# Patient Record
Sex: FEMALE | Race: WHITE | NOT HISPANIC OR LATINO | Employment: OTHER | URBAN - METROPOLITAN AREA
[De-identification: names, ages, dates, MRNs, and addresses within clinical notes are randomized per-mention and may not be internally consistent; named-entity substitution may affect disease eponyms.]

---

## 2017-01-23 RX ORDER — VALSARTAN 320 MG
TABLET ORAL
Qty: 90 TABLET | Refills: 1 | Status: SHIPPED | OUTPATIENT
Start: 2017-01-23 | End: 2017-09-30 | Stop reason: SDUPTHER

## 2017-01-23 RX ORDER — FUROSEMIDE 20 MG/1
TABLET ORAL
Qty: 180 TABLET | Refills: 1 | Status: SHIPPED | OUTPATIENT
Start: 2017-01-23 | End: 2017-07-25

## 2017-01-23 RX ORDER — FLUTICASONE PROPIONATE 50 MCG
SPRAY, SUSPENSION (ML) NASAL
Qty: 16 G | Refills: 1 | Status: SHIPPED | OUTPATIENT
Start: 2017-01-23 | End: 2017-07-31 | Stop reason: SDUPTHER

## 2017-01-23 RX ORDER — NIFEDIPINE 30 MG/1
TABLET, FILM COATED, EXTENDED RELEASE ORAL
Qty: 90 TABLET | Refills: 1 | Status: SHIPPED | OUTPATIENT
Start: 2017-01-23 | End: 2017-08-18 | Stop reason: SDUPTHER

## 2017-01-23 RX ORDER — POTASSIUM CHLORIDE 750 MG/1
TABLET, FILM COATED, EXTENDED RELEASE ORAL
Qty: 90 TABLET | Refills: 1 | Status: SHIPPED | OUTPATIENT
Start: 2017-01-23 | End: 2017-02-16

## 2017-01-23 RX ORDER — CYCLOBENZAPRINE HCL 10 MG
TABLET ORAL
Qty: 30 TABLET | Refills: 0 | Status: SHIPPED | OUTPATIENT
Start: 2017-01-23 | End: 2017-02-22 | Stop reason: SDUPTHER

## 2017-01-23 RX ORDER — ALLOPURINOL 300 MG/1
TABLET ORAL
Qty: 90 TABLET | Refills: 1 | Status: SHIPPED | OUTPATIENT
Start: 2017-01-23 | End: 2017-09-30 | Stop reason: SDUPTHER

## 2017-02-16 ENCOUNTER — OFFICE VISIT (OUTPATIENT)
Dept: FAMILY MEDICINE CLINIC | Facility: CLINIC | Age: 77
End: 2017-02-16

## 2017-02-16 VITALS
WEIGHT: 186.6 LBS | HEIGHT: 62 IN | HEART RATE: 82 BPM | BODY MASS INDEX: 34.34 KG/M2 | TEMPERATURE: 98 F | RESPIRATION RATE: 18 BRPM | SYSTOLIC BLOOD PRESSURE: 140 MMHG | OXYGEN SATURATION: 92 % | DIASTOLIC BLOOD PRESSURE: 70 MMHG

## 2017-02-16 DIAGNOSIS — R19.7 DIARRHEA, UNSPECIFIED TYPE: Primary | ICD-10-CM

## 2017-02-16 DIAGNOSIS — I10 ESSENTIAL HYPERTENSION: ICD-10-CM

## 2017-02-16 PROCEDURE — 99213 OFFICE O/P EST LOW 20 MIN: CPT | Performed by: INTERNAL MEDICINE

## 2017-02-16 NOTE — PROGRESS NOTES
"Subjective   Patient ID: Miri Yung is a 76 y.o. female presents with   Chief Complaint   Patient presents with   • Diarrhea       HPI - this patient presents today with complaints of loose stool off and on for the last few weeks.  She's had no fever or abdominal pain or cramps or blood.  Normal colonoscopy back in 2014.  She is taking magnesium and she drinks a lot of milk.    Assessment plan    Diarrhea-recommend going on a lactose-free diet for a week and see if that makes a diagnosis if not hold the magnesium.    Hypertension controlled with current regimen.    Allergies   Allergen Reactions   • Decongestant  [Pseudoephedrine]        The following portions of the patient's history were reviewed and updated as appropriate: allergies, current medications, past family history, past medical history, past social history, past surgical history and problem list.      Review of Systems   Constitutional: Negative.  Negative for fever.   Gastrointestinal: Positive for diarrhea. Negative for abdominal pain, blood in stool, constipation, nausea and vomiting.       Objective     Vitals:    02/16/17 1002   BP: 140/70   Pulse: 82   Resp: 18   Temp: 98 °F (36.7 °C)   TempSrc: Oral   SpO2: 92%   Weight: 186 lb 9.6 oz (84.6 kg)   Height: 61.5\" (156.2 cm)         Physical Exam   Constitutional: She appears well-developed and well-nourished.   Cardiovascular: Normal rate, regular rhythm and normal heart sounds.    Pulmonary/Chest: Effort normal and breath sounds normal.   Abdominal: Soft. She exhibits no distension. There is no tenderness.   Psychiatric: She has a normal mood and affect. Her behavior is normal.   Nursing note and vitals reviewed.        Miri was seen today for diarrhea.    Diagnoses and all orders for this visit:    Diarrhea, unspecified type    Essential hypertension        Call or return to clinic prn if these symptoms worsen or fail to improve as anticipated.  "

## 2017-02-22 RX ORDER — CYCLOBENZAPRINE HCL 10 MG
10 TABLET ORAL 3 TIMES DAILY PRN
Qty: 90 TABLET | Refills: 0 | Status: SHIPPED | OUTPATIENT
Start: 2017-02-22 | End: 2017-04-28 | Stop reason: SDUPTHER

## 2017-04-10 RX ORDER — METOPROLOL SUCCINATE 100 MG/1
TABLET, EXTENDED RELEASE ORAL
Qty: 90 TABLET | Refills: 1 | Status: SHIPPED | OUTPATIENT
Start: 2017-04-10 | End: 2017-08-24 | Stop reason: ALTCHOICE

## 2017-05-01 RX ORDER — CYCLOBENZAPRINE HCL 10 MG
TABLET ORAL
Qty: 90 TABLET | Refills: 0 | Status: SHIPPED | OUTPATIENT
Start: 2017-05-01 | End: 2017-07-31 | Stop reason: SDUPTHER

## 2017-05-22 ENCOUNTER — OFFICE VISIT (OUTPATIENT)
Dept: FAMILY MEDICINE CLINIC | Facility: CLINIC | Age: 77
End: 2017-05-22

## 2017-05-22 VITALS
BODY MASS INDEX: 35.9 KG/M2 | OXYGEN SATURATION: 90 % | RESPIRATION RATE: 18 BRPM | WEIGHT: 195.1 LBS | TEMPERATURE: 98.4 F | HEART RATE: 78 BPM | HEIGHT: 62 IN | SYSTOLIC BLOOD PRESSURE: 124 MMHG | DIASTOLIC BLOOD PRESSURE: 68 MMHG

## 2017-05-22 DIAGNOSIS — R60.9 EDEMA, UNSPECIFIED TYPE: ICD-10-CM

## 2017-05-22 DIAGNOSIS — D50.0 IRON DEFICIENCY ANEMIA DUE TO CHRONIC BLOOD LOSS: ICD-10-CM

## 2017-05-22 DIAGNOSIS — I10 ESSENTIAL HYPERTENSION: Primary | ICD-10-CM

## 2017-05-22 PROCEDURE — 99213 OFFICE O/P EST LOW 20 MIN: CPT | Performed by: INTERNAL MEDICINE

## 2017-05-23 LAB
ALBUMIN SERPL-MCNC: 4.2 G/DL (ref 3.5–5.2)
ALBUMIN/GLOB SERPL: 2.1 G/DL
ALP SERPL-CCNC: 134 U/L (ref 39–117)
ALT SERPL-CCNC: 60 U/L (ref 1–33)
AST SERPL-CCNC: 64 U/L (ref 1–32)
BASOPHILS # BLD AUTO: 0.07 10*3/MM3 (ref 0–0.2)
BASOPHILS NFR BLD AUTO: 0.7 % (ref 0–1.5)
BILIRUB SERPL-MCNC: 0.4 MG/DL (ref 0.1–1.2)
BUN SERPL-MCNC: 10 MG/DL (ref 8–23)
BUN/CREAT SERPL: 14.1 (ref 7–25)
CALCIUM SERPL-MCNC: 9.4 MG/DL (ref 8.6–10.5)
CHLORIDE SERPL-SCNC: 100 MMOL/L (ref 98–107)
CO2 SERPL-SCNC: 29.9 MMOL/L (ref 22–29)
CREAT SERPL-MCNC: 0.71 MG/DL (ref 0.57–1)
EOSINOPHIL # BLD AUTO: 0.22 10*3/MM3 (ref 0–0.7)
EOSINOPHIL NFR BLD AUTO: 2.1 % (ref 0.3–6.2)
ERYTHROCYTE [DISTWIDTH] IN BLOOD BY AUTOMATED COUNT: 14.2 % (ref 11.7–13)
GLOBULIN SER CALC-MCNC: 2 GM/DL
GLUCOSE SERPL-MCNC: 120 MG/DL (ref 65–99)
HCT VFR BLD AUTO: 43 % (ref 35.6–45.5)
HGB BLD-MCNC: 13.5 G/DL (ref 11.9–15.5)
IMM GRANULOCYTES # BLD: 0.03 10*3/MM3 (ref 0–0.03)
IMM GRANULOCYTES NFR BLD: 0.3 % (ref 0–0.5)
LYMPHOCYTES # BLD AUTO: 3.07 10*3/MM3 (ref 0.9–4.8)
LYMPHOCYTES NFR BLD AUTO: 29.7 % (ref 19.6–45.3)
MCH RBC QN AUTO: 29.2 PG (ref 26.9–32)
MCHC RBC AUTO-ENTMCNC: 31.4 G/DL (ref 32.4–36.3)
MCV RBC AUTO: 92.9 FL (ref 80.5–98.2)
MONOCYTES # BLD AUTO: 0.75 10*3/MM3 (ref 0.2–1.2)
MONOCYTES NFR BLD AUTO: 7.3 % (ref 5–12)
NEUTROPHILS # BLD AUTO: 6.18 10*3/MM3 (ref 1.9–8.1)
NEUTROPHILS NFR BLD AUTO: 59.9 % (ref 42.7–76)
PLATELET # BLD AUTO: 199 10*3/MM3 (ref 140–500)
POTASSIUM SERPL-SCNC: 4.4 MMOL/L (ref 3.5–5.2)
PROT SERPL-MCNC: 6.2 G/DL (ref 6–8.5)
RBC # BLD AUTO: 4.63 10*6/MM3 (ref 3.9–5.2)
SODIUM SERPL-SCNC: 143 MMOL/L (ref 136–145)
T4 FREE SERPL-MCNC: 1.08 NG/DL (ref 0.93–1.7)
TSH SERPL DL<=0.005 MIU/L-ACNC: 3.44 MIU/ML (ref 0.27–4.2)
WBC # BLD AUTO: 10.32 10*3/MM3 (ref 4.5–10.7)

## 2017-05-25 ENCOUNTER — TELEPHONE (OUTPATIENT)
Dept: FAMILY MEDICINE CLINIC | Facility: CLINIC | Age: 77
End: 2017-05-25

## 2017-06-16 RX ORDER — PANTOPRAZOLE SODIUM 40 MG/1
TABLET, DELAYED RELEASE ORAL
Qty: 90 TABLET | Refills: 1 | Status: SHIPPED | OUTPATIENT
Start: 2017-06-16 | End: 2017-09-30 | Stop reason: SDUPTHER

## 2017-06-16 RX ORDER — ISOSORBIDE MONONITRATE 30 MG/1
TABLET, EXTENDED RELEASE ORAL
Qty: 90 TABLET | Refills: 1 | Status: SHIPPED | OUTPATIENT
Start: 2017-06-16 | End: 2017-09-30 | Stop reason: SDUPTHER

## 2017-07-24 ENCOUNTER — TELEPHONE (OUTPATIENT)
Dept: FAMILY MEDICINE CLINIC | Facility: CLINIC | Age: 77
End: 2017-07-24

## 2017-07-25 ENCOUNTER — OFFICE VISIT (OUTPATIENT)
Dept: FAMILY MEDICINE CLINIC | Facility: CLINIC | Age: 77
End: 2017-07-25

## 2017-07-25 VITALS
DIASTOLIC BLOOD PRESSURE: 80 MMHG | BODY MASS INDEX: 35.5 KG/M2 | SYSTOLIC BLOOD PRESSURE: 142 MMHG | TEMPERATURE: 98.2 F | OXYGEN SATURATION: 91 % | RESPIRATION RATE: 18 BRPM | HEIGHT: 62 IN | HEART RATE: 70 BPM | WEIGHT: 192.9 LBS

## 2017-07-25 DIAGNOSIS — R39.9 UTI SYMPTOMS: Primary | ICD-10-CM

## 2017-07-25 DIAGNOSIS — I10 ESSENTIAL HYPERTENSION: ICD-10-CM

## 2017-07-25 DIAGNOSIS — N30.00 ACUTE CYSTITIS WITHOUT HEMATURIA: Primary | ICD-10-CM

## 2017-07-25 DIAGNOSIS — R60.9 EDEMA, UNSPECIFIED TYPE: ICD-10-CM

## 2017-07-25 PROBLEM — R19.7 DIARRHEA: Status: RESOLVED | Noted: 2017-02-16 | Resolved: 2017-07-25

## 2017-07-25 LAB
BILIRUB BLD-MCNC: NEGATIVE MG/DL
CLARITY, POC: ABNORMAL
COLOR UR: ABNORMAL
GLUCOSE UR STRIP-MCNC: NEGATIVE MG/DL
KETONES UR QL: NEGATIVE
LEUKOCYTE EST, POC: ABNORMAL
NITRITE UR-MCNC: NEGATIVE MG/ML
PH UR: 5.5 [PH] (ref 5–8)
PROT UR STRIP-MCNC: NEGATIVE MG/DL
RBC # UR STRIP: NEGATIVE /UL
SP GR UR: 1.01 (ref 1–1.03)
UROBILINOGEN UR QL: NORMAL

## 2017-07-25 PROCEDURE — 99213 OFFICE O/P EST LOW 20 MIN: CPT | Performed by: INTERNAL MEDICINE

## 2017-07-25 PROCEDURE — 81003 URINALYSIS AUTO W/O SCOPE: CPT | Performed by: INTERNAL MEDICINE

## 2017-07-25 RX ORDER — SULFAMETHOXAZOLE AND TRIMETHOPRIM 400; 80 MG/1; MG/1
1 TABLET ORAL 2 TIMES DAILY
Qty: 10 TABLET | Refills: 0 | Status: SHIPPED | OUTPATIENT
Start: 2017-07-25 | End: 2017-08-24

## 2017-07-25 NOTE — PROGRESS NOTES
"Subjective   Patient ID: Miri Yung is a 76 y.o. female presents with   Chief Complaint   Patient presents with   • Sinusitis     ears hurt, head stuffy, eye pain due to stuffy head   • Urinary Tract Infection     burning       HPI - This patient has a history of edema she presents today with dysuria we did a urinalysis grossly suggestive of a UTI she's having frequent dysuria going on for at least 2 weeks.  Also she's been eating a lot of salt and she's has more edema than usual.  No shortness of breath    Assessment plan    Acute cystitis without hematuria Bactrim single strength 1 by mouth twice a day ×5 days    Hypertension controlled with current agents    Edema Lasix 40 mg daily and reiterated salt restriction.    Allergies   Allergen Reactions   • Decongestant  [Pseudoephedrine]        The following portions of the patient's history were reviewed and updated as appropriate: allergies, current medications, past family history, past medical history, past social history, past surgical history and problem list.      Review of Systems   Constitutional: Positive for fatigue.   Respiratory: Negative.  Negative for shortness of breath.    Cardiovascular: Positive for leg swelling. Negative for chest pain.   Genitourinary: Positive for dysuria.       Objective     Vitals:    07/25/17 1308   BP: 142/80   Pulse: 70   Resp: 18   Temp: 98.2 °F (36.8 °C)   TempSrc: Oral   SpO2: 91%   Weight: 192 lb 14.4 oz (87.5 kg)   Height: 61.5\" (156.2 cm)         Physical Exam   Constitutional: She is oriented to person, place, and time. She appears well-developed and well-nourished.   HENT:   Head: Normocephalic and atraumatic.   Cardiovascular: Normal rate, regular rhythm and normal heart sounds.    Pulmonary/Chest: Effort normal and breath sounds normal.   Musculoskeletal: She exhibits edema.   Neurological: She is alert and oriented to person, place, and time.   Nursing note and vitals reviewed.        Miri was seen today for " sinusitis and urinary tract infection.    Diagnoses and all orders for this visit:    Acute cystitis without hematuria    Essential hypertension    Edema, unspecified type    Other orders  -     sulfamethoxazole-trimethoprim (BACTRIM,SEPTRA) 400-80 MG tablet; Take 1 tablet by mouth 2 (Two) Times a Day.        Call or return to clinic prn if these symptoms worsen or fail to improve as anticipated.

## 2017-07-29 LAB
BACTERIA UR CULT: ABNORMAL
BACTERIA UR CULT: ABNORMAL
OTHER ANTIBIOTIC SUSC ISLT: ABNORMAL

## 2017-07-31 RX ORDER — CYCLOBENZAPRINE HCL 10 MG
10 TABLET ORAL 3 TIMES DAILY PRN
Qty: 90 TABLET | Refills: 0 | Status: SHIPPED | OUTPATIENT
Start: 2017-07-31 | End: 2017-10-03 | Stop reason: SDUPTHER

## 2017-07-31 RX ORDER — FLUTICASONE PROPIONATE 50 MCG
2 SPRAY, SUSPENSION (ML) NASAL DAILY
Qty: 16 G | Refills: 1 | Status: SHIPPED | OUTPATIENT
Start: 2017-07-31 | End: 2017-09-30 | Stop reason: SDUPTHER

## 2017-08-18 RX ORDER — NIFEDIPINE 30 MG/1
TABLET, FILM COATED, EXTENDED RELEASE ORAL
Qty: 90 TABLET | Refills: 0 | Status: SHIPPED | OUTPATIENT
Start: 2017-08-18 | End: 2017-11-18 | Stop reason: SDUPTHER

## 2017-08-24 ENCOUNTER — OFFICE VISIT (OUTPATIENT)
Dept: CARDIOLOGY | Facility: CLINIC | Age: 77
End: 2017-08-24

## 2017-08-24 VITALS
DIASTOLIC BLOOD PRESSURE: 78 MMHG | WEIGHT: 191 LBS | SYSTOLIC BLOOD PRESSURE: 132 MMHG | HEIGHT: 61 IN | BODY MASS INDEX: 36.06 KG/M2 | HEART RATE: 71 BPM

## 2017-08-24 DIAGNOSIS — I34.0 NON-RHEUMATIC MITRAL REGURGITATION: Primary | ICD-10-CM

## 2017-08-24 DIAGNOSIS — I38 VALVULAR ENDOCARDITIS: ICD-10-CM

## 2017-08-24 DIAGNOSIS — I36.1 NON-RHEUMATIC TRICUSPID VALVE INSUFFICIENCY: ICD-10-CM

## 2017-08-24 DIAGNOSIS — I27.20 PULMONARY HYPERTENSION (HCC): ICD-10-CM

## 2017-08-24 PROCEDURE — 99214 OFFICE O/P EST MOD 30 MIN: CPT | Performed by: INTERNAL MEDICINE

## 2017-08-24 PROCEDURE — 93000 ELECTROCARDIOGRAM COMPLETE: CPT | Performed by: INTERNAL MEDICINE

## 2017-08-24 NOTE — PROGRESS NOTES
Date of Office Visit: 2017  Encounter Provider: Jignesh York MD  Place of Service: [facility] CARDIOLOGY  Patient Name: Miri Yung  :1940      Chief Complaint   Patient presents with   • Atrial Fibrillation     History of Present Illness    The patient is a 76-year-old white female with a history of paroxysmal atrial fibrillation mitral and tricuspid valve regurgitation and hypertension.  She remains in sinus rhythm.  She has noted some lower extremity edema that is intermittent but may be related to her salt intake.  She has a remote history of endocarditis but did not require valvular replacement.  At this point she has no signs of infection.  Her last echocardiogram was in  revealing moderate mitral regurgitation as well as mild tricuspid regurgitation.  Her right ventricular systolic pressure was mildly elevated at 40.    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   • Endocarditis     with mitral and tricuspid regurgitation   • Esophageal reflux    • Gout    • History of blood transfusion     due to anemia   • HTN (hypertension)    • Hyperlipidemia    • Insomnia    • LOM (loss of memory)    • Lung mass    • 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; generally asymptomatic   • Mitral regurgitation    • Osteoarthritis    • Sacroiliitis    • Tricuspid regurgitation          Past Surgical History:   Procedure Laterality Date   • CATARACT EXTRACTION     • COLONOSCOPY N/A 3/10/2016    Procedure: COLONOSCOPY TO CECUM;  Surgeon: Shaan Becerra Jr., MD;  Location: [facility] ENDOSCOPY;  Service:    • ENDOSCOPY N/A 3/10/2016    Procedure: ESOPHAGOGASTRODUODENOSCOPY ;  Surgeon: Shaan Becerra Jr., MD;  Location: [facility] ENDOSCOPY;  Service:    • KNEE SURGERY     • TUBAL ABDOMINAL LIGATION             Current Outpatient  Prescriptions:   •  acetaminophen (TYLENOL) 500 MG tablet, Take 500 mg by mouth every 4 (four) hours as needed for mild pain (1-3) (1-2 TABLETS PRN Q 4-6 HOURS)., Disp: , Rfl:   •  allopurinol (ZYLOPRIM) 300 MG tablet, Take 1 tablet by mouth  daily, Disp: 90 tablet, Rfl: 1  •  brimonidine (ALPHAGAN) 0.2 % ophthalmic solution, , Disp: , Rfl:   •  cyclobenzaprine (FLEXERIL) 10 MG tablet, Take 1 tablet by mouth 3 (Three) Times a Day As Needed for Muscle Spasms., Disp: 90 tablet, Rfl: 0  •  CYMBALTA 60 MG capsule, Take 1 capsule by mouth  daily, Disp: 90 capsule, Rfl: 1  •  DIOVAN 320 MG tablet, Take 1 tablet by mouth  daily, Disp: 90 tablet, Rfl: 1  •  diphenhydrAMINE (BENADRYL) 25 MG tablet, Take 25 mg by mouth every 6 (six) hours as needed for itching., Disp: , Rfl:   •  fluticasone (FLONASE) 50 MCG/ACT nasal spray, 2 sprays into each nostril Daily., Disp: 16 g, Rfl: 1  •  furosemide (LASIX) 20 MG tablet, Take 2 tablets by mouth daily. Take in the morning, Disp: 60 tablet, Rfl: 5  •  isosorbide mononitrate (IMDUR) 30 MG 24 hr tablet, Take 1 tablet by mouth  daily at night for blood  pressure, Disp: 90 tablet, Rfl: 1  •  Multiple Vitamins-Minerals (MULTI FOR HER 50+) capsule, Take 1 capsule by mouth daily., Disp: , Rfl:   •  NIFEdipine CC (ADALAT CC) 30 MG 24 hr tablet, Take 1 tablet by mouth  daily, Disp: 90 tablet, Rfl: 0  •  pantoprazole (PROTONIX) 40 MG EC tablet, Take 1 tablet by mouth  daily, Disp: 90 tablet, Rfl: 1  •  potassium chloride (K-DUR,KLOR-CON) 10 MEQ CR tablet, Take 1 tablet by mouth every morning., Disp: 90 tablet, Rfl: 3      Social History     Social History   • Marital status:      Spouse name: N/A   • Number of children: N/A   • Years of education: N/A     Occupational History   • Not on file.     Social History Main Topics   • Smoking status: Never Smoker   • Smokeless tobacco: Never Used   • Alcohol use No   • Drug use: No   • Sexual activity: Defer     Other Topics Concern   • Not on  "file     Social History Narrative         Review of Systems   Constitution: Positive for malaise/fatigue.   HENT: Negative.    Eyes: Negative.    Cardiovascular: Positive for dyspnea on exertion and leg swelling.   Respiratory: Negative.    Endocrine: Negative.    Skin: Negative.    Musculoskeletal: Negative.    Gastrointestinal: Negative.    Neurological: Negative.    Psychiatric/Behavioral: Negative.        Procedures      ECG 12 Lead  Date/Time: 8/24/2017 3:36 PM  Performed by: KRISTA MARRERO  Authorized by: KRISTA MARRERO   Comparison: compared with previous ECG from 12/14/2016  Similar to previous ECG  Rhythm: sinus rhythm  Rate: normal  Conduction: conduction normal  QRS axis: normal  Comments: Nonspecific ST-T wave changes               Objective:    /78  Pulse 71  Ht 61\" (154.9 cm)  Wt 191 lb (86.6 kg)  BMI 36.09 kg/m2        Physical Exam   Constitutional: She is oriented to person, place, and time. She appears well-developed and well-nourished.   HENT:   Head: Normocephalic.   Eyes: Pupils are equal, round, and reactive to light.   Neck: Normal range of motion. No JVD present. Carotid bruit is not present. No thyromegaly present.   Cardiovascular: Normal rate, regular rhythm, S1 normal, S2 normal and intact distal pulses.  Exam reveals no gallop and no friction rub.    Murmur heard.  High-pitched blowing holosystolic murmur is present with a grade of 1/6  at the apex  Pulmonary/Chest: Effort normal and breath sounds normal.   Abdominal: Soft. Bowel sounds are normal.   Musculoskeletal: She exhibits edema ( 1+ ankle edema).   Neurological: She is alert and oriented to person, place, and time.   Skin: Skin is warm, dry and intact. No erythema.   Psychiatric: She has a normal mood and affect.   Vitals reviewed.          Assessment:       Diagnosis Plan   1. Non-rheumatic mitral regurgitation     2. Non-rheumatic tricuspid valve insufficiency     3. Pulmonary hypertension     4. Valvular " endocarditis     5.  Paroxysmal atrial fibrillation: Resolved         Plan:       I'm not making any changes in her medication today.  I have encouraged her to refrain from salt.  She leads an extremely sedentary life and indicates that she may have no interest in trying to improve that.  I will plan on seeing her back in 6 months unless her situation changes.

## 2017-09-15 RX ORDER — DULOXETIN HYDROCHLORIDE 60 MG/1
CAPSULE, DELAYED RELEASE ORAL
Qty: 90 CAPSULE | Refills: 1 | Status: SHIPPED | OUTPATIENT
Start: 2017-09-15 | End: 2018-02-20 | Stop reason: SDUPTHER

## 2017-10-02 RX ORDER — FUROSEMIDE 20 MG/1
TABLET ORAL
Qty: 180 TABLET | Refills: 2 | Status: SHIPPED | OUTPATIENT
Start: 2017-10-02 | End: 2018-10-18 | Stop reason: SDUPTHER

## 2017-10-02 RX ORDER — ISOSORBIDE MONONITRATE 30 MG/1
TABLET, EXTENDED RELEASE ORAL
Qty: 90 TABLET | Refills: 2 | Status: SHIPPED | OUTPATIENT
Start: 2017-10-02 | End: 2018-02-28 | Stop reason: SDUPTHER

## 2017-10-02 RX ORDER — PANTOPRAZOLE SODIUM 40 MG/1
TABLET, DELAYED RELEASE ORAL
Qty: 90 TABLET | Refills: 2 | Status: SHIPPED | OUTPATIENT
Start: 2017-10-02 | End: 2018-10-18 | Stop reason: SDUPTHER

## 2017-10-02 RX ORDER — POTASSIUM CHLORIDE 750 MG/1
TABLET, FILM COATED, EXTENDED RELEASE ORAL
Qty: 90 TABLET | Refills: 2 | Status: SHIPPED | OUTPATIENT
Start: 2017-10-02 | End: 2018-10-18 | Stop reason: SDUPTHER

## 2017-10-02 RX ORDER — FLUTICASONE PROPIONATE 50 MCG
SPRAY, SUSPENSION (ML) NASAL
Qty: 32 G | Refills: 2 | Status: SHIPPED | OUTPATIENT
Start: 2017-10-02 | End: 2019-02-12 | Stop reason: SDUPTHER

## 2017-10-02 RX ORDER — ALLOPURINOL 300 MG/1
TABLET ORAL
Qty: 90 TABLET | Refills: 2 | Status: SHIPPED | OUTPATIENT
Start: 2017-10-02 | End: 2018-10-18 | Stop reason: SDUPTHER

## 2017-10-02 RX ORDER — VALSARTAN 320 MG/1
TABLET ORAL
Qty: 90 TABLET | Refills: 2 | Status: SHIPPED | OUTPATIENT
Start: 2017-10-02 | End: 2018-08-15

## 2017-10-02 RX ORDER — METOPROLOL SUCCINATE 100 MG/1
TABLET, EXTENDED RELEASE ORAL
Qty: 90 TABLET | Refills: 2 | Status: SHIPPED | OUTPATIENT
Start: 2017-10-02 | End: 2018-10-18 | Stop reason: SDUPTHER

## 2017-10-03 RX ORDER — CYCLOBENZAPRINE HCL 10 MG
10 TABLET ORAL 3 TIMES DAILY PRN
Qty: 90 TABLET | Refills: 0 | Status: SHIPPED | OUTPATIENT
Start: 2017-10-03 | End: 2018-04-05 | Stop reason: SDUPTHER

## 2017-11-20 RX ORDER — NIFEDIPINE 30 MG/1
TABLET, FILM COATED, EXTENDED RELEASE ORAL
Qty: 90 TABLET | Refills: 1 | Status: SHIPPED | OUTPATIENT
Start: 2017-11-20 | End: 2018-05-15 | Stop reason: SDUPTHER

## 2018-01-03 RX ORDER — CYCLOBENZAPRINE HCL 10 MG
TABLET ORAL
Qty: 90 TABLET | Refills: 0 | Status: SHIPPED | OUTPATIENT
Start: 2018-01-03 | End: 2018-02-28 | Stop reason: SDUPTHER

## 2018-02-20 RX ORDER — DULOXETIN HYDROCHLORIDE 60 MG/1
CAPSULE, DELAYED RELEASE ORAL
Qty: 90 CAPSULE | Refills: 0 | Status: SHIPPED | OUTPATIENT
Start: 2018-02-20 | End: 2018-05-15 | Stop reason: SDUPTHER

## 2018-02-28 ENCOUNTER — OFFICE VISIT (OUTPATIENT)
Dept: FAMILY MEDICINE CLINIC | Facility: CLINIC | Age: 78
End: 2018-02-28

## 2018-02-28 VITALS
BODY MASS INDEX: 35.5 KG/M2 | HEIGHT: 61 IN | OXYGEN SATURATION: 91 % | WEIGHT: 188 LBS | SYSTOLIC BLOOD PRESSURE: 150 MMHG | DIASTOLIC BLOOD PRESSURE: 52 MMHG | HEART RATE: 100 BPM

## 2018-02-28 DIAGNOSIS — I10 ESSENTIAL HYPERTENSION: ICD-10-CM

## 2018-02-28 DIAGNOSIS — E78.00 PURE HYPERCHOLESTEROLEMIA: Primary | ICD-10-CM

## 2018-02-28 DIAGNOSIS — G89.29 CHRONIC MIDLINE LOW BACK PAIN WITHOUT SCIATICA: ICD-10-CM

## 2018-02-28 DIAGNOSIS — D50.0 IRON DEFICIENCY ANEMIA DUE TO CHRONIC BLOOD LOSS: ICD-10-CM

## 2018-02-28 DIAGNOSIS — M54.50 CHRONIC MIDLINE LOW BACK PAIN WITHOUT SCIATICA: ICD-10-CM

## 2018-02-28 DIAGNOSIS — R53.82 CHRONIC FATIGUE: ICD-10-CM

## 2018-02-28 LAB
ALBUMIN SERPL-MCNC: 4.5 G/DL (ref 3.5–5.2)
ALBUMIN/GLOB SERPL: 2.1 G/DL
ALP SERPL-CCNC: 147 U/L (ref 39–117)
ALT SERPL-CCNC: 59 U/L (ref 1–33)
AST SERPL-CCNC: 62 U/L (ref 1–32)
BASOPHILS # BLD AUTO: 0.08 10*3/MM3 (ref 0–0.2)
BASOPHILS NFR BLD AUTO: 0.6 % (ref 0–1.5)
BILIRUB SERPL-MCNC: 1 MG/DL (ref 0.1–1.2)
BUN SERPL-MCNC: 12 MG/DL (ref 8–23)
BUN/CREAT SERPL: 16.7 (ref 7–25)
CALCIUM SERPL-MCNC: 9.4 MG/DL (ref 8.6–10.5)
CHLORIDE SERPL-SCNC: 97 MMOL/L (ref 98–107)
CHOLEST SERPL-MCNC: 152 MG/DL (ref 0–200)
CO2 SERPL-SCNC: 29.3 MMOL/L (ref 22–29)
CREAT SERPL-MCNC: 0.72 MG/DL (ref 0.57–1)
EOSINOPHIL # BLD AUTO: 0.21 10*3/MM3 (ref 0–0.7)
EOSINOPHIL NFR BLD AUTO: 1.6 % (ref 0.3–6.2)
ERYTHROCYTE [DISTWIDTH] IN BLOOD BY AUTOMATED COUNT: 13.7 % (ref 11.7–13)
GFR SERPLBLD CREATININE-BSD FMLA CKD-EPI: 78 ML/MIN/1.73
GFR SERPLBLD CREATININE-BSD FMLA CKD-EPI: 95 ML/MIN/1.73
GLOBULIN SER CALC-MCNC: 2.1 GM/DL
GLUCOSE SERPL-MCNC: 142 MG/DL (ref 65–99)
HCT VFR BLD AUTO: 50.7 % (ref 35.6–45.5)
HDLC SERPL-MCNC: 33 MG/DL (ref 40–60)
HGB BLD-MCNC: 16 G/DL (ref 11.9–15.5)
IMM GRANULOCYTES # BLD: 0.05 10*3/MM3 (ref 0–0.03)
IMM GRANULOCYTES NFR BLD: 0.4 % (ref 0–0.5)
LDLC SERPL CALC-MCNC: 83 MG/DL (ref 0–100)
LYMPHOCYTES # BLD AUTO: 3.53 10*3/MM3 (ref 0.9–4.8)
LYMPHOCYTES NFR BLD AUTO: 27.3 % (ref 19.6–45.3)
MCH RBC QN AUTO: 30.4 PG (ref 26.9–32)
MCHC RBC AUTO-ENTMCNC: 31.6 G/DL (ref 32.4–36.3)
MCV RBC AUTO: 96.4 FL (ref 80.5–98.2)
MONOCYTES # BLD AUTO: 0.98 10*3/MM3 (ref 0.2–1.2)
MONOCYTES NFR BLD AUTO: 7.6 % (ref 5–12)
NEUTROPHILS # BLD AUTO: 8.07 10*3/MM3 (ref 1.9–8.1)
NEUTROPHILS NFR BLD AUTO: 62.5 % (ref 42.7–76)
PLATELET # BLD AUTO: 198 10*3/MM3 (ref 140–500)
POTASSIUM SERPL-SCNC: 4.1 MMOL/L (ref 3.5–5.2)
PROT SERPL-MCNC: 6.6 G/DL (ref 6–8.5)
RBC # BLD AUTO: 5.26 10*6/MM3 (ref 3.9–5.2)
SODIUM SERPL-SCNC: 144 MMOL/L (ref 136–145)
T4 FREE SERPL-MCNC: 1.22 NG/DL (ref 0.93–1.7)
TRIGL SERPL-MCNC: 179 MG/DL (ref 0–150)
TSH SERPL DL<=0.005 MIU/L-ACNC: 5.21 MIU/ML (ref 0.27–4.2)
VLDLC SERPL CALC-MCNC: 35.8 MG/DL (ref 5–40)
WBC # BLD AUTO: 12.92 10*3/MM3 (ref 4.5–10.7)

## 2018-02-28 PROCEDURE — 99214 OFFICE O/P EST MOD 30 MIN: CPT | Performed by: INTERNAL MEDICINE

## 2018-02-28 RX ORDER — ISOSORBIDE MONONITRATE 60 MG/1
60 TABLET, EXTENDED RELEASE ORAL DAILY
Qty: 90 TABLET | Refills: 1 | Status: SHIPPED | OUTPATIENT
Start: 2018-02-28 | End: 2018-03-28 | Stop reason: SDUPTHER

## 2018-02-28 NOTE — PROGRESS NOTES
"Subjective   Patient ID: Miri Yung is a 77 y.o. female presents with   Chief Complaint   Patient presents with   • Edema     bilateral ankles - redness    • Sinusitis   • Back Pain       HPI - This patient presents today for treatment of hypercholesterolemia hypertension low back pain history of anemia and fatigue she also has some low back pain has been going on for a few months.  She has slipped off her low-salt diet and her blood pressures she has lower extremity edema.    Assessment plan    Hypertension and lower extremity edema she sees cardiology.  Gone increase her isosorbide from 30-60 and I instructed her to stop using salt again.  Continue Lasix 40 mg we'll get a metabolic panel    Chronic back pain x-ray of the lumbar spine    History of anemia blood count     fatigue fatigue labs    Hypercholesterolemia fasting lipid profile        Allergies   Allergen Reactions   • Decongestant  [Pseudoephedrine]        The following portions of the patient's history were reviewed and updated as appropriate: allergies, current medications, past family history, past medical history, past social history, past surgical history and problem list.      Review of Systems   Constitutional: Negative.    HENT: Negative.    Eyes: Negative.    Respiratory: Negative.    Cardiovascular: Positive for leg swelling.   Musculoskeletal: Positive for back pain.   Allergic/Immunologic: Negative.        Objective     Vitals:    02/28/18 0915   BP: 150/52   BP Location: Left arm   Patient Position: Sitting   Cuff Size: Adult   Pulse: 100   SpO2: 91%   Weight: 85.3 kg (188 lb)   Height: 154.9 cm (61\")         Physical Exam   Constitutional: She is oriented to person, place, and time. She appears well-developed and well-nourished. No distress.   HENT:   Head: Normocephalic and atraumatic.   Eyes: Conjunctivae and EOM are normal. Pupils are equal, round, and reactive to light. Right eye exhibits no discharge. Left eye exhibits no discharge. " No scleral icterus.   Neck: Normal range of motion. Neck supple. No tracheal deviation present. No thyromegaly present.   Cardiovascular: Normal rate, regular rhythm, normal heart sounds and normal pulses.  Exam reveals no gallop.    No murmur heard.  Pulmonary/Chest: Effort normal and breath sounds normal. No respiratory distress. She has no wheezes. She has no rales.   Musculoskeletal: Normal range of motion. She exhibits no tenderness.   Neurological: She is alert and oriented to person, place, and time.   Skin: Skin is warm and dry. No rash noted. No erythema. No pallor.   Psychiatric: She has a normal mood and affect. Her behavior is normal. Judgment and thought content normal.   Nursing note and vitals reviewed.        Miri was seen today for edema, sinusitis and back pain.    Diagnoses and all orders for this visit:    Pure hypercholesterolemia  -     Lipid Panel  -     CBC & Differential  -     Comprehensive Metabolic Panel  -     TSH+Free T4    Essential hypertension  -     Lipid Panel  -     CBC & Differential  -     Comprehensive Metabolic Panel  -     TSH+Free T4    Chronic midline low back pain without sciatica  -     Lipid Panel  -     CBC & Differential  -     Comprehensive Metabolic Panel  -     TSH+Free T4  -     XR Spine Lumbar 2 or 3 View    Iron deficiency anemia due to chronic blood loss  -     Lipid Panel  -     CBC & Differential  -     Comprehensive Metabolic Panel  -     TSH+Free T4    Chronic fatigue  -     Lipid Panel  -     CBC & Differential  -     Comprehensive Metabolic Panel  -     TSH+Free T4    Other orders  -     isosorbide mononitrate (IMDUR) 60 MG 24 hr tablet; Take 1 tablet by mouth Daily.        Call or return to clinic prn if these symptoms worsen or fail to improve as anticipated.

## 2018-03-12 ENCOUNTER — TELEPHONE (OUTPATIENT)
Dept: FAMILY MEDICINE CLINIC | Facility: CLINIC | Age: 78
End: 2018-03-12

## 2018-03-12 NOTE — TELEPHONE ENCOUNTER
Family called for pt. She has another sinus infection. I called the pt back and explained dr mahajan was out of office and we could get her in tomorrow or she could go to [facility]. She said she would just wait.

## 2018-03-28 RX ORDER — ISOSORBIDE MONONITRATE 60 MG/1
60 TABLET, EXTENDED RELEASE ORAL DAILY
Qty: 90 TABLET | Refills: 0 | Status: SHIPPED | OUTPATIENT
Start: 2018-03-28 | End: 2018-10-23 | Stop reason: SDUPTHER

## 2018-04-09 RX ORDER — CYCLOBENZAPRINE HCL 10 MG
TABLET ORAL
Qty: 90 TABLET | Refills: 0 | Status: SHIPPED | OUTPATIENT
Start: 2018-04-09 | End: 2018-08-01

## 2018-04-18 ENCOUNTER — OFFICE VISIT (OUTPATIENT)
Dept: CARDIOLOGY | Facility: CLINIC | Age: 78
End: 2018-04-18

## 2018-04-18 VITALS
WEIGHT: 190 LBS | HEIGHT: 62 IN | BODY MASS INDEX: 34.96 KG/M2 | DIASTOLIC BLOOD PRESSURE: 74 MMHG | HEART RATE: 78 BPM | SYSTOLIC BLOOD PRESSURE: 152 MMHG

## 2018-04-18 DIAGNOSIS — I10 ESSENTIAL HYPERTENSION: ICD-10-CM

## 2018-04-18 DIAGNOSIS — I36.1 NON-RHEUMATIC TRICUSPID VALVE INSUFFICIENCY: ICD-10-CM

## 2018-04-18 DIAGNOSIS — I38 VALVULAR ENDOCARDITIS: Primary | ICD-10-CM

## 2018-04-18 DIAGNOSIS — I34.0 NON-RHEUMATIC MITRAL REGURGITATION: ICD-10-CM

## 2018-04-18 PROCEDURE — 99214 OFFICE O/P EST MOD 30 MIN: CPT | Performed by: INTERNAL MEDICINE

## 2018-04-18 PROCEDURE — 93000 ELECTROCARDIOGRAM COMPLETE: CPT | Performed by: INTERNAL MEDICINE

## 2018-04-18 NOTE — PROGRESS NOTES
Date of Office Visit: 2018  Encounter Provider: Jignesh York MD  Place of Service: [facility] CARDIOLOGY  Patient Name: Miri Yung  :1940      Chief Complaint   Patient presents with   • Cardiac Valve Problem     History of Present Illness    The patient is a 77-year-old white female with a history of bacterial endocarditis.  As a result she has developed mitral and tricuspid regurgitation area did she did not require valvular replacement.  Her echocardiogram done in  was significant for moderate mitral regurgitation and mild tricuspid regurgitation.  She has borderline pulmonary hypertension.    Symptomatically the patient complains of intermittent peripheral edema but she is not always compliant with salt restriction.  She also complains of fatigue issues as well as some memory loss.    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   • Endocarditis     with mitral and tricuspid regurgitation   • Esophageal reflux    • Gout    • History of blood transfusion     due to anemia   • HTN (hypertension)    • Hyperlipidemia    • Insomnia    • LOM (loss of memory)    • Lung mass    • 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; generally asymptomatic   • Mitral regurgitation    • Osteoarthritis    • Sacroiliitis    • Tricuspid regurgitation          Past Surgical History:   Procedure Laterality Date   • CATARACT EXTRACTION     • COLONOSCOPY N/A 3/10/2016    Procedure: COLONOSCOPY TO CECUM;  Surgeon: Shaan Becerra Jr., MD;  Location: [facility] ENDOSCOPY;  Service:    • ENDOSCOPY N/A 3/10/2016    Procedure: ESOPHAGOGASTRODUODENOSCOPY ;  Surgeon: Shaan Becerra Jr., MD;  Location: [facility] ENDOSCOPY;  Service:    • KNEE SURGERY     • TUBAL ABDOMINAL LIGATION             Current Outpatient Prescriptions:   •  acetaminophen (TYLENOL) 500  MG tablet, Take 500 mg by mouth every 4 (four) hours as needed for mild pain (1-3) (1-2 TABLETS PRN Q 4-6 HOURS)., Disp: , Rfl:   •  allopurinol (ZYLOPRIM) 300 MG tablet, TAKE 1 TABLET BY MOUTH  DAILY, Disp: 90 tablet, Rfl: 2  •  brimonidine (ALPHAGAN) 0.2 % ophthalmic solution, , Disp: , Rfl:   •  cyclobenzaprine (FLEXERIL) 10 MG tablet, TAKE 1 TABLET BY MOUTH 3  TIMES A DAY AS NEEDED FOR  MUSCLE SPASM(S), Disp: 90 tablet, Rfl: 0  •  diphenhydrAMINE (BENADRYL) 25 MG tablet, Take 25 mg by mouth every 6 (six) hours as needed for itching., Disp: , Rfl:   •  DULoxetine (CYMBALTA) 60 MG capsule, TAKE 1 CAPSULE BY MOUTH  DAILY, Disp: 90 capsule, Rfl: 0  •  fluticasone (FLONASE) 50 MCG/ACT nasal spray, USE 2 SPRAYS IN EACH  NOSTRIL DAILY, Disp: 32 g, Rfl: 2  •  furosemide (LASIX) 20 MG tablet, TAKE 1 TABLET BY MOUTH 2  TIMES A DAY, Disp: 180 tablet, Rfl: 2  •  isosorbide mononitrate (IMDUR) 60 MG 24 hr tablet, Take 1 tablet by mouth Daily., Disp: 90 tablet, Rfl: 0  •  metoprolol succinate XL (TOPROL-XL) 100 MG 24 hr tablet, TAKE 1 TABLET BY MOUTH  DAILY, Disp: 90 tablet, Rfl: 2  •  Multiple Vitamins-Minerals (MULTI FOR HER 50+) capsule, Take 1 capsule by mouth daily., Disp: , Rfl:   •  NIFEdipine CC (ADALAT CC) 30 MG 24 hr tablet, TAKE 1 TABLET BY MOUTH  DAILY, Disp: 90 tablet, Rfl: 1  •  pantoprazole (PROTONIX) 40 MG EC tablet, TAKE 1 TABLET BY MOUTH  DAILY, Disp: 90 tablet, Rfl: 2  •  potassium chloride (K-DUR) 10 MEQ CR tablet, TAKE 1 TABLET BY MOUTH  EVERY MORNING, Disp: 90 tablet, Rfl: 2  •  valsartan (DIOVAN) 320 MG tablet, TAKE 1 TABLET BY MOUTH  DAILY, Disp: 90 tablet, Rfl: 2      Social History     Social History   • Marital status:      Spouse name: N/A   • Number of children: N/A   • Years of education: N/A     Occupational History   • Not on file.     Social History Main Topics   • Smoking status: Never Smoker   • Smokeless tobacco: Never Used   • Alcohol use No   • Drug use: No   • Sexual activity:  "Defer     Other Topics Concern   • Not on file     Social History Narrative   • No narrative on file         Review of Systems   Constitution: Positive for malaise/fatigue.   Cardiovascular: Positive for leg swelling.       Procedures      ECG 12 Lead  Date/Time: 4/18/2018 3:20 PM  Performed by: KRISTA MARRERO  Authorized by: KRISTA MARRERO   Comparison: compared with previous ECG from 8/24/2017  Rhythm: sinus rhythm  Rate: normal  Conduction: conduction normal  QRS axis: normal                  Objective:    /74   Pulse 78   Ht 157.5 cm (62\")   Wt 86.2 kg (190 lb)   BMI 34.75 kg/m²         Physical Exam   Constitutional: She is oriented to person, place, and time. She appears well-developed and well-nourished.   HENT:   Head: Normocephalic.   Eyes: Pupils are equal, round, and reactive to light.   Neck: Normal range of motion. No JVD present. Carotid bruit is not present. No thyromegaly present.   Cardiovascular: Normal rate, regular rhythm, S1 normal, S2 normal and intact distal pulses.  Exam reveals no gallop and no friction rub.    Murmur heard.  High-pitched blowing holosystolic murmur is present with a grade of 1/6  at the apex  Pulmonary/Chest: Effort normal and breath sounds normal.   Abdominal: Soft. Bowel sounds are normal.   Musculoskeletal: Edema: trace.   Neurological: She is alert and oriented to person, place, and time.   Skin: Skin is warm, dry and intact. No erythema.   Psychiatric: She has a normal mood and affect.   Vitals reviewed.          Assessment:       Diagnosis Plan   1. Valvular endocarditis     2. Non-rheumatic mitral regurgitation     3. Non-rheumatic tricuspid valve insufficiency     4. Essential hypertension         The patient has been advised concerning her diet and physical activity.  At this point no changes in medication are going to be made.  I will see her back in 6 months.     Plan:         "

## 2018-05-15 RX ORDER — DULOXETIN HYDROCHLORIDE 60 MG/1
CAPSULE, DELAYED RELEASE ORAL
Qty: 90 CAPSULE | Refills: 0 | Status: SHIPPED | OUTPATIENT
Start: 2018-05-15 | End: 2018-10-23 | Stop reason: SDUPTHER

## 2018-05-15 RX ORDER — NIFEDIPINE 30 MG/1
TABLET, FILM COATED, EXTENDED RELEASE ORAL
Qty: 90 TABLET | Refills: 0 | Status: SHIPPED | OUTPATIENT
Start: 2018-05-15 | End: 2018-10-18 | Stop reason: SDUPTHER

## 2018-07-03 RX ORDER — CYCLOBENZAPRINE HCL 10 MG
10 TABLET ORAL 3 TIMES DAILY PRN
Qty: 90 TABLET | Refills: 0 | OUTPATIENT
Start: 2018-07-03 | End: 2018-07-24

## 2018-08-01 ENCOUNTER — OFFICE VISIT (OUTPATIENT)
Dept: FAMILY MEDICINE CLINIC | Facility: CLINIC | Age: 78
End: 2018-08-01

## 2018-08-01 VITALS
SYSTOLIC BLOOD PRESSURE: 122 MMHG | DIASTOLIC BLOOD PRESSURE: 62 MMHG | TEMPERATURE: 98.7 F | RESPIRATION RATE: 18 BRPM | WEIGHT: 193.2 LBS | BODY MASS INDEX: 35.55 KG/M2 | OXYGEN SATURATION: 95 % | HEART RATE: 75 BPM | HEIGHT: 62 IN

## 2018-08-01 DIAGNOSIS — J30.89 CHRONIC ALLERGIC RHINITIS DUE TO OTHER ALLERGIC TRIGGER, UNSPECIFIED SEASONALITY: ICD-10-CM

## 2018-08-01 DIAGNOSIS — K42.9 UMBILICAL HERNIA WITHOUT OBSTRUCTION AND WITHOUT GANGRENE: ICD-10-CM

## 2018-08-01 DIAGNOSIS — J32.1 CHRONIC FRONTAL SINUSITIS: Primary | ICD-10-CM

## 2018-08-01 PROCEDURE — 99214 OFFICE O/P EST MOD 30 MIN: CPT | Performed by: FAMILY MEDICINE

## 2018-08-01 RX ORDER — CETIRIZINE HYDROCHLORIDE 10 MG/1
10 TABLET ORAL DAILY
Qty: 30 TABLET | Refills: 11 | Status: SHIPPED | OUTPATIENT
Start: 2018-08-01 | End: 2019-01-11 | Stop reason: SDUPTHER

## 2018-08-01 RX ORDER — MONTELUKAST SODIUM 10 MG/1
10 TABLET ORAL NIGHTLY
Qty: 30 TABLET | Refills: 11 | Status: SHIPPED | OUTPATIENT
Start: 2018-08-01 | End: 2019-01-15 | Stop reason: SDUPTHER

## 2018-08-01 NOTE — PROGRESS NOTES
Subjective   Miri Yung is a 77 y.o. female. Presents today to establish care for possible sinus infection, transferring from [facility].     History of Present Illness     Subjective   Miri Yung is a 77 y.o. female who presents for evaluation of sinus pain. Symptoms include: clear rhinorrhea, congestion, frequent clearing of the throat, headaches, nasal congestion, post nasal drip, purulent rhinorrhea, sinus pressure, sneezing and sore throat. Onset of symptoms was years ago. n/a ago. Symptoms have been unchanged since that time. Past history is significant for no history of pneumonia or bronchitis. Patient is a non-smoker.  Currently taking benadryl only for allergic control.      The following portions of the patient's history were reviewed and updated as appropriate: allergies, current medications, past family history, past medical history, past social history, past surgical history and problem list.     Review of Systems   Constitutional: Negative for activity change, appetite change, fatigue and fever.   HENT: Positive for congestion, ear pain, sinus pain and sinus pressure. Negative for facial swelling and sore throat.    Eyes: Negative for discharge and itching.   Respiratory: Negative for cough, chest tightness and shortness of breath.    Cardiovascular: Negative for chest pain and palpitations.   Gastrointestinal: Negative for abdominal distention and constipation.   Endocrine: Negative for polydipsia, polyphagia and polyuria.   Genitourinary: Negative for difficulty urinating and flank pain.   Musculoskeletal: Negative for arthralgias and back pain.   Skin: Negative for color change, rash and wound.   Allergic/Immunologic: Negative for environmental allergies and food allergies.   Neurological: Negative for weakness and numbness.   Hematological: Negative for adenopathy. Does not bruise/bleed easily.   Psychiatric/Behavioral: Negative for decreased concentration and dysphoric mood. The  patient is not nervous/anxious.        Objective   Physical Exam   Constitutional: She appears well-developed and well-nourished. No distress.   HENT:   Right Ear: Hearing, tympanic membrane, external ear and ear canal normal.   Left Ear: Hearing, tympanic membrane, external ear and ear canal normal.   Nose: Nose normal.   Mouth/Throat: Uvula is midline, oropharynx is clear and moist and mucous membranes are normal.   Eyes: Conjunctivae are normal. Right eye exhibits no discharge. Left eye exhibits no discharge.   Neck: Neck supple.   Cardiovascular: Normal rate, regular rhythm and normal heart sounds.    Pulmonary/Chest: Effort normal and breath sounds normal.   Abdominal: Soft. Bowel sounds are normal. A hernia is present.       Lymphadenopathy:     She has no cervical adenopathy.   Skin: Skin is warm. Capillary refill takes less than 2 seconds. She is not diaphoretic.   Psychiatric: She has a normal mood and affect. Her behavior is normal. Judgment and thought content normal.   Nursing note and vitals reviewed.       Assessment/Plan   Miri was seen today for establish care and sinusitis.    Diagnoses and all orders for this visit:    Chronic frontal sinusitis  -     cetirizine (zyrTEC) 10 MG tablet; Take 1 tablet by mouth Daily.  -     montelukast (SINGULAIR) 10 MG tablet; Take 1 tablet by mouth Every Night.    Chronic allergic rhinitis due to other allergic trigger, unspecified seasonality  -     cetirizine (zyrTEC) 10 MG tablet; Take 1 tablet by mouth Daily.  -     montelukast (SINGULAIR) 10 MG tablet; Take 1 tablet by mouth Every Night.    Umbilical hernia without obstruction and without gangrene       Stop Benadryl, +Zyrtec, +Singulair  Umibilical hernia is stable per patient and per exam.  Counseled regarding the natural history of a hernia.  No need for referral or surgery right now.  Patient to let me know if things get worse.

## 2018-08-01 NOTE — PATIENT INSTRUCTIONS
Allergic Rhinitis, Adult  Allergic rhinitis is an allergic reaction that affects the mucous membrane inside the nose. It causes sneezing, a runny or stuffy nose, and the feeling of mucus going down the back of the throat (postnasal drip). Allergic rhinitis can be mild to severe.  There are two types of allergic rhinitis:  · Seasonal. This type is also called hay fever. It happens only during certain seasons.  · Perennial. This type can happen at any time of the year.    What are the causes?  This condition happens when the body's defense system (immune system) responds to certain harmless substances called allergens as though they were germs.   Seasonal allergic rhinitis is triggered by pollen, which can come from grasses, trees, and weeds. Perennial allergic rhinitis may be caused by:  · House dust mites.  · Pet dander.  · Mold spores.    What are the signs or symptoms?  Symptoms of this condition include:  · Sneezing.  · Runny or stuffy nose (nasal congestion).  · Postnasal drip.  · Itchy nose.  · Tearing of the eyes.  · Trouble sleeping.  · Daytime sleepiness.    How is this diagnosed?  This condition may be diagnosed based on:  · Your medical history.  · A physical exam.  · Tests to check for related conditions, such as:  ? Asthma.  ? Pink eye.  ? Ear infection.  ? Upper respiratory infection.  · Tests to find out which allergens trigger your symptoms. These may include skin or blood tests.    How is this treated?  There is no cure for this condition, but treatment can help control symptoms. Treatment may include:  · Taking medicines that block allergy symptoms, such as antihistamines. Medicine may be given as a shot, nasal spray, or pill.  · Avoiding the allergen.  · Desensitization. This treatment involves getting ongoing shots until your body becomes less sensitive to the allergen. This treatment may be done if other treatments do not help.  · If taking medicine and avoiding the allergen does not work, new,  stronger medicines may be prescribed.    Follow these instructions at home:  · Find out what you are allergic to. Common allergens include smoke, dust, and pollen.  · Avoid the things you are allergic to. These are some things you can do to help avoid allergens:  ? Replace carpet with wood, tile, or vinyl ajith. Carpet can trap dander and dust.  ? Do not smoke. Do not allow smoking in your home.  ? Change your heating and air conditioning filter at least once a month.  ? During allergy season:  § Keep windows closed as much as possible.  § Plan outdoor activities when pollen counts are lowest. This is usually during the evening hours.  § When coming indoors, change clothing and shower before sitting on furniture or bedding.  · Take over-the-counter and prescription medicines only as told by your health care provider.  · Keep all follow-up visits as told by your health care provider. This is important.  Contact a health care provider if:  · You have a fever.  · You develop a persistent cough.  · You make whistling sounds when you breathe (you wheeze).  · Your symptoms interfere with your normal daily activities.  Get help right away if:  · You have shortness of breath.  Summary  · This condition can be managed by taking medicines as directed and avoiding allergens.  · Contact your health care provider if you develop a persistent cough or fever.  · During allergy season, keep windows closed as much as possible.  This information is not intended to replace advice given to you by your health care provider. Make sure you discuss any questions you have with your health care provider.  Document Released: 09/12/2002 Document Revised: 01/25/2018 Document Reviewed: 01/25/2018  Elsevier Interactive Patient Education © 2018 [employer]

## 2018-08-15 ENCOUNTER — TELEPHONE (OUTPATIENT)
Dept: FAMILY MEDICINE CLINIC | Facility: CLINIC | Age: 78
End: 2018-08-15

## 2018-08-15 DIAGNOSIS — I10 BENIGN ESSENTIAL HTN: Primary | ICD-10-CM

## 2018-08-15 RX ORDER — LOSARTAN POTASSIUM 50 MG/1
50 TABLET ORAL DAILY
Qty: 30 TABLET | Refills: 3 | Status: SHIPPED | OUTPATIENT
Start: 2018-08-15 | End: 2018-11-28 | Stop reason: SDUPTHER

## 2018-08-15 NOTE — TELEPHONE ENCOUNTER
Patient called stating she is needing a refill on her blood pressure medication which is valsartan. The pharmacy informed her that this medication has been recalled and she was hoping you could send an alternate medication to her pharmacy.    Please advise, thanks.    Call Hardeep Yung (son) with any questions or concerns.

## 2018-08-15 NOTE — TELEPHONE ENCOUNTER
Will try switching to losartan 50mg instead.  After a few days, recheck the blood pressure.  If higher than 140/90, than increase to two tablets.

## 2018-10-16 ENCOUNTER — TELEPHONE (OUTPATIENT)
Dept: FAMILY MEDICINE CLINIC | Facility: CLINIC | Age: 78
End: 2018-10-16

## 2018-10-18 RX ORDER — PANTOPRAZOLE SODIUM 40 MG/1
40 TABLET, DELAYED RELEASE ORAL DAILY
Qty: 90 TABLET | Refills: 0 | Status: SHIPPED | OUTPATIENT
Start: 2018-10-18 | End: 2019-08-13 | Stop reason: SDUPTHER

## 2018-10-18 RX ORDER — ALLOPURINOL 300 MG/1
300 TABLET ORAL DAILY
Qty: 90 TABLET | Refills: 0 | Status: SHIPPED | OUTPATIENT
Start: 2018-10-18 | End: 2018-12-06 | Stop reason: SDUPTHER

## 2018-10-18 RX ORDER — METOPROLOL SUCCINATE 100 MG/1
100 TABLET, EXTENDED RELEASE ORAL DAILY
Qty: 90 TABLET | Refills: 0 | Status: SHIPPED | OUTPATIENT
Start: 2018-10-18 | End: 2018-12-06 | Stop reason: SDUPTHER

## 2018-10-18 RX ORDER — NIFEDIPINE 30 MG/1
30 TABLET, FILM COATED, EXTENDED RELEASE ORAL DAILY
Qty: 90 TABLET | Refills: 0 | Status: SHIPPED | OUTPATIENT
Start: 2018-10-18 | End: 2019-01-11 | Stop reason: SDUPTHER

## 2018-10-18 RX ORDER — POTASSIUM CHLORIDE 750 MG/1
10 TABLET, FILM COATED, EXTENDED RELEASE ORAL EVERY MORNING
Qty: 90 TABLET | Refills: 0 | Status: SHIPPED | OUTPATIENT
Start: 2018-10-18 | End: 2018-12-06 | Stop reason: SDUPTHER

## 2018-10-18 RX ORDER — FUROSEMIDE 20 MG/1
20 TABLET ORAL 2 TIMES DAILY
Qty: 180 TABLET | Refills: 0 | Status: SHIPPED | OUTPATIENT
Start: 2018-10-18 | End: 2018-12-06 | Stop reason: SDUPTHER

## 2018-10-18 RX ORDER — POTASSIUM CHLORIDE 750 MG/1
10 TABLET, FILM COATED, EXTENDED RELEASE ORAL EVERY MORNING
Qty: 90 TABLET | Refills: 0 | Status: SHIPPED | OUTPATIENT
Start: 2018-10-18 | End: 2018-10-18 | Stop reason: SDUPTHER

## 2018-10-18 RX ORDER — PANTOPRAZOLE SODIUM 40 MG/1
40 TABLET, DELAYED RELEASE ORAL DAILY
Qty: 90 TABLET | Refills: 0 | Status: SHIPPED | OUTPATIENT
Start: 2018-10-18 | End: 2018-10-18 | Stop reason: SDUPTHER

## 2018-10-23 ENCOUNTER — TELEPHONE (OUTPATIENT)
Dept: FAMILY MEDICINE CLINIC | Facility: CLINIC | Age: 78
End: 2018-10-23

## 2018-10-23 RX ORDER — ISOSORBIDE MONONITRATE 60 MG/1
60 TABLET, EXTENDED RELEASE ORAL DAILY
Qty: 90 TABLET | Refills: 0 | Status: SHIPPED | OUTPATIENT
Start: 2018-10-23 | End: 2019-02-28 | Stop reason: SDUPTHER

## 2018-10-23 RX ORDER — DULOXETIN HYDROCHLORIDE 60 MG/1
60 CAPSULE, DELAYED RELEASE ORAL DAILY
Qty: 90 CAPSULE | Refills: 0 | Status: SHIPPED | OUTPATIENT
Start: 2018-10-23 | End: 2018-12-31 | Stop reason: SDUPTHER

## 2018-10-23 RX ORDER — ISOSORBIDE MONONITRATE 60 MG/1
60 TABLET, EXTENDED RELEASE ORAL DAILY
Qty: 90 TABLET | Refills: 0 | Status: SHIPPED | OUTPATIENT
Start: 2018-10-23 | End: 2018-10-23 | Stop reason: SDUPTHER

## 2018-10-23 RX ORDER — DULOXETIN HYDROCHLORIDE 60 MG/1
60 CAPSULE, DELAYED RELEASE ORAL DAILY
Qty: 90 CAPSULE | Refills: 0 | Status: SHIPPED | OUTPATIENT
Start: 2018-10-23 | End: 2018-10-23 | Stop reason: SDUPTHER

## 2018-10-23 NOTE — TELEPHONE ENCOUNTER
[facility] pharmacy called requesting refills on isosorbide monoitrate 60 mg and duloxetine 60 mg. Looks like neither of those medications have been prescribed by you in the past. Okay to refill?

## 2018-10-31 ENCOUNTER — OFFICE VISIT (OUTPATIENT)
Dept: CARDIOLOGY | Facility: CLINIC | Age: 78
End: 2018-10-31

## 2018-10-31 VITALS
BODY MASS INDEX: 35.68 KG/M2 | HEART RATE: 82 BPM | HEIGHT: 61 IN | WEIGHT: 189 LBS | SYSTOLIC BLOOD PRESSURE: 158 MMHG | DIASTOLIC BLOOD PRESSURE: 82 MMHG

## 2018-10-31 DIAGNOSIS — I36.1 NON-RHEUMATIC TRICUSPID VALVE INSUFFICIENCY: ICD-10-CM

## 2018-10-31 DIAGNOSIS — I34.0 NON-RHEUMATIC MITRAL REGURGITATION: Primary | ICD-10-CM

## 2018-10-31 PROBLEM — I33.0 BACTERIAL ENDOCARDITIS: Status: ACTIVE | Noted: 2018-10-31

## 2018-10-31 PROCEDURE — 99214 OFFICE O/P EST MOD 30 MIN: CPT | Performed by: INTERNAL MEDICINE

## 2018-10-31 PROCEDURE — 93000 ELECTROCARDIOGRAM COMPLETE: CPT | Performed by: INTERNAL MEDICINE

## 2018-11-27 ENCOUNTER — TELEPHONE (OUTPATIENT)
Dept: FAMILY MEDICINE CLINIC | Facility: CLINIC | Age: 78
End: 2018-11-27

## 2018-11-27 NOTE — TELEPHONE ENCOUNTER
Patient saw cardiology 2 weeks ago and they stated that the losartan should be doubled.  So she has been taking more and is running out so she needs a ne script sent in for increased dose.  Son also stated that we had told them that if her BP was running high when we started it 2 months ago when she started it that if it remained high that she should increase it.

## 2018-11-28 DIAGNOSIS — I10 BENIGN ESSENTIAL HTN: ICD-10-CM

## 2018-11-28 RX ORDER — LOSARTAN POTASSIUM 50 MG/1
50 TABLET ORAL 2 TIMES DAILY
Qty: 60 TABLET | Refills: 3 | Status: SHIPPED | OUTPATIENT
Start: 2018-11-28 | End: 2018-11-29 | Stop reason: SDUPTHER

## 2018-11-28 NOTE — TELEPHONE ENCOUNTER
Do we have an office note from cardiology?  I want to see what they actually recommended her to do with the losartan.

## 2018-11-29 ENCOUNTER — OFFICE VISIT (OUTPATIENT)
Dept: FAMILY MEDICINE CLINIC | Facility: CLINIC | Age: 78
End: 2018-11-29

## 2018-11-29 VITALS
HEART RATE: 73 BPM | SYSTOLIC BLOOD PRESSURE: 144 MMHG | RESPIRATION RATE: 16 BRPM | WEIGHT: 183.8 LBS | BODY MASS INDEX: 34.7 KG/M2 | HEIGHT: 61 IN | TEMPERATURE: 98 F | DIASTOLIC BLOOD PRESSURE: 68 MMHG | OXYGEN SATURATION: 94 %

## 2018-11-29 DIAGNOSIS — I10 ESSENTIAL HYPERTENSION: Primary | ICD-10-CM

## 2018-11-29 DIAGNOSIS — E78.00 PURE HYPERCHOLESTEROLEMIA: ICD-10-CM

## 2018-11-29 DIAGNOSIS — I10 BENIGN ESSENTIAL HTN: ICD-10-CM

## 2018-11-29 PROCEDURE — 99213 OFFICE O/P EST LOW 20 MIN: CPT | Performed by: FAMILY MEDICINE

## 2018-11-29 RX ORDER — LOSARTAN POTASSIUM 25 MG/1
TABLET ORAL
Qty: 90 TABLET | Refills: 2 | Status: SHIPPED | OUTPATIENT
Start: 2018-11-29 | End: 2019-01-15 | Stop reason: SDUPTHER

## 2018-11-29 NOTE — PROGRESS NOTES
Subjective   Miri Yung is a 78 y.o. female. Presents today for medication management for hypertension and hyperlipidemia. Patient requesting labs, not fasting     History of Present Illness     Hypertension  Patient is here for follow-up of elevated blood pressure. She is not exercising and is adherent to a low-salt diet. Blood pressure is well controlled at home. Cardiac symptoms: none. Patient denies chest pain, irregular heart beat, lower extremity edema, near-syncope and weight gain. Cardiovascular risk factors: advanced age (older than 55 for men, 65 for women), dyslipidemia and sedentary lifestyle. Use of agents associated with hypertension: none. History of target organ damage: none.    She does use medications that may worsen dyslipidemias (corticosteroids, progestins, anabolic steroids, diuretics, beta-blockers, amiodarone, cyclosporine, olanzapine). The patient exercises never.        The following portions of the patient's history were reviewed and updated as appropriate: allergies, current medications, past family history, past medical history, past social history, past surgical history and problem list.    Review of Systems   Constitutional: Negative for fatigue and fever.   HENT: Negative for sinus pressure, sinus pain and sore throat.    Respiratory: Negative for chest tightness and wheezing.    Cardiovascular: Negative for chest pain and palpitations.       Objective   Physical Exam   Constitutional: She appears well-developed and well-nourished. No distress.   Cardiovascular: Normal rate, regular rhythm and normal heart sounds. Exam reveals no gallop and no friction rub.   No murmur heard.  Pulmonary/Chest: Breath sounds normal. No respiratory distress. She has no wheezes.   Skin: Skin is warm. Capillary refill takes less than 2 seconds. She is not diaphoretic.   Nursing note and vitals reviewed.      Assessment/Plan   Miri was seen today for med management.    Diagnoses and all orders for this  visit:    Essential hypertension    Pure hypercholesterolemia    Benign essential HTN  -     losartan (COZAAR) 25 MG tablet; TAKE 1 TABLET IN THE MORNING AND 2 TABLETS AT NIGHT    Plan will be to decrease her blood pressure medicine to 25 mg in the morning and 50 mg at night.  See her back in 2 weeks for follow-up.

## 2018-11-29 NOTE — PATIENT INSTRUCTIONS
Hypertension  Hypertension is another name for high blood pressure. High blood pressure forces your heart to work harder to pump blood. This can cause problems over time.  There are two numbers in a blood pressure reading. There is a top number (systolic) over a bottom number (diastolic). It is best to have a blood pressure below 120/80. Healthy choices can help lower your blood pressure. You may need medicine to help lower your blood pressure if:  · Your blood pressure cannot be lowered with healthy choices.  · Your blood pressure is higher than 130/80.    Follow these instructions at home:  Eating and drinking  · If directed, follow the DASH eating plan. This diet includes:  ? Filling half of your plate at each meal with fruits and vegetables.  ? Filling one quarter of your plate at each meal with whole grains. Whole grains include whole wheat pasta, brown rice, and whole grain bread.  ? Eating or drinking low-fat dairy products, such as skim milk or low-fat yogurt.  ? Filling one quarter of your plate at each meal with low-fat (lean) proteins. Low-fat proteins include fish, skinless chicken, eggs, beans, and tofu.  ? Avoiding fatty meat, cured and processed meat, or chicken with skin.  ? Avoiding premade or processed food.  · Eat less than 1,500 mg of salt (sodium) a day.  · Limit alcohol use to no more than 1 drink a day for nonpregnant women and 2 drinks a day for men. One drink equals 12 oz of beer, 5 oz of wine, or 1½ oz of hard liquor.  Lifestyle  · Work with your doctor to stay at a healthy weight or to lose weight. Ask your doctor what the best weight is for you.  · Get at least 30 minutes of exercise that causes your heart to beat faster (aerobic exercise) most days of the week. This may include walking, swimming, or biking.  · Get at least 30 minutes of exercise that strengthens your muscles (resistance exercise) at least 3 days a week. This may include lifting weights or pilates.  · Do not use any  products that contain nicotine or tobacco. This includes cigarettes and e-cigarettes. If you need help quitting, ask your doctor.  · Check your blood pressure at home as told by your doctor.  · Keep all follow-up visits as told by your doctor. This is important.  Medicines  · Take over-the-counter and prescription medicines only as told by your doctor. Follow directions carefully.  · Do not skip doses of blood pressure medicine. The medicine does not work as well if you skip doses. Skipping doses also puts you at risk for problems.  · Ask your doctor about side effects or reactions to medicines that you should watch for.  Contact a doctor if:  · You think you are having a reaction to the medicine you are taking.  · You have headaches that keep coming back (recurring).  · You feel dizzy.  · You have swelling in your ankles.  · You have trouble with your vision.  Get help right away if:  · You get a very bad headache.  · You start to feel confused.  · You feel weak or numb.  · You feel faint.  · You get very bad pain in your:  ? Chest.  ? Belly (abdomen).  · You throw up (vomit) more than once.  · You have trouble breathing.  Summary  · Hypertension is another name for high blood pressure.  · Making healthy choices can help lower blood pressure. If your blood pressure cannot be controlled with healthy choices, you may need to take medicine.  This information is not intended to replace advice given to you by your health care provider. Make sure you discuss any questions you have with your health care provider.  Document Released: 06/05/2009 Document Revised: 11/15/2017 Document Reviewed: 11/15/2017  [employer] Interactive Patient Education © 2018 [employer].

## 2018-12-06 RX ORDER — METOPROLOL SUCCINATE 100 MG/1
100 TABLET, EXTENDED RELEASE ORAL DAILY
Qty: 90 TABLET | Refills: 1 | Status: SHIPPED | OUTPATIENT
Start: 2018-12-06 | End: 2019-07-15 | Stop reason: SDUPTHER

## 2018-12-06 RX ORDER — PANTOPRAZOLE SODIUM 40 MG/1
40 TABLET, DELAYED RELEASE ORAL DAILY
Qty: 90 TABLET | Refills: 1 | Status: SHIPPED | OUTPATIENT
Start: 2018-12-06 | End: 2019-05-01

## 2018-12-06 RX ORDER — FUROSEMIDE 20 MG/1
TABLET ORAL
Qty: 180 TABLET | Refills: 1 | Status: SHIPPED | OUTPATIENT
Start: 2018-12-06 | End: 2019-08-05

## 2018-12-06 RX ORDER — ALLOPURINOL 300 MG/1
300 TABLET ORAL DAILY
Qty: 90 TABLET | Refills: 1 | Status: SHIPPED | OUTPATIENT
Start: 2018-12-06 | End: 2019-07-15 | Stop reason: SDUPTHER

## 2018-12-06 RX ORDER — POTASSIUM CHLORIDE 750 MG/1
10 TABLET, FILM COATED, EXTENDED RELEASE ORAL EVERY MORNING
Qty: 90 TABLET | Refills: 1 | Status: SHIPPED | OUTPATIENT
Start: 2018-12-06 | End: 2019-07-15 | Stop reason: SDUPTHER

## 2018-12-31 RX ORDER — DULOXETIN HYDROCHLORIDE 60 MG/1
60 CAPSULE, DELAYED RELEASE ORAL DAILY
Qty: 90 CAPSULE | Refills: 0 | Status: SHIPPED | OUTPATIENT
Start: 2018-12-31 | End: 2019-04-01 | Stop reason: SDUPTHER

## 2019-01-11 DIAGNOSIS — J32.1 CHRONIC FRONTAL SINUSITIS: ICD-10-CM

## 2019-01-11 DIAGNOSIS — J30.9 ALLERGIC RHINITIS, UNSPECIFIED SEASONALITY, UNSPECIFIED TRIGGER: ICD-10-CM

## 2019-01-11 RX ORDER — NIFEDIPINE 30 MG/1
30 TABLET, FILM COATED, EXTENDED RELEASE ORAL DAILY
Qty: 90 TABLET | Refills: 0 | Status: SHIPPED | OUTPATIENT
Start: 2019-01-11 | End: 2019-05-18 | Stop reason: SDUPTHER

## 2019-01-11 RX ORDER — CETIRIZINE HYDROCHLORIDE 10 MG/1
10 TABLET ORAL DAILY
Qty: 90 TABLET | Refills: 0 | Status: SHIPPED | OUTPATIENT
Start: 2019-01-11 | End: 2019-08-06 | Stop reason: HOSPADM

## 2019-01-14 ENCOUNTER — OFFICE VISIT (OUTPATIENT)
Dept: FAMILY MEDICINE CLINIC | Facility: CLINIC | Age: 79
End: 2019-01-14

## 2019-01-14 VITALS
WEIGHT: 185 LBS | TEMPERATURE: 98.2 F | HEIGHT: 61 IN | OXYGEN SATURATION: 93 % | DIASTOLIC BLOOD PRESSURE: 60 MMHG | SYSTOLIC BLOOD PRESSURE: 140 MMHG | HEART RATE: 77 BPM | RESPIRATION RATE: 16 BRPM | BODY MASS INDEX: 34.93 KG/M2

## 2019-01-14 DIAGNOSIS — E78.5 HYPERLIPIDEMIA, UNSPECIFIED HYPERLIPIDEMIA TYPE: ICD-10-CM

## 2019-01-14 DIAGNOSIS — Z79.899 HIGH RISK MEDICATION USE: ICD-10-CM

## 2019-01-14 DIAGNOSIS — I10 BENIGN ESSENTIAL HTN: ICD-10-CM

## 2019-01-14 DIAGNOSIS — R53.83 FATIGUE, UNSPECIFIED TYPE: Primary | ICD-10-CM

## 2019-01-14 DIAGNOSIS — E03.8 SUBCLINICAL HYPOTHYROIDISM: ICD-10-CM

## 2019-01-14 DIAGNOSIS — E55.9 VITAMIN D DEFICIENCY: ICD-10-CM

## 2019-01-14 PROCEDURE — 99214 OFFICE O/P EST MOD 30 MIN: CPT | Performed by: FAMILY MEDICINE

## 2019-01-14 NOTE — PROGRESS NOTES
Subjective   Miri Yung is a 78 y.o. female. Presents today for medication management for hypertension. Patient is requesting fasting labs.   History of Present Illness     Patient comes in today to be checked for hypertension.  She says overall she is doing fairly well she is Yoni care of things okay.  We did not stay on the cystic long as she was moving onto saying that she was very fatigued and that this has not gotten any better in some time and wanted to get some levels checked on her vitamins as well as her thyroid and CMP.  She says that she is able to continue to do what she normally does at home.  Nothing makes it better or worse.  She is not taking any supplementation right now.  She has not any pain.  I'll isn't any weight.    The following portions of the patient's history were reviewed and updated as appropriate: allergies, current medications, past family history, past medical history, past social history, past surgical history and problem list.    Review of Systems   Constitutional: Positive for fatigue. Negative for activity change, appetite change and fever.   HENT: Negative for ear pain, facial swelling and sore throat.    Eyes: Negative for discharge and itching.   Respiratory: Negative for cough, chest tightness and shortness of breath.    Cardiovascular: Negative for chest pain and palpitations.   Gastrointestinal: Negative for abdominal distention and constipation.   Endocrine: Negative for polydipsia, polyphagia and polyuria.   Genitourinary: Negative for difficulty urinating and flank pain.   Musculoskeletal: Negative for arthralgias and back pain.   Skin: Negative for color change, rash and wound.   Allergic/Immunologic: Negative for environmental allergies and food allergies.   Neurological: Negative for weakness and numbness.   Hematological: Negative for adenopathy. Does not bruise/bleed easily.   Psychiatric/Behavioral: Negative for decreased concentration and dysphoric mood. The  patient is not nervous/anxious.        Objective   Physical Exam   Constitutional: She is oriented to person, place, and time. She appears well-developed and well-nourished. No distress.   HENT:   Mouth/Throat: Oropharynx is clear and moist. No oropharyngeal exudate.   Eyes: Conjunctivae are normal. Right eye exhibits no discharge. Left eye exhibits no discharge.   Neck: Normal range of motion. Neck supple.   Cardiovascular: Normal rate, regular rhythm and normal heart sounds. Exam reveals no gallop and no friction rub.   No murmur heard.  Pulmonary/Chest: Effort normal and breath sounds normal. She has no wheezes. She has no rales.   Abdominal: Soft. Bowel sounds are normal. She exhibits no distension. There is no tenderness.   Musculoskeletal: She exhibits no edema or deformity.   Lymphadenopathy:     She has no cervical adenopathy.   Neurological: She is alert and oriented to person, place, and time.   Skin: Skin is warm and dry. No rash noted.   Psychiatric: She has a normal mood and affect. Her behavior is normal.   Nursing note and vitals reviewed.      Assessment/Plan   Miri was seen today for med management.    Diagnoses and all orders for this visit:    Fatigue, unspecified type  -     Comprehensive Metabolic Panel  -     Vitamin D 25 hydroxy  -     Vitamin B12  -     CBC No Differential    Subclinical hypothyroidism  -     TSH Rfx On Abnormal To Free T4    Hyperlipidemia, unspecified hyperlipidemia type  -     Lipid panel    High risk medication use  -     Comprehensive Metabolic Panel    Vitamin D deficiency   -     Vitamin D 25 hydroxy    Benign essential HTN      Will get some labs to check for fatigue including CBC, CMP, vitamin D, vitamin B12, TSH.  Many of these will also be helpful for the hypertension monitoring.  We'll follow and follow up with patient via phone.

## 2019-01-14 NOTE — PATIENT INSTRUCTIONS
Fatigue  Fatigue is feeling tired all of the time, a lack of energy, or a lack of motivation. Occasional or mild fatigue is often a normal response to activity or life in general. However, long-lasting (chronic) or extreme fatigue may indicate an underlying medical condition.  Follow these instructions at home:  Watch your fatigue for any changes. The following actions may help to lessen any discomfort you are feeling:  · Talk to your health care provider about how much sleep you need each night. Try to get the required amount every night.  · Take medicines only as directed by your health care provider.  · Eat a healthy and nutritious diet. Ask your health care provider if you need help changing your diet.  · Drink enough fluid to keep your urine clear or pale yellow.  · Practice ways of relaxing, such as yoga, meditation, massage therapy, or acupuncture.  · Exercise regularly.  · Change situations that cause you stress. Try to keep your work and personal routine reasonable.  · Do not abuse illegal drugs.  · Limit alcohol intake to no more than 1 drink per day for nonpregnant women and 2 drinks per day for men. One drink equals 12 ounces of beer, 5 ounces of wine, or 1½ ounces of hard liquor.  · Take a multivitamin, if directed by your health care provider.    Contact a health care provider if:  · Your fatigue does not get better.  · You have a fever.  · You have unintentional weight loss or gain.  · You have headaches.  · You have difficulty:  ? Falling asleep.  ? Sleeping throughout the night.  · You feel angry, guilty, anxious, or sad.  · You are unable to have a bowel movement (constipation).  · You skin is dry.  · Your legs or another part of your body is swollen.  Get help right away if:  · You feel confused.  · Your vision is blurry.  · You feel faint or pass out.  · You have a severe headache.  · You have severe abdominal, pelvic, or back pain.  · You have chest pain, shortness of breath, or an irregular or  fast heartbeat.  · You are unable to urinate or you urinate less than normal.  · You develop abnormal bleeding, such as bleeding from the rectum, vagina, nose, lungs, or nipples.  · You vomit blood.  · You have thoughts about harming yourself or committing suicide.  · You are worried that you might harm someone else.  This information is not intended to replace advice given to you by your health care provider. Make sure you discuss any questions you have with your health care provider.  Document Released: 10/14/2008 Document Revised: 05/25/2017 Document Reviewed: 04/21/2015  [employer] Interactive Patient Education © 2018 [employer]

## 2019-01-15 DIAGNOSIS — I10 BENIGN ESSENTIAL HTN: ICD-10-CM

## 2019-01-15 DIAGNOSIS — J32.9 CHRONIC SINUSITIS, UNSPECIFIED LOCATION: ICD-10-CM

## 2019-01-15 DIAGNOSIS — J32.1 CHRONIC FRONTAL SINUSITIS: ICD-10-CM

## 2019-01-15 LAB
25(OH)D3+25(OH)D2 SERPL-MCNC: 27.2 NG/ML (ref 30–100)
ALBUMIN SERPL-MCNC: 4.3 G/DL (ref 3.5–4.8)
ALBUMIN/GLOB SERPL: 2.3 {RATIO} (ref 1.2–2.2)
ALP SERPL-CCNC: 159 IU/L (ref 39–117)
ALT SERPL-CCNC: 56 IU/L (ref 0–32)
AST SERPL-CCNC: 65 IU/L (ref 0–40)
BILIRUB SERPL-MCNC: 0.7 MG/DL (ref 0–1.2)
BUN SERPL-MCNC: 9 MG/DL (ref 8–27)
BUN/CREAT SERPL: 16 (ref 12–28)
CALCIUM SERPL-MCNC: 9.1 MG/DL (ref 8.7–10.3)
CHLORIDE SERPL-SCNC: 99 MMOL/L (ref 96–106)
CHOLEST SERPL-MCNC: 134 MG/DL (ref 100–199)
CO2 SERPL-SCNC: 27 MMOL/L (ref 20–29)
CREAT SERPL-MCNC: 0.58 MG/DL (ref 0.57–1)
ERYTHROCYTE [DISTWIDTH] IN BLOOD BY AUTOMATED COUNT: 14.1 % (ref 12.3–15.4)
GLOBULIN SER CALC-MCNC: 1.9 G/DL (ref 1.5–4.5)
GLUCOSE SERPL-MCNC: 199 MG/DL (ref 65–99)
HCT VFR BLD AUTO: 45.4 % (ref 34–46.6)
HDLC SERPL-MCNC: 36 MG/DL
HGB BLD-MCNC: 15 G/DL (ref 11.1–15.9)
LDLC SERPL CALC-MCNC: 70 MG/DL (ref 0–99)
MCH RBC QN AUTO: 29.9 PG (ref 26.6–33)
MCHC RBC AUTO-ENTMCNC: 33 G/DL (ref 31.5–35.7)
MCV RBC AUTO: 91 FL (ref 79–97)
PLATELET # BLD AUTO: 159 X10E3/UL (ref 150–379)
POTASSIUM SERPL-SCNC: 3.9 MMOL/L (ref 3.5–5.2)
PROT SERPL-MCNC: 6.2 G/DL (ref 6–8.5)
RBC # BLD AUTO: 5.01 X10E6/UL (ref 3.77–5.28)
SODIUM SERPL-SCNC: 142 MMOL/L (ref 134–144)
TRIGL SERPL-MCNC: 138 MG/DL (ref 0–149)
TSH SERPL DL<=0.005 MIU/L-ACNC: 3.83 UIU/ML (ref 0.45–4.5)
VIT B12 SERPL-MCNC: 729 PG/ML (ref 232–1245)
VLDLC SERPL CALC-MCNC: 28 MG/DL (ref 5–40)
WBC # BLD AUTO: 10.9 X10E3/UL (ref 3.4–10.8)

## 2019-01-15 RX ORDER — LOSARTAN POTASSIUM 25 MG/1
TABLET ORAL
Qty: 90 TABLET | Refills: 2 | Status: SHIPPED | OUTPATIENT
Start: 2019-01-15 | End: 2019-07-15 | Stop reason: SDUPTHER

## 2019-01-15 RX ORDER — MONTELUKAST SODIUM 10 MG/1
10 TABLET ORAL NIGHTLY
Qty: 90 TABLET | Refills: 2 | Status: SHIPPED | OUTPATIENT
Start: 2019-01-15 | End: 2019-09-03 | Stop reason: SDUPTHER

## 2019-01-16 ENCOUNTER — OFFICE VISIT (OUTPATIENT)
Dept: FAMILY MEDICINE CLINIC | Facility: CLINIC | Age: 79
End: 2019-01-16

## 2019-01-16 VITALS
HEIGHT: 61 IN | SYSTOLIC BLOOD PRESSURE: 138 MMHG | DIASTOLIC BLOOD PRESSURE: 68 MMHG | RESPIRATION RATE: 16 BRPM | TEMPERATURE: 98.1 F | HEART RATE: 82 BPM | BODY MASS INDEX: 34.93 KG/M2 | OXYGEN SATURATION: 94 % | WEIGHT: 185 LBS

## 2019-01-16 DIAGNOSIS — R73.09 ABNORMAL GLUCOSE: ICD-10-CM

## 2019-01-16 DIAGNOSIS — E11.9 TYPE 2 DIABETES MELLITUS WITHOUT COMPLICATION, WITHOUT LONG-TERM CURRENT USE OF INSULIN (HCC): Primary | ICD-10-CM

## 2019-01-16 LAB
EXPIRATION DATE: ABNORMAL
HBA1C MFR BLD: 8.2 %
Lab: 953

## 2019-01-16 PROCEDURE — 99213 OFFICE O/P EST LOW 20 MIN: CPT | Performed by: FAMILY MEDICINE

## 2019-01-16 PROCEDURE — 83036 HEMOGLOBIN GLYCOSYLATED A1C: CPT | Performed by: FAMILY MEDICINE

## 2019-01-16 NOTE — PATIENT INSTRUCTIONS
Diabetes Mellitus and Nutrition  When you have diabetes (diabetes mellitus), it is very important to have healthy eating habits because your blood sugar (glucose) levels are greatly affected by what you eat and drink. Eating healthy foods in the appropriate amounts, at about the same times every day, can help you:  · Control your blood glucose.  · Lower your risk of heart disease.  · Improve your blood pressure.  · Reach or maintain a healthy weight.    Every person with diabetes is different, and each person has different needs for a meal plan. Your health care provider may recommend that you work with a diet and nutrition specialist (dietitian) to make a meal plan that is best for you. Your meal plan may vary depending on factors such as:  · The calories you need.  · The medicines you take.  · Your weight.  · Your blood glucose, blood pressure, and cholesterol levels.  · Your activity level.  · Other health conditions you have, such as heart or kidney disease.    How do carbohydrates affect me?  Carbohydrates affect your blood glucose level more than any other type of food. Eating carbohydrates naturally increases the amount of glucose in your blood. Carbohydrate counting is a method for keeping track of how many carbohydrates you eat. Counting carbohydrates is important to keep your blood glucose at a healthy level, especially if you use insulin or take certain oral diabetes medicines.  It is important to know how many carbohydrates you can safely have in each meal. This is different for every person. Your dietitian can help you calculate how many carbohydrates you should have at each meal and for snack.  Foods that contain carbohydrates include:  · Bread, cereal, rice, pasta, and crackers.  · Potatoes and corn.  · Peas, beans, and lentils.  · Milk and yogurt.  · Fruit and juice.  · Desserts, such as cakes, cookies, ice cream, and candy.    How does alcohol affect me?  Alcohol can cause a sudden decrease in blood  "glucose (hypoglycemia), especially if you use insulin or take certain oral diabetes medicines. Hypoglycemia can be a life-threatening condition. Symptoms of hypoglycemia (sleepiness, dizziness, and confusion) are similar to symptoms of having too much alcohol.  If your health care provider says that alcohol is safe for you, follow these guidelines:  · Limit alcohol intake to no more than 1 drink per day for nonpregnant women and 2 drinks per day for men. One drink equals 12 oz of beer, 5 oz of wine, or 1½ oz of hard liquor.  · Do not drink on an empty stomach.  · Keep yourself hydrated with water, diet soda, or unsweetened iced tea.  · Keep in mind that regular soda, juice, and other mixers may contain a lot of sugar and must be counted as carbohydrates.    What are tips for following this plan?  Reading food labels  · Start by checking the serving size on the label. The amount of calories, carbohydrates, fats, and other nutrients listed on the label are based on one serving of the food. Many foods contain more than one serving per package.  · Check the total grams (g) of carbohydrates in one serving. You can calculate the number of servings of carbohydrates in one serving by dividing the total carbohydrates by 15. For example, if a food has 30 g of total carbohydrates, it would be equal to 2 servings of carbohydrates.  · Check the number of grams (g) of saturated and trans fats in one serving. Choose foods that have low or no amount of these fats.  · Check the number of milligrams (mg) of sodium in one serving. Most people should limit total sodium intake to less than 2,300 mg per day.  · Always check the nutrition information of foods labeled as \"low-fat\" or \"nonfat\". These foods may be higher in added sugar or refined carbohydrates and should be avoided.  · Talk to your dietitian to identify your daily goals for nutrients listed on the label.  Shopping  · Avoid buying canned, premade, or processed foods. These " foods tend to be high in fat, sodium, and added sugar.  · Shop around the outside edge of the grocery store. This includes fresh fruits and vegetables, bulk grains, fresh meats, and fresh dairy.  Cooking  · Use low-heat cooking methods, such as baking, instead of high-heat cooking methods like deep frying.  · Cook using healthy oils, such as olive, canola, or sunflower oil.  · Avoid cooking with butter, cream, or high-fat meats.  Meal planning  · Eat meals and snacks regularly, preferably at the same times every day. Avoid going long periods of time without eating.  · Eat foods high in fiber, such as fresh fruits, vegetables, beans, and whole grains. Talk to your dietitian about how many servings of carbohydrates you can eat at each meal.  · Eat 4-6 ounces of lean protein each day, such as lean meat, chicken, fish, eggs, or tofu. 1 ounce is equal to 1 ounce of meat, chicken, or fish, 1 egg, or 1/4 cup of tofu.  · Eat some foods each day that contain healthy fats, such as avocado, nuts, seeds, and fish.  Lifestyle    · Check your blood glucose regularly.  · Exercise at least 30 minutes 5 or more days each week, or as told by your health care provider.  · Take medicines as told by your health care provider.  · Do not use any products that contain nicotine or tobacco, such as cigarettes and e-cigarettes. If you need help quitting, ask your health care provider.  · Work with a counselor or diabetes educator to identify strategies to manage stress and any emotional and social challenges.  What are some questions to ask my health care provider?  · Do I need to meet with a diabetes educator?  · Do I need to meet with a dietitian?  · What number can I call if I have questions?  · When are the best times to check my blood glucose?  Where to find more information:  · American Diabetes Association: diabetes.org/food-and-fitness/food  · Academy of Nutrition and Dietetics:  www.eatright.org/resources/health/diseases-and-conditions/diabetes  · National [place] of Diabetes and Digestive and Kidney Diseases (NIH): www.niddk.nih.gov/health-information/diabetes/overview/diet-eating-physical-activity  Summary  · A healthy meal plan will help you control your blood glucose and maintain a healthy lifestyle.  · Working with a diet and nutrition specialist (dietitian) can help you make a meal plan that is best for you.  · Keep in mind that carbohydrates and alcohol have immediate effects on your blood glucose levels. It is important to count carbohydrates and to use alcohol carefully.  This information is not intended to replace advice given to you by your health care provider. Make sure you discuss any questions you have with your health care provider.  Document Released: 09/14/2006 Document Revised: 01/22/2018 Document Reviewed: 01/22/2018  [employer] Interactive Patient Education © 2018 [employer].

## 2019-01-16 NOTE — PROGRESS NOTES
Subjective   Miri Yung is a 78 y.o. female. Presents today for follow up on recent lab work.     History of Present Illness     Diabetes mellitus-worsening.  Seen on most recent CMP that glucoses now in the 190s when before it was closer to the 140s.  Currently the patient is not taking any diabetic medications.  As far as she knows she has never been on any diabetic medications it is always been diet controlled.  She cannot remember exactly when she was diagnosed but it wasn't all that long ago.  Denies any neuropathic pain or tingling.  She does endorse some eye blurring which is shown up approximately the same time as when the sugar started to get worse.  She endorses enjoying many oranges a day and also enjoys strawberries, blueberries and those little fruit cups at the supermarket that had for 2 symptom and she drinks the juice.    The following portions of the patient's history were reviewed and updated as appropriate: allergies, current medications, past family history, past medical history, past social history, past surgical history and problem list.    Review of Systems   Constitutional: Negative for fatigue and fever.   Endocrine: Negative for cold intolerance, heat intolerance, polydipsia, polyphagia and polyuria.       Objective   Physical Exam   Constitutional: She appears well-developed and well-nourished. No distress.   Cardiovascular: Normal rate, regular rhythm and normal heart sounds. Exam reveals no gallop and no friction rub.   No murmur heard.  Pulmonary/Chest: Effort normal and breath sounds normal. No respiratory distress. She has no wheezes. She has no rales.   Skin: She is not diaphoretic.   Nursing note and vitals reviewed.      Assessment/Plan   Miri was seen today for follow-up.    Diagnoses and all orders for this visit:    Type 2 diabetes mellitus without complication, without long-term current use of insulin (CMS/[facility])  -     metFORMIN (GLUCOPHAGE) 500 MG tablet; Take 1 tablet by  mouth Daily With Breakfast.    Abnormal glucose  -     POCT glycated hemoglobin, total       will start patient on 500 mg of metformin daily and instructed her and her daughter on working with her diet and making smarter choices when it comes to sweets and fruits.  See back in 1-2 months depending on how she is doing.

## 2019-02-12 RX ORDER — FLUTICASONE PROPIONATE 50 MCG
2 SPRAY, SUSPENSION (ML) NASAL DAILY
Qty: 32 G | Refills: 2 | Status: SHIPPED | OUTPATIENT
Start: 2019-02-12 | End: 2019-10-02

## 2019-02-28 RX ORDER — ISOSORBIDE MONONITRATE 60 MG/1
60 TABLET, EXTENDED RELEASE ORAL DAILY
Qty: 90 TABLET | Refills: 0 | Status: SHIPPED | OUTPATIENT
Start: 2019-02-28 | End: 2019-07-15 | Stop reason: SDUPTHER

## 2019-03-01 RX ORDER — NIFEDIPINE 30 MG/1
TABLET, EXTENDED RELEASE ORAL
Qty: 90 TABLET | OUTPATIENT
Start: 2019-03-01

## 2019-03-21 ENCOUNTER — OFFICE VISIT (OUTPATIENT)
Dept: FAMILY MEDICINE CLINIC | Facility: CLINIC | Age: 79
End: 2019-03-21

## 2019-03-21 VITALS
TEMPERATURE: 98.2 F | BODY MASS INDEX: 35.19 KG/M2 | HEART RATE: 75 BPM | HEIGHT: 61 IN | RESPIRATION RATE: 16 BRPM | OXYGEN SATURATION: 98 % | DIASTOLIC BLOOD PRESSURE: 60 MMHG | SYSTOLIC BLOOD PRESSURE: 142 MMHG | WEIGHT: 186.4 LBS

## 2019-03-21 DIAGNOSIS — G47.00 INSOMNIA, UNSPECIFIED TYPE: ICD-10-CM

## 2019-03-21 DIAGNOSIS — F32.A DEPRESSION, UNSPECIFIED DEPRESSION TYPE: Primary | ICD-10-CM

## 2019-03-21 DIAGNOSIS — K59.00 CONSTIPATION, UNSPECIFIED CONSTIPATION TYPE: ICD-10-CM

## 2019-03-21 DIAGNOSIS — J30.2 SEASONAL ALLERGIES: ICD-10-CM

## 2019-03-21 PROCEDURE — 99214 OFFICE O/P EST MOD 30 MIN: CPT | Performed by: FAMILY MEDICINE

## 2019-03-21 RX ORDER — BUPROPION HYDROCHLORIDE 150 MG/1
150 TABLET, EXTENDED RELEASE ORAL
Qty: 30 TABLET | Refills: 5 | Status: SHIPPED | OUTPATIENT
Start: 2019-03-21 | End: 2019-08-14

## 2019-03-21 NOTE — PATIENT INSTRUCTIONS
Major Depressive Disorder, Adult  Major depressive disorder (MDD) is a mental health condition. It may also be called clinical depression or unipolar depression. MDD usually causes feelings of sadness, hopelessness, or helplessness. MDD can also cause physical symptoms. It can interfere with work, school, relationships, and other everyday activities. MDD may be mild, moderate, or severe. It may occur once (single episode major depressive disorder) or it may occur multiple times (recurrent major depressive disorder).  What are the causes?  The exact cause of this condition is not known. MDD is most likely caused by a combination of things, which may include:  · Genetic factors. These are traits that are passed along from parent to child.  · Individual factors. Your personality, your behavior, and the way you handle your thoughts and feelings may contribute to MDD. This includes personality traits and behaviors learned from others.  · Physical factors, such as:  ? Differences in the part of your brain that controls emotion. This part of your brain may be different than it is in people who do not have MDD.  ? Long-term (chronic) medical or psychiatric illnesses.  · Social factors. Traumatic experiences or major life changes may play a role in the development of MDD.    What increases the risk?  This condition is more likely to develop in women. The following factors may also make you more likely to develop MDD:  · A family history of depression.  · Troubled family relationships.  · Abnormally low levels of certain brain chemicals.  · Traumatic events in childhood, especially abuse or the loss of a parent.  · Being under a lot of stress, or long-term stress, especially from upsetting life experiences or losses.  · A history of:  ? Chronic physical illness.  ? Other mental health disorders.  ? Substance abuse.  · Poor living conditions.  · Experiencing social exclusion or discrimination on a regular basis.    What are  the signs or symptoms?  The main symptoms of MDD typically include:  · Constant depressed or irritable mood.  · Loss of interest in things and activities.    MDD symptoms may also include:  · Sleeping or eating too much or too little.  · Unexplained weight change.  · Fatigue or low energy.  · Feelings of worthlessness or guilt.  · Difficulty thinking clearly or making decisions.  · Thoughts of suicide or of harming others.  · Physical agitation or weakness.  · Isolation.    Severe cases of MDD may also occur with other symptoms, such as:  · Delusions or hallucinations, in which you imagine things that are not real (psychotic depression).  · Low-level depression that lasts at least a year (chronic depression or persistent depressive disorder).  · Extreme sadness and hopelessness (melancholic depression).  · Trouble speaking and moving (catatonic depression).    How is this diagnosed?  This condition may be diagnosed based on:  · Your symptoms.  · Your medical history, including your mental health history. This may involve tests to evaluate your mental health. You may be asked questions about your lifestyle, including any drug and alcohol use, and how long you have had symptoms of MDD.  · A physical exam.  · Blood tests to rule out other conditions.    You must have a depressed mood and at least four other MDD symptoms most of the day, nearly every day in the same 2-week timeframe before your health care provider can confirm a diagnosis of MDD.  How is this treated?  This condition is usually treated by mental health professionals, such as psychologists, psychiatrists, and clinical social workers. You may need more than one type of treatment. Treatment may include:  · Psychotherapy. This is also called talk therapy or counseling. Types of psychotherapy include:  ? Cognitive behavioral therapy (CBT). This type of therapy teaches you to recognize unhealthy feelings, thoughts, and behaviors, and replace them with  positive thoughts and actions.  ? Interpersonal therapy (IPT). This helps you to improve the way you relate to and communicate with others.  ? Family therapy. This treatment includes members of your family.  · Medicine to treat anxiety and depression, or to help you control certain emotions and behaviors.  · Lifestyle changes, such as:  ? Limiting alcohol and drug use.  ? Exercising regularly.  ? Getting plenty of sleep.  ? Making healthy eating choices.  ? Spending more time outdoors.    Treatments involving stimulation of the brain can be used in situations with extremely severe symptoms, or when medicine or other therapies do not work over time. These treatments include electroconvulsive therapy, transcranial magnetic stimulation, and vagal nerve stimulation.  Follow these instructions at home:  Activity  · Return to your normal activities as told by your health care provider.  · Exercise regularly and spend time outdoors as told by your health care provider.  General instructions  · Take over-the-counter and prescription medicines only as told by your health care provider.  · Do not drink alcohol. If you drink alcohol, limit your alcohol intake to no more than 1 drink a day for nonpregnant women and 2 drinks a day for men. One drink equals 12 oz of beer, 5 oz of wine, or 1½ oz of hard liquor. Alcohol can affect any antidepressant medicines you are taking. Talk to your health care provider about your alcohol use.  · Eat a healthy diet and get plenty of sleep.  · Find activities that you enjoy doing, and make time to do them.  · Consider joining a support group. Your health care provider may be able to recommend a support group.  · Keep all follow-up visits as told by your health care provider. This is important.  Where to find more information  National [place] on Mental Illness  · www.daniel.org    U.S. National Thor of Mental Health  · www.nimh.nih.gov    National Suicide Prevention  [facility]  · 4-399-760-TALK (2949). This is free, 24-hour help.    Contact a health care provider if:  · Your symptoms get worse.  · You develop new symptoms.  Get help right away if:  · You self-harm.  · You have serious thoughts about hurting yourself or others.  · You see, hear, taste, smell, or feel things that are not present (hallucinate).  This information is not intended to replace advice given to you by your health care provider. Make sure you discuss any questions you have with your health care provider.  Document Released: 04/14/2014 Document Revised: 08/24/2017 Document Reviewed: 06/28/2017  [employer] Interactive Patient Education © 2019 [employer] Inc.

## 2019-03-21 NOTE — PROGRESS NOTES
Subjective   Miri Yung is a 78 y.o. female. Presents today for follow up on depression, and insomnia. Also would like to discuss constipation and sinusitis.    History of Present Illness     Depression/Insomnia - PHQ-9 was an 11 today.  She says that [place] and she does not have much contact with the world.  She is no longer driving since about 2 years ago but she is interested in trying to get back into driving.  She takes Cymbalta 60mg daily.  The medication was helping her in the past but now she feels like the medication not helping as much.  She also had some issues with insomnia.  Endorses poor concentration, low mood, no suicidal thoughts.    Allergic sinusitis -uncontrolled.  She says that her sinuses are bothering her and been going on for a few months.  She says that she is taking Flonase but not taking any antihistamine.  She was taken Zyrtec in the past.  Zyrtec seem to be helping.  It is unknown what exactly triggers her could be outside or indoor allergies but she is triggered multiple times for the year.    Constipation-uncontrolled.  It just started.  She said this happens from time to time.  But she does not have anything at home except a little bit of Ex-Lax.  Her last bowel movement was about 3 days ago.  Nothing change in her diet that she knows of.  No new medications.    The following portions of the patient's history were reviewed and updated as appropriate: allergies, current medications, past family history, past medical history, past social history, past surgical history and problem list.    Review of Systems   Constitutional: Negative for fatigue and fever.   Respiratory: Negative for shortness of breath and wheezing.    Cardiovascular: Negative for chest pain and palpitations.   Gastrointestinal: Positive for constipation. Negative for nausea.   Psychiatric/Behavioral: Positive for dysphoric mood.       Objective   Physical Exam   Constitutional: She  appears well-developed and well-nourished. No distress.   HENT:   Right Ear: Hearing, tympanic membrane, external ear and ear canal normal.   Left Ear: Hearing, tympanic membrane, external ear and ear canal normal.   Nose: Right sinus exhibits maxillary sinus tenderness. Right sinus exhibits no frontal sinus tenderness. Left sinus exhibits maxillary sinus tenderness. Left sinus exhibits no frontal sinus tenderness.   Mouth/Throat: Uvula is midline, oropharynx is clear and moist and mucous membranes are normal.   Eyes: Conjunctivae are normal. Right eye exhibits no discharge. Left eye exhibits no discharge.   Neck: Neck supple.   Cardiovascular: Normal rate, regular rhythm and normal heart sounds. Exam reveals no gallop and no friction rub.   No murmur heard.  Pulmonary/Chest: Effort normal and breath sounds normal. She has no wheezes. She has no rales.   Lymphadenopathy:     She has no cervical adenopathy.   Skin: Skin is warm. Capillary refill takes less than 2 seconds. She is not diaphoretic.   Nursing note and vitals reviewed.      Assessment/Plan   Miri was seen today for follow-up and constipation.    Diagnoses and all orders for this visit:    Depression, unspecified depression type  -     buPROPion SR (WELLBUTRIN SR) 150 MG 12 hr tablet; Take 1 tablet by mouth Daily With Breakfast.    Insomnia, unspecified type    Seasonal allergies    Constipation, unspecified constipation type      Start new medication bupropion to help with her depression.  I understand that this has a norepinephrine component and so is Cymbalta.  My hypothesis is that with the low dose of Wellbutrin this should not cause much of an interaction with Cymbalta.  I counseled the patient to watch for increased anxiety or increased energy that is out of proportion to expectation.  Regarding her seasonal allergies I gave her some samples of Zyrtec and Mucinex and recommend that she get started back on Zyrtec.  For constipation I gave her some  samples of MiraLAX and recommended that she get started on that to help with that..

## 2019-04-01 RX ORDER — DULOXETIN HYDROCHLORIDE 60 MG/1
60 CAPSULE, DELAYED RELEASE ORAL DAILY
Qty: 90 CAPSULE | Refills: 0 | Status: SHIPPED | OUTPATIENT
Start: 2019-04-01 | End: 2019-08-05

## 2019-05-01 ENCOUNTER — OFFICE VISIT (OUTPATIENT)
Dept: CARDIOLOGY | Facility: CLINIC | Age: 79
End: 2019-05-01

## 2019-05-01 VITALS
WEIGHT: 184.4 LBS | HEART RATE: 77 BPM | DIASTOLIC BLOOD PRESSURE: 64 MMHG | BODY MASS INDEX: 33.93 KG/M2 | SYSTOLIC BLOOD PRESSURE: 122 MMHG | HEIGHT: 62 IN | OXYGEN SATURATION: 97 %

## 2019-05-01 DIAGNOSIS — I36.1 NON-RHEUMATIC TRICUSPID VALVE INSUFFICIENCY: ICD-10-CM

## 2019-05-01 DIAGNOSIS — F03.90 DEMENTIA WITHOUT BEHAVIORAL DISTURBANCE, UNSPECIFIED DEMENTIA TYPE: ICD-10-CM

## 2019-05-01 DIAGNOSIS — E78.00 PURE HYPERCHOLESTEROLEMIA: ICD-10-CM

## 2019-05-01 DIAGNOSIS — I33.0 BACTERIAL ENDOCARDITIS, UNSPECIFIED CHRONICITY: Primary | ICD-10-CM

## 2019-05-01 DIAGNOSIS — I10 ESSENTIAL HYPERTENSION: ICD-10-CM

## 2019-05-01 DIAGNOSIS — I34.0 NON-RHEUMATIC MITRAL REGURGITATION: ICD-10-CM

## 2019-05-01 PROCEDURE — 99214 OFFICE O/P EST MOD 30 MIN: CPT | Performed by: NURSE PRACTITIONER

## 2019-05-01 PROCEDURE — 93000 ELECTROCARDIOGRAM COMPLETE: CPT | Performed by: NURSE PRACTITIONER

## 2019-05-20 RX ORDER — NIFEDIPINE 30 MG/1
TABLET, EXTENDED RELEASE ORAL
Qty: 90 TABLET | Refills: 0 | Status: SHIPPED | OUTPATIENT
Start: 2019-05-20 | End: 2019-08-05

## 2019-05-31 DIAGNOSIS — E11.9 TYPE 2 DIABETES MELLITUS WITHOUT COMPLICATION, WITHOUT LONG-TERM CURRENT USE OF INSULIN (HCC): ICD-10-CM

## 2019-06-03 DIAGNOSIS — E11.9 TYPE 2 DIABETES MELLITUS WITHOUT COMPLICATION, WITHOUT LONG-TERM CURRENT USE OF INSULIN (HCC): ICD-10-CM

## 2019-06-06 ENCOUNTER — OFFICE VISIT (OUTPATIENT)
Dept: FAMILY MEDICINE CLINIC | Facility: CLINIC | Age: 79
End: 2019-06-06

## 2019-06-06 VITALS
HEIGHT: 62 IN | WEIGHT: 185.6 LBS | BODY MASS INDEX: 34.16 KG/M2 | TEMPERATURE: 97.9 F | HEART RATE: 79 BPM | OXYGEN SATURATION: 95 % | DIASTOLIC BLOOD PRESSURE: 64 MMHG | SYSTOLIC BLOOD PRESSURE: 128 MMHG | RESPIRATION RATE: 16 BRPM

## 2019-06-06 DIAGNOSIS — K59.01 SLOW TRANSIT CONSTIPATION: ICD-10-CM

## 2019-06-06 DIAGNOSIS — F41.8 MIXED ANXIETY DEPRESSIVE DISORDER: Primary | ICD-10-CM

## 2019-06-06 DIAGNOSIS — G47.00 INSOMNIA, UNSPECIFIED TYPE: ICD-10-CM

## 2019-06-06 PROCEDURE — 99213 OFFICE O/P EST LOW 20 MIN: CPT | Performed by: FAMILY MEDICINE

## 2019-06-06 NOTE — PATIENT INSTRUCTIONS
Constipation, Adult  Constipation is when a person:  · Poops (has a bowel movement) fewer times in a week than normal.  · Has a hard time pooping.  · Has poop that is dry, hard, or bigger than normal.    Follow these instructions at home:  Eating and drinking    · Eat foods that have a lot of fiber, such as:  ? Fresh fruits and vegetables.  ? Whole grains.  ? Beans.  · Eat less of foods that are high in fat, low in fiber, or overly processed, such as:  ? French fries.  ? Hamburgers.  ? Cookies.  ? Candy.  ? Soda.  · Drink enough fluid to keep your pee (urine) clear or pale yellow.  General instructions  · Exercise regularly or as told by your doctor.  · Go to the restroom when you feel like you need to poop. Do not hold it in.  · Take over-the-counter and prescription medicines only as told by your doctor. These include any fiber supplements.  · Do pelvic floor retraining exercises, such as:  ? Doing deep breathing while relaxing your lower belly (abdomen).  ? Relaxing your pelvic floor while pooping.  · Watch your condition for any changes.  · Keep all follow-up visits as told by your doctor. This is important.  Contact a doctor if:  · You have pain that gets worse.  · You have a fever.  · You have not pooped for 4 days.  · You throw up (vomit).  · You are not hungry.  · You lose weight.  · You are bleeding from the anus.  · You have thin, pencil-like poop (stool).  Get help right away if:  · You have a fever, and your symptoms suddenly get worse.  · You leak poop or have blood in your poop.  · Your belly feels hard or bigger than normal (is bloated).  · You have very bad belly pain.  · You feel dizzy or you faint.  This information is not intended to replace advice given to you by your health care provider. Make sure you discuss any questions you have with your health care provider.  Document Released: 06/05/2009 Document Revised: 07/07/2017 Document Reviewed: 06/07/2017  [employer] Interactive Patient Education ©  2019 [employer] Inc.

## 2019-06-06 NOTE — PROGRESS NOTES
Subjective   Miri Yung is a 78 y.o. female. Presents today for medication management for depression and insomnia.     History of Present Illness     Depression - also having some insomnia.  Denies depressed mood or anxiety but stil having trouble getting to sleep.  Patient is taking Cymbalta as prescribed.  She has not tried any melatonin in a long time but was thinking of trying it again.    Lastly wanted to ask about constipation and whether or not she should be taking something on a regular basis.  She was taking the Colace and MiraLAX that I gave him last time for a few weeks and then stopped.  Denies getting plenty of hydration as she says that she has been hydrating with coke.    The following portions of the patient's history were reviewed and updated as appropriate: allergies, current medications, past family history, past medical history, past social history, past surgical history and problem list.    Review of Systems   Constitutional: Negative for fatigue and fever.   Respiratory: Negative for shortness of breath and wheezing.    Cardiovascular: Negative for chest pain and palpitations.   Gastrointestinal: Negative for constipation and nausea.       Objective   Physical Exam   Constitutional: She appears well-developed and well-nourished. No distress.   Cardiovascular: Normal rate, regular rhythm and normal heart sounds. Exam reveals no gallop and no friction rub.   No murmur heard.  Pulmonary/Chest: Effort normal and breath sounds normal. She has no wheezes. She has no rales.   Skin: Skin is warm. Capillary refill takes less than 2 seconds. She is not diaphoretic.   Nursing note and vitals reviewed.      Assessment/Plan   Miri was seen today for med management.    Diagnoses and all orders for this visit:    Mixed anxiety depressive disorder    Insomnia, unspecified type    Slow transit constipation        Continue current medications.  Okay to try some melatonin 3 mg at night for her insomnia.  For  the slow transit constipation recommend trying some Colace daily along with MiraLAX which I gave him samples of.  After a few weeks they should try MiraLAX a few times a week if needed same thing with the Colace.

## 2019-07-11 ENCOUNTER — OFFICE VISIT (OUTPATIENT)
Dept: FAMILY MEDICINE CLINIC | Facility: CLINIC | Age: 79
End: 2019-07-11

## 2019-07-11 VITALS
WEIGHT: 184 LBS | HEART RATE: 72 BPM | OXYGEN SATURATION: 95 % | HEIGHT: 62 IN | SYSTOLIC BLOOD PRESSURE: 142 MMHG | BODY MASS INDEX: 33.86 KG/M2 | DIASTOLIC BLOOD PRESSURE: 72 MMHG | TEMPERATURE: 98.4 F | RESPIRATION RATE: 16 BRPM

## 2019-07-11 DIAGNOSIS — R27.0 ATAXIA: Primary | ICD-10-CM

## 2019-07-11 DIAGNOSIS — R29.6 FALLS FREQUENTLY: ICD-10-CM

## 2019-07-11 PROCEDURE — 99214 OFFICE O/P EST MOD 30 MIN: CPT | Performed by: FAMILY MEDICINE

## 2019-07-11 NOTE — PATIENT INSTRUCTIONS
Fall Prevention in the Home, Adult  Falls can cause injuries. They can happen to people of all ages. There are many things you can do to make your home safe and to help prevent falls. Ask for help when making these changes, if needed.  What actions can I take to prevent falls?  General Instructions  · Use good lighting in all rooms. Replace any light bulbs that burn out.  · Turn on the lights when you go into a dark area. Use night-lights.  · Keep items that you use often in easy-to-reach places. Lower the shelves around your home if necessary.  · Set up your furniture so you have a clear path. Avoid moving your furniture around.  · Do not have throw rugs and other things on the floor that can make you trip.  · Avoid walking on wet floors.  · If any of your floors are uneven, fix them.  · Add color or contrast paint or tape to clearly jewel and help you see:  ? Any grab bars or handrails.  ? First and last steps of stairways.  ? Where the edge of each step is.  · If you use a stepladder:  ? Make sure that it is fully opened. Do not climb a closed stepladder.  ? Make sure that both sides of the stepladder are locked into place.  ? Ask someone to hold the stepladder for you while you use it.  · If there are any pets around you, be aware of where they are.  What can I do in the bathroom?  · Keep the floor dry. Clean up any water that spills onto the floor as soon as it happens.  · Remove soap buildup in the tub or shower regularly.  · Use non-skid mats or decals on the floor of the tub or shower.  · Attach bath mats securely with double-sided, non-slip rug tape.  · If you need to sit down in the shower, use a plastic, non-slip stool.  · Install grab bars by the toilet and in the tub and shower. Do not use towel bars as grab bars.  What can I do in the bedroom?  · Make sure that you have a light by your bed that is easy to reach.  · Do not use any sheets or blankets that are too big for your bed. They should not hang  down onto the floor.  · Have a firm chair that has side arms. You can use this for support while you get dressed.  What can I do in the kitchen?  · Clean up any spills right away.  · If you need to reach something above you, use a strong step stool that has a grab bar.  · Keep electrical cords out of the way.  · Do not use floor polish or wax that makes floors slippery. If you must use wax, use non-skid floor wax.  What can I do with my stairs?  · Do not leave any items on the stairs.  · Make sure that you have a light switch at the top of the stairs and the bottom of the stairs. If you do not have them, ask someone to add them for you.  · Make sure that there are handrails on both sides of the stairs, and use them. Fix handrails that are broken or loose. Make sure that handrails are as long as the stairways.  · Install non-slip stair treads on all stairs in your home.  · Avoid having throw rugs at the top or bottom of the stairs. If you do have throw rugs, attach them to the floor with carpet tape.  · Choose a carpet that does not hide the edge of the steps on the stairway.  · Check any carpeting to make sure that it is firmly attached to the stairs. Fix any carpet that is loose or worn.  What can I do on the outside of my home?  · Use bright outdoor lighting.  · Regularly fix the edges of walkways and driveways and fix any cracks.  · Remove anything that might make you trip as you walk through a door, such as a raised step or threshold.  · Trim any bushes or trees on the path to your home.  · Regularly check to see if handrails are loose or broken. Make sure that both sides of any steps have handrails.  · Install guardrails along the edges of any raised decks and porches.  · Clear walking paths of anything that might make someone trip, such as tools or rocks.  · Have any leaves, snow, or ice cleared regularly.  · Use sand or salt on walking paths during winter.  · Clean up any spills in your garage right away.  This includes grease or oil spills.  What other actions can I take?  · Wear shoes that:  ? Have a low heel. Do not wear high heels.  ? Have rubber bottoms.  ? Are comfortable and fit you well.  ? Are closed at the toe. Do not wear open-toe sandals.  · Use tools that help you move around (mobility aids) if they are needed. These include:  ? Canes.  ? Walkers.  ? Scooters.  ? Crutches.  · Review your medicines with your doctor. Some medicines can make you feel dizzy. This can increase your chance of falling.  Ask your doctor what other things you can do to help prevent falls.  Where to find more information  · Centers for Disease Control and Prevention, [place]AD[place]: https://cdc.gov  · National [place] on Aging: https://qt1tjac.kathryn.nih.gov  Contact a doctor if:  · You are afraid of falling at home.  · You feel weak, drowsy, or dizzy at home.  · You fall at home.  Summary  · There are many simple things that you can do to make your home safe and to help prevent falls.  · Ways to make your home safe include removing tripping hazards and installing grab bars in the bathroom.  · Ask for help when making these changes in your home.  This information is not intended to replace advice given to you by your health care provider. Make sure you discuss any questions you have with your health care provider.  Document Released: 10/14/2010 Document Revised: 08/02/2018 Document Reviewed: 08/02/2018  [employer] Interactive Patient Education © 2019 [employer]

## 2019-07-11 NOTE — PROGRESS NOTES
Subjective   Miri Yung is a 78 y.o. female. Presents today to discuss recent falls, 3 falls in past month.    History of Present Illness     Falls - Becoming more frequent with at least 3 in the last month.  Last one was about last week.  She has fallen bending over, falling backwards into the tub. No warning of the fall but does lose balance easily.  All falls occurring while standing still.  Patient says that she has not changed any medications since seeing me last.  She had her daughter check her home to make sure there was no rugs or anything that could trip her in the home.  She denies being tripped in the past.  She has not been sick recently.  She denies any changes in her strength, sensation, speech.    The following portions of the patient's history were reviewed and updated as appropriate: allergies, current medications, past family history, past medical history, past social history, past surgical history and problem list.    Review of Systems   Constitutional: Negative for fatigue and fever.   Respiratory: Negative for shortness of breath and wheezing.    Cardiovascular: Negative for chest pain and palpitations.   Gastrointestinal: Negative for constipation and nausea.   Neurological: Negative for dizziness, weakness and light-headedness.       Objective   Physical Exam   Constitutional: She is oriented to person, place, and time. She appears well-developed and well-nourished. No distress.   HENT:   Mouth/Throat: Oropharynx is clear and moist. No oropharyngeal exudate.   Eyes: Conjunctivae are normal. Right eye exhibits no discharge. Left eye exhibits no discharge.   Neck: Neck supple.   Cardiovascular: Normal rate, regular rhythm and normal heart sounds. Exam reveals no gallop and no friction rub.   No murmur heard.  Pulmonary/Chest: Effort normal and breath sounds normal. She has no wheezes. She has no rales.   Abdominal: Soft. Bowel sounds are normal. She exhibits no distension. There is no  tenderness.   Musculoskeletal: She exhibits no edema or deformity.   Lymphadenopathy:     She has no cervical adenopathy.   Neurological: She is alert and oriented to person, place, and time. She displays normal reflexes. No cranial nerve deficit or sensory deficit. She exhibits normal muscle tone. Coordination abnormal.   Negative Romberg  Positive ataxia on heel-to-toe and normal walking   Skin: Skin is warm and dry. No rash noted.        Psychiatric: She has a normal mood and affect. Her behavior is normal.   Nursing note and vitals reviewed.      Assessment/Plan   Miri was seen today for fall.    Diagnoses and all orders for this visit:    Ataxia  -     Ambulatory Referral to Physical Therapy Evaluate and treat  -     MRI Brain With & Without Contrast; Future    Falls frequently  -     Ambulatory Referral to Physical Therapy Evaluate and treat  -     MRI Brain With & Without Contrast; Future      I am going to get her set up with physical therapy to evaluate her ataxia.  We will also get an MRI of the brain with and without contrast to look for subacute stroke or remote history of stroke.  He does not appear to be anything on exam today that would have me send her to the ER for acute stroke evaluation.  I did mention to the family that they should call EMS or send her to the ER the next time she falls so she can be evaluated.  Less likely this could be a blood sugar drop issue but it seems that this only occurs when she is either try to bend down to pick something up there or if she is just standing around.

## 2019-07-15 DIAGNOSIS — E11.9 TYPE 2 DIABETES MELLITUS WITHOUT COMPLICATION, WITHOUT LONG-TERM CURRENT USE OF INSULIN (HCC): ICD-10-CM

## 2019-07-15 DIAGNOSIS — I10 BENIGN ESSENTIAL HTN: ICD-10-CM

## 2019-07-16 RX ORDER — ISOSORBIDE MONONITRATE 60 MG/1
60 TABLET, EXTENDED RELEASE ORAL DAILY
Qty: 90 TABLET | Refills: 1 | Status: SHIPPED | OUTPATIENT
Start: 2019-07-16 | End: 2019-08-05

## 2019-07-16 RX ORDER — METOPROLOL SUCCINATE 100 MG/1
100 TABLET, EXTENDED RELEASE ORAL DAILY
Qty: 90 TABLET | Refills: 1 | Status: SHIPPED | OUTPATIENT
Start: 2019-07-16 | End: 2019-08-05

## 2019-07-16 RX ORDER — ALLOPURINOL 300 MG/1
300 TABLET ORAL DAILY
Qty: 90 TABLET | Refills: 1 | Status: SHIPPED | OUTPATIENT
Start: 2019-07-16 | End: 2019-08-05

## 2019-07-16 RX ORDER — POTASSIUM CHLORIDE 750 MG/1
10 TABLET, FILM COATED, EXTENDED RELEASE ORAL EVERY MORNING
Qty: 90 TABLET | Refills: 1 | Status: SHIPPED | OUTPATIENT
Start: 2019-07-16 | End: 2019-08-05

## 2019-07-16 RX ORDER — LOSARTAN POTASSIUM 25 MG/1
TABLET ORAL
Qty: 270 TABLET | Refills: 1 | Status: SHIPPED | OUTPATIENT
Start: 2019-07-16 | End: 2019-08-05

## 2019-07-18 ENCOUNTER — TELEPHONE (OUTPATIENT)
Dept: FAMILY MEDICINE CLINIC | Facility: CLINIC | Age: 79
End: 2019-07-18

## 2019-07-18 NOTE — TELEPHONE ENCOUNTER
She really should be evaluated somewhere with a x-ray machine.  The last time she came in to be seen for falls she came in on a day when x-ray was not available and she assured me that she felt like she was not in any danger of having a broken bone and felt better.  If she is any increasing pain, she needs to be evaluated to make sure that something is not going on.

## 2019-07-18 NOTE — TELEPHONE ENCOUNTER
"Patient called stating she is in \"pain\" from the recent falls that were discussed at her last office visit on 7/11 and was hoping you could call in something to help with the pain and inflammation. Does she need to be reevaluated?   "

## 2019-08-03 ENCOUNTER — HOSPITAL ENCOUNTER (OUTPATIENT)
Dept: MRI IMAGING | Facility: HOSPITAL | Age: 79
Discharge: HOME | End: 2019-08-03
Admitting: FAMILY MEDICINE

## 2019-08-03 DIAGNOSIS — R29.6 FALLS FREQUENTLY: ICD-10-CM

## 2019-08-03 DIAGNOSIS — R27.0 ATAXIA: ICD-10-CM

## 2019-08-03 PROCEDURE — 0 GADOBENATE DIMEGLUMINE 529 MG/ML SOLUTION: Performed by: FAMILY MEDICINE

## 2019-08-03 PROCEDURE — 70553 MRI BRAIN STEM W/O & W/DYE: CPT

## 2019-08-03 PROCEDURE — 82565 ASSAY OF CREATININE: CPT

## 2019-08-03 PROCEDURE — A9577 INJ MULTIHANCE: HCPCS | Performed by: FAMILY MEDICINE

## 2019-08-03 RX ADMIN — GADOBENATE DIMEGLUMINE 17 ML: 529 INJECTION, SOLUTION INTRAVENOUS at 10:59

## 2019-08-05 ENCOUNTER — HOSPITAL ENCOUNTER (OUTPATIENT)
Facility: HOSPITAL | Age: 79
Setting detail: OBSERVATION
Discharge: HOME | End: 2019-08-06
Attending: EMERGENCY MEDICINE | Admitting: HOSPITALIST

## 2019-08-05 ENCOUNTER — TELEPHONE (OUTPATIENT)
Dept: FAMILY MEDICINE CLINIC | Facility: CLINIC | Age: 79
End: 2019-08-05

## 2019-08-05 ENCOUNTER — APPOINTMENT (OUTPATIENT)
Dept: MRI IMAGING | Facility: HOSPITAL | Age: 79
End: 2019-08-05

## 2019-08-05 DIAGNOSIS — I63.9 ACUTE CEREBROVASCULAR ACCIDENT (CVA) (HCC): Primary | ICD-10-CM

## 2019-08-05 DIAGNOSIS — I63.9 ACUTE ISCHEMIC STROKE (HCC): Primary | ICD-10-CM

## 2019-08-05 PROBLEM — I48.91 ATRIAL FIBRILLATION (HCC): Status: ACTIVE | Noted: 2019-08-05

## 2019-08-05 PROBLEM — E11.9 TYPE 2 DIABETES MELLITUS (HCC): Status: ACTIVE | Noted: 2019-08-05

## 2019-08-05 LAB
ANION GAP SERPL CALCULATED.3IONS-SCNC: 12.3 MMOL/L (ref 5–15)
APTT PPP: 31.4 SECONDS (ref 22.7–35.4)
BASOPHILS # BLD AUTO: 0.1 10*3/MM3 (ref 0–0.2)
BASOPHILS NFR BLD AUTO: 1 % (ref 0–1.5)
BUN BLD-MCNC: 13 MG/DL (ref 8–23)
BUN/CREAT SERPL: 22 (ref 7–25)
CALCIUM SPEC-SCNC: 9.3 MG/DL (ref 8.6–10.5)
CHLORIDE SERPL-SCNC: 104 MMOL/L (ref 98–107)
CO2 SERPL-SCNC: 29.7 MMOL/L (ref 22–29)
CREAT BLD-MCNC: 0.59 MG/DL (ref 0.57–1)
CREAT BLDA-MCNC: 0.6 MG/DL (ref 0.6–1.3)
DEPRECATED RDW RBC AUTO: 47.6 FL (ref 37–54)
EOSINOPHIL # BLD AUTO: 0.17 10*3/MM3 (ref 0–0.4)
EOSINOPHIL NFR BLD AUTO: 1.7 % (ref 0.3–6.2)
ERYTHROCYTE [DISTWIDTH] IN BLOOD BY AUTOMATED COUNT: 13.6 % (ref 12.3–15.4)
GFR SERPL CREATININE-BSD FRML MDRD: 98 ML/MIN/1.73
GLUCOSE BLD-MCNC: 113 MG/DL (ref 65–99)
GLUCOSE BLDC GLUCOMTR-MCNC: 116 MG/DL (ref 70–130)
HCT VFR BLD AUTO: 47.1 % (ref 34–46.6)
HGB BLD-MCNC: 15 G/DL (ref 12–15.9)
IMM GRANULOCYTES # BLD AUTO: 0.06 10*3/MM3 (ref 0–0.05)
IMM GRANULOCYTES NFR BLD AUTO: 0.6 % (ref 0–0.5)
INR PPP: 1.14 (ref 0.9–1.1)
LYMPHOCYTES # BLD AUTO: 2.45 10*3/MM3 (ref 0.7–3.1)
LYMPHOCYTES NFR BLD AUTO: 23.9 % (ref 19.6–45.3)
MCH RBC QN AUTO: 30.2 PG (ref 26.6–33)
MCHC RBC AUTO-ENTMCNC: 31.8 G/DL (ref 31.5–35.7)
MCV RBC AUTO: 94.8 FL (ref 79–97)
MONOCYTES # BLD AUTO: 0.78 10*3/MM3 (ref 0.1–0.9)
MONOCYTES NFR BLD AUTO: 7.6 % (ref 5–12)
NEUTROPHILS # BLD AUTO: 6.69 10*3/MM3 (ref 1.7–7)
NEUTROPHILS NFR BLD AUTO: 65.2 % (ref 42.7–76)
NRBC BLD AUTO-RTO: 0 /100 WBC (ref 0–0.2)
PLATELET # BLD AUTO: 168 10*3/MM3 (ref 140–450)
PMV BLD AUTO: 12.1 FL (ref 6–12)
POTASSIUM BLD-SCNC: 3.8 MMOL/L (ref 3.5–5.2)
PROTHROMBIN TIME: 14.3 SECONDS (ref 11.7–14.2)
RBC # BLD AUTO: 4.97 10*6/MM3 (ref 3.77–5.28)
SODIUM BLD-SCNC: 146 MMOL/L (ref 136–145)
WBC NRBC COR # BLD: 10.25 10*3/MM3 (ref 3.4–10.8)

## 2019-08-05 PROCEDURE — 82962 GLUCOSE BLOOD TEST: CPT

## 2019-08-05 PROCEDURE — G0378 HOSPITAL OBSERVATION PER HR: HCPCS

## 2019-08-05 PROCEDURE — A9577 INJ MULTIHANCE: HCPCS | Performed by: HOSPITALIST

## 2019-08-05 PROCEDURE — 93010 ELECTROCARDIOGRAM REPORT: CPT | Performed by: INTERNAL MEDICINE

## 2019-08-05 PROCEDURE — 85610 PROTHROMBIN TIME: CPT | Performed by: EMERGENCY MEDICINE

## 2019-08-05 PROCEDURE — 99205 OFFICE O/P NEW HI 60 MIN: CPT | Performed by: PSYCHIATRY & NEUROLOGY

## 2019-08-05 PROCEDURE — 85730 THROMBOPLASTIN TIME PARTIAL: CPT | Performed by: EMERGENCY MEDICINE

## 2019-08-05 PROCEDURE — 99284 EMERGENCY DEPT VISIT MOD MDM: CPT

## 2019-08-05 PROCEDURE — 85025 COMPLETE CBC W/AUTO DIFF WBC: CPT | Performed by: EMERGENCY MEDICINE

## 2019-08-05 PROCEDURE — 70549 MR ANGIOGRAPH NECK W/O&W/DYE: CPT

## 2019-08-05 PROCEDURE — 93005 ELECTROCARDIOGRAM TRACING: CPT | Performed by: EMERGENCY MEDICINE

## 2019-08-05 PROCEDURE — 80048 BASIC METABOLIC PNL TOTAL CA: CPT | Performed by: EMERGENCY MEDICINE

## 2019-08-05 PROCEDURE — 70544 MR ANGIOGRAPHY HEAD W/O DYE: CPT

## 2019-08-05 PROCEDURE — 0 GADOBENATE DIMEGLUMINE 529 MG/ML SOLUTION: Performed by: HOSPITALIST

## 2019-08-05 RX ORDER — SODIUM CHLORIDE 0.9 % (FLUSH) 0.9 %
10 SYRINGE (ML) INJECTION AS NEEDED
Status: DISCONTINUED | OUTPATIENT
Start: 2019-08-05 | End: 2019-08-06 | Stop reason: HOSPADM

## 2019-08-05 RX ORDER — METOPROLOL SUCCINATE 100 MG/1
100 TABLET, EXTENDED RELEASE ORAL DAILY
COMMUNITY
End: 2019-10-18 | Stop reason: SDUPTHER

## 2019-08-05 RX ORDER — DULOXETIN HYDROCHLORIDE 60 MG/1
60 CAPSULE, DELAYED RELEASE ORAL DAILY
COMMUNITY
End: 2019-08-06 | Stop reason: HOSPADM

## 2019-08-05 RX ORDER — SODIUM CHLORIDE 0.9 % (FLUSH) 0.9 %
3-10 SYRINGE (ML) INJECTION AS NEEDED
Status: DISCONTINUED | OUTPATIENT
Start: 2019-08-05 | End: 2019-08-06 | Stop reason: HOSPADM

## 2019-08-05 RX ORDER — LOSARTAN POTASSIUM 25 MG/1
25 TABLET ORAL EVERY MORNING
COMMUNITY
End: 2019-08-14

## 2019-08-05 RX ORDER — NIFEDIPINE 30 MG/1
30 TABLET, EXTENDED RELEASE ORAL DAILY
COMMUNITY
End: 2019-08-06 | Stop reason: HOSPADM

## 2019-08-05 RX ORDER — ASPIRIN 600 MG
300 SUPPOSITORY, RECTAL RECTAL DAILY
Status: DISCONTINUED | OUTPATIENT
Start: 2019-08-06 | End: 2019-08-06

## 2019-08-05 RX ORDER — NICOTINE POLACRILEX 4 MG
15 LOZENGE BUCCAL
Status: DISCONTINUED | OUTPATIENT
Start: 2019-08-05 | End: 2019-08-06 | Stop reason: HOSPADM

## 2019-08-05 RX ORDER — ASPIRIN 325 MG
325 TABLET ORAL DAILY
Status: DISCONTINUED | OUTPATIENT
Start: 2019-08-06 | End: 2019-08-06

## 2019-08-05 RX ORDER — ASPIRIN 81 MG/1
324 TABLET, CHEWABLE ORAL ONCE
Status: COMPLETED | OUTPATIENT
Start: 2019-08-05 | End: 2019-08-05

## 2019-08-05 RX ORDER — ATORVASTATIN CALCIUM 80 MG/1
80 TABLET, FILM COATED ORAL NIGHTLY
Status: DISCONTINUED | OUTPATIENT
Start: 2019-08-05 | End: 2019-08-06

## 2019-08-05 RX ORDER — POTASSIUM CHLORIDE 750 MG/1
10 TABLET, FILM COATED, EXTENDED RELEASE ORAL DAILY
COMMUNITY
End: 2019-10-18 | Stop reason: SDUPTHER

## 2019-08-05 RX ORDER — ISOSORBIDE MONONITRATE 60 MG/1
60 TABLET, EXTENDED RELEASE ORAL DAILY
COMMUNITY
End: 2019-10-18 | Stop reason: SDUPTHER

## 2019-08-05 RX ORDER — FUROSEMIDE 20 MG/1
20 TABLET ORAL 2 TIMES DAILY
COMMUNITY
End: 2019-09-02 | Stop reason: SDUPTHER

## 2019-08-05 RX ORDER — SODIUM CHLORIDE 0.9 % (FLUSH) 0.9 %
3 SYRINGE (ML) INJECTION EVERY 12 HOURS SCHEDULED
Status: DISCONTINUED | OUTPATIENT
Start: 2019-08-05 | End: 2019-08-06 | Stop reason: HOSPADM

## 2019-08-05 RX ORDER — NITROGLYCERIN 0.4 MG/1
0.4 TABLET SUBLINGUAL
Status: DISCONTINUED | OUTPATIENT
Start: 2019-08-05 | End: 2019-08-06 | Stop reason: HOSPADM

## 2019-08-05 RX ORDER — LOSARTAN POTASSIUM 25 MG/1
50 TABLET ORAL NIGHTLY
COMMUNITY
End: 2019-08-14

## 2019-08-05 RX ORDER — DEXTROSE MONOHYDRATE 25 G/50ML
25 INJECTION, SOLUTION INTRAVENOUS
Status: DISCONTINUED | OUTPATIENT
Start: 2019-08-05 | End: 2019-08-06 | Stop reason: HOSPADM

## 2019-08-05 RX ORDER — ALLOPURINOL 300 MG/1
300 TABLET ORAL DAILY
COMMUNITY
End: 2019-10-18 | Stop reason: SDUPTHER

## 2019-08-05 RX ADMIN — GADOBENATE DIMEGLUMINE 17 ML: 529 INJECTION, SOLUTION INTRAVENOUS at 17:41

## 2019-08-05 RX ADMIN — SODIUM CHLORIDE, PRESERVATIVE FREE 3 ML: 5 INJECTION INTRAVENOUS at 21:14

## 2019-08-05 RX ADMIN — ATORVASTATIN CALCIUM 80 MG: 80 TABLET, FILM COATED ORAL at 21:14

## 2019-08-05 RX ADMIN — ASPIRIN 324 MG: 81 TABLET, CHEWABLE ORAL at 15:07

## 2019-08-05 NOTE — TELEPHONE ENCOUNTER
I spoke with [facility] neuroscience again.  They spoke with physicians and they advised patient go to the emergency department at [facility] and on call neurology/neurovascular will see her there and evaluate her with work-up.  I spoke with patient again and with her permission spoke with her daughter who will be taking patient to the emergency department.  They both confirmed patient did have a fall 3 to 4 days ago but has otherwise had no other neurological symptoms that are new or changing.  They will go to [facility] now.

## 2019-08-05 NOTE — TELEPHONE ENCOUNTER
----- Message from Gabriela Cortez sent at 8/5/2019 10:48 AM EDT -----  Regarding: Referral to Neuro  Patient was contacted by phone and she did not understand so sister in law called and she gave verbal permission for Gabriela Johnson to talk to sister in law.  Gabriela spoke to patient and discussed importance to patient to go to ER at [facility], [place], operated by [facility].

## 2019-08-05 NOTE — PLAN OF CARE
Problem: Patient Care Overview  Goal: Plan of Care Review  Outcome: Ongoing (interventions implemented as appropriate)   08/05/19 9585   Coping/Psychosocial   Plan of Care Reviewed With patient   Plan of Care Review   Progress no change   OTHER   Outcome Summary vss, nih-1, failed swallow, new admit

## 2019-08-05 NOTE — ED TRIAGE NOTES
Patient states that she had been falling a lot. She also states she had been dizzy. Family also states that she thought she was having some confusion. Patient had an MRI on Saturday and they were called by the Neurologist and told to come here for possible stroke.

## 2019-08-05 NOTE — ED PROVIDER NOTES
" EMERGENCY DEPARTMENT ENCOUNTER    Room Number:  11/11  Date seen:  8/5/2019  Time seen: 12:37 PM  PCP: Albert Brooks MD  Historian: patient      HPI:  Chief Complaint: dizziness  A complete HPI/ROS/PMH/PSH/SH/FH are unobtainable due to: none  Context: Miri Yung is a 78 y.o. female who presents to the ED c/o dizziness and increased falls over the past several weeks. She had outpatient MRI ordered and had it done 08/3/19. She was called today and told to come to the ED as it showed possible CVA. She admits to feeling \"off balanced\" and \"sways to the right\" during physical therapy. She admits that occasionally feels choked when swallowing, but denies HA, extremity weakness/numbness, speech problems, CP, SOA, and other complaints. Pt admits to another fall after seeing her PCP, but did not come to the ED as she was instructed to do for evaluation. Pt denies injuries from falls. Pt is not a smoker and does not use ETOH. She is not on ASA or anticoagulants.    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   • Urinary tract infection 02/08/2016   • Valvular endocarditis 02/08/2016   • Long QT interval 02/19/2016   • Microcytic anemia 03/03/2016   • Anemia 03/04/2016   • Seasonal allergies 08/19/2016   • Health care maintenance 09/28/2016   • Acute cystitis without hematuria 07/25/2017   • Chronic fatigue 02/28/2018   • Bacterial endocarditis 10/31/2018   • Slow " transit constipation 06/06/2019     Resolved Ambulatory Problems     Diagnosis Date Noted   • Left lower quadrant pain 02/08/2016   • Arthritis 02/08/2016   • Hematochezia 02/08/2016   • Pain in joint 02/08/2016   • Lung mass 02/08/2016   • Muscle pain 02/08/2016   • Inflammation of sacroiliac joint (CMS/HCC) 02/08/2016   • Stress incontinence in female 02/08/2016   • Urinary urgency 02/08/2016   • Precordial pain 02/19/2016   • High risk medication use 02/19/2016   • Chest pain 02/19/2016   • Iron deficiency anemia due to chronic blood loss 03/03/2016   • Pulmonary hypertension (CMS/HCC) 03/30/2016   • Fatigue due to exposure 05/05/2016   • Need for vaccination 10/17/2016   • Atypical chest pain 12/14/2016   • Diarrhea 02/16/2017     Past Medical History:   Diagnosis Date   • Anxiety    • Atrial fibrillation (CMS/HCC)    • Dementia    • Depression    • Edema    • Encephalopathy    • Endocarditis    • Esophageal reflux    • Gout    • History of blood transfusion    • HTN (hypertension)    • Hyperlipidemia    • Insomnia    • LOM (loss of memory)    • Lung mass    • Mitral and aortic valve disease    • Mitral regurgitation    • Nonrheumatic tricuspid (valve) insufficiency    • Osteoarthritis    • Sacroiliitis (CMS/HCC)    • Tricuspid regurgitation          PAST SURGICAL HISTORY  Past Surgical History:   Procedure Laterality Date   • CATARACT EXTRACTION     • COLONOSCOPY N/A 3/10/2016    Procedure: COLONOSCOPY TO CECUM;  Surgeon: Shaan Becerra Jr., MD;  Location: [facility] ENDOSCOPY;  Service:    • ENDOSCOPY N/A 3/10/2016    Procedure: ESOPHAGOGASTRODUODENOSCOPY ;  Surgeon: Shaan Becerra Jr., MD;  Location: [facility] ENDOSCOPY;  Service:    • KNEE SURGERY     • TUBAL ABDOMINAL LIGATION           FAMILY HISTORY  Family History   Problem Relation Age of Onset   • Kidney disease Mother    • Heart disease Mother    • Emphysema Father    • Heart disease Brother          SOCIAL HISTORY  Social History     Socioeconomic History    • Marital status:      Spouse name: Not on file   • Number of children: Not on file   • Years of education: Not on file   • Highest education level: Not on file   Tobacco Use   • Smoking status: Never Smoker   • Smokeless tobacco: Never Used   Substance and Sexual Activity   • Alcohol use: No   • Drug use: No   • Sexual activity: Defer         ALLERGIES  Decongestant  [pseudoephedrine]        REVIEW OF SYSTEMS  Review of Systems   Constitutional: Negative for diaphoresis and fever.   HENT: Negative for congestion.    Eyes: Negative for visual disturbance.   Respiratory: Negative for shortness of breath.    Cardiovascular: Negative for palpitations.   Gastrointestinal: Negative for blood in stool and vomiting.   Endocrine: Negative for polyuria.   Genitourinary: Negative for flank pain.   Musculoskeletal: Negative for joint swelling.   Skin: Negative for wound.   Neurological: Positive for dizziness. Negative for seizures.   Hematological: Negative for adenopathy.   Psychiatric/Behavioral: Negative for sleep disturbance.            PHYSICAL EXAM  ED Triage Vitals   Temp Heart Rate Resp BP SpO2   08/05/19 1209 08/05/19 1153 08/05/19 1153 08/05/19 1209 08/05/19 1153   98.4 °F (36.9 °C) 76 18 170/75 92 %      Temp src Heart Rate Source Patient Position BP Location FiO2 (%)   08/05/19 1209 -- -- -- --   Oral             GENERAL: no acute distress  HENT: nares patent  EYES: no scleral icterus  CV: regular rhythm, normal rate, nml heart sounds  RESPIRATORY: normal effort, CTAB  ABDOMEN: soft, nontender  MUSCULOSKELETAL: no deformity  NEURO: alert, moves all extremities, follows commands  Recent and remote memory functions are normal. The patient is attentive with normal concentration. Language is fluent. Speech is clear, non-dysarthric. Fund of knowledge is normal.   Symmetric smile with no facial droop.  Eyes close and shut strongly bilaterally.  Symmetric eyebrow raise bilaterally.  EOMI, PERRL  CN II-XII  grossly normal otherwise.  5/5 strength to bilateral upper and lower extremities.  No pronator drift.  Intact FNF.  SKIN: warm, dry    Vital signs and nursing notes reviewed.          LAB RESULTS  Recent Results (from the past 24 hour(s))   Protime-INR    Collection Time: 08/05/19 12:18 PM   Result Value Ref Range    Protime 14.3 (H) 11.7 - 14.2 Seconds    INR 1.14 (H) 0.90 - 1.10   aPTT    Collection Time: 08/05/19 12:18 PM   Result Value Ref Range    PTT 31.4 22.7 - 35.4 seconds   Basic Metabolic Panel    Collection Time: 08/05/19 12:18 PM   Result Value Ref Range    Glucose 113 (H) 65 - 99 mg/dL    BUN 13 8 - 23 mg/dL    Creatinine 0.59 0.57 - 1.00 mg/dL    Sodium 146 (H) 136 - 145 mmol/L    Potassium 3.8 3.5 - 5.2 mmol/L    Chloride 104 98 - 107 mmol/L    CO2 29.7 (H) 22.0 - 29.0 mmol/L    Calcium 9.3 8.6 - 10.5 mg/dL    eGFR Non African Amer 98 >60 mL/min/1.73    BUN/Creatinine Ratio 22.0 7.0 - 25.0    Anion Gap 12.3 5.0 - 15.0 mmol/L   CBC Auto Differential    Collection Time: 08/05/19 12:18 PM   Result Value Ref Range    WBC 10.25 3.40 - 10.80 10*3/mm3    RBC 4.97 3.77 - 5.28 10*6/mm3    Hemoglobin 15.0 12.0 - 15.9 g/dL    Hematocrit 47.1 (H) 34.0 - 46.6 %    MCV 94.8 79.0 - 97.0 fL    MCH 30.2 26.6 - 33.0 pg    MCHC 31.8 31.5 - 35.7 g/dL    RDW 13.6 12.3 - 15.4 %    RDW-SD 47.6 37.0 - 54.0 fl    MPV 12.1 (H) 6.0 - 12.0 fL    Platelets 168 140 - 450 10*3/mm3    Neutrophil % 65.2 42.7 - 76.0 %    Lymphocyte % 23.9 19.6 - 45.3 %    Monocyte % 7.6 5.0 - 12.0 %    Eosinophil % 1.7 0.3 - 6.2 %    Basophil % 1.0 0.0 - 1.5 %    Immature Grans % 0.6 (H) 0.0 - 0.5 %    Neutrophils, Absolute 6.69 1.70 - 7.00 10*3/mm3    Lymphocytes, Absolute 2.45 0.70 - 3.10 10*3/mm3    Monocytes, Absolute 0.78 0.10 - 0.90 10*3/mm3    Eosinophils, Absolute 0.17 0.00 - 0.40 10*3/mm3    Basophils, Absolute 0.10 0.00 - 0.20 10*3/mm3    Immature Grans, Absolute 0.06 (H) 0.00 - 0.05 10*3/mm3    nRBC 0.0 0.0 - 0.2 /100 WBC       Ordered the  above labs and reviewed the results.        RADIOLOGY  No Radiology Exams Resulted Within Past 24 Hours    Ordered the above noted radiological studies. Reviewed by me in PACS.     PROCEDURES  Procedures      EKG:           EKG time: 1231  Rhythm/Rate: NSR 69  P waves and ND: nml  QRS, axis: imcompleted LBBB   ST and T waves: no acute ischemic changes     Interpreted Contemporaneously by me, independently viewed  Similar changed compared to prior 03/5/16    Pt is not a candidate for TPA due to NIH of 0 and delay of presentation.     MEDICATIONS GIVEN IN ER  Medications   sodium chloride 0.9 % flush 10 mL (not administered)   aspirin chewable tablet 324 mg (not administered)                   PROGRESS AND CONSULTS     1242  Discussed MRI showing an area of concern for acute CVA. Pt told the plan to admit for additional evaluation. Pt understands and agrees with the plan, all questions answered.    1250  Discussed the pt w/ Dr. Hays, neurology. He agreed to consult on the pt.    1323  Discussed the pt w/ Dr. Breen CARLY. He agreed to admit.      MEDICAL DECISION MAKING    78-year-old female presents after incidental notation of an acute stroke on MRI obtained 2 days prior.  She has no symptoms currently.  She was not a TPA candidate due to delayed presentation and lack of symptoms.  Given her risk for further stroke, she will be admitted for further evaluation of stroke risk factors.  I have ordered 324 of aspirin here.  Neurology has been consulted.    MDM  Number of Diagnoses or Management Options  Acute ischemic stroke (CMS/[facility]):      Amount and/or Complexity of Data Reviewed  Clinical lab tests: reviewed ()  Decide to obtain previous medical records or to obtain history from someone other than the patient: yes  Discuss the patient with other providers: yes (Dr. Hays, neurology  Dr. Breen, [facility])    Patient Progress  Patient progress: stable             DIAGNOSIS  Final diagnoses:   Acute ischemic stroke  (CMS/[facility])         DISPOSITION  ADMISSION    Discussed treatment plan and reason for admission with pt/family and admitting physician.  Pt/family voiced understanding of the plan for admission for further testing/treatment as needed.     Latest Documented Vital Signs:  As of 1:24 PM  BP- 170/75 HR- 69 Temp- 98.4 °F (36.9 °C) (Oral) O2 sat- 92%        --  Documentation assistance provided by carmina Salmeron for Dr. CHAS Wetzel MD.  Information recorded by the scribe was done at my direction and has been verified and validated by me.      Please note that portions of this were completed with a voice recognition program.      Brittny Salmeron  08/05/19 7241       Christian Wetzel MD  08/05/19 2271

## 2019-08-05 NOTE — PROGRESS NOTES
Clinical Pharmacy Services: Medication History    Miri Yung is a 78 y.o. female presenting to [facility] for   Chief Complaint   Patient presents with   • Abnormal Lab     patient came in states that she had an MRI on saturday-she states that she thinks she might of had a stroke.        She  has a past medical history of Anxiety, Atrial fibrillation (CMS/HCC), Dementia, Depression, Edema, Encephalopathy, Endocarditis, Esophageal reflux, Gout, History of blood transfusion, HTN (hypertension), Hyperlipidemia, Insomnia, LOM (loss of memory), Lung mass, Mitral and aortic valve disease, Mitral regurgitation, Nonrheumatic tricuspid (valve) insufficiency, Osteoarthritis, Sacroiliitis (CMS/HCC), and Tricuspid regurgitation.    Allergies as of 08/05/2019 - Reviewed 08/05/2019   Allergen Reaction Noted   • Decongestant  [pseudoephedrine]  02/07/2016       Medication information was obtained from: Family, patient, medication list  Pharmacy and Phone Number: [facility] [phone], [facility]    Prior to Admission Medications     Prescriptions Last Dose Informant Patient Reported? Taking?    acetaminophen (TYLENOL) 500 MG tablet  Family Member Yes Yes    Take 500 mg by mouth every 4 (four) hours as needed for mild pain (1-3) (1-2 TABLETS PRN Q 4-6 HOURS).    allopurinol (ZYLOPRIM) 300 MG tablet  Family Member Yes Yes    Take 300 mg by mouth Daily.    cetirizine (zyrTEC) 10 MG tablet  Family Member No Yes    Take 1 tablet by mouth Daily.    Patient taking differently:  Take 10 mg by mouth Daily As Needed.    DULoxetine (CYMBALTA) 60 MG capsule  Family Member Yes Yes    Take 60 mg by mouth Daily.    fluticasone (FLONASE) 50 MCG/ACT nasal spray  Family Member No Yes    2 sprays by Each Nare route Daily.    Patient taking differently:  2 sprays by Each Nare route Daily As Needed.    furosemide (LASIX) 20 MG tablet  Family Member Yes Yes    Take 20 mg by mouth 2 (Two) Times a Day.    isosorbide mononitrate (IMDUR)  60 MG 24 hr tablet  Family Member Yes Yes    Take 60 mg by mouth Daily.    losartan (COZAAR) 25 MG tablet  Family Member Yes Yes    Take 25 mg by mouth Every Morning.    losartan (COZAAR) 25 MG tablet  Family Member Yes Yes    Take 50 mg by mouth Every Night.    metFORMIN (GLUCOPHAGE) 500 MG tablet  Family Member Yes Yes    Take 500 mg by mouth Daily With Breakfast.    metoprolol succinate XL (TOPROL-XL) 100 MG 24 hr tablet  Family Member Yes Yes    Take 100 mg by mouth Daily.    montelukast (SINGULAIR) 10 MG tablet  Family Member No Yes    Take 1 tablet by mouth Every Night.    Multiple Vitamins-Minerals (MULTI FOR HER 50+) capsule  Family Member Yes Yes    Take 1 capsule by mouth daily.    NIFEdipine XL (PROCARDIA XL) 30 MG 24 hr tablet  Family Member Yes Yes    Take 30 mg by mouth Daily.    pantoprazole (PROTONIX) 40 MG EC tablet  Family Member No Yes    Take 1 tablet by mouth Daily.    potassium chloride (K-DUR) 10 MEQ CR tablet  Family Member Yes Yes    Take 10 mEq by mouth Daily.    buPROPion SR (WELLBUTRIN SR) 150 MG 12 hr tablet   No No    Take 1 tablet by mouth Daily With Breakfast.            Medication notes: Family and patient were not able to confirm that the patient is taking Wellbutrin. Medication list provided was dated January 2018. Family stated that she is taking Cymbalta but are unsure about the Wellbutrin.    This medication list is complete to the best of my knowledge as of 8/5/2019    Please call if questions.    Selene Bonner, Medication History Technician  8/5/2019 3:00 PM

## 2019-08-05 NOTE — H&P
Patient Name:  Miri Yung  YOB: 1940  MRN:  [id number]  Admit Date:  8/5/2019  Patient Care Team:  Albert Brooks MD as PCP - General (Family Medicine)  Albert Brooks MD as PCP - Claims Attributed      Subjective   History Present Illness     Chief Complaint   Patient presents with   • Abnormal Lab     patient came in states that she had an MRI on saturday-she states that she thinks she might of had a stroke.      History of Present Illness   Ms. Yung is a 78 y.o. non-smoker with a history of dementia, gout, HLD, paroxsymal atrial fibrillation, mitral and tricuspid regurgitation, endocarditis and HTN that presents to [facility] complaining of an abnormal MRI. The patient presented to her PCP office on 7/11/19 for recurrent falls (3 in one month) and ataxia. At that time, Dr. Brooks ordered for PT and an MRI of the brain. She got this MRI done 8/3/19 and was called today to come to the ED regarding CVA found on imaging. She presents now for evaluation.   The patient states she lives alone and has not fallen since her last doctor's appointment with Dr. Brooks. According to the patient (who has baseline memory loss), she was falling at home, but didn't know why. She denies tripping or becoming lightheaded or dizzy. She reports she would just fall over when she was standing or if she bent down to get something. She denies any visual disturbances at this time. However, she did report seeing red dots and other abnormal images to Dr. Hays with neurology (see his note). She denies headaches as well as any unilateral weakness. Her sons at bedside deny changes in her speech. She is noted to have memory loss of recent events, but is able to recall memories from the distant past. She does take her own medications at home, but her son will make sure they are correctly placed in her weekly tabs. She denies any chest pain, shortness of breath or recent illness.    The patient was last  admitted here in March of 2016. EGD and colonoscopy were negative for bleeding despite requiring 4 units of blood during that stay. Prior to this, she was admitted here in February of 2016 for atrial fibrillation with RVR. She last saw Dr. York in October of 2018 when he adjusted her blood pressure medications at that time. 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 She was on a baby aspirin at one time, but is not on any anticoagulation for her history of atrial fibrillation.     Review of Systems   Constitutional: Negative for activity change, appetite change, chills, fatigue and fever.   HENT: Negative for congestion, ear pain, postnasal drip and sore throat.    Eyes: Negative for pain and discharge.   Respiratory: Negative for cough, choking, chest tightness and shortness of breath.    Cardiovascular: Negative for chest pain, palpitations and leg swelling.   Gastrointestinal: Negative for abdominal distention, abdominal pain, diarrhea, nausea and vomiting.   Endocrine: Negative for cold intolerance and heat intolerance.   Genitourinary: Negative for difficulty urinating and dyspareunia.   Musculoskeletal: Positive for gait problem. Negative for arthralgias, back pain and joint swelling.   Skin: Negative for color change and pallor.   Neurological: Positive for weakness. Negative for dizziness, syncope, light-headedness, numbness and headaches.   Psychiatric/Behavioral: Positive for confusion (baseline forgetful). The patient is not nervous/anxious.       Personal History     Past Medical History:   Diagnosis Date   • Anxiety    • Atrial fibrillation (CMS/HCC)    • Dementia    • Depression    • Edema    • Encephalopathy     secondary to endocarditis   • Endocarditis     with mitral and tricuspid regurgitation   • Esophageal reflux    • Gout    • History of blood transfusion     due to anemia   • HTN (hypertension)    • Hyperlipidemia    • Insomnia    • LOM (loss of  memory)    • Lung mass    • 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; generally asymptomatic   • Mitral regurgitation    • Nonrheumatic tricuspid (valve) insufficiency    • Osteoarthritis    • Sacroiliitis (CMS/HCC)    • Tricuspid regurgitation      Past Surgical History:   Procedure Laterality Date   • CATARACT EXTRACTION     • COLONOSCOPY N/A 3/10/2016    Procedure: COLONOSCOPY TO CECUM;  Surgeon: Shaan Becerra Jr., MD;  Location: [facility] ENDOSCOPY;  Service:    • ENDOSCOPY N/A 3/10/2016    Procedure: ESOPHAGOGASTRODUODENOSCOPY ;  Surgeon: Shaan Becerra Jr., MD;  Location: [facility] ENDOSCOPY;  Service:    • KNEE SURGERY     • TUBAL ABDOMINAL LIGATION       Family History   Problem Relation Age of Onset   • Kidney disease Mother    • Heart disease Mother    • Emphysema Father    • Heart disease Brother      Social History     Tobacco Use   • Smoking status: Never Smoker   • Smokeless tobacco: Never Used   Substance Use Topics   • Alcohol use: No   • Drug use: No       (Not in a hospital admission)  Allergies:    Allergies   Allergen Reactions   • Decongestant  [Pseudoephedrine]        Objective    Objective     Vital Signs  Temp:  [98.4 °F (36.9 °C)] 98.4 °F (36.9 °C)  Heart Rate:  [69-78] 78  Resp:  [18] 18  BP: (150-170)/(69-77) 150/77  SpO2:  [90 %-93 %] 90 %  on   ;      Body mass index is 34.57 kg/m².    Physical Exam   Constitutional: She is oriented to person, place, and time. She appears well-developed and well-nourished. No distress.   HENT:   Head: Normocephalic and atraumatic.   Nose: Nose normal.   Missing teeth.   Eyes: Conjunctivae and EOM are normal. Right eye exhibits no discharge. Left eye exhibits no discharge.   Neck: Normal range of motion. Neck supple.   Cardiovascular: Normal rate, normal heart sounds and intact distal pulses.   No murmur heard.  Occasional irregular beat. PACs on monitor.   Pulmonary/Chest: Effort normal and breath sounds normal. No respiratory distress.    Abdominal: Soft. Bowel sounds are normal. She exhibits no distension. There is no tenderness.   Musculoskeletal: Normal range of motion. She exhibits edema (mild BLE). She exhibits no tenderness.   Neurological: She is alert and oriented to person, place, and time.   Forgetful. This is baseline per family.   Skin: Skin is warm and dry. No erythema.   Psychiatric: She has a normal mood and affect. Her behavior is normal.   Nursing note and vitals reviewed.     Results Review:  I reviewed the patient's new clinical results.  I reviewed the patient's new imaging results and agree with the interpretation.  I reviewed the patient's other test results and agree with the interpretation  I personally viewed and interpreted the patient's EKG/Telemetry data    Lab Results (last 24 hours)     Procedure Component Value Units Date/Time    CBC & Differential [[id number]] Collected:  08/05/19 1218    Specimen:  Blood Updated:  08/05/19 1234    Narrative:       The following orders were created for panel order CBC & Differential.  Procedure                               Abnormality         Status                     ---------                               -----------         ------                     CBC Auto Differential[[id number]]        Abnormal            Final result                 Please view results for these tests on the individual orders.    Protime-INR [[id number]]  (Abnormal) Collected:  08/05/19 1218    Specimen:  Blood Updated:  08/05/19 1245     Protime 14.3 Seconds      INR 1.14    aPTT [[id number]]  (Normal) Collected:  08/05/19 1218    Specimen:  Blood Updated:  08/05/19 1245     PTT 31.4 seconds     Basic Metabolic Panel [[id number]]  (Abnormal) Collected:  08/05/19 1218    Specimen:  Blood Updated:  08/05/19 1250     Glucose 113 mg/dL      BUN 13 mg/dL      Creatinine 0.59 mg/dL      Sodium 146 mmol/L      Potassium 3.8 mmol/L      Chloride 104 mmol/L      CO2 29.7 mmol/L      Calcium 9.3 mg/dL      eGFR Non   Amer 98 mL/min/1.73      BUN/Creatinine Ratio 22.0     Anion Gap 12.3 mmol/L     Narrative:       GFR Normal >60  Chronic Kidney Disease <60  Kidney Failure <15    CBC Auto Differential [[id number]]  (Abnormal) Collected:  08/05/19 1218    Specimen:  Blood Updated:  08/05/19 1234     WBC 10.25 10*3/mm3      RBC 4.97 10*6/mm3      Hemoglobin 15.0 g/dL      Hematocrit 47.1 %      MCV 94.8 fL      MCH 30.2 pg      MCHC 31.8 g/dL      RDW 13.6 %      RDW-SD 47.6 fl      MPV 12.1 fL      Platelets 168 10*3/mm3      Neutrophil % 65.2 %      Lymphocyte % 23.9 %      Monocyte % 7.6 %      Eosinophil % 1.7 %      Basophil % 1.0 %      Immature Grans % 0.6 %      Neutrophils, Absolute 6.69 10*3/mm3      Lymphocytes, Absolute 2.45 10*3/mm3      Monocytes, Absolute 0.78 10*3/mm3      Eosinophils, Absolute 0.17 10*3/mm3      Basophils, Absolute 0.10 10*3/mm3      Immature Grans, Absolute 0.06 10*3/mm3      nRBC 0.0 /100 WBC           Imaging Results (last 24 hours)     ** No results found for the last 24 hours. 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   · PT/OT/ST consulted.   · NPO until cleared by speech.  · ASA and Lipitor.  · Check lipid panel and HgbA1c in AM.  · MRA of head and neck ordered per neuro.  · Check echocardiogram.    Atrial fibrillation  · History of this from 2016.   · In NSR now. Not on anticoagulation from past GI bleeding.  · Await echo and consider cardiology consult pending  results.    Dementia/Anxiety/Depression  · Hold Cymbalta for now per neuro recs, but continue other home medications.    HTN  · Blood pressures stable.   · Goals of BP to <130/80 per neurology recs. Restart home medications once reviewed.  · Monitor.     Diabetes  · Hold oral diabetic agents.   · SSI. AC and HS.   · Last hgbA1c 1/2019 8.2.    I discussed the patients findings and my recommendations with patient and family.    VTE Prophylaxis - SCDs .  Code Status - Full code. Confirmed with patient and sons at bedside.       KWASI Ann  [facility]  08/05/19  3:38 PM     As above.  Pt examined and test results noted.  Pt presently w no c/o.  A&O X 3 w no appreciable lateralizing signs.  Lungs CTA, Heart RRR...    Georges Breen MD  8/5/2019  18:22

## 2019-08-05 NOTE — PROGRESS NOTES
Discharge Planning Assessment  [facility]     Patient Name: Miri Yung  MRN: [id number]  Today's Date: 8/5/2019    Admit Date: 8/5/2019    Discharge Needs Assessment     Row Name 08/05/19 1606       Discharge Needs Assessment    Offered/Gave Vendor List  yes [facility]        Discharge Plan     Row Name 08/05/19 1538       Plan    Plan  Patient plans to go home upon d/c; family can transport via private vehicle    Plan Comments  Entered room, introduced self and explained role. Verified information on facesheet; Patient lives alone in a home w/6 steps into home and a basement for 40+years; Patient presents to the ED today due to abnormal MRI and frequent falls; Patient has been active in outpatient PT through [facility] for her falls; Family (2 sons- Hardeep and Brian) at patient bedside- permission granted to talk w/them present; Family assists patient w/errands however patient is mostly independent w/ADL's. Patient currently receives medication through mail order [facility] and [facility]    Final Discharge Disposition Code  01 - home or self-care        Destination      No service coordination in this encounter.      Durable Medical Equipment      No service coordination in this encounter.      Dialysis/Infusion      No service coordination in this encounter.      Home Medical Care      No service coordination in this encounter.      Therapy      No service coordination in this encounter.      Community Resources      No service coordination in this encounter.          Demographic Summary    No documentation.       Functional Status    No documentation.       Psychosocial    No documentation.       Abuse/Neglect    No documentation.       Legal    No documentation.       Substance Abuse    No documentation.       Patient Forms    No documentation.           Miri Sullivan RN

## 2019-08-05 NOTE — CONSULTS
NEUROLOGY CONSULT - STROKE TEAM    DOS: 2019  NAME: Miri Yung   : 1940  PCP: Albert Brooks MD  CC: Stroke  Referring MD: No ref. provider found  Patient being seen at request of Dr. Wetzel for advice and opinion on stroke.    Neurological Problem and Interval History:  78 y.o. female presents with chief complaint of: falls.  Patient has had 3 falls in the past 4 months prompting an MRI ordered in early July which identified a left frontal tiny DWI change with a subtle ADC correlate consistent with a subacute stroke. Patient reports that since her  passed in , she has been slowly declining and largely spends most of the day in bed or a chair. She also had a right knee replacement and since then, has been prone to falling and these past few months has been falling more.     She also reports having visual disturbances. During the examination, she reported that my hand looked red; that earlier the wall seemed to have red dots on it that are now gone.  She also reports having seen other odd things such as black and gold 'painting' like a landscape on objects that then disappear.     She reports having worsening memory problems over the years as well. 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; generally asymptomatic   • Mitral regurgitation    • Nonrheumatic tricuspid (valve) insufficiency    • Osteoarthritis    • Sacroiliitis (CMS/HCC)    • Tricuspid regurgitation      Past Surgical History:    Procedure Laterality Date   • CATARACT EXTRACTION     • COLONOSCOPY N/A 3/10/2016    Procedure: COLONOSCOPY TO CECUM;  Surgeon: Shaan Becerra Jr., MD;  Location: [facility] ENDOSCOPY;  Service:    • ENDOSCOPY N/A 3/10/2016    Procedure: ESOPHAGOGASTRODUODENOSCOPY ;  Surgeon: Shaan Becerra Jr., MD;  Location: [facility] ENDOSCOPY;  Service:    • KNEE SURGERY     • TUBAL ABDOMINAL LIGATION         Review of Systems:        No fever, sweats or chills,  No neck pain, back pain or joint pain  No loss of hearing or tinnitus  No blurry or double vision but with odd visual hallucinations  No rashes or edema  No chest pain or palpitations  No shortness of breath or wheezing  No diarrhea or constipation  No urinary incontinence or dysuria  No seizures or syncope  No depression or anxiety  Positive for left knee pain and falls.     Medications On Admission    (Not in a hospital admission)  No current facility-administered medications on file prior to encounter.      Current Outpatient Medications on File Prior to Encounter   Medication Sig Dispense Refill   • acetaminophen (TYLENOL) 500 MG tablet Take 500 mg by mouth every 4 (four) hours as needed for mild pain (1-3) (1-2 TABLETS PRN Q 4-6 HOURS).     • allopurinol (ZYLOPRIM) 300 MG tablet TAKE 1 TABLET BY MOUTH  DAILY 90 tablet 1   • buPROPion SR (WELLBUTRIN SR) 150 MG 12 hr tablet Take 1 tablet by mouth Daily With Breakfast. 30 tablet 5   • cetirizine (zyrTEC) 10 MG tablet Take 1 tablet by mouth Daily. 90 tablet 0   • DULoxetine (CYMBALTA) 60 MG capsule TAKE 1 CAPSULE BY MOUTH  DAILY 90 capsule 0   • fluticasone (FLONASE) 50 MCG/ACT nasal spray 2 sprays by Each Nare route Daily. 32 g 2   • furosemide (LASIX) 20 MG tablet TAKE 1 TABLET BY MOUTH TWO  TIMES DAILY 180 tablet 1   • isosorbide mononitrate (IMDUR) 60 MG 24 hr tablet TAKE 1 TABLET BY MOUTH  DAILY 90 tablet 1   • losartan (COZAAR) 25 MG tablet TAKE 1 TABLET BY MOUTH IN  THE MORNING AND 2 TABLETS  AT NIGHT 270 tablet 1    • metFORMIN (GLUCOPHAGE) 500 MG tablet TAKE 1 TABLET BY MOUTH DAILY WITH BREAKFAST 90 tablet 0   • metoprolol succinate XL (TOPROL-XL) 100 MG 24 hr tablet TAKE 1 TABLET BY MOUTH  DAILY 90 tablet 1   • montelukast (SINGULAIR) 10 MG tablet Take 1 tablet by mouth Every Night. 90 tablet 2   • Multiple Vitamins-Minerals (MULTI FOR HER 50+) capsule Take 1 capsule by mouth daily.     • NIFEdipine XL (PROCARDIA XL) 30 MG 24 hr tablet TAKE 1 TABLET BY MOUTH  DAILY 90 tablet 0   • pantoprazole (PROTONIX) 40 MG EC tablet Take 1 tablet by mouth Daily. 90 tablet 0   • potassium chloride (K-DUR) 10 MEQ CR tablet TAKE 1 TABLET BY MOUTH  EVERY MORNING 90 tablet 1       Allergies:  Allergies   Allergen Reactions   • Decongestant  [Pseudoephedrine]        Social Hx:  Social History     Socioeconomic History   • Marital status:      Spouse name: Not on file   • Number of children: Not on file   • Years of education: Not on file   • Highest education level: Not on file   Tobacco Use   • Smoking status: Never Smoker   • Smokeless tobacco: Never Used   Substance and Sexual Activity   • Alcohol use: No   • Drug use: No   • Sexual activity: Defer       Family Hx:  Family History   Problem Relation Age of Onset   • Kidney disease Mother    • Heart disease Mother    • Emphysema Father    • Heart disease Brother        Review of Imaging (Interpretation of images not reports): Left frontal DWI change with very subtle ADC change consistent with a subacute infarct. Mild-moderate leukoaraiosis.      Laboratory Results:   Lab Results   Component Value Date    GLUCOSE 113 (H) 08/05/2019    CALCIUM 9.3 08/05/2019     (H) 08/05/2019    K 3.8 08/05/2019    CO2 29.7 (H) 08/05/2019     08/05/2019    BUN 13 08/05/2019    CREATININE 0.59 08/05/2019    EGFRIFAFRI 101 01/14/2019    EGFRIFNONA 98 08/05/2019    BCR 22.0 08/05/2019    ANIONGAP 12.3 08/05/2019     Lab Results   Component Value Date    WBC 10.25 08/05/2019    HGB 15.0  "08/05/2019    HCT 47.1 (H) 08/05/2019    MCV 94.8 08/05/2019     08/05/2019     No results found for: CHOL  Lab Results   Component Value Date    HDL 36 (L) 01/14/2019    HDL 33 (L) 02/28/2018    HDL 43 08/03/2015     Lab Results   Component Value Date    LDL 70 01/14/2019    LDL 83 02/28/2018    LDL 98 08/03/2015     Lab Results   Component Value Date    TRIG 138 01/14/2019    TRIG 179 (H) 02/28/2018    TRIG 220 (H) 08/03/2015     Lab Results   Component Value Date    HGBA1C 8.2 01/16/2019     Lab Results   Component Value Date    INR 1.14 (H) 08/05/2019    INR 1.0 02/23/2016    PROTIME 14.3 (H) 08/05/2019    PROTIME 13.1 02/23/2016         Physical Examination:   /75   Pulse 69   Temp 98.4 °F (36.9 °C) (Oral)   Resp 18   Ht 157.5 cm (62\")   Wt 85.7 kg (189 lb)   SpO2 92%   BMI 34.57 kg/m²   General Appearance:   Well developed, well nourished, well groomed, alert, and cooperative.  HEENT: Normocephalic. Atraumatic. No JVD, no tracheal deviation. Many missing teeth  Neck and Spine: Normal range of motion.  Normal alignment. No mass or tenderness. No bruits.  Cardiac: Regular rate and rhythm. No murmurs.  Pulmonary: No shortness of breath, clear anteriorly to auscultation  Abdomen:  Soft, non-tender, non-distended   Peripheral Vasculature: Radial pulses are equal and symmetric. No signs of distal embolization.  Extremities:    No edema or deformities.   Skin:    No rashes or discoloration    Neurological examination:  Higher Integrative  Function: Oriented to time, place and person. Normal registration, attention span and concentration. Normal language including comprehension, spontaneous speech, repetition, naming and vocabulary. No neglect. Normal fund of knowledge and higher integrative function. Not able to come up with the president's name. Could orient to 9/11. No obvious psychosis but reports visual hallucinations.   CN II: Pupils are equal, round, and reactive to light. Blinks to visual " threat and counts fingers in all fields  CN III IV VI: Extraocular movements are full without nystagmus.   CN V: Normal facial sensation   CN VII: Facial movements are symmetric. No labial dysarthria  CN VIII:   Auditory acuity is normal.  CN IX & X:   No palatal or pharnygeal dysarthria.  CN XI: Turns head without abnormality.   CN XII:   The tongue is midline.  No lingual dysarthria.   Motor: Normal muscle strength, bulk and tone in upper and lower extremities except for hip flexion which is 4/5 on right and 4+/5 on left. No fasciculations, rigidity, spasticity, or abnormal movements.  Reflexes: 2+ in the upper extremities 1+ knee, absent ankle. Plantar responses are flexor.  Sensation: Normal to light touch, temperature, and proprioception in arms and legs.   Station and Gait: Deferred for bed rest    Coordination: Finger to nose test shows no dysmetria.  Rapid alternating movements are normal.  Heel to shin normal.    NIHSS:     Diagnoses / Discussion:  78 y.o. who presents with Sx of recurrent falls, visual hallucinations, memory problems and a DWI change on MRI. Imaging shows moderate degree of leukoaraiosis. Will check CTA or MRA and echocardiogram and consider prolonged cardiac monitoring.  2) Dementia with visual hallucinations. However, patient is on duloxetine which has a known complication of visual illusion/hallucinations. Would like to hold that and see if her symptoms resolve off of it. If not, consider atypical dementia work-up.  3) Deconditioning - Patient with proximal leg weakness and a long history of progressively decreasing activity. I suspect this is the principal reason for her falls and should improve with physical therapy.     Plan:  Aspirin 325mg  Neurochecks  Check Lipid panel, Hgb A1C  Lipitor 80mg  TTE  MRA  EKG Tele  PT/OT/ST  Stroke Education  Blood pressure control to <130/80  Goal LDL <70-recommend high dose statins-   Serum glucose < 140     Call 911 for stroke any stroke  symptoms    I have discussed the above with the patient and family.  Time spent with patient: 45min    MDM  Reviewed: previous chart, nursing note and vitals  Reviewed previous: labs and MRI  Interpretation: labs and MRI  Total time providing critical care: 30-74 minutes. This excludes time spent performing separately reportable procedures and services.         Fede Hays MD  Neurology

## 2019-08-06 ENCOUNTER — APPOINTMENT (OUTPATIENT)
Dept: CARDIOLOGY | Facility: HOSPITAL | Age: 79
End: 2019-08-06

## 2019-08-06 VITALS
WEIGHT: 182 LBS | TEMPERATURE: 98.6 F | HEART RATE: 74 BPM | RESPIRATION RATE: 18 BRPM | SYSTOLIC BLOOD PRESSURE: 156 MMHG | DIASTOLIC BLOOD PRESSURE: 67 MMHG | HEIGHT: 62 IN | BODY MASS INDEX: 33.49 KG/M2 | OXYGEN SATURATION: 91 %

## 2019-08-06 PROBLEM — I47.9 PAROXYSMAL TACHYCARDIA: Status: ACTIVE | Noted: 2019-08-05

## 2019-08-06 LAB
ANION GAP SERPL CALCULATED.3IONS-SCNC: 12.4 MMOL/L (ref 5–15)
ASCENDING AORTA: 2.6 CM
BH CV ECHO MEAS - % IVS THICK: 167 %
BH CV ECHO MEAS - ACS: 1.8 CM
BH CV ECHO MEAS - AO MAX PG: 16 MMHG
BH CV ECHO MEAS - AO MEAN PG (FULL): 6 MMHG
BH CV ECHO MEAS - AO MEAN PG: 8 MMHG
BH CV ECHO MEAS - AO ROOT AREA (BSA CORRECTED): 1.4
BH CV ECHO MEAS - AO ROOT AREA: 4.9 CM^2
BH CV ECHO MEAS - AO ROOT DIAM: 2.5 CM
BH CV ECHO MEAS - AO V2 MAX: 198 CM/SEC
BH CV ECHO MEAS - AO V2 MEAN: 125 CM/SEC
BH CV ECHO MEAS - AO V2 VTI: 39.9 CM
BH CV ECHO MEAS - AVA(I,A): 1.6 CM^2
BH CV ECHO MEAS - AVA(I,D): 1.6 CM^2
BH CV ECHO MEAS - BSA(HAYCOCK): 1.9 M^2
BH CV ECHO MEAS - BSA: 1.8 M^2
BH CV ECHO MEAS - BZI_BMI: 33.3 KILOGRAMS/M^2
BH CV ECHO MEAS - BZI_METRIC_HEIGHT: 157.5 CM
BH CV ECHO MEAS - BZI_METRIC_WEIGHT: 82.6 KG
BH CV ECHO MEAS - EDV(MOD-SP2): 59 ML
BH CV ECHO MEAS - EDV(MOD-SP4): 73 ML
BH CV ECHO MEAS - EDV(TEICH): 140.1 ML
BH CV ECHO MEAS - EF(MOD-BP): 57 %
BH CV ECHO MEAS - EF(MOD-SP2): 61 %
BH CV ECHO MEAS - EF(MOD-SP4): 53.4 %
BH CV ECHO MEAS - ESV(MOD-SP2): 23 ML
BH CV ECHO MEAS - ESV(MOD-SP4): 34 ML
BH CV ECHO MEAS - IVS/LVPW: 1
BH CV ECHO MEAS - IVSD: 1.1 CM
BH CV ECHO MEAS - IVSS: 2.9 CM
BH CV ECHO MEAS - LAT PEAK E' VEL: 10 CM/SEC
BH CV ECHO MEAS - LV DIASTOLIC VOL/BSA (35-75): 39.8 ML/M^2
BH CV ECHO MEAS - LV MASS(C)D: 223.4 GRAMS
BH CV ECHO MEAS - LV MASS(C)DI: 121.7 GRAMS/M^2
BH CV ECHO MEAS - LV MEAN PG: 2 MMHG
BH CV ECHO MEAS - LV SYSTOLIC VOL/BSA (12-30): 18.5 ML/M^2
BH CV ECHO MEAS - LV V1 MAX: 106 CM/SEC
BH CV ECHO MEAS - LV V1 MEAN: 68.4 CM/SEC
BH CV ECHO MEAS - LV V1 VTI: 22 CM
BH CV ECHO MEAS - LVIDD: 5.4 CM
BH CV ECHO MEAS - LVLD AP2: 5.6 CM
BH CV ECHO MEAS - LVLD AP4: 5.6 CM
BH CV ECHO MEAS - LVLS AP2: 5.1 CM
BH CV ECHO MEAS - LVLS AP4: 5.2 CM
BH CV ECHO MEAS - LVOT AREA (M): 2.8 CM^2
BH CV ECHO MEAS - LVOT AREA: 2.8 CM^2
BH CV ECHO MEAS - LVOT DIAM: 1.9 CM
BH CV ECHO MEAS - LVPWD: 1 CM
BH CV ECHO MEAS - MED PEAK E' VEL: 5 CM/SEC
BH CV ECHO MEAS - MR MAX PG: 96.4 MMHG
BH CV ECHO MEAS - MR MAX VEL: 491 CM/SEC
BH CV ECHO MEAS - MV A DUR: 0.16 SEC
BH CV ECHO MEAS - MV A MAX VEL: 111 CM/SEC
BH CV ECHO MEAS - MV DEC SLOPE: 538 CM/SEC^2
BH CV ECHO MEAS - MV DEC TIME: 0.19 SEC
BH CV ECHO MEAS - MV E MAX VEL: 131 CM/SEC
BH CV ECHO MEAS - MV E/A: 1.2
BH CV ECHO MEAS - MV MEAN PG: 4 MMHG
BH CV ECHO MEAS - MV P1/2T MAX VEL: 144 CM/SEC
BH CV ECHO MEAS - MV P1/2T: 78.4 MSEC
BH CV ECHO MEAS - MV V2 MEAN: 101 CM/SEC
BH CV ECHO MEAS - MV V2 VTI: 42.5 CM
BH CV ECHO MEAS - MVA P1/2T LCG: 1.5 CM^2
BH CV ECHO MEAS - MVA(P1/2T): 2.8 CM^2
BH CV ECHO MEAS - MVA(VTI): 1.5 CM^2
BH CV ECHO MEAS - PA MAX PG: 5.6 MMHG
BH CV ECHO MEAS - PA V2 MAX: 118 CM/SEC
BH CV ECHO MEAS - PULM A REVS DUR: 0.11 SEC
BH CV ECHO MEAS - PULM A REVS VEL: 33.8 CM/SEC
BH CV ECHO MEAS - PULM DIAS VEL: 27.3 CM/SEC
BH CV ECHO MEAS - PULM S/D: 1.4
BH CV ECHO MEAS - PULM SYS VEL: 37.7 CM/SEC
BH CV ECHO MEAS - QP/QS: 0.84
BH CV ECHO MEAS - RAP SYSTOLE: 3 MMHG
BH CV ECHO MEAS - RV MEAN PG: 1 MMHG
BH CV ECHO MEAS - RV V1 MEAN: 46.7 CM/SEC
BH CV ECHO MEAS - RV V1 VTI: 15.2 CM
BH CV ECHO MEAS - RVOT AREA: 3.5 CM^2
BH CV ECHO MEAS - RVOT DIAM: 2.1 CM
BH CV ECHO MEAS - RVSP: 41 MMHG
BH CV ECHO MEAS - SI(AO): 106.7 ML/M^2
BH CV ECHO MEAS - SI(LVOT): 34 ML/M^2
BH CV ECHO MEAS - SI(MOD-SP2): 19.6 ML/M^2
BH CV ECHO MEAS - SI(MOD-SP4): 21.2 ML/M^2
BH CV ECHO MEAS - SV(AO): 195.9 ML
BH CV ECHO MEAS - SV(LVOT): 62.4 ML
BH CV ECHO MEAS - SV(MOD-SP2): 36 ML
BH CV ECHO MEAS - SV(MOD-SP4): 39 ML
BH CV ECHO MEAS - SV(RVOT): 52.6 ML
BH CV ECHO MEAS - TAPSE (>1.6): 2.3 CM2
BH CV ECHO MEAS - TR MAX VEL: 311 CM/SEC
BH CV ECHO MEASUREMENTS AVERAGE E/E' RATIO: 17.47
BH CV XLRA - RV BASE: 3.2 CM
BH CV XLRA - TDI S': 16 CM/SEC
BUN BLD-MCNC: 9 MG/DL (ref 8–23)
BUN/CREAT SERPL: 18.8 (ref 7–25)
CALCIUM SPEC-SCNC: 9 MG/DL (ref 8.6–10.5)
CHLORIDE SERPL-SCNC: 103 MMOL/L (ref 98–107)
CHOLEST SERPL-MCNC: 111 MG/DL (ref 0–200)
CO2 SERPL-SCNC: 27.6 MMOL/L (ref 22–29)
CREAT BLD-MCNC: 0.48 MG/DL (ref 0.57–1)
DEPRECATED RDW RBC AUTO: 47.4 FL (ref 37–54)
ERYTHROCYTE [DISTWIDTH] IN BLOOD BY AUTOMATED COUNT: 13.7 % (ref 12.3–15.4)
GFR SERPL CREATININE-BSD FRML MDRD: 125 ML/MIN/1.73
GLUCOSE BLD-MCNC: 123 MG/DL (ref 65–99)
GLUCOSE BLDC GLUCOMTR-MCNC: 107 MG/DL (ref 70–130)
GLUCOSE BLDC GLUCOMTR-MCNC: 124 MG/DL (ref 70–130)
GLUCOSE BLDC GLUCOMTR-MCNC: 129 MG/DL (ref 70–130)
HBA1C MFR BLD: 6 % (ref 4.8–5.6)
HCT VFR BLD AUTO: 44.4 % (ref 34–46.6)
HDLC SERPL-MCNC: 42 MG/DL (ref 40–60)
HGB BLD-MCNC: 14 G/DL (ref 12–15.9)
LDLC SERPL CALC-MCNC: 47 MG/DL (ref 0–100)
LDLC/HDLC SERPL: 1.13 {RATIO}
LEFT ATRIUM VOLUME INDEX: 37 ML/M2
MAXIMAL PREDICTED HEART RATE: 141 BPM
MCH RBC QN AUTO: 29.9 PG (ref 26.6–33)
MCHC RBC AUTO-ENTMCNC: 31.5 G/DL (ref 31.5–35.7)
MCV RBC AUTO: 94.7 FL (ref 79–97)
PLATELET # BLD AUTO: 153 10*3/MM3 (ref 140–450)
PMV BLD AUTO: 12 FL (ref 6–12)
POTASSIUM BLD-SCNC: 3.5 MMOL/L (ref 3.5–5.2)
RBC # BLD AUTO: 4.69 10*6/MM3 (ref 3.77–5.28)
SINUS: 2.5 CM
SODIUM BLD-SCNC: 143 MMOL/L (ref 136–145)
STJ: 2 CM
STRESS TARGET HR: 120 BPM
TRIGL SERPL-MCNC: 108 MG/DL (ref 0–150)
VLDLC SERPL-MCNC: 21.6 MG/DL (ref 5–40)
WBC NRBC COR # BLD: 8.91 10*3/MM3 (ref 3.4–10.8)

## 2019-08-06 PROCEDURE — 83036 HEMOGLOBIN GLYCOSYLATED A1C: CPT | Performed by: NURSE PRACTITIONER

## 2019-08-06 PROCEDURE — 97535 SELF CARE MNGMENT TRAINING: CPT

## 2019-08-06 PROCEDURE — G0378 HOSPITAL OBSERVATION PER HR: HCPCS

## 2019-08-06 PROCEDURE — 82962 GLUCOSE BLOOD TEST: CPT

## 2019-08-06 PROCEDURE — 80048 BASIC METABOLIC PNL TOTAL CA: CPT | Performed by: NURSE PRACTITIONER

## 2019-08-06 PROCEDURE — 93306 TTE W/DOPPLER COMPLETE: CPT

## 2019-08-06 PROCEDURE — 80061 LIPID PANEL: CPT | Performed by: NURSE PRACTITIONER

## 2019-08-06 PROCEDURE — 97165 OT EVAL LOW COMPLEX 30 MIN: CPT

## 2019-08-06 PROCEDURE — 97161 PT EVAL LOW COMPLEX 20 MIN: CPT

## 2019-08-06 PROCEDURE — 92610 EVALUATE SWALLOWING FUNCTION: CPT

## 2019-08-06 PROCEDURE — 93306 TTE W/DOPPLER COMPLETE: CPT | Performed by: INTERNAL MEDICINE

## 2019-08-06 PROCEDURE — 85027 COMPLETE CBC AUTOMATED: CPT | Performed by: NURSE PRACTITIONER

## 2019-08-06 RX ORDER — ATORVASTATIN CALCIUM 40 MG/1
40 TABLET, FILM COATED ORAL NIGHTLY
Qty: 30 TABLET | Refills: 0 | Status: SHIPPED | OUTPATIENT
Start: 2019-08-06 | End: 2019-10-02

## 2019-08-06 RX ORDER — ATORVASTATIN CALCIUM 20 MG/1
40 TABLET, FILM COATED ORAL NIGHTLY
Status: DISCONTINUED | OUTPATIENT
Start: 2019-08-06 | End: 2019-08-06 | Stop reason: HOSPADM

## 2019-08-06 RX ORDER — ASPIRIN 81 MG/1
81 TABLET ORAL DAILY
Status: DISCONTINUED | OUTPATIENT
Start: 2019-08-06 | End: 2019-08-06 | Stop reason: HOSPADM

## 2019-08-06 RX ORDER — ASPIRIN 81 MG/1
81 TABLET ORAL DAILY
Qty: 30 TABLET | Refills: 0 | Status: SHIPPED | OUTPATIENT
Start: 2019-08-07 | End: 2019-12-04

## 2019-08-06 RX ADMIN — ASPIRIN 325 MG: 325 TABLET ORAL at 08:59

## 2019-08-06 RX ADMIN — SODIUM CHLORIDE, PRESERVATIVE FREE 3 ML: 5 INJECTION INTRAVENOUS at 08:59

## 2019-08-06 NOTE — PLAN OF CARE
Problem: Patient Care Overview  Goal: Plan of Care Review  Outcome: Ongoing (interventions implemented as appropriate)   08/06/19 [id number]   Coping/Psychosocial   Plan of Care Reviewed With patient   Plan of Care Review   Progress no change   OTHER   Outcome Summary VSS. NIH 0. Echo to be completed. Labs for the morning. Will continue to monitor.        Problem: Fall Risk (Adult)  Goal: Absence of Fall  Outcome: Ongoing (interventions implemented as appropriate)      Problem: Stroke (Ischemic) (Adult)  Goal: Signs and Symptoms of Listed Potential Problems Will be Absent, Minimized or Managed (Stroke)  Outcome: Ongoing (interventions implemented as appropriate)

## 2019-08-06 NOTE — THERAPY EVALUATION
Acute Care - Occupational Therapy Initial Evaluation  [facility]     Patient Name: Miri Yung  : 1940  MRN: [id number]  Today's Date: 2019  Onset of Illness/Injury or Date of Surgery: 19  Date of Referral to OT: 19  Referring Physician: Adri VILLALPANDO    Admit Date: 2019       ICD-10-CM ICD-9-CM   1. Acute ischemic stroke (CMS/HCC) I63.9 434.91     Patient Active Problem List   Diagnosis   • Acute sinusitis   • Atopic rhinitis   • Dementia   • Mixed anxiety depressive disorder   • Edema   • Abnormal liver function tests   • Gastroesophageal reflux disease   • Gout   • Hyperlipidemia   • HTN (hypertension)   • Insomnia   • Arthropathy of knee   • Memory loss   • Low back pain   • Mitral valve insufficiency   • Tricuspid valve insufficiency   • Urinary tract infection   • Valvular endocarditis   • Long QT interval   • Microcytic anemia   • Anemia   • Seasonal allergies   • Health care maintenance   • Acute cystitis without hematuria   • Chronic fatigue   • Bacterial endocarditis   • Slow transit constipation   • Acute ischemic stroke (CMS/HCC)   • Atrial fibrillation (CMS/HCC)   • Type 2 diabetes mellitus (CMS/HCC)     Past Medical History:   Diagnosis Date   • Anxiety    • Atrial fibrillation (CMS/HCC)    • Dementia    • Depression    • Edema    • Encephalopathy     secondary to endocarditis   • Endocarditis     with mitral and tricuspid regurgitation   • Esophageal reflux    • Gout    • History of blood transfusion     due to anemia   • HTN (hypertension)    • Hyperlipidemia    • Insomnia    • LOM (loss of memory)    • Lung mass    • 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; generally asymptomatic   • Mitral regurgitation    • Nonrheumatic tricuspid (valve) insufficiency    • Osteoarthritis    • Sacroiliitis (CMS/HCC)    • Tricuspid regurgitation      Past Surgical History:   Procedure Laterality Date   • CATARACT EXTRACTION     • COLONOSCOPY N/A 3/10/2016     "Procedure: COLONOSCOPY TO CECUM;  Surgeon: Shaan Becerra Jr., MD;  Location: [facility] ENDOSCOPY;  Service:    • ENDOSCOPY N/A 3/10/2016    Procedure: ESOPHAGOGASTRODUODENOSCOPY ;  Surgeon: Shaan Becerra Jr., MD;  Location: [facility] ENDOSCOPY;  Service:    • KNEE SURGERY     • TUBAL ABDOMINAL LIGATION            OT ASSESSMENT FLOWSHEET (last 12 hours)      Occupational Therapy Evaluation     Row Name 08/06/19 1348                   OT Evaluation Time/Intention    Subjective Information  complains of;weakness  -SK        Document Type  evaluation  -SK        Mode of Treatment  individual therapy;occupational therapy  -SK        Patient Effort  good  -SK        Comment  Pt states that she \"falls a lot\" at home but per son, she hasn't fallen in months   -SK           General Information    Patient Profile Reviewed?  yes  -SK        Onset of Illness/Injury or Date of Surgery  08/05/19  -SK        Referring Physician  Adri APRN  -SK        Patient Observations  alert;cooperative;agree to therapy  -SK        Patient/Family Observations  \"I live by myself but my son lives next door.\"   -SK        General Observations of Patient  pt supine in bed, family present at bedside, no signs of distress noted   -SK        Prior Level of Function  independent:  -SK        Equipment Currently Used at Home  cane, straight;shower chair  -SK        Pertinent History of Current Functional Problem  acute ishemic CVA  -SK        Risks Reviewed  patient:;family:  -SK        Benefits Reviewed  patient:;family:  -SK        Barriers to Rehab  none identified  -SK           Cognitive Assessment/Intervention- PT/OT    Orientation Status (Cognition)  oriented to;person;place;situation;verbal cues/prompts needed for orientation vcs for year as pt became confused if was 2-19 or 2019  -SK        Follows Commands (Cognition)  follows one step commands;over 90% accuracy  -SK        Safety Deficit (Cognitive)  mild deficit  -SK        Personal Safety " Interventions  fall prevention program maintained;gait belt;supervised activity  -SK           Safety Issues, Functional Mobility    Impairments Affecting Function (Mobility)  balance;strength;cognition  -SK           Bed Mobility Assessment/Treatment    Bed Mobility Assessment/Treatment  supine-sit;sit-supine  -SK        Supine-Sit [place] (Bed Mobility)  independent  -SK        Sit-Supine [place] (Bed Mobility)  independent  -SK           Functional Mobility    Functional Mobility- Ind. Level  contact guard assist  -SK        Functional Mobility- Device  -- none  -SK        Functional Mobility-Distance (Feet)  20  -SK        Functional Mobility- Safety Issues  balance decreased during turns;step length decreased  -SK        Functional Mobility- Comment  walk to and from bathroom  -SK           Transfer Assessment/Treatment    Transfer Assessment/Treatment  sit-stand transfer;stand-sit transfer;toilet transfer  -SK           Sit-Stand Transfer    Sit-Stand [place] (Transfers)  stand by assist  -SK           Stand-Sit Transfer    Stand-Sit [place] (Transfers)  stand by assist  -SK           Toilet Transfer    Type (Toilet Transfer)  sit-stand;stand-sit  -SK        [place] Level (Toilet Transfer)  contact guard  -SK        Assistive Device (Toilet Transfer)  commode;grab bars/safety frame  -SK           ADL Assessment/Intervention    BADL Assessment/Intervention  upper body dressing;lower body dressing;grooming;toileting  -SK           Lower Body Dressing Assessment/Training    Lower Body Dressing [place] Level  doff;don;socks;set up;supervision  -SK        Lower Body Dressing Position  edge of bed sitting  -SK           Grooming Assessment/Training    [place] Level (Grooming)  wash face, hands;oral care regimen;set up;supervision  -SK        Grooming Position  sink side  -SK           Toileting Assessment/Training    [place] Level (Toileting)  toileting skills;adjust/manage  clothing;perform perineal hygiene;contact guard assist  -SK        Assistive Devices (Toileting)  commode;grab bar/safety frame  -SK        Toileting Position  supported sitting;supported standing  -SK           General ROM    GENERAL ROM COMMENTS  BUE WFL  -SK           MMT (Manual Muscle Testing)    General MMT Comments  grossly 3/5   -SK           Motor Assessment/Interventions    Additional Documentation  Balance (Group);Balance Interventions (Group);Therapeutic Exercise (Group);Therapeutic Exercise Interventions (Group)  -SK           Sensory Assessment/Intervention    Sensory General Assessment  no sensation deficits identified  -SK        Additional Documentation  Vision Assessment/Intervention (Group)  -SK           Vision Assessment/Intervention    Visual Impairment/Limitations  other (see comments) decreased tracking temporally bilaterally from mid-line  -SK           Positioning and Restraints    Pre-Treatment Position  in bed  -SK        Post Treatment Position  bed  -SK        In Bed  fowlers;encouraged to call for assist;with family/caregiver;call light within reach  -SK           Pain Scale: Numbers Pre/Post-Treatment    Pain Scale: Numbers, Pretreatment  0/10 - no pain  -SK        Pain Scale: Numbers, Post-Treatment  0/10 - no pain  -SK           Plan of Care Review    Plan of Care Reviewed With  patient;family  -SK           Clinical Impression (OT)    Date of Referral to OT  08/05/19  -SK        OT Diagnosis  78 y.o. female with a history of dementia, gout, HLD, paroxsymal atrial fibrillation, mitral and tricuspid regurgitation, endocarditis and HTN that presents to [facility] with generalized weakness, c/o visual deficits with colors/visual ground, decreased functional mobility, decreased ind with ADLs.    -SK        Functional Level at Time of Evaluation (OT Eval)  Pt perform bed mobility independently, supine to sit with CGA and toilet transfer with CGA, grooming at sink with set-up, don socks sitting  EOB with set-up/CGA.  Pt states she is more off balance than normal.  Pt will benefit from skilled OT to address deficits and increase ind with ADLs and BUE strength.  Pt to return home with assist from family upon d/c.   -SK        Patient/Family Goals Statement (OT Eval)  to return home and to PLOF, increase strength  -SK        Criteria for Skilled Therapeutic Interventions Met (OT Eval)  yes  -SK        Rehab Potential (OT Eval)  good, to achieve stated therapy goals  -SK        Therapy Frequency (OT Eval)  5 times/wk  -SK        Care Plan Review (OT)  evaluation/treatment results reviewed;care plan/treatment goals reviewed;patient/other agree to care plan  -SK        Anticipated Discharge Disposition (OT)  home with assist  -SK           Planned OT Interventions    Planned Therapy Interventions (OT Eval)  activity tolerance training;transfer/mobility retraining;strengthening exercise;ROM/therapeutic exercise;BADL retraining  -SK           OT Goals    Transfer Goal Selection (OT)  transfer, OT goal 1  -SK        Bathing Goal Selection (OT)  bathing, OT goal 1  -SK        Dressing Goal Selection (OT)  dressing, OT goal 1  -SK        Strength Goal Selection (OT)  strength, OT goal 1  -SK        Additional Documentation  Strength Goal Selection (OT) (Row)  -SK           Transfer Goal 1 (OT)    Activity/Assistive Device (Transfer Goal 1, OT)  sit-to-stand/stand-to-sit;bed-to-chair/chair-to-bed;toilet;shower chair  -SK        [place] Level/Cues Needed (Transfer Goal 1, OT)  conditional independence  -SK        Time Frame (Transfer Goal 1, OT)  1 week  -SK        Progress/Outcome (Transfer Goal 1, OT)  goal ongoing  -SK           Bathing Goal 1 (OT)    Activity/Assistive Device (Bathing Goal 1, OT)  bathing skills, all;upper body bathing;lower body bathing;shower chair  -SK        [place] Level/Cues Needed (Bathing Goal 1, OT)  supervision required  -SK        Time Frame (Bathing Goal 1, OT)  1 week  -SK         Progress/Outcomes (Bathing Goal 1, OT)  goal ongoing  -SK           Dressing Goal 1 (OT)    Activity/Assistive Device (Dressing Goal 1, OT)  dressing skills, all;upper body dressing;lower body dressing  -SK        [place]/Cues Needed (Dressing Goal 1, OT)  supervision required  -SK        Time Frame (Dressing Goal 1, OT)  1 week  -SK        Progress/Outcome (Dressing Goal 1, OT)  goal ongoing  -SK           Strength Goal 1 (OT)    Strength Goal 1 (OT)  perform BUE ex to increase strength to 4-/5  -SK        Time Frame (Strength Goal 1, OT)  1 week  -SK        Progress/Outcome (Strength Goal 1, OT)  goal ongoing  -SK          User Key  (r) = Recorded By, (t) = Taken By, (c) = Cosigned By    Initials Name Effective Dates    Anita Cook, OT 02/25/19 -          Occupational Therapy Education     Title: PT OT SLP Therapies (Done)     Topic: Occupational Therapy (Done)     Point: ADL training (Done)     Description: Instruct learner(s) on proper safety adaptation and remediation techniques during self care or transfers.   Instruct in proper use of assistive devices.    Learning Progress Summary           Patient Acceptance, E,TB, VU by SK at 8/6/2019  2:05 PM    Comment:  educate pt on safety with ADLs, DME and safety with shower chair, BUE strength ex   Family Acceptance, E,TB, VU by SK at 8/6/2019  2:05 PM    Comment:  educate pt on safety with ADLs, DME and safety with shower chair, BUE strength ex                   Point: Home exercise program (Done)     Description: Instruct learner(s) on appropriate technique for monitoring, assisting and/or progressing therapeutic exercises/activities.    Learning Progress Summary           Patient Acceptance, E,TB, VU by SK at 8/6/2019  2:05 PM    Comment:  educate pt on safety with ADLs, DME and safety with shower chair, BUE strength ex   Family Acceptance, E,TB, VU by SK at 8/6/2019  2:05 PM    Comment:  educate pt on safety with ADLs, DME and safety with shower  chair, BUE strength ex                   Point: Precautions (Done)     Description: Instruct learner(s) on prescribed precautions during self-care and functional transfers.    Learning Progress Summary           Patient Acceptance, E,TB, VU by SK at 8/6/2019  2:05 PM    Comment:  educate pt on safety with ADLs, DME and safety with shower chair, BUE strength ex   Family Acceptance, E,TB, VU by SK at 8/6/2019  2:05 PM    Comment:  educate pt on safety with ADLs, DME and safety with shower chair, BUE strength ex                   Point: Body mechanics (Done)     Description: Instruct learner(s) on proper positioning and spine alignment during self-care, functional mobility activities and/or exercises.    Learning Progress Summary           Patient Acceptance, E,TB, VU by SK at 8/6/2019  2:05 PM    Comment:  educate pt on safety with ADLs, DME and safety with shower chair, BUE strength ex   Family Acceptance, E,TB, VU by SK at 8/6/2019  2:05 PM    Comment:  educate pt on safety with ADLs, DME and safety with shower chair, BUE strength ex                               User Key     Initials Effective Dates Name Provider Type Discipline    SK 02/25/19 -  Anita Valentine OT Occupational Therapist OT                  OT Recommendation and Plan  Outcome Summary/Treatment Plan (OT)  Anticipated Discharge Disposition (OT): home with assist  Planned Therapy Interventions (OT Eval): activity tolerance training, transfer/mobility retraining, strengthening exercise, ROM/therapeutic exercise, BADL retraining  Therapy Frequency (OT Eval): 5 times/wk  Plan of Care Review  Plan of Care Reviewed With: patient, family  Plan of Care Reviewed With: patient, family  Outcome Summary: 78 y.o. female with a history of dementia, gout, HLD, paroxsymal atrial fibrillation, mitral and tricuspid regurgitation, endocarditis and HTN that presents to [facility] with generalized weakness, c/o visual deficits with colors/visual ground, decreased functional  mobility, decreased ind with ADLs.  Pt perform bed mobility independently, supine to sit with CGA and toilet transfer with CGA, grooming at sink with set-up, don socks sitting EOB with set-up/CGA.  Pt states she is more off balance than normal.  Pt will benefit from skilled OT to address deficits and increase ind with ADLs and BUE strength.  Pt to return home with assist from family upon d/c.     Outcome Measures     Row Name 08/06/19 1400 08/06/19 1000          How much help from another person do you currently need...    Turning from your back to your side while in flat bed without using bedrails?  --  4  -MS     Moving from lying on back to sitting on the side of a flat bed without bedrails?  --  4  -MS     Moving to and from a bed to a chair (including a wheelchair)?  --  4  -MS     Standing up from a chair using your arms (e.g., wheelchair, bedside chair)?  --  4  -MS     Climbing 3-5 steps with a railing?  --  4  -MS     To walk in hospital room?  --  4  -MS     AM-PAC 6 Clicks Score (PT)  --  24  -MS        How much help from another is currently needed...    Putting on and taking off regular lower body clothing?  3  -SK  --     Bathing (including washing, rinsing, and drying)  3  -SK  --     Toileting (which includes using toilet bed pan or urinal)  4  -SK  --     Putting on and taking off regular upper body clothing  4  -SK  --     Taking care of personal grooming (such as brushing teeth)  4  -SK  --     Eating meals  4  -SK  --     AM-PAC 6 Clicks Score (OT)  22  -SK  --        Functional Assessment    Outcome Measure Options  AM-PAC 6 Clicks Daily Activity (OT)  -SK  AM-PAC 6 Clicks Basic Mobility (PT)  -MS       User Key  (r) = Recorded By, (t) = Taken By, (c) = Cosigned By    Initials Name Provider Type    Ajith Enriquez, PT Physical Therapist    Anita Cook, OT Occupational Therapist          Time Calculation:   Time Calculation- OT     Row Name 08/06/19 2545             Time Calculation-  OT    OT Start Time  1327  -SK      OT Stop Time  1348  -SK      OT Time Calculation (min)  21 min  -SK      Total Timed Code Minutes- OT  15 minute(s)  -SK      OT Goal Re-Cert Due Date  08/13/19  -SK        User Key  (r) = Recorded By, (t) = Taken By, (c) = Cosigned By    Initials Name Provider Type    SK Anita Valentine OT Occupational Therapist        Therapy Charges for Today     Code Description Service Date Service Provider Modifiers Qty    [id number]  OT EVAL LOW COMPLEXITY 2 8/6/2019 Anita Valentine OT GO 1    [id number]  OT SELF CARE/MGMT/TRAIN EA 15 MIN 8/6/2019 Anita Valentine OT GO 1               Anita Valentine OT  8/6/2019

## 2019-08-06 NOTE — PROGRESS NOTES
"DOS: 2019  NAME: Miri Yung   : 1940  PCP: Albert Brooks MD  Chief Complaint   Patient presents with   • Abnormal Lab     patient came in states that she had an MRI on saturday-she states that she thinks she might of had a stroke.      Stroke  Patient seen in follow-up today; new to me        Subjective: No events overnight. At neurologic baseline according to patient.     Family at bedside.     Objective:  Vital signs: /67 (BP Location: Right arm, Patient Position: Lying)   Pulse 74   Temp 98.6 °F (37 °C) (Oral)   Resp 18   Ht 157.5 cm (62\")   Wt 82.6 kg (182 lb)   SpO2 91%   BMI 33.29 kg/m²       HEENT: Normocephalic, atraumatic   COR: RRR  Resp: Even and unlabored  Extremities: Equal pulses, non distal embolization    Neurological:   MS: AO. Language normal. No neglect. Higher integrative function normal  CN: II-XII normal mild right-sided facial droop   Motor: 5/5, normal tone  Reflexes: Toes down going   Sensory: Intact  Coordination: Normal    Laboratory results:  Lab Results   Component Value Date    GLUCOSE 123 (H) 2019    CALCIUM 9.0 2019     2019    K 3.5 2019    CO2 27.6 2019     2019    BUN 9 2019    CREATININE 0.48 (L) 2019    EGFRIFAFRI 101 2019    EGFRIFNONA 125 2019    BCR 18.8 2019    ANIONGAP 12.4 2019     Lab Results   Component Value Date    WBC 8.91 2019    HGB 14.0 2019    HCT 44.4 2019    MCV 94.7 2019     2019     Lab Results   Component Value Date    CHOL 111 2019     Lab Results   Component Value Date    HDL 42 2019    HDL 36 (L) 2019    HDL 33 (L) 2018     Lab Results   Component Value Date    LDL 47 2019    LDL 70 2019    LDL 83 2018     Lab Results   Component Value Date    TRIG 108 2019    TRIG 138 2019    TRIG 179 (H) 2018     .  Lab Results   Component Value Date    TSH " 3.830 01/14/2019     Lab Results   Component Value Date    ADKQOITP07 729 01/14/2019     Lab Results   Component Value Date    HGBA1C 6.00 (H) 08/06/2019     Lab Results   Component Value Date    CHOL 111 08/06/2019    CHLPL 134 01/14/2019    CHLPL 152 02/28/2018    CHLPL 185 08/03/2015     Lab Results   Component Value Date    TRIG 108 08/06/2019    TRIG 138 01/14/2019    TRIG 179 (H) 02/28/2018     Lab Results   Component Value Date    HDL 42 08/06/2019    HDL 36 (L) 01/14/2019    HDL 33 (L) 02/28/2018     Lab Results   Component Value Date    LDL 47 08/06/2019    LDL 70 01/14/2019    LDL 83 02/28/2018       Review and interpretation of imaging:  MRI BRAIN WITH AND WITHOUT CONTRAST     HISTORY: Progressive ataxia, frequent falls.     COMPARISON: CT head 04/19/2011.     TECHNIQUE: A MRI examination of the brain was performed before and after  the intravenous administration of contrast utilizing sagittal T1, axial  diffusion, T1, T2, T2 FLAIR, gradient echo T2 as well as axial and  coronal T1 postcontrast weighted sequences.     FINDINGS: The diffusion sequence demonstrates an area of increased  signal intensity involving the operculum on the left consistent with an  acute infarct measuring 6 mm in size. There is mild-to-moderate small  vessel ischemic disease. There is expected flow-void in the basilar  artery and in the distal aspect of the internal carotid arteries  bilaterally on the axial T2 sequence.     There is age-appropriate atrophy.     After contrast administration, there is no evidence of abnormal  enhancement.     IMPRESSION:  Age-appropriate atrophy, mild-to-moderate small vessel  ischemic disease and a small acute infarct involving the operculum on  the left. The above information was called to and discussed with Dr. Gabriela Johnson who is covering for Dr. Albert Brooks on 08/05/2019 at  0955 hours.     This report was finalized on 8/6/2019 7:26 AM by Dr. Isacc Noble M.D.     MRI ANGIOGRAM NECK W  WO CONTRAST-, MRI ANGIOGRAM HEAD WO CONTRAST-     CLINICAL HISTORY: Frequent falls. Ataxia. Head injury. Memory loss. Left  frontal lobe CVA.     TECHNIQUE: MR angiography images of the neck and head were obtained  using time-of-flight-of-flight vascular enhancement techniques. Coronal  MR angiography images of the neck were also acquired during intravenous  injection of MultiHance contrast.      COMPARISON: MRI of the brain dated 08/03/2019.     FINDINGS: There is normal branching of the great vessels from the aortic  arch without NASCET significant stenosis. No stenoses are identified in  either common or external or internal carotid artery in the neck. The  carotid bifurcations appear normal. The right vertebral artery is  dominant. No stenoses are identified in either vertebral artery  throughout their course in the neck and head. There are no intracranial  stenoses or occlusions. No aneurysms are identified. A punctate acute  ischemic infarct is again noted in the operculum of the left frontal  lobe. This is unchanged. There is no significant associated mass effect.     IMPRESSION:  No vascular abnormalities are identified in the neck or  head. There is a tiny punctate acute ischemic infarct within the  operculum of the left frontal lobe that is unchanged since the preceding  MRI dated 08/03/2019.     This report was finalized on 8/5/2019 6:24 PM by Dr. Vincent Henriquez M.D.     TTE  Interpretation Summary     · The study is technically difficult for diagnosis.  · The left ventricular cavity is borderline dilated.  · Left ventricular diastolic dysfunction (grade II) consistent with pseudonormalization.  · Left atrial cavity size is mildly dilated.  · Mild mitral valve regurgitation is present  · Mild tricuspid valve regurgitation is present.  · Calculated right ventricular systolic pressure from tricuspid regurgitation is 41 mmHg.  · The aortic valve exhibits sclerosis.  · Peak velocity of the flow distal to the  aortic valve is 198.0 cm/s.          Impression:This is a 77 yo female w/ hx of AF (on not anticoagulant d/t GI Bleed), HTN, hyperlipidemia, endocarditis and valve disease who presented to [facility] on 08/05 d/t dizziness/recurrent falls for which our service was consulted. Recent MRI July 2019 revealed small area of DWI changes within left frontal lobe suggestive of subacute CVA. MRA Head and Neck revealed no vascular abnormalities. Evidence of smal acute ischemic infarct in left frontal lobe. Otherwise unremarkable. TTE EF 57%. LV normal. LA mildly dilated. No mention of PFO. LA volume index 37; know AF. Given GI bleed hx in the setting of new stroke and known AF watchman device should be consider to prevent CVA reoccurrence. Recommendations below. No further neurology workup indicated. We will sign off and see again upon request.      Plan:  Consider Watchman device in the future d/t AF/GI bleed history    Decrease ASA 81mg daily (not on PTA)   Decrease Lipitor 40mg daily (47)   Neurochecks  BP control  Stroke Education  SAEED/SCDs  PT/OT/ST  Follow-up in stroke clinic in 3 months; sooner if needed     Case reviewed w/ Dr Hays and he agrees with plan above.     KWASI Jha

## 2019-08-06 NOTE — DISCHARGE SUMMARY
"                                                                   PHYSICIAN DISCHARGE SUMMARY                                                                        [facility]    Patient Identification:  Name: Miri Yung  Age: 78 y.o.  Sex: female  :  1940  MRN: [id number]  Primary Care Physician: Albert Brooks MD    Admit date: 2019  Discharge date and time: 2019     Discharged Condition: good    Discharge Diagnoses:     Acute ischemic stroke (CMS/HCC)    Dementia    Mixed anxiety depressive disorder    Gastroesophageal reflux disease    HTN (hypertension)    Paroxysmal atrial fib    Type 2 diabetes mellitus (CMS/HCC)         Hospital Course:  Pleasant 78-year-old female who is been evaluated as an outpatient for increased incidence of falls.  She was sent for an outpatient MRI which reported the following:  \"mild-to-moderate small vessel ischemic disease and a small acute infarct involving the operculum on the left.\"  She was sent to the emergency room and evaluated by neurology.  Work-up was initiated which included an MRA as well as echocardiogram.  Both were unremarkable.  She does have a given history of paroxysmal atrial fibrillation although she stayed in sinus rhythm throughout hospitalization.  She will be started on a daily aspirin.  Neurology is recommended that she be evaluated to see if she would benefit from watchman's device.  She apparently has a very high risk for anticoagulation.  We will have this scheduled to be set up as an outpatient.        Consults:     Consults     Date and Time Order Name Status Description    2019 1339 Inpatient Neurology Consult Stroke      2019 1256 [facility] (on-call MD unless specified) Details Completed     2019 1245 Inpatient Neurology Consult Stroke Completed             Discharge Exam:  Physical Exam  Afebrile vital signs stable.  Well-developed well-nourished female no apparent distress.  Lungs clear to auscultation " good air movement.  Heart regular rate with frequent ectopy and PACs noted on the monitor.  Extremities no clubbing cyanosis or edema.  Alert conversant cooperative and pleasant.  No appreciable lateralizing signs.    Disposition:  Home    Patient Instructions:      Discharge Medications      New Medications      Instructions Start Date   aspirin 81 MG EC tablet   81 mg, Oral, Daily   Start Date:  8/7/2019     atorvastatin 40 MG tablet  Commonly known as:  LIPITOR   40 mg, Oral, Nightly         Changes to Medications      Instructions Start Date   fluticasone 50 MCG/ACT nasal spray  Commonly known as:  FLONASE  What changed:    · when to take this  · reasons to take this   2 sprays, Each Nare, Daily         Continue These Medications      Instructions Start Date   acetaminophen 500 MG tablet  Commonly known as:  TYLENOL   500 mg, Oral, Every 4 Hours PRN      allopurinol 300 MG tablet  Commonly known as:  ZYLOPRIM   300 mg, Oral, Daily      buPROPion  MG 12 hr tablet  Commonly known as:  WELLBUTRIN SR   150 mg, Oral, Daily With Breakfast      furosemide 20 MG tablet  Commonly known as:  LASIX   20 mg, Oral, 2 Times Daily      isosorbide mononitrate 60 MG 24 hr tablet  Commonly known as:  IMDUR   60 mg, Oral, Daily      losartan 25 MG tablet  Commonly known as:  COZAAR   25 mg, Oral, Every Morning      losartan 25 MG tablet  Commonly known as:  COZAAR   50 mg, Oral, Nightly      metFORMIN 500 MG tablet  Commonly known as:  GLUCOPHAGE   500 mg, Oral, Daily With Breakfast      metoprolol succinate  MG 24 hr tablet  Commonly known as:  TOPROL-XL   100 mg, Oral, Daily      montelukast 10 MG tablet  Commonly known as:  SINGULAIR   10 mg, Oral, Nightly      MULTI FOR HER 50+ capsule   1 capsule, Oral, Daily      pantoprazole 40 MG EC tablet  Commonly known as:  PROTONIX   40 mg, Oral, Daily      potassium chloride 10 MEQ CR tablet  Commonly known as:  K-DUR   10 mEq, Oral, Daily         Stop These Medications     cetirizine 10 MG tablet  Commonly known as:  zyrTEC     DULoxetine 60 MG capsule  Commonly known as:  CYMBALTA     NIFEdipine XL 30 MG 24 hr tablet  Commonly known as:  PROCARDIA XL          Diet Instructions     Diet: Consistent Carbohydrate, Cardiac      Discharge Diet:   Consistent Carbohydrate  Cardiac           Future Appointments   Date Time Provider Department Center   9/9/2019  2:45 PM Jen Pizano MD [facility] None   11/6/2019 11:45 AM Jignesh York MD [facility] None     Additional Instructions for the Follow-ups that You Need to Schedule     Ambulatory Referral to Home Health   As directed      Face to Face Visit Date:  8/6/2019    Follow-up Provider for Plan of Care?:  I treated the patient in an acute care facility and will not continue treatment after discharge.    Follow-up Provider:  JEN PIZANO [[id number]]    Reason/Clinical Findings:  CVA    Describe mobility limitations that make leaving home difficult:  CVA    Nursing/Therapeutic Services Requested:  Physical Therapy Occupational Therapy    PT orders:  Therapeutic exercise Gait Training Home safety assessment Transfer training    Weight Bearing Status:  As Tolerated    Occupational orders:  Activities of daily living Strengthening Cognition Home safety assessment    Frequency:  1 Week 1         Discharge Follow-up with PCP   As directed       Currently Documented PCP:    Jen Pizano MD    PCP Phone Number:    [phone]     Follow Up Details:  1 week         Discharge Follow-up with Specialty: Cardiology; 1 Week   As directed      Specialty:  Cardiology    Follow Up:  1 Week            Contact information for follow-up providers     Beckie Cheatham APRN Follow up in 3 month(s).    Specialties:  Emergency Medicine, Neurology, Nurse Practitioner  Contact information:  [address] [place] [address]  [phone]             Jen Pizano MD .    Specialties:  Family Medicine, Emergency Medicine  Why:   1 week  Contact information:  [address] [facility] [address]  [phone]                   Contact information for after-discharge care     Home Medical Care     [facility] .    Service:  Home Health Services  Contact information:  [address], [address]  [facility] [address] [phone]                           Discharge Order (From admission, onward)    Start     Ordered    08/06/19 1540  Discharge patient  Once     Expected Discharge Date:  08/06/19    Discharge Disposition:  Home or Self Care    Physician of Record for Attribution - Please select from Treatment Team:  GEORGES BREEN [[id number]]    Review needed by CMO to determine Physician of Record:  No       Question Answer Comment   Physician of Record for Attribution - Please select from Treatment Team GEORGES BREEN    Review needed by CMO to determine Physician of Record No        08/06/19 1545            Total time spent discharging patient including evaluation,post hospitalization follow up,  medication and post hospitalization instructions and education total time exceeds 30 minutes.    Signed:  Georges Breen MD  8/6/2019  3:49 PM    EMR Dragon/Transcription disclaimer:   Much of this encounter note is an electronic transcription/translation of spoken language to printed text. The electronic translation of spoken language may permit erroneous, or at times, nonsensical words or phrases to be inadvertently transcribed; Although I have reviewed the note for such errors, some may still exist.

## 2019-08-06 NOTE — DISCHARGE PLACEMENT REQUEST
"Bree Byrne (78 y.o. Female)     Date of Birth Social Security Number Address Home Phone MRN    1940  [address]   [place] [address] [phone] [id number]    Orthodox Marital Status          Sabianist        Admission Date Admission Type Admitting Provider Attending Provider Department, Room/Bed    8/5/19 Emergency Georges Breen MD Broaddus, Emmett J., MD [address], [address]    Discharge Date Discharge Disposition Discharge Destination                       Attending Provider:  Georges Breen MD    Allergies:  Decongestant  [Pseudoephedrine]    Isolation:  None   Infection:  None   Code Status:  CPR    Ht:  157.5 cm (62\")   Wt:  82.6 kg (182 lb)    Admission Cmt:  None   Principal Problem:  Acute ischemic stroke (CMS/[facility]) [I63.9]                 Active Insurance as of 8/5/2019     Primary Coverage     Payor Plan Insurance Group Employer/Plan Group    MEDICARE MEDICARE A & B      Payor Plan Address Payor Plan Phone Number Payor Plan Fax Number Effective Dates    [address] [phone]  11/1/2004 - None Entered    formerly [facility] [address]       Subscriber Name Subscriber Birth Date Member ID       BREE BYRNE 1940 [id number]           Secondary Coverage     Payor Plan Insurance Group Employer/Plan Group    AARP MED SUPP AARP HEALTH CARE OPTIONS      Payor Plan Address Payor Plan Phone Number Payor Plan Fax Number Effective Dates    [facility] [phone]  1/1/2016 - None Entered    [address]       [facility] [address]       Subscriber Name Subscriber Birth Date Member ID       BREE BYRNE 1940 [id number]                 Emergency Contacts      (Rel.) Home Phone Work Phone Mobile Phone    Hardeep Byrne (Son) [phone] -- [phone]    Brian Byrne (Son) [phone] -- [phone]    Ashley Israel (Other) -- -- [phone]              "

## 2019-08-06 NOTE — PROGRESS NOTES
Continued Stay Note  [facility]     Patient Name: Miri Yung  MRN: [id number]  Today's Date: 8/6/2019    Admit Date: 8/5/2019    Discharge Plan     Row Name 08/06/19 1537       Plan    Plan Comments  CCP attempted to call [facility] to ask for an earlier appointment for Pt (per Dr. Breen request), received a voicemail system, that states they will call back the next day after 3pm.  CCP left numbers to  with info that we were asking for an earlier appointment than Nov.  Also left pt's numbers to call if they called on 8/7, after dc.  Jessica Lewis RN    Row Name 08/06/19 153       Plan    Plan  plans are home with family and [employer] .          Discharge Codes    No documentation.             Jessica Lewis RN

## 2019-08-06 NOTE — PLAN OF CARE
Problem: Patient Care Overview  Goal: Plan of Care Review   08/06/19 1014   Coping/Psychosocial   Plan of Care Reviewed With patient;family   OTHER   Outcome Summary Pt. is currently independent and back to baseline with functional mobility. Pt. with no questions/concerns regarding functional mobility or home safety. Will sign off.           dizziness

## 2019-08-06 NOTE — THERAPY DISCHARGE NOTE
Acute Care - Physical Therapy Initial Eval/Discharge  [facility]     Patient Name: Miri Yung  : 1940  MRN: [id number]  Today's Date: 2019   Onset of Illness/Injury or Date of Surgery: 19  Date of Referral to PT: 19  Referring Physician: Kiersten Patel      Admit Date: 2019    Visit Dx:    ICD-10-CM ICD-9-CM   1. Acute ischemic stroke (CMS/HCC) I63.9 434.91     Patient Active Problem List   Diagnosis   • Acute sinusitis   • Atopic rhinitis   • Dementia   • Mixed anxiety depressive disorder   • Edema   • Abnormal liver function tests   • Gastroesophageal reflux disease   • Gout   • Hyperlipidemia   • HTN (hypertension)   • Insomnia   • Arthropathy of knee   • Memory loss   • Low back pain   • Mitral valve insufficiency   • Tricuspid valve insufficiency   • Urinary tract infection   • Valvular endocarditis   • Long QT interval   • Microcytic anemia   • Anemia   • Seasonal allergies   • Health care maintenance   • Acute cystitis without hematuria   • Chronic fatigue   • Bacterial endocarditis   • Slow transit constipation   • Acute ischemic stroke (CMS/HCC)   • Atrial fibrillation (CMS/HCC)   • Type 2 diabetes mellitus (CMS/HCC)     Past Medical History:   Diagnosis Date   • Anxiety    • Atrial fibrillation (CMS/HCC)    • Dementia    • Depression    • Edema    • Encephalopathy     secondary to endocarditis   • Endocarditis     with mitral and tricuspid regurgitation   • Esophageal reflux    • Gout    • History of blood transfusion     due to anemia   • HTN (hypertension)    • Hyperlipidemia    • Insomnia    • LOM (loss of memory)    • Lung mass    • 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; generally asymptomatic   • Mitral regurgitation    • Nonrheumatic tricuspid (valve) insufficiency    • Osteoarthritis    • Sacroiliitis (CMS/HCC)    • Tricuspid regurgitation      Past Surgical History:   Procedure Laterality Date   • CATARACT EXTRACTION     • COLONOSCOPY  "N/A 3/10/2016    Procedure: COLONOSCOPY TO CECUM;  Surgeon: Shaan Becerra Jr., MD;  Location: [facility] ENDOSCOPY;  Service:    • ENDOSCOPY N/A 3/10/2016    Procedure: ESOPHAGOGASTRODUODENOSCOPY ;  Surgeon: Shaan Becerra Jr., MD;  Location: [facility] ENDOSCOPY;  Service:    • KNEE SURGERY     • TUBAL ABDOMINAL LIGATION            PT ASSESSMENT (last 12 hours)      Physical Therapy Evaluation     Row Name 08/06/19 1011          PT Evaluation Time/Intention    Subjective Information  no complaints  -MS     Document Type  discharge evaluation/summary  -MS     Mode of Treatment  physical therapy;individual therapy  -MS     Patient Effort  good  -MS     Comment  Pt. reports feeling \"bored\" this day. Otherwise, pt. with no complaints  -MS     Row Name 08/06/19 1011          General Information    Onset of Illness/Injury or Date of Surgery  08/05/19  -MS     Referring Physician  Kiersten Patel  -MS     Patient Observations  agree to therapy;cooperative  -MS     Prior Level of Function  independent:  -MS     Equipment Currently Used at Home  none  -MS     Pertinent History of Current Functional Problem  Pt. admitted with an Acute Ischemic CVA  -MS     Risks Reviewed  patient and family:  -MS     Benefits Reviewed  patient and family:  -MS     Barriers to Rehab  none identified  -MS     Row Name 08/06/19 1011          Cognitive Assessment/Intervention- PT/OT    Orientation Status (Cognition)  oriented x 3  -MS     Follows Commands (Cognition)  follows one step commands;WNL  -MS     Personal Safety Interventions  fall prevention program maintained;gait belt;nonskid shoes/slippers when out of bed;supervised activity  -MS     Row Name 08/06/19 1011          Bed Mobility Assessment/Treatment    Bed Mobility Assessment/Treatment  supine-sit;sit-supine  -MS     Supine-Sit [place] (Bed Mobility)  independent  -MS     Sit-Supine [place] (Bed Mobility)  independent  -MS     Row Name 08/06/19 1011          Transfer " Assessment/Treatment    Transfer Assessment/Treatment  sit-stand transfer;stand-sit transfer  -MS     Stand-Sit [place] (Transfers)  independent  -MS     Row Name 08/06/19 1011          Gait/Stairs Assessment/Training    [place] Level (Gait)  independent  -MS     Distance in Feet (Gait)  100 feet  -MS     Pattern (Gait)  step-through  -MS     Comment (Gait/Stairs)  No balance deficits noted  -MS     Row Name 08/06/19 1011          General ROM    GENERAL ROM COMMENTS  BUE/LE (WFL's)  -MS     Row Name 08/06/19 1011          MMT (Manual Muscle Testing)    General MMT Comments  BUE/LE (4-/5)  -MS     Row Name 08/06/19 1011          Pain Assessment    Additional Documentation  Pain Scale: Numbers Pre/Post-Treatment (Group)  -MS     Row Name 08/06/19 1011          Pain Scale: Numbers Pre/Post-Treatment    Pain Scale: Numbers, Pretreatment  0/10 - no pain  -MS     Pain Scale: Numbers, Post-Treatment  0/10 - no pain  -MS     Row Name 08/06/19 1011          Physical Therapy Clinical Impression    Date of Referral to PT  08/05/19  -MS     Row Name 08/06/19 1011          Positioning and Restraints    Pre-Treatment Position  in bed  -MS     Post Treatment Position  bed  -MS     In Bed  notified nsg;supine;call light within reach;encouraged to call for assist;with family/caregiver All lines intact.  -MS       User Key  (r) = Recorded By, (t) = Taken By, (c) = Cosigned By    Initials Name Provider Type    MS Chepe Ajith ALEXIA, PT Physical Therapist          Physical Therapy Education     Title: PT OT SLP Therapies (Resolved)     Topic: Physical Therapy (Resolved)     Point: Mobility training (Resolved)     Learning Progress Summary           Patient Acceptance, E,D, MARIANA,DU by MS at 8/6/2019 10:14 AM                   Point: Home exercise program (Resolved)     Learning Progress Summary           Patient Acceptance, E,D, VU,DU by MS at 8/6/2019 10:14 AM                   Point: Body mechanics (Resolved)     Learning  Progress Summary           Patient Acceptance, JAMES,MARCELLUS, MARIANA,JACOB by MS at 8/6/2019 10:14 AM                   Point: Precautions (Resolved)     Learning Progress Summary           Patient Acceptance, E,MARCELLUS, MARIANA,JACOB by MS at 8/6/2019 10:14 AM                               User Key     Initials Effective Dates Name Provider Type Discipline    MS 04/03/18 -  Ajith Mo, PT Physical Therapist PT                PT Recommendation and Plan  Anticipated Discharge Disposition (PT): home  Outcome Summary/Treatment Plan (PT)  Anticipated Discharge Disposition (PT): home  Plan of Care Reviewed With: patient, family  Outcome Summary: Pt. is currently independent and back to baseline with functional mobility. Pt. with no questions/concerns regarding functional mobility or home safety.  Will sign off.     Outcome Measures     Row Name 08/06/19 1000             How much help from another person do you currently need...    Turning from your back to your side while in flat bed without using bedrails?  4  -MS      Moving from lying on back to sitting on the side of a flat bed without bedrails?  4  -MS      Moving to and from a bed to a chair (including a wheelchair)?  4  -MS      Standing up from a chair using your arms (e.g., wheelchair, bedside chair)?  4  -MS      Climbing 3-5 steps with a railing?  4  -MS      To walk in hospital room?  4  -MS      AM-PAC 6 Clicks Score (PT)  24  -MS         Functional Assessment    Outcome Measure Options  AM-PAC 6 Clicks Basic Mobility (PT)  -MS        User Key  (r) = Recorded By, (t) = Taken By, (c) = Cosigned By    Initials Name Provider Type    MS Ajith Mo, PT Physical Therapist           Time Calculation:   PT Charges     Row Name 08/06/19 1018             Time Calculation    Start Time  0910  -MS      Stop Time  0924  -MS      Time Calculation (min)  14 min  -MS      PT Received On  08/06/19  -MS         Time Calculation- PT    Total Timed Code Minutes- PT  12 minute(s)  -MS         User Key  (r) = Recorded By, (t) = Taken By, (c) = Cosigned By    Initials Name Provider Type    MS Ajith Mo, PT Physical Therapist        Therapy Charges for Today     Code Description Service Date Service Provider Modifiers Qty    29731210829 HC PT EVAL LOW COMPLEXITY 1 8/6/2019 Ajith Mo, PT GP 1          PT G-Codes  Outcome Measure Options: AM-PAC 6 Clicks Basic Mobility (PT)  AM-PAC 6 Clicks Score (PT): 24    PT Discharge Summary  Anticipated Discharge Disposition (PT): home    Ajith Mo, PT  8/6/2019

## 2019-08-06 NOTE — THERAPY EVALUATION
Acute Care - Speech Language Pathology   Swallow Initial Evaluation [facility]     Patient Name: Miri Yung  : 1940  MRN: [id number]  Today's Date: 2019  Onset of Illness/Injury or Date of Surgery: 19     Referring Physician: Adri VILLALPANDO      Admit Date: 2019    Visit Dx:     ICD-10-CM ICD-9-CM   1. Acute ischemic stroke (CMS/HCC) I63.9 434.91     Patient Active Problem List   Diagnosis   • Acute sinusitis   • Atopic rhinitis   • Dementia   • Mixed anxiety depressive disorder   • Edema   • Abnormal liver function tests   • Gastroesophageal reflux disease   • Gout   • Hyperlipidemia   • HTN (hypertension)   • Insomnia   • Arthropathy of knee   • Memory loss   • Low back pain   • Mitral valve insufficiency   • Tricuspid valve insufficiency   • Urinary tract infection   • Valvular endocarditis   • Long QT interval   • Microcytic anemia   • Anemia   • Seasonal allergies   • Health care maintenance   • Acute cystitis without hematuria   • Chronic fatigue   • Bacterial endocarditis   • Slow transit constipation   • Acute ischemic stroke (CMS/HCC)   • Paroxysmal tachycardia (CMS/HCC)   • Type 2 diabetes mellitus (CMS/HCC)     Past Medical History:   Diagnosis Date   • Anxiety    • Atrial fibrillation (CMS/HCC)    • Dementia    • Depression    • Edema    • Encephalopathy     secondary to endocarditis   • Endocarditis     with mitral and tricuspid regurgitation   • Esophageal reflux    • Gout    • History of blood transfusion     due to anemia   • HTN (hypertension)    • Hyperlipidemia    • Insomnia    • LOM (loss of memory)    • Lung mass    • 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; generally asymptomatic   • Mitral regurgitation    • Nonrheumatic tricuspid (valve) insufficiency    • Osteoarthritis    • Sacroiliitis (CMS/HCC)    • Tricuspid regurgitation      Past Surgical History:   Procedure Laterality Date   • CATARACT EXTRACTION     • COLONOSCOPY N/A 3/10/2016     Procedure: COLONOSCOPY TO CECUM;  Surgeon: Shaan Becerra Jr., MD;  Location: [facility] ENDOSCOPY;  Service:    • ENDOSCOPY N/A 3/10/2016    Procedure: ESOPHAGOGASTRODUODENOSCOPY ;  Surgeon: Shaan Becerra Jr., MD;  Location: [facility] ENDOSCOPY;  Service:    • KNEE SURGERY     • TUBAL ABDOMINAL LIGATION          SWALLOW EVALUATION (last 72 hours)      SLP Adult Swallow Evaluation     Row Name 08/06/19 1400          Document Type  evaluation  -ML    Subjective Information  no complaints  -ML    Patient Observations  alert;cooperative  -ML    Patient/Family Observations  Pt's 2 sons present in room  -ML    Patient Effort  good  -ML    Symptoms Noted During/After Treatment  none  -ML          Patient Profile Reviewed  yes  -ML    Pertinent History Of Current Problem  pt admitted after outpatient MRI revealed CVA involving operculum on the left  -ML    Current Method of Nutrition  regular textures;thin liquids  -ML    Prior Level of Function-Swallowing  no diet consistency restrictions  -ML    Plans/Goals Discussed with  patient;agreed upon  -ML    Barriers to Rehab  none identified  -ML    Patient's Goals for Discharge  patient did not state  -ML          Additional Documentation  Pain Scale: Numbers Pre/Post-Treatment (Group)  -ML          Pain Scale: Numbers, Pretreatment  0/10 - no pain  -ML    Pain Scale: Numbers, Post-Treatment  0/10 - no pain  -ML          Dentition Assessment  natural, present and adequate;missing teeth  -ML    Secretion Management  WNL/WFL  -ML    Mucosal Quality  moist, healthy  -ML          Oral Motor General Assessment  WFL  -ML          Eating/Swallowing Skills  self-fed  -ML    Positioning During Eating  upright in bed  -ML    Utensils Used  spoon;cup;straw  -ML    Consistencies Trialed  regular textures;soft textures;pureed;thin liquids mixed  -ML          Oral Prep Phase  WFL  -ML    Oral Transit  impaired  -ML    Oral Residue  --  -ML    Pharyngeal Phase  suspected pharyngeal impairment  -ML     Clinical Swallow Evaluation Summary  Pt presents with suspected mild oropharyngeal dysphagia characterized by impaired bolus control and delayed swallow initiation with trials of mixed consistencies peaches indicated by coughing. Pt noted to attempt to talk during trials of mixed consistency which could be impacting deficits. Pt exhibited no overt signs/symptoms of penetration/aspiration during trials of thin liquids, puree, or regular solids. Pt's sons present in room reported pt exhibited no coughing/choking during lunch meal today. Recommend regular solid diet/no mixed consistencies and thin liquids. Meds whole with water or per pt preference.  Education provided to pt and pt's son. Pt expressed understanding and agreement.   -ML          SLP Swallowing Diagnosis  mild;oral dysfunction;suspected pharyngeal dysfunction  -ML    Functional Impact  risk of aspiration/pneumonia  -ML    Rehab Potential/Prognosis, Swallowing  good, to achieve stated therapy goals  -ML    Swallow Criteria for Skilled Therapeutic Interventions Met  demonstrates skilled criteria  -ML          Therapy Frequency (Swallow)  PRN  -ML    Predicted Duration Therapy Intervention (Days)  until discharge  -ML    SLP Diet Recommendation  regular textures;thin liquids no mixed consistencies  -ML    Recommended Diagnostics  reassess via clinical swallow evaluation  -ML    Recommended Precautions and Strategies  upright posture during/after eating  -ML    SLP Rec. for Method of Medication Administration  meds whole;with thin liquids  -ML    Monitor for Signs of Aspiration  yes;notify SLP if any concerns  -ML    Anticipated Dischage Disposition  home  -ML          Oral Nutrition/Hydration Goal Selection (SLP)  oral nutrition/hydration, SLP goal 1  -ML          Oral Nutrition/Hydration Goal 1, SLP  Pt will safely swallow regular diet and thin liquids without overt s/s of pen/asp.   -ML    Time Frame (Oral Nutrition/Hydration Goal 1, SLP)  by discharge   -ML      User Key  (r) = Recorded By, (t) = Taken By, (c) = Cosigned By    Initials Name Effective Dates    Gloria Carmona MS CCC-SLP 10/04/18 -           EDUCATION  The patient has been educated in the following areas:   Dysphagia (Swallowing Impairment).    SLP Recommendation and Plan  SLP Swallowing Diagnosis: mild, oral dysfunction, suspected pharyngeal dysfunction  SLP Diet Recommendation: regular textures, thin liquids(no mixed consistencies)  Recommended Precautions and Strategies: upright posture during/after eating  SLP Rec. for Method of Medication Administration: meds whole, with thin liquids     Monitor for Signs of Aspiration: yes, notify SLP if any concerns  Recommended Diagnostics: reassess via clinical swallow evaluation  Swallow Criteria for Skilled Therapeutic Interventions Met: demonstrates skilled criteria  Anticipated Dischage Disposition: home  Rehab Potential/Prognosis, Swallowing: good, to achieve stated therapy goals  Therapy Frequency (Swallow): PRN  Predicted Duration Therapy Intervention (Days): until discharge               SLP GOALS     Row Name 08/06/19 1401             Oral Nutrition/Hydration Goal 1 (SLP)    Oral Nutrition/Hydration Goal 1, SLP  Pt will safely swallow regular diet and thin liquids without overt s/s of pen/asp.   -ML      Time Frame (Oral Nutrition/Hydration Goal 1, SLP)  by discharge  -ML        User Key  (r) = Recorded By, (t) = Taken By, (c) = Cosigned By    Initials Name Provider Type    Gloria Carmona MS CCC-SLP Speech and Language Pathologist           SLP Outcome Measures (last 72 hours)      SLP Outcome Measures     Row Name 08/06/19 1600             SLP Outcome Measures    Outcome Measure Used?  Adult NOMS  -ML         Adult FCM Scores    FCM Chosen  Swallowing  -ML      Swallowing FCM Score  6  -ML        User Key  (r) = Recorded By, (t) = Taken By, (c) = Cosigned By    Initials Name Effective Dates    Gloria Carmona MS CCC-SLP  10/04/18 -            Time Calculation:   Time Calculation- SLP     Row Name 08/06/19 1607             Time Calculation- SLP    SLP Start Time  1401  -ML      SLP Received On  08/06/19  -ML        User Key  (r) = Recorded By, (t) = Taken By, (c) = Cosigned By    Initials Name Provider Type    Gloria Carmona MS CCC-SLP Speech and Language Pathologist          Therapy Charges for Today     Code Description Service Date Service Provider Modifiers Qty    [id number] HC ST EVAL ORAL PHARYNG SWALLOW 3 8/6/2019 Gloria Pascual MS CCC-SLP GN 1               Gloria Pascual MS CCC-MATTHEW  8/6/2019

## 2019-08-06 NOTE — NURSING NOTE
Referral received through stroke screening order set.Noted that PT has signed off as their services are not indicated.  Acute inpatient rehab is not indicated.

## 2019-08-06 NOTE — PLAN OF CARE
Problem: Patient Care Overview  Goal: Plan of Care Review   08/06/19 9764   Coping/Psychosocial   Plan of Care Reviewed With patient;family   OTHER   Outcome Summary 78 y.o. female with a history of dementia, gout, HLD, paroxsymal atrial fibrillation, mitral and tricuspid regurgitation, endocarditis and HTN that presents to [facility] with generalized weakness, c/o visual deficits with colors/visual ground, decreased functional mobility, decreased ind with ADLs. Pt perform bed mobility independently, supine to sit with CGA and toilet transfer with CGA, grooming at sink with set-up, don socks sitting EOB with set-up/CGA. Pt states she is more off balance than normal. Pt will benefit from skilled OT to address deficits and increase ind with ADLs and BUE strength. Pt to return home with assist from family upon d/c.

## 2019-08-06 NOTE — PROGRESS NOTES
Continued Stay Note  [facility]     Patient Name: Miri Yung  MRN: [id number]  Today's Date: 8/6/2019    Admit Date: 8/5/2019    Discharge Plan     Row Name 08/06/19 1537       Plan    Plan Comments  CCP attempted to call [facility] to ask for an earlier appointment for Pt (per Dr. Breen request), received a voicemail system, that states they will call back the next day after 3pm.  CCP left numbers to  with info that we were asking for an earlier appointment than Nov.  Also left pt's numbers to call if they called on 8/7, after dc.  Jessica Lewis RN    Row Name 08/06/19 1534       Plan    Plan  plans are home with family and [facility] .          Discharge Codes    No documentation.             Jessica Lewis RN

## 2019-08-07 ENCOUNTER — READMISSION MANAGEMENT (OUTPATIENT)
Dept: CALL CENTER | Facility: HOSPITAL | Age: 79
End: 2019-08-07

## 2019-08-07 NOTE — OUTREACH NOTE
Prep Survey      Responses   Facility patient discharged from?  [facility]   Is patient eligible?  Yes   Discharge diagnosis  Acute ischemic stroke, dementia, mixed anxiety depressive disorder, GERD, HTN, PAF, DM II   Does the patient have one of the following disease processes/diagnoses(primary or secondary)?  Stroke (TIA) [LACE <7]   Does the patient have Home health ordered?  Yes   What is the Home health agency?   [facility]   Is there a DME ordered?  No   Comments regarding appointments  See AVS   Prep survey completed?  Yes          Miri Toth RN

## 2019-08-07 NOTE — PROGRESS NOTES
Case Management Discharge Note    Final Note: Confirmed with Nisha/ [facility] that they are following.    Destination      No service has been selected for the patient.      Durable Medical Equipment      No service has been selected for the patient.      Dialysis/Infusion      No service has been selected for the patient.      Home Medical Care - Selection Complete      Service Provider Request Status Selected Services Address Phone Number Fax Number    [facility] [place] [facility] [address], [address], [facility] [address] [phone]      Therapy      No service has been selected for the patient.      Community Resources      No service has been selected for the patient.             Final Discharge Disposition Code: 06 - home with home health care([facility])

## 2019-08-07 NOTE — PROGRESS NOTES
Case Management Discharge Note    Final Note: Confirmed with Nisha/ [facility] that they are following.    Destination      No service has been selected for the patient.      Durable Medical Equipment      No service has been selected for the patient.      Dialysis/Infusion      No service has been selected for the patient.      Home Medical Care - Selection Complete      Service Provider Request Status Selected Services Address Phone Number Fax Number    JUAN DANIEL\[facility]\""[place] ,[place] Selected Home Health Services [address] [place], [address], [facility] [address] [phone]      Therapy      No service has been selected for the patient.      Community Resources      No service has been selected for the patient.             Final Discharge Disposition Code: 06 - home with home health care([facility])

## 2019-08-08 ENCOUNTER — READMISSION MANAGEMENT (OUTPATIENT)
Dept: CALL CENTER | Facility: HOSPITAL | Age: 79
End: 2019-08-08

## 2019-08-08 NOTE — OUTREACH NOTE
Stroke Week 1 Survey      Responses   Facility patient discharged from?  [facility]   Does the patient have one of the following disease processes/diagnoses(primary or secondary)?  Stroke (TIA)   Is there a successful TCM telephone encounter documented?  No   Week 1 attempt successful?  Yes   Call start time  1149   Call end time  1153   Discharge diagnosis  Acute ischemic stroke, dementia, mixed anxiety depressive disorder, GERD, HTN, PAF, DM II   Meds reviewed with patient/caregiver?  Yes   Is the patient having any side effects they believe may be caused by any medication additions or changes?  No   Does the patient have all medications ordered at discharge?  Yes   Is the patient taking all medications as directed (includes completed medication regime)?  Yes   Comments regarding appointments  Sees cardiology on August 14 2019   Does the patient have a primary care provider?   Yes   Does the patient have an appointment with their PCP within 7 days of discharge?  Greater than 7 days   Comments regarding PCP  Has PCP appt on Sept 9 2019    What is preventing the patient from scheduling follow up appointments within 7 days of discharge?  Earlier appointment not available   Nursing Interventions  Verified appointment date/time/provider   Has the patient kept scheduled appointments due by today?  N/A   What is the Home health agency?   [facility]    Has home health visited the patient within 72 hours of discharge?  Yes   Psychosocial issues?  No   Does the patient require any assistance with activities of daily living such as eating, bathing, dressing, walking, etc.?  No   Does the patient have any residual symptoms from stroke/TIA?  No   Comments  Patient is doing well.    Did the patient receive a copy of their discharge instructions?  Yes   Nursing interventions  Reviewed instructions with patient   What is the patient's perception of their health status since discharge?  Improving   Nursing interventions  Nurse  provided patient education   Is the patient able to teach back FAST for Stroke?  Yes   Is the patient/caregiver able to teach back the risk factors for a stroke?  High blood pressure-goal below 120/80, History of Afib, Diabetes, High Cholesterol, Carotid or other artery disease   Is the patient/caregiver able to teach back signs and symptoms related to disease process for when to call PCP?  Yes   Is the patient/caregiver able to teach back signs and symptoms related to disease process for when to call 911?  Yes   Is the patient/caregiver able to teach back the hierarchy of who to call/visit for symptoms/problems? PCP, Specialist, Home health nurse, Urgent Care, ED, 911  Yes   Additional teach back comments  If s/s of loss of coordination, visual disturbances, facial drooping, weakness or numbness in arms, speech disturbances, or severe headache seek medical care immediately. Patient verbalized understanding.   Week 1 call completed?  Yes          Hernandez Dye RN

## 2019-08-14 ENCOUNTER — OFFICE VISIT (OUTPATIENT)
Dept: CARDIOLOGY | Facility: CLINIC | Age: 79
End: 2019-08-14

## 2019-08-14 VITALS
HEIGHT: 60 IN | OXYGEN SATURATION: 97 % | WEIGHT: 187 LBS | BODY MASS INDEX: 36.71 KG/M2 | DIASTOLIC BLOOD PRESSURE: 80 MMHG | SYSTOLIC BLOOD PRESSURE: 160 MMHG | HEART RATE: 76 BPM

## 2019-08-14 DIAGNOSIS — E78.00 PURE HYPERCHOLESTEROLEMIA: ICD-10-CM

## 2019-08-14 DIAGNOSIS — I36.1 NON-RHEUMATIC TRICUSPID VALVE INSUFFICIENCY: ICD-10-CM

## 2019-08-14 DIAGNOSIS — I10 ESSENTIAL HYPERTENSION: ICD-10-CM

## 2019-08-14 DIAGNOSIS — I63.9 ACUTE ISCHEMIC STROKE (HCC): Primary | ICD-10-CM

## 2019-08-14 DIAGNOSIS — R00.2 PALPITATIONS: ICD-10-CM

## 2019-08-14 DIAGNOSIS — I34.0 NON-RHEUMATIC MITRAL REGURGITATION: ICD-10-CM

## 2019-08-14 DIAGNOSIS — I33.0 BACTERIAL ENDOCARDITIS, UNSPECIFIED CHRONICITY: ICD-10-CM

## 2019-08-14 PROCEDURE — 99214 OFFICE O/P EST MOD 30 MIN: CPT | Performed by: NURSE PRACTITIONER

## 2019-08-14 PROCEDURE — 93000 ELECTROCARDIOGRAM COMPLETE: CPT | Performed by: NURSE PRACTITIONER

## 2019-08-14 RX ORDER — VALSARTAN 40 MG/1
60 TABLET ORAL DAILY
COMMUNITY
End: 2019-08-14

## 2019-08-14 RX ORDER — PANTOPRAZOLE SODIUM 40 MG/1
40 TABLET, DELAYED RELEASE ORAL DAILY
Qty: 90 TABLET | Refills: 0 | Status: SHIPPED | OUTPATIENT
Start: 2019-08-14 | End: 2019-10-18 | Stop reason: SDUPTHER

## 2019-08-14 RX ORDER — NIFEDIPINE 30 MG/1
30 TABLET, FILM COATED, EXTENDED RELEASE ORAL DAILY
COMMUNITY
End: 2019-10-02

## 2019-08-14 RX ORDER — DULOXETIN HYDROCHLORIDE 60 MG/1
60 CAPSULE, DELAYED RELEASE ORAL DAILY
COMMUNITY
End: 2019-08-14

## 2019-08-14 NOTE — PROGRESS NOTES
Date of Office Visit: 2019  Encounter Provider: KWASI Mello  Place of Service: [facility] CARDIOLOGY  Patient Name: Miri Yung  :1940      Subjective:     Chief Complaint:  Follow-up following an ischemic stroke    History of Present Illness:  Miri Yung is a pleasant 78 y.o. female who I have seen once before outside records have been obtained and reviewed by me.     This is a patient of Dr. York who was last in the office on 10/31/2018.  She has a history of bacterial endocarditis and developed mitral and tricuspid regurgitation which did not require valvular replacement.  Her echocardiogram showed moderate mitral regurgitation mild tricuspid regurgitation as well as borderline pulmonary hypertension.  At her last visit she was feeling about the same.  She did not have any chest pain or lightheadedness.  Very rarely she would notice some palpitations.  Her blood pressure was recently noted to be elevated and adjustments were made.  She does get short of breath with exertion which is unchanged from before.  Her blood pressure was uncontrolled Dr. York increased her losartan to 50 mg twice a day.  He wanted her son to recheck her blood pressure and report to her PCP Dr. Brooks.  He wanted her to return in 6 months for follow-up.    2019 I saw her she for the first time for 6-month follow-up visit.  Her blood pressure today was good at 122/64.  She was not checking her blood pressure at home.  She was unsure names and dosages of her medications however she believes it is exactly how it is written in the computer per Dr. Brooks instruction. She denied any angina or significant shortness of breath, PND  Or orthopnea.  She hadsome chronic lower extremity edema which is unchanged.  She denied weight gain, palpitations, dizziness, lightheadedness, syncope or near syncope.  She reports form about 2 to 3 weeks so she felt a little unbalanced but that has  resolved.  She was able to do most things around her house without limitation.I wanted her to monitor her blood pressure at home and notify us if you staying above 140/90.  I did not make any other changes and wanted her to follow-up in 6 months with Dr. York.    8/5/2019 she had been evaluated for increased incident of falls.  She was sent for an outpatient MRI which showed mild to moderate small vessel ischemic disease and a small acute infarct involving the operculum on the left.  She was sent to the ED and evaluated by neurology.  Work-up included an MRA and echocardiogram.  The study was technically difficult but her EF calculated was 57% with normal wall thickness wall motion, grade 2 diastolic dysfunction, normal RV cavity size and function, mildly dilated left atrial cavity size with negative saline test results, normal right atrial size, aortic sclerosis but no regurgitation or stenosis, max pressure gradient was 16 mmHg, mild mitral regurgitation with mild MAC, mild tricuspid regurgitation with an RVSP of 41 mmHg.The patient stayed in sinus rhythm throughout hospitalization and was started on daily aspirin and her LDL was low so atorvastatin dose decreased.  She was having some visual hallucinations which was felt possibly related to Cymbalta.  It was felt that her falls are related to deconditioning status post knee replacement.  She was felt to be very high risk for anticoagulation due to falls.  Neurology recommended that she follow-up with cardiology for assessment of candidacy for watchman's atrial closure device.    She is presenting today for follow-up visit following acute ischemic stroke to discuss anticoagulation options.  She is present today with her sister-in-law.  She is been feeling okay since she got out of the hospital.  She denies chest pain.  She does have occasional shortness of breath on exertion that is not necessarily worse over the last several years.  She denies any  palpitations, racing heart rate sensation or skipped beats.  She denies any dizziness, syncope, near syncope.  She does feel off balance at times.  Her last fall was 2 weeks ago.  She was started on some Lasix recently that has overall improved her swelling.  Her blood pressure is high today 160/80.  Home health has been coming twice a week and she is also doing home PT following her stroke.  She believes her blood pressures have been good with home health.  He states since she stopped 1 of her antidepressants that she has had nightmares.  She has not had any changes in the occasional red sensation/visual hallucinations that she has been experiencing for years.  There is unfortunately confusion on her medications as she provided an old list and is uncertain what she is actually taking.  They were advised to call back later today with her occasions.  They are also given a pressure log log to keep with home health and advised to call if her blood pressure was greater than 140/90.       Past Medical History:   Diagnosis Date   • Acute ischemic stroke (CMS/HCC)    • Anxiety    • Atrial fibrillation (CMS/HCC)    • Dementia    • Depression    • Edema    • Encephalopathy     secondary to endocarditis   • Endocarditis     with mitral and tricuspid regurgitation   • Esophageal reflux    • GERD (gastroesophageal reflux disease)    • Gout    • History of blood transfusion     due to anemia   • HTN (hypertension)    • Hyperlipidemia    • Insomnia    • LOM (loss of memory)    • Lung mass    • 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; generally asymptomatic   • Mitral regurgitation    • Mixed anxiety depressive disorder    • Nonrheumatic tricuspid (valve) insufficiency    • Osteoarthritis    • PAF (paroxysmal atrial fibrillation) (CMS/HCC)    • Sacroiliitis (CMS/HCC)    • Tricuspid regurgitation    • Type 2 diabetes mellitus (CMS/HCC)      Past Surgical History:   Procedure Laterality Date   • CATARACT  EXTRACTION     • COLONOSCOPY N/A 3/10/2016    Procedure: COLONOSCOPY TO CECUM;  Surgeon: Shaan Becerra Jr., MD;  Location: [facility] ENDOSCOPY;  Service:    • ENDOSCOPY N/A 3/10/2016    Procedure: ESOPHAGOGASTRODUODENOSCOPY ;  Surgeon: Shaan Becerra Jr., MD;  Location: [facility] ENDOSCOPY;  Service:    • KNEE SURGERY     • TUBAL ABDOMINAL LIGATION       This med list may not be accurate as the patient brought an updated medication list and was unable to verify many of her medications.  They were advised to call back today as soon as they get home to review these meds so it can be updated.    Outpatient Medications Prior to Visit   Medication Sig Dispense Refill   • acetaminophen (TYLENOL) 500 MG tablet Take 500 mg by mouth every 4 (four) hours as needed for mild pain (1-3) (1-2 TABLETS PRN Q 4-6 HOURS).     • allopurinol (ZYLOPRIM) 300 MG tablet Take 300 mg by mouth Daily.     • aspirin 81 MG EC tablet Take 1 tablet by mouth Daily. 30 tablet 0   • atorvastatin (LIPITOR) 40 MG tablet Take 1 tablet by mouth Every Night. 30 tablet 0   • DULoxetine (CYMBALTA) 60 MG capsule Take 60 mg by mouth Daily.     • fluticasone (FLONASE) 50 MCG/ACT nasal spray 2 sprays by Each Nare route Daily. (Patient taking differently: 2 sprays by Each Nare route Daily As Needed.) 32 g 2   • furosemide (LASIX) 20 MG tablet Take 20 mg by mouth 2 (Two) Times a Day.     • isosorbide mononitrate (IMDUR) 60 MG 24 hr tablet Take 60 mg by mouth Daily.     • metoprolol succinate XL (TOPROL-XL) 100 MG 24 hr tablet Take 100 mg by mouth Daily.     • montelukast (SINGULAIR) 10 MG tablet Take 1 tablet by mouth Every Night. 90 tablet 2   • Multiple Vitamins-Minerals (MULTI FOR HER 50+) capsule Take 1 capsule by mouth daily.     • NIFEdipine CC (ADALAT CC) 30 MG 24 hr tablet Take 30 mg by mouth Daily.     • pantoprazole (PROTONIX) 40 MG EC tablet TAKE 1 TABLET BY MOUTH  DAILY 90 tablet 0   • potassium chloride (K-DUR) 10 MEQ CR tablet Take 10 mEq by mouth Daily.      • valsartan (DIOVAN) 40 MG tablet Take 60 mg by mouth Daily.     • buPROPion SR (WELLBUTRIN SR) 150 MG 12 hr tablet Take 1 tablet by mouth Daily With Breakfast. 30 tablet 5   • losartan (COZAAR) 25 MG tablet Take 25 mg by mouth Every Morning.     • losartan (COZAAR) 25 MG tablet Take 50 mg by mouth Every Night.     • metFORMIN (GLUCOPHAGE) 500 MG tablet Take 500 mg by mouth Daily With Breakfast.       No facility-administered medications prior to visit.        Allergies as of 08/14/2019 - Reviewed 08/14/2019   Allergen Reaction Noted   • Decongestant  [pseudoephedrine]  02/07/2016     Social History     Socioeconomic History   • Marital status:      Spouse name: Not on file   • Number of children: Not on file   • Years of education: Not on file   • Highest education level: Not on file   Tobacco Use   • Smoking status: Never Smoker   • Smokeless tobacco: Never Used   • Tobacco comment: caffeine use    Substance and Sexual Activity   • Alcohol use: No   • Drug use: No   • Sexual activity: Defer     Family History   Problem Relation Age of Onset   • Kidney disease Mother    • Heart disease Mother    • Emphysema Father    • Heart disease Brother      Review of Systems   Constitution: Positive for malaise/fatigue and weight gain. Negative for chills and fever.   HENT: Positive for ear pain (r/t allergies/sinus). Negative for hearing loss, nosebleeds and sore throat (r/t allergies/sinus).    Eyes: Positive for pain (r/t allergies/sinus). Negative for double vision, vision loss in left eye and vision loss in right eye.   Cardiovascular: Negative for chest pain, claudication, cyanosis, dyspnea on exertion, irregular heartbeat, leg swelling, near-syncope, orthopnea, palpitations, paroxysmal nocturnal dyspnea and syncope.   Respiratory: Positive for shortness of breath and snoring. Negative for cough (intermittent dry r/t allergies/sinus) and wheezing.    Endocrine: Negative for cold intolerance and heat  "intolerance.   Hematologic/Lymphatic: Negative for bleeding problem.   Skin: Positive for color change. Negative for itching, rash and unusual hair distribution.   Musculoskeletal: Positive for joint pain and myalgias. Negative for joint swelling.   Gastrointestinal: Negative for abdominal pain, diarrhea, hematochezia, melena, nausea and vomiting.   Genitourinary: Negative for decreased libido, frequency, hematuria, hesitancy and incomplete emptying.   Neurological: Positive for excessive daytime sleepiness and headaches. Negative for dizziness, light-headedness, loss of balance, numbness, paresthesias and seizures.   Psychiatric/Behavioral: Positive for depression. The patient is nervous/anxious.           Objective:     Vitals:    08/14/19 1230   BP: 160/80   BP Location: Left arm   Patient Position: Sitting   Cuff Size: Adult   Pulse: 75   Weight: 84.8 kg (187 lb)   Height: 152.4 cm (60\")     Body mass index is 36.52 kg/m².    PHYSICAL EXAM:  Physical Exam   Constitutional: She is oriented to person, place, and time. She appears well-developed and well-nourished. No distress.   Obese   HENT:   Head: Normocephalic and atraumatic.   Eyes: Conjunctivae are normal. Pupils are equal, round, and reactive to light.   Neck: No JVD present. Carotid bruit is not present.   Cardiovascular: Normal rate, regular rhythm and intact distal pulses.   Murmur (aortic sclerosis) heard.   Harsh midsystolic murmur is present with a grade of 2/6 at the upper right sternal border and upper left sternal border.  Pulses:       Radial pulses are 2+ on the right side, and 2+ on the left side.        Posterior tibial pulses are 2+ on the right side, and 2+ on the left side.   1+ pretibial and ankle edema bilaterally   Pulmonary/Chest: Effort normal and breath sounds normal. No accessory muscle usage. No tachypnea. No respiratory distress. She has no decreased breath sounds. She has no wheezes. She has no rhonchi. She has no rales. She " exhibits no tenderness.   Abdominal: Soft. Bowel sounds are normal. She exhibits no distension. There is no tenderness. There is no rebound and no guarding.   Obese   Musculoskeletal: Edema: mild, nonpitting bilateral ankle edema.   Neurological: She is alert and oriented to person, place, and time. GCS eye subscore is 4. GCS verbal subscore is 5. GCS motor subscore is 6.   Skin: Skin is warm, dry and intact. She is not diaphoretic. No erythema.   Psychiatric: She has a normal mood and affect. Her speech is normal and behavior is normal. Judgment and thought content normal. Cognition and memory are impaired.   Forgetful   Nursing note and vitals reviewed.        ECG 12 Lead  Date/Time: 8/14/2019 12:35 PM  Performed by: Kia Ho APRN  Authorized by: Kia Ho APRN   Comparison: compared with previous ECG from 8/5/2019  Rhythm: sinus rhythm  Rate: normal  BPM: 75  QRS axis: normal  Other findings: non-specific ST-T wave changes    Clinical impression: abnormal EKG  Comments: Indication: Recent ischemic CVA, valvular heart disease                 Assessment:       Diagnosis Plan   1. Acute ischemic stroke (CMS/HCC)     2. Non-rheumatic mitral regurgitation     3. Non-rheumatic tricuspid valve insufficiency     4. Bacterial endocarditis, unspecified chronicity     5. Pure hypercholesterolemia     6. Essential hypertension         Plan:        1.  Bacterial endocarditis: Hx of. Patient had recovered     2.  Mitral and tricuspid regurgitation secondary to #1.  Moderate mitral regurgitation on Feb 2015 echo, MR and TR both mild on 8/2018 echo.   Stable.      3.  Pulmonary hypertension: Borderline with RVSP of 40 mmHg on Feb 2015 echo.  Repeat echo 8/2019 following ischemic stroke showed RVSP of 41 mmHg.  No signficant shortness of breath or worsening times.     4. Hypertension: Blood pressure is elevated today 160/80.  She is unsure what meds she is taking.  She was advised to call back with her medications  so we can ensure we have updated list in epic.    5. Dementia: Forgetful but A & O x 4    6.  Acute ischemic stroke: August 2019.  Started on aspirin  by neurology her LDL was in the 40s so her statin dose was decreased.  Will evaluate for atrial fibrillation as potential cause of her stroke.  Echo with negative saline test.  Defer further to neurology.    7.  History of paroxysmal atrial fibrillation: I reviewed all her EKGs from 2016 in epic and I do not see any atrial fibrillation.  I do not see a recent Holter monitor either.  Will evaluate with Ziopatch to start.  She may benefit from a loop recorder if no evidence of atrial fibrillation is found on Ziopatch.  We will make further recommendations following her Ziopatch.    8.  Frequent falls: Neuro felt this was attributed to deconditioning and leg weakness from her knee replacement. Defer     9. Aortic Sclerosis: On 8/2019 echo     10.  Nightmares/sleep disturbance: Advised to follow-up with Dr. Brooks or neurology to discuss since med changes were recently made to her Cymbalta.        I advised the patient that if they have not heard from our office within 7 days after they have completed their testing (Ziopatch) to please call our office to obtain their results.  Patient was given written plan of care and both her and her sister-in-law demonstrated understanding to call back with her medications today, they were also advised to contact our office if her blood pressures are remaining over 140/90 as well as to obtain her Ziopatch results if they have not heard regarding results within 1 week after completing.  Further recommendations to be made following her Ziopatch results.     I will have her keep her follow up with Dr. York on 11/6/19 as scheduled, unless otherwise needed sooner.  I advised the patient to contact our office with any questions or concerns.  She demonstrated understanding.        Your medication list           Accurate as of 8/14/19 12:45  PM. If you have any questions, ask your nurse or doctor.               CHANGE how you take these medications      Instructions Last Dose Given Next Dose Due   fluticasone 50 MCG/ACT nasal spray  Commonly known as:  FLONASE  What changed:    · when to take this  · reasons to take this      2 sprays by Each Nare route Daily.          CONTINUE taking these medications      Instructions Last Dose Given Next Dose Due   acetaminophen 500 MG tablet  Commonly known as:  TYLENOL      Take 500 mg by mouth every 4 (four) hours as needed for mild pain (1-3) (1-2 TABLETS PRN Q 4-6 HOURS).       allopurinol 300 MG tablet  Commonly known as:  ZYLOPRIM      Take 300 mg by mouth Daily.       aspirin 81 MG EC tablet      Take 1 tablet by mouth Daily.       atorvastatin 40 MG tablet  Commonly known as:  LIPITOR      Take 1 tablet by mouth Every Night.       DULoxetine 60 MG capsule  Commonly known as:  CYMBALTA      Take 60 mg by mouth Daily.       furosemide 20 MG tablet  Commonly known as:  LASIX      Take 20 mg by mouth 2 (Two) Times a Day.       isosorbide mononitrate 60 MG 24 hr tablet  Commonly known as:  IMDUR      Take 60 mg by mouth Daily.       metoprolol succinate  MG 24 hr tablet  Commonly known as:  TOPROL-XL      Take 100 mg by mouth Daily.       montelukast 10 MG tablet  Commonly known as:  SINGULAIR      Take 1 tablet by mouth Every Night.       MULTI FOR HER 50+ capsule      Take 1 capsule by mouth daily.       NIFEdipine CC 30 MG 24 hr tablet  Commonly known as:  ADALAT CC      Take 30 mg by mouth Daily.       pantoprazole 40 MG EC tablet  Commonly known as:  PROTONIX      TAKE 1 TABLET BY MOUTH  DAILY       potassium chloride 10 MEQ CR tablet  Commonly known as:  K-DUR      Take 10 mEq by mouth Daily.       valsartan 40 MG tablet  Commonly known as:  DIOVAN      Take 60 mg by mouth Daily.          STOP taking these medications    buPROPion  MG 12 hr tablet  Commonly known as:  WELLBUTRIN SR  Stopped by:   KWASI Mello        losartan 25 MG tablet  Commonly known as:  COZAAR  Stopped by:  KWASI Mello        metFORMIN 500 MG tablet  Commonly known as:  GLUCOPHAGE  Stopped by:  KWASI Mello               The above medication changes may not have been made by this provider.  Medication list was updated to reflect medications patient is currently taking including medication changes and discontinuations made by other healthcare providers.      It has been a pleasure to participate in this patient's care. Please feel free to contact me with any questions or concerns.     KWASI Mello  08/14/2019       Dictated utilizing Dragon Dictation System.

## 2019-08-15 ENCOUNTER — READMISSION MANAGEMENT (OUTPATIENT)
Dept: CALL CENTER | Facility: HOSPITAL | Age: 79
End: 2019-08-15

## 2019-08-15 NOTE — OUTREACH NOTE
Stroke Week 2 Survey      Responses   Facility patient discharged from?  [facility]   Does the patient have one of the following disease processes/diagnoses(primary or secondary)?  Stroke (TIA)   Week 2 attempt successful?  No   Unsuccessful attempts  Attempt 1          Jake Veronica RN

## 2019-08-16 ENCOUNTER — READMISSION MANAGEMENT (OUTPATIENT)
Dept: CALL CENTER | Facility: HOSPITAL | Age: 79
End: 2019-08-16

## 2019-08-16 NOTE — OUTREACH NOTE
Stroke Week 2 Survey      Responses   Facility patient discharged from?  [facility]   Does the patient have one of the following disease processes/diagnoses(primary or secondary)?  Stroke (TIA)   Week 2 attempt successful?  Yes   Call start time  1558   Call end time  1602   Discharge diagnosis  Acute ischemic stroke, dementia, mixed anxiety depressive disorder, GERD, HTN, PAF, DM II   Meds reviewed with patient/caregiver?  Yes   Is the patient having any side effects they believe may be caused by any medication additions or changes?  No   Does the patient have all medications ordered at discharge?  Yes   Is the patient taking all medications as directed (includes completed medication regime)?  Yes   Medication comments  Son and HH manage meds. She doesnt know details.   Does the patient have a primary care provider?   Yes   Does the patient have an appointment with their PCP within 7 days of discharge?  Yes   Has the patient kept scheduled appointments due by today?  Yes   Comments  Needs Neuro appt.   What is the Home health agency?   [facility]   Has home health visited the patient within 72 hours of discharge?  Yes   Psychosocial issues?  No   Does the patient require any assistance with activities of daily living such as eating, bathing, dressing, walking, etc.?  No   Does the patient have any residual symptoms from stroke/TIA?  No   Does the patient understand the diet ordered at discharge?  Yes   Comments  Patient is doing well.    Did the patient receive a copy of their discharge instructions?  Yes   Nursing interventions  Reviewed instructions with patient   What is the patient's perception of their health status since discharge?  Improving   Nursing interventions  Nurse provided patient education   Is the patient able to teach back FAST for Stroke?  Yes   Is the patient/caregiver able to teach back the risk factors for a stroke?  High blood pressure-goal below 120/80, History of Afib, Diabetes, High  Cholesterol, Carotid or other artery disease   Is the patient/caregiver able to teach back signs and symptoms related to disease process for when to call PCP?  Yes   Is the patient/caregiver able to teach back signs and symptoms related to disease process for when to call 911?  Yes   Is the patient/caregiver able to teach back the hierarchy of who to call/visit for symptoms/problems? PCP, Specialist, Home health nurse, Urgent Care, ED, 911  Yes   Additional teach back comments  Needs more reinforcement on s/s of CVA.    Week 2 call completed?  Yes          Vincent Mesa RN

## 2019-08-23 ENCOUNTER — READMISSION MANAGEMENT (OUTPATIENT)
Dept: CALL CENTER | Facility: HOSPITAL | Age: 79
End: 2019-08-23

## 2019-08-23 NOTE — OUTREACH NOTE
Stroke Week 3 Survey      Responses   Facility patient discharged from?  [facility]   Does the patient have one of the following disease processes/diagnoses(primary or secondary)?  Stroke (TIA)   Week 3 attempt successful?  Yes   Call start time  1659   Call end time  1703   Discharge diagnosis  Acute ischemic stroke, dementia, mixed anxiety depressive disorder, GERD, HTN, PAF, DM II   Is patient permission given to speak with other caregiver?  Yes   Person spoke with today (if not patient) and relationship  Pt and son.   Meds reviewed with patient/caregiver?  Yes   Is the patient having any side effects they believe may be caused by any medication additions or changes?  No   Does the patient have all medications ordered at discharge?  Yes   Is the patient taking all medications as directed (includes completed medication regime)?  Yes   Does the patient have a primary care provider?   Yes   Does the patient have an appointment with their PCP within 7 days of discharge?  Yes   Has the patient kept scheduled appointments due by today?  Yes   Comments  Still needs Neuro appt. Spoke to son who will call about appt.   [facility] agency?   [facility]    Has home health visited the patient within 72 hours of discharge?  Yes   Psychosocial issues?  No   Does the patient require any assistance with activities of daily living such as eating, bathing, dressing, walking, etc.?  No   Does the patient have any residual symptoms from stroke/TIA?  No   Does the patient understand the diet ordered at discharge?  Yes   Did the patient receive a copy of their discharge instructions?  Yes   Nursing interventions  Reviewed instructions with patient   What is the patient's perception of their health status since discharge?  Improving   Nursing interventions  Nurse provided patient education   Is the patient able to teach back FAST for Stroke?  Yes   Is the patient/caregiver able to teach back the risk factors for a  stroke?  High blood pressure-goal below 120/80, History of Afib, Diabetes, High Cholesterol, Carotid or other artery disease   Is the patient/caregiver able to teach back signs and symptoms related to disease process for when to call PCP?  Yes   Is the patient/caregiver able to teach back signs and symptoms related to disease process for when to call 911?  Yes   Is the patient/caregiver able to teach back the hierarchy of who to call/visit for symptoms/problems? PCP, Specialist, Home health nurse, Urgent Care, ED, 911  Yes   Week 3 call completed?  Yes          Vincent Mesa RN

## 2019-08-30 ENCOUNTER — EPISODE CHANGES (OUTPATIENT)
Dept: CASE MANAGEMENT | Facility: OTHER | Age: 79
End: 2019-08-30

## 2019-08-30 ENCOUNTER — READMISSION MANAGEMENT (OUTPATIENT)
Dept: CALL CENTER | Facility: HOSPITAL | Age: 79
End: 2019-08-30

## 2019-08-30 NOTE — OUTREACH NOTE
Stroke Week 4 Survey      Responses   Facility patient discharged from?  [facility]   Does the patient have one of the following disease processes/diagnoses(primary or secondary)?  Stroke (TIA)   Week 4 attempt successful?  Yes   Call start time  1447   Call end time  1450   Discharge diagnosis  Acute ischemic stroke, dementia, mixed anxiety depressive disorder, GERD, HTN, PAF, DM II   Meds reviewed with patient/caregiver?  Yes   Is the patient taking all medications as directed (includes completed medication regime)?  Yes   Medication comments  Son and HH manage meds. She doesnt know details.   Has the patient kept scheduled appointments due by today?  Yes   Is the patient still receiving Home Health Services?  Yes   Home health comments  HH and therapist coming in to check    Does the patient require any assistance with activities of daily living such as eating, bathing, dressing, walking, etc.?  No   Does the patient have any residual symptoms from stroke/TIA?  No   Does the patient understand the diet ordered at discharge?  Yes   Comments  Patient is doing well.    What is the patient's perception of their health status since discharge?  Improving   Is the patient able to teach back FAST for Stroke?  Yes   Is the patient/caregiver able to teach back the risk factors for a stroke?  High blood pressure-goal below 120/80, High Cholesterol, Physical inactivity and obesity   Is the patient/caregiver able to teach back signs and symptoms related to disease process for when to call PCP?  Yes   Is the patient/caregiver able to teach back signs and symptoms related to disease process for when to call 911?  Yes   Is the patient/caregiver able to teach back the hierarchy of who to call/visit for symptoms/problems? PCP, Specialist, Home health nurse, Urgent Care, ED, 911  Yes   Additional teach back comments  Pt is doing well and states she is not having any issues   Week 4 Call Completed?  Yes   Would the patient like one  additional call?  No   Graduated  Yes   Did the patient feel the follow up calls were helpful during their recovery period?  Yes   Was the number of calls appropriate?  Yes          Leanna Sanchez RN

## 2019-08-31 DIAGNOSIS — J32.1 CHRONIC FRONTAL SINUSITIS: ICD-10-CM

## 2019-09-03 DIAGNOSIS — J32.1 CHRONIC FRONTAL SINUSITIS: ICD-10-CM

## 2019-09-03 RX ORDER — FUROSEMIDE 20 MG/1
TABLET ORAL
Qty: 180 TABLET | Refills: 0 | Status: SHIPPED | OUTPATIENT
Start: 2019-09-03 | End: 2019-12-06 | Stop reason: SDUPTHER

## 2019-09-03 RX ORDER — MONTELUKAST SODIUM 10 MG/1
10 TABLET ORAL NIGHTLY
Qty: 90 TABLET | Refills: 0 | Status: SHIPPED | OUTPATIENT
Start: 2019-09-03 | End: 2020-08-03

## 2019-09-03 RX ORDER — MONTELUKAST SODIUM 10 MG/1
10 TABLET ORAL NIGHTLY
Qty: 30 TABLET | Refills: 0 | Status: SHIPPED | OUTPATIENT
Start: 2019-09-03 | End: 2019-09-03 | Stop reason: SDUPTHER

## 2019-09-05 ENCOUNTER — TELEPHONE (OUTPATIENT)
Dept: FAMILY MEDICINE CLINIC | Facility: CLINIC | Age: 79
End: 2019-09-05

## 2019-09-05 NOTE — TELEPHONE ENCOUNTER
I would have her check with the dentist to see what would be entailed as far as if she will need to be put out or if she could be awake for the procedure.  If the dentist needs a clearance then we can talk about it.

## 2019-09-05 NOTE — TELEPHONE ENCOUNTER
Pt called stating she needs to go to the dentist due to breaking teeth. She states that she has a history of strokes and wants to make sure she doesn't need to see you first?

## 2019-09-06 NOTE — TELEPHONE ENCOUNTER
Pt states that her sister in law spoke to her dentist. She is going to call her to get the information and call back.

## 2019-09-06 NOTE — TELEPHONE ENCOUNTER
Patch applied at cardiologist appointment today. They are going to go in details with him. Her dentist appointment is on Monday and it just a consultation at this time. When and if they begin to do procedures they will get this information for you beforehand.

## 2019-09-09 ENCOUNTER — OFFICE VISIT (OUTPATIENT)
Dept: FAMILY MEDICINE CLINIC | Facility: CLINIC | Age: 79
End: 2019-09-09

## 2019-09-09 VITALS
SYSTOLIC BLOOD PRESSURE: 128 MMHG | HEIGHT: 60 IN | BODY MASS INDEX: 36.48 KG/M2 | OXYGEN SATURATION: 95 % | TEMPERATURE: 98.5 F | DIASTOLIC BLOOD PRESSURE: 64 MMHG | WEIGHT: 185.8 LBS | HEART RATE: 74 BPM | RESPIRATION RATE: 16 BRPM

## 2019-09-09 DIAGNOSIS — Z86.73 STATUS POST STROKE DUE TO CEREBROVASCULAR DISEASE: Primary | ICD-10-CM

## 2019-09-09 PROCEDURE — 99214 OFFICE O/P EST MOD 30 MIN: CPT | Performed by: FAMILY MEDICINE

## 2019-09-09 NOTE — PATIENT INSTRUCTIONS
Stroke Prevention  Some medical conditions and lifestyle choices can lead to a higher risk for a stroke. You can help to prevent a stroke by making nutrition, lifestyle, and other changes.  What nutrition changes can be made?    · Eat healthy foods.  ? Choose foods that are high in fiber. These include:  § Fresh fruits.  § Fresh vegetables.  § Whole grains.  ? Eat at least 5 or more servings of fruits and vegetables each day. Try to fill half of your plate at each meal with fruits and vegetables.  ? Choose lean protein foods. These include:  § Lowfat (lean) cuts of meat.  § Chicken without skin.  § Fish.  § Tofu.  § Beans.  § Nuts.  ? Eat low-fat dairy products.  ? Avoid foods that:  § Are high in salt (sodium).  § Have saturated fat.  § Have trans fat.  § Have cholesterol.  § Are processed.  § Are premade.  · Follow eating guidelines as told by your doctor. These may include:  ? Reducing how many calories you eat and drink each day.  ? Limiting how much salt you eat or drink each day to 1,500 milligrams (mg).  ? Using only healthy fats for cooking. These include:  § Olive oil.  § Canola oil.  § Sunflower oil.  ? Counting how many carbohydrates you eat and drink each day.  What lifestyle changes can be made?  · Try to stay at a healthy weight. Talk to your doctor about what a good weight is for you.  · Get at least 30 minutes of moderate physical activity at least 5 days a week. This can include:  ? Fast walking.  ? Biking.  ? Swimming.  · Do not use any products that have nicotine or tobacco. This includes cigarettes and e-cigarettes. If you need help quitting, ask your doctor. Avoid being around tobacco smoke in general.  · Limit how much alcohol you drink to no more than 1 drink a day for nonpregnant women and 2 drinks a day for men. One drink equals 12 oz of beer, 5 oz of wine, or 1½ oz of hard liquor.  · Do not use drugs.  · Avoid taking birth control pills. Talk to your doctor about the risks of taking birth  "control pills if:  ? You are over 35 years old.  ? You smoke.  ? You get migraines.  ? You have had a blood clot.  What other changes can be made?  · Manage your cholesterol.  ? It is important to eat a healthy diet.  ? If your cholesterol cannot be managed through your diet, you may also need to take medicines. Take medicines as told by your doctor.  · Manage your diabetes.  ? It is important to eat a healthy diet and to exercise regularly.  ? If your blood sugar cannot be managed through diet and exercise, you may need to take medicines. Take medicines as told by your doctor.  · Control your high blood pressure (hypertension).  ? Try to keep your blood pressure below 130/80. This can help lower your risk of stroke.  ? It is important to eat a healthy diet and to exercise regularly.  ? If your blood pressure cannot be managed through diet and exercise, you may need to take medicines. Take medicines as told by your doctor.  ? Ask your doctor if you should check your blood pressure at home.  ? Have your blood pressure checked every year. Do this even if your blood pressure is normal.  · Talk to your doctor about getting checked for a sleep disorder. Signs of this can include:  ? Snoring a lot.  ? Feeling very tired.  · Take over-the-counter and prescription medicines only as told by your doctor. These may include aspirin or blood thinners (antiplatelets or anticoagulants).  · Make sure that any other medical conditions you have are managed.  Where to find more information  · American Stroke Association: www.strokeassociation.org  · National Stroke Association: www.stroke.org  Get help right away if:  · You have any symptoms of stroke. \"BE FAST\" is an easy way to remember the main warning signs:  ? B - Balance. Signs are dizziness, sudden trouble walking, or loss of balance.  ? E - Eyes. Signs are trouble seeing or a sudden change in how you see.  ? F - Face. Signs are sudden weakness or loss of feeling of the face, " or the face or eyelid drooping on one side.  ? A - Arms. Signs are weakness or loss of feeling in an arm. This happens suddenly and usually on one side of the body.  ? S - Speech. Signs are sudden trouble speaking, slurred speech, or trouble understanding what people say.  ? T - Time. Time to call emergency services. Write down what time symptoms started.  · You have other signs of stroke, such as:  ? A sudden, very bad headache with no known cause.  ? Feeling sick to your stomach (nausea).  ? Throwing up (vomiting).  ? Jerky movements you cannot control (seizure).  These symptoms may represent a serious problem that is an emergency. Do not wait to see if the symptoms will go away. Get medical help right away. Call your local emergency services (911 in the [place] Do not drive yourself to the hospital.  Summary  · You can prevent a stroke by eating healthy, exercising, not smoking, drinking less alcohol, and treating other health problems, such as diabetes, high blood pressure, or high cholesterol.  · Do not use any products that contain nicotine or tobacco, such as cigarettes and e-cigarettes.  · Get help right away if you have any signs or symptoms of a stroke.  This information is not intended to replace advice given to you by your health care provider. Make sure you discuss any questions you have with your health care provider.  Document Released: 06/18/2013 Document Revised: 03/21/2018 Document Reviewed: 03/21/2018  [employer] Interactive Patient Education © 2019 [employer]

## 2019-09-23 ENCOUNTER — TELEPHONE (OUTPATIENT)
Dept: CARDIOLOGY | Facility: CLINIC | Age: 79
End: 2019-09-23

## 2019-09-23 NOTE — TELEPHONE ENCOUNTER
Called and spoke with the patient regarding her ziopatch results after discussing with Dr. York.  When I talked her she has not had any problems with dizziness leg weakness or falls since before I saw her last.  She is overall doing well.  She has not had any bleeding issues.  She takes aspirin 81 mg daily.  I informed her of the need to treat her atrial fibrillation with anticoagulation especially with recent ischemic stroke.  I reviewed safety precautions and bleeding precautions with the patient.  She was encouraged to contact our office if the apixaban cost is too much as we can try to supplement.  I tried to reach out to her son Bhargav and will try again later to review the plan of care with him.  She will keep her 10/2/2019 scheduled appointment with Dr. York for reevaluation after initiation of apixaban.

## 2019-09-23 NOTE — TELEPHONE ENCOUNTER
Called and spoke with the patient's son Hardeep regarding plan of care and apixaban. He was given same side effect and bleeding/safety risk discussion as his mother. They will call with any issues or cost trouble with the medicine. Otherwise they will follow up with Dr. York on 10/2/19 as scheduled. Patient's son  demonstrated understanding.

## 2019-09-27 ENCOUNTER — PATIENT OUTREACH (OUTPATIENT)
Dept: CASE MANAGEMENT | Facility: OTHER | Age: 79
End: 2019-09-27

## 2019-09-27 NOTE — OUTREACH NOTE
Care Coordination Assessment    Documented/Reviewed By:  Luis Enrique Ramos RN Date/time:  9/27/2019 11:29 AM   Assessment completed with:  children (Comment: son Hardeep)  Enrolled in care management program:  No  Living arrangement:  alone  Support system:  children  Type of residence:  private residence  Home care services:  Yes (Comment: [facility]  following hospital stay 8/5/19)  Equipment used at home:  cane  Inadequate nutrition:  No  Medication adherence problem:  No  Experiencing side effects from current medications:  No  History of fall(s) in last 6 months:  No  Difficulty keeping appointments:  No  Family aware of the patient's advance care planning wishes:  Yes

## 2019-09-27 NOTE — OUTREACH NOTE
Care Plan Note      Responses   Annual Wellness Visit:   Patient Will Schedule   Care Gaps Addressed  Flu Shot, Mammogram, Pneumonia Vaccine   Flu Shot Status  Patient will Complete   Flu Shot Completion at [facility] or Other  Other   Other Location  pharmacy   Mammogram Status  Patient will Complete   Mammogram Completion at [facility] or Other  [facility]   Mammogram Comments  son will schedule   Care Gap Comments  Son will discuss with PCP and pharmacy   Does patient have depression diagnosis?  No   Advanced Directives:  Patient Has   Medication Adherence  Medications understood        Spoke with pt's son Hardeep Yung for f/u call.  Pt is at baseline with ADL's, cooking, lives alone, uses a cane when out of the home. Family checks on her several times a day, son lives near by, shops, transports as needed. [facility] continue visits, but may be ending soon.  Discussed pt's intermittent short term memory loss, suggested son to be observant of any changes in her ADL routines especially if concerns safety.  Addressed AWV and gaps in care (see above).  Declines MyChart.  Mr Yung voiced no further needs or questions, appreciated the call.     Education/instruction provided by Care Coordinator:  Informed of care advisor role, contact number.     Follow Up Outreach Due:  As needed    Luis Enrique Ramos RN  Community Care Coordinator    9/27/2019, 11:33 AM

## 2019-10-02 ENCOUNTER — OFFICE VISIT (OUTPATIENT)
Dept: CARDIOLOGY | Facility: CLINIC | Age: 79
End: 2019-10-02

## 2019-10-02 VITALS
HEIGHT: 62 IN | DIASTOLIC BLOOD PRESSURE: 72 MMHG | SYSTOLIC BLOOD PRESSURE: 154 MMHG | HEART RATE: 75 BPM | WEIGHT: 181.8 LBS | OXYGEN SATURATION: 95 % | BODY MASS INDEX: 33.45 KG/M2

## 2019-10-02 DIAGNOSIS — I36.1 NONRHEUMATIC TRICUSPID VALVE REGURGITATION: ICD-10-CM

## 2019-10-02 DIAGNOSIS — I34.0 NONRHEUMATIC MITRAL VALVE REGURGITATION: ICD-10-CM

## 2019-10-02 DIAGNOSIS — I63.9 ACUTE ISCHEMIC STROKE (HCC): ICD-10-CM

## 2019-10-02 DIAGNOSIS — I38 VALVULAR ENDOCARDITIS: Primary | ICD-10-CM

## 2019-10-02 DIAGNOSIS — I47.9 PAROXYSMAL TACHYCARDIA (HCC): ICD-10-CM

## 2019-10-02 PROCEDURE — 93000 ELECTROCARDIOGRAM COMPLETE: CPT | Performed by: INTERNAL MEDICINE

## 2019-10-02 PROCEDURE — 99214 OFFICE O/P EST MOD 30 MIN: CPT | Performed by: INTERNAL MEDICINE

## 2019-10-02 RX ORDER — BUPROPION HYDROCHLORIDE 150 MG/1
150 TABLET, EXTENDED RELEASE ORAL DAILY
COMMUNITY
End: 2019-11-04

## 2019-10-02 RX ORDER — LOSARTAN POTASSIUM 25 MG/1
25 TABLET ORAL DAILY
COMMUNITY
End: 2019-10-18 | Stop reason: SDUPTHER

## 2019-10-02 NOTE — PROGRESS NOTES
Date of Office Visit: 10/02/2019    Patient Name: Miri Yung  : 1940    Encounter Provider: Jignesh York MD  Referring Provider: No ref. provider found  Place of Service: [facility] CARDIOLOGY  Patient Care Team:  Albert Brooks MD as PCP - General (Family Medicine)  Albert Brooks MD as PCP - Claims Attributed  Luis Enrique Ramos, DELORES as Community Care Coordinator (Population Health)      Chief Complaint   Patient presents with   • Cardiac Valve Problem   • Irregular Heart Beat     History of Present Illness    The patient is a 78-year-old white female with a history of bacterial endocarditis.  She developed mitral and tricuspid regurgitation but after reviewing treatment it was felt that she did not need valve replacement.  Her echocardiogram shows moderate mitral regurgitation and only mild tricuspid regurgitation with borderline pulmonary hypertension.  She has paroxysms of atrial fibrillation which we believe are the basis of her cerebrovascular accident.  She remains anticoagulated with apixaban.    The patient's symptoms are mostly orthopedic or related to memory loss and dementia issues.    Past Medical History:   Diagnosis Date   • Acute ischemic stroke (CMS/HCC)    • Anxiety    • Atrial fibrillation (CMS/HCC)    • Dementia (CMS/HCC)    • Depression    • Edema    • Encephalopathy     secondary to endocarditis   • Endocarditis     with mitral and tricuspid regurgitation   • Esophageal reflux    • GERD (gastroesophageal reflux disease)    • Gout    • History of blood transfusion     due to anemia   • HTN (hypertension)    • Hyperlipidemia    • Insomnia    • LOM (loss of memory)    • Lung mass    • 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; generally asymptomatic   • Mitral regurgitation    • Mixed anxiety depressive disorder    • Nonrheumatic tricuspid (valve) insufficiency    • Osteoarthritis    • PAF (paroxysmal atrial  fibrillation) (CMS/HCC)    • Sacroiliitis (CMS/HCC)    • Tricuspid regurgitation    • Type 2 diabetes mellitus (CMS/HCC)          Past Surgical History:   Procedure Laterality Date   • CATARACT EXTRACTION     • COLONOSCOPY N/A 3/10/2016    Procedure: COLONOSCOPY TO CECUM;  Surgeon: Shaan Becerra Jr., MD;  Location: [facility] ENDOSCOPY;  Service:    • ENDOSCOPY N/A 3/10/2016    Procedure: ESOPHAGOGASTRODUODENOSCOPY ;  Surgeon: Shaan Becerra Jr., MD;  Location: [facility] ENDOSCOPY;  Service:    • KNEE SURGERY     • TUBAL ABDOMINAL LIGATION             Current Outpatient Medications:   •  acetaminophen (TYLENOL) 500 MG tablet, Take 500 mg by mouth every 4 (four) hours as needed for mild pain (1-3) (1-2 TABLETS PRN Q 4-6 HOURS)., Disp: , Rfl:   •  allopurinol (ZYLOPRIM) 300 MG tablet, Take 300 mg by mouth Daily., Disp: , Rfl:   •  apixaban (ELIQUIS) 5 MG tablet tablet, Take 1 tablet by mouth Every 12 (Twelve) Hours., Disp: 60 tablet, Rfl: 2  •  aspirin 81 MG EC tablet, Take 1 tablet by mouth Daily., Disp: 30 tablet, Rfl: 0  •  buPROPion SR (WELLBUTRIN SR) 150 MG 12 hr tablet, Take 150 mg by mouth Daily., Disp: , Rfl:   •  furosemide (LASIX) 20 MG tablet, TAKE 1 TABLET BY MOUTH TWO  TIMES DAILY (Patient taking differently: TAKE 1 TABLET BY MOUTH DAILY), Disp: 180 tablet, Rfl: 0  •  isosorbide mononitrate (IMDUR) 60 MG 24 hr tablet, Take 60 mg by mouth Daily., Disp: , Rfl:   •  losartan (COZAAR) 25 MG tablet, Take 25 mg by mouth Daily., Disp: , Rfl:   •  metFORMIN (GLUCOPHAGE) 500 MG tablet, Take 500 mg by mouth Daily With Breakfast., Disp: , Rfl:   •  metoprolol succinate XL (TOPROL-XL) 100 MG 24 hr tablet, Take 100 mg by mouth Daily., Disp: , Rfl:   •  montelukast (SINGULAIR) 10 MG tablet, TAKE 1 TABLET BY MOUTH EVERY NIGHT, Disp: 90 tablet, Rfl: 0  •  Multiple Vitamins-Minerals (MULTI FOR HER 50+) capsule, Take 1 capsule by mouth daily., Disp: , Rfl:   •  pantoprazole (PROTONIX) 40 MG EC tablet, TAKE 1 TABLET BY MOUTH   "DAILY, Disp: 90 tablet, Rfl: 0  •  potassium chloride (K-DUR) 10 MEQ CR tablet, Take 10 mEq by mouth Daily., Disp: , Rfl:       Social History     Socioeconomic History   • Marital status:      Spouse name: Not on file   • Number of children: Not on file   • Years of education: Not on file   • Highest education level: Not on file   Tobacco Use   • Smoking status: Never Smoker   • Smokeless tobacco: Never Used   • Tobacco comment: caffeine use    Substance and Sexual Activity   • Alcohol use: No   • Drug use: No   • Sexual activity: Defer         ROS    Procedures      ECG 12 Lead  Date/Time: 10/2/2019 12:42 PM  Performed by: Jignesh York MD  Authorized by: Jignesh York MD   Comparison: compared with previous ECG from 8/14/2019  Similar to previous ECG  Rhythm: sinus rhythm  Rate: normal  Conduction: conduction normal  QRS axis: normal                  Objective:    /72 (BP Location: Right arm, Patient Position: Sitting, Cuff Size: Large Adult)   Pulse 75   Ht 157.5 cm (62\")   Wt 82.5 kg (181 lb 12.8 oz)   SpO2 95%   BMI 33.25 kg/m²         Physical Exam   Constitutional: She is oriented to person, place, and time. She appears well-developed and well-nourished.   HENT:   Head: Normocephalic.   Eyes: Pupils are equal, round, and reactive to light.   Neck: Normal range of motion. No JVD present. Carotid bruit is not present. No thyromegaly present.   Cardiovascular: Normal rate, regular rhythm, S1 normal, S2 normal and intact distal pulses. Exam reveals no gallop and no friction rub.   Murmur heard.  High-pitched blowing holosystolic murmur is present at the apex.  Pulmonary/Chest: Effort normal and breath sounds normal.   Abdominal: Soft. Bowel sounds are normal.   Musculoskeletal: She exhibits no edema.   Neurological: She is alert and oriented to person, place, and time.   Skin: Skin is warm, dry and intact. No erythema.   Psychiatric: She has a normal mood and affect.   Vitals " reviewed.          Assessment:       Diagnosis Plan   1. Valvular endocarditis     2. Paroxysmal tachycardia (CMS/HCC)     3. Nonrheumatic tricuspid valve regurgitation     4. Nonrheumatic mitral valve regurgitation     5. Acute ischemic stroke (CMS/HCC)       1.  Endocarditis: Mitral and tricuspid regurgitation, relatively asymptomatic  2.  Paroxysmal atrial fibrillation: Remains anticoagulated with apixaban  3.  Mitral regurgitation: Moderate  4.  Tricuspid regurgitation: Mild  5.  Cerebrovascular accident: Remains anticoagulated due to atrial fibrillation  6.  Dementia       Plan:

## 2019-10-03 ENCOUNTER — EPISODE CHANGES (OUTPATIENT)
Dept: CASE MANAGEMENT | Facility: OTHER | Age: 79
End: 2019-10-03

## 2019-10-06 DIAGNOSIS — F32.A DEPRESSION, UNSPECIFIED DEPRESSION TYPE: ICD-10-CM

## 2019-10-06 DIAGNOSIS — E11.9 TYPE 2 DIABETES MELLITUS WITHOUT COMPLICATION, WITHOUT LONG-TERM CURRENT USE OF INSULIN (HCC): ICD-10-CM

## 2019-10-07 RX ORDER — BUPROPION HYDROCHLORIDE 150 MG/1
150 TABLET, EXTENDED RELEASE ORAL
Qty: 30 TABLET | Refills: 2 | Status: SHIPPED | OUTPATIENT
Start: 2019-10-07 | End: 2020-01-06

## 2019-10-09 ENCOUNTER — HOSPITAL ENCOUNTER (EMERGENCY)
Facility: HOSPITAL | Age: 79
Discharge: HOME | End: 2019-10-09
Attending: EMERGENCY MEDICINE | Admitting: EMERGENCY MEDICINE

## 2019-10-09 ENCOUNTER — PATIENT OUTREACH (OUTPATIENT)
Dept: CASE MANAGEMENT | Facility: OTHER | Age: 79
End: 2019-10-09

## 2019-10-09 VITALS
WEIGHT: 181.88 LBS | SYSTOLIC BLOOD PRESSURE: 181 MMHG | HEIGHT: 62 IN | TEMPERATURE: 97.1 F | RESPIRATION RATE: 18 BRPM | OXYGEN SATURATION: 95 % | HEART RATE: 70 BPM | BODY MASS INDEX: 33.47 KG/M2 | DIASTOLIC BLOOD PRESSURE: 84 MMHG

## 2019-10-09 DIAGNOSIS — K06.8 BLEEDING GUMS: Primary | ICD-10-CM

## 2019-10-09 PROCEDURE — 99284 EMERGENCY DEPT VISIT MOD MDM: CPT

## 2019-10-09 RX ORDER — LIDOCAINE HYDROCHLORIDE AND EPINEPHRINE 10; 10 MG/ML; UG/ML
10 INJECTION, SOLUTION INFILTRATION; PERINEURAL ONCE
Status: COMPLETED | OUTPATIENT
Start: 2019-10-09 | End: 2019-10-09

## 2019-10-09 RX ADMIN — LIDOCAINE HYDROCHLORIDE,EPINEPHRINE BITARTRATE 2 ML: 10; .01 INJECTION, SOLUTION INFILTRATION; PERINEURAL at 15:26

## 2019-10-09 RX ADMIN — TRANEXAMIC ACID 500 MG: 100 INJECTION, SOLUTION INTRAVENOUS at 14:53

## 2019-10-09 NOTE — PROGRESS NOTES
Spoke at length with son, who states that her home health services have just completed. Offered resources to son, who declined at this time. Son stated that he lives next door and he does not need anything. Pt. Management business card given to son for any further needs. Jayde Plasencia RN

## 2019-10-09 NOTE — ED TRIAGE NOTES
Pt reports having teeth pulled at dentist office yesterday, takes Eliquis and today cannot get bleeding to stop.

## 2019-10-09 NOTE — ED PROVIDER NOTES
EMERGENCY DEPARTMENT ENCOUNTER    Room Number:  32/32  Date seen:  10/9/2019  Time seen: 11:22 AM  PCP: Albert Brooks MD  Historian: self, pt's family      HPI:  Chief complaint: Gum bleeding  Context: Miri Yung is a 78 y.o. female who presents to the ED c/o gum bleeding that started around 0300 this morning. Pt denies nausea, vomiting, fever, chills, HA, SOA, and dizziness. The pt was seen at the Dentist yesterday, where she had 2 teeth extracted. Since the extraction, she rinsed her mouth with salt water and took medications as instructed, including 5 mg Eliquis BID. Around 0300 this morning, the pt noticed significant bleeding. Due to bleeding being uncontrollable, she decided to come to the ED for further evaluation. Hx of AFIB, CVA, HTN, Tyle 2 DM, GERD and dementia.     ALLERGIES  Decongestant  [pseudoephedrine]    PAST MEDICAL HISTORY  Active Ambulatory Problems     Diagnosis Date Noted   • Acute sinusitis 02/08/2016   • Atopic rhinitis 02/08/2016   • Dementia (CMS/[facility]) 02/08/2016   • Mixed anxiety depressive disorder 02/08/2016   • Edema 02/08/2016   • Abnormal liver function tests 02/08/2016   • Gastroesophageal reflux disease 02/08/2016   • Gout 02/08/2016   • Hyperlipidemia 02/08/2016   • HTN (hypertension) 02/08/2016   • Insomnia 02/08/2016   • Arthropathy of knee 02/08/2016   • Memory loss 02/08/2016   • Low back pain 02/08/2016   • Mitral valve insufficiency 02/08/2016   • Tricuspid valve insufficiency 02/08/2016   • Urinary tract infection 02/08/2016   • Valvular endocarditis 02/08/2016   • Long QT interval 02/19/2016   • Microcytic anemia 03/03/2016   • Anemia 03/04/2016   • Seasonal allergies 08/19/2016   • Health care maintenance 09/28/2016   • Acute cystitis without hematuria 07/25/2017   • Chronic fatigue 02/28/2018   • Bacterial endocarditis 10/31/2018   • Slow transit constipation 06/06/2019   • Acute ischemic stroke (CMS/HCC) 08/05/2019   • Paroxysmal tachycardia (CMS/[facility]) 08/05/2019    • Type 2 diabetes mellitus (CMS/HCC) 08/05/2019     Resolved Ambulatory Problems     Diagnosis Date Noted   • Left lower quadrant pain 02/08/2016   • Arthritis 02/08/2016   • Hematochezia 02/08/2016   • Pain in joint 02/08/2016   • Lung mass 02/08/2016   • Muscle pain 02/08/2016   • Inflammation of sacroiliac joint (CMS/HCC) 02/08/2016   • Stress incontinence in female 02/08/2016   • Urinary urgency 02/08/2016   • Precordial pain 02/19/2016   • High risk medication use 02/19/2016   • Chest pain 02/19/2016   • Iron deficiency anemia due to chronic blood loss 03/03/2016   • Pulmonary hypertension (CMS/HCC) 03/30/2016   • Fatigue due to exposure 05/05/2016   • Need for vaccination 10/17/2016   • Atypical chest pain 12/14/2016   • Diarrhea 02/16/2017     Past Medical History:   Diagnosis Date   • Acute ischemic stroke (CMS/HCC)    • Anxiety    • Atrial fibrillation (CMS/HCC)    • Dementia (CMS/HCC)    • Depression    • Edema    • Encephalopathy    • Endocarditis    • Esophageal reflux    • GERD (gastroesophageal reflux disease)    • Gout    • History of blood transfusion    • HTN (hypertension)    • Hyperlipidemia    • Insomnia    • LOM (loss of memory)    • Lung mass    • Mitral and aortic valve disease    • Mitral regurgitation    • Mixed anxiety depressive disorder    • Nonrheumatic tricuspid (valve) insufficiency    • Osteoarthritis    • PAF (paroxysmal atrial fibrillation) (CMS/HCC)    • Sacroiliitis (CMS/HCC)    • Tricuspid regurgitation    • Type 2 diabetes mellitus (CMS/HCC)        PAST SURGICAL HISTORY  Past Surgical History:   Procedure Laterality Date   • CATARACT EXTRACTION     • COLONOSCOPY N/A 3/10/2016    Procedure: COLONOSCOPY TO CECUM;  Surgeon: Shaan Becerra Jr., MD;  Location: [facility] ENDOSCOPY;  Service:    • ENDOSCOPY N/A 3/10/2016    Procedure: ESOPHAGOGASTRODUODENOSCOPY ;  Surgeon: Shaan Becerra Jr., MD;  Location: [facility] ENDOSCOPY;  Service:    • KNEE SURGERY     • TUBAL ABDOMINAL LIGATION          FAMILY HISTORY  Family History   Problem Relation Age of Onset   • Kidney disease Mother    • Heart disease Mother    • Emphysema Father    • Heart disease Brother        SOCIAL HISTORY  Social History     Socioeconomic History   • Marital status:      Spouse name: Not on file   • Number of children: Not on file   • Years of education: Not on file   • Highest education level: Not on file   Tobacco Use   • Smoking status: Never Smoker   • Smokeless tobacco: Never Used   • Tobacco comment: caffeine use    Substance and Sexual Activity   • Alcohol use: No   • Drug use: No   • Sexual activity: Defer           REVIEW OF SYSTEMS  Review of Systems   Constitutional: Negative for chills and fever.   HENT: Positive for dental problem (gum bleeding).    Respiratory: Negative for shortness of breath.    Cardiovascular: Negative for chest pain.   Gastrointestinal: Negative for blood in stool, nausea and vomiting.   Genitourinary: Negative for vaginal bleeding.   Neurological: Negative for dizziness and headaches.   Hematological: Bruises/bleeds easily.           PHYSICAL EXAM  ED Triage Vitals [10/09/19 1114]   Temp Heart Rate Resp BP SpO2   97.1 °F (36.2 °C) 76 18 -- 95 %      Temp src Heart Rate Source Patient Position BP Location FiO2 (%)   Tympanic Monitor -- -- --     Physical Exam   Constitutional: No distress.   HENT:   Head: Normocephalic and atraumatic.   Mouth/Throat: Abnormal dentition (poor). No dental abscesses.   Area of bleeding to the top right gum and bottom left gum, with minimal active bleeding and large clot formation.  Patient missing the majority of her teeth.  Few remaining teeth are in poor condition   Eyes: EOM are normal.   Neck: Normal range of motion. Neck supple.   Cardiovascular: Normal rate. An irregularly irregular rhythm present.   Murmur heard.   Systolic murmur is present with a grade of 2/6.  Pulmonary/Chest: Effort normal and breath sounds normal. No respiratory distress.    Abdominal: Soft. There is no tenderness. There is no rebound and no guarding.   Musculoskeletal: Normal range of motion. She exhibits no edema.   Neurological: She is alert. She has normal sensation.   Skin: Skin is warm and dry. No rash noted.   Psychiatric: Mood and affect normal.   Nursing note and vitals reviewed.      MEDICATIONS GIVEN IN ER  Medications   Tranexamic Acid Sterile Solution 500 mg (500 mg Topical Given by Other 10/9/19 1453)   lidocaine-EPINEPHrine (XYLOCAINE W/EPI) 1 %-1:215165 injection 10 mL (2 mL Injection Given by Other 10/9/19 1526)       PROGRESS AND CONSULTS    Progress Notes:       1133 Suctioned moderate amount of blood out of pt's mouth. There is an area of bleeding to the top right mouth and bottom left, with minimal active bleeding and large clot formation.. Thrombi pad applied.     1226 Rechecked pt. Pt is resting comfortably with improved bleeding. Plan is to recheck pt.     1243 Tranexamic Acid Sterile Solution 500 mg ordered.     1319 Rechecked pt. Pt is resting comfortably with slight bleeding. Pt states Dr. Hall (Dentist) extracted the teeth and was aware pt is anticoagulated. Additional thrombi pad and TXA applied.     1322 Reviewed pt's history and workup with Dr. Tyler (ER physician).  After a bedside evaluation, Dr. Tyler agrees with the plan of care.    1505 Rechecked pt. Still oozing blood from the bottom L. Injected Xylocaine with EPI 1% and placed Xylocaine soaked gauze.    1522 Xylocaine w/EPI 1% ordered.  Surgicel gauze placed    1609 Rechecked pt. Pt is resting comfortably in NAD with HR of 72 and BP of 186/89. There is no active bleeding upon re-examination of pt at this time.     1741 Rechecked pt. Pt is resting comfortably with HR of 73. Upon re-evaluation, pt is not actively bleeding. Plan is to d/c pt with f/u to Oral Surgery. Pt was instructed to not take Eliquis for the next 2 days. Pt understands and agrees with the plan. All questions were answered.  "    Disposition vitals:  /89   Pulse 71   Temp 97.1 °F (36.2 °C) (Tympanic)   Resp 18   Ht 157.5 cm (62\")   Wt 82.5 kg (181 lb 14.1 oz)   SpO2 96%   BMI 33.27 kg/m²         DIAGNOSIS  Final diagnoses:   Bleeding gums         DISPOSITION  DISCHARGE    Patient discharged in stable condition.    Reviewed implications of results, diagnosis, meds, responsibility to follow up, warning signs and symptoms of possible worsening, potential complications and reasons to return to ER.    Patient/Family voiced understanding of above instructions.    Discussed plan for discharge, as there is no emergent indication for admission. Patient referred to primary care provider for BP management due to today's BP. Pt/family is agreeable and understands need for follow up and repeat testing.  Pt is aware that discharge does not mean that nothing is wrong but it indicates no emergency is present that requires admission and they must continue care with follow-up as given below or physician of their choice.     FOLLOW-UP  Albert Brooks MD  [address] [id number] [phone]      As needed    Daniel Higginbotham MD  [address] [address]  [phone]      or see Oral Surgeon in [facility]         Medication List      Changed    furosemide 20 MG tablet  Commonly known as:  LASIX  TAKE 1 TABLET BY MOUTH TWO  TIMES DAILY  What changed:    how much to take  how to take this  when to take this        Stop    apixaban 5 MG tablet tablet  Commonly known as:  ELIQUIS          Documentation assistance provided by carmina Rodríguez for Jan Gerard PA-C.  Information recorded by the carmina was done at my direction and has been verified and validated by me.       Marcos Melo  10/09/19 1805       Jan Gerard PA  10/09/19 2000    "

## 2019-10-09 NOTE — DISCHARGE INSTRUCTIONS
Follow-up with Oral Surgeon in [facility] tomorrow    Only Ice Chips tonight    Keep gauze on until seen by Dentist or Oral Surgeon    Return for any worsening symptoms

## 2019-10-09 NOTE — ED PROVIDER NOTES
Pt presents to the ED c/o oral bleeding that began yesterday after having two teeth extracted. Pt reports being anticoagulated on Eliquis for Afib. Pt states she did not stop Eliquis prior to having teeth extracted.       On exam, heart irregularly irregular, lungs CTAB, poor dentition, area of bleeding and clot in R posterior upper gum and L lower posterior gum.     Workup reviewed. Thrombin gauze was placed in mouth. I agree with the plan to re-assess pt and make sure bleeding stops.            Attestation:    The CABRERA and I have discussed this patient's history, physical exam, and treatment plan.  I have reviewed the documentation and personally had a face to face interaction with the patient. I affirm the documentation and agree with the treatment and plan.  The attached note describes my personal findings.    Documentation assistance provided by carmina Nicholson for . Information recorded by the carmina was done at my direction and has been verified and validated by me.          Gladys Nicholson  10/09/19 5567       Tanner Tyler MD  10/09/19 2738

## 2019-10-09 NOTE — OUTREACH NOTE
Care Coordination Note    Communication with ED care manager update of the following:   Spoke with Jayde GUSMAN. Patient here with bleeding from the mouth after tooth extraction. Patient has Home Health ending soon. ACO patient. Patient has memory issues. Please round with son to see if resources needed. Son knows to watch for ADL slips as patient support decreases.     Jess Romo RN  Community Care Coordinator    10/9/2019, 2:53 PM

## 2019-10-21 RX ORDER — ALLOPURINOL 300 MG/1
TABLET ORAL
Qty: 90 TABLET | Refills: 0 | Status: SHIPPED | OUTPATIENT
Start: 2019-10-21 | End: 2020-03-12

## 2019-10-21 RX ORDER — METOPROLOL SUCCINATE 100 MG/1
TABLET, EXTENDED RELEASE ORAL
Qty: 90 TABLET | Refills: 0 | Status: SHIPPED | OUTPATIENT
Start: 2019-10-21 | End: 2020-03-12

## 2019-10-21 RX ORDER — POTASSIUM CHLORIDE 750 MG/1
TABLET, FILM COATED, EXTENDED RELEASE ORAL
Qty: 90 TABLET | Refills: 0 | Status: SHIPPED | OUTPATIENT
Start: 2019-10-21 | End: 2020-03-12

## 2019-10-21 RX ORDER — ISOSORBIDE MONONITRATE 60 MG/1
TABLET, EXTENDED RELEASE ORAL
Qty: 90 TABLET | Refills: 0 | Status: SHIPPED | OUTPATIENT
Start: 2019-10-21 | End: 2020-03-12

## 2019-10-21 RX ORDER — LOSARTAN POTASSIUM 25 MG/1
TABLET ORAL
Qty: 270 TABLET | Refills: 0 | Status: SHIPPED | OUTPATIENT
Start: 2019-10-21 | End: 2020-03-12

## 2019-10-21 RX ORDER — PANTOPRAZOLE SODIUM 40 MG/1
40 TABLET, DELAYED RELEASE ORAL DAILY
Qty: 90 TABLET | Refills: 0 | Status: SHIPPED | OUTPATIENT
Start: 2019-10-21 | End: 2020-01-13

## 2019-11-04 ENCOUNTER — OFFICE VISIT (OUTPATIENT)
Dept: FAMILY MEDICINE CLINIC | Facility: CLINIC | Age: 79
End: 2019-11-04

## 2019-11-04 ENCOUNTER — TELEPHONE (OUTPATIENT)
Dept: CARDIOLOGY | Facility: CLINIC | Age: 79
End: 2019-11-04

## 2019-11-04 VITALS
SYSTOLIC BLOOD PRESSURE: 134 MMHG | BODY MASS INDEX: 33.2 KG/M2 | OXYGEN SATURATION: 94 % | HEIGHT: 62 IN | TEMPERATURE: 98.5 F | RESPIRATION RATE: 17 BRPM | HEART RATE: 81 BPM | WEIGHT: 180.4 LBS | DIASTOLIC BLOOD PRESSURE: 68 MMHG

## 2019-11-04 DIAGNOSIS — F51.01 PRIMARY INSOMNIA: ICD-10-CM

## 2019-11-04 DIAGNOSIS — R06.02 SHORTNESS OF BREATH: ICD-10-CM

## 2019-11-04 DIAGNOSIS — Z23 NEED FOR INFLUENZA VACCINATION: ICD-10-CM

## 2019-11-04 DIAGNOSIS — Z79.01 CHRONIC ANTICOAGULATION: ICD-10-CM

## 2019-11-04 DIAGNOSIS — R15.9 FULL INCONTINENCE OF FECES: Primary | ICD-10-CM

## 2019-11-04 DIAGNOSIS — M25.50 GENERALIZED JOINT PAIN: ICD-10-CM

## 2019-11-04 PROCEDURE — 99215 OFFICE O/P EST HI 40 MIN: CPT | Performed by: FAMILY MEDICINE

## 2019-11-04 PROCEDURE — 90653 IIV ADJUVANT VACCINE IM: CPT | Performed by: FAMILY MEDICINE

## 2019-11-04 PROCEDURE — G0008 ADMIN INFLUENZA VIRUS VAC: HCPCS | Performed by: FAMILY MEDICINE

## 2019-11-04 RX ORDER — MIRTAZAPINE 7.5 MG/1
7.5 TABLET, FILM COATED ORAL NIGHTLY
Qty: 30 TABLET | Refills: 3 | Status: SHIPPED | OUTPATIENT
Start: 2019-11-04 | End: 2020-03-09

## 2019-11-04 RX ORDER — ATORVASTATIN CALCIUM 40 MG/1
40 TABLET, FILM COATED ORAL DAILY
COMMUNITY
End: 2021-03-26 | Stop reason: SDUPTHER

## 2019-11-04 NOTE — TELEPHONE ENCOUNTER
Called and spoke with patient's son Hardeep after discussing with Dr. Beyer. Patient is ok to temporarily hold her blood thinners for couple days before her dental work and resume as soon as possible afterwards. I did review with him that her stroke risk off of her blood thinners is not 0 especially with her history of her previous stroke but is felt to be low enough to temporarily hold since she has had bleeding issues necessitating ED visit with dental work in the past. I did reiterate the need for antibiotics with dental work with her history of endocarditis.  He states her dentist has been prescribing antibiotics for every procedure with her endocarditis history.  He demonstrated understanding.

## 2019-11-04 NOTE — TELEPHONE ENCOUNTER
[phone]  9:18 am    Pts son called, stating the pt is needing dental work and they need orders for her anticoag.  After reviewing chart I called son back and lmom to call with what she is having done, when and with whom.  And also to let us know if she is on any other anticoags.     [facility]

## 2019-11-04 NOTE — TELEPHONE ENCOUNTER
[phone]  10:07 am    Hardeep called back stating the pt has a broken tooth that needs repair.  He states pt is on eliquis and asa.  He is concerned since last time she needed tooth repair she bled a lot.  Please advise.    [facility]

## 2019-11-04 NOTE — PATIENT INSTRUCTIONS
Arthritis  Arthritis means joint pain. It can also mean joint disease. A joint is a place where bones come together. People who have arthritis may have:  · Red joints.  · Swollen joints.  · Stiff joints.  · Warm joints.  · A fever.  · A feeling of being sick.  Follow these instructions at home:  Pay attention to any changes in your symptoms. Take these actions to help with your pain and swelling.  Medicines  · Take over-the-counter and prescription medicines only as told by your doctor.  · Do not take aspirin for pain if your doctor says that you may have gout.  Activity  · Rest your joint if your doctor tells you to.  · Avoid activities that make the pain worse.  · Exercise your joint regularly as told by your doctor. Try doing exercises like:  ? Swimming.  ? Water aerobics.  ? Biking.  ? Walking.  Joint Care    · If your joint is swollen, keep it raised (elevated) if told by your doctor.  · If your joint feels stiff in the morning, try taking a warm shower.  · If you have diabetes, do not apply heat without asking your doctor.  · If told, apply heat to the joint:  ? Put a towel between the joint and the hot pack or heating pad.  ? Leave the heat on the area for 20-30 minutes.  · If told, apply ice to the joint:  ? Put ice in a plastic bag.  ? Place a towel between your skin and the bag.  ? Leave the ice on for 20 minutes, 2-3 times per day.  · Keep all follow-up visits as told by your doctor.  Contact a doctor if:  · The pain gets worse.  · You have a fever.  Get help right away if:  · You have very bad pain in your joint.  · You have swelling in your joint.  · Your joint is red.  · Many joints become painful and swollen.  · You have very bad back pain.  · Your leg is very weak.  · You cannot control your pee (urine) or poop (stool).  This information is not intended to replace advice given to you by your health care provider. Make sure you discuss any questions you have with your health care provider.  Document  Released: 03/14/2011 Document Revised: 05/25/2017 Document Reviewed: 03/14/2016  [employer] Interactive Patient Education © 2019 [employer]

## 2019-11-04 NOTE — PROGRESS NOTES
Subjective   Miri Yung is a 79 y.o. female. Presents today to be evaluated for ongoing bowel incontinence and shortness of breath. Also would like to discuss issues with insomnia. Patient will be having a tooth removed soon and is needing to know how to stop Eliquis.     History of Present Illness     I have reviewed the information above gathered by the medical assistant with the patient it is accurate.    Bowel incontinence-patient has ever been seen before for it.  She has been going on for a few months.  She says it will happen at random.  She has not changed anything in her diet.    Shortness of breath-she says she is not too concerned about it right now.  It was a problem initially but she thinks it was related to her atrial fibrillation.  Currently she is not having any palpitations or fluttering of her heart.    She is having some issues with insomnia.  She has pain which contributes but she also does have a primary insomnia.  She is tried melatonin which she has been partially effective but she needs up a little bit stronger.  Never tried anything like mirtazapine.      She is also concerned about generalized arthritis which is been a hronic issue.  She says that she is never been worked up for it.  She cannot take NSAIDs because she is on Eliquis for her atrial fibrillation.  She has a history of arthritis from gout and last uric acid level documented in our system was 2015 at that point it was slightly elevated in the sixes.    She is having some bleeding in her mouth from a recent tooth extraction which they did know she was on Eliquis and she took Eliquis throughout the procedure.  She is supposed to have a different tooth extracted soon and wanted to know when she needed to stop her Eliquis.    The following portions of the patient's history were reviewed and updated as appropriate: allergies, current medications, past family history, past medical history, past social history, past surgical  history and problem list.    Review of Systems   Respiratory: Positive for shortness of breath.    Gastrointestinal: Positive for diarrhea.        Bowel incontinence    Psychiatric/Behavioral: Positive for sleep disturbance.   All other systems reviewed and are negative.      Objective   Physical Exam   Constitutional: She is oriented to person, place, and time. She appears well-developed and well-nourished. No distress.   HENT:   Mouth/Throat: Oropharynx is clear and moist. No oropharyngeal exudate.   Eyes: Conjunctivae are normal. Right eye exhibits no discharge. Left eye exhibits no discharge.   Neck: Normal range of motion. Neck supple.   Cardiovascular: Normal rate, regular rhythm and normal heart sounds. Exam reveals no gallop and no friction rub.   No murmur heard.  Pulmonary/Chest: Effort normal and breath sounds normal. She has no wheezes. She has no rales.   Abdominal: Soft. Bowel sounds are normal. She exhibits no distension. There is no tenderness.   Musculoskeletal: She exhibits no edema or deformity.   Lymphadenopathy:     She has no cervical adenopathy.   Neurological: She is alert and oriented to person, place, and time.   Skin: Skin is warm and dry. No rash noted.   Psychiatric: She has a normal mood and affect. Her behavior is normal.   Nursing note and vitals reviewed.      Assessment/Plan   Miri was seen today for fecal incontinence, shortness of breath and insomnia.    Diagnoses and all orders for this visit:    Full incontinence of feces  -     Ambulatory Referral to Gastroenterology    Shortness of breath    Primary insomnia  -     mirtazapine (REMERON) 7.5 MG tablet; Take 1 tablet by mouth Every Night.    Generalized joint pain  -     Comprehensive Metabolic Panel  -     Uric acid; Future  -     DAVID  -     Anti-DNA Antibody, Double-stranded  -     C-reactive Protein  -     Cyclic Citrul Peptide Antibody, IgG / IgA  -     Rheumatoid Factor  -     Sedimentation Rate  -     Uric acid    Chronic  anticoagulation    Need for influenza vaccination  -     Fluad Quad >65 years ([id number])      Plan as above.  We will send the patient out for gastroenterology referral regarding the new diagnosis of full incontinence of feces.  Nothing to do a shortness of breath, may have just been a short run of RVR.  But currently she is not having the symptoms.  We will start the medication Remeron for her insomnia.  We will also do the work-up for the generalized joint pain as above.  Regarding the chronic anticoagulation, I recommended that we stop the Eliquis 48 hours prior to the procedure and then they should discuss with the specialist regarding when to restart Eliquis based on the type of procedure and risk involved.  Plan on seeing the patient back in about 3 to 4 weeks.    I spent 40 minutes in the room with greater than 50% of the time counseling regarding the incontinence of feces in the different possibilities that could be and that we would need a referral to gastroenterology she could need possibility of scope.  We also talked about the different possibilities for insomnia and why we should try mirtazapine as a safer medication in her age group.  We also discussed the generalized joint pain and that we would likely need a rheumatologic work-up to discern whether or not there could be something like rheumatoid arthritis or other inflammatory arthritic disorders.  We also discussed at length the reasoning behind stopping Eliquis approximately 48 hours prior to the procedure.

## 2019-11-06 ENCOUNTER — OFFICE VISIT (OUTPATIENT)
Dept: GASTROENTEROLOGY | Facility: CLINIC | Age: 79
End: 2019-11-06

## 2019-11-06 VITALS
WEIGHT: 179.8 LBS | HEIGHT: 62 IN | SYSTOLIC BLOOD PRESSURE: 138 MMHG | TEMPERATURE: 97.8 F | DIASTOLIC BLOOD PRESSURE: 82 MMHG | BODY MASS INDEX: 33.09 KG/M2

## 2019-11-06 DIAGNOSIS — R19.4 ALTERED BOWEL HABITS: Primary | ICD-10-CM

## 2019-11-06 DIAGNOSIS — R15.9 FULL INCONTINENCE OF FECES: ICD-10-CM

## 2019-11-06 LAB
ALBUMIN SERPL-MCNC: 4.1 G/DL (ref 3.5–5.2)
ALBUMIN/GLOB SERPL: 2.7 G/DL
ALP SERPL-CCNC: 120 U/L (ref 39–117)
ALT SERPL-CCNC: 21 U/L (ref 1–33)
ANA SER QL: NEGATIVE
AST SERPL-CCNC: 24 U/L (ref 1–32)
BILIRUB SERPL-MCNC: 0.6 MG/DL (ref 0.2–1.2)
BUN SERPL-MCNC: 13 MG/DL (ref 8–23)
BUN/CREAT SERPL: 21 (ref 7–25)
CALCIUM SERPL-MCNC: 8.9 MG/DL (ref 8.6–10.5)
CCP IGA+IGG SERPL IA-ACNC: 5 UNITS (ref 0–19)
CHLORIDE SERPL-SCNC: 104 MMOL/L (ref 98–107)
CO2 SERPL-SCNC: 26.2 MMOL/L (ref 22–29)
CREAT SERPL-MCNC: 0.62 MG/DL (ref 0.57–1)
CRP SERPL-MCNC: 0.32 MG/DL (ref 0–0.5)
DSDNA AB SER-ACNC: <1 IU/ML (ref 0–9)
ERYTHROCYTE [SEDIMENTATION RATE] IN BLOOD BY WESTERGREN METHOD: 4 MM/HR (ref 0–30)
GLOBULIN SER CALC-MCNC: 1.5 GM/DL
GLUCOSE SERPL-MCNC: 143 MG/DL (ref 65–99)
POTASSIUM SERPL-SCNC: 3.6 MMOL/L (ref 3.5–5.2)
PROT SERPL-MCNC: 5.6 G/DL (ref 6–8.5)
SODIUM SERPL-SCNC: 145 MMOL/L (ref 136–145)
URATE SERPL-MCNC: 3.9 MG/DL (ref 2.4–5.7)

## 2019-11-06 PROCEDURE — 99203 OFFICE O/P NEW LOW 30 MIN: CPT | Performed by: INTERNAL MEDICINE

## 2019-11-08 ENCOUNTER — PATIENT OUTREACH (OUTPATIENT)
Dept: CASE MANAGEMENT | Facility: OTHER | Age: 79
End: 2019-11-08

## 2019-11-08 DIAGNOSIS — J32.1 CHRONIC FRONTAL SINUSITIS: ICD-10-CM

## 2019-11-08 RX ORDER — MONTELUKAST SODIUM 10 MG/1
10 TABLET ORAL NIGHTLY
Qty: 30 TABLET | Refills: 5 | Status: SHIPPED | OUTPATIENT
Start: 2019-11-08 | End: 2019-12-18 | Stop reason: SDUPTHER

## 2019-11-11 ENCOUNTER — TELEPHONE (OUTPATIENT)
Dept: FAMILY MEDICINE CLINIC | Facility: CLINIC | Age: 79
End: 2019-11-11

## 2019-11-11 NOTE — TELEPHONE ENCOUNTER
The GI doctor thought the metformin could be contributing to the GI problems as asked to discontinue it.  It is a low dose anyway.  However, she would need to be very cautious with her sugar consumption to keep her diabetes from worsening.  It is does, we will need to try something else.  I would have her check in with me in about a month to make sure her diabetes is not taking a poor turn.    I have d/c'd the metformin on the med list.

## 2019-11-11 NOTE — TELEPHONE ENCOUNTER
----- Message from Melody Luna MA sent at 11/11/2019 10:00 AM EST -----  Patients son aware.  He would like to hold off on both the pain management and palliative care referral for now. He is going to try to get her up and exercising a little more to see if that helps with some of her issues.  She seen gastro on 11/6, records on in the system to review.  Also, he is wanting to know if want her to continue taking metformin daily. He stated his aunt told him at one point when she was released from the hospital that she was no longer supposed to take it. I still have it listed as one of her meds and didn't see where you had discontinued it in any recent office notes.

## 2019-12-04 ENCOUNTER — OFFICE VISIT (OUTPATIENT)
Dept: NEUROLOGY | Facility: CLINIC | Age: 79
End: 2019-12-04

## 2019-12-04 ENCOUNTER — TELEPHONE (OUTPATIENT)
Dept: NEUROLOGY | Facility: CLINIC | Age: 79
End: 2019-12-04

## 2019-12-04 VITALS
BODY MASS INDEX: 33.86 KG/M2 | DIASTOLIC BLOOD PRESSURE: 78 MMHG | HEIGHT: 62 IN | OXYGEN SATURATION: 96 % | SYSTOLIC BLOOD PRESSURE: 158 MMHG | HEART RATE: 73 BPM | WEIGHT: 184 LBS

## 2019-12-04 DIAGNOSIS — I63.9 CEREBROVASCULAR ACCIDENT (CVA), UNSPECIFIED MECHANISM (HCC): ICD-10-CM

## 2019-12-04 DIAGNOSIS — I10 ESSENTIAL HYPERTENSION: ICD-10-CM

## 2019-12-04 DIAGNOSIS — Z79.899 ON STATIN THERAPY: ICD-10-CM

## 2019-12-04 DIAGNOSIS — Z91.89 AT RISK FOR OBSTRUCTIVE SLEEP APNEA: Primary | ICD-10-CM

## 2019-12-04 PROCEDURE — 99214 OFFICE O/P EST MOD 30 MIN: CPT | Performed by: NURSE PRACTITIONER

## 2019-12-04 NOTE — TELEPHONE ENCOUNTER
Attempted to reach pt, no answer.  Discussed w/ pt's son Hardeep.     ----- Message from KWASI Jha sent at 12/4/2019  3:58 PM EST -----  Let patient know that cardiology recommendations from before still stand with this dental procedure.  Please let her know that she is at increased risk for recurrent stroke while being off anticoagulation.  Today I reviewed signs and symptoms of stroke with her you may want to enter rate those and when she should present to the emergency room.  Also, according to cardiology they do not see an indication for aspirin therefore given her history of GI bleed I would recommend discontinuation.  I will plan to discontinue in the MAR.  Please update patient.  Thank you.    ----- Message -----  From: Kia Ho APRN  Sent: 12/4/2019   2:11 PM  To: KWASI Jha    Yes same clearance as before. She will require antibiotic prophylaxis for her history of endocarditis from her dentist. I also do not see a clear cardiac indication of why she is on Aspirin so ok to stop from my standpoint while on DOAC. Thanks!        ----- Message -----  From: Beckie Cheatham APRN  Sent: 12/4/2019   2:02 PM  To: KWASI Mello    Patient has another planned upcoming dental extraction (4-6 teeth); previous note regarding dental surgery indicated okay to hold and resume as soon as possible.  I just wanted to confirm that this is still the case for repeat dental surgery planned for next week.  I reiterated the need to have prophylactic antibiotic prior to procedure due to history of endocarditis.  Also, given the patient's history of GI bleed is there an indication for NOAC and antiplatelet from a cardiology perspective?  If not, I was thinking we could discontinue aspirin… Thoughts?

## 2019-12-04 NOTE — ADDENDUM NOTE
Addended by: JAZLYN CARSON on: 12/4/2019 04:03 PM     Modules accepted: Orders, Level of Service

## 2019-12-09 ENCOUNTER — OFFICE VISIT (OUTPATIENT)
Dept: FAMILY MEDICINE CLINIC | Facility: CLINIC | Age: 79
End: 2019-12-09

## 2019-12-09 VITALS
WEIGHT: 187 LBS | HEIGHT: 62 IN | DIASTOLIC BLOOD PRESSURE: 64 MMHG | HEART RATE: 72 BPM | TEMPERATURE: 98.2 F | OXYGEN SATURATION: 92 % | BODY MASS INDEX: 34.41 KG/M2 | SYSTOLIC BLOOD PRESSURE: 142 MMHG | RESPIRATION RATE: 16 BRPM

## 2019-12-09 DIAGNOSIS — E11.9 TYPE 2 DIABETES MELLITUS WITHOUT COMPLICATION, WITHOUT LONG-TERM CURRENT USE OF INSULIN (HCC): Primary | ICD-10-CM

## 2019-12-09 DIAGNOSIS — F32.A DEPRESSIVE DISORDER: ICD-10-CM

## 2019-12-09 DIAGNOSIS — G47.00 INSOMNIA, UNSPECIFIED TYPE: ICD-10-CM

## 2019-12-09 LAB
EXPIRATION DATE: ABNORMAL
HBA1C MFR BLD: 6.9 %
Lab: ABNORMAL

## 2019-12-09 PROCEDURE — 99214 OFFICE O/P EST MOD 30 MIN: CPT | Performed by: FAMILY MEDICINE

## 2019-12-09 PROCEDURE — 83036 HEMOGLOBIN GLYCOSYLATED A1C: CPT | Performed by: FAMILY MEDICINE

## 2019-12-09 RX ORDER — NIFEDIPINE 30 MG/1
TABLET, EXTENDED RELEASE ORAL
Qty: 90 TABLET | Refills: 0 | Status: SHIPPED | OUTPATIENT
Start: 2019-12-09 | End: 2019-12-09

## 2019-12-09 RX ORDER — DULOXETIN HYDROCHLORIDE 60 MG/1
CAPSULE, DELAYED RELEASE ORAL
Qty: 90 CAPSULE | Refills: 0 | Status: SHIPPED | OUTPATIENT
Start: 2019-12-09 | End: 2019-12-09

## 2019-12-09 RX ORDER — FUROSEMIDE 20 MG/1
TABLET ORAL
Qty: 180 TABLET | Refills: 0 | Status: SHIPPED | OUTPATIENT
Start: 2019-12-09 | End: 2020-02-10

## 2019-12-09 RX ORDER — PANTOPRAZOLE SODIUM 40 MG/1
40 TABLET, DELAYED RELEASE ORAL DAILY
Qty: 90 TABLET | Refills: 0 | OUTPATIENT
Start: 2019-12-09

## 2019-12-09 NOTE — PATIENT INSTRUCTIONS

## 2019-12-09 NOTE — PROGRESS NOTES
Subjective   Miri Yung is a 79 y.o. female. Presents today for medication management for depression, diabetes mellitus, and insomnia.     History of Present Illness     Depressive disorder - not good.  She is alone a lot and not interacting with society much.  Taking Remeron 7.5mg and wellbutrin 150mg.  Her sister is planning on getting her out more often.    Diabetes Mellitus - Stable.  She is currently diet controlled.  No new numbness, tingling.  Eye exam is up-to-date.  Last A1C was 6.0 in August 2019.  A1c today is 6.9.  Attributes that to maybe some sweets.    Insomnia - Not going well.  Stroke doctor is setting up a sleep study after the first of the year.  Takes remeron 7.5mg qHS currently with some benefit.      The following portions of the patient's history were reviewed and updated as appropriate: allergies, current medications, past family history, past medical history, past social history, past surgical history and problem list.    Review of Systems   Constitutional: Negative for activity change, appetite change, fatigue and fever.   HENT: Negative for ear pain, facial swelling and sore throat.    Eyes: Negative for discharge and itching.   Respiratory: Negative for cough, chest tightness, shortness of breath and wheezing.    Cardiovascular: Negative for chest pain and palpitations.   Gastrointestinal: Negative for abdominal distention, constipation and nausea.   Endocrine: Negative for polydipsia, polyphagia and polyuria.   Genitourinary: Negative for difficulty urinating and flank pain.   Musculoskeletal: Negative for arthralgias and back pain.   Skin: Negative for color change, rash and wound.   Allergic/Immunologic: Negative for environmental allergies and food allergies.   Neurological: Negative for weakness and numbness.   Hematological: Negative for adenopathy. Does not bruise/bleed easily.   Psychiatric/Behavioral: Positive for decreased concentration and dysphoric mood. The patient is  nervous/anxious.    All other systems reviewed and are negative.      Objective   Physical Exam   Constitutional: She appears well-developed and well-nourished. No distress.   Cardiovascular: Normal rate, regular rhythm and normal heart sounds. Exam reveals no gallop and no friction rub.   No murmur heard.  Pulmonary/Chest: Effort normal and breath sounds normal. She has no wheezes. She has no rales.   Abdominal: Soft. Bowel sounds are normal. She exhibits no distension.   Skin: Skin is warm. Capillary refill takes less than 2 seconds. She is not diaphoretic.   Psychiatric: She has a normal mood and affect. Her behavior is normal. Judgment and thought content normal.   Nursing note and vitals reviewed.      Assessment/Plan   Miri was seen today for med management.    Diagnoses and all orders for this visit:    Type 2 diabetes mellitus without complication, without long-term current use of insulin (CMS/[facility])  -     POCT glycated hemoglobin, total    Depressive disorder    Insomnia, unspecified type        All conditions appear stable.  I did mention to her that her A1c has increased to 6.9% and if it gets into the sevens then I am going to have to consider adding back a small dose of metformin.  I will recheck it in 3 months.  Regarding the depressive disorder and insomnia, patient is going to continue with the medications that she is currently prescribed.  She is about to get a sleep study so I am not to change the Remeron right this moment.  Plan on seeing her back in about 3 months for another A1c check.

## 2019-12-11 ENCOUNTER — EPISODE CHANGES (OUTPATIENT)
Dept: CASE MANAGEMENT | Facility: OTHER | Age: 79
End: 2019-12-11

## 2019-12-18 ENCOUNTER — OFFICE VISIT (OUTPATIENT)
Dept: FAMILY MEDICINE CLINIC | Facility: CLINIC | Age: 79
End: 2019-12-18

## 2019-12-18 VITALS
OXYGEN SATURATION: 93 % | DIASTOLIC BLOOD PRESSURE: 76 MMHG | HEIGHT: 62 IN | TEMPERATURE: 98.2 F | WEIGHT: 189.2 LBS | BODY MASS INDEX: 34.82 KG/M2 | SYSTOLIC BLOOD PRESSURE: 140 MMHG | HEART RATE: 80 BPM

## 2019-12-18 DIAGNOSIS — I47.9 PAROXYSMAL TACHYCARDIA (HCC): ICD-10-CM

## 2019-12-18 DIAGNOSIS — I63.9 ACUTE ISCHEMIC STROKE (HCC): ICD-10-CM

## 2019-12-18 DIAGNOSIS — I48.0 PAROXYSMAL ATRIAL FIBRILLATION (HCC): ICD-10-CM

## 2019-12-18 DIAGNOSIS — I38 VALVULAR ENDOCARDITIS: ICD-10-CM

## 2019-12-18 DIAGNOSIS — R94.31 LONG QT INTERVAL: ICD-10-CM

## 2019-12-18 DIAGNOSIS — I33.0 BACTERIAL ENDOCARDITIS, UNSPECIFIED CHRONICITY: ICD-10-CM

## 2019-12-18 DIAGNOSIS — R06.01 ORTHOPNEA: ICD-10-CM

## 2019-12-18 DIAGNOSIS — R60.0 LOCALIZED EDEMA: Primary | ICD-10-CM

## 2019-12-18 DIAGNOSIS — R06.02 SHORTNESS OF BREATH: ICD-10-CM

## 2019-12-18 PROBLEM — I48.91 ATRIAL FIBRILLATION (HCC): Status: ACTIVE | Noted: 2019-12-18

## 2019-12-18 PROCEDURE — 99214 OFFICE O/P EST MOD 30 MIN: CPT | Performed by: PHYSICIAN ASSISTANT

## 2019-12-18 NOTE — PROGRESS NOTES
"Subjective   Miri Yung is a 79 y.o. female presented today with shortness of breath and bilateral lower extremity edema started on Friday     History of Present Illness     Started 12/13/19 but worsened last night. She has had increased SOA, \"gasping last night\". Feels unbalanced with walking- repots that is new with her symptoms. No CP, palpitations, weakness.  She is having to sleep on multiple pillows due to shortness of breath with laying down. Legs have been swelling \"for a long time\". They report she has had some worsening recently but she states that happens then improves.     Does not weight daily. Heart monitor was after CVA- they found out she had Atrial fibrillation and started Eliquis.  She does take Lasix daily.    The following portions of the patient's history were reviewed and updated as appropriate: allergies, current medications, past family history, past medical history, past social history, past surgical history and problem list.    Review of Systems   Constitutional: Positive for fatigue.   HENT: Negative.    Eyes: Negative.    Respiratory: Positive for shortness of breath.    Cardiovascular: Positive for leg swelling.   Gastrointestinal: Negative.    Genitourinary: Negative.    Musculoskeletal: Positive for gait problem.   Skin:        Bilateral lower extremity edema    Psychiatric/Behavioral: Negative.        Objective    Vitals:    12/18/19 1028   BP: 140/76   Pulse: 80   Temp: 98.2 °F (36.8 °C)   SpO2: 93%     Body mass index is 34.6 kg/m².    Physical Exam   Constitutional: She is oriented to person, place, and time. She appears well-developed and well-nourished.   HENT:   Head: Normocephalic and atraumatic.   Right Ear: External ear normal.   Left Ear: External ear normal.   Nose: Nose normal.   Eyes: Conjunctivae and lids are normal.   Neck: Neck supple. Carotid bruit is not present.   Cardiovascular: Normal rate, regular rhythm, normal heart sounds and intact distal pulses. Exam " reveals no gallop and no friction rub.   No murmur heard.  Pulmonary/Chest: Effort normal. No respiratory distress. She has no wheezes. She has no rhonchi. She has rales ( Possible faint Rales bilateral bases).   Musculoskeletal: She exhibits edema ( 2+ pitting bilateral lower extremities). She exhibits no deformity.   Neurological: She is alert and oriented to person, place, and time. Gait normal.   Skin: Skin is warm and dry.   Psychiatric: She has a normal mood and affect. Her speech is normal and behavior is normal. Judgment and thought content normal. Cognition and memory are normal.   Nursing note and vitals reviewed.      Assessment/Plan   Miri was seen today for shortness of breath and bilateral lower extremity edema.    Diagnoses and all orders for this visit:    Localized edema    Shortness of breath    Orthopnea    Bacterial endocarditis, unspecified chronicity    Long QT interval    Paroxysmal tachycardia (CMS/HCC)    Valvular endocarditis    Paroxysmal atrial fibrillation (CMS/HCC)    Acute ischemic stroke (CMS/HCC)      Patient Instructions   Assessment and plan  79-year-old female who presents today with edema and shortness of breath.  Patient's family member that is with her reports symptoms started 12/13/2019.  However, patient reports while it may have been mild since 12/13/2019, she had significant worsening last night.  They report bilateral lower extremity edema that is increased from baseline, orthopnea-propping on multiple pillows to lay down at night, and shortness of breath-reports gasping for air last night.  She reports this has improved some from last night.  Patient usually sees Dr. Albert Brooks and is unknown to me.  With review of her past medical history, Dr. Brooks notes, cardiology and neurology consult notes, hospitalizations, labs, and cardiac testing, she has significant history for CVA 8/2019  then found to have atrial fibrillation on cardiac monitor-low A. fib burden.  She  was started on Eliquis that they reports she is taking as directed.  She was seen is up-to-date with cardiology and neurology.  Patient has gained about 10 pounds since 11/6/2019.  I discussed with them possible dietary changes over the holidays and they report she does not eat much due to not having teeth.  They do not feel her diet has significantly changed over the holidays.  Patient does have 2+ pitting edema bilateral lower extremities and possible faint rales bilateral bases.  She is in no acute distress today.  Blood pressure is uncontrolled but is stable from previous vital signs back to 11/6/2019.  I consulted cardiology to determine if they wanted me to do further work-up or if she needed to go to the chest pain clinic, ER, or follow-up with them.  They were able to work her in tomorrow with KWASI in the cardiologist's office.  I counseled the patient and family member at length that she should go to ER ASAP if recurrence of gasping shortness of breath, worsening symptoms, or if she develops chest pain, palpitations, weakness, dizziness, syncope, or any other concerns.  Otherwise, she will be seen by cardiology tomorrow at 230.

## 2019-12-18 NOTE — PATIENT INSTRUCTIONS
Assessment and plan  79-year-old female who presents today with edema and shortness of breath.  Patient's family member that is with her reports symptoms started 12/13/2019.  However, patient reports while it may have been mild since 12/13/2019, she had significant worsening last night.  They report bilateral lower extremity edema that is increased from baseline, orthopnea-propping on multiple pillows to lay down at night, and shortness of breath-reports gasping for air last night.  She reports this has improved some from last night.  Patient usually sees Dr. Albert Brooks and is unknown to me.  With review of her past medical history, Dr. Brooks notes, cardiology and neurology consult notes, hospitalizations, labs, and cardiac testing, she has significant history for CVA 8/2019  then found to have atrial fibrillation on cardiac monitor-low A. fib burden.  She was started on Eliquis that they reports she is taking as directed.  She was seen is up-to-date with cardiology and neurology.  Patient has gained about 10 pounds since 11/6/2019.  I discussed with them possible dietary changes over the holidays and they report she does not eat much due to not having teeth.  They do not feel her diet has significantly changed over the holidays.  Patient does have 2+ pitting edema bilateral lower extremities and possible faint rales bilateral bases.  She is in no acute distress today.  Blood pressure is uncontrolled but is stable from previous vital signs back to 11/6/2019.  I consulted cardiology to determine if they wanted me to do further work-up or if she needed to go to the chest pain clinic, ER, or follow-up with them.  They were able to work her in tomorrow with KWASI in the cardiologist's office.  I counseled the patient and family member at length that she should go to ER ASAP if recurrence of gasping shortness of breath, worsening symptoms, or if she develops chest pain, palpitations, weakness, dizziness, syncope, or  any other concerns.  Otherwise, she will be seen by cardiology tomorrow at 230.

## 2019-12-19 ENCOUNTER — OFFICE VISIT (OUTPATIENT)
Dept: CARDIOLOGY | Facility: CLINIC | Age: 79
End: 2019-12-19

## 2019-12-19 VITALS
SYSTOLIC BLOOD PRESSURE: 142 MMHG | WEIGHT: 187 LBS | BODY MASS INDEX: 34.41 KG/M2 | DIASTOLIC BLOOD PRESSURE: 80 MMHG | RESPIRATION RATE: 22 BRPM | HEIGHT: 62 IN | HEART RATE: 82 BPM

## 2019-12-19 DIAGNOSIS — I50.33 ACUTE ON CHRONIC DIASTOLIC CONGESTIVE HEART FAILURE (HCC): ICD-10-CM

## 2019-12-19 DIAGNOSIS — I10 ESSENTIAL HYPERTENSION: ICD-10-CM

## 2019-12-19 DIAGNOSIS — I63.9 ACUTE ISCHEMIC STROKE (HCC): ICD-10-CM

## 2019-12-19 DIAGNOSIS — I34.0 NONRHEUMATIC MITRAL VALVE REGURGITATION: ICD-10-CM

## 2019-12-19 DIAGNOSIS — I38 VALVULAR ENDOCARDITIS: Primary | ICD-10-CM

## 2019-12-19 DIAGNOSIS — I48.0 PAROXYSMAL ATRIAL FIBRILLATION (HCC): ICD-10-CM

## 2019-12-19 PROCEDURE — 99214 OFFICE O/P EST MOD 30 MIN: CPT | Performed by: NURSE PRACTITIONER

## 2019-12-19 PROCEDURE — 93000 ELECTROCARDIOGRAM COMPLETE: CPT | Performed by: NURSE PRACTITIONER

## 2019-12-19 NOTE — PROGRESS NOTES
Date of Office Visit: 19  Encounter Provider: KWASI Corbin  Place of Service: [facility] CARDIOLOGY  Patient Name: Miri Yung  :1940    Chief Complaint   Patient presents with   • Transient Ischemic Attack   • Endocarditis   • Congestive Heart Failure   :     HPI: Miri Yung is a 79 y.o. female  with history of hyperlipidemia, diabetes mellitus, paroxysmal atrial fibrillation, cerebrovascular accident, dementia, congestive heart failure, endocarditis, and GERD.      She is followed by Dr. York. I will see her for the first time today and have reviewed her record.       She is followed by Dr. York. I will see her for the first time today andhave reviewed her record.  She has history of bacterial endocarditis and as a result developed mitral and tricuspid regurgitation.  She did not require valvular replacement.  In 2019 repeat echocardiogram showed normal left ventricular systolic function, grade 2 diastolic dysfunction, mild mitral regurgitation, mild tricuspid regurgitation and elevated RVSP at 41 mmHg.  Unfortunately, she suffered a cerebral ischemic event.  Follow-up mobile telemetry device revealed atrial fibrillation and atrial flutter.  She is anticoagulated with Eliquis.    She presents today with complaint of increased shortness of breath over the last 48 hours.  She describes orthopnea.  She has had increased lower extremity edema.  She has some dental work and has been eating a lot of [employer] soup.  She has had no palpitation, chest pain, near-syncope, or syncope.  She does have intermittent lightheadedness especially if she moves too quickly.      Allergies   Allergen Reactions   • Decongestant  [Pseudoephedrine]        Past Medical History:   Diagnosis Date   • Acute ischemic stroke (CMS/HCC)    • Anxiety    • Atrial fibrillation (CMS/HCC)    • Dementia (CMS/HCC)    • Depression    • Edema    • Encephalopathy     secondary to  "endocarditis   • Endocarditis     with mitral and tricuspid regurgitation   • Esophageal reflux    • GERD (gastroesophageal reflux disease)    • Gout    • History of blood transfusion     due to anemia   • HTN (hypertension)    • Hyperlipidemia    • Insomnia    • LOM (loss of memory)    • Lung mass    • 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; generally asymptomatic   • Mitral regurgitation    • Mixed anxiety depressive disorder    • Nonrheumatic tricuspid (valve) insufficiency    • Osteoarthritis    • PAF (paroxysmal atrial fibrillation) (CMS/HCC)    • Sacroiliitis (CMS/HCC)    • Tricuspid regurgitation    • Type 2 diabetes mellitus (CMS/HCC)        Past Surgical History:   Procedure Laterality Date   • CATARACT EXTRACTION     • COLONOSCOPY N/A 3/10/2016    Procedure: COLONOSCOPY TO CECUM;  Surgeon: Shaan Becerra Jr., MD;  Location: [facility] ENDOSCOPY;  Service:    • ENDOSCOPY N/A 3/10/2016    Procedure: ESOPHAGOGASTRODUODENOSCOPY ;  Surgeon: Shaan Becerra Jr., MD;  Location: [facility] ENDOSCOPY;  Service:    • KNEE SURGERY     • TUBAL ABDOMINAL LIGATION           Family and social history reviewed.     Review of Systems   Constitution: Positive for malaise/fatigue.   Cardiovascular: Positive for dyspnea on exertion, leg swelling and orthopnea.   Neurological: Positive for light-headedness.   Psychiatric/Behavioral: Positive for memory loss.     All other systems were reviewed and are negative          Objective:     Vitals:    12/19/19 1419   BP: 142/80   BP Location: Left arm   Patient Position: Sitting   Pulse: 82   Resp: 22   Weight: 84.8 kg (187 lb)   Height: 157.5 cm (62\")     Body mass index is 34.2 kg/m².    PHYSICAL EXAM:  Physical Exam   Constitutional: She is oriented to person, place, and time. She appears well-developed and well-nourished. No distress.   HENT:   Head: Normocephalic.   Eyes: Conjunctivae are normal.   Neck: Normal range of motion. No JVD present.   Cardiovascular: " Normal rate, regular rhythm, normal heart sounds and intact distal pulses.   No murmur heard.  Pulses:       Carotid pulses are 2+ on the right side, and 2+ on the left side.       Radial pulses are 2+ on the right side, and 2+ on the left side.        Posterior tibial pulses are 2+ on the right side, and 2+ on the left side.   Pulmonary/Chest: Effort normal. No respiratory distress. She has decreased breath sounds in the right lower field and the left lower field. She has wheezes in the right middle field. She has no rhonchi. She has no rales. She exhibits no tenderness.   Abdominal: Soft. Bowel sounds are normal. She exhibits no distension.   Musculoskeletal: Normal range of motion. She exhibits edema.   Neurological: She is alert and oriented to person, place, and time.   Skin: Skin is warm, dry and intact. No rash noted. She is not diaphoretic. No cyanosis.   Psychiatric: She has a normal mood and affect. Her behavior is normal. Judgment and thought content normal.         ECG 12 Lead  Date/Time: 12/19/2019 4:42 PM  Performed by: Jeanette Hall APRN  Authorized by: Jeanette Hall APRN   Comparison: compared with previous ECG   Similar to previous ECG  Rhythm: sinus rhythm  Ectopy: unifocal PVCs and trigeminy  Rate: normal    Clinical impression: abnormal EKG            Current Outpatient Medications   Medication Sig Dispense Refill   • acetaminophen (TYLENOL) 500 MG tablet Take 500 mg by mouth every 4 (four) hours as needed for mild pain (1-3) (1-2 TABLETS PRN Q 4-6 HOURS).     • allopurinol (ZYLOPRIM) 300 MG tablet TAKE 1 TABLET BY MOUTH  DAILY 90 tablet 0   • Apixaban (ELIQUIS PO) Take  by mouth.     • atorvastatin (LIPITOR) 40 MG tablet Take 40 mg by mouth Daily.     • buPROPion SR (WELLBUTRIN SR) 150 MG 12 hr tablet TAKE 1 TABLET BY MOUTH DAILY WITH BREAKFAST 30 tablet 2   • furosemide (LASIX) 20 MG tablet TAKE 1 TABLET BY MOUTH TWO  TIMES DAILY 180 tablet 0   • isosorbide mononitrate (IMDUR) 60 MG 24 hr  tablet TAKE 1 TABLET BY MOUTH  DAILY 90 tablet 0   • losartan (COZAAR) 25 MG tablet TAKE 1 TABLET BY MOUTH IN  THE MORNING AND 2 TABLETS  AT NIGHT 270 tablet 0   • metoprolol succinate XL (TOPROL-XL) 100 MG 24 hr tablet TAKE 1 TABLET BY MOUTH  DAILY 90 tablet 0   • mirtazapine (REMERON) 7.5 MG tablet Take 1 tablet by mouth Every Night. 30 tablet 3   • montelukast (SINGULAIR) 10 MG tablet TAKE 1 TABLET BY MOUTH EVERY NIGHT 90 tablet 0   • Multiple Vitamins-Minerals (MULTI FOR HER 50+) capsule Take 1 capsule by mouth daily.     • pantoprazole (PROTONIX) 40 MG EC tablet TAKE 1 TABLET BY MOUTH  DAILY 90 tablet 0   • potassium chloride (K-DUR) 10 MEQ CR tablet TAKE 1 TABLET BY MOUTH  EVERY MORNING 90 tablet 0     No current facility-administered medications for this visit.      Assessment:       Diagnosis Plan   1. Valvular endocarditis     2. Nonrheumatic mitral valve regurgitation     3. Acute ischemic stroke (CMS/HCC)     4. Essential hypertension     5. Paroxysmal atrial fibrillation (CMS/[facility])          Orders Placed This Encounter   Procedures   • ECG 12 Lead     This order was created via procedure documentation         Plan:   1.  79-year-old female with diastolic congestive heart failure and now with increased shortness of breath, lower extremity edema, orthopnea and wheezing.  We will double Lasix to 40 mg twice daily she will also double potassium.  She is to start following daily weights and cut back on salt intake.  We will call her on Monday to reevaluate her weights and give further recommendation on continued diuretic needs.  We will continue increased diuretic any longer will need follow-up BMP  2.  Paroxysmal atrial fibrillation in normal sinus rhythm today anticoagulated with Eliquis  3.  History of bacterial endocarditis with mild mitral and tricuspid regurgitation on echo August 2019.  She requires SBE with dental procedures and her dentist prescribes that  4.  Hypertension blood pressure adequately  controlled  5. GERD  6. History of falls   7. History of CVA 08/2019  8. Dementia  9. Suspected sleep apnea- her neurologist previously referred her to sleep medicine they are to keep that appt in feb/ march  It has been a pleasure to participate in this patient's care.      Thank you,  KWASI Corbin      **I used Dragon to dictate this note:**

## 2019-12-23 ENCOUNTER — TELEPHONE (OUTPATIENT)
Dept: CARDIOLOGY | Facility: CLINIC | Age: 79
End: 2019-12-23

## 2019-12-23 NOTE — TELEPHONE ENCOUNTER
Called M to sonHardeep to inquire on weights, breathing and swelling since increased diuretic. Await his returned call.

## 2019-12-23 NOTE — TELEPHONE ENCOUNTER
----- Message from KWASI Corbin sent at 12/19/2019  3:14 PM EST -----  [phone] carlos- son. Call to follow up on weights

## 2019-12-24 NOTE — TELEPHONE ENCOUNTER
Please have him give doubled dose today then Go back to 20 mg twice daily with one dose potassium daily starting tomorrow. Continue to weight daily, limit sodium/salt and call with 2-3 pound gain overnight or 5 pound gain in a week

## 2019-12-24 NOTE — TELEPHONE ENCOUNTER
I spoke with patient's son and relayed the instructions from Jeanette below.  He verbalized understanding of all instructions given. / LILLIAN

## 2019-12-24 NOTE — TELEPHONE ENCOUNTER
Patient's son (Hardeep) called to relay patient is down to 179 lbs.  The edema in her legs has improved.  Breathing easier.  Overall, she is doing much better.  He asks what he should do regarding her medications now.  / LILLIAN     Hardeep can be reached at [phone]

## 2020-01-03 DIAGNOSIS — F32.A DEPRESSION, UNSPECIFIED DEPRESSION TYPE: ICD-10-CM

## 2020-01-06 RX ORDER — BUPROPION HYDROCHLORIDE 150 MG/1
150 TABLET, EXTENDED RELEASE ORAL
Qty: 30 TABLET | Refills: 2 | Status: SHIPPED | OUTPATIENT
Start: 2020-01-06 | End: 2020-04-02

## 2020-01-13 RX ORDER — PANTOPRAZOLE SODIUM 40 MG/1
40 TABLET, DELAYED RELEASE ORAL DAILY
Qty: 90 TABLET | Refills: 0 | Status: SHIPPED | OUTPATIENT
Start: 2020-01-13 | End: 2020-04-06

## 2020-01-14 ENCOUNTER — OFFICE VISIT (OUTPATIENT)
Dept: FAMILY MEDICINE CLINIC | Facility: CLINIC | Age: 80
End: 2020-01-14

## 2020-01-14 VITALS
SYSTOLIC BLOOD PRESSURE: 132 MMHG | BODY MASS INDEX: 34.6 KG/M2 | WEIGHT: 188 LBS | HEIGHT: 62 IN | RESPIRATION RATE: 17 BRPM | OXYGEN SATURATION: 91 % | TEMPERATURE: 97.3 F | HEART RATE: 93 BPM | DIASTOLIC BLOOD PRESSURE: 68 MMHG

## 2020-01-14 DIAGNOSIS — M54.50 ACUTE LEFT-SIDED LOW BACK PAIN WITHOUT SCIATICA: ICD-10-CM

## 2020-01-14 DIAGNOSIS — R30.0 DYSURIA: Primary | ICD-10-CM

## 2020-01-14 DIAGNOSIS — R82.998 OTHER ABNORMAL FINDINGS IN URINE: ICD-10-CM

## 2020-01-14 LAB
BILIRUB BLD-MCNC: NEGATIVE MG/DL
CLARITY, POC: CLEAR
COLOR UR: YELLOW
GLUCOSE UR STRIP-MCNC: NEGATIVE MG/DL
KETONES UR QL: NEGATIVE
LEUKOCYTE EST, POC: NEGATIVE
NITRITE UR-MCNC: NEGATIVE MG/ML
PH UR: 6 [PH] (ref 5–8)
PROT UR STRIP-MCNC: NEGATIVE MG/DL
RBC # UR STRIP: NEGATIVE /UL
SP GR UR: 1.01 (ref 1–1.03)
UROBILINOGEN UR QL: NORMAL

## 2020-01-14 PROCEDURE — 81003 URINALYSIS AUTO W/O SCOPE: CPT | Performed by: PHYSICIAN ASSISTANT

## 2020-01-14 PROCEDURE — 99213 OFFICE O/P EST LOW 20 MIN: CPT | Performed by: PHYSICIAN ASSISTANT

## 2020-01-14 RX ORDER — BACLOFEN 10 MG/1
10 TABLET ORAL 2 TIMES DAILY PRN
Qty: 30 TABLET | Refills: 0 | Status: SHIPPED | OUTPATIENT
Start: 2020-01-14 | End: 2020-03-09

## 2020-01-14 NOTE — PROGRESS NOTES
Subjective   Miri Yung is a 79 y.o. female. Presents today to be evaluated for possible UTI- mild dysuria and low back pain.     History of Present Illness     Every now and then slight burning with urination. No other urinary symptoms. Having left sided back pain- left low back. No injury. Started before [place]. - no change in activity prior. Normal BM. No constipation, diarrhea, fever. No CP.  No other concerns or symptoms    The following portions of the patient's history were reviewed and updated as appropriate: allergies, current medications, past family history, past medical history, past social history, past surgical history and problem list.    Review of Systems   Constitutional: Negative.    HENT: Negative.    Respiratory: Negative.    Cardiovascular: Negative.    Gastrointestinal: Negative.    Genitourinary: Positive for dysuria.   Musculoskeletal: Positive for back pain.   Skin: Negative.    Psychiatric/Behavioral: Negative.        Objective    Vitals:    01/14/20 1246   BP: 132/68   Pulse: 93   Resp: 17   Temp: 97.3 °F (36.3 °C)   SpO2: 91%     Body mass index is 34.39 kg/m².    Physical Exam   Constitutional: She is oriented to person, place, and time. She appears well-developed and well-nourished.   HENT:   Head: Normocephalic and atraumatic.   Right Ear: External ear normal.   Left Ear: External ear normal.   Nose: Nose normal.   Eyes: Conjunctivae and lids are normal.   Neck: Neck supple. Carotid bruit is not present.   Cardiovascular: Normal rate, regular rhythm, normal heart sounds and intact distal pulses. Exam reveals no gallop and no friction rub.   No murmur heard.  Pulmonary/Chest: Effort normal and breath sounds normal. No respiratory distress. She has no wheezes. She has no rhonchi. She has no rales.   Abdominal: Soft. Normal appearance and bowel sounds are normal. She exhibits no shifting dullness, no distension, no abdominal bruit and no mass. There is no hepatosplenomegaly. There  is no tenderness. There is no rigidity, no rebound, no guarding and no CVA tenderness. No hernia.   Musculoskeletal: Normal range of motion. She exhibits no edema or deformity.        Lumbar back: She exhibits no tenderness, no bony tenderness, no swelling, no edema, no spasm and normal pulse.   Neurological: She is alert and oriented to person, place, and time. She has normal strength. Gait normal.   Reflex Scores:       Patellar reflexes are 2+ on the right side and 2+ on the left side.       Achilles reflexes are 2+ on the right side and 2+ on the left side.  Skin: Skin is warm and dry. She is not diaphoretic.   Psychiatric: She has a normal mood and affect. Her speech is normal and behavior is normal. Judgment and thought content normal. Cognition and memory are normal.   Nursing note and vitals reviewed.      Assessment/Plan   Miri was seen today for difficulty urinating and back pain.    Diagnoses and all orders for this visit:    Dysuria  -     POCT urinalysis dipstick, automated    Acute left-sided low back pain without sciatica  -     Urine Culture - Urine, Urine, Clean Catch  -     Basic Metabolic Panel  -     baclofen (LIORESAL) 10 MG tablet; Take 1 tablet by mouth 2 (Two) Times a Day As Needed for Muscle Spasms.  -     Ambulatory Referral to Physical Therapy    Other abnormal findings in urine   -     Urine Culture - Urine, Urine, Clean Catch      Patient Instructions   Assessment and plan  79-year-old female who presents today with only occasional, intermittent slight burning with urination.  She is also having left sided low back pain.  Pain is worse positionally and with certain movements.  She denies any other urinary symptoms, injury, change in activity, constipation, diarrhea, fever, chest pain, diaphoresis, or any other concerns or symptoms.  She reports this started before [place].  She has normal urinalysis today.  I will send out for culture just to ensure there is no occult UTI.  I also  discussed this seems to be more musculoskeletal.  I will give baclofen to use as needed and will refer to physical therapy.  Patient to return if no improvement, worsening, new or changing symptoms.

## 2020-01-15 LAB
BUN SERPL-MCNC: 17 MG/DL (ref 8–27)
BUN/CREAT SERPL: 28 (ref 12–28)
CALCIUM SERPL-MCNC: 8.9 MG/DL (ref 8.7–10.3)
CHLORIDE SERPL-SCNC: 104 MMOL/L (ref 96–106)
CO2 SERPL-SCNC: 26 MMOL/L (ref 20–29)
CREAT SERPL-MCNC: 0.61 MG/DL (ref 0.57–1)
GLUCOSE SERPL-MCNC: 182 MG/DL (ref 65–99)
POTASSIUM SERPL-SCNC: 4 MMOL/L (ref 3.5–5.2)
SODIUM SERPL-SCNC: 145 MMOL/L (ref 134–144)

## 2020-01-16 ENCOUNTER — TELEPHONE (OUTPATIENT)
Dept: FAMILY MEDICINE CLINIC | Facility: CLINIC | Age: 80
End: 2020-01-16

## 2020-01-16 LAB
BACTERIA UR CULT: NORMAL
BACTERIA UR CULT: NORMAL

## 2020-01-16 NOTE — TELEPHONE ENCOUNTER
Patients son called stating the patient informed him after her last office visit she was told to no longer take tylenol. He is wanting to know an alternate pain reliever that she could take. Just wanting to be cautious due to her age and contraindications with other medications. Please advise, thanks.

## 2020-01-16 NOTE — TELEPHONE ENCOUNTER
"Patient called stating she is unable to tolerated the baclofen that was recently prescribed. She stated it is causing weakness, fatigue, and she is \"hurting all over\". I advised her to stop the medication. Please advise, thanks.   "

## 2020-01-17 NOTE — TELEPHONE ENCOUNTER
Please see if she has seen started physical therapy.  See if she has had any change in her symptoms.  She should stop baclofen.

## 2020-01-17 NOTE — PATIENT INSTRUCTIONS
Assessment and plan  79-year-old female who presents today with only occasional, intermittent slight burning with urination.  She is also having left sided low back pain.  Pain is worse positionally and with certain movements.  She denies any other urinary symptoms, injury, change in activity, constipation, diarrhea, fever, chest pain, diaphoresis, or any other concerns or symptoms.  She reports this started before [place].  She has normal urinalysis today.  I will send out for culture just to ensure there is no occult UTI.  I also discussed this seems to be more musculoskeletal.  I will give baclofen to use as needed and will refer to physical therapy.  Patient to return if no improvement, worsening, new or changing symptoms.

## 2020-01-17 NOTE — TELEPHONE ENCOUNTER
Patients son stated she has not started PT as of yet and her symptoms are about the same, no changes.

## 2020-01-26 DIAGNOSIS — M54.50 ACUTE LEFT-SIDED LOW BACK PAIN WITHOUT SCIATICA: ICD-10-CM

## 2020-01-27 RX ORDER — BACLOFEN 10 MG/1
TABLET ORAL
Qty: 30 TABLET | Refills: 0 | OUTPATIENT
Start: 2020-01-27

## 2020-02-04 ENCOUNTER — OFFICE VISIT (OUTPATIENT)
Dept: SLEEP MEDICINE | Facility: HOSPITAL | Age: 80
End: 2020-02-04

## 2020-02-04 VITALS
HEART RATE: 79 BPM | OXYGEN SATURATION: 92 % | DIASTOLIC BLOOD PRESSURE: 78 MMHG | BODY MASS INDEX: 35.15 KG/M2 | SYSTOLIC BLOOD PRESSURE: 147 MMHG | HEIGHT: 62 IN | WEIGHT: 191 LBS

## 2020-02-04 DIAGNOSIS — G47.33 OBSTRUCTIVE SLEEP APNEA, ADULT: Primary | ICD-10-CM

## 2020-02-04 PROCEDURE — 99215 OFFICE O/P EST HI 40 MIN: CPT | Performed by: PSYCHIATRY & NEUROLOGY

## 2020-02-04 PROCEDURE — G0463 HOSPITAL OUTPT CLINIC VISIT: HCPCS

## 2020-02-04 RX ORDER — APIXABAN 5 MG/1
TABLET, FILM COATED ORAL
Qty: 60 TABLET | Refills: 2 | Status: SHIPPED | OUTPATIENT
Start: 2020-02-04 | End: 2020-03-05 | Stop reason: SDUPTHER

## 2020-02-04 NOTE — PROGRESS NOTES
Subjective   Miri Yung is a 79 y.o. female.     CC:  Can't sleep    HPI:  Ms. Yung is an 79-year-old female with a history of atrial fibrillation, CHF, hypertension, hyperlipidemia, endocarditis with valve disease, dementia, depression, and diabetes as well as stroke who presents to the sleep clinic today as a new patient to me for the evaluation of sleep apnea.  The patient was seen in the neurology clinic on December 4, 2019.  At that time the patient reported a poor quality of sleep.  She catnaps.  She only gets 2 hours asleep at a time.  She does use Remeron for insomnia.  Due to concerns for sleep apnea referral was placed.  I reviewed the patient's labs.  On January 14 her BMP was normal.  I reviewed the patient's imaging.  She had an MRI of her brain done in August of last year that showed age-appropriate atrophy, mild to moderate small vessel ischemic disease and a small acute infarct involving the operculum on the left.    Patient reports that she cannot sleep.  She does not go to bed at a certain time but wakes up at 6 AM.  She does not get more than 2 hours at a time.  She feels tired when she wakes up and nap several times throughout the day.  She does report excessive daytime sleepiness and fatigue during the day.  She has gained 10 pounds in the last 5 years.  There is loud snoring and all sleeping positions.  She does wake up gasping for breath.  She does report some pain that affects her sleep.  She has difficulty falling and staying asleep.  She feels like her sleep is restless.  ESS is a 7.    Goes to bed around 9 pm, gets up by 2 am.  Is sleep at 9 pm.  Falls asleep pretty easily.  Is waking up too early.  Can't fall back to sleep.  Will watch TV or do things around the house if she can't fall back to sleep.  Is tired during the day.  Thinks that she naps daily.  Not sure how many times per day.  Not sure for how long.  Doesn't feel better after she naps.  Not sure what is waking her up at  2.  Drinks 1 cup of coffee a day.  Drinks soda as well.  Not sure how much.  Not much exercise.  Just doesn't want to.  Doing PT 2 days per week.  No ETOH.  Watches TV before bed.  Not in the bedroom.  No RLS.  No RBD, sleep walking.      Apnea:  Gasping- no  Witnessed pauses- no  Night sweats- no  AM HA- no  Snoring- yes  FHx- no  Dry mouth- yes  Nocturia- 1/night    Describes mood as: grumpy  Describes memory as: bad    Prior PSG? no         The following portions of the patient's history were reviewed and updated as appropriate: allergies, current medications, past family history, past medical history, past social history, past surgical history and problem list.    Review of Systems   Constitutional: Negative.    HENT: Negative.    Respiratory: Positive for shortness of breath.    Cardiovascular: Positive for leg swelling.   Gastrointestinal: Negative.    Endocrine: Negative.    Genitourinary: Negative.    Musculoskeletal: Positive for arthralgias and joint swelling.   Skin: Negative.    Neurological: Negative.    Hematological: Negative.    Psychiatric/Behavioral: Positive for depressed mood. The patient is nervous/anxious.        Objective   Physical Exam  Constitutional:  Vital signs reviewed.  No apparent distress.  Well groomed.  Eyes:  No injection, no icterus.    Ears, Nose, Mouth, Throat:  Mallampati grade III, retrognathia, overjet, neck circumference is 40 cm  Respiratory:  Normal effort.  Clear to auscultation bilaterally.  Cardiovascular:  Regular rate and rhythm.  No murmurs.  No carotid bruits.   Musculoskeletal: Gait mildly unsteady.  Muscle tone and bulk normal.  Strength is 5/5 in the bilateral upper and lower extremities proximally and distally.  Skin:  No rashes.  Warm, dry, and intact.  Psychiatric:  Good mood.  Normal affect.  Neurologic:  The patient is alert and oriented. Attention/concentration is within normal limits.  Speech is fluent without dysarthria.  The patient is able to follow  complex commands without difficulty. Fund of knowledge normal.  Pupils equally round and reactive to light. Extraocular movements intact.  Facial sensation intact.  Smile symmetric. Palate elevates symmetrically.  SCM and trapezius are 5/5 bilaterally.  Tongue is midline.      Assessment/Plan   Problems Addressed this Visit     None      Visit Diagnoses     Obstructive sleep apnea, adult    -  Primary    Relevant Orders    Home Sleep Study        Ms. Yung is an 79-year-old female with history of atrial fibrillation, congestive heart failure, hypertension, hyperlipidemia, endocarditis, dementia, depression, diabetes, and stroke who presents today for sleep evaluation.    1.  Insomnia-I recommend for the patient to wean off caffeine, increase activity during the day, and avoid naps.  She may also benefit from a referral to Dr. Carrion for cognitive behavioral therapy for insomnia.    2.  Sleep disordered breathing-The patient is also high risk for sleep disordered breathing such as obstructive sleep apnea.  I recommend further evaluation with a home sleep study.  We discussed the diagnosis of sleep apnea as well as the consequences of untreated sleep apnea.

## 2020-02-04 NOTE — PATIENT INSTRUCTIONS
• At night, only use the bed and bedroom for sleep and sex only. Do not read, watch TV, eat, or worry in bed.   • Lie down only when you are sleepy.   • If you are unable to fall asleep, get up and go to another room. Avoid stimulating activities if you do get up.   • No clocks (if you tend to look at the clock throughout the night) or TV (or other electronic screens) in the bedroom.   • Avoid screens (TV, computer, tablet, cell phone) the hour prior to bedtime and during your sleep period.   • Establish a regular bedtime routine and a regular sleep/wake schedule. Keep this schedule 7 days a week.   • Do not eat or drink close to bedtime.   • Make sure your bedroom is dark, cool, and comfortable.   • If you need background noise, use a fan or a white noise machine.   • Avoid caffeine (regular coffee, decaf coffee, tea, sodas, chocolate, energy drinks).   • Avoid alcohol and nicotine close to bedtime.   • Exercise during the day, but not within 3 hours of bedtime.   • Avoid naps.   • Check if any of your night time medications may cause sleep problems.   • If you have heart burn or indigestion at night: avoid eating close to bedtime; elevated your head of bed; avoid spicy foods, tomatoes, citrus, alcohol, caffeine, and mint at night; take your antacid at night; sleep on your left side.   • If you have nasal stuffiness or congestion: consider taking an over the counter allergy medicine such as zyrtec, claritin, or allegra at night as well as a nasal spray such as Flonase or Nasacort.   • If you gasp for air at night, stop breathing in your sleep, have night sweats, snore, unrefreshing sleep, feel tired during the day, have morning headaches or dry mouth in the morning, you may be at risk for having problems with your breathing at night, likely sleep apnea. You may want to talk to your doctor about these symptoms.   • Consider trying melatonin at night. This can be purchased over the counter without a prescription.  I  recommend doses between 0.5-3 mg.  Try [employer], [employer], [employer] (on [employer]) or REM Fresh brands.    • Consider the Night [employer]  fermin or [employer]-I  for your smart device.

## 2020-02-10 RX ORDER — NIFEDIPINE 30 MG/1
TABLET, EXTENDED RELEASE ORAL
Qty: 90 TABLET | Refills: 0 | Status: SHIPPED | OUTPATIENT
Start: 2020-02-10 | End: 2020-03-05

## 2020-02-10 RX ORDER — FUROSEMIDE 20 MG/1
TABLET ORAL
Qty: 180 TABLET | Refills: 0 | Status: SHIPPED | OUTPATIENT
Start: 2020-02-10 | End: 2020-05-04

## 2020-02-10 RX ORDER — DULOXETIN HYDROCHLORIDE 60 MG/1
CAPSULE, DELAYED RELEASE ORAL
Qty: 90 CAPSULE | Refills: 0 | Status: SHIPPED | OUTPATIENT
Start: 2020-02-10 | End: 2020-03-05 | Stop reason: SINTOL

## 2020-02-21 ENCOUNTER — TELEPHONE (OUTPATIENT)
Dept: NEUROLOGY | Facility: CLINIC | Age: 80
End: 2020-02-21

## 2020-02-26 ENCOUNTER — APPOINTMENT (OUTPATIENT)
Dept: SLEEP MEDICINE | Facility: HOSPITAL | Age: 80
End: 2020-02-26

## 2020-02-28 ENCOUNTER — OFFICE VISIT (OUTPATIENT)
Dept: NEUROLOGY | Facility: CLINIC | Age: 80
End: 2020-02-28

## 2020-02-28 VITALS
OXYGEN SATURATION: 94 % | BODY MASS INDEX: 34.78 KG/M2 | HEART RATE: 73 BPM | SYSTOLIC BLOOD PRESSURE: 140 MMHG | WEIGHT: 189 LBS | HEIGHT: 62 IN | DIASTOLIC BLOOD PRESSURE: 72 MMHG

## 2020-02-28 DIAGNOSIS — G47.33 OBSTRUCTIVE SLEEP APNEA: Primary | ICD-10-CM

## 2020-02-28 DIAGNOSIS — G47.00 INSOMNIA, UNSPECIFIED TYPE: ICD-10-CM

## 2020-02-28 NOTE — PROGRESS NOTES
Subjective:     Patient ID: Miri Yung is a 79 y.o. female.    Today's appointment was made by mistake.  The patient was actually already evaluated for her sleep on February 4, 2020 in the sleep clinic.  She has not had her sleep study done yet.  She is going to reschedule in April.  Her sleep study is scheduled for March 18.  No charge for this visit.    The following portions of the patient's history were reviewed and updated as appropriate: allergies, current medications, past family history, past medical history, past social history, past surgical history and problem list.    Review of Systems   Constitutional: Negative for activity change, appetite change and fatigue.   HENT: Positive for trouble swallowing. Negative for ear pain and sore throat.    Eyes: Negative for photophobia, pain and redness.   Respiratory: Negative for cough, chest tightness and shortness of breath.    Cardiovascular: Positive for leg swelling. Negative for chest pain and palpitations.   Gastrointestinal: Negative for abdominal pain, nausea and vomiting.   Endocrine: Negative for cold intolerance, heat intolerance and polyphagia.   Musculoskeletal: Positive for back pain and gait problem. Negative for neck pain.   Skin: Negative for color change, pallor and rash.   Allergic/Immunologic: Negative for environmental allergies, food allergies and immunocompromised state.   Neurological: Negative for dizziness, tremors, seizures, syncope, facial asymmetry, speech difficulty, weakness, light-headedness, numbness and headaches.   Hematological: Negative for adenopathy. Bruises/bleeds easily.   Psychiatric/Behavioral: Positive for agitation and sleep disturbance. Negative for behavioral problems, confusion, decreased concentration, dysphoric mood, hallucinations, self-injury and suicidal ideas. The patient is nervous/anxious. The patient is not hyperactive.         Objective:    Neurologic Exam    Physical Exam    Assessment/Plan:        Problems Addressed this Visit        Other    Insomnia      Other Visit Diagnoses     Obstructive sleep apnea    -  Primary

## 2020-03-02 ENCOUNTER — TELEPHONE (OUTPATIENT)
Dept: FAMILY MEDICINE CLINIC | Facility: CLINIC | Age: 80
End: 2020-03-02

## 2020-03-02 NOTE — TELEPHONE ENCOUNTER
----- Message from Gabriela Diego sent at 3/2/2020 11:54 AM EST -----  Regarding: SALT WATER TO RINSE MOUTH OUT  Contact: [phone]  Patient son told her not to use salt water to wash her mouth out.  She wants to know if he is correct.

## 2020-03-02 NOTE — TELEPHONE ENCOUNTER
Dr. Brooks states it is okay for the patient to gargle salt water and spit it out. Patient son Hardeep is concerned that she will swallow it and states she is trying every way she can to consume salt.

## 2020-03-05 ENCOUNTER — OFFICE VISIT (OUTPATIENT)
Dept: NEUROLOGY | Facility: CLINIC | Age: 80
End: 2020-03-05

## 2020-03-05 VITALS
HEART RATE: 70 BPM | DIASTOLIC BLOOD PRESSURE: 76 MMHG | OXYGEN SATURATION: 94 % | BODY MASS INDEX: 34.23 KG/M2 | HEIGHT: 62 IN | WEIGHT: 186 LBS | SYSTOLIC BLOOD PRESSURE: 160 MMHG

## 2020-03-05 DIAGNOSIS — I63.9 ACUTE ISCHEMIC STROKE (HCC): Primary | ICD-10-CM

## 2020-03-05 DIAGNOSIS — E11.9 TYPE 2 DIABETES MELLITUS WITHOUT COMPLICATION, WITHOUT LONG-TERM CURRENT USE OF INSULIN (HCC): ICD-10-CM

## 2020-03-05 DIAGNOSIS — E78.00 PURE HYPERCHOLESTEROLEMIA: ICD-10-CM

## 2020-03-05 DIAGNOSIS — I48.0 PAROXYSMAL ATRIAL FIBRILLATION (HCC): ICD-10-CM

## 2020-03-05 DIAGNOSIS — Z91.81 RISK FOR FALLS: ICD-10-CM

## 2020-03-05 DIAGNOSIS — I10 ESSENTIAL HYPERTENSION: ICD-10-CM

## 2020-03-05 PROCEDURE — 99213 OFFICE O/P EST LOW 20 MIN: CPT | Performed by: NURSE PRACTITIONER

## 2020-03-05 NOTE — PROGRESS NOTES
"DOS: 3/5/2020  NAME: Miri Yung   : 1940  PCP: Albert Brooks MD    Chief Complaint   Patient presents with   • Stroke      Is accompanied by her son who assists w/ provided some portion of history.     Neurological Problem and Interval History:  79 y.o. right-handed female with a Hx of atrial fibrillation, hyperlipidemia, hypertension and Endocarditis with valve disease, dementia, depressive disorder and diabetes mellitus who I am seeing today in follow-up for left frontal infarct; etiology since been determined to be cardioembolic.    Ms. Yung was last seen in follow-up by me on 2019 for the same.  The time she denied any new signs and/or symptoms of stroke.  Since that time, she denies any new signs and symptoms of stroke.  Both she and her son state that they feel like neurologically she continues to improve daily.  She has increased stamina/energy over the last couple of months.  She denies any falls in the past 1 year; specifically no falls since last visit.  She continues to take Eliquis and atorvastatin; aspirin since discontinued.  Son also reports that Cymbalta was discontinued due to hallucinations.  She is on Wellbutrin and has been long-term along with Remeron.  She denies any suicidal or homicidal ideations.  She denies any worsening interchanging of her depression over the past 5 years.  She has planned sleep evaluation on .  Recommended that she complete repeat lipid panel/AST/ALT prior to this appointment which has not yet been done.  She has planned follow-up with her PCP next week and I have suggested she complete during that visit.  She had previously had dental surgery which required her to hold her Eliquis; this was completed 6 to 8 weeks ago without any issues.  He denies any other complaints and/or concerns on my exam minus still feeling a bit unsteady \"off balance\".  She is able to complete all ADLs without assistance.  Her son manages the finances.  " "Patient reports that she gave up driving since CVA because she did not feel \"safe\".  I will plan to see patient back in August 2020 and then annually thereafter.         Review of Systems:        Review of Systems   Constitutional: Negative for activity change, appetite change and unexpected weight change.   HENT: Negative for facial swelling, trouble swallowing and voice change.    Eyes: Negative for photophobia, pain and visual disturbance.   Respiratory: Negative for chest tightness, shortness of breath and wheezing.    Cardiovascular: Positive for leg swelling. Negative for chest pain and palpitations.   Gastrointestinal: Negative for abdominal pain, nausea and vomiting.   Endocrine: Negative for cold intolerance and heat intolerance.   Musculoskeletal: Positive for gait problem. Negative for arthralgias, back pain, joint swelling, myalgias, neck pain and neck stiffness.   Neurological: Negative for dizziness, tremors, seizures, syncope, facial asymmetry, speech difficulty, weakness, light-headedness, numbness and headaches.   Hematological: Bruises/bleeds easily.   Psychiatric/Behavioral: Positive for sleep disturbance. Negative for agitation, behavioral problems, confusion, decreased concentration, dysphoric mood, hallucinations, self-injury and suicidal ideas. The patient is not nervous/anxious and is not hyperactive.          Current Outpatient Medications:   •  acetaminophen (TYLENOL) 500 MG tablet, Take 500 mg by mouth every 4 (four) hours as needed for mild pain (1-3) (1-2 TABLETS PRN Q 4-6 HOURS)., Disp: , Rfl:   •  allopurinol (ZYLOPRIM) 300 MG tablet, TAKE 1 TABLET BY MOUTH  DAILY, Disp: 90 tablet, Rfl: 0  •  apixaban (ELIQUIS) 5 MG tablet tablet, Take 1 tablet by mouth Every 12 (Twelve) Hours., Disp: 60 tablet, Rfl: 2  •  atorvastatin (LIPITOR) 40 MG tablet, Take 40 mg by mouth Daily., Disp: , Rfl:   •  baclofen (LIORESAL) 10 MG tablet, Take 1 tablet by mouth 2 (Two) Times a Day As Needed for Muscle " "Spasms., Disp: 30 tablet, Rfl: 0  •  buPROPion SR (WELLBUTRIN SR) 150 MG 12 hr tablet, TAKE 1 TABLET BY MOUTH DAILY WITH BREAKFAST, Disp: 30 tablet, Rfl: 2  •  furosemide (LASIX) 20 MG tablet, TAKE 1 TABLET BY MOUTH TWO  TIMES DAILY, Disp: 180 tablet, Rfl: 0  •  isosorbide mononitrate (IMDUR) 60 MG 24 hr tablet, TAKE 1 TABLET BY MOUTH  DAILY, Disp: 90 tablet, Rfl: 0  •  losartan (COZAAR) 25 MG tablet, TAKE 1 TABLET BY MOUTH IN  THE MORNING AND 2 TABLETS  AT NIGHT, Disp: 270 tablet, Rfl: 0  •  metoprolol succinate XL (TOPROL-XL) 100 MG 24 hr tablet, TAKE 1 TABLET BY MOUTH  DAILY, Disp: 90 tablet, Rfl: 0  •  mirtazapine (REMERON) 7.5 MG tablet, Take 1 tablet by mouth Every Night., Disp: 30 tablet, Rfl: 3  •  montelukast (SINGULAIR) 10 MG tablet, TAKE 1 TABLET BY MOUTH EVERY NIGHT, Disp: 90 tablet, Rfl: 0  •  Multiple Vitamins-Minerals (MULTI FOR HER 50+) capsule, Take 1 capsule by mouth daily., Disp: , Rfl:   •  pantoprazole (PROTONIX) 40 MG EC tablet, TAKE 1 TABLET BY MOUTH  DAILY, Disp: 90 tablet, Rfl: 0  •  potassium chloride (K-DUR) 10 MEQ CR tablet, TAKE 1 TABLET BY MOUTH  EVERY MORNING, Disp: 90 tablet, Rfl: 0  •  VITAMIN D PO, Take  by mouth., Disp: , Rfl:     \"The following portions of the patient's history were reviewed and updated as appropriate: allergies, current medications, past family history, past medical history, past social history, past surgical history and problem list.\"  Review and Interpretation of Imaging:  No new imaging reviewed today  Laboratory Results:             Lab Results   Component Value Date    HGBA1C 6.9 12/09/2019         Lab Results   Component Value Date    CHOL 111 08/06/2019         Lab Results   Component Value Date    HDL 42 08/06/2019    HDL 36 (L) 01/14/2019    HDL 33 (L) 02/28/2018         Lab Results   Component Value Date    LDL 47 08/06/2019    LDL 70 01/14/2019    LDL 83 02/28/2018         Lab Results   Component Value Date    TRIG 108 08/06/2019    TRIG 138 01/14/2019 "    TRIG 179 (H) 02/28/2018     No results found for: RPR  Lab Results   Component Value Date    TSH 3.830 01/14/2019     Lab Results   Component Value Date    IMKWRCSA56 729 01/14/2019     Physical Examination: NIHSS: 0     mRS: 0  General Appearance:           Well developed, well nourished, well groomed, alert, and cooperative.  HEENT:            Normocephalic.   Neck and Spine:         Normal range of motion.  Normal alignment. No mass or tenderness. No bruits.  Cardiac:                                 Regular rate and rhythm. No murmurs.  Peripheral Vasculature:       Radial and pedal pulses are equal and symmetric.   Extremities:                            2+ pitting lower extremity edema noted no deformities. Normal joint ROM.  Skin:                                       No rashes or birth marks.     Neurological examination:  Higher Integrative  Function:         Oriented to time, place and person. Normal registration, recall, attention span and concentration. Normal language including comprehension, spontaneous speech, repetition, reading, writing, naming and vocabulary. No neglect with normal visual-spatial function and construction. Normal fund of knowledge and higher integrative function.  CN II:    Pupils are equal, round, and reactive to light. Normal visual acuity and visual fields.    CN III IV VI:      Extraocular movements are full without nystagmus.   CN V:   Normal facial sensation and strength of muscles of mastication.  CN VII:             Facial movements are symmetric. No weakness.  CN VIII:                                   Auditory acuity is normal.  CN IX & X:                              Symmetric palatal movement.  CN XI:  Sternocleidomastoid and trapezius are normal.  No weakness.  CN XII:                                    The tongue is midline.  No atrophy or fasciculations.  Motor:  Normal muscle strength, bulk and tone in upper and lower extremities.  No fasciculations, rigidity,  spasticity, or abnormal movements.  Reflexes:         Plantar responses are flexor.  Sensation:       Normal to light touch in arms and legs.  Station and Gait:        Normal gait and station.    Coordination:                        Finger to nose test shows no dysmetria.      Diagnoses / Discussion: This is a 79-year-old female with history of A. fib, hypertension, hyperlipidemia, endocarditis/valve disease, depression and diabetes mellitus who I saw today in follow-up for left frontal lobe infarction from August 2019 where she presented with complaint of dizziness and recurrent falls.  Etiology of her infarction has since been determined to be cardiac source, atrial fibrillation as her Holter showed A. fib therefore Eliquis 5 mg twice daily was initiated.  Since that time aspirin has been stopped and she continues to take high-dose statin.  May be able to taper statin in the near future (labs pending).  Other recommendations below.  Keep planned follow-up.  Call with any questions.    Plan:   Continue Eliquis and statin as written; samples of Eliquis provided   Keep planned sleep medicine follow-up   Will plan to complete MMSE/clock draw/animal naming testing at next exam   Recommend low weight compression stop to assist with lower extremity edema   Blood pressure control to <130/80   Goal LDL <70-recommend high dose statins-    Serum glucose < 140   Call 911 for stroke any stroke symptoms   Follow-up w/ me August 2020   Miri was seen today for stroke.    Diagnoses and all orders for this visit:    Acute ischemic stroke (CMS/HCC)    Paroxysmal atrial fibrillation (CMS/HCC)  -     apixaban (ELIQUIS) 5 MG tablet tablet; Take 1 tablet by mouth Every 12 (Twelve) Hours.    Essential hypertension    Pure hypercholesterolemia    Type 2 diabetes mellitus without complication, without long-term current use of insulin (CMS/HCC)    Risk for falls        MDM  Reviewed: previous chart, nursing note and vitals  Reviewed  previous: MRI and CT scan

## 2020-03-08 DIAGNOSIS — F51.01 PRIMARY INSOMNIA: ICD-10-CM

## 2020-03-09 ENCOUNTER — OFFICE VISIT (OUTPATIENT)
Dept: FAMILY MEDICINE CLINIC | Facility: CLINIC | Age: 80
End: 2020-03-09

## 2020-03-09 VITALS
HEART RATE: 73 BPM | RESPIRATION RATE: 17 BRPM | BODY MASS INDEX: 34.6 KG/M2 | SYSTOLIC BLOOD PRESSURE: 136 MMHG | OXYGEN SATURATION: 95 % | DIASTOLIC BLOOD PRESSURE: 62 MMHG | HEIGHT: 62 IN | WEIGHT: 188 LBS | TEMPERATURE: 98.8 F

## 2020-03-09 DIAGNOSIS — F51.01 PRIMARY INSOMNIA: ICD-10-CM

## 2020-03-09 DIAGNOSIS — E11.9 TYPE 2 DIABETES MELLITUS WITHOUT COMPLICATION, WITHOUT LONG-TERM CURRENT USE OF INSULIN (HCC): ICD-10-CM

## 2020-03-09 DIAGNOSIS — E78.00 PURE HYPERCHOLESTEROLEMIA: ICD-10-CM

## 2020-03-09 DIAGNOSIS — I10 ESSENTIAL HYPERTENSION: Primary | ICD-10-CM

## 2020-03-09 DIAGNOSIS — F32.A DEPRESSION, UNSPECIFIED DEPRESSION TYPE: ICD-10-CM

## 2020-03-09 PROCEDURE — 99214 OFFICE O/P EST MOD 30 MIN: CPT | Performed by: FAMILY MEDICINE

## 2020-03-09 RX ORDER — MIRTAZAPINE 7.5 MG/1
7.5 TABLET, FILM COATED ORAL NIGHTLY
Qty: 30 TABLET | Refills: 0 | Status: SHIPPED | OUTPATIENT
Start: 2020-03-09 | End: 2020-04-10

## 2020-03-09 NOTE — PROGRESS NOTES
Subjective   Miri Yung is a 79 y.o. female.  Presents for hypertension, hyperlipidemia, depression, type 2 diabetes mellitus, insomnia follow-up.    History of Present Illness     Diabetes mellitus-stable.  No new numbness, tingling.  Patient is taking no meds currently.  No side effects.  Last A1c was 6.9 on 12/9/2019.      Hypertension - stable.  Patient taking medication as prescribed.  Denies chest pain, shortness of breath, headache, lower extremity edema.  Patient is taking lasix, imdur, losartan, and metoprolol.    HLD-stable.  Patient taking atorvastatin 40mg as prescribed.  Trying to adhere to a low-fat diet.    Depression - stable.  Taking bupropion  mg.  Doing well with the medication.    Insomnia -stable.  Patient taking Remeron 7.5 mg at bedtime.  Getting plenty of sleep at night.  Still waiting to get sleep study completed.    The following portions of the patient's history were reviewed and updated as appropriate: allergies, current medications, past family history, past medical history, past social history, past surgical history and problem list.    Review of Systems   Constitutional: Negative for fatigue and fever.   Respiratory: Negative for shortness of breath and wheezing.    Cardiovascular: Negative for chest pain and palpitations.   Gastrointestinal: Negative for constipation and nausea.       Objective   Physical Exam   Constitutional: She is oriented to person, place, and time. She appears well-developed and well-nourished. No distress.   HENT:   Mouth/Throat: Oropharynx is clear and moist. No oropharyngeal exudate.   Eyes: Conjunctivae are normal. Right eye exhibits no discharge. Left eye exhibits no discharge.   Neck: Neck supple.   Cardiovascular: Normal rate, regular rhythm and normal heart sounds. Exam reveals no gallop and no friction rub.   No murmur heard.  Pulmonary/Chest: Effort normal and breath sounds normal. She has no wheezes. She has no rales.   Abdominal: Soft.  Bowel sounds are normal. She exhibits no distension. There is no tenderness.   Musculoskeletal: She exhibits no edema or deformity.   Lymphadenopathy:     She has no cervical adenopathy.   Neurological: She is alert and oriented to person, place, and time.   Skin: Skin is warm and dry. Capillary refill takes less than 2 seconds. No rash noted. She is not diaphoretic.   Psychiatric: She has a normal mood and affect. Her behavior is normal.   Nursing note and vitals reviewed.      Assessment/Plan   Miri was seen today for med management.    Diagnoses and all orders for this visit:    Essential hypertension  -     CBC (No Diff); Future  -     Comprehensive Metabolic Panel; Future    Type 2 diabetes mellitus without complication, without long-term current use of insulin (CMS/[facility])  -     Comprehensive Metabolic Panel; Future  -     Hemoglobin A1c; Future    Depression, unspecified depression type    Pure hypercholesterolemia  -     Lipid Panel; Future    Primary insomnia      All conditions appear stable.  Will get labs as above.  Plan on seeing back in about 6 months.

## 2020-03-09 NOTE — PATIENT INSTRUCTIONS

## 2020-03-10 ENCOUNTER — RESULTS ENCOUNTER (OUTPATIENT)
Dept: FAMILY MEDICINE CLINIC | Facility: CLINIC | Age: 80
End: 2020-03-10

## 2020-03-10 DIAGNOSIS — E78.00 PURE HYPERCHOLESTEROLEMIA: ICD-10-CM

## 2020-03-10 DIAGNOSIS — I10 ESSENTIAL HYPERTENSION: ICD-10-CM

## 2020-03-10 DIAGNOSIS — E11.9 TYPE 2 DIABETES MELLITUS WITHOUT COMPLICATION, WITHOUT LONG-TERM CURRENT USE OF INSULIN (HCC): ICD-10-CM

## 2020-03-11 LAB
ALBUMIN SERPL-MCNC: 4 G/DL (ref 3.5–5.2)
ALBUMIN/GLOB SERPL: 2.4 G/DL
ALP SERPL-CCNC: 121 U/L (ref 39–117)
ALT SERPL-CCNC: 30 U/L (ref 1–33)
AST SERPL-CCNC: 28 U/L (ref 1–32)
BILIRUB SERPL-MCNC: 0.6 MG/DL (ref 0.2–1.2)
BUN SERPL-MCNC: 16 MG/DL (ref 8–23)
BUN/CREAT SERPL: 26.2 (ref 7–25)
CALCIUM SERPL-MCNC: 8.9 MG/DL (ref 8.6–10.5)
CHLORIDE SERPL-SCNC: 100 MMOL/L (ref 98–107)
CHOLEST SERPL-MCNC: 131 MG/DL (ref 0–200)
CO2 SERPL-SCNC: 28.6 MMOL/L (ref 22–29)
CREAT SERPL-MCNC: 0.61 MG/DL (ref 0.57–1)
ERYTHROCYTE [DISTWIDTH] IN BLOOD BY AUTOMATED COUNT: 15.7 % (ref 12.3–15.4)
GLOBULIN SER CALC-MCNC: 1.7 GM/DL
GLUCOSE SERPL-MCNC: 169 MG/DL (ref 65–99)
HBA1C MFR BLD: 7.8 % (ref 4.8–5.6)
HCT VFR BLD AUTO: 36.6 % (ref 34–46.6)
HDLC SERPL-MCNC: 50 MG/DL (ref 40–60)
HGB BLD-MCNC: 11.3 G/DL (ref 12–15.9)
LDLC SERPL CALC-MCNC: 57 MG/DL (ref 0–100)
MCH RBC QN AUTO: 24.5 PG (ref 26.6–33)
MCHC RBC AUTO-ENTMCNC: 30.9 G/DL (ref 31.5–35.7)
MCV RBC AUTO: 79.2 FL (ref 79–97)
PLATELET # BLD AUTO: 158 10*3/MM3 (ref 140–450)
POTASSIUM SERPL-SCNC: 3.9 MMOL/L (ref 3.5–5.2)
PROT SERPL-MCNC: 5.7 G/DL (ref 6–8.5)
RBC # BLD AUTO: 4.62 10*6/MM3 (ref 3.77–5.28)
SODIUM SERPL-SCNC: 141 MMOL/L (ref 136–145)
TRIGL SERPL-MCNC: 118 MG/DL (ref 0–150)
VLDLC SERPL CALC-MCNC: 23.6 MG/DL
WBC # BLD AUTO: 7.94 10*3/MM3 (ref 3.4–10.8)

## 2020-03-12 RX ORDER — LOSARTAN POTASSIUM 25 MG/1
TABLET ORAL
Qty: 270 TABLET | Refills: 1 | Status: SHIPPED | OUTPATIENT
Start: 2020-03-12 | End: 2020-08-27

## 2020-03-12 RX ORDER — ISOSORBIDE MONONITRATE 60 MG/1
TABLET, EXTENDED RELEASE ORAL
Qty: 90 TABLET | Refills: 1 | Status: SHIPPED | OUTPATIENT
Start: 2020-03-12 | End: 2020-08-27

## 2020-03-12 RX ORDER — ALLOPURINOL 300 MG/1
TABLET ORAL
Qty: 90 TABLET | Refills: 1 | Status: SHIPPED | OUTPATIENT
Start: 2020-03-12 | End: 2020-08-27

## 2020-03-12 RX ORDER — METOPROLOL SUCCINATE 100 MG/1
TABLET, EXTENDED RELEASE ORAL
Qty: 90 TABLET | Refills: 1 | Status: SHIPPED | OUTPATIENT
Start: 2020-03-12 | End: 2020-08-27

## 2020-03-12 RX ORDER — POTASSIUM CHLORIDE 750 MG/1
TABLET, FILM COATED, EXTENDED RELEASE ORAL
Qty: 90 TABLET | Refills: 1 | Status: SHIPPED | OUTPATIENT
Start: 2020-03-12 | End: 2020-08-27

## 2020-03-18 ENCOUNTER — APPOINTMENT (OUTPATIENT)
Dept: SLEEP MEDICINE | Facility: HOSPITAL | Age: 80
End: 2020-03-18

## 2020-03-24 ENCOUNTER — TELEPHONE (OUTPATIENT)
Dept: FAMILY MEDICINE CLINIC | Facility: CLINIC | Age: 80
End: 2020-03-24

## 2020-03-24 NOTE — TELEPHONE ENCOUNTER
Agreed, and I would add she could wrap them with ace bandages if they continue to worsen.  Watch the salt intake.  If they continue to worsen, she should go to an urgent care to be seen.

## 2020-03-24 NOTE — TELEPHONE ENCOUNTER
Patient called stating she has a new onset of swelling in her feet. She said she has been walking around in her home more often as of lately so isn't sure if that is contributing. Denies chest pain/discomfort and SOA. She stated she feels fine. She isn't able to monitor blood pressure at home as of now. I advised patient to elevated legs and feet. Please advise, thanks.

## 2020-04-01 ENCOUNTER — TELEPHONE (OUTPATIENT)
Dept: FAMILY MEDICINE CLINIC | Facility: CLINIC | Age: 80
End: 2020-04-01

## 2020-04-01 NOTE — TELEPHONE ENCOUNTER
I would recommend she take Tylenol 500mg-1000mg no more than 3 times a day about 8 hours apart.  She is on too many medications with interactions for much else.  I don’t think her insurance would cover OTC Tylenol as a RX.

## 2020-04-01 NOTE — TELEPHONE ENCOUNTER
----- Message from Leana Gonzalez sent at 4/1/2020 10:26 AM EDT -----  Contact: [phone]  Patient called she is doing some home exercises and now her muscles are sore and she is in pain and would like for Dr Brooks to call in something for Pain to [facility] in [facility]

## 2020-04-02 DIAGNOSIS — F32.A DEPRESSION, UNSPECIFIED DEPRESSION TYPE: ICD-10-CM

## 2020-04-02 RX ORDER — BUPROPION HYDROCHLORIDE 150 MG/1
TABLET, EXTENDED RELEASE ORAL
Qty: 30 TABLET | Refills: 2 | Status: SHIPPED | OUTPATIENT
Start: 2020-04-02 | End: 2020-06-29

## 2020-04-06 RX ORDER — PANTOPRAZOLE SODIUM 40 MG/1
40 TABLET, DELAYED RELEASE ORAL DAILY
Qty: 90 TABLET | Refills: 0 | Status: SHIPPED | OUTPATIENT
Start: 2020-04-06 | End: 2020-06-15

## 2020-04-10 ENCOUNTER — OFFICE VISIT (OUTPATIENT)
Dept: FAMILY MEDICINE CLINIC | Facility: CLINIC | Age: 80
End: 2020-04-10

## 2020-04-10 DIAGNOSIS — F51.01 PRIMARY INSOMNIA: ICD-10-CM

## 2020-04-10 DIAGNOSIS — R52 CHRONIC GENERALIZED PAIN: Primary | ICD-10-CM

## 2020-04-10 DIAGNOSIS — G89.29 CHRONIC GENERALIZED PAIN: Primary | ICD-10-CM

## 2020-04-10 PROCEDURE — G2025 DIS SITE TELE SVCS RHC/FQHC: HCPCS | Performed by: FAMILY MEDICINE

## 2020-04-10 RX ORDER — MIRTAZAPINE 7.5 MG/1
7.5 TABLET, FILM COATED ORAL NIGHTLY
Qty: 30 TABLET | Refills: 2 | Status: SHIPPED | OUTPATIENT
Start: 2020-04-10 | End: 2020-06-29

## 2020-04-10 RX ORDER — HYDROCODONE BITARTRATE AND ACETAMINOPHEN 5; 325 MG/1; MG/1
1 TABLET ORAL EVERY 8 HOURS PRN
Qty: 12 TABLET | Refills: 0 | Status: SHIPPED | OUTPATIENT
Start: 2020-04-10 | End: 2020-05-05 | Stop reason: SDUPTHER

## 2020-04-10 NOTE — PATIENT INSTRUCTIONS
Chronic Pain, Adult  Chronic pain is a type of pain that lasts or keeps coming back (recurs) for at least six months. You may have chronic headaches, abdominal pain, or body pain. Chronic pain may be related to an illness, such as fibromyalgia or complex regional pain syndrome. Sometimes the cause of chronic pain is not known.  Chronic pain can make it hard for you to do daily activities. If not treated, chronic pain can lead to other health problems, including anxiety and depression. Treatment depends on the cause and severity of your pain. You may need to work with a pain specialist to come up with a treatment plan. The plan may include medicine, counseling, and physical therapy. Many people benefit from a combination of two or more types of treatment to control their pain.  Follow these instructions at home:  Lifestyle  · Consider keeping a pain diary to share with your health care providers.  · Consider talking with a mental health care provider (psychologist) about how to cope with chronic pain.  · Consider joining a chronic pain support group.  · Try to control or lower your stress levels. Talk to your health care provider about strategies to do this.  General instructions    · Take over-the-counter and prescription medicines only as told by your health care provider.  · Follow your treatment plan as told by your health care provider. This may include:  ? Gentle, regular exercise.  ? Eating a healthy diet that includes foods such as vegetables, fruits, fish, and lean meats.  ? Cognitive or behavioral therapy.  ? Working with a physical therapist.  ? Meditation or yoga.  ? Acupuncture or massage therapy.  ? Aroma, color, light, or sound therapy.  ? Local electrical stimulation.  ? Shots (injections) of numbing or pain-relieving medicines into the spine or the area of pain.  · Check your pain level as told by your health care provider. Ask your health care provider if you should use a pain scale.  · Learn as  much as you can about how to manage your chronic pain. Ask your health care provider if an intensive pain rehabilitation program or a chronic pain specialist would be helpful.  · Keep all follow-up visits as told by your health care provider. This is important.  Contact a health care provider if:  · Your pain gets worse.  · You have new pain.  · You have trouble sleeping.  · You have trouble doing your normal activities.  · Your pain is not controlled with treatment.  · Your have side effects from pain medicine.  · You feel weak.  Get help right away if:  · You lose feeling or have numbness in your body.  · You lose control of bowel or bladder function.  · Your pain suddenly gets much worse.  · You develop shaking or chills.  · You develop confusion.  · You develop chest pain.  · You have trouble breathing or shortness of breath.  · You pass out.  · You have thoughts about hurting yourself or others.  This information is not intended to replace advice given to you by your health care provider. Make sure you discuss any questions you have with your health care provider.  Document Released: 09/09/2003 Document Revised: 08/17/2017 Document Reviewed: 06/06/2017  [employer] Interactive Patient Education © 2020 [employer]

## 2020-04-10 NOTE — TELEPHONE ENCOUNTER
Yes, to consider anything stronger I would need video visit if at all possible.  I am considering a controlled substance I am tying to do a face to face.  If video is not possible, I may only be able to write a limited supply for now.

## 2020-04-10 NOTE — TELEPHONE ENCOUNTER
Patients son stated patient does not have Internet access and he works through the week until around 5PM so he isn't able to help her get set up with a video visit. Would you be willing to do a telephone visit?

## 2020-04-10 NOTE — TELEPHONE ENCOUNTER
I CAN do that.  I would just not be able to offer long term stronger medication until we do a video visit (kind of like a face to face).  I can do something on the short term with a phone call.

## 2020-04-10 NOTE — PROGRESS NOTES
Subjective   Miri Yung is a 79 y.o. female. Presents via telephone visit due to ongoing pain from chronic arthritis.    History of Present Illness     Chronic arthritis/pain -spoke with the patient and Hardeep, her son.  She has been dealing with this pain for quite some time and it is generalized in the joints but also sometimes in the muscle areas.  The signs had a long time ago  mention she may have fibromyalgia but that has never since materialized.  She took some hydrocodone for her dentures not long ago that seemed to help a lot with her pain as well.  She takes a 5 mg hydrocodone and breaks it in half and only takes a half of 1 when she is in pain.  The son tends to hang onto that pill and only give it to her if she is needing it.  He is only down to a few pills left.  He is very interested in getting her in after the pandemic to see what is going on as to why she is in pain.    The following portions of the patient's history were reviewed and updated as appropriate: allergies, current medications, past family history, past medical history, past social history, past surgical history and problem list.    Review of Systems   Musculoskeletal: Positive for arthralgias.       Objective   Physical Exam   N/A    Assessment/Plan   Diagnoses and all orders for this visit:    Chronic generalized pain  -     HYDROcodone-acetaminophen (NORCO) 5-325 MG per tablet; Take 1 tablet by mouth Every 8 (Eight) Hours As Needed for Moderate Pain .      Gave a temporary supply of hydrocodone for the chronic pain.  I mentioned that once the pandemic is over that we should have a face-to-face and try to figure out why she is having this pain but until then hopefully the hydrocodone will make her more comfortable.  I talked with the son extensively and I think he will be very good about hanging onto her medication and giving it to her as appropriate.  Follow-up in approximately 2 months.    I spent about 20 minutes in total on  the phone with the patient and the son.  And another 10 minutes completing the encounter along with research.

## 2020-04-10 NOTE — TELEPHONE ENCOUNTER
Patient called again today stating she has been taking the tylenol as directed and it isn't helping with muscle/joint pain. Are you able to prescribe anything? Does she need a telephone visit?

## 2020-04-13 ENCOUNTER — OFFICE VISIT (OUTPATIENT)
Dept: CARDIOLOGY | Facility: CLINIC | Age: 80
End: 2020-04-13

## 2020-04-13 VITALS
HEART RATE: 73 BPM | HEIGHT: 62 IN | BODY MASS INDEX: 34.04 KG/M2 | WEIGHT: 185 LBS | SYSTOLIC BLOOD PRESSURE: 156 MMHG | DIASTOLIC BLOOD PRESSURE: 61 MMHG

## 2020-04-13 DIAGNOSIS — I48.21 PERMANENT ATRIAL FIBRILLATION (HCC): Primary | ICD-10-CM

## 2020-04-13 DIAGNOSIS — I34.0 NONRHEUMATIC MITRAL VALVE REGURGITATION: ICD-10-CM

## 2020-04-13 DIAGNOSIS — I36.1 NONRHEUMATIC TRICUSPID VALVE REGURGITATION: ICD-10-CM

## 2020-04-13 DIAGNOSIS — I10 ESSENTIAL HYPERTENSION: ICD-10-CM

## 2020-04-13 DIAGNOSIS — I33.0 BACTERIAL ENDOCARDITIS, UNSPECIFIED CHRONICITY: ICD-10-CM

## 2020-04-13 PROCEDURE — 99442 PR PHYS/QHP TELEPHONE EVALUATION 11-20 MIN: CPT | Performed by: INTERNAL MEDICINE

## 2020-04-13 NOTE — PROGRESS NOTES
Date of Office Visit: 2020    Patient Name: Miri Yung  : 1940    Encounter Provider: Jignesh York MD  Referring Provider: No ref. provider found  Place of Service: [facility] CARDIOLOGY  Patient Care Team:  Albert Brooks MD as PCP - General (Family Medicine)  Albert Brooks MD as PCP - Claims Attributed    The patient agreed to telephone visit today.  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  It was felt at the time of her evaluation that she did not need valve replacement.  She has been managed on medical therapy.  She does have atrial fibrillation for which she is anticoagulated.    The big concern that the patient has is with her arthritis.  She does walk quite a bit and her knees generally hurt.    With respect to her cardiac status her blood pressure she states has remained fine.  She is not complaining of any chest pain or shortness of breath.  She does have some mild lower extremity edema.  She is trying to stay away from salt as much as she can.  She denies any fever or chills.  There is no orthopnea nor paroxysmal nocturnal dyspnea.  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; generally  asymptomatic   • Mitral regurgitation    • Mixed anxiety depressive disorder    • Nonrheumatic tricuspid (valve) insufficiency    • Osteoarthritis    • PAF (paroxysmal atrial fibrillation) (CMS/HCC)    • Sacroiliitis (CMS/HCC)    • Tricuspid regurgitation    • Type 2 diabetes mellitus (CMS/HCC)          Past Surgical History:   Procedure Laterality Date   • CATARACT EXTRACTION     • COLONOSCOPY N/A 3/10/2016    Procedure: COLONOSCOPY TO CECUM;  Surgeon: Shaan Becerra Jr., MD;  Location: [facility] ENDOSCOPY;  Service:    • ENDOSCOPY N/A 3/10/2016    Procedure: ESOPHAGOGASTRODUODENOSCOPY ;  Surgeon: Shaan Becerra Jr., MD;  Location: [facility] ENDOSCOPY;  Service:    • KNEE SURGERY     • TUBAL ABDOMINAL LIGATION             Current Outpatient Medications:   •  acetaminophen (TYLENOL) 500 MG tablet, Take 500 mg by mouth every 4 (four) hours as needed for mild pain (1-3) (1-2 TABLETS PRN Q 4-6 HOURS)., Disp: , Rfl:   •  allopurinol (ZYLOPRIM) 300 MG tablet, TAKE 1 TABLET BY MOUTH  DAILY, Disp: 90 tablet, Rfl: 1  •  apixaban (ELIQUIS) 5 MG tablet tablet, Take 1 tablet by mouth Every 12 (Twelve) Hours., Disp: 60 tablet, Rfl: 2  •  atorvastatin (LIPITOR) 40 MG tablet, Take 40 mg by mouth Daily., Disp: , Rfl:   •  buPROPion SR (WELLBUTRIN SR) 150 MG 12 hr tablet, TAKE 1 TABLET BY MOUTH EVERY DAY WITH BREAKFAST, Disp: 30 tablet, Rfl: 2  •  furosemide (LASIX) 20 MG tablet, TAKE 1 TABLET BY MOUTH TWO  TIMES DAILY, Disp: 180 tablet, Rfl: 0  •  HYDROcodone-acetaminophen (NORCO) 5-325 MG per tablet, Take 1 tablet by mouth Every 8 (Eight) Hours As Needed for Moderate Pain ., Disp: 12 tablet, Rfl: 0  •  isosorbide mononitrate (IMDUR) 60 MG 24 hr tablet, TAKE 1 TABLET BY MOUTH  DAILY, Disp: 90 tablet, Rfl: 1  •  losartan (COZAAR) 25 MG tablet, TAKE 1 TABLET BY MOUTH IN  THE MORNING AND 2 TABLETS  AT NIGHT, Disp: 270 tablet, Rfl: 1  •  metoprolol succinate XL (TOPROL-XL) 100 MG 24 hr tablet, TAKE 1 TABLET BY MOUTH  DAILY, Disp: 90 tablet,  "Rfl: 1  •  mirtazapine (REMERON) 7.5 MG tablet, TAKE 1 TABLET BY MOUTH EVERY NIGHT, Disp: 30 tablet, Rfl: 2  •  montelukast (SINGULAIR) 10 MG tablet, TAKE 1 TABLET BY MOUTH EVERY NIGHT, Disp: 90 tablet, Rfl: 0  •  Multiple Vitamins-Minerals (MULTI FOR HER 50+) capsule, Take 1 capsule by mouth daily., Disp: , Rfl:   •  pantoprazole (PROTONIX) 40 MG EC tablet, TAKE 1 TABLET BY MOUTH  DAILY, Disp: 90 tablet, Rfl: 0  •  potassium chloride (K-DUR) 10 MEQ CR tablet, TAKE 1 TABLET BY MOUTH  EVERY MORNING, Disp: 90 tablet, Rfl: 1  •  VITAMIN D PO, Take  by mouth., Disp: , Rfl:       Social History     Socioeconomic History   • Marital status:      Spouse name: Not on file   • Number of children: Not on file   • Years of education: Not on file   • Highest education level: Not on file   Tobacco Use   • Smoking status: Never Smoker   • Smokeless tobacco: Never Used   • Tobacco comment: caffeine use    Substance and Sexual Activity   • Alcohol use: No   • Drug use: No   • Sexual activity: Defer         Review of Systems   Constitution: Negative.   HENT: Negative.    Eyes: Negative.    Cardiovascular: Positive for leg swelling (Mild).   Respiratory: Negative.    Endocrine: Negative.    Skin: Negative.    Musculoskeletal: Positive for arthritis.   Gastrointestinal: Negative.    Neurological: Negative.    Psychiatric/Behavioral: Negative.        Procedures    Procedures        Objective:    /61   Pulse 73   Ht 157.5 cm (62\")   Wt 83.9 kg (185 lb)   BMI 33.84 kg/m²         Physical Exam    No physical exam done today because a telehealth visit  Assessment:       Diagnosis Plan   1. Permanent atrial fibrillation     2. Essential hypertension     3. Bacterial endocarditis, unspecified chronicity     4. Nonrheumatic mitral valve regurgitation     5. Nonrheumatic tricuspid valve regurgitation         1.  Atrial fibrillation: Anticoagulated with apixaban.  No bleeding issues  2.  Hypertension: Borderline control at this " time.  3.  Endocarditis: Asymptomatic  4.  Valvular heart disease: Tricuspid and mitral regurgitation.  Both mild  5.  Dyslipidemia: On statin therapy     Plan:   1.  Continue present medications  2.  Follow-up in the office in 6 months.  3.  Reviewed pandemic recommendations.    Total visit time including patient interaction: 15 minutes

## 2020-05-01 DIAGNOSIS — I48.0 PAROXYSMAL ATRIAL FIBRILLATION (HCC): ICD-10-CM

## 2020-05-01 RX ORDER — APIXABAN 5 MG/1
TABLET, FILM COATED ORAL
Qty: 60 TABLET | Refills: 2 | Status: SHIPPED | OUTPATIENT
Start: 2020-05-01 | End: 2020-08-25

## 2020-05-04 RX ORDER — FUROSEMIDE 20 MG/1
TABLET ORAL
Qty: 180 TABLET | Refills: 0 | Status: SHIPPED | OUTPATIENT
Start: 2020-05-04 | End: 2020-07-09 | Stop reason: SDUPTHER

## 2020-05-05 DIAGNOSIS — G89.29 CHRONIC GENERALIZED PAIN: ICD-10-CM

## 2020-05-05 DIAGNOSIS — R52 CHRONIC GENERALIZED PAIN: ICD-10-CM

## 2020-05-05 NOTE — TELEPHONE ENCOUNTER
Patient is requesting refill on HYDROcodone-acetaminophen (NORCO) 5-325 MG per tablet to be called in to [facility] #[id number] - [facility] [address] [address] [address] AT [place] [address] [address] [place] [address] - [phone] [facility] [phone]   [phone]

## 2020-05-05 NOTE — TELEPHONE ENCOUNTER
If she wants more of it, she will need an appointment to get that started.  She could do a telehealth as soon as today or she can do a telehealth tomorrow or see me on Thursday or Friday.     If she is not completely out, then I will wait until her appointment.  If she is completely out I will give her enough to get her to the appointment.

## 2020-05-06 NOTE — TELEPHONE ENCOUNTER
Patients son stated she does not have Internet access and would prefer to do a telephone visit. Medicare will cover the telephone visit. Are you okay with doing the telephone visit for the controlled substance on this patient or does she have to come in office?

## 2020-05-06 NOTE — TELEPHONE ENCOUNTER
Patients son is aware okay for telephone visit. He is not with patient. I attempted to contact patient at her number [phone], unable to reach.

## 2020-05-06 NOTE — TELEPHONE ENCOUNTER
That would be fine.  Video is optimal as it has a much better payout with Medicare but we can get by with a telephone this time.  Could she do 10am today?  Or I have lots of spots tomorrow.  You can tell her that I really do not need a lot of her time, just need to ask a few things.

## 2020-05-07 ENCOUNTER — OFFICE VISIT (OUTPATIENT)
Dept: FAMILY MEDICINE CLINIC | Facility: CLINIC | Age: 80
End: 2020-05-07

## 2020-05-07 DIAGNOSIS — G89.29 CHRONIC GENERALIZED PAIN: Primary | ICD-10-CM

## 2020-05-07 DIAGNOSIS — R52 CHRONIC GENERALIZED PAIN: Primary | ICD-10-CM

## 2020-05-07 DIAGNOSIS — M25.50 GENERALIZED JOINT PAIN: ICD-10-CM

## 2020-05-07 PROCEDURE — G2025 DIS SITE TELE SVCS RHC/FQHC: HCPCS | Performed by: FAMILY MEDICINE

## 2020-05-07 RX ORDER — HYDROCODONE BITARTRATE AND ACETAMINOPHEN 5; 325 MG/1; MG/1
TABLET ORAL
Qty: 15 TABLET | Refills: 0 | Status: SHIPPED | OUTPATIENT
Start: 2020-05-07 | End: 2020-06-01 | Stop reason: SDUPTHER

## 2020-05-07 NOTE — PROGRESS NOTES
Subjective   Miri Yung is a 79 y.o. female. Presents via telephone visit due to ongoing pain from chronic arthritis.    You have chosen to receive care through a telephone visit. Do you consent to use a telephone visit for your medical care today? Yes      History of Present Illness     Chronic arthritis/pain -patient says that the medication has been helping with her arthritic pain quite a bit.  She is taking half a pill at a time and usually about once a day.  I had originally given her 12 pills so she ran out a couple of days early which would make sense.  We settled on giving her another prescription for 15 tablets to take half a tablet once a day as needed.  She should follow-up with me in about 2 months.  Everything has been appropriate.        I spent about 8 minutes in total on the phone with the patient and 5 minutes completing the encounter along with research.

## 2020-05-07 NOTE — PATIENT INSTRUCTIONS
Arthritis  Arthritis means joint pain. It can also mean joint disease. A joint is a place where bones come together. There are more than 100 types of arthritis.  What are the causes?  This condition may be caused by:  · Wear and tear of a joint. This is the most common cause.  · A lot of acid in the blood, which leads to pain in the joint (gout).  · Pain and swelling (inflammation) in a joint.  · Infection of a joint.  · Injuries in the joint.  · A reaction to medicines (allergy).  In some cases, the cause may not be known.  What are the signs or symptoms?  Symptoms of this condition include:  · Redness at a joint.  · Swelling at a joint.  · Stiffness at a joint.  · Warmth coming from the joint.  · A fever.  · A feeling of being sick.  How is this treated?  This condition may be treated with:  · Treating the cause, if it is known.  · Rest.  · Raising (elevating) the joint.  · Putting cold or hot packs on the joint.  · Medicines to treat symptoms and reduce pain and swelling.  · Shots of medicines (cortisone) into the joint.  You may also be told to make changes in your life, such as doing exercises and losing weight.  Follow these instructions at home:  Medicines  · Take over-the-counter and prescription medicines only as told by your doctor.  · Do not take aspirin for pain if your doctor says that you may have gout.  Activity  · Rest your joint if your doctor tells you to.  · Avoid activities that make the pain worse.  · Exercise your joint regularly as told by your doctor. Try doing exercises like:  ? Swimming.  ? Water aerobics.  ? Biking.  ? Walking.  Managing pain, stiffness, and swelling         · If told, put ice on the affected area.  ? Put ice in a plastic bag.  ? Place a towel between your skin and the bag.  ? Leave the ice on for 20 minutes, 2-3 times per day.  · If your joint is swollen, raise (elevate) it above the level of your heart if told by your doctor.  · If your joint feels stiff in the morning,  try taking a warm shower.  · If told, put heat on the affected area. Do this as often as told by your doctor. Use the heat source that your doctor recommends, such as a moist heat pack or a heating pad. If you have diabetes, do not apply heat without asking your doctor. To apply heat:  ? Place a towel between your skin and the heat source.  ? Leave the heat on for 20-30 minutes.  ? Remove the heat if your skin turns bright red. This is very important if you are unable to feel pain, heat, or cold. You may have a greater risk of getting burned.  General instructions  · Do not use any products that contain nicotine or tobacco, such as cigarettes, e-cigarettes, and chewing tobacco. If you need help quitting, ask your doctor.  · Keep all follow-up visits as told by your doctor. This is important.  Contact a doctor if:  · The pain gets worse.  · You have a fever.  Get help right away if:  · You have very bad pain in your joint.  · You have swelling in your joint.  · Your joint is red.  · Many joints become painful and swollen.  · You have very bad back pain.  · Your leg is very weak.  · You cannot control your pee (urine) or poop (stool).  Summary  · Arthritis means joint pain. It can also mean joint disease. A joint is a place where bones come together.  · The most common cause of this condition is wear and tear of a joint.  · Symptoms of this condition include redness, swelling, or stiffness of the joint.  · This condition is treated with rest, raising the joint, medicines, and putting cold or hot packs on the joint.  · Follow your doctor's instructions about medicines, activity, exercises, and other home care treatments.  This information is not intended to replace advice given to you by your health care provider. Make sure you discuss any questions you have with your health care provider.  Document Released: 03/14/2011 Document Revised: 11/25/2019 Document Reviewed: 11/25/2019  [employer] Patient Education ©  2020 [employer]

## 2020-05-26 ENCOUNTER — TELEPHONE (OUTPATIENT)
Dept: FAMILY MEDICINE CLINIC | Facility: CLINIC | Age: 80
End: 2020-05-26

## 2020-05-26 DIAGNOSIS — R52 CHRONIC GENERALIZED PAIN: ICD-10-CM

## 2020-05-26 DIAGNOSIS — G89.29 CHRONIC GENERALIZED PAIN: ICD-10-CM

## 2020-06-01 ENCOUNTER — TELEPHONE (OUTPATIENT)
Dept: FAMILY MEDICINE CLINIC | Facility: CLINIC | Age: 80
End: 2020-06-01

## 2020-06-01 DIAGNOSIS — G89.29 CHRONIC GENERALIZED PAIN: ICD-10-CM

## 2020-06-01 DIAGNOSIS — R52 CHRONIC GENERALIZED PAIN: ICD-10-CM

## 2020-06-01 RX ORDER — HYDROCODONE BITARTRATE AND ACETAMINOPHEN 5; 325 MG/1; MG/1
TABLET ORAL
Qty: 15 TABLET | Refills: 0 | Status: SHIPPED | OUTPATIENT
Start: 2020-06-06 | End: 2020-06-29 | Stop reason: SDUPTHER

## 2020-06-01 NOTE — TELEPHONE ENCOUNTER
PATIENT CALLED IN TO REQUEST A REFILL OF HYDROcodone-acetaminophen (NORCO) 5-325 MG per tablet SENT TO [place] [address] . PATIENT PHONE NUMBER [phone].

## 2020-06-01 NOTE — TELEPHONE ENCOUNTER
I have placed the order but she is not due for a fill until 6/6/2020 so it will not be filled until then.

## 2020-06-15 RX ORDER — PANTOPRAZOLE SODIUM 40 MG/1
40 TABLET, DELAYED RELEASE ORAL DAILY
Qty: 90 TABLET | Refills: 0 | Status: SHIPPED | OUTPATIENT
Start: 2020-06-15 | End: 2020-08-27

## 2020-06-29 ENCOUNTER — TELEPHONE (OUTPATIENT)
Dept: FAMILY MEDICINE CLINIC | Facility: CLINIC | Age: 80
End: 2020-06-29

## 2020-06-29 DIAGNOSIS — G89.29 CHRONIC GENERALIZED PAIN: ICD-10-CM

## 2020-06-29 DIAGNOSIS — F32.A DEPRESSION, UNSPECIFIED DEPRESSION TYPE: ICD-10-CM

## 2020-06-29 DIAGNOSIS — F51.01 PRIMARY INSOMNIA: ICD-10-CM

## 2020-06-29 DIAGNOSIS — R52 CHRONIC GENERALIZED PAIN: ICD-10-CM

## 2020-06-29 RX ORDER — HYDROCODONE BITARTRATE AND ACETAMINOPHEN 5; 325 MG/1; MG/1
TABLET ORAL
Qty: 15 TABLET | Refills: 0 | Status: SHIPPED | OUTPATIENT
Start: 2020-06-29 | End: 2020-08-10 | Stop reason: SDUPTHER

## 2020-06-29 RX ORDER — BUPROPION HYDROCHLORIDE 150 MG/1
TABLET, EXTENDED RELEASE ORAL
Qty: 30 TABLET | Refills: 2 | Status: SHIPPED | OUTPATIENT
Start: 2020-06-29 | End: 2020-09-24

## 2020-06-29 RX ORDER — MIRTAZAPINE 7.5 MG/1
7.5 TABLET, FILM COATED ORAL NIGHTLY
Qty: 30 TABLET | Refills: 2 | Status: SHIPPED | OUTPATIENT
Start: 2020-06-29 | End: 2020-09-24

## 2020-06-29 NOTE — TELEPHONE ENCOUNTER
Patient called requesting refill on hydrocodone-acetaminophen 5-325 mg. qty 15.    tess done 5/7/2020.  Per office visit on 5/7/2020 patient to follow up in 7/2020.    Okay to refill?

## 2020-07-07 ENCOUNTER — TELEPHONE (OUTPATIENT)
Dept: INTERNAL MEDICINE | Facility: CLINIC | Age: 80
End: 2020-07-07

## 2020-07-07 NOTE — TELEPHONE ENCOUNTER
PATIENT WAS CALLING TO SEE IF THERE IS SOMETHING THAT CAN BE DONE FOR THE PAIN IN HER LEGS.     PATIENT IS WANTING SOME ADVICE OF ANY KIND OF EXERCISES SHE CAN DO TO HELP RELIEVE THE PAIN.     PATIENT STATED THE PAIN IS GETTING WORSE AND SHE HAS TO HOLD ONTO STUFF TO WALK SOMETIMES.    PATIENT CALL BACK NUMBER [phone]     PATIENT CONFIRMED [place] [id number]

## 2020-07-08 ENCOUNTER — TELEPHONE (OUTPATIENT)
Dept: INTERNAL MEDICINE | Facility: CLINIC | Age: 80
End: 2020-07-08

## 2020-07-08 NOTE — TELEPHONE ENCOUNTER
Pt does not wish to come in she has not been out of the house since covid has hit. I did inform her she is due to follow up. She said her pain level is an 8 she just wants you to call her and tell her how to proceed.

## 2020-07-08 NOTE — TELEPHONE ENCOUNTER
Patient returning call to Melody. Advised she wasn't available and wants someone to call her back.    Best call back [phone]

## 2020-07-08 NOTE — TELEPHONE ENCOUNTER
She really does not have many options.  I have some slots tomorrow that I could see her or she goes to the ER.  I cannot do anything for her over the phone/video phone.  Tell her this is like asking her  to fix your pipe leak without allowing him or her in the house.  She should be able to understand that analogy.

## 2020-07-09 ENCOUNTER — OFFICE VISIT (OUTPATIENT)
Dept: FAMILY MEDICINE CLINIC | Facility: CLINIC | Age: 80
End: 2020-07-09

## 2020-07-09 VITALS
SYSTOLIC BLOOD PRESSURE: 140 MMHG | RESPIRATION RATE: 17 BRPM | WEIGHT: 196.6 LBS | HEIGHT: 62 IN | TEMPERATURE: 98.3 F | OXYGEN SATURATION: 95 % | HEART RATE: 71 BPM | DIASTOLIC BLOOD PRESSURE: 50 MMHG | BODY MASS INDEX: 36.18 KG/M2

## 2020-07-09 DIAGNOSIS — I50.9 ACUTE ON CHRONIC CONGESTIVE HEART FAILURE, UNSPECIFIED HEART FAILURE TYPE (HCC): Primary | ICD-10-CM

## 2020-07-09 DIAGNOSIS — I50.9 ACUTE ON CHRONIC CONGESTIVE HEART FAILURE, UNSPECIFIED HEART FAILURE TYPE (HCC): ICD-10-CM

## 2020-07-09 PROCEDURE — 99214 OFFICE O/P EST MOD 30 MIN: CPT | Performed by: FAMILY MEDICINE

## 2020-07-09 RX ORDER — FUROSEMIDE 40 MG/1
40 TABLET ORAL 2 TIMES DAILY
Qty: 60 TABLET | Refills: 0 | Status: SHIPPED | OUTPATIENT
Start: 2020-07-09 | End: 2020-07-10

## 2020-07-09 NOTE — PATIENT INSTRUCTIONS
Heart Failure, Self Care  Heart failure is a serious condition. This sheet explains things you need to do to take care of yourself at home. To help you stay as healthy as possible, you may be asked to change your diet, take certain medicines, and make other changes in your life. Your doctor may also give you more specific instructions. If you have problems or questions, call your doctor.  What are the risks?  Having heart failure makes it more likely for you to have some problems. These problems can get worse if you do not take good care of yourself. Problems may include:  · Blood clotting problems. This may cause a stroke.  · Damage to the kidneys, liver, or lungs.  · Abnormal heart rhythms.  Supplies needed:  · Scale for weighing yourself.  · Blood pressure monitor.  · Notebook.  · Medicines.  How to care for yourself when you have heart failure  Medicines  Take over-the-counter and prescription medicines only as told by your doctor. Take your medicines every day.  · Do not stop taking your medicine unless your doctor tells you to do so.  · Do not skip any medicines.  · Get your prescriptions refilled before you run out of medicine. This is important.  Eating and drinking    · Eat heart-healthy foods. Talk with a diet specialist (dietitian) to create an eating plan.  · Choose foods that:  ? Have no trans fat.  ? Are low in saturated fat and cholesterol.  · Choose healthy foods, such as:  ? Fresh or frozen fruits and vegetables.  ? Fish.  ? Low-fat (lean) meats.  ? Legumes, such as beans, peas, and lentils.  ? Fat-free or low-fat dairy products.  ? Whole-grain foods.  ? High-fiber foods.  · Limit salt (sodium) if told by your doctor. Ask your diet specialist to tell you which seasonings are healthy for your heart.  · Cook in healthy ways instead of frying. Healthy ways of cooking include roasting, grilling, broiling, baking, poaching, steaming, and stir-frying.  · Limit how much fluid you drink, if told by your  doctor.  Alcohol use  · Do not drink alcohol if:  ? Your doctor tells you not to drink.  ? Your heart was damaged by alcohol, or you have very bad heart failure.  ? You are pregnant, may be pregnant, or are planning to become pregnant.  · If you drink alcohol:  ? Limit how much you use to:  § 0-1 drink a day for women.  § 0-2 drinks a day for men.  ? Be aware of how much alcohol is in your drink. In the [place] one drink equals one 12 oz bottle of beer (355 mL), one 5 oz glass of wine (148 mL), or one 1½ oz glass of hard liquor (44 mL).  Lifestyle    · Do not use any products that contain nicotine or tobacco, such as cigarettes, e-cigarettes, and chewing tobacco. If you need help quitting, ask your doctor.  ? Do not use nicotine gum or patches before talking to your doctor.  · Do not use illegal drugs.  · Lose weight if told by your doctor.  · Do physical activity if told by your doctor. Talk to your doctor before you begin an exercise if:  ? You are an older adult.  ? You have very bad heart failure.  · Learn to manage stress. If you need help, ask your doctor.  · Get rehab (rehabilitation) to help you stay independent and to help with your quality of life.  · Plan time to rest when you get tired.  Check weight and blood pressure    · Weigh yourself every day. This will help you to know if fluid is building up in your body.  ? Weigh yourself every morning after you pee (urinate) and before you eat breakfast.  ? Wear the same amount of clothing each time.  ? Write down your daily weight. Give your record to your doctor.  · Check and write down your blood pressure as told by your doctor.  · Check your pulse as told by your doctor.  Dealing with very hot and very cold weather  · If it is very hot:  ? Avoid activities that take a lot of energy.  ? Use air conditioning or fans, or find a cooler place.  ? Avoid caffeine and alcohol.  ? Wear clothing that is loose-fitting, lightweight, and light-colored.  · If it is very  cold:  ? Avoid activities that take a lot of energy.  ? Layer your clothes.  ? Wear mittens or gloves, a hat, and a scarf when you go outside.  ? Avoid alcohol.  Follow these instructions at home:  · Stay up to date with shots (vaccines). Get pneumococcal and flu (influenza) shots.  · Keep all follow-up visits as told by your doctor. This is important.  Contact a doctor if:  · You gain weight quickly.  · You have increasing shortness of breath.  · You cannot do your normal activities.  · You get tired easily.  · You cough a lot.  · You don't feel like eating or feel like you may vomit (nauseous).  · You become puffy (swell) in your hands, feet, ankles, or belly (abdomen).  · You cannot sleep well because it is hard to breathe.  · You feel like your heart is beating fast (palpitations).  · You get dizzy when you stand up.  Get help right away if:  · You have trouble breathing.  · You or someone else notices a change in your behavior, such as having trouble staying awake.  · You have chest pain or discomfort.  · You pass out (faint).  These symptoms may be an emergency. Do not wait to see if the symptoms will go away. Get medical help right away. Call your local emergency services (911 in the [place] Do not drive yourself to the hospital.  Summary  · Heart failure is a serious condition. To care for yourself, you may have to change your diet, take medicines, and make other lifestyle changes.  · Take your medicines every day. Do not stop taking them unless your doctor tells you to do so.  · Eat heart-healthy foods, such as fresh or frozen fruits and vegetables, fish, lean meats, legumes, fat-free or low-fat dairy products, and whole-grain or high-fiber foods.  · Ask your doctor if you can drink alcohol. You may have to stop alcohol use if you have very bad heart failure.  · Contact your doctor if you gain weight quickly or feel that your heart is beating too fast. Get help right away if you pass out, or have chest pain  or trouble breathing.  This information is not intended to replace advice given to you by your health care provider. Make sure you discuss any questions you have with your health care provider.  Document Released: 04/01/2020 Document Revised: 03/31/2020 Document Reviewed: 04/01/2020  Elsevier Patient Education © 2020 [employer] Inc.

## 2020-07-09 NOTE — PROGRESS NOTES
Subjective   Miri Yung is a 79 y.o. female.  Presents for bilateral lower extremity swelling and pain.    History of Present Illness     Congestive heart failure-worsening.  The son says that she was eating chili last night and she has been eating some things that are not good for her congestive heart failure.  She has been gaining quite a bit of weight lately.  She is gained about 10 pounds over the last month.  She is taking furosemide that is 20 mg twice a day.  Denies any chest pain or shortness of breath.  She says she has some pain in her lower extremities from the swelling.    The following portions of the patient's history were reviewed and updated as appropriate: allergies, current medications, past family history, past medical history, past social history, past surgical history and problem list.    Review of Systems   Constitutional: Positive for fatigue. Negative for fever.   Respiratory: Negative for shortness of breath and wheezing.    Cardiovascular: Positive for leg swelling. Negative for chest pain and palpitations.   Gastrointestinal: Negative for constipation and nausea.       Objective   Physical Exam   Constitutional: She appears well-developed and well-nourished. No distress.   Cardiovascular: Normal rate, regular rhythm and normal heart sounds. Exam reveals no gallop and no friction rub.   No murmur heard.  Pulmonary/Chest: Effort normal and breath sounds normal. She has no wheezes. She has no rales.   Musculoskeletal: She exhibits edema.   Skin: Skin is warm. Capillary refill takes less than 2 seconds. She is not diaphoretic.   Nursing note and vitals reviewed.      Assessment/Plan   Diagnoses and all orders for this visit:    Acute on chronic congestive heart failure, unspecified heart failure type (CMS/HCC)  -     furosemide (LASIX) 40 MG tablet; Take 1 tablet by mouth 2 (Two) Times a Day.      She is in acute on chronic congestive heart failure but I would say is that of mild stage.   Therefore I am going to increase her Lasix and have her son watch her weights over the next couple weeks until she follows back up with me.  If she increases more than 2 pounds per week and he needs to call me and let me know.

## 2020-07-10 RX ORDER — FUROSEMIDE 40 MG/1
TABLET ORAL
Qty: 180 TABLET | Refills: 1 | Status: SHIPPED | OUTPATIENT
Start: 2020-07-10 | End: 2020-07-14 | Stop reason: SDUPTHER

## 2020-07-14 DIAGNOSIS — I50.9 ACUTE ON CHRONIC CONGESTIVE HEART FAILURE, UNSPECIFIED HEART FAILURE TYPE (HCC): ICD-10-CM

## 2020-07-14 RX ORDER — FUROSEMIDE 40 MG/1
40 TABLET ORAL 2 TIMES DAILY
Qty: 180 TABLET | Refills: 0 | Status: SHIPPED | OUTPATIENT
Start: 2020-07-14 | End: 2020-12-25 | Stop reason: SDUPTHER

## 2020-07-24 ENCOUNTER — RESULTS ENCOUNTER (OUTPATIENT)
Dept: FAMILY MEDICINE CLINIC | Facility: CLINIC | Age: 80
End: 2020-07-24

## 2020-07-24 DIAGNOSIS — M1A.9XX0 CHRONIC GOUT WITHOUT TOPHUS, UNSPECIFIED CAUSE, UNSPECIFIED SITE: ICD-10-CM

## 2020-07-24 DIAGNOSIS — I50.9 ACUTE ON CHRONIC CONGESTIVE HEART FAILURE, UNSPECIFIED HEART FAILURE TYPE (HCC): ICD-10-CM

## 2020-07-24 DIAGNOSIS — E78.00 PURE HYPERCHOLESTEROLEMIA: ICD-10-CM

## 2020-07-24 DIAGNOSIS — Z79.899 HIGH RISK MEDICATION USE: ICD-10-CM

## 2020-07-27 ENCOUNTER — CLINICAL SUPPORT (OUTPATIENT)
Dept: FAMILY MEDICINE CLINIC | Facility: CLINIC | Age: 80
End: 2020-07-27

## 2020-07-27 VITALS
BODY MASS INDEX: 36.14 KG/M2 | HEIGHT: 62 IN | WEIGHT: 196.4 LBS | TEMPERATURE: 98.9 F | RESPIRATION RATE: 17 BRPM | SYSTOLIC BLOOD PRESSURE: 116 MMHG | DIASTOLIC BLOOD PRESSURE: 62 MMHG | OXYGEN SATURATION: 97 % | HEART RATE: 79 BPM

## 2020-07-27 LAB
ALBUMIN SERPL-MCNC: 4.5 G/DL (ref 3.5–5.2)
ALBUMIN/GLOB SERPL: 3 G/DL
ALP SERPL-CCNC: 95 U/L (ref 39–117)
ALT SERPL-CCNC: 35 U/L (ref 1–33)
AST SERPL-CCNC: 34 U/L (ref 1–32)
BILIRUB SERPL-MCNC: 0.5 MG/DL (ref 0–1.2)
BUN SERPL-MCNC: 15 MG/DL (ref 8–23)
BUN/CREAT SERPL: 22.7 (ref 7–25)
CALCIUM SERPL-MCNC: 9.2 MG/DL (ref 8.6–10.5)
CHLORIDE SERPL-SCNC: 102 MMOL/L (ref 98–107)
CHOLEST SERPL-MCNC: 142 MG/DL (ref 0–200)
CO2 SERPL-SCNC: 26.3 MMOL/L (ref 22–29)
CREAT SERPL-MCNC: 0.66 MG/DL (ref 0.57–1)
ERYTHROCYTE [DISTWIDTH] IN BLOOD BY AUTOMATED COUNT: 16.5 % (ref 12.3–15.4)
GLOBULIN SER CALC-MCNC: 1.5 GM/DL
GLUCOSE SERPL-MCNC: 182 MG/DL (ref 65–99)
HCT VFR BLD AUTO: 39.3 % (ref 34–46.6)
HDLC SERPL-MCNC: 47 MG/DL (ref 40–60)
HGB BLD-MCNC: 12 G/DL (ref 12–15.9)
LDLC SERPL CALC-MCNC: 66 MG/DL (ref 0–100)
MCH RBC QN AUTO: 25.5 PG (ref 26.6–33)
MCHC RBC AUTO-ENTMCNC: 30.5 G/DL (ref 31.5–35.7)
MCV RBC AUTO: 83.6 FL (ref 79–97)
PLATELET # BLD AUTO: 162 10*3/MM3 (ref 140–450)
POTASSIUM SERPL-SCNC: 3.5 MMOL/L (ref 3.5–5.2)
PROT SERPL-MCNC: 6 G/DL (ref 6–8.5)
RBC # BLD AUTO: 4.7 10*6/MM3 (ref 3.77–5.28)
SODIUM SERPL-SCNC: 142 MMOL/L (ref 136–145)
TRIGL SERPL-MCNC: 146 MG/DL (ref 0–150)
URATE SERPL-MCNC: 4.9 MG/DL (ref 2.4–5.7)
VLDLC SERPL CALC-MCNC: 29.2 MG/DL
WBC # BLD AUTO: 8.17 10*3/MM3 (ref 3.4–10.8)

## 2020-07-28 ENCOUNTER — RESULTS ENCOUNTER (OUTPATIENT)
Dept: INTERNAL MEDICINE | Facility: CLINIC | Age: 80
End: 2020-07-28

## 2020-07-28 DIAGNOSIS — E11.9 TYPE 2 DIABETES MELLITUS WITHOUT COMPLICATION, WITHOUT LONG-TERM CURRENT USE OF INSULIN (HCC): Primary | ICD-10-CM

## 2020-07-28 DIAGNOSIS — E11.9 TYPE 2 DIABETES MELLITUS WITHOUT COMPLICATION, WITHOUT LONG-TERM CURRENT USE OF INSULIN (HCC): ICD-10-CM

## 2020-07-28 LAB
HBA1C MFR BLD: 7.89 % (ref 4.8–5.6)
Lab: NORMAL
Lab: NORMAL
WRITTEN AUTHORIZATION: NORMAL

## 2020-08-01 DIAGNOSIS — J32.1 CHRONIC FRONTAL SINUSITIS: ICD-10-CM

## 2020-08-03 RX ORDER — MONTELUKAST SODIUM 10 MG/1
10 TABLET ORAL NIGHTLY
Qty: 30 TABLET | Refills: 5 | Status: SHIPPED | OUTPATIENT
Start: 2020-08-03 | End: 2021-02-09 | Stop reason: SDUPTHER

## 2020-08-10 ENCOUNTER — TELEPHONE (OUTPATIENT)
Dept: INTERNAL MEDICINE | Facility: CLINIC | Age: 80
End: 2020-08-10

## 2020-08-10 DIAGNOSIS — R52 CHRONIC GENERALIZED PAIN: ICD-10-CM

## 2020-08-10 DIAGNOSIS — G89.29 CHRONIC GENERALIZED PAIN: ICD-10-CM

## 2020-08-10 RX ORDER — HYDROCODONE BITARTRATE AND ACETAMINOPHEN 5; 325 MG/1; MG/1
TABLET ORAL
Qty: 15 TABLET | Refills: 0 | Status: SHIPPED | OUTPATIENT
Start: 2020-08-10 | End: 2020-09-09 | Stop reason: SDUPTHER

## 2020-08-10 RX ORDER — HYDROCODONE BITARTRATE AND ACETAMINOPHEN 5; 325 MG/1; MG/1
TABLET ORAL
Qty: 15 TABLET | Refills: 0 | Status: CANCELLED | OUTPATIENT
Start: 2020-08-10

## 2020-08-10 NOTE — TELEPHONE ENCOUNTER
PATIENT CALLED IN AND STATED SHE NEEDS A REFILL OF HYDROcodone-acetaminophen (NORCO) 5-325 MG per tablet       SENT TO [facility] #[id number] - [facility] [address] [facility] [address] [facility] [address] [place] [address] [address] [facility] [address] - [phone] [facility] [phone]   [phone]          PATIENT CALL BACK  [phone]

## 2020-08-20 ENCOUNTER — OFFICE VISIT (OUTPATIENT)
Dept: NEUROLOGY | Facility: CLINIC | Age: 80
End: 2020-08-20

## 2020-08-20 DIAGNOSIS — I63.9 ACUTE ISCHEMIC STROKE (HCC): Primary | ICD-10-CM

## 2020-08-20 DIAGNOSIS — E11.9 TYPE 2 DIABETES MELLITUS WITHOUT COMPLICATION, WITHOUT LONG-TERM CURRENT USE OF INSULIN (HCC): ICD-10-CM

## 2020-08-20 DIAGNOSIS — E66.01 MORBIDLY OBESE (HCC): ICD-10-CM

## 2020-08-20 DIAGNOSIS — I48.21 PERMANENT ATRIAL FIBRILLATION (HCC): ICD-10-CM

## 2020-08-20 DIAGNOSIS — I10 ESSENTIAL HYPERTENSION: ICD-10-CM

## 2020-08-20 DIAGNOSIS — E78.00 PURE HYPERCHOLESTEROLEMIA: ICD-10-CM

## 2020-08-20 DIAGNOSIS — Z99.89 USES WALKER: ICD-10-CM

## 2020-08-20 PROCEDURE — 99442 PR PHYS/QHP TELEPHONE EVALUATION 11-20 MIN: CPT | Performed by: NURSE PRACTITIONER

## 2020-08-20 NOTE — PROGRESS NOTES
"DOS: 2020  NAME: Miri Yung   : 1940  PCP: Albert Brooks MD    Chief Complaint   Patient presents with   • Stroke        Neurological Problem and Interval History:  79 y.o. right-handed female with a Hx of Atrial verbalization, hyperlipidemia, hypertension disease, dementia disorder, diabetes mellitus telephone visit today in follow-up for left frontal infarct (2019) with etiology determined to be cardioembolic sources patient to be in atrial fibrillation.      Follow-up by me on 2020 for the same.  At that time she denied any new signs and/or symptoms of stroke.  Today, she continues to deny any new signs and/or symptoms.  She reports that she is not sleeping well has poor balance CVA.  She denies any falls.  She reports frequent daytime sleepiness and has had prior BRIDGETTE appointment with pending BRIDGETTE evaluation encouraged her to complete this in the near future.  Today she reports she is on Eliquis but does not report that she is on atorvastatin she is unsure as to why this is been discontinued.  She states that her son takes care of all her medicines and if it is not in her medicine cabinet and she does not take it.  I have asked her to have her son give me a call to further evaluate.  Recent labs indicate mildly elevated AST and ALT reports generalized \"sore muscles\" which have been that way for forever according to her which may be the reason the medication was stopped/held.  She reports her blood pressure is \"good\" although she is unable to verbalize any numbers.  She lives alone and states [place] by myself and I have seen for people since quarantine starting.  She does state that she feels depressed but does not have any thoughts of suicidal/homicidal ideations.  She sees her son every day as he brings the dog over for his mother to \"dog sit\".  Offered home health services as patient could use some assistance with bathing and household ADLs which she is " declined at this time but reports that she will discuss further with her son and get back to me.  Denies any falls.  She uses a walker to ambulate.  She has had no hospitalizations since last evaluation per chart check.  She denies any new complaints or concerns at this time.  I will plan to see her back in 6 months time; at that time I would like to complete MMSE/animal naming/clock draw to further evaluate cognitive impairment.  Patient agrees with plan.      Review of Systems:        Review of Systems   Constitutional: Negative for activity change, appetite change and unexpected weight change.   HENT: Negative for facial swelling, trouble swallowing and voice change.    Eyes: Negative for photophobia, pain and visual disturbance.   Respiratory: Negative for chest tightness, shortness of breath and wheezing.    Cardiovascular: Negative for chest pain, palpitations and leg swelling.   Gastrointestinal: Negative for abdominal pain, nausea and vomiting.   Endocrine: Negative for cold intolerance and heat intolerance.   Musculoskeletal: Positive for gait problem. Negative for arthralgias, back pain, joint swelling, myalgias, neck pain and neck stiffness.   Neurological: Negative for dizziness, tremors, seizures, syncope, facial asymmetry, speech difficulty, weakness, light-headedness, numbness and headaches.   Hematological: Does not bruise/bleed easily.   Psychiatric/Behavioral: Negative for agitation, behavioral problems, confusion, decreased concentration, dysphoric mood, hallucinations, self-injury, sleep disturbance and suicidal ideas. The patient is not nervous/anxious and is not hyperactive.          Current Outpatient Medications:   •  acetaminophen (TYLENOL) 500 MG tablet, Take 500 mg by mouth every 4 (four) hours as needed for mild pain (1-3) (1-2 TABLETS PRN Q 4-6 HOURS)., Disp: , Rfl:   •  allopurinol (ZYLOPRIM) 300 MG tablet, TAKE 1 TABLET BY MOUTH  DAILY, Disp: 90 tablet, Rfl: 1  •  buPROPion SR  "(WELLBUTRIN SR) 150 MG 12 hr tablet, TAKE 1 TABLET BY MOUTH EVERY DAY WITH BREAKFAST, Disp: 30 tablet, Rfl: 2  •  ELIQUIS 5 MG tablet tablet, TAKE 1 TABLET BY MOUTH EVERY 12 HOURS, Disp: 60 tablet, Rfl: 2  •  furosemide (LASIX) 40 MG tablet, Take 1 tablet by mouth 2 (Two) Times a Day., Disp: 180 tablet, Rfl: 0  •  HYDROcodone-acetaminophen (NORCO) 5-325 MG per tablet, Take 1/2 tablet daily as needed for pain., Disp: 15 tablet, Rfl: 0  •  isosorbide mononitrate (IMDUR) 60 MG 24 hr tablet, TAKE 1 TABLET BY MOUTH  DAILY, Disp: 90 tablet, Rfl: 1  •  losartan (COZAAR) 25 MG tablet, TAKE 1 TABLET BY MOUTH IN  THE MORNING AND 2 TABLETS  AT NIGHT, Disp: 270 tablet, Rfl: 1  •  metoprolol succinate XL (TOPROL-XL) 100 MG 24 hr tablet, TAKE 1 TABLET BY MOUTH  DAILY, Disp: 90 tablet, Rfl: 1  •  mirtazapine (REMERON) 7.5 MG tablet, TAKE 1 TABLET BY MOUTH EVERY NIGHT, Disp: 30 tablet, Rfl: 2  •  montelukast (SINGULAIR) 10 MG tablet, TAKE 1 TABLET BY MOUTH EVERY NIGHT, Disp: 30 tablet, Rfl: 5  •  pantoprazole (PROTONIX) 40 MG EC tablet, TAKE 1 TABLET BY MOUTH  DAILY, Disp: 90 tablet, Rfl: 0  •  potassium chloride (K-DUR) 10 MEQ CR tablet, TAKE 1 TABLET BY MOUTH  EVERY MORNING, Disp: 90 tablet, Rfl: 1  •  atorvastatin (LIPITOR) 40 MG tablet, Take 40 mg by mouth Daily., Disp: , Rfl:     \"The following portions of the patient's history were reviewed and updated as appropriate: allergies, current medications, past family history, past medical history, past social history, past surgical history and problem list.\"  Review and Interpretation of Imaging:  No new imaging reviewed   laboratory Results:             Lab Results   Component Value Date    HGBA1C 7.89 (H) 07/27/2020         Lab Results   Component Value Date    CHOL 111 08/06/2019         Lab Results   Component Value Date    HDL 47 07/27/2020    HDL 50 03/10/2020    HDL 42 08/06/2019         Lab Results   Component Value Date    LDL 66 07/27/2020    LDL 57 03/10/2020    LDL 47 " 08/06/2019         Lab Results   Component Value Date    TRIG 146 07/27/2020    TRIG 118 03/10/2020    TRIG 108 08/06/2019     No results found for: RPR  Lab Results   Component Value Date    TSH 3.830 01/14/2019     Lab Results   Component Value Date    [id number] 729 01/14/2019     Exam limited by telephone visit    Last BMI 35.9  Neurological: Patient  is alert and oriented to person, place, and time.   Psychiatric: Patient has a normal mood and affect. Patients behavior is normal. Judgment and thought content normal.        Impression:  1.  Left frontal lobe infarct (August 2019); knowledge of stroke likely cardioembolic source as patient found to be in new onset A. fib since started on Eliquis  2.  A. Fib  3.  Diabetes mellitus  4.  Essential hypertension  5.  Hypercholesterolemia  6.  High risk for falls; uses walker        Plan:   Consider home health services; to assist with bathing and other ADLs-lives alone and does not drive   Recommend continue Eliquis as written   Atorvastatin has been discontinued; assuming that was due to most recent elevated AST/ALT-recommend consideration of Repatha and/or repeating AST/ALT to further evaluate resuming at l ower dose   Complete sleep study as previously recommended   Plan to complete MMSE/cholesterol/animal naming test at next in-person visit   Blood pressure control to <130/80   Goal LDL <70-recommend high dose statins-    Serum glucose < 140   Call 911 for stroke any stroke symptoms   Follow-up 6 months     I performed this clinical encounter by utilizing a real-time telehealth telephone/video connection between my location and the patient's location at home.     This patient has consented to a telehealth visit via get2play. The visit was scheduled as a telephone visit to comply with patient safety concerns in accordance with CDC recommendations. I spent 17 minutes in total including but not limited to the 15 minutes spent in direct conversation with this patient.      Miri was seen today for stroke.    Diagnoses and all orders for this visit:    Acute ischemic stroke (CMS/HCC)    Morbidly obese (CMS/HCC)    Pure hypercholesterolemia    Essential hypertension    Permanent atrial fibrillation (CMS/HCC)    Uses walker    Type 2 diabetes mellitus without complication, without long-term current use of insulin (CMS/HCC)

## 2020-08-24 ENCOUNTER — TELEPHONE (OUTPATIENT)
Dept: NEUROLOGY | Facility: CLINIC | Age: 80
End: 2020-08-24

## 2020-08-24 DIAGNOSIS — I48.0 PAROXYSMAL ATRIAL FIBRILLATION (HCC): ICD-10-CM

## 2020-08-24 NOTE — TELEPHONE ENCOUNTER
Received a call from Hardeep Yung son of patient who stated that his mother had a telephone appt the other day and Beckie Cheatham had requested for Hardeep to call and and answer some medication question according to his mother.     Apparently there is some confusion and he wants to make sure it is sorted out?     Hardeep Yung  [phone]      Thank you

## 2020-08-25 RX ORDER — APIXABAN 5 MG/1
TABLET, FILM COATED ORAL
Qty: 60 TABLET | Refills: 2 | Status: SHIPPED | OUTPATIENT
Start: 2020-08-25 | End: 2020-11-24

## 2020-08-27 RX ORDER — POTASSIUM CHLORIDE 750 MG/1
TABLET, FILM COATED, EXTENDED RELEASE ORAL
Qty: 90 TABLET | Refills: 3 | Status: SHIPPED | OUTPATIENT
Start: 2020-08-27 | End: 2021-01-01

## 2020-08-27 RX ORDER — ISOSORBIDE MONONITRATE 60 MG/1
TABLET, EXTENDED RELEASE ORAL
Qty: 90 TABLET | Refills: 3 | Status: SHIPPED | OUTPATIENT
Start: 2020-08-27 | End: 2021-01-01

## 2020-08-27 RX ORDER — ALLOPURINOL 300 MG/1
TABLET ORAL
Qty: 90 TABLET | Refills: 3 | Status: SHIPPED | OUTPATIENT
Start: 2020-08-27 | End: 2021-01-01

## 2020-08-27 RX ORDER — PANTOPRAZOLE SODIUM 40 MG/1
40 TABLET, DELAYED RELEASE ORAL DAILY
Qty: 90 TABLET | Refills: 3 | Status: SHIPPED | OUTPATIENT
Start: 2020-08-27 | End: 2021-01-01

## 2020-08-27 RX ORDER — METOPROLOL SUCCINATE 100 MG/1
TABLET, EXTENDED RELEASE ORAL
Qty: 90 TABLET | Refills: 3 | Status: SHIPPED | OUTPATIENT
Start: 2020-08-27 | End: 2021-06-19 | Stop reason: HOSPADM

## 2020-08-27 RX ORDER — LOSARTAN POTASSIUM 25 MG/1
TABLET ORAL
Qty: 270 TABLET | Refills: 3 | Status: SHIPPED | OUTPATIENT
Start: 2020-08-27 | End: 2021-01-01

## 2020-09-09 DIAGNOSIS — R52 CHRONIC GENERALIZED PAIN: ICD-10-CM

## 2020-09-09 DIAGNOSIS — G89.29 CHRONIC GENERALIZED PAIN: ICD-10-CM

## 2020-09-09 RX ORDER — HYDROCODONE BITARTRATE AND ACETAMINOPHEN 5; 325 MG/1; MG/1
TABLET ORAL
Qty: 15 TABLET | Refills: 0 | Status: SHIPPED | OUTPATIENT
Start: 2020-09-09 | End: 2020-10-26 | Stop reason: SDUPTHER

## 2020-09-09 NOTE — TELEPHONE ENCOUNTER
Please have her make a follow-up for 1 month from now.  We need to talk about her weaning some on this medicine anyway.   .

## 2020-09-09 NOTE — TELEPHONE ENCOUNTER
LAST RX 8-10-20   LAST O/V 7-23-20  NO FOLLOW UP SCHEDULED  UDS WAS ORDERED AT LAST VISIT BUT NOT DONE  CONTRACT UP TO DATE   DONNIE SCANNED.

## 2020-09-09 NOTE — TELEPHONE ENCOUNTER
Caller: Miri Yung    Relationship: Self    Best call back number: [phone]    Medication needed:   Requested Prescriptions     Pending Prescriptions Disp Refills   • HYDROcodone-acetaminophen (NORCO) 5-325 MG per tablet 15 tablet 0     Sig: Take 1/2 tablet daily as needed for pain.       When do you need the refill by:ASAP    What details did the patient provide when requesting the medication: OUT    Does the patient have less than a 3 day supply:  [x] Yes  [] No    What is the patient's preferred pharmacy: [id number] - [facility] [address] [facility] [address] OF [place] [address] & [place] [address] - [phone]  - [phone] FX

## 2020-09-15 ENCOUNTER — CLINICAL SUPPORT (OUTPATIENT)
Dept: FAMILY MEDICINE CLINIC | Facility: CLINIC | Age: 80
End: 2020-09-15

## 2020-09-15 DIAGNOSIS — Z23 NEED FOR INFLUENZA VACCINATION: Primary | ICD-10-CM

## 2020-09-15 PROCEDURE — 90694 VACC AIIV4 NO PRSRV 0.5ML IM: CPT | Performed by: PHYSICIAN ASSISTANT

## 2020-09-15 PROCEDURE — G0008 ADMIN INFLUENZA VIRUS VAC: HCPCS | Performed by: PHYSICIAN ASSISTANT

## 2020-09-23 DIAGNOSIS — F51.01 PRIMARY INSOMNIA: ICD-10-CM

## 2020-09-23 DIAGNOSIS — F32.A DEPRESSION, UNSPECIFIED DEPRESSION TYPE: ICD-10-CM

## 2020-09-24 RX ORDER — MIRTAZAPINE 7.5 MG/1
7.5 TABLET, FILM COATED ORAL NIGHTLY
Qty: 30 TABLET | Refills: 2 | Status: SHIPPED | OUTPATIENT
Start: 2020-09-24 | End: 2020-12-25 | Stop reason: SDUPTHER

## 2020-09-24 RX ORDER — BUPROPION HYDROCHLORIDE 150 MG/1
TABLET, EXTENDED RELEASE ORAL
Qty: 30 TABLET | Refills: 2 | Status: SHIPPED | OUTPATIENT
Start: 2020-09-24 | End: 2020-12-25 | Stop reason: SDUPTHER

## 2020-10-15 ENCOUNTER — OFFICE VISIT (OUTPATIENT)
Dept: FAMILY MEDICINE CLINIC | Facility: CLINIC | Age: 80
End: 2020-10-15

## 2020-10-15 VITALS
OXYGEN SATURATION: 95 % | TEMPERATURE: 97.2 F | HEIGHT: 62 IN | RESPIRATION RATE: 17 BRPM | DIASTOLIC BLOOD PRESSURE: 60 MMHG | WEIGHT: 197 LBS | SYSTOLIC BLOOD PRESSURE: 140 MMHG | BODY MASS INDEX: 36.25 KG/M2 | HEART RATE: 78 BPM

## 2020-10-15 DIAGNOSIS — E11.9 TYPE 2 DIABETES MELLITUS WITHOUT COMPLICATION, WITHOUT LONG-TERM CURRENT USE OF INSULIN (HCC): Primary | ICD-10-CM

## 2020-10-15 DIAGNOSIS — F51.01 PRIMARY INSOMNIA: ICD-10-CM

## 2020-10-15 DIAGNOSIS — I50.32 CHRONIC DIASTOLIC (CONGESTIVE) HEART FAILURE (HCC): ICD-10-CM

## 2020-10-15 PROCEDURE — 99214 OFFICE O/P EST MOD 30 MIN: CPT | Performed by: FAMILY MEDICINE

## 2020-10-15 RX ORDER — MIRTAZAPINE 15 MG/1
15 TABLET, FILM COATED ORAL NIGHTLY
Qty: 30 TABLET | Refills: 3 | Status: SHIPPED | OUTPATIENT
Start: 2020-10-15 | End: 2021-02-18 | Stop reason: SDUPTHER

## 2020-10-15 NOTE — PROGRESS NOTES
Subjective   Miri Yung is a 79 y.o. female. Presents for CHF, diabetes, insomnia follow-up     History of Present Illness     CHF - stable.  She believes it is doing a little better.  Only mild swelling in the ankles.  Denies any chest pain, or shortness of breath.  Taking  Lasix as prescribed.      Diabetes mellitus-stable.  No new numbness, tingling.  Patient is taking nothing.   A1c due.      Insomnia - sleeping is okay but anxiety is up a little.  Taking the remeron.   ASking if it could be increased.    The following portions of the patient's history were reviewed and updated as appropriate: allergies, current medications, past family history, past medical history, past social history, past surgical history and problem list.    Review of Systems   Constitutional: Negative for fatigue and fever.   Respiratory: Negative for shortness of breath and wheezing.    Cardiovascular: Negative for chest pain and palpitations.   Gastrointestinal: Negative for constipation and nausea.   Musculoskeletal: Positive for arthralgias.       Objective   Physical Exam  Vitals signs and nursing note reviewed.   Constitutional:       General: She is not in acute distress.     Appearance: She is well-developed.   HENT:      Mouth/Throat:      Pharynx: No oropharyngeal exudate.   Eyes:      General:         Right eye: No discharge.         Left eye: No discharge.      Conjunctiva/sclera: Conjunctivae normal.   Neck:      Musculoskeletal: Normal range of motion and neck supple.   Cardiovascular:      Rate and Rhythm: Normal rate and regular rhythm.      Heart sounds: Normal heart sounds. No murmur. No friction rub. No gallop.    Pulmonary:      Effort: Pulmonary effort is normal.      Breath sounds: Normal breath sounds. No wheezing or rales.   Abdominal:      General: Bowel sounds are normal. There is no distension.      Palpations: Abdomen is soft.      Tenderness: There is no abdominal tenderness.   Musculoskeletal:          General: No deformity.      Right lower leg: Edema present.      Left lower leg: Edema present.   Lymphadenopathy:      Cervical: No cervical adenopathy.   Skin:     General: Skin is warm and dry.      Findings: No rash.   Neurological:      Mental Status: She is alert and oriented to person, place, and time.   Psychiatric:         Behavior: Behavior normal.           Assessment/Plan   Diagnoses and all orders for this visit:    1. Type 2 diabetes mellitus without complication, without long-term current use of insulin (CMS/[facility]) (Primary)  -     Hemoglobin A1c    2. Primary insomnia  -     mirtazapine (REMERON) 15 MG tablet; Take 1 tablet by mouth Every Night.  Dispense: 30 tablet; Refill: 3    3. Chronic diastolic (congestive) heart failure (CMS/[facility])      Update A1c today.  Increased Remeron for sleep and anxiety.  Continue current heart failure meds.

## 2020-10-16 LAB — HBA1C MFR BLD: 8.2 % (ref 4.8–5.6)

## 2020-10-16 NOTE — PATIENT INSTRUCTIONS

## 2020-10-20 DIAGNOSIS — E11.9 TYPE 2 DIABETES MELLITUS WITHOUT COMPLICATION, WITHOUT LONG-TERM CURRENT USE OF INSULIN (HCC): Primary | ICD-10-CM

## 2020-10-26 DIAGNOSIS — R52 CHRONIC GENERALIZED PAIN: ICD-10-CM

## 2020-10-26 DIAGNOSIS — G89.29 CHRONIC GENERALIZED PAIN: ICD-10-CM

## 2020-10-26 RX ORDER — HYDROCODONE BITARTRATE AND ACETAMINOPHEN 5; 325 MG/1; MG/1
TABLET ORAL
Qty: 15 TABLET | Refills: 0 | Status: SHIPPED | OUTPATIENT
Start: 2020-10-26 | End: 2020-11-19 | Stop reason: SDUPTHER

## 2020-10-26 NOTE — TELEPHONE ENCOUNTER
Caller: Miri Yung    Relationship: Self    Best call back number: [phone]    Medication needed:   Requested Prescriptions     Pending Prescriptions Disp Refills   • HYDROcodone-acetaminophen (NORCO) 5-325 MG per tablet 15 tablet 0     Sig: Take 1/2 tablet daily as needed for pain.     What is the patient's preferred pharmacy: [facility] #[id number] - [facility] [address] [address] AT [facility] [address] [place] [address] & [place] [address] - [phone] [facility] [phone] FX

## 2020-11-11 ENCOUNTER — TELEPHONE (OUTPATIENT)
Dept: INTERNAL MEDICINE | Facility: CLINIC | Age: 80
End: 2020-11-11

## 2020-11-16 ENCOUNTER — OFFICE VISIT (OUTPATIENT)
Dept: FAMILY MEDICINE CLINIC | Facility: CLINIC | Age: 80
End: 2020-11-16

## 2020-11-16 VITALS
RESPIRATION RATE: 17 BRPM | TEMPERATURE: 97.7 F | OXYGEN SATURATION: 94 % | HEIGHT: 62 IN | DIASTOLIC BLOOD PRESSURE: 70 MMHG | BODY MASS INDEX: 37.8 KG/M2 | WEIGHT: 205.4 LBS | SYSTOLIC BLOOD PRESSURE: 160 MMHG | HEART RATE: 91 BPM

## 2020-11-16 DIAGNOSIS — R60.9 EDEMA, UNSPECIFIED TYPE: ICD-10-CM

## 2020-11-16 DIAGNOSIS — I50.32 CHRONIC DIASTOLIC (CONGESTIVE) HEART FAILURE (HCC): ICD-10-CM

## 2020-11-16 DIAGNOSIS — I10 ESSENTIAL HYPERTENSION: ICD-10-CM

## 2020-11-16 DIAGNOSIS — M17.10 ARTHROPATHY OF KNEE: Primary | ICD-10-CM

## 2020-11-16 DIAGNOSIS — I48.91 ATRIAL FIBRILLATION, UNSPECIFIED TYPE (HCC): ICD-10-CM

## 2020-11-16 PROCEDURE — 99214 OFFICE O/P EST MOD 30 MIN: CPT | Performed by: NURSE PRACTITIONER

## 2020-11-16 NOTE — PROGRESS NOTES
Subjective   Miri Yung is a 80 y.o. female.     Chief Complaint   Patient presents with   • Establish Care   • Med Management     Chronic generalized pain    • Edema     feet and legs        Vitals:    11/16/20 1257   BP: 160/70   Pulse: 91   Resp: 17   Temp: 97.7 °F (36.5 °C)   SpO2: 94%        This is a 80-year-old female patient being seen today due to chronic pain and hypertension.  Patient is currently taking Norco 5/320 1/2 tab daily for pain.  She reports medication is not easing her pain.  She tried physical therapy 1 time and was a unable to walk the next day.  She reports her pain is chronic.  He describes it as an ache all the time.  She states it is at a 7 on the pain scale.  She also has hypertension along with A. fib.  Blood pressure today is elevated.  She does have a family member with her today he states her edema has increased.  She is currently on Lasix 40 mg twice a day.  She is being followed by Dr. Zhu in cardiology.        The following portions of the patient's history were reviewed and updated as appropriate: allergies, current medications, past family history, past medical history, past social history, past surgical history, and problem list.    Review of Systems   Constitutional: Positive for unexpected weight gain. Negative for activity change, appetite change, fatigue and fever.   HENT: Negative for congestion.    Respiratory: Negative for apnea, cough, chest tightness and shortness of breath.    Cardiovascular: Positive for leg swelling. Negative for chest pain.   Gastrointestinal: Negative for abdominal pain.   Musculoskeletal: Positive for arthralgias and back pain. Negative for joint swelling.   Neurological: Negative for dizziness.       Objective   Physical Exam  Constitutional:       General: She is not in acute distress.     Appearance: Normal appearance.   HENT:      Head: Normocephalic.   Neck:      Musculoskeletal: Normal range of motion and neck supple.    Cardiovascular:      Rate and Rhythm: Normal rate and regular rhythm.      Pulses: Normal pulses.      Heart sounds: Normal heart sounds.   Pulmonary:      Effort: Pulmonary effort is normal.      Breath sounds: Normal breath sounds. No rhonchi.   Abdominal:      General: Bowel sounds are normal. There is no distension.      Palpations: Abdomen is soft.   Musculoskeletal:         General: No swelling or tenderness.      Right lower leg: Edema present.      Left lower leg: Edema present.   Skin:     General: Skin is warm and dry.   Neurological:      Mental Status: She is alert.   Psychiatric:         Mood and Affect: Mood normal.         Behavior: Behavior normal.           Assessment/Plan   Problems Addressed this Visit        Cardiovascular and Mediastinum    HTN (hypertension)    Relevant Orders    Basic metabolic panel    CBC w AUTO Differential    Atrial fibrillation (CMS/HCC)    Chronic diastolic (congestive) heart failure (CMS/HCC)    Relevant Orders    proBNP       Musculoskeletal and Integument    Arthropathy of knee - Primary    Relevant Orders    Ambulatory Referral to Physical Therapy       Other    Edema    Relevant Orders    proBNP      Diagnoses       Codes Comments    Arthropathy of knee    -  Primary ICD-10-CM: M17.10  ICD-9-CM: 716.96     Essential hypertension     ICD-10-CM: I10  ICD-9-CM: 401.9     Edema, unspecified type     ICD-10-CM: R60.9  ICD-9-CM: 782.3     Chronic diastolic (congestive) heart failure (CMS/HCC)     ICD-10-CM: I50.32  ICD-9-CM: 428.32, 428.0     Atrial fibrillation, unspecified type (CMS/HCC)     ICD-10-CM: I48.91  ICD-9-CM: 427.31           I have discussed her pain and we have agreed to try physical therapy again.  She does have significant lower extremity edema.  I will check a BMP and proBNP today.  I have instructed her to take a double Lasix in the a.m. for the next 2 days with an extra potassium pill.  I have also instructed her to follow-up with her cardiologist  soon

## 2020-11-17 ENCOUNTER — TELEPHONE (OUTPATIENT)
Dept: FAMILY MEDICINE CLINIC | Facility: CLINIC | Age: 80
End: 2020-11-17

## 2020-11-17 LAB
BASOPHILS # BLD AUTO: 0.1 X10E3/UL (ref 0–0.2)
BASOPHILS NFR BLD AUTO: 1 %
BUN SERPL-MCNC: 19 MG/DL (ref 8–27)
BUN/CREAT SERPL: 28 (ref 12–28)
CALCIUM SERPL-MCNC: 8.9 MG/DL (ref 8.7–10.3)
CHLORIDE SERPL-SCNC: 101 MMOL/L (ref 96–106)
CO2 SERPL-SCNC: 24 MMOL/L (ref 20–29)
CREAT SERPL-MCNC: 0.68 MG/DL (ref 0.57–1)
EOSINOPHIL # BLD AUTO: 0.1 X10E3/UL (ref 0–0.4)
EOSINOPHIL NFR BLD AUTO: 1 %
ERYTHROCYTE [DISTWIDTH] IN BLOOD BY AUTOMATED COUNT: 15.3 % (ref 11.7–15.4)
GLUCOSE SERPL-MCNC: 403 MG/DL (ref 65–99)
HCT VFR BLD AUTO: 32.9 % (ref 34–46.6)
HGB BLD-MCNC: 9.9 G/DL (ref 11.1–15.9)
IMM GRANULOCYTES # BLD AUTO: 0 X10E3/UL (ref 0–0.1)
IMM GRANULOCYTES NFR BLD AUTO: 1 %
LYMPHOCYTES # BLD AUTO: 1.4 X10E3/UL (ref 0.7–3.1)
LYMPHOCYTES NFR BLD AUTO: 20 %
MCH RBC QN AUTO: 25.3 PG (ref 26.6–33)
MCHC RBC AUTO-ENTMCNC: 30.1 G/DL (ref 31.5–35.7)
MCV RBC AUTO: 84 FL (ref 79–97)
MONOCYTES # BLD AUTO: 0.4 X10E3/UL (ref 0.1–0.9)
MONOCYTES NFR BLD AUTO: 6 %
NEUTROPHILS # BLD AUTO: 4.9 X10E3/UL (ref 1.4–7)
NEUTROPHILS NFR BLD AUTO: 71 %
NT-PROBNP SERPL-MCNC: 542 PG/ML (ref 0–738)
PLATELET # BLD AUTO: 140 X10E3/UL (ref 150–450)
POTASSIUM SERPL-SCNC: 4.2 MMOL/L (ref 3.5–5.2)
RBC # BLD AUTO: 3.91 X10E6/UL (ref 3.77–5.28)
SODIUM SERPL-SCNC: 139 MMOL/L (ref 134–144)
WBC # BLD AUTO: 6.9 X10E3/UL (ref 3.4–10.8)

## 2020-11-17 NOTE — TELEPHONE ENCOUNTER
PATIENT IS CALLING TO CHECK ON HER APPOINTMENT WITH DR. MARRERO THE HEART DOCTOR    PATIENT IS WANTING TO KNOW IF SHE NEEDS TO MAKE THE APPOINTMENT OR IF SOMEONE IN THE OFFICE WILL MAKE THE APPOINTMENT     PLEASE CONTACT PATIENT @[phone]

## 2020-11-19 DIAGNOSIS — R52 CHRONIC GENERALIZED PAIN: ICD-10-CM

## 2020-11-19 DIAGNOSIS — G89.29 CHRONIC GENERALIZED PAIN: ICD-10-CM

## 2020-11-19 RX ORDER — HYDROCODONE BITARTRATE AND ACETAMINOPHEN 5; 325 MG/1; MG/1
TABLET ORAL
Qty: 15 TABLET | Refills: 0 | Status: SHIPPED | OUTPATIENT
Start: 2020-11-19 | End: 2020-12-04 | Stop reason: ALTCHOICE

## 2020-11-20 ENCOUNTER — TELEPHONE (OUTPATIENT)
Dept: FAMILY MEDICINE CLINIC | Facility: CLINIC | Age: 80
End: 2020-11-20

## 2020-11-20 NOTE — TELEPHONE ENCOUNTER
Called patient and told her I would fax order to [facility] and have them call her for the appointments

## 2020-11-20 NOTE — TELEPHONE ENCOUNTER
PATIENT WOULD LIKE HER PHYSICAL THERAPY TO BE DONE AT [facility]. [facility] .    PLEASE ADVISE  [phone]

## 2020-11-21 DIAGNOSIS — I48.0 PAROXYSMAL ATRIAL FIBRILLATION (HCC): ICD-10-CM

## 2020-11-24 DIAGNOSIS — R60.9 EDEMA, UNSPECIFIED TYPE: ICD-10-CM

## 2020-11-24 DIAGNOSIS — I10 ESSENTIAL HYPERTENSION: ICD-10-CM

## 2020-11-24 DIAGNOSIS — I48.91 ATRIAL FIBRILLATION, UNSPECIFIED TYPE (HCC): ICD-10-CM

## 2020-11-24 RX ORDER — APIXABAN 5 MG/1
TABLET, FILM COATED ORAL
Qty: 60 TABLET | Refills: 2 | Status: SHIPPED | OUTPATIENT
Start: 2020-11-24 | End: 2021-02-18

## 2020-11-25 ENCOUNTER — OFFICE VISIT (OUTPATIENT)
Dept: CARDIOLOGY | Facility: CLINIC | Age: 80
End: 2020-11-25

## 2020-11-25 VITALS
SYSTOLIC BLOOD PRESSURE: 133 MMHG | HEART RATE: 89 BPM | BODY MASS INDEX: 37.17 KG/M2 | DIASTOLIC BLOOD PRESSURE: 52 MMHG | HEIGHT: 62 IN | WEIGHT: 202 LBS

## 2020-11-25 DIAGNOSIS — I33.0 BACTERIAL ENDOCARDITIS, UNSPECIFIED CHRONICITY: ICD-10-CM

## 2020-11-25 DIAGNOSIS — I36.1 NONRHEUMATIC TRICUSPID VALVE REGURGITATION: ICD-10-CM

## 2020-11-25 DIAGNOSIS — I50.32 CHRONIC DIASTOLIC (CONGESTIVE) HEART FAILURE (HCC): ICD-10-CM

## 2020-11-25 DIAGNOSIS — I48.0 PAROXYSMAL ATRIAL FIBRILLATION (HCC): Primary | ICD-10-CM

## 2020-11-25 DIAGNOSIS — I34.0 NONRHEUMATIC MITRAL VALVE REGURGITATION: ICD-10-CM

## 2020-11-25 PROCEDURE — 99442 PR PHYS/QHP TELEPHONE EVALUATION 11-20 MIN: CPT | Performed by: INTERNAL MEDICINE

## 2020-11-25 NOTE — PROGRESS NOTES
Date of Office Visit: 2020    Patient Name: Miri Yung  : 1940    Encounter Provider: Jignesh York MD  Referring Provider: No ref. provider found  Place of Service: [facility] CARDIOLOGY  Patient Care Team:  Loren Cruz APRN as PCP - General (Family Medicine)    The patient consented to a telephone telehealth visit today.  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  She has been managed on medical therapy over the years and actually done fairly well.  She has paroxysmal atrial fibrillation.  I do not have a recent echocardiogram.  The last is from .  At that time she was in sinus rhythm.  Patient overall states that she does have exertional dyspnea and also has some peripheral edema.  She recently had some laboratory work that I have reviewed.  Her proBNP was only 500.    Her echocardiogram was last performed in 2019.  At that time her left ventricular cavity was borderline dilated.  There is mild mitral regurgitation as well as mild tricuspid regurgitation and borderline right ventricular systolic pressure of 41.  Left ventricular function is normal.  Past Medical History:   Diagnosis Date   • Acute ischemic stroke (CMS/HCC)    • Anxiety    • Atrial fibrillation (CMS/HCC)    • Congestive heart failure (CHF) (CMS/HCC)    • Dementia (CMS/HCC)    • Depression    • Edema    • Encephalopathy     secondary to endocarditis   • Endocarditis     with mitral and tricuspid regurgitation   • Esophageal reflux    • GERD (gastroesophageal reflux disease)    • Gout    • History of blood transfusion     due to anemia   • HTN (hypertension)    • Hyperlipidemia    • Insomnia    • LOM (loss of memory)    • Lung mass    • Mitral and aortic  valve disease     secondary to endocarditis; generally asymptomatic   • Mitral regurgitation    • Mixed anxiety depressive disorder    • Nonrheumatic tricuspid (valve) insufficiency    • Osteoarthritis    • PAF (paroxysmal atrial fibrillation) (CMS/HCC)    • Sacroiliitis (CMS/HCC)    • Tricuspid regurgitation    • Type 2 diabetes mellitus (CMS/HCC)          Past Surgical History:   Procedure Laterality Date   • CATARACT EXTRACTION     • COLONOSCOPY N/A 3/10/2016    Procedure: COLONOSCOPY TO CECUM;  Surgeon: Shaan Becerra Jr., MD;  Location: [facility] ENDOSCOPY;  Service:    • ENDOSCOPY N/A 3/10/2016    Procedure: ESOPHAGOGASTRODUODENOSCOPY ;  Surgeon: Shaan Becerra Jr., MD;  Location: [facility] ENDOSCOPY;  Service:    • KNEE SURGERY     • TUBAL ABDOMINAL LIGATION             Current Outpatient Medications:   •  acetaminophen (TYLENOL) 500 MG tablet, Take 500 mg by mouth every 4 (four) hours as needed for mild pain (1-3) (1-2 TABLETS PRN Q 4-6 HOURS)., Disp: , Rfl:   •  allopurinol (ZYLOPRIM) 300 MG tablet, TAKE 1 TABLET BY MOUTH  DAILY, Disp: 90 tablet, Rfl: 3  •  atorvastatin (LIPITOR) 40 MG tablet, Take 40 mg by mouth Daily., Disp: , Rfl:   •  buPROPion SR (WELLBUTRIN SR) 150 MG 12 hr tablet, TAKE 1 TABLET BY MOUTH EVERY DAY WITH BREAKFAST, Disp: 30 tablet, Rfl: 2  •  Eliquis 5 MG tablet tablet, TAKE 1 TABLET BY MOUTH EVERY 12 HOURS, Disp: 60 tablet, Rfl: 2  •  furosemide (LASIX) 40 MG tablet, Take 1 tablet by mouth 2 (Two) Times a Day., Disp: 180 tablet, Rfl: 0  •  HYDROcodone-acetaminophen (NORCO) 5-325 MG per tablet, Take 1/2 tablet daily as needed for pain., Disp: 15 tablet, Rfl: 0  •  isosorbide mononitrate (IMDUR) 60 MG 24 hr tablet, TAKE 1 TABLET BY MOUTH  DAILY, Disp: 90 tablet, Rfl: 3  •  losartan (COZAAR) 25 MG tablet, TAKE 1 TABLET BY MOUTH IN  THE MORNING AND 2 TABLETS  BY MOUTH IN THE EVENING, Disp: 270 tablet, Rfl: 3  •  metFORMIN (Glucophage) 500 MG tablet, Take 1 tablet by mouth Daily With Breakfast.,  "Disp: 90 tablet, Rfl: 1  •  metoprolol succinate XL (TOPROL-XL) 100 MG 24 hr tablet, TAKE 1 TABLET BY MOUTH  DAILY, Disp: 90 tablet, Rfl: 3  •  mirtazapine (REMERON) 15 MG tablet, Take 1 tablet by mouth Every Night., Disp: 30 tablet, Rfl: 3  •  montelukast (SINGULAIR) 10 MG tablet, TAKE 1 TABLET BY MOUTH EVERY NIGHT, Disp: 30 tablet, Rfl: 5  •  pantoprazole (PROTONIX) 40 MG EC tablet, TAKE 1 TABLET BY MOUTH  DAILY, Disp: 90 tablet, Rfl: 3  •  potassium chloride (K-DUR) 10 MEQ CR tablet, TAKE 1 TABLET BY MOUTH IN  THE MORNING, Disp: 90 tablet, Rfl: 3      Social History     Socioeconomic History   • Marital status:      Spouse name: Not on file   • Number of children: Not on file   • Years of education: Not on file   • Highest education level: Not on file   Tobacco Use   • Smoking status: Never Smoker   • Smokeless tobacco: Never Used   • Tobacco comment: caffeine use    Substance and Sexual Activity   • Alcohol use: No   • Drug use: No   • Sexual activity: Defer         Review of Systems   Constitution: Negative.   HENT: Negative.    Eyes: Negative.    Cardiovascular: Positive for dyspnea on exertion and leg swelling.   Respiratory: Negative.    Endocrine: Negative.    Skin: Negative.    Musculoskeletal: Negative.    Gastrointestinal: Negative.    Neurological: Negative.    Psychiatric/Behavioral: Negative.        Procedures    Procedures        Objective:    /52 (BP Location: Left arm, Patient Position: Sitting, Cuff Size: Adult)   Pulse 89   Ht 157.5 cm (62\")   Wt 91.6 kg (202 lb)   BMI 36.95 kg/m²         Physical Exam  Not performed      Assessment:       Diagnosis Plan   1. Paroxysmal atrial fibrillation (CMS/HCC)     2. Bacterial endocarditis, unspecified chronicity     3. Chronic diastolic (congestive) heart failure (CMS/HCC)     4. Nonrheumatic mitral valve regurgitation     5. Nonrheumatic tricuspid valve regurgitation       1.  Paroxysmal atrial fibrillation.  Patient is anticoagulated " with Eliquis.  Recent renal function checked normal  2.  History of bacterial endocarditis: Mild mitral and tricuspid regurgitation  3.  Chronic diastolic heart failure: Remains on diuretic therapy and ARB.  4.  Diabetes mellitus: Type II.  Uncontrolled.  Markedly elevated hemoglobin A1c       Plan:       1.  Continue current medications  2.  Adhere to dietary restrictions  3.  Follow-up in 6 months    Total time of patient visit including direct patient contact: 15 minutes

## 2020-11-30 ENCOUNTER — OFFICE VISIT (OUTPATIENT)
Dept: FAMILY MEDICINE CLINIC | Facility: CLINIC | Age: 80
End: 2020-11-30

## 2020-11-30 ENCOUNTER — APPOINTMENT (OUTPATIENT)
Dept: GENERAL RADIOLOGY | Facility: HOSPITAL | Age: 80
End: 2020-11-30

## 2020-11-30 ENCOUNTER — HOSPITAL ENCOUNTER (EMERGENCY)
Facility: HOSPITAL | Age: 80
Discharge: HOME | End: 2020-11-30
Attending: EMERGENCY MEDICINE | Admitting: EMERGENCY MEDICINE

## 2020-11-30 VITALS
BODY MASS INDEX: 36.65 KG/M2 | HEIGHT: 62 IN | RESPIRATION RATE: 20 BRPM | OXYGEN SATURATION: 94 % | TEMPERATURE: 97.1 F | SYSTOLIC BLOOD PRESSURE: 142 MMHG | DIASTOLIC BLOOD PRESSURE: 68 MMHG | HEART RATE: 75 BPM

## 2020-11-30 VITALS
RESPIRATION RATE: 18 BRPM | HEART RATE: 148 BPM | WEIGHT: 200.4 LBS | OXYGEN SATURATION: 95 % | TEMPERATURE: 97.1 F | SYSTOLIC BLOOD PRESSURE: 132 MMHG | BODY MASS INDEX: 36.88 KG/M2 | HEIGHT: 62 IN | DIASTOLIC BLOOD PRESSURE: 68 MMHG

## 2020-11-30 DIAGNOSIS — R60.0 PEDAL EDEMA: ICD-10-CM

## 2020-11-30 DIAGNOSIS — I48.0 PAROXYSMAL ATRIAL FIBRILLATION (HCC): Primary | ICD-10-CM

## 2020-11-30 LAB
ALBUMIN SERPL-MCNC: 3.7 G/DL (ref 3.5–5.2)
ALBUMIN/GLOB SERPL: 2.2 G/DL
ALP SERPL-CCNC: 146 U/L (ref 39–117)
ALT SERPL W P-5'-P-CCNC: 39 U/L (ref 1–33)
ANION GAP SERPL CALCULATED.3IONS-SCNC: 10.6 MMOL/L (ref 5–15)
AST SERPL-CCNC: 34 U/L (ref 1–32)
BASOPHILS # BLD AUTO: 0.08 10*3/MM3 (ref 0–0.2)
BASOPHILS NFR BLD AUTO: 1.1 % (ref 0–1.5)
BILIRUB SERPL-MCNC: 0.4 MG/DL (ref 0–1.2)
BILIRUB UR QL STRIP: NEGATIVE
BUN SERPL-MCNC: 19 MG/DL (ref 8–23)
BUN/CREAT SERPL: 25 (ref 7–25)
CALCIUM SPEC-SCNC: 9 MG/DL (ref 8.6–10.5)
CHLORIDE SERPL-SCNC: 101 MMOL/L (ref 98–107)
CLARITY UR: CLEAR
CO2 SERPL-SCNC: 28.4 MMOL/L (ref 22–29)
COLOR UR: YELLOW
CREAT SERPL-MCNC: 0.76 MG/DL (ref 0.57–1)
DEPRECATED RDW RBC AUTO: 43.4 FL (ref 37–54)
EOSINOPHIL # BLD AUTO: 0.13 10*3/MM3 (ref 0–0.4)
EOSINOPHIL NFR BLD AUTO: 1.8 % (ref 0.3–6.2)
ERYTHROCYTE [DISTWIDTH] IN BLOOD BY AUTOMATED COUNT: 14.9 % (ref 12.3–15.4)
GFR SERPL CREATININE-BSD FRML MDRD: 73 ML/MIN/1.73
GLOBULIN UR ELPH-MCNC: 1.7 GM/DL
GLUCOSE SERPL-MCNC: 397 MG/DL (ref 65–99)
GLUCOSE UR STRIP-MCNC: ABNORMAL MG/DL
HCT VFR BLD AUTO: 33.8 % (ref 34–46.6)
HGB BLD-MCNC: 10.2 G/DL (ref 12–15.9)
HGB UR QL STRIP.AUTO: NEGATIVE
HOLD SPECIMEN: NORMAL
HOLD SPECIMEN: NORMAL
INR PPP: 1.66 (ref 0.9–1.1)
KETONES UR QL STRIP: NEGATIVE
LEUKOCYTE ESTERASE UR QL STRIP.AUTO: NEGATIVE
LYMPHOCYTES # BLD AUTO: 1.93 10*3/MM3 (ref 0.7–3.1)
LYMPHOCYTES NFR BLD AUTO: 27.3 % (ref 19.6–45.3)
MAGNESIUM SERPL-MCNC: 1.6 MG/DL (ref 1.6–2.4)
MCH RBC QN AUTO: 24.6 PG (ref 26.6–33)
MCHC RBC AUTO-ENTMCNC: 30.2 G/DL (ref 31.5–35.7)
MCV RBC AUTO: 81.4 FL (ref 79–97)
MONOCYTES # BLD AUTO: 0.49 10*3/MM3 (ref 0.1–0.9)
MONOCYTES NFR BLD AUTO: 6.9 % (ref 5–12)
NEUTROPHILS NFR BLD AUTO: 4.39 10*3/MM3 (ref 1.7–7)
NEUTROPHILS NFR BLD AUTO: 62.3 % (ref 42.7–76)
NITRITE UR QL STRIP: NEGATIVE
PH UR STRIP.AUTO: <=5 [PH] (ref 5–8)
PLATELET # BLD AUTO: 147 10*3/MM3 (ref 140–450)
PMV BLD AUTO: 11.6 FL (ref 6–12)
POTASSIUM SERPL-SCNC: 3.9 MMOL/L (ref 3.5–5.2)
PROT SERPL-MCNC: 5.4 G/DL (ref 6–8.5)
PROT UR QL STRIP: NEGATIVE
PROTHROMBIN TIME: 19.4 SECONDS (ref 11.7–14.2)
QT INTERVAL: 337 MS
QT INTERVAL: 400 MS
RBC # BLD AUTO: 4.15 10*6/MM3 (ref 3.77–5.28)
SODIUM SERPL-SCNC: 140 MMOL/L (ref 136–145)
SP GR UR STRIP: 1.01 (ref 1–1.03)
TROPONIN T SERPL-MCNC: <0.01 NG/ML (ref 0–0.03)
TROPONIN T SERPL-MCNC: <0.01 NG/ML (ref 0–0.03)
TSH SERPL DL<=0.05 MIU/L-ACNC: 2.61 UIU/ML (ref 0.27–4.2)
UROBILINOGEN UR QL STRIP: ABNORMAL
WBC # BLD AUTO: 7.06 10*3/MM3 (ref 3.4–10.8)
WHOLE BLOOD HOLD SPECIMEN: NORMAL
WHOLE BLOOD HOLD SPECIMEN: NORMAL

## 2020-11-30 PROCEDURE — 99284 EMERGENCY DEPT VISIT MOD MDM: CPT

## 2020-11-30 PROCEDURE — 85610 PROTHROMBIN TIME: CPT | Performed by: EMERGENCY MEDICINE

## 2020-11-30 PROCEDURE — 83735 ASSAY OF MAGNESIUM: CPT | Performed by: EMERGENCY MEDICINE

## 2020-11-30 PROCEDURE — 81003 URINALYSIS AUTO W/O SCOPE: CPT | Performed by: EMERGENCY MEDICINE

## 2020-11-30 PROCEDURE — P9612 CATHETERIZE FOR URINE SPEC: HCPCS

## 2020-11-30 PROCEDURE — 85025 COMPLETE CBC W/AUTO DIFF WBC: CPT | Performed by: EMERGENCY MEDICINE

## 2020-11-30 PROCEDURE — 99214 OFFICE O/P EST MOD 30 MIN: CPT | Performed by: NURSE PRACTITIONER

## 2020-11-30 PROCEDURE — 93005 ELECTROCARDIOGRAM TRACING: CPT | Performed by: EMERGENCY MEDICINE

## 2020-11-30 PROCEDURE — 84484 ASSAY OF TROPONIN QUANT: CPT | Performed by: EMERGENCY MEDICINE

## 2020-11-30 PROCEDURE — 96375 TX/PRO/DX INJ NEW DRUG ADDON: CPT

## 2020-11-30 PROCEDURE — 84443 ASSAY THYROID STIM HORMONE: CPT | Performed by: EMERGENCY MEDICINE

## 2020-11-30 PROCEDURE — 25010000002 FUROSEMIDE PER 20 MG: Performed by: EMERGENCY MEDICINE

## 2020-11-30 PROCEDURE — 80053 COMPREHEN METABOLIC PANEL: CPT | Performed by: EMERGENCY MEDICINE

## 2020-11-30 PROCEDURE — 93010 ELECTROCARDIOGRAM REPORT: CPT | Performed by: INTERNAL MEDICINE

## 2020-11-30 PROCEDURE — 71045 X-RAY EXAM CHEST 1 VIEW: CPT

## 2020-11-30 PROCEDURE — 96374 THER/PROPH/DIAG INJ IV PUSH: CPT

## 2020-11-30 RX ORDER — FUROSEMIDE 10 MG/ML
40 INJECTION INTRAMUSCULAR; INTRAVENOUS ONCE
Status: COMPLETED | OUTPATIENT
Start: 2020-11-30 | End: 2020-11-30

## 2020-11-30 RX ORDER — SODIUM CHLORIDE 0.9 % (FLUSH) 0.9 %
10 SYRINGE (ML) INJECTION AS NEEDED
Status: DISCONTINUED | OUTPATIENT
Start: 2020-11-30 | End: 2020-11-30 | Stop reason: HOSPADM

## 2020-11-30 RX ADMIN — FUROSEMIDE 40 MG: 10 INJECTION, SOLUTION INTRAMUSCULAR; INTRAVENOUS at 13:05

## 2020-11-30 RX ADMIN — METOROPROLOL TARTRATE 2.5 MG: 5 INJECTION, SOLUTION INTRAVENOUS at 12:58

## 2020-11-30 NOTE — PROGRESS NOTES
Subjective   Miri Yung is a 80 y.o. female.     Chief Complaint   Patient presents with   • Difficulty Urinating        Vitals:    11/30/20 1102   BP: 132/68   Pulse: (!) 148   Resp: 18   Temp: 97.1 °F (36.2 °C)   SpO2: 95%        This is a 80-year-old female patient here today for evaluation of complaints of dysuria and shortness of breath.  Patient has a history of atrial fibrillation and CHF.  At our last visit her edema was increased with increased weight gain.  She was asked to increase her water pills for couple days and be evaluated by cardiology.  Today her pulse is 140s to 150s and irregular.  She does report shortness of breath.  She states she has been up since 1 AM.  Her weight today is slightly improved from last visit.  She also reports chest tightness.       The following portions of the patient's history were reviewed and updated as appropriate: allergies, current medications, past family history, past medical history, past social history, past surgical history, and problem list.    Review of Systems   Constitutional: Positive for unexpected weight gain. Negative for activity change, appetite change and fatigue.   HENT: Negative for congestion.    Respiratory: Positive for chest tightness and shortness of breath. Negative for cough.    Cardiovascular: Positive for palpitations and leg swelling.   Gastrointestinal: Negative for abdominal distention, abdominal pain and nausea.   Genitourinary: Positive for dysuria. Negative for flank pain and urgency.   Skin: Dry skin: shortness of breath.       Objective   Physical Exam  Constitutional:       Appearance: Normal appearance.   Cardiovascular:      Rate and Rhythm: Tachycardia present. Rhythm irregular.      Pulses: Normal pulses.      Heart sounds: Murmur present.   Pulmonary:      Effort: Pulmonary effort is normal.      Breath sounds: Normal breath sounds. No wheezing or rhonchi.   Abdominal:      General: Abdomen is flat. Bowel sounds are  normal.      Palpations: Abdomen is soft.      Tenderness: There is no abdominal tenderness.   Skin:     General: Skin is warm and dry.   Neurological:      Mental Status: She is alert.           Assessment/Plan   Problems Addressed this Visit        Cardiovascular and Mediastinum    Atrial fibrillation (CMS/HCC) - Primary      Diagnoses       Codes Comments    Paroxysmal atrial fibrillation (CMS/HCC)    -  Primary ICD-10-CM: I48.0  ICD-9-CM: 427.31         Due to patient's rapid heart rate with increased shortness of breath I will send patient to ER for further evaluation. Family is at bedside and aware of plans

## 2020-12-03 ENCOUNTER — OFFICE VISIT (OUTPATIENT)
Dept: CARDIOLOGY | Facility: CLINIC | Age: 80
End: 2020-12-03

## 2020-12-03 ENCOUNTER — PATIENT OUTREACH (OUTPATIENT)
Dept: CASE MANAGEMENT | Facility: OTHER | Age: 80
End: 2020-12-03

## 2020-12-03 VITALS
HEART RATE: 77 BPM | WEIGHT: 198.8 LBS | SYSTOLIC BLOOD PRESSURE: 132 MMHG | HEIGHT: 62 IN | BODY MASS INDEX: 36.58 KG/M2 | DIASTOLIC BLOOD PRESSURE: 66 MMHG

## 2020-12-03 DIAGNOSIS — I38 VALVULAR ENDOCARDITIS: ICD-10-CM

## 2020-12-03 DIAGNOSIS — I50.32 CHRONIC DIASTOLIC (CONGESTIVE) HEART FAILURE (HCC): ICD-10-CM

## 2020-12-03 DIAGNOSIS — I48.0 PAROXYSMAL ATRIAL FIBRILLATION (HCC): Primary | ICD-10-CM

## 2020-12-03 PROCEDURE — 93000 ELECTROCARDIOGRAM COMPLETE: CPT | Performed by: INTERNAL MEDICINE

## 2020-12-03 PROCEDURE — 99214 OFFICE O/P EST MOD 30 MIN: CPT | Performed by: INTERNAL MEDICINE

## 2020-12-03 NOTE — PROGRESS NOTES
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  She has been managed medically over the years having done quite well.  Her cardiogram performed in 2019 showed left ventricular diastolic dysfunction grade 2, normal left ventricular systolic function, mild mitral and tricuspid regurgitation, aortic valve sclerosis, borderline pulmonary hypertension.  The patient has a history of atrial fibrillation as well.  Recently she was seen in the emergency room with  atrial fibrillation but ultimately converted to sinus rhythm.  Patient was sent to the emergency room she states by her family physician.  No specific therapy was administered.  The patient is chronically anticoagulated on apixaban.  Today here in the office she is in sinus rhythm and not really expressing any complaints.    Past Medical History:   Diagnosis Date   • Acute ischemic stroke (CMS/HCC)    • Anxiety    • Atrial fibrillation (CMS/HCC)    • Congestive heart failure (CHF) (CMS/HCC)    • Dementia (CMS/HCC)    • Depression    • Edema    • Encephalopathy     secondary to endocarditis   • Endocarditis     with mitral and tricuspid regurgitation   • Esophageal reflux    • GERD (gastroesophageal reflux disease)    • Gout    • History of blood transfusion     due to anemia   • HTN (hypertension)    • Hyperlipidemia    • Insomnia    • LOM (loss of memory)    • Lung mass    •  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; generally asymptomatic   • Mitral regurgitation    • Mixed anxiety depressive disorder    • Nonrheumatic tricuspid (valve) insufficiency    • Osteoarthritis    • PAF (paroxysmal atrial fibrillation) (CMS/HCC)    • Sacroiliitis (CMS/HCC)    • Tricuspid regurgitation    • Type 2 diabetes mellitus (CMS/HCC)          Past Surgical History:   Procedure Laterality Date   • CATARACT EXTRACTION     • COLONOSCOPY N/A 3/10/2016    Procedure: COLONOSCOPY TO CECUM;  Surgeon: Shaan Becerra Jr., MD;  Location: [facility] ENDOSCOPY;  Service:    • ENDOSCOPY N/A 3/10/2016    Procedure: ESOPHAGOGASTRODUODENOSCOPY ;  Surgeon: Shaan Becerra Jr., MD;  Location: [facility] ENDOSCOPY;  Service:    • KNEE SURGERY     • TUBAL ABDOMINAL LIGATION             Current Outpatient Medications:   •  acetaminophen (TYLENOL) 500 MG tablet, Take 500 mg by mouth every 4 (four) hours as needed for mild pain (1-3) (1-2 TABLETS PRN Q 4-6 HOURS)., Disp: , Rfl:   •  allopurinol (ZYLOPRIM) 300 MG tablet, TAKE 1 TABLET BY MOUTH  DAILY, Disp: 90 tablet, Rfl: 3  •  atorvastatin (LIPITOR) 40 MG tablet, Take 40 mg by mouth Daily., Disp: , Rfl:   •  buPROPion SR (WELLBUTRIN SR) 150 MG 12 hr tablet, TAKE 1 TABLET BY MOUTH EVERY DAY WITH BREAKFAST, Disp: 30 tablet, Rfl: 2  •  Eliquis 5 MG tablet tablet, TAKE 1 TABLET BY MOUTH EVERY 12 HOURS, Disp: 60 tablet, Rfl: 2  •  furosemide (LASIX) 40 MG tablet, Take 1 tablet by mouth 2 (Two) Times a Day., Disp: 180 tablet, Rfl: 0  •  HYDROcodone-acetaminophen (NORCO) 5-325 MG per tablet, Take 1/2 tablet daily as needed for pain., Disp: 15 tablet, Rfl: 0  •  isosorbide mononitrate (IMDUR) 60 MG 24 hr tablet, TAKE 1 TABLET BY MOUTH  DAILY, Disp: 90 tablet, Rfl: 3  •  losartan (COZAAR) 25 MG tablet, TAKE 1 TABLET BY MOUTH IN  THE MORNING AND 2 TABLETS  BY MOUTH IN THE EVENING, Disp: 270 tablet, Rfl: 3  •  metFORMIN (Glucophage) 500 MG tablet, Take 1 tablet by mouth Daily  "With Breakfast., Disp: 90 tablet, Rfl: 1  •  metoprolol succinate XL (TOPROL-XL) 100 MG 24 hr tablet, TAKE 1 TABLET BY MOUTH  DAILY, Disp: 90 tablet, Rfl: 3  •  mirtazapine (REMERON) 15 MG tablet, Take 1 tablet by mouth Every Night., Disp: 30 tablet, Rfl: 3  •  montelukast (SINGULAIR) 10 MG tablet, TAKE 1 TABLET BY MOUTH EVERY NIGHT, Disp: 30 tablet, Rfl: 5  •  pantoprazole (PROTONIX) 40 MG EC tablet, TAKE 1 TABLET BY MOUTH  DAILY, Disp: 90 tablet, Rfl: 3  •  potassium chloride (K-DUR) 10 MEQ CR tablet, TAKE 1 TABLET BY MOUTH IN  THE MORNING, Disp: 90 tablet, Rfl: 3      Social History     Socioeconomic History   • Marital status:      Spouse name: Not on file   • Number of children: Not on file   • Years of education: Not on file   • Highest education level: Not on file   Tobacco Use   • Smoking status: Never Smoker   • Smokeless tobacco: Never Used   • Tobacco comment: caffeine use    Substance and Sexual Activity   • Alcohol use: No   • Drug use: No   • Sexual activity: Defer         Review of Systems   Constitution: Positive for malaise/fatigue.   HENT: Negative.    Eyes: Negative.    Cardiovascular: Negative.    Respiratory: Negative.    Endocrine: Negative.    Skin: Negative.    Musculoskeletal: Negative.    Gastrointestinal: Negative.    Neurological: Negative.    Psychiatric/Behavioral: Negative.        Procedures      ECG 12 Lead    Date/Time: 12/3/2020 1:37 PM  Performed by: Jignesh York MD  Authorized by: Jignesh York MD   Comparison: compared with previous ECG from 11/30/2020  Similar to previous ECG  Rhythm: sinus rhythm  Rate: normal  Conduction: conduction normal  QRS axis: normal  Other findings: non-specific ST-T wave changes                Objective:    /66 (BP Location: Left arm)   Pulse 77   Ht 157.5 cm (62\")   Wt 90.2 kg (198 lb 12.8 oz)   BMI 36.36 kg/m²         Vitals signs reviewed.   Constitutional:       Appearance: Well-developed.   Eyes:      Pupils: " Pupils are equal, round, and reactive to light.   HENT:      Head: Normocephalic.   Neck:      Musculoskeletal: Normal range of motion.      Thyroid: No thyromegaly.      Vascular: No carotid bruit or JVD.   Pulmonary:      Effort: Pulmonary effort is normal.      Breath sounds: Normal breath sounds.   Cardiovascular:      Normal rate. Regular rhythm.      No gallop.   Pulses:     Intact distal pulses.   Edema:     Peripheral edema absent.   Abdominal:      General: Bowel sounds are normal.      Palpations: Abdomen is soft.   Skin:     General: Skin is warm and dry.      Findings: No erythema.   Neurological:      Mental Status: Alert and oriented to person, place, and time.             Assessment:       Diagnosis Plan   1. Paroxysmal atrial fibrillation (CMS/HCC)     2. Chronic diastolic (congestive) heart failure (CMS/HCC)     3. Valvular endocarditis         1.  Paroxysmal atrial fibrillation: [id number] score 5.  Patient anticoagulated with apixaban.  Renal function normal.  No further therapy indicated for her paroxysmal atrial fibrillation at this time.  2.  Chronic diastolic heart failure: Compensated  3.  Valvular endocarditis: Mild tricuspid and mitral regurgitation  4.  Borderline pulmonary hypertension  5.  Diabetes mellitus, type II on oral therapy     Plan:

## 2020-12-03 NOTE — OUTREACH NOTE
Patient Outreach Note    Spoke with patient regarding health and wellness after her recent ED visit with AF. Introduced self, explained ACM RN role.  Patient states she followed up with cardiology today and had a good report regarding her heart. Patient states she has a PCP appointment tomorrow. Patient states she does not check her BS or B/P and is not interested in doing so. Patient declines need for ACM services. No further outreach scheduled at this time.     Jess Romo RN  Ambulatory     12/3/2020, 15:19 EST

## 2020-12-04 ENCOUNTER — OFFICE VISIT (OUTPATIENT)
Dept: FAMILY MEDICINE CLINIC | Facility: CLINIC | Age: 80
End: 2020-12-04

## 2020-12-04 VITALS
SYSTOLIC BLOOD PRESSURE: 128 MMHG | TEMPERATURE: 97.3 F | OXYGEN SATURATION: 93 % | RESPIRATION RATE: 18 BRPM | BODY MASS INDEX: 36.62 KG/M2 | HEART RATE: 76 BPM | WEIGHT: 199 LBS | HEIGHT: 62 IN | DIASTOLIC BLOOD PRESSURE: 68 MMHG

## 2020-12-04 DIAGNOSIS — R52 CHRONIC GENERALIZED PAIN: ICD-10-CM

## 2020-12-04 DIAGNOSIS — R30.0 DYSURIA: Primary | ICD-10-CM

## 2020-12-04 DIAGNOSIS — G89.29 CHRONIC GENERALIZED PAIN: ICD-10-CM

## 2020-12-04 LAB
BILIRUB BLD-MCNC: NEGATIVE MG/DL
CLARITY, POC: CLEAR
COLOR UR: YELLOW
GLUCOSE UR STRIP-MCNC: NEGATIVE MG/DL
KETONES UR QL: NEGATIVE
LEUKOCYTE EST, POC: ABNORMAL
NITRITE UR-MCNC: NEGATIVE MG/ML
PH UR: 5 [PH] (ref 5–8)
PROT UR STRIP-MCNC: NEGATIVE MG/DL
RBC # UR STRIP: NEGATIVE /UL
SP GR UR: 1.01 (ref 1–1.03)
UROBILINOGEN UR QL: NORMAL

## 2020-12-04 PROCEDURE — 81003 URINALYSIS AUTO W/O SCOPE: CPT | Performed by: NURSE PRACTITIONER

## 2020-12-04 PROCEDURE — 99214 OFFICE O/P EST MOD 30 MIN: CPT | Performed by: NURSE PRACTITIONER

## 2020-12-04 RX ORDER — TRAMADOL HYDROCHLORIDE 50 MG/1
50 TABLET ORAL EVERY 6 HOURS PRN
Qty: 30 TABLET | Refills: 5 | Status: ON HOLD | OUTPATIENT
Start: 2020-12-04 | End: 2021-06-14

## 2020-12-04 RX ORDER — FLUCONAZOLE 150 MG/1
150 TABLET ORAL ONCE
Qty: 1 TABLET | Refills: 0 | Status: SHIPPED | OUTPATIENT
Start: 2020-12-04 | End: 2020-12-04

## 2020-12-04 RX ORDER — HYDROCODONE BITARTRATE AND ACETAMINOPHEN 5; 325 MG/1; MG/1
TABLET ORAL
Qty: 15 TABLET | Refills: 0 | Status: CANCELLED | OUTPATIENT
Start: 2020-12-04

## 2020-12-04 NOTE — PROGRESS NOTES
Subjective   Miri Yung is a 80 y.o. female.     Chief Complaint   Patient presents with   • Follow-up     [facility]- AFib   • Difficulty Urinating   • Med Management     chronic pain        Vitals:    12/04/20 1113   BP: 128/68   Pulse: 76   Resp: 18   Temp: 97.3 °F (36.3 °C)   SpO2: 93%        History of Present Illness   This is a 80-year-old female patient being seen today for chronic pain and dysuria.  Patient was in my office on 11/30/2020 where she was here for evaluation of her hydrocodone.  During that time she had tachycardia of with a heart rate of 140.  She was sent to the emergency room where she stabilized and was discharged.  During that time she also had complaints of dysuria.  Urine was obtained during the emergency room visit and appears to be negative.  Patient continues to have dysuria along with pruritus.  She reports she is red in the vaginal area.  She reports she has not taking Norco due to running out in the last week.  She denies any pain at this time.  She is scheduled for physical therapy which she has not attempted yet due to recent events.      The following portions of the patient's history were reviewed and updated as appropriate: allergies, current medications, past family history, past medical history, past social history, past surgical history, and problem list.    Review of Systems   Constitutional: Negative for fatigue.   Gastrointestinal: Negative for abdominal distention, abdominal pain, diarrhea, nausea and vomiting.   Genitourinary: Negative for decreased urine volume, difficulty urinating, dysuria, hematuria, urgency and vaginal bleeding.   Musculoskeletal: Positive for arthralgias, back pain and gait problem.       Objective   Physical Exam  Constitutional:       Appearance: Normal appearance.   HENT:      Head: Normocephalic.   Cardiovascular:      Rate and Rhythm: Normal rate and regular rhythm.      Pulses: Normal pulses.      Heart sounds: Normal heart sounds.    Pulmonary:      Effort: Pulmonary effort is normal.      Breath sounds: Normal breath sounds.   Abdominal:      General: Abdomen is flat. Bowel sounds are normal.      Palpations: Abdomen is soft.      Tenderness: There is no abdominal tenderness. There is no guarding.   Musculoskeletal: Normal range of motion.         General: No tenderness.   Skin:     General: Skin is warm and dry.   Neurological:      Mental Status: She is alert and oriented to person, place, and time.           Assessment/Plan   Problems Addressed this Visit     None      Visit Diagnoses     Dysuria    -  Primary    Relevant Orders    POCT urinalysis dipstick, automated (Completed)    Urinalysis With Culture If Indicated -    Chronic generalized pain        Relevant Medications    traMADol (ULTRAM) 50 MG tablet      Diagnoses       Codes Comments    Dysuria    -  Primary ICD-10-CM: R30.0  ICD-9-CM: 788.1     Chronic generalized pain     ICD-10-CM: R52, G89.29  ICD-9-CM: 780.96, 338.29         I had a long discussion with patient regarding need for hydrocodone.  Patient reports her pain seems to be controlled at this time.  I am willing to titrate her down to tramadol 50 mg daily to see how that works for the time being.  If her pain is not controlled we will refer her to pain management to evaluate the need for Norco.  She will continue to attempt physical therapy.  Her symptoms of dysuria is described as candidiasis.  I will obtain a urine and send for culture but will also prescribe Diflucan for now.  If symptoms do not resolve she will let us know.  Otherwise I will see her in 3 months or sooner if needed

## 2020-12-07 LAB
APPEARANCE UR: CLEAR
BACTERIA #/AREA URNS HPF: ABNORMAL /HPF
BACTERIA UR CULT: ABNORMAL
BACTERIA UR CULT: ABNORMAL
BILIRUB UR QL STRIP: NEGATIVE
CASTS URNS MICRO: ABNORMAL
CASTS URNS QL MICRO: PRESENT /LPF
COLOR UR: YELLOW
CRYSTALS URNS MICRO: ABNORMAL
EPI CELLS #/AREA URNS HPF: ABNORMAL /HPF (ref 0–10)
GLUCOSE UR QL: NEGATIVE
HGB UR QL STRIP: NEGATIVE
KETONES UR QL STRIP: NEGATIVE
LEUKOCYTE ESTERASE UR QL STRIP: ABNORMAL
MICRO URNS: ABNORMAL
MUCOUS THREADS URNS QL MICRO: PRESENT /HPF
NITRITE UR QL STRIP: NEGATIVE
OTHER ANTIBIOTIC SUSC ISLT: ABNORMAL
PH UR STRIP: 5.5 [PH] (ref 5–7.5)
PROT UR QL STRIP: NEGATIVE
RBC #/AREA URNS HPF: ABNORMAL /HPF (ref 0–2)
SP GR UR: 1.01 (ref 1–1.03)
UNIDENT CRYS URNS QL MICRO: PRESENT /LPF
URINALYSIS REFLEX: ABNORMAL
UROBILINOGEN UR STRIP-MCNC: 0.2 MG/DL (ref 0.2–1)
WBC #/AREA URNS HPF: ABNORMAL /HPF (ref 0–5)

## 2020-12-08 RX ORDER — SULFAMETHOXAZOLE AND TRIMETHOPRIM 800; 160 MG/1; MG/1
1 TABLET ORAL 2 TIMES DAILY
Qty: 14 TABLET | Refills: 0 | Status: SHIPPED | OUTPATIENT
Start: 2020-12-08 | End: 2021-01-15

## 2020-12-10 ENCOUNTER — EPISODE CHANGES (OUTPATIENT)
Dept: CASE MANAGEMENT | Facility: OTHER | Age: 80
End: 2020-12-10

## 2020-12-10 ENCOUNTER — TELEPHONE (OUTPATIENT)
Dept: FAMILY MEDICINE CLINIC | Facility: CLINIC | Age: 80
End: 2020-12-10

## 2020-12-10 NOTE — TELEPHONE ENCOUNTER
PATIENT STATES THAT WHEN SHE TAKES HER fluconazole (Diflucan) 150 MG tablet SHE ITCHES AND WANTS TO KNOW WHAT TO DO.  SHE STATES THAT SHE DOESN'T WANT TO IGNORE THIS REACTION TO THE MEDICATION.    PLEASE ADVISE.    PATIENT CAN BE REACHED AT:  [phone]

## 2020-12-10 NOTE — TELEPHONE ENCOUNTER
"Patient stated she took diflucan and started \"itching all over\". Denied rash and shortness of breath. She stated she took two benadryl and is not itching as bad now. Wanted to run this by you and see what you wanted to do. She only had one dose of that medication so she will not be taking anymore of it. Do you need to evaluate her? Please advise, thanks.  "

## 2020-12-11 NOTE — TELEPHONE ENCOUNTER
Please call and make sure she is feeling better. Since it was one pill no need to do any different at this time if shes ok

## 2020-12-11 NOTE — TELEPHONE ENCOUNTER
Caller: royal huynh    Relationship to patient:     Best call back number:[phone]    Patient is needing: Royal called in and stated her sister in law  Was taking fluconazole (Diflucan) 150 MG tablet    And she broke into a rash from head to toe and wanted to see what else could be prescribed . pharmacy verified  [facility] #[id number] - [facility] [address] [address] [address] [address] - [phone] [facility] [phone]   [phone]

## 2020-12-11 NOTE — TELEPHONE ENCOUNTER
Patient stated the itching has gotten better and she never had a rash. She was told if worsening symptoms to go to ER or urgent care for evaluation.

## 2020-12-18 ENCOUNTER — TELEPHONE (OUTPATIENT)
Dept: FAMILY MEDICINE CLINIC | Facility: CLINIC | Age: 80
End: 2020-12-18

## 2020-12-18 NOTE — TELEPHONE ENCOUNTER
Spoke with patient and she states she is still having symptoms. She verbalized understanding that if her symptoms continue after this then she is to be evaluated again.

## 2020-12-18 NOTE — TELEPHONE ENCOUNTER
Caller: Miri Yung    Relationship: Self    Best call back number: [phone]     Medication needed: fluconazole (Diflucan) 150 MG tablet     When do you need the refill by: ASAp    What details did the patient provide when requesting the medication: Patient is out of medication   Does the patient have less than a 3 day supply:  [x] Yes  [] No    What is the patient's preferred pharmacy:  [facility] #[id number] [facility] [id number] [place] [address] AT [address] [address] [address] [address] - [phone] [facility] [phone]   [phone]

## 2020-12-21 RX ORDER — FLUCONAZOLE 150 MG/1
150 TABLET ORAL ONCE
Qty: 1 TABLET | Refills: 0 | Status: SHIPPED | OUTPATIENT
Start: 2020-12-21 | End: 2020-12-21

## 2020-12-23 DIAGNOSIS — F32.A DEPRESSION, UNSPECIFIED DEPRESSION TYPE: ICD-10-CM

## 2020-12-23 DIAGNOSIS — I50.9 ACUTE ON CHRONIC CONGESTIVE HEART FAILURE, UNSPECIFIED HEART FAILURE TYPE (HCC): ICD-10-CM

## 2020-12-23 DIAGNOSIS — F51.01 PRIMARY INSOMNIA: ICD-10-CM

## 2020-12-25 RX ORDER — FUROSEMIDE 40 MG/1
TABLET ORAL
Qty: 180 TABLET | Refills: 0 | Status: SHIPPED | OUTPATIENT
Start: 2020-12-25 | End: 2021-03-23 | Stop reason: SDUPTHER

## 2020-12-25 RX ORDER — BUPROPION HYDROCHLORIDE 150 MG/1
TABLET, EXTENDED RELEASE ORAL
Qty: 30 TABLET | Refills: 2 | Status: SHIPPED | OUTPATIENT
Start: 2020-12-25 | End: 2021-03-22 | Stop reason: SDUPTHER

## 2020-12-25 RX ORDER — MIRTAZAPINE 7.5 MG/1
7.5 TABLET, FILM COATED ORAL NIGHTLY
Qty: 30 TABLET | Refills: 2 | Status: ON HOLD | OUTPATIENT
Start: 2020-12-25 | End: 2021-06-14

## 2020-12-30 ENCOUNTER — TELEPHONE (OUTPATIENT)
Dept: FAMILY MEDICINE CLINIC | Facility: CLINIC | Age: 80
End: 2020-12-30

## 2020-12-30 NOTE — TELEPHONE ENCOUNTER
Patient states she was taking traMADol (ULTRAM) 50 MG tablet for pain. She states it is causing her to itch all over. She would like something else called in. Verified [facility] on [address].    Please call patient at [phone].

## 2021-01-01 ENCOUNTER — OFFICE VISIT (OUTPATIENT)
Dept: CARDIOLOGY | Facility: CLINIC | Age: 81
End: 2021-01-01

## 2021-01-01 ENCOUNTER — TELEPHONE (OUTPATIENT)
Dept: FAMILY MEDICINE CLINIC | Facility: CLINIC | Age: 81
End: 2021-01-01

## 2021-01-01 ENCOUNTER — TELEPHONE (OUTPATIENT)
Dept: CARDIOLOGY | Facility: CLINIC | Age: 81
End: 2021-01-01

## 2021-01-01 VITALS
DIASTOLIC BLOOD PRESSURE: 68 MMHG | WEIGHT: 188 LBS | OXYGEN SATURATION: 97 % | SYSTOLIC BLOOD PRESSURE: 142 MMHG | HEIGHT: 62 IN | BODY MASS INDEX: 34.6 KG/M2 | HEART RATE: 69 BPM

## 2021-01-01 DIAGNOSIS — I48.0 PAROXYSMAL ATRIAL FIBRILLATION (HCC): ICD-10-CM

## 2021-01-01 DIAGNOSIS — I48.0 PAROXYSMAL ATRIAL FIBRILLATION (HCC): Primary | ICD-10-CM

## 2021-01-01 DIAGNOSIS — I36.1 NONRHEUMATIC TRICUSPID VALVE REGURGITATION: ICD-10-CM

## 2021-01-01 DIAGNOSIS — I50.9 ACUTE ON CHRONIC CONGESTIVE HEART FAILURE, UNSPECIFIED HEART FAILURE TYPE (HCC): ICD-10-CM

## 2021-01-01 DIAGNOSIS — E11.9 TYPE 2 DIABETES MELLITUS WITHOUT COMPLICATION, WITHOUT LONG-TERM CURRENT USE OF INSULIN (HCC): ICD-10-CM

## 2021-01-01 DIAGNOSIS — I50.32 CHRONIC DIASTOLIC (CONGESTIVE) HEART FAILURE (HCC): ICD-10-CM

## 2021-01-01 DIAGNOSIS — F32.A DEPRESSION, UNSPECIFIED DEPRESSION TYPE: ICD-10-CM

## 2021-01-01 DIAGNOSIS — I10 PRIMARY HYPERTENSION: ICD-10-CM

## 2021-01-01 DIAGNOSIS — I38 VALVULAR ENDOCARDITIS: ICD-10-CM

## 2021-01-01 DIAGNOSIS — F51.01 PRIMARY INSOMNIA: ICD-10-CM

## 2021-01-01 DIAGNOSIS — I34.0 NONRHEUMATIC MITRAL VALVE REGURGITATION: ICD-10-CM

## 2021-01-01 LAB
A1AT SERPL-MCNC: 168 MG/DL (ref 101–187)
ACTIN IGG SERPL-ACNC: 5 UNITS (ref 0–19)
AFP-TM SERPL-MCNC: 17.6 NG/ML (ref 0–8.3)
ALBUMIN SERPL-MCNC: 3.8 G/DL (ref 3.5–5.2)
ALBUMIN/GLOB SERPL: 2.5 G/DL
ALP SERPL-CCNC: 110 U/L (ref 39–117)
ALT SERPL-CCNC: 25 U/L (ref 1–33)
AMMONIA PLAS-MCNC: 24 UMOL/L (ref 11–51)
ANA SER QL: NEGATIVE
AST SERPL-CCNC: 33 U/L (ref 1–32)
BASOPHILS # BLD AUTO: ABNORMAL 10*3/UL
BASOPHILS # BLD MANUAL: 0.06 10*3/MM3 (ref 0–0.2)
BASOPHILS NFR BLD MANUAL: 1.1 % (ref 0–1.5)
BILIRUB SERPL-MCNC: 0.8 MG/DL (ref 0–1.2)
BUN SERPL-MCNC: 11 MG/DL (ref 8–23)
BUN/CREAT SERPL: 19 (ref 7–25)
CALCIUM SERPL-MCNC: 8.8 MG/DL (ref 8.6–10.5)
CANCER AG19-9 SERPL-ACNC: 94 U/ML (ref 0–35)
CEA SERPL-MCNC: 1.4 NG/ML (ref 0–4.7)
CERULOPLASMIN SERPL-MCNC: 26.3 MG/DL (ref 19–39)
CHLORIDE SERPL-SCNC: 105 MMOL/L (ref 98–107)
CO2 SERPL-SCNC: 29.4 MMOL/L (ref 22–29)
CREAT SERPL-MCNC: 0.58 MG/DL (ref 0.57–1)
DIFFERENTIAL COMMENT: ABNORMAL
EOSINOPHIL # BLD AUTO: ABNORMAL 10*3/UL
EOSINOPHIL # BLD MANUAL: 0.06 10*3/MM3 (ref 0–0.4)
EOSINOPHIL NFR BLD AUTO: ABNORMAL %
EOSINOPHIL NFR BLD MANUAL: 1.1 % (ref 0.3–6.2)
ERYTHROCYTE [DISTWIDTH] IN BLOOD BY AUTOMATED COUNT: 17.6 % (ref 12.3–15.4)
GLOBULIN SER CALC-MCNC: 1.5 GM/DL
GLUCOSE SERPL-MCNC: 139 MG/DL (ref 65–99)
HAV IGM SERPL QL IA: NEGATIVE
HBV CORE IGM SERPL QL IA: NEGATIVE
HBV SURFACE AG SERPL QL IA: NEGATIVE
HCT VFR BLD AUTO: 32.7 % (ref 34–46.6)
HCV AB S/CO SERPL IA: <0.1 S/CO RATIO (ref 0–0.9)
HGB BLD-MCNC: 9.9 G/DL (ref 12–15.9)
INR PPP: 1.4 (ref 0.9–1.1)
IRON SATN MFR SERPL: 7 % (ref 20–50)
IRON SERPL-MCNC: 35 MCG/DL (ref 37–145)
LYMPHOCYTES # BLD AUTO: ABNORMAL 10*3/UL
LYMPHOCYTES # BLD MANUAL: 0.96 10*3/MM3 (ref 0.7–3.1)
LYMPHOCYTES NFR BLD AUTO: ABNORMAL %
LYMPHOCYTES NFR BLD MANUAL: 16.3 % (ref 19.6–45.3)
MCH RBC QN AUTO: 25.8 PG (ref 26.6–33)
MCHC RBC AUTO-ENTMCNC: 30.3 G/DL (ref 31.5–35.7)
MCV RBC AUTO: 85.4 FL (ref 79–97)
MITOCHONDRIA M2 IGG SER-ACNC: <20 UNITS (ref 0–20)
MONOCYTES # BLD MANUAL: 0.19 10*3/MM3 (ref 0.1–0.9)
MONOCYTES NFR BLD AUTO: ABNORMAL %
MONOCYTES NFR BLD MANUAL: 3.3 % (ref 5–12)
NEUTROPHILS # BLD MANUAL: 4.61 10*3/MM3 (ref 1.7–7)
NEUTROPHILS NFR BLD AUTO: ABNORMAL %
NEUTROPHILS NFR BLD MANUAL: 78.3 % (ref 42.7–76)
PLATELET # BLD AUTO: 108 10*3/MM3 (ref 140–450)
PLATELET BLD QL SMEAR: ABNORMAL
POTASSIUM SERPL-SCNC: 3.8 MMOL/L (ref 3.5–5.2)
PROT SERPL-MCNC: 5.3 G/DL (ref 6–8.5)
PROTHROMBIN TIME: 17 SECONDS (ref 11.7–14.2)
RBC # BLD AUTO: 3.83 10*6/MM3 (ref 3.77–5.28)
RBC MORPH BLD: ABNORMAL
SODIUM SERPL-SCNC: 142 MMOL/L (ref 136–145)
TIBC SERPL-MCNC: 492 MCG/DL
TSH SERPL DL<=0.005 MIU/L-ACNC: 2.56 UIU/ML (ref 0.27–4.2)
UIBC SERPL-MCNC: 457 MCG/DL (ref 112–346)
WBC # BLD AUTO: 5.89 10*3/MM3 (ref 3.4–10.8)

## 2021-01-01 PROCEDURE — 99214 OFFICE O/P EST MOD 30 MIN: CPT | Performed by: INTERNAL MEDICINE

## 2021-01-01 PROCEDURE — 93000 ELECTROCARDIOGRAM COMPLETE: CPT | Performed by: INTERNAL MEDICINE

## 2021-01-01 RX ORDER — APIXABAN 5 MG/1
TABLET, FILM COATED ORAL
Qty: 180 TABLET | Refills: 0 | Status: SHIPPED | OUTPATIENT
Start: 2021-01-01 | End: 2021-01-01

## 2021-01-01 RX ORDER — FUROSEMIDE 40 MG/1
TABLET ORAL
Qty: 60 TABLET | Refills: 0 | Status: SHIPPED | OUTPATIENT
Start: 2021-01-01 | End: 2021-01-01 | Stop reason: SDUPTHER

## 2021-01-01 RX ORDER — POTASSIUM CHLORIDE 750 MG/1
TABLET, FILM COATED, EXTENDED RELEASE ORAL
Qty: 90 TABLET | Refills: 3 | Status: SHIPPED | OUTPATIENT
Start: 2021-01-01 | End: 2022-01-01 | Stop reason: HOSPADM

## 2021-01-01 RX ORDER — BUPROPION HYDROCHLORIDE 150 MG/1
150 TABLET, EXTENDED RELEASE ORAL
Qty: 30 TABLET | Refills: 0 | Status: SHIPPED | OUTPATIENT
Start: 2021-01-01 | End: 2022-01-01

## 2021-01-01 RX ORDER — ATORVASTATIN CALCIUM 40 MG/1
40 TABLET, FILM COATED ORAL DAILY
Qty: 90 TABLET | Refills: 0 | Status: SHIPPED | OUTPATIENT
Start: 2021-01-01 | End: 2022-01-01

## 2021-01-01 RX ORDER — MIRTAZAPINE 15 MG/1
15 TABLET, FILM COATED ORAL NIGHTLY
Qty: 30 TABLET | Refills: 0 | Status: SHIPPED | OUTPATIENT
Start: 2021-01-01 | End: 2022-01-01

## 2021-01-01 RX ORDER — ATORVASTATIN CALCIUM 40 MG/1
40 TABLET, FILM COATED ORAL DAILY
Qty: 90 TABLET | Refills: 0 | Status: SHIPPED | OUTPATIENT
Start: 2021-01-01 | End: 2021-01-01 | Stop reason: SDUPTHER

## 2021-01-01 RX ORDER — FUROSEMIDE 40 MG/1
40 TABLET ORAL 2 TIMES DAILY
Qty: 180 TABLET | Refills: 0 | Status: SHIPPED | OUTPATIENT
Start: 2021-01-01 | End: 2021-01-01

## 2021-01-01 RX ORDER — PANTOPRAZOLE SODIUM 40 MG/1
40 TABLET, DELAYED RELEASE ORAL DAILY
Qty: 90 TABLET | Refills: 3 | Status: SHIPPED | OUTPATIENT
Start: 2021-01-01

## 2021-01-01 RX ORDER — FUROSEMIDE 40 MG/1
TABLET ORAL
Qty: 180 TABLET | Refills: 3 | Status: SHIPPED | OUTPATIENT
Start: 2021-01-01 | End: 2022-01-01 | Stop reason: SDUPTHER

## 2021-01-01 RX ORDER — ALLOPURINOL 300 MG/1
TABLET ORAL
Qty: 90 TABLET | Refills: 3 | Status: SHIPPED | OUTPATIENT
Start: 2021-01-01

## 2021-01-01 RX ORDER — LOSARTAN POTASSIUM 25 MG/1
TABLET ORAL
Qty: 270 TABLET | Refills: 3 | Status: SHIPPED | OUTPATIENT
Start: 2021-01-01 | End: 2022-01-01 | Stop reason: HOSPADM

## 2021-01-01 RX ORDER — METFORMIN HYDROCHLORIDE 500 MG/1
1000 TABLET, EXTENDED RELEASE ORAL
Qty: 60 TABLET | Refills: 5 | Status: SHIPPED | OUTPATIENT
Start: 2021-01-01 | End: 2022-01-01 | Stop reason: SDUPTHER

## 2021-01-01 RX ORDER — ISOSORBIDE MONONITRATE 60 MG/1
TABLET, EXTENDED RELEASE ORAL
Qty: 90 TABLET | Refills: 3 | Status: SHIPPED | OUTPATIENT
Start: 2021-01-01

## 2021-01-01 RX ORDER — METOPROLOL SUCCINATE 100 MG/1
TABLET, EXTENDED RELEASE ORAL
Qty: 90 TABLET | Refills: 3 | Status: SHIPPED | OUTPATIENT
Start: 2021-01-01 | End: 2022-01-01 | Stop reason: HOSPADM

## 2021-01-11 ENCOUNTER — TELEPHONE (OUTPATIENT)
Dept: FAMILY MEDICINE CLINIC | Facility: CLINIC | Age: 81
End: 2021-01-11

## 2021-01-11 NOTE — TELEPHONE ENCOUNTER
Patient will call back after she checks her schedule to schedule an appointment.     HUB okay to schedule.

## 2021-01-11 NOTE — TELEPHONE ENCOUNTER
PT IS CALLING IN STATING THAT SHE IS EXPERIENCING ITCHING AND BURNING IN HER VAGINAL AREA AND WANTS TO KNOW IF SOMETHING CAN BE CALLED IN FOR HER.    PT CALL BACK  [phone]  PHARMACY  [address]  [phone]

## 2021-01-15 ENCOUNTER — OFFICE VISIT (OUTPATIENT)
Dept: FAMILY MEDICINE CLINIC | Facility: CLINIC | Age: 81
End: 2021-01-15

## 2021-01-15 VITALS
DIASTOLIC BLOOD PRESSURE: 62 MMHG | SYSTOLIC BLOOD PRESSURE: 140 MMHG | HEART RATE: 82 BPM | BODY MASS INDEX: 36.33 KG/M2 | WEIGHT: 197.4 LBS | OXYGEN SATURATION: 93 % | RESPIRATION RATE: 18 BRPM | HEIGHT: 62 IN | TEMPERATURE: 97.1 F

## 2021-01-15 DIAGNOSIS — N94.9 VAGINAL BURNING: Primary | ICD-10-CM

## 2021-01-15 DIAGNOSIS — B37.9 INFECTION DUE TO YEAST: ICD-10-CM

## 2021-01-15 DIAGNOSIS — L29.9 PRURITUS: ICD-10-CM

## 2021-01-15 LAB
BILIRUB BLD-MCNC: NEGATIVE MG/DL
CLARITY, POC: CLEAR
COLOR UR: YELLOW
GLUCOSE UR STRIP-MCNC: ABNORMAL MG/DL
KETONES UR QL: NEGATIVE
LEUKOCYTE EST, POC: NEGATIVE
NITRITE UR-MCNC: NEGATIVE MG/ML
PH UR: 5 [PH] (ref 5–8)
PROT UR STRIP-MCNC: NEGATIVE MG/DL
RBC # UR STRIP: ABNORMAL /UL
SP GR UR: 1 (ref 1–1.03)
UROBILINOGEN UR QL: NORMAL

## 2021-01-15 PROCEDURE — 81003 URINALYSIS AUTO W/O SCOPE: CPT | Performed by: NURSE PRACTITIONER

## 2021-01-15 PROCEDURE — 99214 OFFICE O/P EST MOD 30 MIN: CPT | Performed by: NURSE PRACTITIONER

## 2021-01-15 RX ORDER — FLUCONAZOLE 100 MG/1
100 TABLET ORAL DAILY
Qty: 7 TABLET | Refills: 0 | Status: SHIPPED | OUTPATIENT
Start: 2021-01-15 | End: 2021-01-22

## 2021-01-15 RX ORDER — FLUCONAZOLE 100 MG/1
100 TABLET ORAL DAILY
Qty: 7 TABLET | Refills: 0 | Status: SHIPPED | OUTPATIENT
Start: 2021-01-15 | End: 2021-01-15

## 2021-01-15 RX ORDER — HYDROXYZINE HYDROCHLORIDE 25 MG/1
25 TABLET, FILM COATED ORAL EVERY 8 HOURS PRN
Qty: 30 TABLET | Refills: 0 | Status: ON HOLD | OUTPATIENT
Start: 2021-01-15 | End: 2021-06-14

## 2021-01-15 NOTE — PROGRESS NOTES
Subjective   Miri Yung is a 80 y.o. female.     Chief Complaint   Patient presents with   • Vaginal Itching   • Vaginal burning        Vitals:    01/15/21 1348   BP: 140/62   Pulse: 82   Resp: 18   Temp: 97.1 °F (36.2 °C)   SpO2: 93%        History of Present Illness   This is a 80-year-old female patient being evaluated today for burning and itching in vaginal area and generalized pruritus.  Patient was treated for UTI in the past.  She states she continues to have symptoms of itching and burning.  She also reports generalized itching that keeps her up at night.  There are no reports of new medication.  She is afebrile.    The following portions of the patient's history were reviewed and updated as appropriate: allergies, current medications, past family history, past medical history, past social history, past surgical history, and problem list.    Review of Systems   Constitutional: Negative for activity change and appetite change.   Gastrointestinal: Negative for abdominal pain, nausea and vomiting.   Genitourinary: Negative for difficulty urinating, dysuria, flank pain, frequency, hematuria and pelvic pain.   Neurological: Negative for dizziness and weakness.       Objective   Physical Exam  Vitals signs and nursing note reviewed. Exam conducted with a chaperone present.   Constitutional:       Appearance: Normal appearance.   HENT:      Head: Normocephalic.   Neck:      Musculoskeletal: Neck supple.   Cardiovascular:      Rate and Rhythm: Normal rate and regular rhythm.      Pulses: Normal pulses.      Heart sounds: Normal heart sounds.   Pulmonary:      Effort: Pulmonary effort is normal.      Breath sounds: Normal breath sounds.   Abdominal:      General: Bowel sounds are normal.      Palpations: Abdomen is soft.   Skin:     General: Skin is warm and dry.      Capillary Refill: Capillary refill takes less than 2 seconds.      Findings: Erythema and rash present.      Comments: Extreme excoriation noted  in vaginal area   Neurological:      Mental Status: She is alert and oriented to person, place, and time.           Assessment/Plan   Problems Addressed this Visit     None      Visit Diagnoses     Vaginal burning    -  Primary    Relevant Orders    POCT urinalysis dipstick, automated (Completed)    Urinalysis With Culture If Indicated - (Completed)    Pruritus        Relevant Medications    hydrOXYzine (ATARAX) 25 MG tablet    nystatin-zinc oxide    Infection due to yeast        Relevant Medications    fluconazole (Diflucan) 100 MG tablet    nystatin-zinc oxide      Diagnoses       Codes Comments    Vaginal burning    -  Primary ICD-10-CM: N94.9  ICD-9-CM: 625.8     Pruritus     ICD-10-CM: L29.9  ICD-9-CM: 698.9     Infection due to yeast     ICD-10-CM: B37.9  ICD-9-CM: 112.9       I will prescribe her Diflucan daily for 1 week along with nystatin cream.  I will give patient a hydroxyzine for short period of time to relieve itching.  I will obtain a urine and send for culture.

## 2021-01-16 LAB
APPEARANCE UR: CLEAR
BACTERIA #/AREA URNS HPF: NORMAL /[HPF]
BILIRUB UR QL STRIP: NEGATIVE
COLOR UR: YELLOW
EPI CELLS #/AREA URNS HPF: NORMAL /HPF (ref 0–10)
GLUCOSE UR QL: ABNORMAL
HGB UR QL STRIP: NEGATIVE
KETONES UR QL STRIP: NEGATIVE
LEUKOCYTE ESTERASE UR QL STRIP: NEGATIVE
MICRO URNS: ABNORMAL
MICRO URNS: ABNORMAL
MUCOUS THREADS URNS QL MICRO: PRESENT
NITRITE UR QL STRIP: NEGATIVE
PH UR STRIP: 5.5 [PH] (ref 5–7.5)
PROT UR QL STRIP: NEGATIVE
RBC #/AREA URNS HPF: NORMAL /HPF (ref 0–2)
SP GR UR: >=1.03 (ref 1–1.03)
URINALYSIS REFLEX: ABNORMAL
UROBILINOGEN UR STRIP-MCNC: 0.2 MG/DL (ref 0.2–1)
WBC #/AREA URNS HPF: NORMAL /HPF (ref 0–5)

## 2021-02-09 DIAGNOSIS — J32.1 CHRONIC FRONTAL SINUSITIS: ICD-10-CM

## 2021-02-09 RX ORDER — MONTELUKAST SODIUM 10 MG/1
10 TABLET ORAL NIGHTLY
Qty: 30 TABLET | Refills: 5 | Status: SHIPPED | OUTPATIENT
Start: 2021-02-09 | End: 2021-06-22 | Stop reason: SDUPTHER

## 2021-02-09 NOTE — TELEPHONE ENCOUNTER
[facility] pharmacy requesting refill on montelukast 10 mg tablets. Ready for approval.

## 2021-02-18 DIAGNOSIS — F51.01 PRIMARY INSOMNIA: ICD-10-CM

## 2021-02-18 DIAGNOSIS — I48.0 PAROXYSMAL ATRIAL FIBRILLATION (HCC): ICD-10-CM

## 2021-02-18 RX ORDER — MIRTAZAPINE 15 MG/1
15 TABLET, FILM COATED ORAL NIGHTLY
Qty: 30 TABLET | Refills: 3 | Status: SHIPPED | OUTPATIENT
Start: 2021-02-18 | End: 2021-06-22 | Stop reason: SDUPTHER

## 2021-02-18 RX ORDER — APIXABAN 5 MG/1
TABLET, FILM COATED ORAL
Qty: 60 TABLET | Refills: 2 | Status: SHIPPED | OUTPATIENT
Start: 2021-02-18 | End: 2021-04-06 | Stop reason: SDUPTHER

## 2021-02-19 ENCOUNTER — TELEPHONE (OUTPATIENT)
Dept: FAMILY MEDICINE CLINIC | Facility: CLINIC | Age: 81
End: 2021-02-19

## 2021-02-19 DIAGNOSIS — B37.9 CANDIDIASIS: Primary | ICD-10-CM

## 2021-02-19 RX ORDER — CLOTRIMAZOLE AND BETAMETHASONE DIPROPIONATE 10; .64 MG/G; MG/G
CREAM TOPICAL 2 TIMES DAILY
Qty: 45 G | Refills: 0 | Status: ON HOLD | OUTPATIENT
Start: 2021-02-19 | End: 2021-06-14

## 2021-02-19 RX ORDER — FLUCONAZOLE 200 MG/1
200 TABLET ORAL DAILY
Qty: 7 TABLET | Refills: 0 | Status: SHIPPED | OUTPATIENT
Start: 2021-02-19 | End: 2021-06-11

## 2021-03-19 ENCOUNTER — OFFICE VISIT (OUTPATIENT)
Dept: FAMILY MEDICINE CLINIC | Facility: CLINIC | Age: 81
End: 2021-03-19

## 2021-03-19 ENCOUNTER — TELEPHONE (OUTPATIENT)
Dept: FAMILY MEDICINE CLINIC | Facility: CLINIC | Age: 81
End: 2021-03-19

## 2021-03-19 VITALS
DIASTOLIC BLOOD PRESSURE: 58 MMHG | OXYGEN SATURATION: 88 % | HEART RATE: 76 BPM | HEIGHT: 62 IN | BODY MASS INDEX: 36.8 KG/M2 | WEIGHT: 200 LBS | SYSTOLIC BLOOD PRESSURE: 150 MMHG | TEMPERATURE: 97.1 F

## 2021-03-19 DIAGNOSIS — I10 ESSENTIAL HYPERTENSION: Primary | ICD-10-CM

## 2021-03-19 DIAGNOSIS — I50.32 CHRONIC DIASTOLIC (CONGESTIVE) HEART FAILURE (HCC): ICD-10-CM

## 2021-03-19 DIAGNOSIS — E11.9 TYPE 2 DIABETES MELLITUS WITHOUT COMPLICATION, WITHOUT LONG-TERM CURRENT USE OF INSULIN (HCC): ICD-10-CM

## 2021-03-19 PROCEDURE — 99214 OFFICE O/P EST MOD 30 MIN: CPT | Performed by: NURSE PRACTITIONER

## 2021-03-19 NOTE — TELEPHONE ENCOUNTER
PATIENT IS TAKING 20MG TWICE A DAY ON THE LASIX. PATIENT WAS TOLD TO CALL TO INFORM WITH THIS INFORMATION. STATED TO CALL [facility] WITH INFORMATION FOR DOSAGE INCREASE.      [facility] PHONE NUMBER - [phone]

## 2021-03-19 NOTE — TELEPHONE ENCOUNTER
SPOKE WITH [facility] OF CARDIOLOGY APPT THIS Thursday @840 WITH NISHANT VIEYRA AND MED INSTRUCTIONS PER STEPHENIE

## 2021-03-19 NOTE — TELEPHONE ENCOUNTER
Please call carlos and give him instruction to have her take 40mg (2 tabs in am) and 1 tab in afternoon for 1 week and make appt to see Dr rodas soon

## 2021-03-20 LAB
ALBUMIN SERPL-MCNC: 3.7 G/DL (ref 3.6–4.6)
ALBUMIN/GLOB SERPL: 2.2 {RATIO} (ref 1.2–2.2)
ALP SERPL-CCNC: 142 IU/L (ref 39–117)
ALT SERPL-CCNC: 30 IU/L (ref 0–32)
AST SERPL-CCNC: 35 IU/L (ref 0–40)
BASOPHILS # BLD AUTO: 0.1 X10E3/UL (ref 0–0.2)
BASOPHILS NFR BLD AUTO: 1 %
BILIRUB SERPL-MCNC: 0.4 MG/DL (ref 0–1.2)
BUN SERPL-MCNC: 17 MG/DL (ref 8–27)
BUN/CREAT SERPL: 22 (ref 12–28)
CALCIUM SERPL-MCNC: 8.8 MG/DL (ref 8.7–10.3)
CHLORIDE SERPL-SCNC: 99 MMOL/L (ref 96–106)
CO2 SERPL-SCNC: 25 MMOL/L (ref 20–29)
CREAT SERPL-MCNC: 0.78 MG/DL (ref 0.57–1)
EOSINOPHIL # BLD AUTO: 0.1 X10E3/UL (ref 0–0.4)
EOSINOPHIL NFR BLD AUTO: 1 %
ERYTHROCYTE [DISTWIDTH] IN BLOOD BY AUTOMATED COUNT: 15.6 % (ref 11.7–15.4)
GLOBULIN SER CALC-MCNC: 1.7 G/DL (ref 1.5–4.5)
GLUCOSE SERPL-MCNC: 458 MG/DL (ref 65–99)
HBA1C MFR BLD: 15.1 % (ref 4.8–5.6)
HCT VFR BLD AUTO: 28.5 % (ref 34–46.6)
HGB BLD-MCNC: 7.9 G/DL (ref 11.1–15.9)
IMM GRANULOCYTES # BLD AUTO: 0 X10E3/UL (ref 0–0.1)
IMM GRANULOCYTES NFR BLD AUTO: 1 %
LYMPHOCYTES # BLD AUTO: 1.6 X10E3/UL (ref 0.7–3.1)
LYMPHOCYTES NFR BLD AUTO: 25 %
MCH RBC QN AUTO: 20.9 PG (ref 26.6–33)
MCHC RBC AUTO-ENTMCNC: 27.7 G/DL (ref 31.5–35.7)
MCV RBC AUTO: 75 FL (ref 79–97)
MONOCYTES # BLD AUTO: 0.5 X10E3/UL (ref 0.1–0.9)
MONOCYTES NFR BLD AUTO: 8 %
NEUTROPHILS # BLD AUTO: 4.1 X10E3/UL (ref 1.4–7)
NEUTROPHILS NFR BLD AUTO: 64 %
NT-PROBNP SERPL-MCNC: 1375 PG/ML (ref 0–738)
PLATELET # BLD AUTO: 146 X10E3/UL (ref 150–450)
POTASSIUM SERPL-SCNC: 4.4 MMOL/L (ref 3.5–5.2)
PROT SERPL-MCNC: 5.4 G/DL (ref 6–8.5)
RBC # BLD AUTO: 3.78 X10E6/UL (ref 3.77–5.28)
SODIUM SERPL-SCNC: 139 MMOL/L (ref 134–144)
WBC # BLD AUTO: 6.4 X10E3/UL (ref 3.4–10.8)

## 2021-03-22 DIAGNOSIS — F32.A DEPRESSION, UNSPECIFIED DEPRESSION TYPE: ICD-10-CM

## 2021-03-22 DIAGNOSIS — E11.9 TYPE 2 DIABETES MELLITUS WITHOUT COMPLICATION, WITHOUT LONG-TERM CURRENT USE OF INSULIN (HCC): Primary | ICD-10-CM

## 2021-03-22 RX ORDER — METFORMIN HYDROCHLORIDE 500 MG/1
1000 TABLET, EXTENDED RELEASE ORAL
Qty: 30 TABLET | Refills: 5 | Status: SHIPPED | OUTPATIENT
Start: 2021-03-22 | End: 2021-04-19 | Stop reason: SDUPTHER

## 2021-03-22 RX ORDER — BUPROPION HYDROCHLORIDE 150 MG/1
150 TABLET, EXTENDED RELEASE ORAL
Qty: 30 TABLET | Refills: 3 | Status: SHIPPED | OUTPATIENT
Start: 2021-03-22 | End: 2021-08-03 | Stop reason: SDUPTHER

## 2021-03-23 DIAGNOSIS — I50.9 ACUTE ON CHRONIC CONGESTIVE HEART FAILURE, UNSPECIFIED HEART FAILURE TYPE (HCC): ICD-10-CM

## 2021-03-23 RX ORDER — FUROSEMIDE 40 MG/1
40 TABLET ORAL 2 TIMES DAILY
Qty: 180 TABLET | Refills: 0 | Status: ON HOLD | OUTPATIENT
Start: 2021-03-23 | End: 2021-06-18 | Stop reason: SDUPTHER

## 2021-03-25 ENCOUNTER — OFFICE VISIT (OUTPATIENT)
Dept: CARDIOLOGY | Facility: CLINIC | Age: 81
End: 2021-03-25

## 2021-03-25 VITALS
OXYGEN SATURATION: 99 % | HEART RATE: 85 BPM | HEIGHT: 62 IN | SYSTOLIC BLOOD PRESSURE: 140 MMHG | WEIGHT: 196 LBS | DIASTOLIC BLOOD PRESSURE: 70 MMHG | BODY MASS INDEX: 36.07 KG/M2

## 2021-03-25 DIAGNOSIS — I34.0 NONRHEUMATIC MITRAL VALVE REGURGITATION: ICD-10-CM

## 2021-03-25 DIAGNOSIS — R60.9 EDEMA, UNSPECIFIED TYPE: ICD-10-CM

## 2021-03-25 DIAGNOSIS — I48.0 PAROXYSMAL ATRIAL FIBRILLATION (HCC): Primary | ICD-10-CM

## 2021-03-25 DIAGNOSIS — I50.32 CHRONIC DIASTOLIC (CONGESTIVE) HEART FAILURE (HCC): ICD-10-CM

## 2021-03-25 PROCEDURE — 99214 OFFICE O/P EST MOD 30 MIN: CPT | Performed by: NURSE PRACTITIONER

## 2021-03-25 PROCEDURE — 93000 ELECTROCARDIOGRAM COMPLETE: CPT | Performed by: NURSE PRACTITIONER

## 2021-03-25 NOTE — PROGRESS NOTES
Date of Office Visit: 2021  Encounter Provider: KWASI Mancia  Place of Service: [facility] CARDIOLOGY  Patient Name: Miri Yung  :1940    Chief Complaint   Patient presents with   • Paroxysmal atrial fibrillation     follow up    • Hypertension   • Hyperlipidemia   • Congestive Heart Failure   :     HPI: Miri Yung is a 80 y.o. female who presents today for follow-up.  Old records have been obtained and reviewed by me.  She is a patient of Dr. 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  She has done well with medical management over the years.  Her last echocardiogram was in  at which time she had normal LV systolic function, grade 2 diastolic dysfunction, aortic valve sclerosis with no stenosis or regurgitation, mild mitral regurgitation, and a mildly elevated RVSP of 41 mmHg.   She was last seen in the office by Dr. York in 2020 at which time she felt well.  She had recently been in the emergency room with atrial fibrillation but ultimately had converted to sinus rhythm.  During her appointment with Dr. York, she was in sinus rhythm.  She denied any symptoms of angina or heart failure.  No changes were made to her medical regimen, and she was advised to follow-up in 6 months.   Evidently she was seen at her PCP on 3/19/2021 for lateral lower extremity edema.  She also reported increased shortness of breath and had gained 3 pounds since her last visit.  Her PCP ordered a proBNP which was 1375.  She was advised to follow-up in our office.   Today she overall feels well.  She denies any chest pain, palpitations, dizziness, syncope, bleeding difficulties or melena.  She has very mild and intermittent shortness of breath with activities such as getting out of her bed.   She denies any PND orthopnea.  She does have bilateral lower extremity swelling.  According to she and her son this is a chronic issue but has been worse over the last 3 weeks.  Evidently she had a fall 2 weeks ago when she was outside working in the yard.  Because of a past knee surgery and a metal plate in her knee, she was unable to get up so she had to crawl into her house.  She also has balance issues which are reportedly chronic for her.  According to the son, she is falling frequently, at least once a month.    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; generally asymptomatic   • Mitral regurgitation    • Mixed anxiety depressive disorder    • Nonrheumatic tricuspid (valve) insufficiency    • Osteoarthritis    • PAF (paroxysmal atrial fibrillation) (CMS/HCC)    • Sacroiliitis (CMS/HCC)    • Tricuspid regurgitation    • Type 2 diabetes mellitus (CMS/HCC)        Past Surgical History:   Procedure Laterality Date   • CATARACT EXTRACTION     • COLONOSCOPY N/A 3/10/2016    Procedure: COLONOSCOPY TO CECUM;  Surgeon: Shaan Becerra Jr., MD;  Location: [facility] ENDOSCOPY;  Service:    • ENDOSCOPY N/A 3/10/2016    Procedure: ESOPHAGOGASTRODUODENOSCOPY ;  Surgeon: Shaan Becerra Jr., MD;  Location: [facility] ENDOSCOPY;  Service:    • KNEE SURGERY     • TUBAL ABDOMINAL LIGATION         Social History     Socioeconomic History   • Marital status:       Spouse name: Not on file   • Number of children: Not on file   • Years of education: Not on file   • Highest education level: Not on file   Tobacco Use   • Smoking status: Never Smoker   • Smokeless tobacco: Never Used   • Tobacco comment: caffeine use    Vaping Use   • Vaping Use: Never assessed   Substance and Sexual Activity   • Alcohol use: No   • Drug use: No   • Sexual activity: Defer       Family History   Problem Relation Age of Onset   • Kidney disease Mother    • Heart disease Mother    • Emphysema Father    • Heart disease Brother        Review of Systems   Constitutional: Negative.   Cardiovascular: Positive for dyspnea on exertion and leg swelling. Negative for chest pain, orthopnea, paroxysmal nocturnal dyspnea and syncope.   Respiratory: Negative.    Hematologic/Lymphatic: Negative for bleeding problem.   Musculoskeletal: Positive for falls and joint pain.   Gastrointestinal: Negative for melena.   Neurological: Negative for dizziness and light-headedness.       Allergies   Allergen Reactions   • Decongestant  [Pseudoephedrine]          Current Outpatient Medications:   •  acetaminophen (TYLENOL) 500 MG tablet, Take 500 mg by mouth every 4 (four) hours as needed for mild pain (1-3) (1-2 TABLETS PRN Q 4-6 HOURS)., Disp: , Rfl:   •  allopurinol (ZYLOPRIM) 300 MG tablet, TAKE 1 TABLET BY MOUTH  DAILY, Disp: 90 tablet, Rfl: 3  •  atorvastatin (LIPITOR) 40 MG tablet, Take 40 mg by mouth Daily., Disp: , Rfl:   •  buPROPion SR (WELLBUTRIN SR) 150 MG 12 hr tablet, Take 1 tablet by mouth Daily With Breakfast., Disp: 30 tablet, Rfl: 3  •  clotrimazole-betamethasone (Lotrisone) 1-0.05 % cream, Apply  topically to the appropriate area as directed 2 (Two) Times a Day., Disp: 45 g, Rfl: 0  •  Eliquis 5 MG tablet tablet, TAKE 1 TABLET BY MOUTH EVERY 12 HOURS, Disp: 60 tablet, Rfl: 2  •  fluconazole (Diflucan) 200 MG tablet, Take 1 tablet by mouth Daily., Disp: 7 tablet, Rfl: 0  •  furosemide (LASIX) 40 MG  "tablet, Take 1 tablet by mouth 2 (Two) Times a Day., Disp: 180 tablet, Rfl: 0  •  hydrOXYzine (ATARAX) 25 MG tablet, Take 1 tablet by mouth Every 8 (Eight) Hours As Needed for Itching., Disp: 30 tablet, Rfl: 0  •  isosorbide mononitrate (IMDUR) 60 MG 24 hr tablet, TAKE 1 TABLET BY MOUTH  DAILY, Disp: 90 tablet, Rfl: 3  •  losartan (COZAAR) 25 MG tablet, TAKE 1 TABLET BY MOUTH IN  THE MORNING AND 2 TABLETS  BY MOUTH IN THE EVENING, Disp: 270 tablet, Rfl: 3  •  metFORMIN ER (GLUCOPHAGE-XR) 500 MG 24 hr tablet, Take 2 tablets by mouth Daily With Breakfast., Disp: 30 tablet, Rfl: 5  •  metoprolol succinate XL (TOPROL-XL) 100 MG 24 hr tablet, TAKE 1 TABLET BY MOUTH  DAILY, Disp: 90 tablet, Rfl: 3  •  mirtazapine (REMERON) 15 MG tablet, Take 1 tablet by mouth Every Night., Disp: 30 tablet, Rfl: 3  •  mirtazapine (REMERON) 7.5 MG tablet, TAKE 1 TABLET BY MOUTH EVERY NIGHT, Disp: 30 tablet, Rfl: 2  •  montelukast (SINGULAIR) 10 MG tablet, Take 1 tablet by mouth Every Night., Disp: 30 tablet, Rfl: 5  •  nystatin-zinc oxide, To affected area twice daily, Disp: 1 tube, Rfl: 2  •  pantoprazole (PROTONIX) 40 MG EC tablet, TAKE 1 TABLET BY MOUTH  DAILY, Disp: 90 tablet, Rfl: 3  •  potassium chloride (K-DUR) 10 MEQ CR tablet, TAKE 1 TABLET BY MOUTH IN  THE MORNING, Disp: 90 tablet, Rfl: 3  •  traMADol (ULTRAM) 50 MG tablet, Take 1 tablet by mouth Every 6 (Six) Hours As Needed for Moderate Pain ., Disp: 30 tablet, Rfl: 5      Objective:     Vitals:    03/25/21 0926   BP: 140/70   Pulse: 85   SpO2: 99%   Weight: 88.9 kg (196 lb)   Height: 157.5 cm (62\")     Body mass index is 35.85 kg/m².    PHYSICAL EXAM:    Neck:      Vascular: No JVD.   Pulmonary:      Effort: Pulmonary effort is normal.      Breath sounds: Normal breath sounds.   Cardiovascular:      Normal rate. Regular rhythm.      Murmurs: There is a systolic murmur.      No gallop. No click. No rub.   Pulses:     Intact distal pulses.   Edema:     Peripheral edema (BLE) " present.          ECG 12 Lead    Date/Time: 3/25/2021 9:33 AM  Performed by: Prema Mo APRN  Authorized by: Prema Mo APRN   Comparison: compared with previous ECG from 12/3/2020  Similar to previous ECG  Rhythm: sinus rhythm  Rate: normal  BPM: 85  T inversion: I and aVL  Other findings: non-specific ST-T wave changes    Clinical impression: abnormal EKG  Comments: Indication: Atrial fibrillation              Assessment:       Diagnosis Plan   1. Paroxysmal atrial fibrillation (CMS/HCC)  ECG 12 Lead   2. Chronic diastolic (congestive) heart failure (CMS/HCC)  Adult Transthoracic Echo Complete W/ Cont if Necessary Per Protocol   3. Nonrheumatic mitral valve regurgitation     4. Edema, unspecified type       Orders Placed This Encounter   Procedures   • ECG 12 Lead     This order was created via procedure documentation   • Adult Transthoracic Echo Complete W/ Cont if Necessary Per Protocol     Standing Status:   Future     Standing Expiration Date:   3/25/2022     Order Specific Question:   Reason for exam?     Answer:   Dyspnea          Plan:       1.  Paroxysmal atrial fibrillation.  She is in sinus rhythm today.  She is rate controlled on metoprolol and anticoagulated on Eliquis.  I am a little concerned about her frequent falls and being on anticoagulation.  I will discuss this with Dr. York.      2.  Chronic diastolic CHF/bilateral lower extremity edema.  She appears euvolemic today.  She denies any PND, orthopnea, or really even shortness of breath with exertion.  She is on Lasix 40 mg twice daily.  I will check an echocardiogram to assess for any LV function or structural changes.      Further recommendations will be made pending the results of the echocardiogram.        As always, it has been a pleasure to participate in your patient's care.      Sincerely,         KWASI Isbell

## 2021-03-26 RX ORDER — ATORVASTATIN CALCIUM 40 MG/1
40 TABLET, FILM COATED ORAL DAILY
Qty: 90 TABLET | Refills: 0 | Status: SHIPPED | OUTPATIENT
Start: 2021-03-26 | End: 2021-06-22 | Stop reason: SDUPTHER

## 2021-03-26 NOTE — TELEPHONE ENCOUNTER
Caller: Hardeep Yung    Relationship: Emergency Contact    Best call back number: [phone]    Medication needed:   Requested Prescriptions     Pending Prescriptions Disp Refills   • atorvastatin (LIPITOR) 40 MG tablet       Sig: Take 1 tablet by mouth Daily.       When do you need the refill by: 3/26/21    What additional details did the patient provide when requesting the medication: PATIENTS SON STATED THE CARDIOLOGIST SAID SHE SHOULD BE ON LIPITOR AND HE WASN'T AWARE AND SHE HASNT BEEN TAKING. ALSO WAS ADVISED BY PCP IF THEY DIDN'T DO LABS SHE WOULD NEED TO BE SCHEDULED AT OFFICE TO HAVE THEM DONE AND THEY DID NOT DO THE LABS. PLEASE CALL BACK TO SCHEDULE.     Does the patient have less than a 3 day supply:  [x] Yes  [] No    What is the patient's preferred pharmacy:    [id number] - [facility] [address] [address] [address] AT [facility] [address] [address] [address] [address] - [phone] [facility] [phone]   [phone]

## 2021-04-06 DIAGNOSIS — I48.0 PAROXYSMAL ATRIAL FIBRILLATION (HCC): ICD-10-CM

## 2021-04-12 ENCOUNTER — TELEPHONE (OUTPATIENT)
Dept: CARDIOLOGY | Facility: CLINIC | Age: 81
End: 2021-04-12

## 2021-04-12 ENCOUNTER — HOSPITAL ENCOUNTER (OUTPATIENT)
Dept: CARDIOLOGY | Facility: HOSPITAL | Age: 81
Discharge: HOME | End: 2021-04-12
Admitting: NURSE PRACTITIONER

## 2021-04-12 VITALS
WEIGHT: 196 LBS | HEIGHT: 62 IN | HEART RATE: 77 BPM | SYSTOLIC BLOOD PRESSURE: 150 MMHG | DIASTOLIC BLOOD PRESSURE: 80 MMHG | OXYGEN SATURATION: 94 % | BODY MASS INDEX: 36.07 KG/M2

## 2021-04-12 DIAGNOSIS — I50.32 CHRONIC DIASTOLIC (CONGESTIVE) HEART FAILURE (HCC): ICD-10-CM

## 2021-04-12 DIAGNOSIS — I71.00 DISSECTION OF AORTA, UNSPECIFIED PORTION OF AORTA (HCC): Primary | ICD-10-CM

## 2021-04-12 PROCEDURE — 93306 TTE W/DOPPLER COMPLETE: CPT

## 2021-04-12 PROCEDURE — 93306 TTE W/DOPPLER COMPLETE: CPT | Performed by: INTERNAL MEDICINE

## 2021-04-12 PROCEDURE — 25010000002 PERFLUTREN (DEFINITY) 8.476 MG IN SODIUM CHLORIDE (PF) 0.9 % 10 ML INJECTION: Performed by: NURSE PRACTITIONER

## 2021-04-12 RX ADMIN — PERFLUTREN 1.5 ML: 6.52 INJECTION, SUSPENSION INTRAVENOUS at 11:04

## 2021-04-12 NOTE — TELEPHONE ENCOUNTER
----- Message from Jignesh York MD sent at 4/12/2021  1:23 PM EDT -----  I agree that it is probably calcium.  The aortic stenosis does not look significant.  Lets get an MRI however to exclude a dissection  ----- Message -----  From: Prema Mo APRN  Sent: 4/12/2021   1:03 PM EDT  To: Jignesh York MD    This is your patient with paroxysmal atrial fibrillation.    I order this echocardiogram for complaints of increased lower extremity edema.  What are your thoughts on the mention of the echointense structures in the proximal aorta?  The AS is also new.  ----- Message -----  From: Jatinder Hinojosa Jr., MD  Sent: 4/12/2021  12:03 PM EDT  To: KWASI Hamilton

## 2021-04-13 LAB
AORTIC ARCH: 2.1 CM
AORTIC DIMENSIONLESS INDEX: 0.5 (DI)
ASCENDING AORTA: 2.9 CM
BH CV ECHO MEAS - ACS: 1.7 CM
BH CV ECHO MEAS - AO ARCH DIAM (PROXIMAL TRANS.): 2.1 CM
BH CV ECHO MEAS - AO MAX PG (FULL): 22 MMHG
BH CV ECHO MEAS - AO MAX PG: 30.2 MMHG
BH CV ECHO MEAS - AO MEAN PG (FULL): 12 MMHG
BH CV ECHO MEAS - AO MEAN PG: 16.8 MMHG
BH CV ECHO MEAS - AO ROOT AREA (BSA CORRECTED): 1.6
BH CV ECHO MEAS - AO ROOT AREA: 7.3 CM^2
BH CV ECHO MEAS - AO ROOT DIAM: 3 CM
BH CV ECHO MEAS - AO V2 MAX: 274.7 CM/SEC
BH CV ECHO MEAS - AO V2 MEAN: 197.5 CM/SEC
BH CV ECHO MEAS - AO V2 VTI: 65.2 CM
BH CV ECHO MEAS - ASC AORTA: 2.9 CM
BH CV ECHO MEAS - AVA(I,A): 1.4 CM^2
BH CV ECHO MEAS - AVA(I,D): 1.4 CM^2
BH CV ECHO MEAS - AVA(V,A): 1.4 CM^2
BH CV ECHO MEAS - AVA(V,D): 1.4 CM^2
BH CV ECHO MEAS - BSA(HAYCOCK): 2 M^2
BH CV ECHO MEAS - BSA: 1.9 M^2
BH CV ECHO MEAS - BZI_BMI: 35.8 KILOGRAMS/M^2
BH CV ECHO MEAS - BZI_METRIC_HEIGHT: 157.5 CM
BH CV ECHO MEAS - BZI_METRIC_WEIGHT: 88.9 KG
BH CV ECHO MEAS - EDV(MOD-SP2): 106 ML
BH CV ECHO MEAS - EDV(MOD-SP4): 114 ML
BH CV ECHO MEAS - EDV(TEICH): 114.4 ML
BH CV ECHO MEAS - EF(CUBED): 80.8 %
BH CV ECHO MEAS - EF(MOD-BP): 71.2 %
BH CV ECHO MEAS - EF(MOD-SP2): 70.8 %
BH CV ECHO MEAS - EF(MOD-SP4): 74.6 %
BH CV ECHO MEAS - EF(TEICH): 73.2 %
BH CV ECHO MEAS - ESV(MOD-SP2): 31 ML
BH CV ECHO MEAS - ESV(MOD-SP4): 29 ML
BH CV ECHO MEAS - ESV(TEICH): 30.7 ML
BH CV ECHO MEAS - FS: 42.3 %
BH CV ECHO MEAS - IVS/LVPW: 0.91
BH CV ECHO MEAS - IVSD: 1.3 CM
BH CV ECHO MEAS - LAT PEAK E' VEL: 7.4 CM/SEC
BH CV ECHO MEAS - LV DIASTOLIC VOL/BSA (35-75): 60.2 ML/M^2
BH CV ECHO MEAS - LV MASS(C)D: 275.5 GRAMS
BH CV ECHO MEAS - LV MASS(C)DI: 145.3 GRAMS/M^2
BH CV ECHO MEAS - LV MAX PG: 8.2 MMHG
BH CV ECHO MEAS - LV MEAN PG: 4.9 MMHG
BH CV ECHO MEAS - LV SYSTOLIC VOL/BSA (12-30): 15.3 ML/M^2
BH CV ECHO MEAS - LV V1 MAX: 143.2 CM/SEC
BH CV ECHO MEAS - LV V1 MEAN: 106.2 CM/SEC
BH CV ECHO MEAS - LV V1 VTI: 34.5 CM
BH CV ECHO MEAS - LVIDD: 4.9 CM
BH CV ECHO MEAS - LVIDS: 2.8 CM
BH CV ECHO MEAS - LVLD AP2: 7 CM
BH CV ECHO MEAS - LVLD AP4: 7.1 CM
BH CV ECHO MEAS - LVLS AP2: 5.4 CM
BH CV ECHO MEAS - LVLS AP4: 6.2 CM
BH CV ECHO MEAS - LVOT AREA (M): 2.8 CM^2
BH CV ECHO MEAS - LVOT AREA: 2.7 CM^2
BH CV ECHO MEAS - LVOT DIAM: 1.9 CM
BH CV ECHO MEAS - LVPWD: 1.4 CM
BH CV ECHO MEAS - MED PEAK E' VEL: 7.2 CM/SEC
BH CV ECHO MEAS - MR MAX PG: 102.7 MMHG
BH CV ECHO MEAS - MR MAX VEL: 506.8 CM/SEC
BH CV ECHO MEAS - MV A DUR: 0.12 SEC
BH CV ECHO MEAS - MV A MAX VEL: 110.2 CM/SEC
BH CV ECHO MEAS - MV DEC SLOPE: 641.7 CM/SEC^2
BH CV ECHO MEAS - MV DEC TIME: 0.18 SEC
BH CV ECHO MEAS - MV E MAX VEL: 156.1 CM/SEC
BH CV ECHO MEAS - MV E/A: 1.4
BH CV ECHO MEAS - MV MAX PG: 9.9 MMHG
BH CV ECHO MEAS - MV MEAN PG: 4.2 MMHG
BH CV ECHO MEAS - MV P1/2T MAX VEL: 157.5 CM/SEC
BH CV ECHO MEAS - MV P1/2T: 71.9 MSEC
BH CV ECHO MEAS - MV V2 MAX: 157.5 CM/SEC
BH CV ECHO MEAS - MV V2 MEAN: 96.8 CM/SEC
BH CV ECHO MEAS - MV V2 VTI: 40.7 CM
BH CV ECHO MEAS - MVA P1/2T LCG: 1.4 CM^2
BH CV ECHO MEAS - MVA(P1/2T): 3.1 CM^2
BH CV ECHO MEAS - MVA(VTI): 2.3 CM^2
BH CV ECHO MEAS - PA ACC TIME: 0.12 SEC
BH CV ECHO MEAS - PA MAX PG (FULL): 2.3 MMHG
BH CV ECHO MEAS - PA MAX PG: 4.5 MMHG
BH CV ECHO MEAS - PA PR(ACCEL): 24.3 MMHG
BH CV ECHO MEAS - PA V2 MAX: 106.4 CM/SEC
BH CV ECHO MEAS - PULM A REVS DUR: 0.11 SEC
BH CV ECHO MEAS - PULM A REVS VEL: 43.3 CM/SEC
BH CV ECHO MEAS - PULM DIAS VEL: 66.2 CM/SEC
BH CV ECHO MEAS - PULM S/D: 1.2
BH CV ECHO MEAS - PULM SYS VEL: 78.6 CM/SEC
BH CV ECHO MEAS - PVA(V,A): 2.3 CM^2
BH CV ECHO MEAS - PVA(V,D): 2.3 CM^2
BH CV ECHO MEAS - QP/QS: 0.49
BH CV ECHO MEAS - RAP SYSTOLE: 3 MMHG
BH CV ECHO MEAS - RV MAX PG: 2.2 MMHG
BH CV ECHO MEAS - RV MEAN PG: 1.4 MMHG
BH CV ECHO MEAS - RV V1 MAX: 75 CM/SEC
BH CV ECHO MEAS - RV V1 MEAN: 54.8 CM/SEC
BH CV ECHO MEAS - RV V1 VTI: 14.3 CM
BH CV ECHO MEAS - RVOT AREA: 3.2 CM^2
BH CV ECHO MEAS - RVOT DIAM: 2 CM
BH CV ECHO MEAS - RVSP: 47 MMHG
BH CV ECHO MEAS - SI(AO): 250.8 ML/M^2
BH CV ECHO MEAS - SI(CUBED): 51.1 ML/M^2
BH CV ECHO MEAS - SI(LVOT): 49.6 ML/M^2
BH CV ECHO MEAS - SI(MOD-SP2): 39.6 ML/M^2
BH CV ECHO MEAS - SI(MOD-SP4): 44.9 ML/M^2
BH CV ECHO MEAS - SI(TEICH): 44.2 ML/M^2
BH CV ECHO MEAS - SUP REN AO DIAM: 1.9 CM
BH CV ECHO MEAS - SV(AO): 475.3 ML
BH CV ECHO MEAS - SV(CUBED): 96.8 ML
BH CV ECHO MEAS - SV(LVOT): 93.9 ML
BH CV ECHO MEAS - SV(MOD-SP2): 75 ML
BH CV ECHO MEAS - SV(MOD-SP4): 85 ML
BH CV ECHO MEAS - SV(RVOT): 46.3 ML
BH CV ECHO MEAS - SV(TEICH): 83.7 ML
BH CV ECHO MEAS - TAPSE (>1.6): 2.5 CM
BH CV ECHO MEAS - TR MAX VEL: 329.8 CM/SEC
BH CV ECHO MEASUREMENTS AVERAGE E/E' RATIO: 21.38
BH CV XLRA - RV BASE: 3.5 CM
BH CV XLRA - RV LENGTH: 6.6 CM
BH CV XLRA - RV MID: 2 CM
BH CV XLRA - TDI S': 15.2 CM/SEC
LEFT ATRIUM VOLUME INDEX: 82 ML/M2
MAXIMAL PREDICTED HEART RATE: 139 BPM
SINUS: 2.8 CM
STJ: 2.1 CM
STRESS TARGET HR: 118 BPM

## 2021-04-13 NOTE — TELEPHONE ENCOUNTER
I spoke with Hardeep and reviewed Dr. York's recommendation.  He is agreeable.  Additionally, we had a discussion regarding his mother's falls recently discussed at her appointment.  He does not feel at this point that there is too much concern and especially does not feel like she needs to stop her blood thinner.

## 2021-04-19 DIAGNOSIS — E11.9 TYPE 2 DIABETES MELLITUS WITHOUT COMPLICATION, WITHOUT LONG-TERM CURRENT USE OF INSULIN (HCC): ICD-10-CM

## 2021-04-19 RX ORDER — METFORMIN HYDROCHLORIDE 500 MG/1
1000 TABLET, EXTENDED RELEASE ORAL
Qty: 30 TABLET | Refills: 5 | Status: SHIPPED | OUTPATIENT
Start: 2021-04-19 | End: 2021-01-01 | Stop reason: SDUPTHER

## 2021-04-22 ENCOUNTER — TELEPHONE (OUTPATIENT)
Dept: FAMILY MEDICINE CLINIC | Facility: CLINIC | Age: 81
End: 2021-04-22

## 2021-04-22 NOTE — TELEPHONE ENCOUNTER
Caller: [facility] #[id number] - [facility] [address] [facility] [address] [facility] [facility] [address] - [phone] [facility] [phone] FX    Relationship to patient: Pharmacy    Best call back number: [phone]    Patient is needing: PHARMACY CALLED TO SEE IF A FAX WAS RECEIVED REGARDING THIS PATIENTS PILL COUNT FOR METFORMIN.  PLEASE ADVISE.  THANK YOU.

## 2021-05-07 ENCOUNTER — HOSPITAL ENCOUNTER (OUTPATIENT)
Dept: CT IMAGING | Facility: HOSPITAL | Age: 81
Discharge: HOME | End: 2021-05-07
Admitting: NURSE PRACTITIONER

## 2021-05-07 DIAGNOSIS — I71.00 DISSECTION OF AORTA, UNSPECIFIED PORTION OF AORTA (HCC): ICD-10-CM

## 2021-05-07 LAB — CREAT BLDA-MCNC: 0.6 MG/DL (ref 0.6–1.3)

## 2021-05-07 PROCEDURE — 82565 ASSAY OF CREATININE: CPT

## 2021-05-07 PROCEDURE — 0 IOPAMIDOL PER 1 ML: Performed by: NURSE PRACTITIONER

## 2021-05-07 PROCEDURE — 71275 CT ANGIOGRAPHY CHEST: CPT

## 2021-05-07 RX ADMIN — IOPAMIDOL 100 ML: 755 INJECTION, SOLUTION INTRAVENOUS at 06:27

## 2021-05-10 ENCOUNTER — TELEPHONE (OUTPATIENT)
Dept: CARDIOLOGY | Facility: CLINIC | Age: 81
End: 2021-05-10

## 2021-05-10 DIAGNOSIS — R91.1 LUNG NODULE: Primary | ICD-10-CM

## 2021-05-10 NOTE — TELEPHONE ENCOUNTER
I spoke with the patient's son Hardeep in regards to this information.  He is agreeable.  I will place a referral to pulmonary.

## 2021-05-10 NOTE — TELEPHONE ENCOUNTER
----- Message from Jignesh York MD sent at 5/10/2021  9:36 AM EDT -----  She should probably see pulmonary  ----- Message -----  From: Prema Mo APRN  Sent: 5/10/2021   8:51 AM EDT  To: Jignesh York MD    I ordered this to rule out an aortic dissection which fortunately she does not have.  However, there are several pulmonary nodules for which follow-up PET CT is recommended.  She also had compression deformity of T6.  I am not sure what to do.  Does she need to see pulmonary?  Should we defer to her PCP?  ----- Message -----  From: Interface, Rad Results [place] In  Sent: 5/7/2021   5:17 PM EDT  To: KWASI Hamilton

## 2021-06-11 ENCOUNTER — TELEPHONE (OUTPATIENT)
Dept: FAMILY MEDICINE CLINIC | Facility: CLINIC | Age: 81
End: 2021-06-11

## 2021-06-11 ENCOUNTER — OFFICE VISIT (OUTPATIENT)
Dept: FAMILY MEDICINE CLINIC | Facility: CLINIC | Age: 81
End: 2021-06-11

## 2021-06-11 VITALS
SYSTOLIC BLOOD PRESSURE: 144 MMHG | TEMPERATURE: 98.4 F | DIASTOLIC BLOOD PRESSURE: 68 MMHG | WEIGHT: 197 LBS | BODY MASS INDEX: 36.25 KG/M2 | HEIGHT: 62 IN | RESPIRATION RATE: 22 BRPM | HEART RATE: 86 BPM

## 2021-06-11 DIAGNOSIS — I50.32 CHRONIC DIASTOLIC (CONGESTIVE) HEART FAILURE (HCC): Primary | ICD-10-CM

## 2021-06-11 DIAGNOSIS — E11.9 TYPE 2 DIABETES MELLITUS WITHOUT COMPLICATION, WITHOUT LONG-TERM CURRENT USE OF INSULIN (HCC): ICD-10-CM

## 2021-06-11 DIAGNOSIS — D64.9 ANEMIA, UNSPECIFIED TYPE: ICD-10-CM

## 2021-06-11 LAB
EXPIRATION DATE: ABNORMAL
HBA1C MFR BLD: 11.9 %
Lab: ABNORMAL

## 2021-06-11 PROCEDURE — 83036 HEMOGLOBIN GLYCOSYLATED A1C: CPT | Performed by: NURSE PRACTITIONER

## 2021-06-11 PROCEDURE — 99214 OFFICE O/P EST MOD 30 MIN: CPT | Performed by: NURSE PRACTITIONER

## 2021-06-11 NOTE — PROGRESS NOTES
"Chief Complaint  Leg Swelling (bilateral leg and ankle swelling. Started today. )    Subjective          Miri Yung presents to [facility]  History of Present Illness  This is a 80-year-old female patient here today for evaluation of lower extremity edema.  Patient is here with daughter-in-law and states she has increased lower extremity edema.  She denies any shortness of breath lightheaded or dizziness.  No episodes of chest pain.  She is currently being followed by cardiology and is on Lasix 40 mg twice daily.      At last visit hemoglobin A1c was elevated at 15.1.  We increased her Metformin at that time.  She is living by herself and after speaking with her son we did not feel like she could handle insulin at this time.  She states she is cutting out sugar in her diet.    Objective   Vital Signs:   /68   Pulse 86   Temp 98.4 °F (36.9 °C)   Resp 22   Ht 157 cm (61.81\")   Wt 89.4 kg (197 lb)   BMI 36.25 kg/m²     Physical Exam  Vitals and nursing note reviewed.   HENT:      Head: Normocephalic.      Nose: Nose normal.   Eyes:      Pupils: Pupils are equal, round, and reactive to light.   Cardiovascular:      Rate and Rhythm: Normal rate and regular rhythm.      Pulses: Normal pulses.      Heart sounds: Normal heart sounds.   Pulmonary:      Effort: Pulmonary effort is normal. No respiratory distress.      Breath sounds: Normal breath sounds. No wheezing or rales.   Abdominal:      General: Bowel sounds are normal. There is no distension.      Tenderness: There is no abdominal tenderness.   Musculoskeletal:         General: No swelling.      Cervical back: Neck supple.      Right lower leg: Edema present.      Left lower leg: Edema present.   Skin:     General: Skin is warm and dry.   Neurological:      Mental Status: She is alert and oriented to person, place, and time.   Psychiatric:         Mood and Affect: Mood normal.        Result Review :               "   Assessment and Plan    Diagnoses and all orders for this visit:    1. Chronic diastolic (congestive) heart failure (CMS/[facility]) (Primary)  -     Comprehensive metabolic panel    2. Type 2 diabetes mellitus without complication, without long-term current use of insulin (CMS/[facility])  -     POCT glycated hemoglobin, total  -     Comprehensive metabolic panel    3. Anemia, unspecified type  -     CBC w AUTO Differential  -     Ferritin  -     Iron and TIBC    Last hemoglobin was low I will repeat that today.  We have discussed limiting salt and using compression stockings when able to along with elevating lower extremities.  I have instructed for her to take an extra Lasix for the few days  Hemoglobin A1c is improving.  She will continue to watch her diet.  I would like her follow-up if swelling continues    I spent a total of  30 minutes with the patient today including face-to-face encounter, reviewing data in the system, coordination of care with the nursing staff as well as consultants, documentation and entering orders.        Follow Up   No follow-ups on file.  Patient was given instructions and counseling regarding her condition or for health maintenance advice. Please see specific information pulled into the AVS if appropriate.

## 2021-06-11 NOTE — TELEPHONE ENCOUNTER
Caller: Miri Yung    Relationship: Self    Best call back number: [phone] (H)    What is the best time to reach you:ANYTIME    Who are you requesting to speak with: ANYONE    Do you know the name of the person who called: PATIENT    What was the call regarding: PATIENT CALLED IN STATING HER FEET ARE SWOLLEN. WANTS TO KNOW IF SHE SHOULD TAKE AN EXTRA furosemide (LASIX) 40 MG tablet, OR IF SHE NEEDS TO MAKE AN APPOINTMENT    Do you require a callback:YES

## 2021-06-12 LAB
ALBUMIN SERPL-MCNC: 3.7 G/DL (ref 3.6–4.6)
ALBUMIN/GLOB SERPL: 2.5 {RATIO} (ref 1.2–2.2)
ALP SERPL-CCNC: 129 IU/L (ref 48–121)
ALT SERPL-CCNC: 31 IU/L (ref 0–32)
AST SERPL-CCNC: 38 IU/L (ref 0–40)
BASOPHILS # BLD AUTO: 0 X10E3/UL (ref 0–0.2)
BASOPHILS NFR BLD AUTO: 1 %
BILIRUB SERPL-MCNC: 0.4 MG/DL (ref 0–1.2)
BUN SERPL-MCNC: 17 MG/DL (ref 8–27)
BUN/CREAT SERPL: 26 (ref 12–28)
CALCIUM SERPL-MCNC: 8.9 MG/DL (ref 8.7–10.3)
CHLORIDE SERPL-SCNC: 103 MMOL/L (ref 96–106)
CO2 SERPL-SCNC: 25 MMOL/L (ref 20–29)
CREAT SERPL-MCNC: 0.66 MG/DL (ref 0.57–1)
EOSINOPHIL # BLD AUTO: 0.1 X10E3/UL (ref 0–0.4)
EOSINOPHIL NFR BLD AUTO: 1 %
ERYTHROCYTE [DISTWIDTH] IN BLOOD BY AUTOMATED COUNT: 16.9 % (ref 11.7–15.4)
GLOBULIN SER CALC-MCNC: 1.5 G/DL (ref 1.5–4.5)
GLUCOSE SERPL-MCNC: 374 MG/DL (ref 65–99)
HCT VFR BLD AUTO: 24.9 % (ref 34–46.6)
HGB BLD-MCNC: 7 G/DL (ref 11.1–15.9)
IMM GRANULOCYTES # BLD AUTO: 0 X10E3/UL (ref 0–0.1)
IMM GRANULOCYTES NFR BLD AUTO: 1 %
LYMPHOCYTES # BLD AUTO: 1.5 X10E3/UL (ref 0.7–3.1)
LYMPHOCYTES NFR BLD AUTO: 25 %
MCH RBC QN AUTO: 19.6 PG (ref 26.6–33)
MCHC RBC AUTO-ENTMCNC: 28.1 G/DL (ref 31.5–35.7)
MCV RBC AUTO: 70 FL (ref 79–97)
MONOCYTES # BLD AUTO: 0.5 X10E3/UL (ref 0.1–0.9)
MONOCYTES NFR BLD AUTO: 9 %
NEUTROPHILS # BLD AUTO: 3.8 X10E3/UL (ref 1.4–7)
NEUTROPHILS NFR BLD AUTO: 63 %
PLATELET # BLD AUTO: 129 X10E3/UL (ref 150–450)
POTASSIUM SERPL-SCNC: 4.5 MMOL/L (ref 3.5–5.2)
PROT SERPL-MCNC: 5.2 G/DL (ref 6–8.5)
RBC # BLD AUTO: 3.57 X10E6/UL (ref 3.77–5.28)
SODIUM SERPL-SCNC: 140 MMOL/L (ref 134–144)
WBC # BLD AUTO: 6 X10E3/UL (ref 3.4–10.8)

## 2021-06-14 ENCOUNTER — HOSPITAL ENCOUNTER (INPATIENT)
Facility: HOSPITAL | Age: 81
LOS: 4 days | Discharge: HOME | End: 2021-06-19
Attending: EMERGENCY MEDICINE | Admitting: HOSPITALIST

## 2021-06-14 DIAGNOSIS — D64.9 SYMPTOMATIC ANEMIA: Primary | ICD-10-CM

## 2021-06-14 DIAGNOSIS — E11.65 UNCONTROLLED TYPE 2 DIABETES MELLITUS WITH HYPERGLYCEMIA (HCC): ICD-10-CM

## 2021-06-14 DIAGNOSIS — Z79.01 ANTICOAGULATED BY ANTICOAGULATION TREATMENT: ICD-10-CM

## 2021-06-14 PROBLEM — Z86.73 HISTORY OF STROKE: Status: ACTIVE | Noted: 2021-06-14

## 2021-06-14 LAB
ABO GROUP BLD: NORMAL
ALBUMIN SERPL-MCNC: 3.9 G/DL (ref 3.5–5.2)
ALBUMIN/GLOB SERPL: 2.3 G/DL
ALP SERPL-CCNC: 140 U/L (ref 39–117)
ALT SERPL W P-5'-P-CCNC: 32 U/L (ref 1–33)
ANION GAP SERPL CALCULATED.3IONS-SCNC: 9.3 MMOL/L (ref 5–15)
ANISOCYTOSIS BLD QL: ABNORMAL
AST SERPL-CCNC: 26 U/L (ref 1–32)
BASOPHILS # BLD MANUAL: 0.13 10*3/MM3 (ref 0–0.2)
BASOPHILS NFR BLD AUTO: 2 % (ref 0–1.5)
BILIRUB SERPL-MCNC: 0.5 MG/DL (ref 0–1.2)
BLD GP AB SCN SERPL QL: NEGATIVE
BUN SERPL-MCNC: 18 MG/DL (ref 8–23)
BUN/CREAT SERPL: 24.7 (ref 7–25)
CALCIUM SPEC-SCNC: 8.8 MG/DL (ref 8.6–10.5)
CHLORIDE SERPL-SCNC: 98 MMOL/L (ref 98–107)
CO2 SERPL-SCNC: 28.7 MMOL/L (ref 22–29)
CREAT SERPL-MCNC: 0.73 MG/DL (ref 0.57–1)
DEPRECATED RDW RBC AUTO: 45.1 FL (ref 37–54)
EOSINOPHIL # BLD MANUAL: 0.2 10*3/MM3 (ref 0–0.4)
EOSINOPHIL NFR BLD MANUAL: 3 % (ref 0.3–6.2)
ERYTHROCYTE [DISTWIDTH] IN BLOOD BY AUTOMATED COUNT: 17.5 % (ref 12.3–15.4)
GFR SERPL CREATININE-BSD FRML MDRD: 77 ML/MIN/1.73
GLOBULIN UR ELPH-MCNC: 1.7 GM/DL
GLUCOSE BLDC GLUCOMTR-MCNC: 387 MG/DL (ref 70–130)
GLUCOSE BLDC GLUCOMTR-MCNC: 470 MG/DL (ref 70–130)
GLUCOSE SERPL-MCNC: 503 MG/DL (ref 65–99)
HCT VFR BLD AUTO: 27.2 % (ref 34–46.6)
HGB BLD-MCNC: 7.3 G/DL (ref 12–15.9)
HYPOCHROMIA BLD QL: ABNORMAL
INR PPP: 1.64 (ref 0.9–1.1)
LYMPHOCYTES # BLD MANUAL: 0.8 10*3/MM3 (ref 0.7–3.1)
LYMPHOCYTES NFR BLD MANUAL: 12 % (ref 19.6–45.3)
LYMPHOCYTES NFR BLD MANUAL: 4 % (ref 5–12)
MCH RBC QN AUTO: 19.4 PG (ref 26.6–33)
MCHC RBC AUTO-ENTMCNC: 26.8 G/DL (ref 31.5–35.7)
MCV RBC AUTO: 72.3 FL (ref 79–97)
MICROCYTES BLD QL: ABNORMAL
MONOCYTES # BLD AUTO: 0.27 10*3/MM3 (ref 0.1–0.9)
NEUTROPHILS # BLD AUTO: 5.27 10*3/MM3 (ref 1.7–7)
NEUTROPHILS NFR BLD MANUAL: 79 % (ref 42.7–76)
PLAT MORPH BLD: NORMAL
PLATELET # BLD AUTO: 156 10*3/MM3 (ref 140–450)
PMV BLD AUTO: 12.4 FL (ref 6–12)
POLYCHROMASIA BLD QL SMEAR: ABNORMAL
POTASSIUM SERPL-SCNC: 3.9 MMOL/L (ref 3.5–5.2)
PROT SERPL-MCNC: 5.6 G/DL (ref 6–8.5)
PROTHROMBIN TIME: 19.2 SECONDS (ref 11.7–14.2)
RBC # BLD AUTO: 3.76 10*6/MM3 (ref 3.77–5.28)
RH BLD: NEGATIVE
SARS-COV-2 RNA RESP QL NAA+PROBE: NOT DETECTED
SODIUM SERPL-SCNC: 136 MMOL/L (ref 136–145)
T&S EXPIRATION DATE: NORMAL
WBC # BLD AUTO: 6.67 10*3/MM3 (ref 3.4–10.8)
WBC MORPH BLD: NORMAL

## 2021-06-14 PROCEDURE — 80053 COMPREHEN METABOLIC PANEL: CPT | Performed by: EMERGENCY MEDICINE

## 2021-06-14 PROCEDURE — U0005 INFEC AGEN DETEC AMPLI PROBE: HCPCS | Performed by: EMERGENCY MEDICINE

## 2021-06-14 PROCEDURE — 93010 ELECTROCARDIOGRAM REPORT: CPT | Performed by: INTERNAL MEDICINE

## 2021-06-14 PROCEDURE — 93005 ELECTROCARDIOGRAM TRACING: CPT | Performed by: NURSE PRACTITIONER

## 2021-06-14 PROCEDURE — 85610 PROTHROMBIN TIME: CPT | Performed by: EMERGENCY MEDICINE

## 2021-06-14 PROCEDURE — 63710000001 INSULIN REGULAR HUMAN PER 5 UNITS: Performed by: EMERGENCY MEDICINE

## 2021-06-14 PROCEDURE — 82962 GLUCOSE BLOOD TEST: CPT

## 2021-06-14 PROCEDURE — 86900 BLOOD TYPING SEROLOGIC ABO: CPT | Performed by: EMERGENCY MEDICINE

## 2021-06-14 PROCEDURE — 85007 BL SMEAR W/DIFF WBC COUNT: CPT | Performed by: EMERGENCY MEDICINE

## 2021-06-14 PROCEDURE — 99284 EMERGENCY DEPT VISIT MOD MDM: CPT

## 2021-06-14 PROCEDURE — 86850 RBC ANTIBODY SCREEN: CPT | Performed by: EMERGENCY MEDICINE

## 2021-06-14 PROCEDURE — 85025 COMPLETE CBC W/AUTO DIFF WBC: CPT | Performed by: EMERGENCY MEDICINE

## 2021-06-14 PROCEDURE — G0378 HOSPITAL OBSERVATION PER HR: HCPCS

## 2021-06-14 PROCEDURE — U0003 INFECTIOUS AGENT DETECTION BY NUCLEIC ACID (DNA OR RNA); SEVERE ACUTE RESPIRATORY SYNDROME CORONAVIRUS 2 (SARS-COV-2) (CORONAVIRUS DISEASE [COVID-19]), AMPLIFIED PROBE TECHNIQUE, MAKING USE OF HIGH THROUGHPUT TECHNOLOGIES AS DESCRIBED BY CMS-2020-01-R: HCPCS | Performed by: EMERGENCY MEDICINE

## 2021-06-14 PROCEDURE — 86901 BLOOD TYPING SEROLOGIC RH(D): CPT | Performed by: EMERGENCY MEDICINE

## 2021-06-14 PROCEDURE — 25010000002 LORAZEPAM PER 2 MG: Performed by: EMERGENCY MEDICINE

## 2021-06-14 RX ORDER — ATORVASTATIN CALCIUM 20 MG/1
40 TABLET, FILM COATED ORAL DAILY
Status: DISCONTINUED | OUTPATIENT
Start: 2021-06-15 | End: 2021-06-19 | Stop reason: HOSPADM

## 2021-06-14 RX ORDER — ACETAMINOPHEN 160 MG/5ML
650 SOLUTION ORAL EVERY 4 HOURS PRN
Status: DISCONTINUED | OUTPATIENT
Start: 2021-06-14 | End: 2021-06-19 | Stop reason: HOSPADM

## 2021-06-14 RX ORDER — ALLOPURINOL 300 MG/1
300 TABLET ORAL DAILY
Status: DISCONTINUED | OUTPATIENT
Start: 2021-06-15 | End: 2021-06-19 | Stop reason: HOSPADM

## 2021-06-14 RX ORDER — BUPROPION HYDROCHLORIDE 150 MG/1
150 TABLET, EXTENDED RELEASE ORAL
Status: DISCONTINUED | OUTPATIENT
Start: 2021-06-15 | End: 2021-06-19 | Stop reason: HOSPADM

## 2021-06-14 RX ORDER — SODIUM CHLORIDE 0.9 % (FLUSH) 0.9 %
10 SYRINGE (ML) INJECTION EVERY 12 HOURS SCHEDULED
Status: DISCONTINUED | OUTPATIENT
Start: 2021-06-14 | End: 2021-06-19 | Stop reason: HOSPADM

## 2021-06-14 RX ORDER — NICOTINE POLACRILEX 4 MG
15 LOZENGE BUCCAL
Status: DISCONTINUED | OUTPATIENT
Start: 2021-06-14 | End: 2021-06-19 | Stop reason: HOSPADM

## 2021-06-14 RX ORDER — ACETAMINOPHEN 325 MG/1
650 TABLET ORAL EVERY 4 HOURS PRN
Status: DISCONTINUED | OUTPATIENT
Start: 2021-06-14 | End: 2021-06-19 | Stop reason: HOSPADM

## 2021-06-14 RX ORDER — MIRTAZAPINE 15 MG/1
15 TABLET, FILM COATED ORAL NIGHTLY
Status: DISCONTINUED | OUTPATIENT
Start: 2021-06-15 | End: 2021-06-19 | Stop reason: HOSPADM

## 2021-06-14 RX ORDER — OLANZAPINE 10 MG/1
5 INJECTION, POWDER, LYOPHILIZED, FOR SOLUTION INTRAMUSCULAR ONCE
Status: COMPLETED | OUTPATIENT
Start: 2021-06-15 | End: 2021-06-15

## 2021-06-14 RX ORDER — INSULIN LISPRO 100 [IU]/ML
0-9 INJECTION, SOLUTION INTRAVENOUS; SUBCUTANEOUS
Status: DISCONTINUED | OUTPATIENT
Start: 2021-06-15 | End: 2021-06-16

## 2021-06-14 RX ORDER — DEXTROSE MONOHYDRATE 25 G/50ML
25 INJECTION, SOLUTION INTRAVENOUS
Status: DISCONTINUED | OUTPATIENT
Start: 2021-06-14 | End: 2021-06-19 | Stop reason: HOSPADM

## 2021-06-14 RX ORDER — SODIUM CHLORIDE 0.9 % (FLUSH) 0.9 %
10 SYRINGE (ML) INJECTION AS NEEDED
Status: DISCONTINUED | OUTPATIENT
Start: 2021-06-14 | End: 2021-06-19 | Stop reason: HOSPADM

## 2021-06-14 RX ORDER — PANTOPRAZOLE SODIUM 40 MG/1
40 TABLET, DELAYED RELEASE ORAL DAILY
Status: DISCONTINUED | OUTPATIENT
Start: 2021-06-15 | End: 2021-06-19 | Stop reason: HOSPADM

## 2021-06-14 RX ORDER — NITROGLYCERIN 0.4 MG/1
0.4 TABLET SUBLINGUAL
Status: DISCONTINUED | OUTPATIENT
Start: 2021-06-14 | End: 2021-06-19 | Stop reason: HOSPADM

## 2021-06-14 RX ORDER — LORAZEPAM 2 MG/ML
1 INJECTION INTRAMUSCULAR ONCE
Status: COMPLETED | OUTPATIENT
Start: 2021-06-14 | End: 2021-06-14

## 2021-06-14 RX ORDER — MONTELUKAST SODIUM 10 MG/1
10 TABLET ORAL NIGHTLY
Status: DISCONTINUED | OUTPATIENT
Start: 2021-06-15 | End: 2021-06-19 | Stop reason: HOSPADM

## 2021-06-14 RX ORDER — ONDANSETRON 2 MG/ML
4 INJECTION INTRAMUSCULAR; INTRAVENOUS EVERY 6 HOURS PRN
Status: DISCONTINUED | OUTPATIENT
Start: 2021-06-14 | End: 2021-06-19 | Stop reason: HOSPADM

## 2021-06-14 RX ORDER — ACETAMINOPHEN 650 MG/1
650 SUPPOSITORY RECTAL EVERY 4 HOURS PRN
Status: DISCONTINUED | OUTPATIENT
Start: 2021-06-14 | End: 2021-06-19 | Stop reason: HOSPADM

## 2021-06-14 RX ORDER — METOPROLOL SUCCINATE 100 MG/1
100 TABLET, EXTENDED RELEASE ORAL DAILY
Status: DISCONTINUED | OUTPATIENT
Start: 2021-06-15 | End: 2021-06-16

## 2021-06-14 RX ORDER — ISOSORBIDE MONONITRATE 60 MG/1
60 TABLET, EXTENDED RELEASE ORAL DAILY
Status: DISCONTINUED | OUTPATIENT
Start: 2021-06-15 | End: 2021-06-19 | Stop reason: HOSPADM

## 2021-06-14 RX ADMIN — LORAZEPAM 1 MG: 2 INJECTION INTRAMUSCULAR; INTRAVENOUS at 20:37

## 2021-06-14 RX ADMIN — INSULIN HUMAN 10 UNITS: 100 INJECTION, SOLUTION PARENTERAL at 20:39

## 2021-06-14 NOTE — ED TRIAGE NOTES
Pt had labs drawn Friday.  She was called today and told her hgb was low.  Son thinks they said it was down to 7    Patient was placed in face mask during first look triage.  Patient was wearing a face mask throughout encounter.  I wore personal protective equipment throughout the encounter.  Hand hygiene was performed before and after patient encounter.

## 2021-06-14 NOTE — ED NOTES
This RN wore gloves, mask, eye protection and all other necessary PPE while performing pt care.     Jolynn Dye, RN  06/14/21 1925

## 2021-06-14 NOTE — ED PROVIDER NOTES
" EMERGENCY DEPARTMENT ENCOUNTER    Room Number:  40/40  Date of encounter:  6/14/2021  PCP: Loren Cruz APRN  Historian: Patient     I used full protective equipment while examining this patient.  This includes face mask, gloves and protective eyewear.  I washed my hands before entering the room and immediately upon leaving the room      HPI:  Chief Complaint: Abnormal labs  A complete HPI/ROS/PMH/PSH/SH/FH are unobtainable due to: None    Context: Miri Yung is a 80 y.o. female who presents to the ED c/o abnormal labs.  Patient had office visit last week and was found to have a hemoglobin of 7.0.  Patient states that she has been slightly headed, kind of like \"when you got a cold\".  She describes mild shortness of breath.  She denies any chest pain.  She denies significant cough or fever.  Patient states she has had occasional bright red blood in her stool which she attributes to \"hemorrhoids\".  She denies black or tarry stools.  She denies nosebleed or cutaneous bleeding.      MEDICAL RECORD REVIEW  I reviewed prior medical records including recent office visit with primary care provider.  Hemoglobin drawn on that day was 7 which is down from 7.9 2 months earlier.  Patient's last admission was in 2019 for acute ischemic stroke.  Patient was anticoagulated with aspirin as she has high risk for anticoagulation and not felt to be safe for DOAC therapy.    PAST MEDICAL HISTORY  Active Ambulatory Problems     Diagnosis Date Noted   • Dementia (CMS/HCC) 02/08/2016   • Depressive disorder 02/08/2016   • Edema 02/08/2016   • Abnormal liver function tests 02/08/2016   • Gastroesophageal reflux disease 02/08/2016   • Gout 02/08/2016   • Hyperlipidemia 02/08/2016   • HTN (hypertension) 02/08/2016   • Insomnia 02/08/2016   • Arthropathy of knee 02/08/2016   • Memory loss 02/08/2016   • Low back pain 02/08/2016   • Mitral valve insufficiency 02/08/2016   • Tricuspid valve insufficiency 02/08/2016   • Urinary tract " infection 02/08/2016   • Valvular endocarditis 02/08/2016   • Long QT interval 02/19/2016   • Microcytic anemia 03/03/2016   • Anemia 03/04/2016   • Seasonal allergies 08/19/2016   • Acute cystitis without hematuria 07/25/2017   • Chronic fatigue 02/28/2018   • Bacterial endocarditis 10/31/2018   • Slow transit constipation 06/06/2019   • Acute ischemic stroke (CMS/HCC) 08/05/2019   • Paroxysmal tachycardia (CMS/HCC) 08/05/2019   • Type 2 diabetes mellitus (CMS/HCC) 08/05/2019   • Atrial fibrillation (CMS/[facility]) 12/18/2019   • Morbidly obese (CMS/HCC) 08/20/2020   • Chronic diastolic (congestive) heart failure (CMS/HCC) 11/16/2020     Resolved Ambulatory Problems     Diagnosis Date Noted   • Left lower quadrant pain 02/08/2016   • Acute sinusitis 02/08/2016   • Atopic rhinitis 02/08/2016   • Arthritis 02/08/2016   • Hematochezia 02/08/2016   • Pain in joint 02/08/2016   • Lung mass 02/08/2016   • Muscle pain 02/08/2016   • Inflammation of sacroiliac joint (CMS/[facility]) 02/08/2016   • Stress incontinence in female 02/08/2016   • Urinary urgency 02/08/2016   • Precordial pain 02/19/2016   • High risk medication use 02/19/2016   • Chest pain 02/19/2016   • Iron deficiency anemia due to chronic blood loss 03/03/2016   • Pulmonary hypertension (CMS/HCC) 03/30/2016   • Fatigue due to exposure 05/05/2016   • Health care maintenance 09/28/2016   • Need for vaccination 10/17/2016   • Atypical chest pain 12/14/2016   • Diarrhea 02/16/2017     Past Medical History:   Diagnosis Date   • Anxiety    • Congestive heart failure (CHF) (CMS/[facility])    • Depression    • Encephalopathy    • Endocarditis    • Esophageal reflux    • GERD (gastroesophageal reflux disease)    • History of blood transfusion    • LOM (loss of memory)    • Mitral and aortic valve disease    • Mitral regurgitation    • Mixed anxiety depressive disorder    • Nonrheumatic tricuspid (valve) insufficiency    • Osteoarthritis    • PAF (paroxysmal atrial fibrillation)  "(CMS/[facility])    • Sacroiliitis (CMS/[facility])    • Tricuspid regurgitation          PAST SURGICAL HISTORY  Past Surgical History:   Procedure Laterality Date   • CATARACT EXTRACTION     • COLONOSCOPY N/A 3/10/2016    Procedure: COLONOSCOPY TO CECUM;  Surgeon: Shaan Becerra Jr., MD;  Location: [facility] ENDOSCOPY;  Service:    • ENDOSCOPY N/A 3/10/2016    Procedure: ESOPHAGOGASTRODUODENOSCOPY ;  Surgeon: Shaan Becerra Jr., MD;  Location: [facility] ENDOSCOPY;  Service:    • KNEE SURGERY     • TUBAL ABDOMINAL LIGATION           FAMILY HISTORY  Family History   Problem Relation Age of Onset   • Kidney disease Mother    • Heart disease Mother    • Emphysema Father    • Heart disease Brother          SOCIAL HISTORY  Social History     Socioeconomic History   • Marital status:      Spouse name: Not on file   • Number of children: Not on file   • Years of education: Not on file   • Highest education level: Not on file   Tobacco Use   • Smoking status: Never Smoker   • Smokeless tobacco: Never Used   • Tobacco comment: caffeine use    Vaping Use   • Vaping Use: Never assessed   Substance and Sexual Activity   • Alcohol use: No   • Drug use: No   • Sexual activity: Defer         ALLERGIES  Decongestant  [pseudoephedrine]       REVIEW OF SYSTEMS  Review of Systems   Constitutional: Positive for fatigue. Negative for fever.   HENT: Negative.  Negative for sore throat.    Eyes: Negative.    Respiratory: Negative.  Negative for cough.    Cardiovascular: Positive for leg swelling (Chronic, unchanged based). Negative for chest pain.   Gastrointestinal: Positive for anal bleeding (Occasional secondary to \"hemorrhoids\") and diarrhea (Chronic, on Metamucil).   Genitourinary: Negative.  Negative for dysuria.   Musculoskeletal: Negative.  Negative for back pain.   Skin: Negative.  Negative for rash.   Neurological: Positive for light-headedness. Negative for headaches.   All other systems reviewed and are negative.          PHYSICAL " EXAM    I have reviewed the triage vital signs and nursing notes.    ED Triage Vitals [06/14/21 1750]   Temp Heart Rate Resp BP SpO2   99.4 °F (37.4 °C) 81 16 -- 93 %      Temp src Heart Rate Source Patient Position BP Location FiO2 (%)   Tympanic Monitor -- -- --       Physical Exam  GENERAL: Alert female in no obvious distress.  Vitals reviewed notable for pulse of 81.  Temperature 99.4.  HENT: nares patent  EYES: no scleral icterus  CV: regular rhythm, regular rate-mild systolic murmur  RESPIRATORY: normal effort, clear to auscultation bilaterally  ABDOMEN: soft, obese-nontender to palpation  MUSCULOSKELETAL: Moderate chronic appearing edema bilateral lower extremities  NEURO: Strength, sensation, and coordination are grossly intact.  Speech and mentation are unremarkable  SKIN: warm, dry      LAB RESULTS  Recent Results (from the past 24 hour(s))   Protime-INR    Collection Time: 06/14/21  7:17 PM    Specimen: Blood   Result Value Ref Range    Protime 19.2 (H) 11.7 - 14.2 Seconds    INR 1.64 (H) 0.90 - 1.10   Comprehensive Metabolic Panel    Collection Time: 06/14/21  7:17 PM    Specimen: Blood   Result Value Ref Range    Glucose 503 (C) 65 - 99 mg/dL    BUN 18 8 - 23 mg/dL    Creatinine 0.73 0.57 - 1.00 mg/dL    Sodium 136 136 - 145 mmol/L    Potassium 3.9 3.5 - 5.2 mmol/L    Chloride 98 98 - 107 mmol/L    CO2 28.7 22.0 - 29.0 mmol/L    Calcium 8.8 8.6 - 10.5 mg/dL    Total Protein 5.6 (L) 6.0 - 8.5 g/dL    Albumin 3.90 3.50 - 5.20 g/dL    ALT (SGPT) 32 1 - 33 U/L    AST (SGOT) 26 1 - 32 U/L    Alkaline Phosphatase 140 (H) 39 - 117 U/L    Total Bilirubin 0.5 0.0 - 1.2 mg/dL    eGFR Non African Amer 77 >60 mL/min/1.73    Globulin 1.7 gm/dL    A/G Ratio 2.3 g/dL    BUN/Creatinine Ratio 24.7 7.0 - 25.0    Anion Gap 9.3 5.0 - 15.0 mmol/L   Type & Screen    Collection Time: 06/14/21  7:17 PM    Specimen: Blood   Result Value Ref Range    ABO Type O     RH type Negative     Antibody Screen Negative     T&S Expiration  Date 6/17/2021 11:59:59 PM    CBC Auto Differential    Collection Time: 06/14/21  7:17 PM    Specimen: Blood   Result Value Ref Range    WBC 6.67 3.40 - 10.80 10*3/mm3    RBC 3.76 (L) 3.77 - 5.28 10*6/mm3    Hemoglobin 7.3 (L) 12.0 - 15.9 g/dL    Hematocrit 27.2 (L) 34.0 - 46.6 %    MCV 72.3 (L) 79.0 - 97.0 fL    MCH 19.4 (L) 26.6 - 33.0 pg    MCHC 26.8 (L) 31.5 - 35.7 g/dL    RDW 17.5 (H) 12.3 - 15.4 %    RDW-SD 45.1 37.0 - 54.0 fl    MPV 12.4 (H) 6.0 - 12.0 fL    Platelets 156 140 - 450 10*3/mm3   Manual Differential    Collection Time: 06/14/21  7:17 PM    Specimen: Blood   Result Value Ref Range    Neutrophil % 79.0 (H) 42.7 - 76.0 %    Lymphocyte % 12.0 (L) 19.6 - 45.3 %    Monocyte % 4.0 (L) 5.0 - 12.0 %    Eosinophil % 3.0 0.3 - 6.2 %    Basophil % 2.0 (H) 0.0 - 1.5 %    Neutrophils Absolute 5.27 1.70 - 7.00 10*3/mm3    Lymphocytes Absolute 0.80 0.70 - 3.10 10*3/mm3    Monocytes Absolute 0.27 0.10 - 0.90 10*3/mm3    Eosinophils Absolute 0.20 0.00 - 0.40 10*3/mm3    Basophils Absolute 0.13 0.00 - 0.20 10*3/mm3    Anisocytosis Mod/2+ None Seen    Hypochromia Slight/1+ None Seen    Microcytes Mod/2+ None Seen    Polychromasia Slight/1+ None Seen    WBC Morphology Normal Normal    Platelet Morphology Normal Normal   COVID-19,[facility] IN-HOUSE CEPHEID/RACH NP SWAB IN TRANSPORT MEDIA 8-12 HR TAT - Swab, Nasopharynx    Collection Time: 06/14/21  7:19 PM    Specimen: Nasopharynx; Swab   Result Value Ref Range    COVID19 Not Detected Not Detected - Ref. Range   POC Glucose Once    Collection Time: 06/14/21  9:03 PM    Specimen: Blood   Result Value Ref Range    Glucose 470 (C) 70 - 130 mg/dL       Ordered the above labs and independently reviewed the results.      RADIOLOGY  No Radiology Exams Resulted Within Past 24 Hours    I ordered the above noted radiological studies. Reviewed by me and discussed with radiologist.  See dictation for official radiology interpretation.      PROCEDURES  Procedures      MEDICATIONS  GIVEN IN ER    Medications   insulin regular (humuLIN R,novoLIN R) injection 10 Units (10 Units Subcutaneous Given 6/14/21 2039)   LORazepam (ATIVAN) injection 1 mg (1 mg Intravenous Given 6/14/21 2037)         PROGRESS, DATA ANALYSIS, CONSULTS, AND MEDICAL DECISION MAKING    All labs have been independently reviewed by me.  All radiology studies have been reviewed by me and discussed with radiologist dictating the report.   EKG's independently viewed and interpreted by me.  Discussion below represents my analysis of pertinent findings related to patient's condition, differential diagnosis, treatment plan and final disposition.      ED Course as of Jun 14 2129   Mon Jun 14, 2021   [age] [id number]-old female presents with noted hemoglobin of 7.1 which is down from 7.92 months ago.  Differential diagnosis for anemia would include lack of production such as aplastic anemia or blood loss which is commonly related to GI sources.  We will repeat labs, suspect likely admission for blood transfusion and work-up of symptomatic anemia.    [DB]   1941 Hemoglobin is slightly improved from Friday with hemoglobin of 7.3.  White blood count and platelet counts are within normal limits.    [DB]   2024 Chemistries are back and notable for elevated glucose of 503.  Patient is not acidotic with bicarb of 28.7 and therefore is not in DKA but is rather than hyperglycemia.    [DB]   2024 Patient has INR of 1.6 which represents her Eliquis usage.    [DB]   2029 Patient was fairly anxious in the room and is requesting that I give her something for sedation.  Sugar is fairly significantly elevated at 503.  Patient is taking oral agents including double dose of Metformin.  We will go ahead and give 10 units of subcu insulin.  Will contact hospitalist about admission for transfusion and work-up of possible GI bleed.    [DB]   2031 I discussed evaluation of this patient with Jazmyn Strauss from [facility] who will admit on behalf of Dr. Mdeina.   Because patient has significant anemia and history of A. fib we will go ahead and place her on a monitor bed.    [DB]      ED Course User Index  [DB] Vincent Grimm MD       AS OF 21:29 EDT VITALS:    BP - (!) 172/39  HR - 81  TEMP - 99.4 °F (37.4 °C) (Tympanic)  O2 SATS - 94%      DIAGNOSIS  Final diagnoses:   Symptomatic anemia   Uncontrolled type 2 diabetes mellitus with hyperglycemia (CMS/HCC)   Anticoagulated by anticoagulation treatment         DISPOSITION  Admission         Vincent Grimm MD  06/14/21 [id number]

## 2021-06-15 ENCOUNTER — APPOINTMENT (OUTPATIENT)
Dept: CT IMAGING | Facility: HOSPITAL | Age: 81
End: 2021-06-15

## 2021-06-15 PROBLEM — D62 ACUTE BLOOD LOSS ANEMIA: Status: ACTIVE | Noted: 2021-06-15

## 2021-06-15 PROBLEM — Z79.01 LONG TERM CURRENT USE OF ANTICOAGULANT THERAPY: Status: ACTIVE | Noted: 2021-06-15

## 2021-06-15 PROBLEM — K92.2 GI BLEED: Status: ACTIVE | Noted: 2021-06-15

## 2021-06-15 LAB
FERRITIN SERPL-MCNC: 14.1 NG/ML (ref 13–150)
FOLATE SERPL-MCNC: 19.1 NG/ML (ref 4.78–24.2)
GGT SERPL-CCNC: 157 U/L (ref 5–36)
GLUCOSE BLDC GLUCOMTR-MCNC: 240 MG/DL (ref 70–130)
GLUCOSE BLDC GLUCOMTR-MCNC: 353 MG/DL (ref 70–130)
GLUCOSE BLDC GLUCOMTR-MCNC: 388 MG/DL (ref 70–130)
GLUCOSE BLDC GLUCOMTR-MCNC: 418 MG/DL (ref 70–130)
HBA1C MFR BLD: 11.18 % (ref 4.8–5.6)
HCT VFR BLD AUTO: 25.1 % (ref 34–46.6)
HCT VFR BLD AUTO: 26.7 % (ref 34–46.6)
HCT VFR BLD AUTO: 29.2 % (ref 34–46.6)
HGB BLD-MCNC: 7 G/DL (ref 12–15.9)
HGB BLD-MCNC: 7.3 G/DL (ref 12–15.9)
HGB BLD-MCNC: 7.9 G/DL (ref 12–15.9)
IRON 24H UR-MRATE: 17 MCG/DL (ref 37–145)
IRON SATN MFR SERPL: 3 % (ref 20–50)
QT INTERVAL: 324 MS
QT INTERVAL: 404 MS
RETICS # AUTO: 0.11 10*6/MM3 (ref 0.02–0.13)
RETICS/RBC NFR AUTO: 2.99 % (ref 0.7–1.9)
TIBC SERPL-MCNC: 590 MCG/DL (ref 298–536)
TRANSFERRIN SERPL-MCNC: 396 MG/DL (ref 200–360)
VIT B12 BLD-MCNC: 1000 PG/ML (ref 211–946)

## 2021-06-15 PROCEDURE — 93005 ELECTROCARDIOGRAM TRACING: CPT | Performed by: INTERNAL MEDICINE

## 2021-06-15 PROCEDURE — 74177 CT ABD & PELVIS W/CONTRAST: CPT

## 2021-06-15 PROCEDURE — 25010000002 IRON SUCROSE PER 1 MG: Performed by: INTERNAL MEDICINE

## 2021-06-15 PROCEDURE — 63710000001 INSULIN LISPRO (HUMAN) PER 5 UNITS: Performed by: NURSE PRACTITIONER

## 2021-06-15 PROCEDURE — 97161 PT EVAL LOW COMPLEX 20 MIN: CPT

## 2021-06-15 PROCEDURE — 85014 HEMATOCRIT: CPT | Performed by: NURSE PRACTITIONER

## 2021-06-15 PROCEDURE — 82607 VITAMIN B-12: CPT | Performed by: NURSE PRACTITIONER

## 2021-06-15 PROCEDURE — 25010000002 IOPAMIDOL 61 % SOLUTION: Performed by: INTERNAL MEDICINE

## 2021-06-15 PROCEDURE — 82746 ASSAY OF FOLIC ACID SERUM: CPT | Performed by: NURSE PRACTITIONER

## 2021-06-15 PROCEDURE — 85045 AUTOMATED RETICULOCYTE COUNT: CPT | Performed by: NURSE PRACTITIONER

## 2021-06-15 PROCEDURE — 93010 ELECTROCARDIOGRAM REPORT: CPT | Performed by: INTERNAL MEDICINE

## 2021-06-15 PROCEDURE — 97530 THERAPEUTIC ACTIVITIES: CPT

## 2021-06-15 PROCEDURE — 99222 1ST HOSP IP/OBS MODERATE 55: CPT | Performed by: INTERNAL MEDICINE

## 2021-06-15 PROCEDURE — 84466 ASSAY OF TRANSFERRIN: CPT | Performed by: NURSE PRACTITIONER

## 2021-06-15 PROCEDURE — 63710000001 INSULIN LISPRO (HUMAN) PER 5 UNITS: Performed by: INTERNAL MEDICINE

## 2021-06-15 PROCEDURE — 0 DIATRIZOATE MEGLUMINE & SODIUM PER 1 ML: Performed by: INTERNAL MEDICINE

## 2021-06-15 PROCEDURE — 85018 HEMOGLOBIN: CPT | Performed by: NURSE PRACTITIONER

## 2021-06-15 PROCEDURE — 83540 ASSAY OF IRON: CPT | Performed by: NURSE PRACTITIONER

## 2021-06-15 PROCEDURE — 82962 GLUCOSE BLOOD TEST: CPT

## 2021-06-15 PROCEDURE — 83036 HEMOGLOBIN GLYCOSYLATED A1C: CPT | Performed by: NURSE PRACTITIONER

## 2021-06-15 PROCEDURE — 99222 1ST HOSP IP/OBS MODERATE 55: CPT | Performed by: NURSE PRACTITIONER

## 2021-06-15 PROCEDURE — 82977 ASSAY OF GGT: CPT | Performed by: INTERNAL MEDICINE

## 2021-06-15 PROCEDURE — 63710000001 INSULIN GLARGINE PER 5 UNITS: Performed by: INTERNAL MEDICINE

## 2021-06-15 PROCEDURE — 82728 ASSAY OF FERRITIN: CPT | Performed by: NURSE PRACTITIONER

## 2021-06-15 RX ORDER — ACETAMINOPHEN 325 MG/1
650 TABLET ORAL ONCE
Status: COMPLETED | OUTPATIENT
Start: 2021-06-15 | End: 2021-06-15

## 2021-06-15 RX ORDER — INSULIN LISPRO 100 [IU]/ML
8 INJECTION, SOLUTION INTRAVENOUS; SUBCUTANEOUS ONCE
Status: COMPLETED | OUTPATIENT
Start: 2021-06-15 | End: 2021-06-15

## 2021-06-15 RX ORDER — INSULIN LISPRO 100 [IU]/ML
4 INJECTION, SOLUTION INTRAVENOUS; SUBCUTANEOUS
Status: DISCONTINUED | OUTPATIENT
Start: 2021-06-15 | End: 2021-06-16

## 2021-06-15 RX ORDER — INSULIN GLARGINE 100 [IU]/ML
15 INJECTION, SOLUTION SUBCUTANEOUS EVERY MORNING
Status: DISCONTINUED | OUTPATIENT
Start: 2021-06-16 | End: 2021-06-16

## 2021-06-15 RX ORDER — FUROSEMIDE 40 MG/1
40 TABLET ORAL DAILY
Status: DISCONTINUED | OUTPATIENT
Start: 2021-06-15 | End: 2021-06-18

## 2021-06-15 RX ORDER — INSULIN GLARGINE 100 [IU]/ML
7 INJECTION, SOLUTION SUBCUTANEOUS ONCE
Status: COMPLETED | OUTPATIENT
Start: 2021-06-15 | End: 2021-06-15

## 2021-06-15 RX ORDER — INSULIN LISPRO 100 [IU]/ML
2 INJECTION, SOLUTION INTRAVENOUS; SUBCUTANEOUS
Status: DISCONTINUED | OUTPATIENT
Start: 2021-06-15 | End: 2021-06-15

## 2021-06-15 RX ORDER — LOSARTAN POTASSIUM 25 MG/1
25 TABLET ORAL
Status: DISCONTINUED | OUTPATIENT
Start: 2021-06-15 | End: 2021-06-18

## 2021-06-15 RX ORDER — INSULIN GLARGINE 100 [IU]/ML
8 INJECTION, SOLUTION SUBCUTANEOUS EVERY MORNING
Status: DISCONTINUED | OUTPATIENT
Start: 2021-06-15 | End: 2021-06-15

## 2021-06-15 RX ADMIN — ALLOPURINOL 300 MG: 300 TABLET ORAL at 08:41

## 2021-06-15 RX ADMIN — DIATRIZOATE MEGLUMINE AND DIATRIZOATE SODIUM 30 ML: 600; 100 SOLUTION ORAL; RECTAL at 11:06

## 2021-06-15 RX ADMIN — INSULIN LISPRO 8 UNITS: 100 INJECTION, SOLUTION INTRAVENOUS; SUBCUTANEOUS at 21:11

## 2021-06-15 RX ADMIN — IOPAMIDOL 85 ML: 612 INJECTION, SOLUTION INTRAVENOUS at 13:54

## 2021-06-15 RX ADMIN — PANTOPRAZOLE SODIUM 40 MG: 40 TABLET, DELAYED RELEASE ORAL at 08:41

## 2021-06-15 RX ADMIN — INSULIN LISPRO 4 UNITS: 100 INJECTION, SOLUTION INTRAVENOUS; SUBCUTANEOUS at 08:41

## 2021-06-15 RX ADMIN — MIRTAZAPINE 15 MG: 15 TABLET, FILM COATED ORAL at 21:11

## 2021-06-15 RX ADMIN — INSULIN GLARGINE 7 UNITS: 100 INJECTION, SOLUTION SUBCUTANEOUS at 17:25

## 2021-06-15 RX ADMIN — INSULIN LISPRO 8 UNITS: 100 INJECTION, SOLUTION INTRAVENOUS; SUBCUTANEOUS at 17:25

## 2021-06-15 RX ADMIN — ISOSORBIDE MONONITRATE 60 MG: 60 TABLET ORAL at 08:41

## 2021-06-15 RX ADMIN — LOSARTAN POTASSIUM 25 MG: 25 TABLET, FILM COATED ORAL at 13:27

## 2021-06-15 RX ADMIN — ACETAMINOPHEN 650 MG: 325 TABLET, FILM COATED ORAL at 11:11

## 2021-06-15 RX ADMIN — BUPROPION HYDROCHLORIDE 150 MG: 150 TABLET, EXTENDED RELEASE ORAL at 08:41

## 2021-06-15 RX ADMIN — INSULIN LISPRO 4 UNITS: 100 INJECTION, SOLUTION INTRAVENOUS; SUBCUTANEOUS at 17:24

## 2021-06-15 RX ADMIN — METOPROLOL SUCCINATE 100 MG: 100 TABLET, EXTENDED RELEASE ORAL at 06:49

## 2021-06-15 RX ADMIN — OLANZAPINE 5 MG: 10 INJECTION, POWDER, LYOPHILIZED, FOR SOLUTION INTRAMUSCULAR at 00:18

## 2021-06-15 RX ADMIN — INSULIN LISPRO 8 UNITS: 100 INJECTION, SOLUTION INTRAVENOUS; SUBCUTANEOUS at 11:22

## 2021-06-15 RX ADMIN — MONTELUKAST SODIUM 10 MG: 10 TABLET, FILM COATED ORAL at 21:11

## 2021-06-15 RX ADMIN — INSULIN GLARGINE 8 UNITS: 100 INJECTION, SOLUTION SUBCUTANEOUS at 11:23

## 2021-06-15 RX ADMIN — FUROSEMIDE 40 MG: 40 TABLET ORAL at 13:27

## 2021-06-15 RX ADMIN — ATORVASTATIN CALCIUM 40 MG: 20 TABLET, FILM COATED ORAL at 08:41

## 2021-06-15 RX ADMIN — SODIUM CHLORIDE, PRESERVATIVE FREE 10 ML: 5 INJECTION INTRAVENOUS at 01:08

## 2021-06-15 RX ADMIN — SODIUM CHLORIDE, PRESERVATIVE FREE 10 ML: 5 INJECTION INTRAVENOUS at 21:12

## 2021-06-15 RX ADMIN — SODIUM CHLORIDE, PRESERVATIVE FREE 10 ML: 5 INJECTION INTRAVENOUS at 08:42

## 2021-06-15 RX ADMIN — IRON SUCROSE 100 MG: 20 INJECTION, SOLUTION INTRAVENOUS at 11:11

## 2021-06-15 RX ADMIN — INSULIN LISPRO 2 UNITS: 100 INJECTION, SOLUTION INTRAVENOUS; SUBCUTANEOUS at 11:23

## 2021-06-15 NOTE — H&P
Patient Name:  Miri Yung  YOB: 1940  MRN:  [id number]  Admit Date:  6/14/2021  Patient Care Team:  Loren Cruz APRN as PCP - General (Family Medicine)      Chief Complaint   Patient presents with   • Abnormal Lab       Subjective     Ms. Yung is a 80 y.o. female with a history of HLD, HTN, DM2, dCHF, a fib on Eliquis, GERD, CVA, dementia that presents to  with reports of low hemoglobin. Patient is anxious and slightly confused after receiving Ativan while in ED tonight, son at bedside available for additional history. Patient able to answer questions, but is not the best of historians at this time. She does report that she was seen by PCP on Friday due to swelling of lower legs, had labs done at that time, and was called today and told to come to ED due to low hemoglobin. Patient denies history of anemia in the past and she is on Eliquis for a fib. She states that she has noticed blood occasionally when she has a bowel movement, though reports she thought it was hemorrhoids and has not been seen for GI bleeding in the past. She confirms chronic shortness of breath and cough, dizziness and lightheadedness, weakness and increased fatigue, denies chest pain, abdominal pain, changes in bowel or bladder habits. Patient does have swelling to lower extremities that son reports has been ongoing for a year and states that she has required adjustments of her Lasix several times because of it. Labs done tonight showed hemoglobin of 7.3 and her blood glucose was elevated at 503. Patient had been given a dose of IV Ativan while in ED due to her reported anxiety, but son at bedside reports that her agitation and confusion has increased since receiving this medication and she does not take this at home. He also denies that she has history of dementia, though it is listed on her chart from previous visits.     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; generally asymptomatic   • Mitral regurgitation    • Mixed anxiety depressive disorder    • Nonrheumatic tricuspid (valve) insufficiency    • Osteoarthritis    • PAF (paroxysmal atrial fibrillation) (CMS/HCC)    • Sacroiliitis (CMS/HCC)    • Tricuspid regurgitation    • Type 2 diabetes mellitus (CMS/HCC)      Past Surgical History:   Procedure Laterality Date   • CATARACT EXTRACTION     • COLONOSCOPY N/A 3/10/2016    Procedure: COLONOSCOPY TO CECUM;  Surgeon: Shaan Becerra Jr., MD;  Location: [facility] ENDOSCOPY;  Service:    • ENDOSCOPY N/A 3/10/2016    Procedure: ESOPHAGOGASTRODUODENOSCOPY ;  Surgeon: Shaan Becerra Jr., MD;  Location: [facility] ENDOSCOPY;  Service:    • KNEE SURGERY     • TUBAL ABDOMINAL LIGATION       Family History   Problem Relation Age of Onset   • Kidney disease Mother    • Heart disease Mother    • Emphysema Father    • Heart disease Brother      Social History     Tobacco Use   • Smoking status: Never Smoker   • Smokeless tobacco: Never Used   • Tobacco comment: caffeine use    Vaping Use   • Vaping Use: Never assessed   Substance Use Topics   • Alcohol use: No   • Drug use: No     Medications Prior to Admission   Medication Sig Dispense Refill Last Dose   • allopurinol (ZYLOPRIM) 300 MG tablet TAKE 1 TABLET BY MOUTH  DAILY 90 tablet 3 6/14/2021 at 0800   • apixaban (Eliquis) 5 MG tablet tablet Take 1  tablet by mouth Every 12 (Twelve) Hours. 60 tablet 2 6/14/2021 at 1800   • atorvastatin (LIPITOR) 40 MG tablet Take 1 tablet by mouth Daily. 90 tablet 0 6/14/2021 at 0800   • buPROPion SR (WELLBUTRIN SR) 150 MG 12 hr tablet Take 1 tablet by mouth Daily With Breakfast. 30 tablet 3 6/14/2021 at 0800   • furosemide (LASIX) 40 MG tablet Take 1 tablet by mouth 2 (Two) Times a Day. 180 tablet 0 6/14/2021 at 1800   • isosorbide mononitrate (IMDUR) 60 MG 24 hr tablet TAKE 1 TABLET BY MOUTH  DAILY 90 tablet 3 6/14/2021 at 0800   • losartan (COZAAR) 25 MG tablet TAKE 1 TABLET BY MOUTH IN  THE MORNING AND 2 TABLETS  BY MOUTH IN THE EVENING 270 tablet 3 6/14/2021 at 1800   • metFORMIN ER (GLUCOPHAGE-XR) 500 MG 24 hr tablet Take 2 tablets by mouth Daily With Breakfast. 30 tablet 5 6/14/2021 at 0800   • metoprolol succinate XL (TOPROL-XL) 100 MG 24 hr tablet TAKE 1 TABLET BY MOUTH  DAILY 90 tablet 3 6/14/2021 at 0800   • mirtazapine (REMERON) 15 MG tablet Take 1 tablet by mouth Every Night. 30 tablet 3 6/14/2021 at 1800   • montelukast (SINGULAIR) 10 MG tablet Take 1 tablet by mouth Every Night. 30 tablet 5 6/14/2021 at 1800   • pantoprazole (PROTONIX) 40 MG EC tablet TAKE 1 TABLET BY MOUTH  DAILY 90 tablet 3 6/14/2021 at 0800   • potassium chloride (K-DUR) 10 MEQ CR tablet TAKE 1 TABLET BY MOUTH IN  THE MORNING 90 tablet 3 6/14/2021 at 0800   • acetaminophen (TYLENOL) 500 MG tablet Take 500 mg by mouth every 4 (four) hours as needed for mild pain (1-3) (1-2 TABLETS PRN Q 4-6 HOURS).      • hydrOXYzine (ATARAX) 25 MG tablet Take 1 tablet by mouth Every 8 (Eight) Hours As Needed for Itching. 30 tablet 0    • mirtazapine (REMERON) 7.5 MG tablet TAKE 1 TABLET BY MOUTH EVERY NIGHT 30 tablet 2    • nystatin-zinc oxide To affected area twice daily 1 tube 2    • traMADol (ULTRAM) 50 MG tablet Take 1 tablet by mouth Every 6 (Six) Hours As Needed for Moderate Pain . 30 tablet 5      Allergies:    Allergies   Allergen Reactions   •  Decongestant  [Pseudoephedrine]        Review of Systems   Constitutional: Positive for fatigue. Negative for chills and fever.   HENT: Negative.  Negative for congestion and sore throat.    Eyes: Negative.  Negative for visual disturbance.   Respiratory: Positive for cough (chronic) and shortness of breath (chronic).    Cardiovascular: Positive for leg swelling. Negative for chest pain.   Gastrointestinal: Negative.  Negative for abdominal pain, constipation, diarrhea, nausea and vomiting.   Endocrine: Negative.    Genitourinary: Negative.  Negative for dysuria, frequency and urgency.   Musculoskeletal: Negative.  Negative for arthralgias and myalgias.   Skin: Negative.  Negative for color change and pallor.   Allergic/Immunologic: Negative.    Neurological: Positive for dizziness, weakness and light-headedness.   Hematological: Negative.    Psychiatric/Behavioral: Negative.  Negative for agitation and behavioral problems.        Objective    Vital Signs  Temp:  [98.5 °F (36.9 °C)-99.4 °F (37.4 °C)] 98.5 °F (36.9 °C)  Heart Rate:  [81-95] 95  Resp:  [16] 16  BP: (133-172)/(39-87) 133/87  SpO2:  [92 %-94 %] 92 %  on   ;   Device (Oxygen Therapy): room air  Body mass index is 34.2 kg/m².    Physical Exam  Vitals and nursing note reviewed.   Constitutional:       General: She is not in acute distress.     Appearance: She is obese.   HENT:      Head: Normocephalic and atraumatic.      Nose: Nose normal.      Mouth/Throat:      Mouth: Mucous membranes are dry.   Eyes:      Extraocular Movements: Extraocular movements intact.   Cardiovascular:      Rate and Rhythm: Normal rate and regular rhythm.      Pulses: Normal pulses.      Heart sounds: Murmur heard.     Abdominal:      General: Bowel sounds are normal. There is distension.      Palpations: Abdomen is soft.      Hernia: A hernia is present.   Musculoskeletal:         General: Swelling (moderate edema BLE) present. Normal range of motion.      Cervical back: Normal  range of motion and neck supple. No rigidity.   Skin:     General: Skin is warm and dry.      Findings: Erythema (BLE) present.   Neurological:      Mental Status: She is alert and oriented to person, place, and time.   Psychiatric:         Mood and Affect: Mood is anxious.         Behavior: Behavior is agitated.         Cognition and Memory: Cognition is impaired.         Results Review:   I reviewed the patient's new clinical results including all labs and xrays.    Lab Results (last 24 hours)     Procedure Component Value Units Date/Time    CBC & Differential [[id number]]  (Abnormal) Collected: 06/14/21 1917    Specimen: Blood Updated: 06/14/21 1937    Narrative:      The following orders were created for panel order CBC & Differential.  Procedure                               Abnormality         Status                     ---------                               -----------         ------                     CBC Auto Differential[[id number]]        Abnormal            Final result                 Please view results for these tests on the individual orders.    Protime-INR [[id number]]  (Abnormal) Collected: 06/14/21 1917    Specimen: Blood Updated: 06/14/21 1947     Protime 19.2 Seconds      INR 1.64    Comprehensive Metabolic Panel [[id number]]  (Abnormal) Collected: 06/14/21 1917    Specimen: Blood Updated: 06/14/21 2006     Glucose 503 mg/dL      BUN 18 mg/dL      Creatinine 0.73 mg/dL      Sodium 136 mmol/L      Potassium 3.9 mmol/L      Chloride 98 mmol/L      CO2 28.7 mmol/L      Calcium 8.8 mg/dL      Total Protein 5.6 g/dL      Albumin 3.90 g/dL      ALT (SGPT) 32 U/L      AST (SGOT) 26 U/L      Alkaline Phosphatase 140 U/L      Total Bilirubin 0.5 mg/dL      eGFR Non African Amer 77 mL/min/1.73      Globulin 1.7 gm/dL      A/G Ratio 2.3 g/dL      BUN/Creatinine Ratio 24.7     Anion Gap 9.3 mmol/L     Narrative:      GFR Normal >60  Chronic Kidney Disease <60  Kidney Failure <15      CBC Auto Differential  [[id number]]  (Abnormal) Collected: 06/14/21 1917    Specimen: Blood Updated: 06/14/21 1937     WBC 6.67 10*3/mm3      RBC 3.76 10*6/mm3      Hemoglobin 7.3 g/dL      Hematocrit 27.2 %      MCV 72.3 fL      MCH 19.4 pg      MCHC 26.8 g/dL      RDW 17.5 %      RDW-SD 45.1 fl      MPV 12.4 fL      Platelets 156 10*3/mm3     Manual Differential [[id number]]  (Abnormal) Collected: 06/14/21 1917    Specimen: Blood Updated: 06/14/21 2004     Neutrophil % 79.0 %      Lymphocyte % 12.0 %      Monocyte % 4.0 %      Eosinophil % 3.0 %      Basophil % 2.0 %      Neutrophils Absolute 5.27 10*3/mm3      Lymphocytes Absolute 0.80 10*3/mm3      Monocytes Absolute 0.27 10*3/mm3      Eosinophils Absolute 0.20 10*3/mm3      Basophils Absolute 0.13 10*3/mm3      Anisocytosis Mod/2+     Hypochromia Slight/1+     Microcytes Mod/2+     Polychromasia Slight/1+     WBC Morphology Normal     Platelet Morphology Normal    COVID PRE-OP / PRE-PROCEDURE SCREENING ORDER (NO ISOLATION) - Swab, Nasopharynx [[id number]]  (Normal) Collected: 06/14/21 1919    Specimen: Swab from Nasopharynx Updated: 06/14/21 2050    Narrative:      The following orders were created for panel order COVID PRE-OP / PRE-PROCEDURE SCREENING ORDER (NO ISOLATION) - Swab, Nasopharynx.  Procedure                               Abnormality         Status                     ---------                               -----------         ------                     COVID-19,[facility] IN-HOUSE...[[id number]]  Normal              Final result                 Please view results for these tests on the individual orders.    COVID-19,[facility]-HOUSE CEPHEID/RACH NP SWAB IN TRANSPORT MEDIA 8-12 HR TAT - Swab, Nasopharynx [[id number]]  (Normal) Collected: 06/14/21 1919    Specimen: Swab from Nasopharynx Updated: 06/14/21 2050     COVID19 Not Detected    Narrative:      Fact sheet for providers: https://www.fda.gov/media/418630/download     Fact sheet for patients:  https://www.fda.gov/media/178934/download    POC Glucose Once [[id number]]  (Abnormal) Collected: 06/14/21 2103    Specimen: Blood Updated: 06/14/21 2109     Glucose 470 mg/dL     POC Glucose Once [[id number]]  (Abnormal) Collected: 06/14/21 2317    Specimen: Blood Updated: 06/14/21 2318     Glucose 387 mg/dL           No orders to display     Assessment/Plan      Active Hospital Problems    Diagnosis  POA   • **Symptomatic anemia [D64.9]  Yes   • History of stroke [Z86.73]  Not Applicable   • Chronic diastolic (congestive) heart failure (CMS/HCC) [I50.32]  Yes   • Atrial fibrillation (CMS/[facility]) [I48.91]  Yes   • Type 2 diabetes mellitus (CMS/[facility]) [E11.9]  Yes   • Hyperlipidemia [E78.5]  Yes   • HTN (hypertension) [I10]  Yes   • Gastroesophageal reflux disease [K21.9]  Yes   • Dementia (CMS/[facility]) [F03.90]  Yes      Resolved Hospital Problems   No resolved problems to display.     Symptomatic anemia  -reported dizziness, weakness and fatigue, hgb on Friday was 7, recheck tonight was 7.3 but still down from baseline of around 11, reports of blood per rectum occasionally  -GI consult  -check anemia studies  -monitor H&H  -hold home dose Eliquis and Losartan in anticipation for hypotension from ABLA    DM2  -hyperglycemic in ED tonight and given insulin in ED with improvement, hold home dose Metformin and start moderate dose correctional factor insulin for meals, check a1c in AM    HTN/HLD/a fib/CHF  -stable, will continue home medications once med rec complete, hold Eliquis and Losartan as per above    VTE Ppx  -SCDs    CODE status  -full    I discussed the patients findings and my recommendations with patient.    KWASI Schmid  [facility]  06/14/21  9:48 PM EDT

## 2021-06-15 NOTE — PLAN OF CARE
Goal Outcome Evaluation:  Plan of Care Reviewed With: patient, son           Outcome Summary: Pt is an 81 yo F who presented with low Hemoglobin. PMHx includes HLD, HTN, DM2, dCHF, a fib on Eliquis, GERD, CVA, and dementia. Pt lives alone and reports intermittent use of a walker secondary to balance/weakness. She has 2 GISSELL with R HR and reports no difficulty with the stairs. Pt presents to PT with limited mobility deficits. Pt performed bed mobility independently as well as 2 STS, 1 from bed and 1 from C, both independently using a FWW. Pt ambulated 150ft around unit with FWW and supervision demonstrating no gait deviations or LOB. Pt required 1 brief standing rest break 2/2 SOA. Pt reports she feels she is getting around at her BL and has no other PT concerns. PT will sign off at this time. PT recommending home with HHPT.

## 2021-06-15 NOTE — PROGRESS NOTES
Discharge Planning Assessment  [facility]     Patient Name: Miri Yung  MRN: [id number]  Today's Date: 6/15/2021    Admit Date: 6/14/2021    Discharge Needs Assessment     Row Name 06/15/21 1320       Living Environment    Lives With  alone    Current Living Arrangements  home/apartment/condo    Primary Care Provided by  self;child(yissel)    Provides Primary Care For  no one    Family Caregiver if Needed  child(yissel), adult;sibling(s)    Family Caregiver Names  Son's Hardeep and Brian and sister-in-law, Ashley Silverman    Quality of Family Relationships  helpful;involved;supportive    Able to Return to Prior Arrangements  yes       Resource/Environmental Concerns    Resource/Environmental Concerns  none       Transition Planning    Patient/Family Anticipates Transition to  home    Patient/Family Anticipated Services at Transition  home health care    Transportation Anticipated  family or friend will provide       Discharge Needs Assessment    Readmission Within the Last 30 Days  no previous admission in last 30 days    Equipment Currently Used at Home  wheelchair;walker, standard;shower chair;commode    Concerns to be Addressed  adjustment to diagnosis/illness    Discharge Facility/Level of Care Needs  home with home health    Provided Post Acute Provider List?  N/A    N/A Provider List Comment  request [facility]        Discharge Plan     Row Name 06/15/21 1322       Plan    Plan  Pending progress pt will dc home w/ family support and HH if needed    Patient/Family in Agreement with Plan  yes    Plan Comments  Met w/ patient at the bedside, face sheet verified. PO Box on face sheet is correct, but pt could not recall house #, she was able to tell me that she lives on [address] in [place]. She asked that I phone her son for the house #, but she states her son just left to go home to get some sleep, so I will follow up w/ him later this afternoon or in the AM. Patient lives alone in a single story home. She  states she is normally IADL inside the home, but she no longer drives. Her sister-in-law, Ashley Silverman lives next door and her sons live nearby they provide transportation to appointments and grocery, pharmacy etc. Pt states she has no trouble affording her medications. PCP is Loren Cruz and preferred pharmacy is [facility]. She uses a walker, transport chair for long distances, cane, bath chair and has elevated commode. She denies DME needs. She has used [facility] in the past and she would like to use them again if needed. Epic referral sent to [facility]. She denies SNF needs at this time. CCP will follow progress and will confirm face sheet and dc plan w/ pt's son, Hardeep [phone].        Continued Care and Services - Admitted Since 6/14/2021     Home Medical Care     Service Provider Request Status Selected Services Address Phone Fax Patient Preferred    [facility]  Pending - Request Sent N/A [address], [address], [place] [facility] [address] [phone] --                Demographic Summary    No documentation.       Functional Status     Row Name 06/15/21 1322       Functional Status    Usual Activity Tolerance  moderate    Current Activity Tolerance  fair       Functional Status, IADL    Medications  assistive person    Meal Preparation  independent    Housekeeping  assistive equipment    Laundry  independent    Shopping  assistive equipment and person       Mental Status    General Appearance WDL  WDL       Mental Status Summary    Recent Changes in Mental Status/Cognitive Functioning  unable to assess        Psychosocial    No documentation.       Abuse/Neglect    No documentation.       Legal    No documentation.       Substance Abuse    No documentation.       Patient Forms    No documentation.           Deneen Dos Santos, RN

## 2021-06-15 NOTE — DISCHARGE PLACEMENT REQUEST
"Bree Yung (80 y.o. Female)     Date of Birth Social Security Number Address Home Phone MRN    1940  [address]  [facility] [address] [phone] [id number]    Jew Marital Status          Synagogue        Admission Date Admission Type Admitting Provider Attending Provider Department, Room/Bed    6/14/21 Emergency Jatinder Medina MD Furlow, Stephen Matthew, MD [address], [address]    Discharge Date Discharge Disposition Discharge Destination                       Attending Provider: Isacc Westbrook MD    Allergies: Decongestant  [Pseudoephedrine]    Isolation: None   Infection: None   Code Status: CPR    Ht: 157.5 cm (62\")   Wt: 84.8 kg (187 lb)    Admission Cmt: None   Principal Problem: Symptomatic anemia [D64.9]                 Active Insurance as of 6/14/2021     Primary Coverage     Payor Plan Insurance Group Employer/Plan Group    MEDICARE MEDICARE A & B      Payor Plan Address Payor Plan Phone Number Payor Plan Fax Number Effective Dates    [address] [phone]  11/1/2004 - None Entered    [facility] [id number]       Subscriber Name Subscriber Birth Date Member ID       BREE YUNG 1940 [id number]           Secondary Coverage     Payor Plan Insurance Group Employer/Plan Group    [facility] [employer]      Payor Plan Address Payor Plan Phone Number Payor Plan Fax Number Effective Dates    [facility] [phone]  1/1/2016 - None Entered    [address]       [facility] [address]       Subscriber Name Subscriber Birth Date Member ID       BREE YUNG 1940 [id number]                 Emergency Contacts      (Rel.) Home Phone Work Phone Mobile Phone    Hardeep Yung (Son) [phone] -- [phone]    Brian Yung (Son) [phone] -- [phone]    Ashley Israel (Other) -- -- [phone]              "

## 2021-06-15 NOTE — ED NOTES
Attempt to call report x1, receiving RN currently unavailable and is to call back.     Jolynn Dye, RN  06/14/21 1493

## 2021-06-15 NOTE — PLAN OF CARE
Problem: Adult Inpatient Plan of Care  Goal: Plan of Care Review  6/15/2021 1701 by Gabriela Howell, RN  Flowsheets (Taken 6/15/2021 1701)  Outcome Summary: Assist X1 , AOX3, RM , No c/o pain or distress, Ct done this afternoon  6/15/2021 1351 by Gabriela Howell, RN  Outcome: Ongoing, Progressing   Goal Outcome Evaluation:              Outcome Summary: Assist X1 , AOX3, RM , No c/o pain or distress, Ct done this afternoon

## 2021-06-15 NOTE — THERAPY EVALUATION
Patient Name: Miri Yung  : 1940    MRN: [id number]                              Today's Date: 6/15/2021       Admit Date: 2021    Visit Dx:     ICD-10-CM ICD-9-CM   1. Symptomatic anemia  D64.9 285.9   2. Uncontrolled type 2 diabetes mellitus with hyperglycemia (CMS/[facility])  E11.65 250.02   3. Anticoagulated by anticoagulation treatment  Z79.01 V58.61     Patient Active Problem List   Diagnosis   • Dementia (CMS/HCC)   • Depressive disorder   • Edema   • Abnormal liver function tests   • Gastroesophageal reflux disease   • Gout   • Hyperlipidemia   • HTN (hypertension)   • Insomnia   • Arthropathy of knee   • Memory loss   • Low back pain   • Mitral valve insufficiency   • Tricuspid valve insufficiency   • Urinary tract infection   • Valvular endocarditis   • Long QT interval   • Microcytic anemia   • Anemia   • Seasonal allergies   • Acute cystitis without hematuria   • Chronic fatigue   • Bacterial endocarditis   • Slow transit constipation   • Acute ischemic stroke (CMS/HCC)   • Paroxysmal tachycardia (CMS/HCC)   • Type 2 diabetes mellitus (CMS/HCC)   • Atrial fibrillation (CMS/HCC)   • Morbidly obese (CMS/HCC)   • Chronic diastolic (congestive) heart failure (CMS/HCC)   • Symptomatic anemia   • History of stroke   • GI bleed   • Long term current use of anticoagulant therapy   • Acute blood loss anemia     Past Medical History:   Diagnosis Date   • Acute ischemic stroke (CMS/[facility])    • Anxiety    • Atrial fibrillation (CMS/HCC)    • Congestive heart failure (CHF) (CMS/HCC)    • Dementia (CMS/HCC)    • Depression    • Edema    • Encephalopathy     secondary to endocarditis   • Endocarditis     with mitral and tricuspid regurgitation   • Esophageal reflux    • GERD (gastroesophageal reflux disease)    • Gout    • History of blood transfusion     due to anemia   • HTN (hypertension)    • Hyperlipidemia    • Insomnia    • LOM (loss of memory)    • Lung mass    • Mitral and aortic valve disease      secondary to endocarditis; generally asymptomatic   • Mitral regurgitation    • Mixed anxiety depressive disorder    • Nonrheumatic tricuspid (valve) insufficiency    • Osteoarthritis    • PAF (paroxysmal atrial fibrillation) (CMS/HCC)    • Sacroiliitis (CMS/HCC)    • Tricuspid regurgitation    • Type 2 diabetes mellitus (CMS/HCC)      Past Surgical History:   Procedure Laterality Date   • CATARACT EXTRACTION     • COLONOSCOPY N/A 3/10/2016    Procedure: COLONOSCOPY TO CECUM;  Surgeon: Shaan Becerra Jr., MD;  Location: [facility] ENDOSCOPY;  Service:    • ENDOSCOPY N/A 3/10/2016    Procedure: ESOPHAGOGASTRODUODENOSCOPY ;  Surgeon: Shaan Becerra Jr., MD;  Location: [facility] ENDOSCOPY;  Service:    • KNEE SURGERY     • TUBAL ABDOMINAL LIGATION       General Information     [facility] Name 06/15/21 1316          Physical Therapy Time and Intention    Document Type  evaluation  -     Mode of Treatment  individual therapy;physical therapy  -[facility] Name 06/15/21 1316          General Information    Patient Profile Reviewed  yes  -     Prior Level of Function  independent:;gait;transfer;bed mobility  -     Existing Precautions/Restrictions  no known precautions/restrictions  -     Barriers to Rehab  none identified  -     Row Name 06/15/21 1316          Living Environment    Lives With  alone  -[facility] Name 06/15/21 1316          Home Main Entrance    Number of Stairs, Main Entrance  two  -     Stair Railings, Main Entrance  railing on left side (ascending)  -[facility] Name 06/15/21 1316          Stairs Within Home, Primary    Number of Stairs, Within Home, Primary  none  -     Row Name 06/15/21 1316          Cognition    Orientation Status (Cognition)  oriented x 3  -       User Key  (r) = Recorded By, (t) = Taken By, (c) = Cosigned By    Initials Name Provider Type     Asuncion Bolivar Physical Therapist        Mobility     Row Name 06/15/21 1318          Bed Mobility    Bed Mobility  supine-sit;sit-supine   -     Supine-Sit [place] (Bed Mobility)  modified independence  -     Sit-Supine [place] (Bed Mobility)  not tested [facility]     Assistive Device (Bed Mobility)  bed rails;head of bed elevated  -[facility] Name 06/15/21 1318          Sit-Stand Transfer    Sit-Stand [place] (Transfers)  modified independence  -     Assistive Device (Sit-Stand Transfers)  walker, front-wheeled  -BH     Row Name 06/15/21 1318          Gait/Stairs (Locomotion)    [place] Level (Gait)  supervision  -     Assistive Device (Gait)  walker, front-wheeled  -     Distance in Feet (Gait)  150ft  -     Comment (Gait/Stairs)  Tolerated ambulation well, requiring no assistance and 1 brief rest break secondary to mild SOA.  -       User Key  (r) = Recorded By, (t) = Taken By, (c) = Cosigned By    Initials Name Provider Type     Asuncion Bolivar Physical Therapist        Obj/Interventions     [facility] Name 06/15/21 1322          Range of Motion Comprehensive    General Range of Motion  no range of motion deficits identified  -BH     Row Name 06/15/21 1322          Strength Comprehensive (MMT)    General Manual Muscle Testing (MMT) Assessment  no strength deficits identified  -     Comment, General Manual Muscle Testing (MMT) Assessment  BLE grossly 5/5 with MMT and ambulation  -BH     Row Name 06/15/21 1322          Motor Skills    Therapeutic Exercise  -- 10 reps B AP/LAQ/seated marches  -BH     Row Name 06/15/21 1322          Balance    Balance Assessment  sitting static balance;standing static balance  -     Static Sitting Balance  WFL;unsupported;sitting, edge of bed  -     Static Standing Balance  WFL;unsupported;standing  -       User Key  (r) = Recorded By, (t) = Taken By, (c) = Cosigned By    Initials Name Provider Type     Asuncion Bolivar Physical Therapist        Goals/Plan    No documentation.       Clinical Impression     [facility] Name 06/15/21 1322          Pain    Additional Documentation  Pain Scale:  Numbers Pre/Post-Treatment (Group)  -     Row Name 06/15/21 1322          Pain Scale: Numbers Pre/Post-Treatment    Pretreatment Pain Rating  0/10 - no pain  -     Posttreatment Pain Rating  0/10 - no pain  -     Row Name 06/15/21 1322          Plan of Care Review    Plan of Care Reviewed With  patient;son  -     Outcome Summary  Pt is an 81 yo F who presented with low Hemoglobin. PMHx includes HLD, HTN, DM2, dCHF, a fib on Eliquis, GERD, CVA, and dementia. Pt lives alone and reports intermittent use of a walker secondary to balance/weakness. She has 2 GISSELL with R HR and reports no difficulty with the stairs. Pt presents to PT with limited mobility deficits. Pt performed bed mobility independently as well as 2 STS, 1 from bed and 1 from [facility], both independently using a FWW. Pt ambulated 150ft around unit with FWW and supervision demonstrating no gait deviations or LOB. Pt required 1 brief standing rest break 2/2 SOA. Pt reports she feels she is getting around at her BL and has no other PT concerns. PT will sign off at this time. PT recommending home with HHPT.  -     Row Name 06/15/21 1322          Therapy Assessment/Plan (PT)    Criteria for Skilled Interventions Met (PT)  no  -     Row Name 06/15/21 1322          Positioning and Restraints    Pre-Treatment Position  in bed  -     Post Treatment Position  chair  -     In Chair  reclined;call light within reach;encouraged to call for assist;exit alarm on;with family/caregiver  -       User Key  (r) = Recorded By, (t) = Taken By, (c) = Cosigned By    Initials Name Provider Type    Asuncion Roberts Physical Therapist        Outcome Measures     Row Name 06/15/21 0477          How much help from another person do you currently need...    Turning from your back to your side while in flat bed without using bedrails?  4  -BH     Moving from lying on back to sitting on the side of a flat bed without bedrails?  4  -BH     Moving to and from a bed to a chair  (including a wheelchair)?  4  -BH     Standing up from a chair using your arms (e.g., wheelchair, bedside chair)?  4  -BH     Climbing 3-5 steps with a railing?  3  -BH     To walk in hospital room?  4  -BH     AM-PAC 6 Clicks Score (PT)  23  -     Row Name 06/15/21 1328          Functional Assessment    Outcome Measure Options  AM-PAC 6 Clicks Basic Mobility (PT)  -       User Key  (r) = Recorded By, (t) = Taken By, (c) = Cosigned By    Initials Name Provider Type     Asuncion Bolivar Physical Therapist        Physical Therapy Education                 Title: PT OT SLP Therapies (Done)     Topic: Physical Therapy (Done)     Point: Mobility training (Done)     Learning Progress Summary           Patient Acceptance, E,TB,D, VU,NR by  at 6/15/2021 1328                   Point: Home exercise program (Done)     Learning Progress Summary           Patient Acceptance, E,TB,D, VU,NR by  at 6/15/2021 1328                   Point: Body mechanics (Done)     Learning Progress Summary           Patient Acceptance, E,TB,D, VU,NR by  at 6/15/2021 1328                   Point: Precautions (Done)     Learning Progress Summary           Patient Acceptance, E,TB,D, VU,NR by  at 6/15/2021 1328                               User Key     Initials Effective Dates Name Provider Type [facility] 05/10/21 -  Asuncion Bolivar Physical Therapist PT              PT Recommendation and Plan     Plan of Care Reviewed With: patient, son  Outcome Summary: Pt is an 79 yo F who presented with low Hemoglobin. PMHx includes HLD, HTN, DM2, dCHF, a fib on Eliquis, GERD, CVA, and dementia. Pt lives alone and reports intermittent use of a walker secondary to balance/weakness. She has 2 GISSELL with R HR and reports no difficulty with the stairs. Pt presents to PT with limited mobility deficits. Pt performed bed mobility independently as well as 2 STS, 1 from bed and 1 from [facility], both independently using a FWW. Pt ambulated 150ft around unit with  FWW and supervision demonstrating no gait deviations or LOB. Pt required 1 brief standing rest break 2/2 SOA. Pt reports she feels she is getting around at her BL and has no other PT concerns. PT will sign off at this time. PT recommending home with HHPT.     Time Calculation:   PT Charges     Row Name 06/15/21 1330             Time Calculation    Start Time  1159  -      Stop Time  1224  -      Time Calculation (min)  25 min  -      PT Received On  06/15/21  -         Time Calculation- PT    Total Timed Code Minutes- PT  18 minute(s)  -         Timed Charges    47873 - Gait Training Minutes   10  -BH      94333 - PT Therapeutic Activity Minutes  8  -BH         Untimed Charges    PT Eval/Re-eval Minutes  7  -         Total Minutes    Timed Charges Total Minutes  18  -BH      Untimed Charges Total Minutes  7  -BH       Total Minutes  25  -BH        User Key  (r) = Recorded By, (t) = Taken By, (c) = Cosigned By    Initials Name Provider Type     Asuncion Bolivar Physical Therapist        Therapy Charges for Today     Code Description Service Date Service Provider Modifiers Qty    18697628090 HC PT EVAL LOW COMPLEXITY 1 6/15/2021 Asuncion Bolivar GP 1    52801038721 HC PT THERAPEUTIC ACT EA 15 MIN 6/15/2021 Asuncion Bolivar GP 1          PT G-Codes  Outcome Measure Options: AM-PAC 6 Clicks Basic Mobility (PT)  AM-PAC 6 Clicks Score (PT): 23    ASUNCION BOLIVAR  6/15/2021

## 2021-06-15 NOTE — CONSULTS
"Diabetes Education  Assessment/Teaching    Patient Name:  Miri Yung  YOB: 1940  MRN: [id number]  Admit Date:  6/14/2021      Assessment Date:  6/15/2021    Most Recent Value   General Information    Referral From:  A1c, Database [A1C 11%]   Height  157.5 cm (62\")   Height Method  Stated   Weight  84.8 kg (187 lb)   Weight Method  Bed scale   Are you currently involved in an activity/exercise program?   No   Pregnancy Assessment   Diabetes History   What type of diabetes do you have?  Type 2   Length of Diabetes Diagnosis  Newly diagnosed <6 months   Current DM knowledge  poor   Have you had diabetes education/teaching in the past?  no   Do you test your blood sugar at home?  no   Have you had low blood sugar? (<70mg/dl)  no   Have you had high blood sugar? (>140mg/dl)  no   How would you rate your diabetes control?  poor   How do you feel about having diabetes?  expecting it   Has diabetes caused a problem in your life (work/school/family/friends, etc.)?  no   Have You Felt Down, Depressed or Hopeless?  yes   Have You Felt Little Interest or Pleasure in Doing Things?  yes   What makes it difficult for you to take care of your diabetes or yourself?  limited physical activity   Do you have any diabetes complications?  circulation problems   Education Preferences   What areas of diabetes would you like to learn about?  avoiding high blood sugar, medications for diabetes, testing my blood sugar at home, diet information   Nutrition Information   Assessment Topics   Healthy Eating - Assessment  Needs education   Being Active - Assessment  Needs education   Taking Medication - Assessment  Needs education   Problem Solving - Assessment  Needs education   Reducing Risk - Assessment  Needs education   Healthy Coping - Assessment  Needs education   Monitoring - Assessment  Needs education   DM Goals            Most Recent Value   DM Education Needs   Meter  Meter provided   Frequency of Testing  3 times a " day   Blood Glucose Target Range  under 200   Medication  Insulin, Actions, Side effects, Administration, Vial   Problem Solving  Hyperglycemia   Healthy Eating  Reviewed meal plan   Physical Activity  None   Physical Activity Frequency  Never   Healthy Coping  Appropriate   Discharge Plan  Home   Motivation  Moderate   Teaching Method  Explanation, Discussion, Demonstration, Teach back, Handouts   Patient Response  Needs reinforcement, Verbalized understanding            Other Comments:  Discussed with pt and her son her diabetes diagnosis.She states she is new to diabetes in last 3 months and has been on metformin. She has not testing her BG but is open to testing if sent home with a meter and son said he would oversee if she has a meter.    Pt took both insulin shots with some prompting. We will return  to keep reviewing with pt and her son.        Electronically signed by:  Heather Camargo RN  06/15/21 11:49 EDT

## 2021-06-15 NOTE — PLAN OF CARE
Goal Outcome Evaluation:  Plan of Care Reviewed With: patient        Progress: no change   VSS. Patient placed on O2@2L to keep saturations >90%. Patient awake and extremely restless for most of night-pulling off monitor and pulse ox. X1 ativan given in ER but ineffective. Son Hardeep Yung states she is normally A&Ox4 and denies dementia but patient unable to answer some questions with periods of confusion reguarding history and situation. Son also states patient lives alone and until this point was getting around her house ok with walker and even cooking for self. IM zyprexa ordered X1 and seemed to help a little with anxiety but patient was still not able to sleep. Son at bedside until 2am and was able to help answer admission questions. Son also states patient has advanced directive but he would have to find it at home and bring in copy for chart. GI consult called for possible GI bleed. Need occult stool sample. Q8 hour H&H scheduled with 3am level being 7.3. Bed alarm activated and working. Will continue to monitor.

## 2021-06-15 NOTE — CONSULTS
Patient Name: Miri Yung  :1940  80 y.o.    Date of Admission: 2021  Date of Consultation:  06/15/21  Encounter Provider: KWASI Bazan  Place of Service: [facility] CARDIOLOGY  Referring Provider: No ref. provider found  Patient Care Team:  Loren Cruz APRN as PCP - General (Family Medicine)      Chief complaint: low Hgb    Reason for Consult: AF, GIB    History of Present Illness: Ms. Yung is an 80 year old patient of Dr. York with paroxysmal atrial fibrillation and chronic diastolic heart failure. She is anticoagulated with apixaban. She saw KWASI Isbell in 2021. She complained of lower extremity edema. An echo was ordered and showed normal LVEF, severe LAE, aortic valve calcification with mild AS, and echo dense material in the aorta which was suspected to be calcium. She had a CT to rule out dissection. It did show pulmonary nodules as well as liver abdnormalities.     She also has prior history of bacterial endocarditis, with a recent echocardiogram completed in April demonstrating mild to moderate concentric LVH with EF 66-70%, as well as a grade II diastolic dysfunction and RVSP of 47 mmHg. He had mild AR with mild to moderate AS with a valve area of 1.4 cm2, maximum pressure gradient of 30.2 mmHg, mean pressure gradient of 16.78 mmHg and peak velocity of the distal flow at 274.7 cm/s.     She presented to the emergency room yesterday after seeing her PCP on  for lower extremity edema. Labs were checked. She was noted to be anemic with Hgb 7. She was sent to the ER.     She has been evaluated by GI with plans noted for possible endoscopic workup Thursday. Her Eliquis remains on hold.    She was noted to be tachycardic, with intermittent atrial fibrillation, for which we have been consulted. Her home dose Toprol has been restarted.     She feels well. She is on room air. She denies PND, orthopnea. She has mild lower  extremity edema.     Previous Cardiac Testing:    Echocardiogram 4/12/21    · Left ventricular ejection fraction appears to be 66 - 70%. Normal left ventricular cavity size noted. Left ventricular wall thickness is consistent with mild to moderate concentric hypertrophy. All left ventricular wall segments contract normally. Left ventricular diastolic function is consistent with (grade II w/high LAP) pseudonormalization. No evidence of left ventricular thrombus or mass present.  · The left atrial cavity is severely dilated.  · There is mild calcification of the aortic valve. Mild aortic valve regurgitation is present. Mild to moderate aortic valve stenosis is present. Peak velocity of the flow distal to the aortic valve is 274.7 cm/s. Aortic valve maximum pressure gradient is 30.2 mmHg. Aortic valve mean pressure gradient is 16.8 mmHg. Aortic valve area is 1.4 cm2.  · There are 2 echointense structures with minimal mobility in the proximal aorta. The appearance seems most consistent with concentric calcification of the sinotubular junction however cannot exclude intimal flap. No clear evidence for aortic dissection.  · Calculated right ventricular systolic pressure from tricuspid regurgitation is 47 mmHg.    Holter 9/6/19  1.  The total analysis time is 11 days and 1 hour.  2.  The underlying rhythm is sinus with an average heart rate of 78.  The minimum heart rate was 60, the maximum was 232.  3.  Premature ventricular ectopics occurred moderately frequently.  They were mostly unifocal but occasional couplets were noted and 2 triplets were recorded.  1 4 beat run of nonsustained ventricular tachycardia was also recorded at a rate of 130 bpm.  4.  Atrial fibrillation and atrial flutter occurred throughout the entire tracing.  The ectopy amounted to approximately 3% of the total recording time with average heart rates of 160 bpm.  In addition there were frequent premature atrial ectopics noted.  5.  No pauses or high  degree AV block were noted.  6.  No diary entries were made.      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; generally asymptomatic   • Mitral regurgitation    • Mixed anxiety depressive disorder    • Nonrheumatic tricuspid (valve) insufficiency    • Osteoarthritis    • PAF (paroxysmal atrial fibrillation) (CMS/HCC)    • Sacroiliitis (CMS/HCC)    • Tricuspid regurgitation    • Type 2 diabetes mellitus (CMS/HCC)        Past Surgical History:   Procedure Laterality Date   • CATARACT EXTRACTION     • COLONOSCOPY N/A 3/10/2016    Procedure: COLONOSCOPY TO CECUM;  Surgeon: Shaan Becerra Jr., MD;  Location: [facility] ENDOSCOPY;  Service:    • ENDOSCOPY N/A 3/10/2016    Procedure: ESOPHAGOGASTRODUODENOSCOPY ;  Surgeon: Shaan Becerra Jr., MD;  Location: [facility] ENDOSCOPY;  Service:    • KNEE SURGERY     • TUBAL ABDOMINAL LIGATION           Prior to Admission medications    Medication Sig Start Date End Date Taking? Authorizing Provider   allopurinol (ZYLOPRIM) 300 MG tablet TAKE 1 TABLET BY MOUTH  DAILY 8/27/20  Yes Albert Brooks MD   apixaban (Eliquis) 5 MG tablet tablet Take 1 tablet by mouth Every 12 (Twelve) Hours. 4/7/21  Yes Jignesh York MD   atorvastatin (LIPITOR) 40 MG tablet Take 1 tablet by mouth Daily. 3/26/21  Yes Loren Cruz APRN   buPROPion SR (WELLBUTRIN SR) 150 MG 12 hr tablet Take 1 tablet by mouth Daily With  Breakfast. 3/22/21  Yes Loren Cruz APRN   furosemide (LASIX) 40 MG tablet Take 1 tablet by mouth 2 (Two) Times a Day. 3/23/21  Yes Loren Cruz APRN   isosorbide mononitrate (IMDUR) 60 MG 24 hr tablet TAKE 1 TABLET BY MOUTH  DAILY 8/27/20  Yes Albert Brooks MD   losartan (COZAAR) 25 MG tablet TAKE 1 TABLET BY MOUTH IN  THE MORNING AND 2 TABLETS  BY MOUTH IN THE EVENING 8/27/20  Yes Albert Brooks MD   metFORMIN ER (GLUCOPHAGE-XR) 500 MG 24 hr tablet Take 2 tablets by mouth Daily With Breakfast. 4/19/21  Yes Loren Cruz APRN   metoprolol succinate XL (TOPROL-XL) 100 MG 24 hr tablet TAKE 1 TABLET BY MOUTH  DAILY 8/27/20  Yes Albert Brooks MD   mirtazapine (REMERON) 15 MG tablet Take 1 tablet by mouth Every Night. 2/18/21  Yes Loren Cruz APRN   montelukast (SINGULAIR) 10 MG tablet Take 1 tablet by mouth Every Night. 2/9/21  Yes Loren Cruz APRN   pantoprazole (PROTONIX) 40 MG EC tablet TAKE 1 TABLET BY MOUTH  DAILY 8/27/20  Yes Albert Brooks MD   potassium chloride (K-DUR) 10 MEQ CR tablet TAKE 1 TABLET BY MOUTH IN  THE MORNING 8/27/20  Yes Albert Brooks MD   acetaminophen (TYLENOL) 500 MG tablet Take 500 mg by mouth every 4 (four) hours as needed for mild pain (1-3) (1-2 TABLETS PRN Q 4-6 HOURS).    Provider, MD Sabina   buPROPion SR (WELLBUTRIN SR) 150 MG 12 hr tablet TAKE 1 TABLET BY MOUTH EVERY DAY WITH BREAKFAST 9/24/20   Albert Brooks MD   furosemide (LASIX) 40 MG tablet Take 1 tablet by mouth 2 (Two) Times a Day. 7/14/20   Albert Brooks MD   mirtazapine (REMERON) 7.5 MG tablet TAKE 1 TABLET BY MOUTH EVERY NIGHT 9/24/20   Albert Brooks MD       Allergies   Allergen Reactions   • Decongestant  [Pseudoephedrine]        Social History     Socioeconomic History   • Marital status:      Spouse name: Not on file   • Number of children: Not on file   • Years of education: Not on file   • Highest education level: Not on file   Tobacco Use    • Smoking status: Never Smoker   • Smokeless tobacco: Never Used   • Tobacco comment: caffeine use    Vaping Use   • Vaping Use: Never assessed   Substance and Sexual Activity   • Alcohol use: No   • Drug use: No   • Sexual activity: Defer       Family History   Problem Relation Age of Onset   • Kidney disease Mother    • Heart disease Mother    • Emphysema Father    • Heart disease Brother        REVIEW OF SYSTEMS:   All systems reviewed.  Pertinent positives identified in HPI.  All other systems are negative.      Objective:     Vitals:    06/15/21 0649 06/15/21 0733 06/15/21 0841 06/15/21 1100   BP:  121/58  116/46   BP Location:  Right arm  Right arm   Patient Position:  Lying  Sitting   Pulse: (!) 145  95    Resp:  16  16   Temp:  98.5 °F (36.9 °C)  97.8 °F (36.6 °C)   TempSrc:  Oral  Oral   SpO2:       Weight:       Height:         Body mass index is 34.2 kg/m².    Constitutional: She is oriented to person, place, and time. She appears well-developed. She does not appear ill.   HENT:   Head: Normocephalic and atraumatic. Head is without contusion.   Right Ear: Hearing normal. No drainage.   Left Ear: Hearing normal. No drainage.   Nose: No nasal deformity. No epistaxis.   Eyes: Lids are normal. Right eye exhibits no exudate. Left eye exhibits no exudate.  Neck: No JVD present. Carotid bruit is not present. No tracheal deviation present. No thyroid mass and no thyromegaly present.   Cardiovascular: Normal rate, regular rhythm and normal heart sounds.    Pulses:       Posterior tibial pulses are 2+ on the right side, and 2+ on the left side.   Pulmonary/Chest: Effort normal and breath sounds normal.   Abdominal: Soft. Normal appearance and bowel sounds are normal. There is no tenderness.   Musculoskeletal: Normal range of motion.        Right shoulder: She exhibits no deformity.        Left shoulder: She exhibits no deformity.   Neurological: She is alert and oriented to person, place, and time. She has normal  strength.   Skin: Skin is warm, dry and intact. No rash noted.   Psychiatric: She has a normal mood and affect. Her behavior is normal. Thought content normal.   Vitals reviewed    Lab Review:     Results from last 7 days   Lab Units 06/14/21 1917   SODIUM mmol/L 136   POTASSIUM mmol/L 3.9   CHLORIDE mmol/L 98   CO2 mmol/L 28.7   BUN mg/dL 18   CREATININE mg/dL 0.73   CALCIUM mg/dL 8.8   BILIRUBIN mg/dL 0.5   ALK PHOS U/L 140*   ALT (SGPT) U/L 32   AST (SGOT) U/L 26   GLUCOSE mg/dL 503*         Results from last 7 days   Lab Units 06/15/21  0801 06/14/21 1917   WBC 10*3/mm3  --  6.67   HEMOGLOBIN g/dL 7.9* 7.3*   HEMATOCRIT % 29.2* 27.2*   PLATELETS 10*3/mm3  --  156     Results from last 7 days   Lab Units 06/14/21 1917   INR  1.64*                       EKG 6/15/21    Admit EKG 6/14/21    Previous EKG 3/25/21                  Assessment and Plan:       1. Anemia , labs consistent with iron deficiency, concern for GIB. Scopes as per GI. apixaban is on hold.  2. Paroxysmal atrial fibrillation, back in SR. Continue oral beta blocker. Can use IV metoprolol as needed if she has recurrent AF with RVR.   3.  Mild aortic stenosis   4.  Chronic diastolic heart failure  5. Hypertension  6. She is on ISMN, denies history of CAD.  7. History of stroke    KWASI Bazan  06/15/21  15:10 EDT

## 2021-06-15 NOTE — CONSULTS
[facility]  Initial Inpatient Consult Note    Referring Provider: Dr. ADIEL Westbrook    Reason for Consultation: Lower GI bleeding    Subjective     History of present illness:    80 y.o. female with a history of HLD, HTN, DM2, dCHF, a fib on Eliquis, GERD, CVA, dementia  who was admitted 6/14/2021 with anemia noted by primary care doctor.  She went to see the primary care doctor because she had had some swelling in her feet that has been going on and on and off fashion for couple years.  Patient was found to have iron deficiency anemia while on Eliquis (for atrial fibrillation).  She has had some bright red blood per rectum.  No melena.  No diarrhea or constipation.  She is not sure if her weight is changed.  She did have a CT of the chest last month that showed some pulmonary nodules and it was suggestive of cirrhosis with splenomegaly.  She has had no liver troubles in the past.  No family history of liver disease.  No jaundice or hepatitis.  She is a non-smoker and denies alcohol use.  She did have some anemia 20+ years ago.  Her last Eliquis was 6/14/2021.  She does have a history of a stroke.  She does take Aleve and ibuprofen as needed.  She last had an EGD and colonoscopy in 2016 by Dr. Shaan Becerra.    Past Medical History:  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; generally asymptomatic   • Mitral regurgitation     • Mixed anxiety depressive disorder    • Nonrheumatic tricuspid (valve) insufficiency    • Osteoarthritis    • PAF (paroxysmal atrial fibrillation) (CMS/HCC)    • Sacroiliitis (CMS/HCC)    • Tricuspid regurgitation    • Type 2 diabetes mellitus (CMS/HCC)      Past Surgical History:  Past Surgical History:   Procedure Laterality Date   • CATARACT EXTRACTION     • COLONOSCOPY N/A 3/10/2016    Procedure: COLONOSCOPY TO CECUM;  Surgeon: Shaan Becerra Jr., MD;  Location: [facility] ENDOSCOPY;  Service:    • ENDOSCOPY N/A 3/10/2016    Procedure: ESOPHAGOGASTRODUODENOSCOPY ;  Surgeon: Shaan Becerra Jr., MD;  Location: [facility] ENDOSCOPY;  Service:    • KNEE SURGERY     • TUBAL ABDOMINAL LIGATION        Social History:   Social History     Tobacco Use   • Smoking status: Never Smoker   • Smokeless tobacco: Never Used   • Tobacco comment: caffeine use    Substance Use Topics   • Alcohol use: No      Family History:  Family History   Problem Relation Age of Onset   • Kidney disease Mother    • Heart disease Mother    • Emphysema Father    • Heart disease Brother        Home Meds:  Medications Prior to Admission   Medication Sig Dispense Refill Last Dose   • allopurinol (ZYLOPRIM) 300 MG tablet TAKE 1 TABLET BY MOUTH  DAILY 90 tablet 3 6/14/2021 at 0800   • apixaban (Eliquis) 5 MG tablet tablet Take 1 tablet by mouth Every 12 (Twelve) Hours. 60 tablet 2 6/14/2021 at 1800   • atorvastatin (LIPITOR) 40 MG tablet Take 1 tablet by mouth Daily. 90 tablet 0 6/14/2021 at 0800   • buPROPion SR (WELLBUTRIN SR) 150 MG 12 hr tablet Take 1 tablet by mouth Daily With Breakfast. 30 tablet 3 6/14/2021 at 0800   • furosemide (LASIX) 40 MG tablet Take 1 tablet by mouth 2 (Two) Times a Day. 180 tablet 0 6/14/2021 at 1800   • isosorbide mononitrate (IMDUR) 60 MG 24 hr tablet TAKE 1 TABLET BY MOUTH  DAILY 90 tablet 3 6/14/2021 at 0800   • losartan (COZAAR) 25 MG tablet TAKE 1 TABLET BY MOUTH IN  THE MORNING AND 2 TABLETS  BY MOUTH IN THE EVENING  270 tablet 3 6/14/2021 at 1800   • metFORMIN ER (GLUCOPHAGE-XR) 500 MG 24 hr tablet Take 2 tablets by mouth Daily With Breakfast. 30 tablet 5 6/14/2021 at 0800   • metoprolol succinate XL (TOPROL-XL) 100 MG 24 hr tablet TAKE 1 TABLET BY MOUTH  DAILY 90 tablet 3 6/14/2021 at 0800   • mirtazapine (REMERON) 15 MG tablet Take 1 tablet by mouth Every Night. 30 tablet 3 6/14/2021 at 1800   • montelukast (SINGULAIR) 10 MG tablet Take 1 tablet by mouth Every Night. 30 tablet 5 6/14/2021 at 1800   • pantoprazole (PROTONIX) 40 MG EC tablet TAKE 1 TABLET BY MOUTH  DAILY 90 tablet 3 6/14/2021 at 0800   • potassium chloride (K-DUR) 10 MEQ CR tablet TAKE 1 TABLET BY MOUTH IN  THE MORNING 90 tablet 3 6/14/2021 at 0800   • acetaminophen (TYLENOL) 500 MG tablet Take 500 mg by mouth every 4 (four) hours as needed for mild pain (1-3) (1-2 TABLETS PRN Q 4-6 HOURS).        Current Meds:   acetaminophen, 650 mg, Oral, Once   And  iron sucrose, 100 mg, Intravenous, Q24H  allopurinol, 300 mg, Oral, Daily  atorvastatin, 40 mg, Oral, Daily  buPROPion SR, 150 mg, Oral, Daily With Breakfast  insulin lispro, 0-9 Units, Subcutaneous, TID AC  isosorbide mononitrate, 60 mg, Oral, Daily  metoprolol succinate XL, 100 mg, Oral, Daily  mirtazapine, 15 mg, Oral, Nightly  montelukast, 10 mg, Oral, Nightly  pantoprazole, 40 mg, Oral, Daily  sodium chloride, 10 mL, Intravenous, Q12H      Allergies:  Allergies   Allergen Reactions   • Decongestant  [Pseudoephedrine]      Review of Systems  Pertinent items are noted in HPI  The following systems were reviewed and negative;  constitution, ENT, respiratory, cardiovascular, genitourinary, musculoskeletal and neurological     Objective     Vital Signs  Temp:  [98.4 °F (36.9 °C)-99.4 °F (37.4 °C)] 98.5 °F (36.9 °C)  Heart Rate:  [] 95  Resp:  [16] 16  BP: (121-172)/(39-87) 121/58  Physical Exam:  General Appearance:    Alert, cooperative, in no acute distress   Head:    Normocephalic, without obvious  abnormality, atraumatic   Eyes:            Lids and lashes normal, conjunctivae and sclerae normal, no   icterus   Throat:   No oral lesions, no thrush, oral mucosa moist   Neck:   No adenopathy, supple, trachea midline, no thyromegaly, no   carotid bruit, no JVD   Lungs:     Clear to auscultation,respirations regular, even and                   unlabored    Heart:    Regular rhythm and normal rate, normal S1 and S2, no            murmur, no gallop, no rub, no click   Chest Wall:    No abnormalities observed   Abdomen:     Normal bowel sounds, no masses, no organomegaly, soft        nontender, +distended with an umbilical hernia where the bowels are easily pushed back into the peritoneal cavity, no guarding, no rebound                 Tenderness.  Obese   Rectal:     Deferred   Extremities:   no edema, no cyanosis, no redness   Skin:   No bleeding, bruising or rash   Lymph nodes:   No palpable adenopathy   Psychiatric:  Judgement and insight: normal   Orientation to person place and time: normal   Mood and affect: normal   Results Review:   I reviewed the patient's new clinical results.    Results from last 7 days   Lab Units 06/15/21  0801 06/15/21  0259 06/14/21 1917 06/11/21  1518   WBC 10*3/mm3  --   --  6.67 6.0   HEMOGLOBIN g/dL 7.9* 7.3* 7.3* 7.0*   HEMATOCRIT % 29.2* 26.7* 27.2* 24.9*   PLATELETS 10*3/mm3  --   --  156 129*     Results from last 7 days   Lab Units 06/14/21 1917 06/11/21  1518   SODIUM mmol/L 136 140   POTASSIUM mmol/L 3.9 4.5   CHLORIDE mmol/L 98 103   TOTAL CO2 mmol/L  --  25   CO2 mmol/L 28.7  --    BUN mg/dL 18 17   CREATININE mg/dL 0.73 0.66   CALCIUM mg/dL 8.8 8.9   BILIRUBIN mg/dL 0.5 0.4   ALK PHOS U/L 140* 129*   ALT (SGPT) U/L 32 31   AST (SGOT) U/L 26 38   GLUCOSE mg/dL 503*  --      Results from last 7 days   Lab Units 06/14/21 1917   INR  1.64*     Lab Results   Lab Value Date/Time    LIPASE 46 02/23/2016 0635       Radiology:  No orders to display       Assessment/Plan    Assessment:   1. 80 y.o. female with a history of HLD, HTN, DM2, dCHF, a fib on Eliquis, GERD, CVA, dementia  who was admitted 6/14/2021 with leg swelling and symptomatic iron deficiency anemia with some rectal bleeding.  Her last dose of Eliquis 6/14.   2.  She has an abnormal CT of the chest done last month that showed possible cirrhosis with splenomegaly.    Plan:   1.  I agree with giving IV iron.  2.  I will check serial H&H's and transfuse as necessary.  3.  I will check a CT of the abdomen and pelvis to evaluate her liver and the rest of her abdomen more.  4.  I agree with holding Eliquis for now.  5.  She should eventually have an EGD (to look for esophageal varices and to evaluate iron deficiency anemia) and a colonoscopy (to evaluate the rectal bleeding and to evaluate iron deficiency anemia).  This could potentially be done Thursday 6/2021.    I discussed the patient's findings and my recommendations with patient and family.         Rajesh Colby M.D.  [facility]  [address] [place] [address]  Office: [phone]

## 2021-06-15 NOTE — ED NOTES
"Nursing report ED to floor  Miri Yung  80 y.o.  female    HPI (triage note):   Chief Complaint   Patient presents with   • Abnormal Lab       Admitting doctor:   Jatinder Medina MD    Admitting diagnosis:   The primary encounter diagnosis was Symptomatic anemia. Diagnoses of Uncontrolled type 2 diabetes mellitus with hyperglycemia (CMS/HCC) and Anticoagulated by anticoagulation treatment were also pertinent to this visit.    Code status:   Current Code Status     Date Active Code Status Order ID Comments User Context       Prior    Advance Care Planning Activity          Allergies:   Decongestant  [pseudoephedrine]    Weight:   There were no vitals filed for this visit.    Most recent vitals:   Vitals:    06/14/21 1750 06/14/21 1922 06/14/21 2055   BP:  165/71 (!) 172/39   Pulse: 81     Resp: 16     Temp: 99.4 °F (37.4 °C)     TempSrc: Tympanic     SpO2: 93%  94%   Height:  157.5 cm (62\")        Active LDAs/IV Access:   Lines, Drains & Airways    Active LDAs     Name:   Placement date:   Placement time:   Site:   Days:    Peripheral IV 06/14/21 1917 Right Antecubital   06/14/21 1917    Antecubital   less than 1                Labs (abnormal labs have a star):   Labs Reviewed   PROTIME-INR - Abnormal; Notable for the following components:       Result Value    Protime 19.2 (*)     INR 1.64 (*)     All other components within normal limits   COMPREHENSIVE METABOLIC PANEL - Abnormal; Notable for the following components:    Glucose 503 (*)     Total Protein 5.6 (*)     Alkaline Phosphatase 140 (*)     All other components within normal limits    Narrative:     GFR Normal >60  Chronic Kidney Disease <60  Kidney Failure <15     CBC WITH AUTO DIFFERENTIAL - Abnormal; Notable for the following components:    RBC 3.76 (*)     Hemoglobin 7.3 (*)     Hematocrit 27.2 (*)     MCV 72.3 (*)     MCH 19.4 (*)     MCHC 26.8 (*)     RDW 17.5 (*)     MPV 12.4 (*)     All other components within normal limits   MANUAL " DIFFERENTIAL - Abnormal; Notable for the following components:    Neutrophil % 79.0 (*)     Lymphocyte % 12.0 (*)     Monocyte % 4.0 (*)     Basophil % 2.0 (*)     All other components within normal limits   POCT GLUCOSE FINGERSTICK - Abnormal; Notable for the following components:    Glucose 470 (*)     All other components within normal limits   COVID-19,[facility] CEPHEID/RACH, NP SWAB IN TRANSPORT MEDIA 8-12 HR TAT - Normal    Narrative:     Fact sheet for providers: https://www.fda.gov/media/457162/download     Fact sheet for patients: https://www.fda.gov/media/553861/download   COVID PRE-OP / PRE-PROCEDURE SCREENING ORDER (NO ISOLATION)    Narrative:     The following orders were created for panel order COVID PRE-OP / PRE-PROCEDURE SCREENING ORDER (NO ISOLATION) - Swab, Nasopharynx.  Procedure                               Abnormality         Status                     ---------                               -----------         ------                     COVID-19, [facility]-HOUSE...[[id number]]  Normal              Final result                 Please view results for these tests on the individual orders.   POCT OCCULT BLOOD STOOL (ED ONLY)   TYPE AND SCREEN   CBC AND DIFFERENTIAL    Narrative:     The following orders were created for panel order CBC & Differential.  Procedure                               Abnormality         Status                     ---------                               -----------         ------                     CBC Auto Differential[[id number]]        Abnormal            Final result                 Please view results for these tests on the individual orders.       EKG:   No orders to display       Meds given in ED:   Medications   insulin regular (humuLIN R,novoLIN R) injection 10 Units (10 Units Subcutaneous Given 6/14/21 2039)   LORazepam (ATIVAN) injection 1 mg (1 mg Intravenous Given 6/14/21 2037)       Imaging results:  No radiology results for the last day    Ambulatory  status:   - assist/wheelchair    Social issues:   Social History     Socioeconomic History   • Marital status:      Spouse name: Not on file   • Number of children: Not on file   • Years of education: Not on file   • Highest education level: Not on file   Tobacco Use   • Smoking status: Never Smoker   • Smokeless tobacco: Never Used   • Tobacco comment: caffeine use    Vaping Use   • Vaping Use: Never assessed   Substance and Sexual Activity   • Alcohol use: No   • Drug use: No   • Sexual activity: Defer    Nursing report ED to floor       Jolynn Dye RN  06/14/21 9971

## 2021-06-15 NOTE — PROGRESS NOTES
[facility] Medicine Services  PROGRESS NOTE    Patient Name: Miri Yung  : 1940  MRN: [id number]    Date of Admission: 2021  Primary Care Physician: Loren Cruz APRN    Subjective   Subjective     CC:  Follow-up anemia    HPI:  Patient denies any active bleeding.  She is a poor historian.  Son is at the bedside.  Patient reportedly having some intermittent rapid atrial fibrillation this morning.    Review of Systems  No current fevers or chills  No current shortness of breath or cough  No current nausea, vomiting, or diarrhea  No current chest pain or palpitations      Objective   Objective     Vital Signs:   Temp:  [97.8 °F (36.6 °C)-99.4 °F (37.4 °C)] 97.8 °F (36.6 °C)  Heart Rate:  [] 95  Resp:  [16] 16  BP: (116-172)/(39-87) 116/46        Physical Exam:  Constitutional:Awake, alert  HENT: NCAT, mucous membranes moist, neck supple  Respiratory: Clear to auscultation bilaterally, respiratory effort normal, nonlabored breathing   Cardiovascular: Irregular, normal radial pulses  Gastrointestinal: Positive bowel sounds, soft, nontender, nondistended  Musculoskeletal: Elderly and chronically debilitated appearance, mild lower extremity edema, obese, BMI is 34  Psychiatric: Appropriate affect, cooperative, conversational  Neurologic: No slurred speech or facial droop, follows commands  Skin: No rashes or jaundice, warm      Results Reviewed:  Results from last 7 days   Lab Units 06/15/21  0801 06/15/21  0259 21  1518   WBC 10*3/mm3  --   --  6.67 6.0   HEMOGLOBIN g/dL 7.9* 7.3* 7.3* 7.0*   HEMATOCRIT % 29.2* 26.7* 27.2* 24.9*   PLATELETS 10*3/mm3  --   --  156 129*   INR   --   --  1.64*  --      Results from last 7 days   Lab Units 21  1518   SODIUM mmol/L 136 140   POTASSIUM mmol/L 3.9 4.5   CHLORIDE mmol/L 98 103   TOTAL CO2 mmol/L  --  25   CO2 mmol/L 28.7  --    BUN mg/dL 18 17   CREATININE mg/dL 0.73 0.66   GLUCOSE mg/dL 503*  --     CALCIUM mg/dL 8.8 8.9   ALT (SGPT) U/L 32 31   AST (SGOT) U/L 26 38     Estimated Creatinine Clearance: 55.7 mL/min (by C-G formula based on SCr of 0.73 mg/dL).    Microbiology Results Abnormal     Procedure Component Value - Date/Time    COVID PRE-OP / PRE-PROCEDURE SCREENING ORDER (NO ISOLATION) - Swab, Nasopharynx [[id number]]  (Normal) Collected: 06/14/21 1919    Lab Status: Final result Specimen: Swab from Nasopharynx Updated: 06/14/21 2050    Narrative:      The following orders were created for panel order COVID PRE-OP / PRE-PROCEDURE SCREENING ORDER (NO ISOLATION) - Swab, Nasopharynx.  Procedure                               Abnormality         Status                     ---------                               -----------         ------                     COVID-19,[facility] IN-HOUSE...[[id number]]  Normal              Final result                 Please view results for these tests on the individual orders.    COVID-19,[facility] CEPHEID/RACH NP SWAB IN TRANSPORT MEDIA 8-12 HR TAT - Swab, Nasopharynx [[id number]]  (Normal) Collected: 06/14/21 1919    Lab Status: Final result Specimen: Swab from Nasopharynx Updated: 06/14/21 2050     COVID19 Not Detected    Narrative:      Fact sheet for providers: https://www.fda.gov/media/255219/download     Fact sheet for patients: https://www.fda.gov/media/884806/download          Imaging Results (Last 24 Hours)     ** No results found for the last 24 hours. **          Results for orders placed during the hospital encounter of 04/12/21    Adult Transthoracic Echo Complete W/ Cont if Necessary Per Protocol    Interpretation Summary  · Left ventricular ejection fraction appears to be 66 - 70%. Normal left ventricular cavity size noted. Left ventricular wall thickness is consistent with mild to moderate concentric hypertrophy. All left ventricular wall segments contract normally. Left ventricular diastolic function is consistent with (grade II w/high LAP)  pseudonormalization. No evidence of left ventricular thrombus or mass present.  · The left atrial cavity is severely dilated.  · There is mild calcification of the aortic valve. Mild aortic valve regurgitation is present. Mild to moderate aortic valve stenosis is present. Peak velocity of the flow distal to the aortic valve is 274.7 cm/s. Aortic valve maximum pressure gradient is 30.2 mmHg. Aortic valve mean pressure gradient is 16.8 mmHg. Aortic valve area is 1.4 cm2.  · There are 2 echointense structures with minimal mobility in the proximal aorta. The appearance seems most consistent with concentric calcification of the sinotubular junction however cannot exclude intimal flap. No clear evidence for aortic dissection.  · Calculated right ventricular systolic pressure from tricuspid regurgitation is 47 mmHg.      I have reviewed the medications:  Scheduled Meds:allopurinol, 300 mg, Oral, Daily  atorvastatin, 40 mg, Oral, Daily  buPROPion SR, 150 mg, Oral, Daily With Breakfast  furosemide, 40 mg, Oral, Daily  insulin glargine, 8 Units, Subcutaneous, QAM  insulin lispro, 0-9 Units, Subcutaneous, TID AC  insulin lispro, 2 Units, Subcutaneous, TID With Meals  iron sucrose, 100 mg, Intravenous, Q24H  isosorbide mononitrate, 60 mg, Oral, Daily  losartan, 25 mg, Oral, Q24H  metoprolol succinate XL, 100 mg, Oral, Daily  mirtazapine, 15 mg, Oral, Nightly  montelukast, 10 mg, Oral, Nightly  pantoprazole, 40 mg, Oral, Daily  sodium chloride, 10 mL, Intravenous, Q12H      Continuous Infusions:   PRN Meds:.•  acetaminophen **OR** acetaminophen **OR** acetaminophen  •  dextrose  •  dextrose  •  glucagon (human recombinant)  •  nitroglycerin  •  ondansetron  •  sodium chloride    Assessment/Plan   Assessment & Plan     Active Hospital Problems    Diagnosis  POA   • **Symptomatic anemia [D64.9]  Yes   • GI bleed [K92.2]  Yes   • Long term current use of anticoagulant therapy [Z79.01]  Not Applicable   • Acute blood loss anemia  [D62]  Yes   • History of stroke [Z86.73]  Not Applicable   • Chronic diastolic (congestive) heart failure (CMS/HCC) [I50.32]  Yes   • Atrial fibrillation (CMS/HCC) [I48.91]  Yes   • Type 2 diabetes mellitus (CMS/HCC) [E11.9]  Yes   • Hyperlipidemia [E78.5]  Yes   • HTN (hypertension) [I10]  Yes   • Gastroesophageal reflux disease [K21.9]  Yes   • Dementia (CMS/HCC) [F03.90]  Yes      Resolved Hospital Problems   No resolved problems to display.        Brief Hospital Course to date:  Miri Yung is a 80 y.o. female on long-term anticoagulation who presents with adverse effect of anticoagulation causing GI bleeding and symptomatic anemia.    Discussion/plan for today:  Anemia studies reviewed.  Consistent with iron deficiency anemia.  Supplement IV iron.  Case discussed with gastroenterology and plan is for eventual endoscopy after further imaging.  Add basal and prandial insulin to correction scale.  Monitor glucose and adjust as needed.  A1c 11.18.  Severely uncontrolled diabetes.  Discussed with son at the bedside.  Continue monitoring of anemia.  Transfuse as needed.  Holding anticoagulation for now based on risk versus benefit assessment.  Continue to monitor volume status.  Continue home cardiac medications.  Consult PT OT for debility.  Consult cardiology for rapid atrial fibrillation.    DVT Prophylaxis: Mechanical      Disposition: Pending clinical course likely home with family    CODE STATUS:   Code Status and Medical Interventions:   Ordered at: 06/14/21 8706     Code Status:    CPR     Medical Interventions (Level of Support Prior to Arrest):    Full       Isacc Westbrook MD  06/15/21

## 2021-06-15 NOTE — NURSING NOTE
Patient rhythm change from NS with occ PAC's to rapid a-fib with frequent PVC's. Patient denies any chest pain or distress. Slightly hypertensive at 160/72. [facility] paged and spoke to Dr. Sage. WILLIAM for STAT ekg and cardiology consult. Gave routine AM metoprolol early.

## 2021-06-16 LAB
ANION GAP SERPL CALCULATED.3IONS-SCNC: 7.1 MMOL/L (ref 5–15)
ANISOCYTOSIS BLD QL: ABNORMAL
BASOPHILS # BLD MANUAL: 0.08 10*3/MM3 (ref 0–0.2)
BASOPHILS NFR BLD AUTO: 1 % (ref 0–1.5)
BUN SERPL-MCNC: 10 MG/DL (ref 8–23)
BUN/CREAT SERPL: 20.4 (ref 7–25)
CALCIUM SPEC-SCNC: 8.1 MG/DL (ref 8.6–10.5)
CHLORIDE SERPL-SCNC: 103 MMOL/L (ref 98–107)
CO2 SERPL-SCNC: 29.9 MMOL/L (ref 22–29)
CREAT SERPL-MCNC: 0.49 MG/DL (ref 0.57–1)
DEPRECATED RDW RBC AUTO: 43.3 FL (ref 37–54)
EOSINOPHIL # BLD MANUAL: 0.08 10*3/MM3 (ref 0–0.4)
EOSINOPHIL NFR BLD MANUAL: 1 % (ref 0.3–6.2)
ERYTHROCYTE [DISTWIDTH] IN BLOOD BY AUTOMATED COUNT: 17.6 % (ref 12.3–15.4)
GFR SERPL CREATININE-BSD FRML MDRD: 121 ML/MIN/1.73
GLUCOSE BLDC GLUCOMTR-MCNC: 207 MG/DL (ref 70–130)
GLUCOSE BLDC GLUCOMTR-MCNC: 275 MG/DL (ref 70–130)
GLUCOSE BLDC GLUCOMTR-MCNC: 295 MG/DL (ref 70–130)
GLUCOSE BLDC GLUCOMTR-MCNC: 302 MG/DL (ref 70–130)
GLUCOSE SERPL-MCNC: 223 MG/DL (ref 65–99)
HCT VFR BLD AUTO: 25.7 % (ref 34–46.6)
HCT VFR BLD AUTO: 26.1 % (ref 34–46.6)
HCT VFR BLD AUTO: 27 % (ref 34–46.6)
HGB BLD-MCNC: 7.1 G/DL (ref 12–15.9)
HGB BLD-MCNC: 7.2 G/DL (ref 12–15.9)
HGB BLD-MCNC: 7.4 G/DL (ref 12–15.9)
LYMPHOCYTES # BLD MANUAL: 1.66 10*3/MM3 (ref 0.7–3.1)
LYMPHOCYTES NFR BLD MANUAL: 20.2 % (ref 19.6–45.3)
LYMPHOCYTES NFR BLD MANUAL: 3 % (ref 5–12)
MAGNESIUM SERPL-MCNC: 2.1 MG/DL (ref 1.6–2.4)
MCH RBC QN AUTO: 19.3 PG (ref 26.6–33)
MCHC RBC AUTO-ENTMCNC: 27.6 G/DL (ref 31.5–35.7)
MCV RBC AUTO: 70 FL (ref 79–97)
MICROCYTES BLD QL: ABNORMAL
MONOCYTES # BLD AUTO: 0.25 10*3/MM3 (ref 0.1–0.9)
NEUTROPHILS # BLD AUTO: 6.16 10*3/MM3 (ref 1.7–7)
NEUTROPHILS NFR BLD MANUAL: 74.7 % (ref 42.7–76)
PLAT MORPH BLD: NORMAL
PLATELET # BLD AUTO: 142 10*3/MM3 (ref 140–450)
PMV BLD AUTO: 12.4 FL (ref 6–12)
POTASSIUM SERPL-SCNC: 3.7 MMOL/L (ref 3.5–5.2)
QT INTERVAL: 334 MS
RBC # BLD AUTO: 3.67 10*6/MM3 (ref 3.77–5.28)
SODIUM SERPL-SCNC: 140 MMOL/L (ref 136–145)
WBC # BLD AUTO: 8.24 10*3/MM3 (ref 3.4–10.8)
WBC MORPH BLD: NORMAL

## 2021-06-16 PROCEDURE — 85014 HEMATOCRIT: CPT | Performed by: NURSE PRACTITIONER

## 2021-06-16 PROCEDURE — 93005 ELECTROCARDIOGRAM TRACING: CPT | Performed by: INTERNAL MEDICINE

## 2021-06-16 PROCEDURE — 63710000001 INSULIN LISPRO (HUMAN) PER 5 UNITS: Performed by: INTERNAL MEDICINE

## 2021-06-16 PROCEDURE — 97530 THERAPEUTIC ACTIVITIES: CPT

## 2021-06-16 PROCEDURE — 93010 ELECTROCARDIOGRAM REPORT: CPT | Performed by: INTERNAL MEDICINE

## 2021-06-16 PROCEDURE — 25010000002 IRON SUCROSE PER 1 MG: Performed by: INTERNAL MEDICINE

## 2021-06-16 PROCEDURE — 99232 SBSQ HOSP IP/OBS MODERATE 35: CPT | Performed by: INTERNAL MEDICINE

## 2021-06-16 PROCEDURE — 85025 COMPLETE CBC W/AUTO DIFF WBC: CPT | Performed by: INTERNAL MEDICINE

## 2021-06-16 PROCEDURE — 63710000001 INSULIN GLARGINE PER 5 UNITS: Performed by: INTERNAL MEDICINE

## 2021-06-16 PROCEDURE — 97166 OT EVAL MOD COMPLEX 45 MIN: CPT

## 2021-06-16 PROCEDURE — 85007 BL SMEAR W/DIFF WBC COUNT: CPT | Performed by: INTERNAL MEDICINE

## 2021-06-16 PROCEDURE — 82962 GLUCOSE BLOOD TEST: CPT

## 2021-06-16 PROCEDURE — 85018 HEMOGLOBIN: CPT | Performed by: NURSE PRACTITIONER

## 2021-06-16 PROCEDURE — 63710000001 INSULIN LISPRO (HUMAN) PER 5 UNITS: Performed by: NURSE PRACTITIONER

## 2021-06-16 PROCEDURE — 83735 ASSAY OF MAGNESIUM: CPT | Performed by: INTERNAL MEDICINE

## 2021-06-16 PROCEDURE — 80048 BASIC METABOLIC PNL TOTAL CA: CPT | Performed by: INTERNAL MEDICINE

## 2021-06-16 PROCEDURE — 25010000002 ONDANSETRON PER 1 MG: Performed by: NURSE PRACTITIONER

## 2021-06-16 RX ORDER — POTASSIUM CHLORIDE 750 MG/1
40 TABLET, FILM COATED, EXTENDED RELEASE ORAL AS NEEDED
Status: DISCONTINUED | OUTPATIENT
Start: 2021-06-16 | End: 2021-06-19 | Stop reason: HOSPADM

## 2021-06-16 RX ORDER — POTASSIUM CHLORIDE 1.5 G/1.77G
40 POWDER, FOR SOLUTION ORAL AS NEEDED
Status: DISCONTINUED | OUTPATIENT
Start: 2021-06-16 | End: 2021-06-19 | Stop reason: HOSPADM

## 2021-06-16 RX ORDER — INSULIN LISPRO 100 [IU]/ML
6 INJECTION, SOLUTION INTRAVENOUS; SUBCUTANEOUS
Status: DISCONTINUED | OUTPATIENT
Start: 2021-06-16 | End: 2021-06-16

## 2021-06-16 RX ORDER — INSULIN GLARGINE 100 [IU]/ML
20 INJECTION, SOLUTION SUBCUTANEOUS EVERY MORNING
Status: DISCONTINUED | OUTPATIENT
Start: 2021-06-16 | End: 2021-06-16

## 2021-06-16 RX ORDER — MAGNESIUM SULFATE HEPTAHYDRATE 40 MG/ML
2 INJECTION, SOLUTION INTRAVENOUS AS NEEDED
Status: DISCONTINUED | OUTPATIENT
Start: 2021-06-16 | End: 2021-06-19 | Stop reason: HOSPADM

## 2021-06-16 RX ORDER — INSULIN LISPRO 100 [IU]/ML
0-14 INJECTION, SOLUTION INTRAVENOUS; SUBCUTANEOUS
Status: DISCONTINUED | OUTPATIENT
Start: 2021-06-16 | End: 2021-06-19 | Stop reason: HOSPADM

## 2021-06-16 RX ORDER — BISACODYL 5 MG/1
20 TABLET, DELAYED RELEASE ORAL ONCE
Status: COMPLETED | OUTPATIENT
Start: 2021-06-17 | End: 2021-06-17

## 2021-06-16 RX ORDER — INSULIN LISPRO 100 [IU]/ML
8 INJECTION, SOLUTION INTRAVENOUS; SUBCUTANEOUS
Status: DISCONTINUED | OUTPATIENT
Start: 2021-06-16 | End: 2021-06-19 | Stop reason: HOSPADM

## 2021-06-16 RX ORDER — POLYETHYLENE GLYCOL 3350 17 G/17G
1 POWDER, FOR SOLUTION ORAL ONCE
Status: COMPLETED | OUTPATIENT
Start: 2021-06-17 | End: 2021-06-17

## 2021-06-16 RX ORDER — INSULIN GLARGINE 100 [IU]/ML
25 INJECTION, SOLUTION SUBCUTANEOUS EVERY MORNING
Status: DISCONTINUED | OUTPATIENT
Start: 2021-06-17 | End: 2021-06-17

## 2021-06-16 RX ORDER — MAGNESIUM SULFATE HEPTAHYDRATE 40 MG/ML
4 INJECTION, SOLUTION INTRAVENOUS AS NEEDED
Status: DISCONTINUED | OUTPATIENT
Start: 2021-06-16 | End: 2021-06-19 | Stop reason: HOSPADM

## 2021-06-16 RX ADMIN — MIRTAZAPINE 15 MG: 15 TABLET, FILM COATED ORAL at 20:09

## 2021-06-16 RX ADMIN — ATORVASTATIN CALCIUM 40 MG: 20 TABLET, FILM COATED ORAL at 09:28

## 2021-06-16 RX ADMIN — SODIUM CHLORIDE, PRESERVATIVE FREE 10 ML: 5 INJECTION INTRAVENOUS at 09:30

## 2021-06-16 RX ADMIN — INSULIN LISPRO 6 UNITS: 100 INJECTION, SOLUTION INTRAVENOUS; SUBCUTANEOUS at 12:38

## 2021-06-16 RX ADMIN — LOSARTAN POTASSIUM 25 MG: 25 TABLET, FILM COATED ORAL at 09:28

## 2021-06-16 RX ADMIN — METOROPROLOL TARTRATE 5 MG: 5 INJECTION, SOLUTION INTRAVENOUS at 03:42

## 2021-06-16 RX ADMIN — BUPROPION HYDROCHLORIDE 150 MG: 150 TABLET, EXTENDED RELEASE ORAL at 07:58

## 2021-06-16 RX ADMIN — ACETAMINOPHEN 650 MG: 325 TABLET, FILM COATED ORAL at 22:30

## 2021-06-16 RX ADMIN — METOROPROLOL TARTRATE 5 MG: 5 INJECTION, SOLUTION INTRAVENOUS at 03:35

## 2021-06-16 RX ADMIN — INSULIN LISPRO 8 UNITS: 100 INJECTION, SOLUTION INTRAVENOUS; SUBCUTANEOUS at 17:07

## 2021-06-16 RX ADMIN — INSULIN LISPRO 6 UNITS: 100 INJECTION, SOLUTION INTRAVENOUS; SUBCUTANEOUS at 12:37

## 2021-06-16 RX ADMIN — ALLOPURINOL 300 MG: 300 TABLET ORAL at 09:28

## 2021-06-16 RX ADMIN — INSULIN LISPRO 6 UNITS: 100 INJECTION, SOLUTION INTRAVENOUS; SUBCUTANEOUS at 09:27

## 2021-06-16 RX ADMIN — IRON SUCROSE 100 MG: 20 INJECTION, SOLUTION INTRAVENOUS at 11:32

## 2021-06-16 RX ADMIN — MONTELUKAST SODIUM 10 MG: 10 TABLET, FILM COATED ORAL at 20:09

## 2021-06-16 RX ADMIN — PANTOPRAZOLE SODIUM 40 MG: 40 TABLET, DELAYED RELEASE ORAL at 09:28

## 2021-06-16 RX ADMIN — INSULIN LISPRO 6 UNITS: 100 INJECTION, SOLUTION INTRAVENOUS; SUBCUTANEOUS at 09:29

## 2021-06-16 RX ADMIN — INSULIN GLARGINE 20 UNITS: 100 INJECTION, SOLUTION SUBCUTANEOUS at 09:31

## 2021-06-16 RX ADMIN — METOPROLOL SUCCINATE 100 MG: 100 TABLET, EXTENDED RELEASE ORAL at 09:28

## 2021-06-16 RX ADMIN — ISOSORBIDE MONONITRATE 60 MG: 60 TABLET ORAL at 09:29

## 2021-06-16 RX ADMIN — FUROSEMIDE 40 MG: 40 TABLET ORAL at 09:28

## 2021-06-16 RX ADMIN — SODIUM CHLORIDE, PRESERVATIVE FREE 10 ML: 5 INJECTION INTRAVENOUS at 20:09

## 2021-06-16 RX ADMIN — ONDANSETRON 4 MG: 2 INJECTION INTRAMUSCULAR; INTRAVENOUS at 00:28

## 2021-06-16 RX ADMIN — INSULIN LISPRO 10 UNITS: 100 INJECTION, SOLUTION INTRAVENOUS; SUBCUTANEOUS at 17:07

## 2021-06-16 NOTE — PROGRESS NOTES
"Patient Care Team:  Loren Cruz APRN as PCP - General (Family Medicine)    Chief Complaint: Follow-up paroxysmal atrial fibrillation.    Interval History:   Patient with atrial fibrillation with rapid ventricular rate last night.  Received 1 dose of IV metoprolol therapy.  Hematocrit stable today at 25.7.    Objective   Vital Signs  Temp:  [97.7 °F (36.5 °C)-98.6 °F (37 °C)] 98 °F (36.7 °C)  Heart Rate:  [] 91  Resp:  [16-20] 20  BP: (116-157)/(46-70) 117/58  No intake or output data in the 24 hours ending 06/16/21 1025  Flowsheet Rows      First Filed Value   Admission Height  157.5 cm (62\") Documented at 06/14/2021 1922   Admission Weight  84.8 kg (187 lb) Documented at 06/14/2021 2219          Temp:  [97.7 °F (36.5 °C)-98.6 °F (37 °C)] 98 °F (36.7 °C)  Heart Rate:  [] 91  Resp:  [16-20] 20  BP: (116-157)/(46-70) 117/58  No intake or output data in the 24 hours ending 06/16/21 1025  Flowsheet Rows      First Filed Value   Admission Height  157.5 cm (62\") Documented at 06/14/2021 1922   Admission Weight  84.8 kg (187 lb) Documented at 06/14/2021 2219          General Appearance:    Alert, cooperative, in no acute distress   Head:    Normocephalic, without obvious abnormality, atraumatic       Neck/Lymph   No adenopathy, supple, no thyromegaly, no carotid bruit, no    JVD   Lungs:     Clear to auscultation bilaterally, no wheezes, rales, or     rhonchi    Cardiac:    Normal rate, regular rhythm, no murmur, no rub, no gallop   Chest Wall:    No abnormalities observed   GI:     Normal bowel sounds, soft, nontender, nondistended,            no rebound tenderness   Extremities:   No cyanosis, clubbing, or edema   Circulatory/Peripheral Vascular :   Pulses palpable and equal bilaterally   Integumentary:   No bleeding or rash. Normal temperature       Neurologic:   Cranial nerves 2 - 12 grossly intact, sensation intact              allopurinol, 300 mg, Oral, Daily  atorvastatin, 40 mg, Oral, " Daily  buPROPion SR, 150 mg, Oral, Daily With Breakfast  furosemide, 40 mg, Oral, Daily  insulin glargine, 20 Units, Subcutaneous, QAM  insulin lispro, 0-9 Units, Subcutaneous, TID AC  insulin lispro, 6 Units, Subcutaneous, TID With Meals  iron sucrose, 100 mg, Intravenous, Q24H  isosorbide mononitrate, 60 mg, Oral, Daily  losartan, 25 mg, Oral, Q24H  metoprolol succinate XL, 100 mg, Oral, Daily  mirtazapine, 15 mg, Oral, Nightly  montelukast, 10 mg, Oral, Nightly  pantoprazole, 40 mg, Oral, Daily  sodium chloride, 10 mL, Intravenous, Q12H             Results Review:    Results from last 7 days   Lab Units 06/16/21  0602   SODIUM mmol/L 140   POTASSIUM mmol/L 3.7   CHLORIDE mmol/L 103   CO2 mmol/L 29.9*   BUN mg/dL 10   CREATININE mg/dL 0.49*   GLUCOSE mg/dL 223*   CALCIUM mg/dL 8.1*         Results from last 7 days   Lab Units 06/16/21  0602   WBC 10*3/mm3 8.24   HEMOGLOBIN g/dL 7.1*   HEMATOCRIT % 25.7*   PLATELETS 10*3/mm3 142     Results from last 7 days   Lab Units 06/14/21  1917   INR  1.64*                 @LABRCNT(bnp)@  I reviewed the patient's new clinical results.  I personally viewed and interpreted the patient's EKG/Telemetry data        · Left ventricular ejection fraction appears to be 66 - 70%. Normal left ventricular cavity size noted. Left ventricular wall thickness is consistent with mild to moderate concentric hypertrophy. All left ventricular wall segments contract normally. Left ventricular diastolic function is consistent with (grade II w/high LAP) pseudonormalization. No evidence of left ventricular thrombus or mass present.  · The left atrial cavity is severely dilated.  · There is mild calcification of the aortic valve. Mild aortic valve regurgitation is present. Mild to moderate aortic valve stenosis is present. Peak velocity of the flow distal to the aortic valve is 274.7 cm/s. Aortic valve maximum pressure gradient is 30.2 mmHg. Aortic valve mean pressure gradient is 16.8 mmHg. Aortic  valve area is 1.4 cm2.  · There are 2 echointense structures with minimal mobility in the proximal aorta. The appearance seems most consistent with concentric calcification of the sinotubular junction however cannot exclude intimal flap. No clear evidence for aortic dissection.  · Calculated right ventricular systolic pressure from tricuspid regurgitation is 47 mmHg.    Assessment/Plan   1. Anemia , labs consistent with iron deficiency, concern for GIB.  Endoscopy per GI  2. Paroxysmal atrial fibrillation  3.  Mild to moderate aortic stenosis   4.  Chronic diastolic heart failure  5. Hypertension  6. She is on ISMN, denies history of CAD.  7. History of stroke    -Patient is currently in sinus rhythm.  Had atrial fibrillation with rapid ventricular rate last night.  I will increase the Toprol to 150 mg p.o. daily.

## 2021-06-16 NOTE — PROGRESS NOTES
[facility] Medicine Services  PROGRESS NOTE    Patient Name: Miri Yung  : 1940  MRN: [id number]    Date of Admission: 2021  Primary Care Physician: Loren Cruz APRN    Subjective   Subjective     CC:  Follow-up anemia    HPI:  No further bleeding reported.  Son is at the bedside.  Patient denies any new complaints.  Patient reportedly having some further atrial fibrillation intermittently.  Denies cardiac symptoms at this time.    Review of Systems  No current fevers or chills  No current shortness of breath or cough  No current nausea, vomiting, or diarrhea  No current chest pain or palpitations      Objective   Objective     Vital Signs:   Temp:  [97.7 °F (36.5 °C)-98.6 °F (37 °C)] 98 °F (36.7 °C)  Heart Rate:  [] 91  Resp:  [16-20] 20  BP: (117-157)/(49-70) 117/58        Physical Exam:  Constitutional:Awake, alert  HENT: NCAT, mucous membranes moist, neck supple  Respiratory: Clear to auscultation bilaterally, respiratory effort normal, nonlabored breathing   Cardiovascular: Irregular, normal radial pulses  Gastrointestinal: Positive bowel sounds, soft, nontender, nondistended  Musculoskeletal: Elderly and chronically debilitated appearance, mild lower extremity edema, obese, BMI is 34  Psychiatric: Appropriate affect, cooperative, conversational  Neurologic: No slurred speech or facial droop, follows commands  Skin: No rashes or jaundice, warm      Results Reviewed:  Results from last 7 days   Lab Units 21  0602 21  0000 06/15/21  1558 21  1518   WBC 10*3/mm3 8.24  --   --  6.67 6.0   HEMOGLOBIN g/dL 7.1* 7.4* 7.0* 7.3* 7.0*   HEMATOCRIT % 25.7* 27.0* 25.1* 27.2* 24.9*   PLATELETS 10*3/mm3 142  --   --  156 129*   INR   --   --   --  1.64*  --      Results from last 7 days   Lab Units 21  0602 21  1518   SODIUM mmol/L 140 136 140   POTASSIUM mmol/L 3.7 3.9 4.5   CHLORIDE mmol/L 103 98 103   TOTAL CO2 mmol/L  --   --   25   CO2 mmol/L 29.9* 28.7  --    BUN mg/dL 10 18 17   CREATININE mg/dL 0.49* 0.73 0.66   GLUCOSE mg/dL 223* 503*  --    CALCIUM mg/dL 8.1* 8.8 8.9   ALT (SGPT) U/L  --  32 31   AST (SGOT) U/L  --  26 38     Estimated Creatinine Clearance: 55.7 mL/min (A) (by C-G formula based on SCr of 0.49 mg/dL (L)).    Microbiology Results Abnormal     Procedure Component Value - Date/Time    COVID PRE-OP / PRE-PROCEDURE SCREENING ORDER (NO ISOLATION) - Swab, Nasopharynx [[id number]]  (Normal) Collected: 06/14/21 1919    Lab Status: Final result Specimen: Swab from Nasopharynx Updated: 06/14/21 2050    Narrative:      The following orders were created for panel order COVID PRE-OP / PRE-PROCEDURE SCREENING ORDER (NO ISOLATION) - Swab, Nasopharynx.  Procedure                               Abnormality         Status                     ---------                               -----------         ------                     COVID-19,[facility]-HOUSE...[[id number]]  Normal              Final result                 Please view results for these tests on the individual orders.    COVID-19,[facility]-HOU[facility] CEPHEID/RACH NP SWAB IN TRANSPORT MEDIA 8-12 HR TAT - Swab, Nasopharynx [[id number]]  (Normal) Collected: 06/14/21 1919    Lab Status: Final result Specimen: Swab from Nasopharynx Updated: 06/14/21 2050     COVID19 Not Detected    Narrative:      Fact sheet for providers: https://www.fda.gov/media/499079/download     Fact sheet for patients: https://www.fda.gov/media/048907/download          Imaging Results (Last 24 Hours)     ** No results found for the last 24 hours. **          Results for orders placed during the hospital encounter of 04/12/21    Adult Transthoracic Echo Complete W/ Cont if Necessary Per Protocol    Interpretation Summary  · Left ventricular ejection fraction appears to be 66 - 70%. Normal left ventricular cavity size noted. Left ventricular wall thickness is consistent with mild to moderate concentric hypertrophy. All  left ventricular wall segments contract normally. Left ventricular diastolic function is consistent with (grade II w/high LAP) pseudonormalization. No evidence of left ventricular thrombus or mass present.  · The left atrial cavity is severely dilated.  · There is mild calcification of the aortic valve. Mild aortic valve regurgitation is present. Mild to moderate aortic valve stenosis is present. Peak velocity of the flow distal to the aortic valve is 274.7 cm/s. Aortic valve maximum pressure gradient is 30.2 mmHg. Aortic valve mean pressure gradient is 16.8 mmHg. Aortic valve area is 1.4 cm2.  · There are 2 echointense structures with minimal mobility in the proximal aorta. The appearance seems most consistent with concentric calcification of the sinotubular junction however cannot exclude intimal flap. No clear evidence for aortic dissection.  · Calculated right ventricular systolic pressure from tricuspid regurgitation is 47 mmHg.      I have reviewed the medications:  Scheduled Meds:allopurinol, 300 mg, Oral, Daily  atorvastatin, 40 mg, Oral, Daily  buPROPion SR, 150 mg, Oral, Daily With Breakfast  furosemide, 40 mg, Oral, Daily  insulin glargine, 20 Units, Subcutaneous, QAM  insulin lispro, 0-9 Units, Subcutaneous, TID AC  insulin lispro, 6 Units, Subcutaneous, TID With Meals  iron sucrose, 100 mg, Intravenous, Q24H  isosorbide mononitrate, 60 mg, Oral, Daily  losartan, 25 mg, Oral, Q24H  metoprolol succinate XL, 100 mg, Oral, Daily  mirtazapine, 15 mg, Oral, Nightly  montelukast, 10 mg, Oral, Nightly  pantoprazole, 40 mg, Oral, Daily  sodium chloride, 10 mL, Intravenous, Q12H      Continuous Infusions:   PRN Meds:.•  acetaminophen **OR** acetaminophen **OR** acetaminophen  •  dextrose  •  dextrose  •  glucagon (human recombinant)  •  metoprolol tartrate  •  nitroglycerin  •  ondansetron  •  sodium chloride    Assessment/Plan   Assessment & Plan     Active Hospital Problems    Diagnosis  POA   • **Symptomatic  anemia [D64.9]  Yes   • GI bleed [K92.2]  Yes   • Long term current use of anticoagulant therapy [Z79.01]  Not Applicable   • Acute blood loss anemia [D62]  Yes   • History of stroke [Z86.73]  Not Applicable   • Chronic diastolic (congestive) heart failure (CMS/HCC) [I50.32]  Yes   • Atrial fibrillation (CMS/HCC) [I48.91]  Yes   • Type 2 diabetes mellitus (CMS/HCC) [E11.9]  Yes   • Hyperlipidemia [E78.5]  Yes   • HTN (hypertension) [I10]  Yes   • Gastroesophageal reflux disease [K21.9]  Yes   • Dementia (CMS/HCC) [F03.90]  Yes      Resolved Hospital Problems   No resolved problems to display.        Brief Hospital Course to date:  Miri Yung is a 80 y.o. female on long-term anticoagulation who presents with adverse effect of anticoagulation causing GI bleeding and symptomatic anemia.    Discussion/plan for today:  Anemia reasonably stable.  Continue IV iron supplementation.  Gastroenterology is following and planning for tentative endoscopy tomorrow per our conversation.    Case discussed with cardiology this morning.  They are going to continue to work on medical management for atrial fibrillation.    Glucose reviewed and hypoglycemia noted.  Insulin further adjusted today.  Monitor glucose and adjust more as needed.    Continue to hold home anticoagulation for now based on risk versus benefit assessment.  Family in agreement with this plan.    Continue home cardiac medications.  PT OT for debility.     Replete potassium and check magnesium and replete if needed.     DVT Prophylaxis: Mechanical    Disposition: Pending clinical course likely home with family    CODE STATUS:   Code Status and Medical Interventions:   Ordered at: 06/14/21 7391     Code Status:    CPR     Medical Interventions (Level of Support Prior to Arrest):    Full       Isacc Westbrook MD  06/16/21

## 2021-06-16 NOTE — PLAN OF CARE
Goal Outcome Evaluation:  Plan of Care Reviewed With: patient        Progress: no change  Outcome Summary: Pt seen by OT this date for evaluation. RN ok'd OT to assess pt with hemoglobin at 7.4 this date as RN reports pt is not getting transfusion this date. Upon arrival pt sitting up in chair, A&O x3, no c/o pain just fatigue and dizziness. Pt completed LB dressing task sitting up in chair. Pt monitored pt during session closely. No transfers attempted this date due to low hemoglobin making unsafe. Pt left in chair, needs in reach, alarm on. Pt presents with decrease strength, decrease balance, decrease endurance/activity tolerance. Pt to benefit from skilled OT services to address goals and deficits. OT rec D/C home with A vs home. OT wore mask, gloves, glasses, hand hygiene performed.

## 2021-06-16 NOTE — NURSING NOTE
Call placed to 's office, spoke to Christina VILLALPANDO orders received, will maribel Lopressor 5mg IV for rapid heart rate greater ha 160 at times, patient denies any c/o discomfort, noted periods of  Sleep apnea, 02 at 2lpm nasal cannula placed on patient, will continue to monitor closely.

## 2021-06-16 NOTE — PROGRESS NOTES
[facility]  Inpatient Progress Note    Reason for Follow Up: Anemia, bright red blood per rectum    Subjective     Interval History:   Rapid rate noted overnight-cardiology has adjusted her Toprol.  She denies abdominal pain.  She said no blood in her stool today    Current Facility-Administered Medications:   •  acetaminophen (TYLENOL) tablet 650 mg, 650 mg, Oral, Q4H PRN **OR** acetaminophen (TYLENOL) 160 MG/5ML solution 650 mg, 650 mg, Oral, Q4H PRN **OR** acetaminophen (TYLENOL) suppository 650 mg, 650 mg, Rectal, Q4H PRN, Jazmyn Strauss APRN  •  allopurinol (ZYLOPRIM) tablet 300 mg, 300 mg, Oral, Daily, Jazmyn Strauss APRN, 300 mg at 06/16/21 0928  •  atorvastatin (LIPITOR) tablet 40 mg, 40 mg, Oral, Daily, Jazmyn Strauss APRN, 40 mg at 06/16/21 0928  •  buPROPion SR (WELLBUTRIN SR) 12 hr tablet 150 mg, 150 mg, Oral, Daily With Breakfast, Jazmyn Strauss APRN, 150 mg at 06/16/21 0758  •  dextrose (D50W) 25 g/ 50mL Intravenous Solution 25 g, 25 g, Intravenous, Q15 Min PRN, Jazmyn Strauss APRN  •  dextrose (GLUTOSE) oral gel 15 g, 15 g, Oral, Q15 Min PRN, Jazmyn Strauss APRN  •  furosemide (LASIX) tablet 40 mg, 40 mg, Oral, Daily, Isacc Westbrook MD, 40 mg at 06/16/21 0928  •  glucagon (human recombinant) (GLUCAGEN DIAGNOSTIC) injection 1 mg, 1 mg, Subcutaneous, PRN, Jazmyn Strauss APRN  •  [START ON 6/17/2021] insulin glargine (LANTUS, SEMGLEE) injection 25 Units, 25 Units, Subcutaneous, QAM, Isacc Westbrook MD  •  insulin lispro (ADMELOG) injection 0-14 Units, 0-14 Units, Subcutaneous, TID AC, Isacc Westbrook MD  •  insulin lispro (ADMELOG) injection 8 Units, 8 Units, Subcutaneous, TID With Meals, Isacc Westbrook MD  •  [COMPLETED] acetaminophen (TYLENOL) tablet 650 mg, 650 mg, Oral, Once, 650 mg at 06/15/21 1111 **AND** iron sucrose (VENOFER) 100 mg in sodium chloride 0.9 % 100 mL IVPB, 100 mg, Intravenous, Q24H, Isacc Westbrook  MD Ajith, 100 mg at 06/16/21 1132  •  isosorbide mononitrate (IMDUR) 24 hr tablet 60 mg, 60 mg, Oral, Daily, Jazmyn Strauss APRN, 60 mg at 06/16/21 0929  •  losartan (COZAAR) tablet 25 mg, 25 mg, Oral, Q24H, Isacc Westbrook MD, 25 mg at 06/16/21 0928  •  Magnesium Sulfate 2 gram Bolus, followed by 8 gram infusion (total Mg dose 10 grams)- Mg less than or equal to 1mg/dL, 2 g, Intravenous, PRN **OR** Magnesium Sulfate 2 gram / 50mL Infusion (GIVE X 3 BAGS TO EQUAL 6GM TOTAL DOSE) - Mg 1.1 - 1.5 mg/dl, 2 g, Intravenous, PRN **OR** Magnesium Sulfate 4 gram infusion- Mg 1.6-1.9 mg/dL, 4 g, Intravenous, PRN, Isacc Westbrook MD  •  [START ON 6/17/2021] metoprolol succinate XL (TOPROL-XL) 24 hr tablet 150 mg, 150 mg, Oral, Daily, Fady Rodgers MD  •  metoprolol tartrate (LOPRESSOR) injection 5 mg, 5 mg, Intravenous, Q5 Min PRN, Anny Haney APRN, 5 mg at 06/16/21 0342  •  mirtazapine (REMERON) tablet 15 mg, 15 mg, Oral, Nightly, Jazmyn Strauss APRN, 15 mg at 06/15/21 2111  •  montelukast (SINGULAIR) tablet 10 mg, 10 mg, Oral, Nightly, Jazmyn Strauss APRN, 10 mg at 06/15/21 2111  •  nitroglycerin (NITROSTAT) SL tablet 0.4 mg, 0.4 mg, Sublingual, Q5 Min PRN, Jazmyn Strauss APRN  •  ondansetron (ZOFRAN) injection 4 mg, 4 mg, Intravenous, Q6H PRN, Jazmyn Strauss APRN, 4 mg at 06/16/21 0028  •  pantoprazole (PROTONIX) EC tablet 40 mg, 40 mg, Oral, Daily, Jazmyn Strauss APRN, 40 mg at 06/16/21 0928  •  potassium chloride (K-DUR,KLOR-CON) ER tablet 40 mEq, 40 mEq, Oral, PRN, Isacc Westbrook MD  •  potassium chloride (KLOR-CON) packet 40 mEq, 40 mEq, Oral, PRN, Isacc Westbrook MD  •  sodium chloride 0.9 % flush 10 mL, 10 mL, Intravenous, Q12H, Jazmyn Strauss, APRN, 10 mL at 06/16/21 0930  •  sodium chloride 0.9 % flush 10 mL, 10 mL, Intravenous, PRN, Jazmyn Strauss, APRN  Review of Systems:    The following systems were reviewed and negative;  constitution  and gastrointestinal    Objective     Vital Signs  Temp:  [97.6 °F (36.4 °C)-98.6 °F (37 °C)] 97.6 °F (36.4 °C)  Heart Rate:  [] 65  Resp:  [16-20] 16  BP: (101-157)/(48-70) 101/48  Body mass index is 34.2 kg/m².    Intake/Output Summary (Last 24 hours) at 6/16/2021 1558  Last data filed at 6/16/2021 1459  Gross per 24 hour   Intake 360 ml   Output --   Net 360 ml     I/O this shift:  In: 360 [P.O.:360]  Out: -      Physical Exam:   General: patient awake, alert and cooperative   Eyes: Normal lids and lashes, no scleral icterus   Neck: supple, normal ROM   Skin: warm and dry, not jaundiced   Cardiovascular: regular rhythm and rate    Pulm:  regular and unlabored   Abdomen: soft, nontender, nondistended    Extremities: no rash or edema   Psychiatric: Normal mood and behavior; memory intact     Results Review:     I reviewed the patient's new clinical results.    Results from last 7 days   Lab Units 06/16/21  1514 06/16/21  0602 06/16/21  0000 06/14/21 1917 06/11/21  1518   WBC 10*3/mm3  --  8.24  --  6.67 6.0   HEMOGLOBIN g/dL 7.2* 7.1* 7.4* 7.3* 7.0*   HEMATOCRIT % 26.1* 25.7* 27.0* 27.2* 24.9*   PLATELETS 10*3/mm3  --  142  --  156 129*     Results from last 7 days   Lab Units 06/16/21  0602 06/14/21 1917 06/11/21  1518   SODIUM mmol/L 140 136 140   POTASSIUM mmol/L 3.7 3.9 4.5   CHLORIDE mmol/L 103 98 103   TOTAL CO2 mmol/L  --   --  25   CO2 mmol/L 29.9* 28.7  --    BUN mg/dL 10 18 17   CREATININE mg/dL 0.49* 0.73 0.66   CALCIUM mg/dL 8.1* 8.8 8.9   BILIRUBIN mg/dL  --  0.5 0.4   ALK PHOS U/L  --  140* 129*   ALT (SGPT) U/L  --  32 31   AST (SGOT) U/L  --  26 38   GLUCOSE mg/dL 223* 503*  --      Results from last 7 days   Lab Units 06/14/21  1917   INR  1.64*     Lab Results   Lab Value Date/Time    LIPASE 46 02/23/2016 0635       Radiology:  CT Abdomen Pelvis With Contrast   Final Result   Cirrhotic liver morphology with evidence of portal   hypertension and also mild splenomegaly. These findings are  new since   the preceding CT scan in 2014. A moderate-sized periumbilical hernia   containing omental tissue and vessels is also new. There are small   bilateral posteriorly layering pleural effusions.       This report was finalized on 6/15/2021 2:26 PM by Dr. Vincent Henriquez M.D.              Assessment/Plan     Patient Active Problem List   Diagnosis   • Dementia (CMS/HCC)   • Depressive disorder   • Edema   • Abnormal liver function tests   • Gastroesophageal reflux disease   • Gout   • Hyperlipidemia   • HTN (hypertension)   • Insomnia   • Arthropathy of knee   • Memory loss   • Low back pain   • Mitral valve insufficiency   • Tricuspid valve insufficiency   • Urinary tract infection   • Valvular endocarditis   • Long QT interval   • Microcytic anemia   • Anemia   • Seasonal allergies   • Acute cystitis without hematuria   • Chronic fatigue   • Bacterial endocarditis   • Slow transit constipation   • Acute ischemic stroke (CMS/HCC)   • Paroxysmal tachycardia (CMS/HCC)   • Type 2 diabetes mellitus (CMS/HCC)   • Atrial fibrillation (CMS/HCC)   • Morbidly obese (CMS/HCC)   • Chronic diastolic (congestive) heart failure (CMS/HCC)   • Symptomatic anemia   • History of stroke   • GI bleed   • Long term current use of anticoagulant therapy   • Acute blood loss anemia     All problems are new to me today  Assessment:  1. Symptomatic iron deficiency anemia  2. Rectal bleeding  3. History of A. fib on Eliquis-last dose 6/14/2021  4. Abnormal CT of the chest performed last month which showed possible cirrhosis with splenomegaly      Plan:  · We will tentatively plan for EGD and colonoscopy on 6/18.  Heart rate is now controlled with change in medication.  She has been eating all day so we will plan on prep tomorrow to ensure adequate exam on 6/18  · Hemoglobin stable-continue to follow    I discussed the patients findings and my recommendations with patient.    Mayra Aaron MD

## 2021-06-16 NOTE — PROGRESS NOTES
Continued Stay Note  [facility]     Patient Name: Miri Yung  MRN: [id number]  Today's Date: 6/16/2021    Admit Date: 6/14/2021    Discharge Plan     Row Name 06/16/21 4897       Plan    Plan  Home w/ family and [facility] if needed.    Plan Comments  DC plan confirmed w/ son, Hardeep [phone], pt to dc home w/ family support and [facility] pending HH needs. PR address is [address] [place] [address], [place], [address].        Discharge Codes    No documentation.             Deneen Dos Santos RN

## 2021-06-16 NOTE — CONSULTS
"Diabetes Education  Assessment/Teaching    Patient Name:  Miri Yung  YOB: 1940  MRN: [id number]  Admit Date:  6/14/2021      Assessment Date:  6/16/2021    Most Recent Value   General Information    Referral From:  A1c, Database [A1C 11%]   Height  157.5 cm (62\")   Height Method  Stated   Weight  84.8 kg (187 lb)   Weight Method  Bed scale   Are you currently involved in an activity/exercise program?   No   Pregnancy Assessment   Diabetes History   What type of diabetes do you have?  Type 2   Length of Diabetes Diagnosis  Newly diagnosed <6 months   Current DM knowledge  poor   Have you had diabetes education/teaching in the past?  no   Do you test your blood sugar at home?  no   Have you had low blood sugar? (<70mg/dl)  no   Have you had high blood sugar? (>140mg/dl)  no   How would you rate your diabetes control?  poor   How do you feel about having diabetes?  expecting it   Has diabetes caused a problem in your life (work/school/family/friends, etc.)?  no   Have You Felt Down, Depressed or Hopeless?  yes   Have You Felt Little Interest or Pleasure in Doing Things?  yes   What makes it difficult for you to take care of your diabetes or yourself?  limited physical activity   Do you have any diabetes complications?  circulation problems   Education Preferences   What areas of diabetes would you like to learn about?  avoiding high blood sugar, medications for diabetes, testing my blood sugar at home, diet information   Nutrition Information   Assessment Topics   Healthy Eating - Assessment  Needs education   Being Active - Assessment  Needs education   Taking Medication - Assessment  Needs education   Problem Solving - Assessment  Needs education   Reducing Risk - Assessment  Needs education   Healthy Coping - Assessment  Needs education   Monitoring - Assessment  Needs education   DM Goals            Most Recent Value   DM Education Needs   Meter  Meter provided   Frequency of Testing  3 times a " day   Blood Glucose Target Range  under 200   Medication  Insulin, Actions, Side effects, Administration, Vial   Problem Solving  Hyperglycemia   Healthy Eating  Reviewed meal plan   Physical Activity  None   Physical Activity Frequency  Never   Healthy Coping  Appropriate   Discharge Plan  Home   Motivation  Moderate   Teaching Method  Explanation, Discussion, Demonstration, Teach back, Handouts   Patient Response  Needs reinforcement, Verbalized understanding            Other Comments:  Stopped by to teach pt a meter, will wait til son is here tomorrow        Electronically signed by:  Heather Camargo RN  06/16/21 15:30 EDT

## 2021-06-17 LAB
ANION GAP SERPL CALCULATED.3IONS-SCNC: 8.5 MMOL/L (ref 5–15)
ANISOCYTOSIS BLD QL: ABNORMAL
BASOPHILS # BLD MANUAL: 0.1 10*3/MM3 (ref 0–0.2)
BASOPHILS NFR BLD AUTO: 1 % (ref 0–1.5)
BUN SERPL-MCNC: 14 MG/DL (ref 8–23)
BUN/CREAT SERPL: 23.3 (ref 7–25)
CALCIUM SPEC-SCNC: 8.7 MG/DL (ref 8.6–10.5)
CHLORIDE SERPL-SCNC: 102 MMOL/L (ref 98–107)
CO2 SERPL-SCNC: 29.5 MMOL/L (ref 22–29)
CREAT SERPL-MCNC: 0.6 MG/DL (ref 0.57–1)
DEPRECATED RDW RBC AUTO: 45.4 FL (ref 37–54)
EOSINOPHIL # BLD MANUAL: 0.41 10*3/MM3 (ref 0–0.4)
EOSINOPHIL NFR BLD MANUAL: 4.1 % (ref 0.3–6.2)
ERYTHROCYTE [DISTWIDTH] IN BLOOD BY AUTOMATED COUNT: 18.4 % (ref 12.3–15.4)
GFR SERPL CREATININE-BSD FRML MDRD: 96 ML/MIN/1.73
GLUCOSE BLDC GLUCOMTR-MCNC: 111 MG/DL (ref 70–130)
GLUCOSE BLDC GLUCOMTR-MCNC: 174 MG/DL (ref 70–130)
GLUCOSE BLDC GLUCOMTR-MCNC: 188 MG/DL (ref 70–130)
GLUCOSE BLDC GLUCOMTR-MCNC: 237 MG/DL (ref 70–130)
GLUCOSE SERPL-MCNC: 207 MG/DL (ref 65–99)
HCT VFR BLD AUTO: 27.1 % (ref 34–46.6)
HCT VFR BLD AUTO: 29.8 % (ref 34–46.6)
HCT VFR BLD AUTO: 29.9 % (ref 34–46.6)
HGB BLD-MCNC: 7.4 G/DL (ref 12–15.9)
HGB BLD-MCNC: 7.8 G/DL (ref 12–15.9)
HGB BLD-MCNC: 8.1 G/DL (ref 12–15.9)
LYMPHOCYTES # BLD MANUAL: 1.45 10*3/MM3 (ref 0.7–3.1)
LYMPHOCYTES NFR BLD MANUAL: 14.3 % (ref 19.6–45.3)
LYMPHOCYTES NFR BLD MANUAL: 4.1 % (ref 5–12)
MAGNESIUM SERPL-MCNC: 2 MG/DL (ref 1.6–2.4)
MCH RBC QN AUTO: 19.7 PG (ref 26.6–33)
MCHC RBC AUTO-ENTMCNC: 27.1 G/DL (ref 31.5–35.7)
MCV RBC AUTO: 72.6 FL (ref 79–97)
MICROCYTES BLD QL: ABNORMAL
MONOCYTES # BLD AUTO: 0.41 10*3/MM3 (ref 0.1–0.9)
NEUTROPHILS # BLD AUTO: 7.73 10*3/MM3 (ref 1.7–7)
NEUTROPHILS NFR BLD MANUAL: 76.5 % (ref 42.7–76)
PLAT MORPH BLD: NORMAL
PLATELET # BLD AUTO: 180 10*3/MM3 (ref 140–450)
PMV BLD AUTO: 11.7 FL (ref 6–12)
POIKILOCYTOSIS BLD QL SMEAR: ABNORMAL
POTASSIUM SERPL-SCNC: 4.4 MMOL/L (ref 3.5–5.2)
RBC # BLD AUTO: 4.12 10*6/MM3 (ref 3.77–5.28)
SCHISTOCYTES BLD QL SMEAR: ABNORMAL
SODIUM SERPL-SCNC: 140 MMOL/L (ref 136–145)
WBC # BLD AUTO: 10.11 10*3/MM3 (ref 3.4–10.8)
WBC MORPH BLD: NORMAL

## 2021-06-17 PROCEDURE — 63710000001 INSULIN GLARGINE PER 5 UNITS: Performed by: INTERNAL MEDICINE

## 2021-06-17 PROCEDURE — 85018 HEMOGLOBIN: CPT | Performed by: NURSE PRACTITIONER

## 2021-06-17 PROCEDURE — 99232 SBSQ HOSP IP/OBS MODERATE 35: CPT | Performed by: NURSE PRACTITIONER

## 2021-06-17 PROCEDURE — 63710000001 INSULIN LISPRO (HUMAN) PER 5 UNITS: Performed by: INTERNAL MEDICINE

## 2021-06-17 PROCEDURE — 85007 BL SMEAR W/DIFF WBC COUNT: CPT | Performed by: INTERNAL MEDICINE

## 2021-06-17 PROCEDURE — 80048 BASIC METABOLIC PNL TOTAL CA: CPT | Performed by: INTERNAL MEDICINE

## 2021-06-17 PROCEDURE — 83735 ASSAY OF MAGNESIUM: CPT | Performed by: INTERNAL MEDICINE

## 2021-06-17 PROCEDURE — 85025 COMPLETE CBC W/AUTO DIFF WBC: CPT | Performed by: INTERNAL MEDICINE

## 2021-06-17 PROCEDURE — 84132 ASSAY OF SERUM POTASSIUM: CPT | Performed by: INTERNAL MEDICINE

## 2021-06-17 PROCEDURE — 82962 GLUCOSE BLOOD TEST: CPT

## 2021-06-17 PROCEDURE — 85014 HEMATOCRIT: CPT | Performed by: NURSE PRACTITIONER

## 2021-06-17 PROCEDURE — 25010000002 IRON SUCROSE PER 1 MG: Performed by: INTERNAL MEDICINE

## 2021-06-17 PROCEDURE — 99232 SBSQ HOSP IP/OBS MODERATE 35: CPT | Performed by: INTERNAL MEDICINE

## 2021-06-17 RX ORDER — INSULIN GLARGINE 100 [IU]/ML
26 INJECTION, SOLUTION SUBCUTANEOUS EVERY MORNING
Status: DISCONTINUED | OUTPATIENT
Start: 2021-06-18 | End: 2021-06-18

## 2021-06-17 RX ADMIN — ALLOPURINOL 300 MG: 300 TABLET ORAL at 08:00

## 2021-06-17 RX ADMIN — MONTELUKAST SODIUM 10 MG: 10 TABLET, FILM COATED ORAL at 21:50

## 2021-06-17 RX ADMIN — MIRTAZAPINE 15 MG: 15 TABLET, FILM COATED ORAL at 21:50

## 2021-06-17 RX ADMIN — IRON SUCROSE 100 MG: 20 INJECTION, SOLUTION INTRAVENOUS at 12:13

## 2021-06-17 RX ADMIN — SODIUM CHLORIDE, PRESERVATIVE FREE 10 ML: 5 INJECTION INTRAVENOUS at 08:02

## 2021-06-17 RX ADMIN — INSULIN GLARGINE 25 UNITS: 100 INJECTION, SOLUTION SUBCUTANEOUS at 07:58

## 2021-06-17 RX ADMIN — BISACODYL 20 MG: 5 TABLET ORAL at 17:08

## 2021-06-17 RX ADMIN — LOSARTAN POTASSIUM 25 MG: 25 TABLET, FILM COATED ORAL at 08:03

## 2021-06-17 RX ADMIN — INSULIN LISPRO 8 UNITS: 100 INJECTION, SOLUTION INTRAVENOUS; SUBCUTANEOUS at 12:25

## 2021-06-17 RX ADMIN — INSULIN LISPRO 5 UNITS: 100 INJECTION, SOLUTION INTRAVENOUS; SUBCUTANEOUS at 12:25

## 2021-06-17 RX ADMIN — METOPROLOL SUCCINATE 150 MG: 100 TABLET, EXTENDED RELEASE ORAL at 08:00

## 2021-06-17 RX ADMIN — ISOSORBIDE MONONITRATE 60 MG: 60 TABLET ORAL at 08:00

## 2021-06-17 RX ADMIN — SODIUM CHLORIDE, PRESERVATIVE FREE 10 ML: 5 INJECTION INTRAVENOUS at 21:50

## 2021-06-17 RX ADMIN — BUPROPION HYDROCHLORIDE 150 MG: 150 TABLET, EXTENDED RELEASE ORAL at 08:00

## 2021-06-17 RX ADMIN — PANTOPRAZOLE SODIUM 40 MG: 40 TABLET, DELAYED RELEASE ORAL at 08:00

## 2021-06-17 RX ADMIN — FUROSEMIDE 40 MG: 40 TABLET ORAL at 08:00

## 2021-06-17 RX ADMIN — POLYETHYLENE GLYCOL 3350 1 BOTTLE: 17 POWDER, FOR SOLUTION ORAL at 17:49

## 2021-06-17 RX ADMIN — ATORVASTATIN CALCIUM 40 MG: 20 TABLET, FILM COATED ORAL at 08:00

## 2021-06-17 NOTE — PROGRESS NOTES
Patient Name: Miri Yung  Patient : 1940        Date of Service:21  Provider of Service: Jignesh York MD  Place of Service: [facility]  Referral Provider: No ref. provider found          Follow Up: Atrial fibrillation    Interval Hx: Patient remains in sinus rhythm.  Tolerating high-dose metoprolol succinate.  No specific complaints today.        OBJECTIVE  Temp:  [97.6 °F (36.4 °C)-98.8 °F (37.1 °C)] 97.6 °F (36.4 °C)  Heart Rate:  [65-83] 83  Resp:  [16-18] 18  BP: (101-159)/(48-67) 155/65     Intake/Output Summary (Last 24 hours) at 2021 0920  Last data filed at 2021 0005  Gross per 24 hour   Intake 600 ml   Output --   Net 600 ml     Body mass index is 34.2 kg/m².      21  2219   Weight: 84.8 kg (187 lb)         Physical Exam:   Vitals reviewed.   Constitutional:       Appearance: Well-developed.   Eyes:      Pupils: Pupils are equal, round, and reactive to light.   HENT:      Head: Normocephalic.   Neck:      Thyroid: No thyromegaly.      Vascular: No carotid bruit or JVD.   Pulmonary:      Effort: Pulmonary effort is normal.      Breath sounds: Normal breath sounds.   Cardiovascular:      Normal rate. Regular rhythm. Normal S1. Normal S2.      No gallop.   Pulses:     Intact distal pulses.   Edema:     Peripheral edema present.     Ankle: bilateral trace edema of the ankle.     Feet: bilateral trace edema of the feet.  Abdominal:      General: Bowel sounds are normal.      Palpations: Abdomen is soft.   Musculoskeletal:      Cervical back: Normal range of motion. Skin:     General: Skin is warm and dry.      Findings: No erythema.   Neurological:      Mental Status: Alert and oriented to person, place, and time.           CURRENT MEDS    Scheduled Meds:allopurinol, 300 mg, Oral, Daily  atorvastatin, 40 mg, Oral, Daily  bisacodyl, 20 mg, Oral, Once  buPROPion SR, 150 mg, Oral, Daily With Breakfast  furosemide, 40 mg, Oral, Daily  insulin glargine,  25 Units, Subcutaneous, QAM  insulin lispro, 0-14 Units, Subcutaneous, TID AC  insulin lispro, 8 Units, Subcutaneous, TID With Meals  iron sucrose, 100 mg, Intravenous, Q24H  isosorbide mononitrate, 60 mg, Oral, Daily  losartan, 25 mg, Oral, Q24H  metoprolol succinate XL, 150 mg, Oral, Daily  mirtazapine, 15 mg, Oral, Nightly  montelukast, 10 mg, Oral, Nightly  pantoprazole, 40 mg, Oral, Daily  polyethylene glycol, 1 bottle, Oral, Once  sodium chloride, 10 mL, Intravenous, Q12H      Continuous Infusions:       Lab Review:   Results from last 7 days   Lab Units 06/17/21  0810 06/16/21  0602 06/14/21 1917 06/11/21  1518   SODIUM mmol/L 140 140 136 140   POTASSIUM mmol/L 4.4 3.7 3.9 4.5   CHLORIDE mmol/L 102 103 98 103   TOTAL CO2 mmol/L  --   --   --  25   CO2 mmol/L 29.5* 29.9* 28.7  --    BUN mg/dL 14 10 18 17   CREATININE mg/dL 0.60 0.49* 0.73 0.66   GLUCOSE mg/dL 207* 223* 503*  --    CALCIUM mg/dL 8.7 8.1* 8.8 8.9   AST (SGOT) U/L  --   --  26 38   ALT (SGPT) U/L  --   --  32 31         Results from last 7 days   Lab Units 06/16/21  2348 06/16/21  1514 06/16/21  0602 06/14/21 1917   WBC 10*3/mm3  --   --  8.24 6.67   HEMOGLOBIN g/dL 7.4* 7.2* 7.1* 7.3*   HEMATOCRIT % 27.1* 26.1* 25.7* 27.2*   PLATELETS 10*3/mm3  --   --  142 156     Results from last 7 days   Lab Units 06/14/21 1917   INR  1.64*     Results from last 7 days   Lab Units 06/16/21 0602   MAGNESIUM mg/dL 2.1                   I personally reviewed the patient's ECG and telemetry data    ASSESSMENT & PLAN    Symptomatic anemia    Dementia (CMS/HCC)    Gastroesophageal reflux disease    Hyperlipidemia    HTN (hypertension)    Type 2 diabetes mellitus (CMS/HCC)    Atrial fibrillation (CMS/HCC)    Chronic diastolic (congestive) heart failure (CMS/HCC)    History of stroke    GI bleed    Long term current use of anticoagulant therapy    Acute blood loss anemia    Tolerating high-dose metoprolol succinate.  Remains in sinus rhythm.  No changes in  medication today.  Continue to monitor blood pressure.      Jignesh York MD  06/17/21

## 2021-06-17 NOTE — PROGRESS NOTES
Gastroenterology   Inpatient Progress Note    Reason for Follow Up: Bright red blood per rectum, anemia    Subjective     Interval History:   Patient reports having some bright red blood with wiping after BM yesterday.    Current Facility-Administered Medications:   •  acetaminophen (TYLENOL) tablet 650 mg, 650 mg, Oral, Q4H PRN, 650 mg at 06/16/21 2230 **OR** acetaminophen (TYLENOL) 160 MG/5ML solution 650 mg, 650 mg, Oral, Q4H PRN **OR** acetaminophen (TYLENOL) suppository 650 mg, 650 mg, Rectal, Q4H PRN, Jazmyn Strauss APRN  •  allopurinol (ZYLOPRIM) tablet 300 mg, 300 mg, Oral, Daily, Jazmyn Strauss APRN, 300 mg at 06/17/21 0800  •  atorvastatin (LIPITOR) tablet 40 mg, 40 mg, Oral, Daily, Jazmyn Strauss APRN, 40 mg at 06/17/21 0800  •  bisacodyl (DULCOLAX) EC tablet 20 mg, 20 mg, Oral, Once, Mayra Aaron MD  •  buPROPion SR (WELLBUTRIN SR) 12 hr tablet 150 mg, 150 mg, Oral, Daily With Breakfast, Jazmyn Strauss APRN, 150 mg at 06/17/21 0800  •  dextrose (D50W) 25 g/ 50mL Intravenous Solution 25 g, 25 g, Intravenous, Q15 Min PRN, Jazmyn Strauss APRN  •  dextrose (GLUTOSE) oral gel 15 g, 15 g, Oral, Q15 Min PRN, Jazmyn Strauss APRN  •  furosemide (LASIX) tablet 40 mg, 40 mg, Oral, Daily, Isacc Westbrook MD, 40 mg at 06/17/21 0800  •  glucagon (human recombinant) (GLUCAGEN DIAGNOSTIC) injection 1 mg, 1 mg, Subcutaneous, PRN, Jazmyn Strauss APRN  •  insulin glargine (LANTUS, SEMGLEE) injection 25 Units, 25 Units, Subcutaneous, QAM, Isacc Westbrook MD, 25 Units at 06/17/21 0758  •  insulin lispro (ADMELOG) injection 0-14 Units, 0-14 Units, Subcutaneous, TID AC, Isacc Westbrook MD, 10 Units at 06/16/21 1707  •  insulin lispro (ADMELOG) injection 8 Units, 8 Units, Subcutaneous, TID With Meals, Pietro, Isacc Canseco MD, 8 Units at 06/16/21 1707  •  [COMPLETED] acetaminophen (TYLENOL) tablet 650 mg, 650 mg, Oral, Once, 650 mg at 06/15/21 1111 **AND** iron sucrose  (VENOFER) 100 mg in sodium chloride 0.9 % 100 mL IVPB, 100 mg, Intravenous, Q24H, Isacc Westbrook MD, 100 mg at 06/16/21 1132  •  isosorbide mononitrate (IMDUR) 24 hr tablet 60 mg, 60 mg, Oral, Daily, Jazmyn Strauss APRN, 60 mg at 06/17/21 0800  •  losartan (COZAAR) tablet 25 mg, 25 mg, Oral, Q24H, Isacc Westbrook MD, 25 mg at 06/17/21 0803  •  Magnesium Sulfate 2 gram Bolus, followed by 8 gram infusion (total Mg dose 10 grams)- Mg less than or equal to 1mg/dL, 2 g, Intravenous, PRN **OR** Magnesium Sulfate 2 gram / 50mL Infusion (GIVE X 3 BAGS TO EQUAL 6GM TOTAL DOSE) - Mg 1.1 - 1.5 mg/dl, 2 g, Intravenous, PRN **OR** Magnesium Sulfate 4 gram infusion- Mg 1.6-1.9 mg/dL, 4 g, Intravenous, PRN, Isacc Westbrook MD  •  metoprolol succinate XL (TOPROL-XL) 24 hr tablet 150 mg, 150 mg, Oral, Daily, Fady Rodgers MD, 150 mg at 06/17/21 0800  •  metoprolol tartrate (LOPRESSOR) injection 5 mg, 5 mg, Intravenous, Q5 Min PRN, Anny Haney, APRN, 5 mg at 06/16/21 0342  •  mirtazapine (REMERON) tablet 15 mg, 15 mg, Oral, Nightly, Jazmyn Strauss APRN, 15 mg at 06/16/21 2009  •  montelukast (SINGULAIR) tablet 10 mg, 10 mg, Oral, Nightly, Jazmyn Strauss APRN, 10 mg at 06/16/21 2009  •  nitroglycerin (NITROSTAT) SL tablet 0.4 mg, 0.4 mg, Sublingual, Q5 Min PRN, Jazmyn Strauss, APRN  •  ondansetron (ZOFRAN) injection 4 mg, 4 mg, Intravenous, Q6H PRN, Jazmyn Strauss APRN, 4 mg at 06/16/21 0028  •  pantoprazole (PROTONIX) EC tablet 40 mg, 40 mg, Oral, Daily, Jazmyn Strauss APRN, 40 mg at 06/17/21 0800  •  polyethylene glycol (MIRALAX) powder 1 bottle, 1 bottle, Oral, Once, Mayra Aaron MD  •  potassium chloride (K-DUR,KLOR-CON) ER tablet 40 mEq, 40 mEq, Oral, PRN, Isacc Westbrook MD  •  potassium chloride (KLOR-CON) packet 40 mEq, 40 mEq, Oral, PRN, Isacc Westbrook MD  •  sodium chloride 0.9 % flush 10 mL, 10 mL, Intravenous, Q12H, Jazmyn Strauss APRN,  10 mL at 06/17/21 0802  •  sodium chloride 0.9 % flush 10 mL, 10 mL, Intravenous, PRN, , Jazmyn MCKINNON, APRN  Review of Systems:    All systems were reviewed and negative except for:  Gastrointestinal: positive for  bright red blood per rectum    Objective     Vital Signs  Temp:  [97.6 °F (36.4 °C)-98.8 °F (37.1 °C)] 97.6 °F (36.4 °C)  Heart Rate:  [65-83] 83  Resp:  [16-18] 18  BP: (101-159)/(48-67) 155/65  Body mass index is 34.2 kg/m².    Intake/Output Summary (Last 24 hours) at 6/17/2021 0901  Last data filed at 6/17/2021 0005  Gross per 24 hour   Intake 600 ml   Output --   Net 600 ml     No intake/output data recorded.     Physical Exam:   General: patient awake, alert and cooperative   Eyes: no scleral icterus   Skin: warm and dry, not jaundiced   Abdomen: soft, mild diffuse abdominal tenderness, nondistended; normal bowel sounds, no masses palpated, no periumbical lymphadenopathy   Psychiatric: Appropriate affect and behavior     Results Review:     I reviewed the patient's new clinical results.  I reviewed the patient's new imaging results and agree with the interpretation.  I reviewed the patient's other test results and agree with the interpretation    Results from last 7 days   Lab Units 06/16/21  2348 06/16/21  1514 06/16/21  0602 06/14/21 1917 06/11/21  1518   WBC 10*3/mm3  --   --  8.24 6.67 6.0   HEMOGLOBIN g/dL 7.4* 7.2* 7.1* 7.3* 7.0*   HEMATOCRIT % 27.1* 26.1* 25.7* 27.2* 24.9*   PLATELETS 10*3/mm3  --   --  142 156 129*     Results from last 7 days   Lab Units 06/17/21  0810 06/16/21  0602 06/14/21 1917 06/11/21  1518   SODIUM mmol/L 140 140 136 140   POTASSIUM mmol/L 4.4 3.7 3.9 4.5   CHLORIDE mmol/L 102 103 98 103   TOTAL CO2 mmol/L  --   --   --  25   CO2 mmol/L 29.5* 29.9* 28.7  --    BUN mg/dL 14 10 18 17   CREATININE mg/dL 0.60 0.49* 0.73 0.66   CALCIUM mg/dL 8.7 8.1* 8.8 8.9   BILIRUBIN mg/dL  --   --  0.5 0.4   ALK PHOS U/L  --   --  140* 129*   ALT (SGPT) U/L  --   --  32 31   AST  (SGOT) U/L  --   --  26 38   GLUCOSE mg/dL 207* 223* 503*  --      Results from last 7 days   Lab Units 06/14/21  1917   INR  1.64*     Lab Results   Lab Value Date/Time    LIPASE 46 02/23/2016 0635       Radiology:  CT Abdomen Pelvis With Contrast   Final Result   Cirrhotic liver morphology with evidence of portal   hypertension and also mild splenomegaly. These findings are new since   the preceding CT scan in 2014. A moderate-sized periumbilical hernia   containing omental tissue and vessels is also new. There are small   bilateral posteriorly layering pleural effusions.       This report was finalized on 6/15/2021 2:26 PM by Dr. Vincent Henriquez M.D.              Assessment/Plan     Patient Active Problem List   Diagnosis   • Dementia (CMS/HCC)   • Depressive disorder   • Edema   • Abnormal liver function tests   • Gastroesophageal reflux disease   • Gout   • Hyperlipidemia   • HTN (hypertension)   • Insomnia   • Arthropathy of knee   • Memory loss   • Low back pain   • Mitral valve insufficiency   • Tricuspid valve insufficiency   • Urinary tract infection   • Valvular endocarditis   • Long QT interval   • Microcytic anemia   • Anemia   • Seasonal allergies   • Acute cystitis without hematuria   • Chronic fatigue   • Bacterial endocarditis   • Slow transit constipation   • Acute ischemic stroke (CMS/HCC)   • Paroxysmal tachycardia (CMS/HCC)   • Type 2 diabetes mellitus (CMS/HCC)   • Atrial fibrillation (CMS/HCC)   • Morbidly obese (CMS/HCC)   • Chronic diastolic (congestive) heart failure (CMS/HCC)   • Symptomatic anemia   • History of stroke   • GI bleed   • Long term current use of anticoagulant therapy   • Acute blood loss anemia       Assessment:  1. Iron deficiency anemia  2. Bright red blood per rectum  3. History of atrial fibrillation on Eliquis, last dose on 6/14/2021  4. New diagnosis of cirrhosis with splenomegaly portal hypertension noted on CT scan last month  5. Periumbilical hernia    These  problems are new to me.  Plan:  · Patient is currently on clear liquid diet today in preparation for EGD and colonoscopy tomorrow for further evaluation of iron deficiency anemia bright red blood per rectum.  Hemoglobin remains low at 7.4 as of yesterday.  Eliquis has been held since 6/14/2021.  Patient receiving IV iron.  Additional recommendations pending outcome of bidirectional endoscopy.    I discussed the patients findings and my recommendations with patient and her son.    KWASI Irizarry APRN  [facility] [place] [address]  Office: [phone]

## 2021-06-17 NOTE — CONSULTS
Diabetes Education    Patient Name:  Miri Yung  YOB: 1940  MRN: [id number]  Admit Date:  6/14/2021    Follow up for BGM.  Son not at bedside.  Will re-attempt.        Electronically signed by:  Luigi Deleon RN, [facility]  06/17/21 13:23 EDT

## 2021-06-17 NOTE — PLAN OF CARE
Goal Outcome Evaluation:  Plan of Care Reviewed With: patient        Progress: improving   VSS. NS on monitor. O2@2L via N/C. Alert and oriented to person, place, and time with periods of confusion. Ax1 with transfers and ambulation. Clear liquid diet since midnight for EGD/Colonoscopy scheduled 6/18. Denies any pain or SOA.

## 2021-06-17 NOTE — PROGRESS NOTES
[facility] Medicine Services  PROGRESS NOTE    Patient Name: Miri Yugn  : 1940  MRN: [id number]    Date of Admission: 2021  Primary Care Physician: Loren Cruz APRN    Subjective   Subjective     CC:  Follow-up anemia    HPI:  Denies further bleeding.  Awaiting endoscopy today.  No current palpitations or cardiac symptoms.      Review of Systems  No current fevers or chills  No current shortness of breath or cough  No current nausea, vomiting, or diarrhea  No current chest pain or palpitations      Objective   Objective     Vital Signs:   Temp:  [97.6 °F (36.4 °C)-98.8 °F (37.1 °C)] 97.6 °F (36.4 °C)  Heart Rate:  [65-83] 83  Resp:  [16-18] 18  BP: (101-159)/(48-67) 155/65        Physical Exam:  Constitutional:Awake, alert  HENT: NCAT, mucous membranes moist, neck supple  Respiratory: Clear to auscultation bilaterally, respiratory effort normal, nonlabored breathing   Cardiovascular: Regular rate and rhythm, normal radial pulses  Gastrointestinal: Positive bowel sounds, soft, nontender, nondistended  Musculoskeletal: Elderly and chronically debilitated appearance, mild lower extremity edema, obese, BMI is 34  Psychiatric: Appropriate affect, cooperative, conversational  Neurologic: No slurred speech or facial droop, follows commands  Skin: No rashes or jaundice, warm      Results Reviewed:  Results from last 7 days   Lab Units 21  0810 21  2348 21  1514 21  0602 21   WBC 10*3/mm3 10.11  --   --  8.24 6.67   HEMOGLOBIN g/dL 8.1* 7.4* 7.2* 7.1* 7.3*   HEMATOCRIT % 29.9* 27.1* 26.1* 25.7* 27.2*   PLATELETS 10*3/mm3 180  --   --  142 156   INR   --   --   --   --  1.64*     Results from last 7 days   Lab Units 21  0810 21  0602 21  1518   SODIUM mmol/L 140 140 136 140   POTASSIUM mmol/L 4.4 3.7 3.9 4.5   CHLORIDE mmol/L 102 103 98 103   TOTAL CO2 mmol/L  --   --   --  25   CO2 mmol/L 29.5* 29.9* 28.7  --    BUN mg/dL 14  10 18 17   CREATININE mg/dL 0.60 0.49* 0.73 0.66   GLUCOSE mg/dL 207* 223* 503*  --    CALCIUM mg/dL 8.7 8.1* 8.8 8.9   ALT (SGPT) U/L  --   --  32 31   AST (SGOT) U/L  --   --  26 38     Estimated Creatinine Clearance: 55.7 mL/min (by C-G formula based on SCr of 0.6 mg/dL).    Microbiology Results Abnormal     Procedure Component Value - Date/Time    COVID PRE-OP / PRE-PROCEDURE SCREENING ORDER (NO ISOLATION) - Swab, Nasopharynx [[id number]]  (Normal) Collected: 06/14/21 1919    Lab Status: Final result Specimen: Swab from Nasopharynx Updated: 06/14/21 2050    Narrative:      The following orders were created for panel order COVID PRE-OP / PRE-PROCEDURE SCREENING ORDER (NO ISOLATION) - Swab, Nasopharynx.  Procedure                               Abnormality         Status                     ---------                               -----------         ------                     COVID-19,[facility]...[[id number]]  Normal              Final result                 Please view results for these tests on the individual orders.    COVID-19,[facility] CEPHEID/RACH NP SWAB IN TRANSPORT MEDIA 8-12 HR TAT - Swab, Nasopharynx [[id number]]  (Normal) Collected: 06/14/21 1919    Lab Status: Final result Specimen: Swab from Nasopharynx Updated: 06/14/21 2050     COVID19 Not Detected    Narrative:      Fact sheet for providers: https://www.fda.gov/media/230325/download     Fact sheet for patients: https://www.fda.gov/media/489918/download          Imaging Results (Last 24 Hours)     ** No results found for the last 24 hours. **          Results for orders placed during the hospital encounter of 04/12/21    Adult Transthoracic Echo Complete W/ Cont if Necessary Per Protocol    Interpretation Summary  · Left ventricular ejection fraction appears to be 66 - 70%. Normal left ventricular cavity size noted. Left ventricular wall thickness is consistent with mild to moderate concentric hypertrophy. All left ventricular wall segments  contract normally. Left ventricular diastolic function is consistent with (grade II w/high LAP) pseudonormalization. No evidence of left ventricular thrombus or mass present.  · The left atrial cavity is severely dilated.  · There is mild calcification of the aortic valve. Mild aortic valve regurgitation is present. Mild to moderate aortic valve stenosis is present. Peak velocity of the flow distal to the aortic valve is 274.7 cm/s. Aortic valve maximum pressure gradient is 30.2 mmHg. Aortic valve mean pressure gradient is 16.8 mmHg. Aortic valve area is 1.4 cm2.  · There are 2 echointense structures with minimal mobility in the proximal aorta. The appearance seems most consistent with concentric calcification of the sinotubular junction however cannot exclude intimal flap. No clear evidence for aortic dissection.  · Calculated right ventricular systolic pressure from tricuspid regurgitation is 47 mmHg.      I have reviewed the medications:  Scheduled Meds:allopurinol, 300 mg, Oral, Daily  atorvastatin, 40 mg, Oral, Daily  bisacodyl, 20 mg, Oral, Once  buPROPion SR, 150 mg, Oral, Daily With Breakfast  furosemide, 40 mg, Oral, Daily  insulin glargine, 25 Units, Subcutaneous, QAM  insulin lispro, 0-14 Units, Subcutaneous, TID AC  insulin lispro, 8 Units, Subcutaneous, TID With Meals  iron sucrose, 100 mg, Intravenous, Q24H  isosorbide mononitrate, 60 mg, Oral, Daily  losartan, 25 mg, Oral, Q24H  metoprolol succinate XL, 150 mg, Oral, Daily  mirtazapine, 15 mg, Oral, Nightly  montelukast, 10 mg, Oral, Nightly  pantoprazole, 40 mg, Oral, Daily  polyethylene glycol, 1 bottle, Oral, Once  sodium chloride, 10 mL, Intravenous, Q12H      Continuous Infusions:   PRN Meds:.•  acetaminophen **OR** acetaminophen **OR** acetaminophen  •  dextrose  •  dextrose  •  glucagon (human recombinant)  •  magnesium sulfate **OR** magnesium sulfate **OR** magnesium sulfate  •  metoprolol tartrate  •  nitroglycerin  •  ondansetron  •   potassium chloride  •  potassium chloride  •  sodium chloride    Assessment/Plan   Assessment & Plan     Active Hospital Problems    Diagnosis  POA   • **Symptomatic anemia [D64.9]  Yes   • GI bleed [K92.2]  Yes   • Long term current use of anticoagulant therapy [Z79.01]  Not Applicable   • Acute blood loss anemia [D62]  Yes   • History of stroke [Z86.73]  Not Applicable   • Chronic diastolic (congestive) heart failure (CMS/[facility]) [I50.32]  Yes   • Atrial fibrillation (CMS/HCC) [I48.91]  Yes   • Type 2 diabetes mellitus (CMS/[facility]) [E11.9]  Yes   • Hyperlipidemia [E78.5]  Yes   • HTN (hypertension) [I10]  Yes   • Gastroesophageal reflux disease [K21.9]  Yes   • Dementia (CMS/[facility]) [F03.90]  Yes      Resolved Hospital Problems   No resolved problems to display.        Brief Hospital Course to date:  Miri Yung is a 80 y.o. female on long-term anticoagulation who presents with adverse effect of anticoagulation causing GI bleeding and symptomatic anemia.    Discussion/plan for today:  Case discussed with gastroenterology.  Plan is for EGD colonoscopy today.  Continue supplemental oxygen.  Discharge planning based on EGD findings.      Cardiology increase metoprolol succinate remittent episodes of rapid atrial fibrillation.  Currently sinus rhythm this morning.    Glucose reviewed and hypoglycemia noted.  Basal insulin further adjusted today.  Monitor glucose and adjust more as needed.    Continue to hold home anticoagulation for now based on risk versus benefit assessment.  Family in agreement with this plan.  Resume when cleared by GI.    Continue home cardiac medications.  PT OT for debility.     Potassium improved.  Magnesium 2.1.    DVT Prophylaxis: Mechanical    Disposition: Pending clinical course likely home with family    CODE STATUS:   Code Status and Medical Interventions:   Ordered at: 06/14/21 1732     Code Status:    CPR     Medical Interventions (Level of Support Prior to Arrest):    Full       Isacc  Ajith Westbrook MD  06/17/21

## 2021-06-18 ENCOUNTER — PREP FOR SURGERY (OUTPATIENT)
Dept: OTHER | Facility: HOSPITAL | Age: 81
End: 2021-06-18

## 2021-06-18 ENCOUNTER — ANESTHESIA EVENT (OUTPATIENT)
Dept: GASTROENTEROLOGY | Facility: HOSPITAL | Age: 81
End: 2021-06-18

## 2021-06-18 ENCOUNTER — ANESTHESIA (OUTPATIENT)
Dept: GASTROENTEROLOGY | Facility: HOSPITAL | Age: 81
End: 2021-06-18

## 2021-06-18 DIAGNOSIS — D64.9 SYMPTOMATIC ANEMIA: Primary | ICD-10-CM

## 2021-06-18 DIAGNOSIS — I50.9 ACUTE ON CHRONIC CONGESTIVE HEART FAILURE, UNSPECIFIED HEART FAILURE TYPE (HCC): ICD-10-CM

## 2021-06-18 PROBLEM — R16.1 SPLENOMEGALY: Status: ACTIVE | Noted: 2021-06-18

## 2021-06-18 PROBLEM — I85.00 ESOPHAGEAL VARICES (HCC): Status: ACTIVE | Noted: 2021-06-18

## 2021-06-18 PROBLEM — K74.60 CIRRHOSIS OF LIVER (HCC): Status: ACTIVE | Noted: 2021-06-18

## 2021-06-18 LAB
ANION GAP SERPL CALCULATED.3IONS-SCNC: 9.6 MMOL/L (ref 5–15)
BUN SERPL-MCNC: 11 MG/DL (ref 8–23)
BUN/CREAT SERPL: 19.6 (ref 7–25)
CALCIUM SPEC-SCNC: 8.4 MG/DL (ref 8.6–10.5)
CHLORIDE SERPL-SCNC: 104 MMOL/L (ref 98–107)
CO2 SERPL-SCNC: 28.4 MMOL/L (ref 22–29)
CREAT SERPL-MCNC: 0.56 MG/DL (ref 0.57–1)
DEPRECATED RDW RBC AUTO: 43.3 FL (ref 37–54)
ERYTHROCYTE [DISTWIDTH] IN BLOOD BY AUTOMATED COUNT: 18.2 % (ref 12.3–15.4)
GFR SERPL CREATININE-BSD FRML MDRD: 104 ML/MIN/1.73
GLUCOSE BLDC GLUCOMTR-MCNC: 114 MG/DL (ref 70–130)
GLUCOSE BLDC GLUCOMTR-MCNC: 147 MG/DL (ref 70–130)
GLUCOSE BLDC GLUCOMTR-MCNC: 155 MG/DL (ref 70–130)
GLUCOSE BLDC GLUCOMTR-MCNC: 247 MG/DL (ref 70–130)
GLUCOSE SERPL-MCNC: 101 MG/DL (ref 65–99)
HCT VFR BLD AUTO: 25.4 % (ref 34–46.6)
HCT VFR BLD AUTO: 27.4 % (ref 34–46.6)
HCT VFR BLD AUTO: 28.6 % (ref 34–46.6)
HCT VFR BLD AUTO: 32.3 % (ref 34–46.6)
HGB BLD-MCNC: 7.1 G/DL (ref 12–15.9)
HGB BLD-MCNC: 7.5 G/DL (ref 12–15.9)
HGB BLD-MCNC: 7.8 G/DL (ref 12–15.9)
HGB BLD-MCNC: 8.7 G/DL (ref 12–15.9)
MCH RBC QN AUTO: 19.5 PG (ref 26.6–33)
MCHC RBC AUTO-ENTMCNC: 28 G/DL (ref 31.5–35.7)
MCV RBC AUTO: 69.6 FL (ref 79–97)
PLATELET # BLD AUTO: 166 10*3/MM3 (ref 140–450)
PMV BLD AUTO: 11.4 FL (ref 6–12)
POTASSIUM SERPL-SCNC: 3.4 MMOL/L (ref 3.5–5.2)
POTASSIUM SERPL-SCNC: 3.8 MMOL/L (ref 3.5–5.2)
RBC # BLD AUTO: 3.65 10*6/MM3 (ref 3.77–5.28)
SARS-COV-2 RNA RESP QL NAA+PROBE: NOT DETECTED
SODIUM SERPL-SCNC: 142 MMOL/L (ref 136–145)
WBC # BLD AUTO: 8.16 10*3/MM3 (ref 3.4–10.8)

## 2021-06-18 PROCEDURE — 80048 BASIC METABOLIC PNL TOTAL CA: CPT | Performed by: INTERNAL MEDICINE

## 2021-06-18 PROCEDURE — U0003 INFECTIOUS AGENT DETECTION BY NUCLEIC ACID (DNA OR RNA); SEVERE ACUTE RESPIRATORY SYNDROME CORONAVIRUS 2 (SARS-COV-2) (CORONAVIRUS DISEASE [COVID-19]), AMPLIFIED PROBE TECHNIQUE, MAKING USE OF HIGH THROUGHPUT TECHNOLOGIES AS DESCRIBED BY CMS-2020-01-R: HCPCS | Performed by: NURSE PRACTITIONER

## 2021-06-18 PROCEDURE — 85018 HEMOGLOBIN: CPT | Performed by: NURSE PRACTITIONER

## 2021-06-18 PROCEDURE — 43235 EGD DIAGNOSTIC BRUSH WASH: CPT | Performed by: INTERNAL MEDICINE

## 2021-06-18 PROCEDURE — 25010000002 PROPOFOL 10 MG/ML EMULSION: Performed by: ANESTHESIOLOGY

## 2021-06-18 PROCEDURE — 63710000001 INSULIN LISPRO (HUMAN) PER 5 UNITS: Performed by: INTERNAL MEDICINE

## 2021-06-18 PROCEDURE — 99232 SBSQ HOSP IP/OBS MODERATE 35: CPT | Performed by: NURSE PRACTITIONER

## 2021-06-18 PROCEDURE — 85018 HEMOGLOBIN: CPT | Performed by: INTERNAL MEDICINE

## 2021-06-18 PROCEDURE — 85027 COMPLETE CBC AUTOMATED: CPT | Performed by: INTERNAL MEDICINE

## 2021-06-18 PROCEDURE — 82962 GLUCOSE BLOOD TEST: CPT

## 2021-06-18 PROCEDURE — 45378 DIAGNOSTIC COLONOSCOPY: CPT | Performed by: INTERNAL MEDICINE

## 2021-06-18 PROCEDURE — 0DJD8ZZ INSPECTION OF LOWER INTESTINAL TRACT, VIA NATURAL OR ARTIFICIAL OPENING ENDOSCOPIC: ICD-10-PCS | Performed by: INTERNAL MEDICINE

## 2021-06-18 PROCEDURE — 85014 HEMATOCRIT: CPT | Performed by: INTERNAL MEDICINE

## 2021-06-18 PROCEDURE — 85014 HEMATOCRIT: CPT | Performed by: NURSE PRACTITIONER

## 2021-06-18 PROCEDURE — U0005 INFEC AGEN DETEC AMPLI PROBE: HCPCS | Performed by: NURSE PRACTITIONER

## 2021-06-18 PROCEDURE — 0DJ08ZZ INSPECTION OF UPPER INTESTINAL TRACT, VIA NATURAL OR ARTIFICIAL OPENING ENDOSCOPIC: ICD-10-PCS | Performed by: INTERNAL MEDICINE

## 2021-06-18 RX ORDER — LIDOCAINE HYDROCHLORIDE 20 MG/ML
INJECTION, SOLUTION INFILTRATION; PERINEURAL AS NEEDED
Status: DISCONTINUED | OUTPATIENT
Start: 2021-06-18 | End: 2021-06-18 | Stop reason: SURG

## 2021-06-18 RX ORDER — GLYCOPYRROLATE 0.2 MG/ML
INJECTION INTRAMUSCULAR; INTRAVENOUS AS NEEDED
Status: DISCONTINUED | OUTPATIENT
Start: 2021-06-18 | End: 2021-06-18 | Stop reason: SURG

## 2021-06-18 RX ORDER — FUROSEMIDE 40 MG/1
40 TABLET ORAL 2 TIMES DAILY
Qty: 180 TABLET | Refills: 0 | Status: SHIPPED | OUTPATIENT
Start: 2021-06-18 | End: 2021-01-01 | Stop reason: SDUPTHER

## 2021-06-18 RX ORDER — SODIUM CHLORIDE 9 MG/ML
30 INJECTION, SOLUTION INTRAVENOUS CONTINUOUS PRN
Status: DISCONTINUED | OUTPATIENT
Start: 2021-06-18 | End: 2021-06-19 | Stop reason: HOSPADM

## 2021-06-18 RX ORDER — LOSARTAN POTASSIUM 50 MG/1
50 TABLET ORAL
Status: DISCONTINUED | OUTPATIENT
Start: 2021-06-19 | End: 2021-06-19 | Stop reason: HOSPADM

## 2021-06-18 RX ORDER — INSULIN GLARGINE 100 [IU]/ML
20 INJECTION, SOLUTION SUBCUTANEOUS EVERY MORNING
Status: DISCONTINUED | OUTPATIENT
Start: 2021-06-19 | End: 2021-06-19 | Stop reason: HOSPADM

## 2021-06-18 RX ORDER — PROPOFOL 10 MG/ML
VIAL (ML) INTRAVENOUS CONTINUOUS PRN
Status: DISCONTINUED | OUTPATIENT
Start: 2021-06-18 | End: 2021-06-18 | Stop reason: SURG

## 2021-06-18 RX ORDER — FUROSEMIDE 40 MG/1
40 TABLET ORAL
Status: DISCONTINUED | OUTPATIENT
Start: 2021-06-18 | End: 2021-06-19 | Stop reason: HOSPADM

## 2021-06-18 RX ADMIN — METOPROLOL SUCCINATE 150 MG: 100 TABLET, EXTENDED RELEASE ORAL at 08:31

## 2021-06-18 RX ADMIN — ALLOPURINOL 300 MG: 300 TABLET ORAL at 08:31

## 2021-06-18 RX ADMIN — LIDOCAINE HYDROCHLORIDE 60 MG: 20 INJECTION, SOLUTION INFILTRATION; PERINEURAL at 13:51

## 2021-06-18 RX ADMIN — SODIUM CHLORIDE 30 ML/HR: 9 INJECTION, SOLUTION INTRAVENOUS at 13:42

## 2021-06-18 RX ADMIN — PANTOPRAZOLE SODIUM 40 MG: 40 TABLET, DELAYED RELEASE ORAL at 08:36

## 2021-06-18 RX ADMIN — ACETAMINOPHEN 650 MG: 325 TABLET, FILM COATED ORAL at 03:29

## 2021-06-18 RX ADMIN — MIRTAZAPINE 15 MG: 15 TABLET, FILM COATED ORAL at 21:11

## 2021-06-18 RX ADMIN — INSULIN LISPRO 8 UNITS: 100 INJECTION, SOLUTION INTRAVENOUS; SUBCUTANEOUS at 18:07

## 2021-06-18 RX ADMIN — PROPOFOL 200 MCG/KG/MIN: 10 INJECTION, EMULSION INTRAVENOUS at 13:51

## 2021-06-18 RX ADMIN — SODIUM CHLORIDE, PRESERVATIVE FREE 10 ML: 5 INJECTION INTRAVENOUS at 08:31

## 2021-06-18 RX ADMIN — INSULIN LISPRO 5 UNITS: 100 INJECTION, SOLUTION INTRAVENOUS; SUBCUTANEOUS at 18:07

## 2021-06-18 RX ADMIN — BUPROPION HYDROCHLORIDE 150 MG: 150 TABLET, EXTENDED RELEASE ORAL at 08:31

## 2021-06-18 RX ADMIN — ATORVASTATIN CALCIUM 40 MG: 20 TABLET, FILM COATED ORAL at 08:31

## 2021-06-18 RX ADMIN — FUROSEMIDE 40 MG: 40 TABLET ORAL at 18:07

## 2021-06-18 RX ADMIN — LOSARTAN POTASSIUM 25 MG: 25 TABLET, FILM COATED ORAL at 08:31

## 2021-06-18 RX ADMIN — ISOSORBIDE MONONITRATE 60 MG: 60 TABLET ORAL at 08:31

## 2021-06-18 RX ADMIN — GLYCOPYRROLATE 0.2 MG: 0.2 INJECTION INTRAMUSCULAR; INTRAVENOUS at 13:50

## 2021-06-18 RX ADMIN — APIXABAN 5 MG: 5 TABLET, FILM COATED ORAL at 21:11

## 2021-06-18 RX ADMIN — MONTELUKAST SODIUM 10 MG: 10 TABLET, FILM COATED ORAL at 21:11

## 2021-06-18 RX ADMIN — SODIUM CHLORIDE, PRESERVATIVE FREE 10 ML: 5 INJECTION INTRAVENOUS at 21:11

## 2021-06-18 NOTE — PROGRESS NOTES
Discharge Planning Assessment  [facility]     Patient Name: Miri Yung  MRN: [id number]  Today's Date: 6/18/2021    Admit Date: 6/14/2021    Discharge Needs Assessment    No documentation.       Discharge Plan     Row Name 06/18/21 1310       Plan    Plan  Home with family denies any discharge needs    Plan Comments  Spoke with rosalba Meier [phone] at bedside regarding discharge planning. Son stated he lives next door to the patient and is able to provide assistance as needed. Son stated patient will not need home health. Discharge plans are to return home with family support. Taniya ESTRELLA        Continued Care and Services - Admitted Since 6/14/2021     Home Medical Care     Service Provider Request Status Selected Services Address Phone Fax Patient Preferred    [facility]  Accepted N/A [address], [address], [facility] [place] [facility] [id number] [phone] --                Demographic Summary    No documentation.       Functional Status    No documentation.       Psychosocial    No documentation.       Abuse/Neglect    No documentation.       Legal    No documentation.       Substance Abuse    No documentation.       Patient Forms    No documentation.           Shira Andres, RN

## 2021-06-18 NOTE — ANESTHESIA POSTPROCEDURE EVALUATION
"Patient: Miri Yung    Procedure Summary     Date: 06/18/21 Room / Location:  [facility] /  [facility]    Anesthesia Start: 1349 Anesthesia Stop: 1418    Procedures:       COLONOSCOPY TO CECUM AND INTO TERMINAL ILEUM (N/A )      ESOPHAGOGASTRODUODENOSCOPY (N/A Esophagus) Diagnosis:       Symptomatic anemia      (Symptomatic anemia [D64.9])    Surgeons: Jeremy Darden MD Provider: Vincent Gamboa MD    Anesthesia Type: MAC ASA Status: 4          Anesthesia Type: MAC    Vitals  Vitals Value Taken Time   /68 06/18/21 1440   Temp     Pulse 75 06/18/21 1440   Resp 20 06/18/21 1440   SpO2 95 % 06/18/21 1440           Post Anesthesia Care and Evaluation    Patient location during evaluation: PACU  Patient participation: complete - patient participated  Level of consciousness: awake  Pain score: 0  Pain management: adequate  Airway patency: patent  Anesthetic complications: No anesthetic complications  PONV Status: none  Cardiovascular status: acceptable  Respiratory status: acceptable  Hydration status: acceptable    Comments: /68   Pulse 75   Temp 36.4 °C (97.6 °F) (Oral)   Resp 20   Ht 157.5 cm (62.01\")   Wt 84.8 kg (187 lb)   SpO2 95%   BMI 34.19 kg/m²       "

## 2021-06-18 NOTE — CONSULTS
Diabetes Education  Assessment/Teaching    Patient Name:  Miri Yung  YOB: 1940  MRN: [id number]  Admit Date:  6/14/2021      Assessment Date:  6/18/2021        Most Recent Value   DM Education Needs   Meter  Meter provided   Meter Type  One Touch [Verio Flex w/ Delica lancets]   Frequency of Testing  AC meals   Blood Glucose Target Range  100-150 mg/dL pre meal.  Recommend pt to ask PCP for patient specific target.   Medication  Insulin, Actions, Side effects, Administration, Pen [Lantus Solostar and Humalog Kwikpen w 4 mm 32G Daria pen needles, basal bolus concept.]   Problem Solving  Hypoglycemia, Signs, Symptoms, Treatment   Healthy Eating  RD consult   Healthy Coping  Appropriate   Discharge Plan  Follow-up with PCP, Home   Motivation  Engaged   Teaching Method  Explanation, Discussion, Demonstration, Handouts, Teach back   Patient Response  Needs reinforcement, Demonstrates adequately, Verbalized understanding          Other Comments:  Met with patient and son Hardeep who states he lives next door to the patient.  Hardeep states he stops by every AM to see patient and assist with her medications.      Both educated on BGM and insulin administration with pen device.  Pt was able to return demonstration BGM but had difficulty with insulin administration.  Son able to return demonstrate insulin administration.  States will assist patient until patient comfort level appropriate.      Pt may have difficulty with giving meal time insulin if alone until more proficient.      Electronically signed by:  Luigi Deleon RN, [facility]  06/18/21 11:03 EDT

## 2021-06-18 NOTE — ANESTHESIA PREPROCEDURE EVALUATION
Anesthesia Evaluation     Patient summary reviewed and Nursing notes reviewed   NPO Solid Status: > 8 hours  NPO Liquid Status: > 2 hours           Airway   Mallampati: I  TM distance: >3 FB  Neck ROM: full  No difficulty expected  Dental - normal exam     Pulmonary - negative pulmonary ROS and normal exam   Cardiovascular - normal exam    (+) hypertension, valvular problems/murmurs MR, dysrhythmias Atrial Fib, CHF , hyperlipidemia,       Neuro/Psych  (+) CVA, psychiatric history, dementia,     GI/Hepatic/Renal/Endo    (+) obesity,  GERD, GI bleeding , diabetes mellitus,   (-) morbid obesity    Musculoskeletal (-) negative ROS    Abdominal  - normal exam    Bowel sounds: normal.   Substance History - negative use     OB/GYN negative ob/gyn ROS         Other                      Anesthesia Plan    ASA 4     MAC       Anesthetic plan, all risks, benefits, and alternatives have been provided, discussed and informed consent has been obtained with: patient.

## 2021-06-18 NOTE — PROGRESS NOTES
LOS: 3 days   Patient Care Team:  Loren Cruz APRN as PCP - General (Family Medicine)      Chief Complaint: Follow-up paroxysmal atrial fibrillation, hypertension       Interval History: Sitting up in the chair.  Feels okay.  Waiting for the procedure today.  Denies CP.  Occasional SOA.       Objective   Vital Signs  Temp:  [97.4 °F (36.3 °C)-98.3 °F (36.8 °C)] 98.2 °F (36.8 °C)  Heart Rate:  [63-74] 74  Resp:  [18] 18  BP: (136-158)/(65-70) 154/65    Intake/Output Summary (Last 24 hours) at 6/18/2021 0916  Last data filed at 6/18/2021 0128  Gross per 24 hour   Intake --   Output 2850 ml   Net -2850 ml           Constitutional:       General: Not in acute distress.     Appearance: Well-developed. Not diaphoretic.   Eyes:      Pupils: Pupils are equal, round, and reactive to light.   HENT:      Head: Normocephalic and atraumatic.   Neck:      Thyroid: No thyromegaly.      Vascular: No JVD.   Pulmonary:      Effort: Pulmonary effort is normal. No respiratory distress.      Breath sounds: Normal breath sounds.   Cardiovascular:      Normal rate. Regular rhythm.   Pulses:     Intact distal pulses.   Abdominal:      General: Bowel sounds are normal. There is no distension.      Palpations: Abdomen is soft. There is no hepatomegaly or splenomegaly.      Tenderness: There is no abdominal tenderness.   Musculoskeletal: Normal range of motion. Skin:     General: Skin is warm and dry.      Findings: No erythema.   Neurological:      Mental Status: Alert and oriented to person, place, and time.   Psychiatric:         Behavior: Behavior normal.         Judgment: Judgment normal.         Results Review:      Results from last 7 days   Lab Units 06/18/21  0618 06/17/21  2257 06/17/21  0810 06/16/21  0602   SODIUM mmol/L 142  --  140 140   POTASSIUM mmol/L 3.4* 3.8 4.4 3.7   CHLORIDE mmol/L 104  --  102 103   CO2 mmol/L 28.4  --  29.5* 29.9*   BUN mg/dL 11  --  14 10   CREATININE mg/dL 0.56*  --  0.60 0.49*   GLUCOSE  mg/dL 101*  --  207* 223*   CALCIUM mg/dL 8.4*  --  8.7 8.1*         Results from last 7 days   Lab Units 06/18/21  0518 06/17/21  2257 06/17/21  1619 06/17/21  0810 06/16/21  0602   WBC 10*3/mm3 8.16  --   --  10.11 8.24   HEMOGLOBIN g/dL 7.1* 8.7* 7.8* 8.1* 7.1*   HEMATOCRIT % 25.4* 32.3* 29.8* 29.9* 25.7*   PLATELETS 10*3/mm3 166  --   --  180 142     Results from last 7 days   Lab Units 06/14/21  1917   INR  1.64*         Results from last 7 days   Lab Units 06/17/21  2257   MAGNESIUM mg/dL 2.0           I reviewed the patient's new clinical results.  I personally viewed and interpreted the patient's EKG/Telemetry data        Medication Review:   allopurinol, 300 mg, Oral, Daily  atorvastatin, 40 mg, Oral, Daily  buPROPion SR, 150 mg, Oral, Daily With Breakfast  furosemide, 40 mg, Oral, Daily  [START ON 6/19/2021] insulin glargine, 20 Units, Subcutaneous, QAM  insulin lispro, 0-14 Units, Subcutaneous, TID AC  insulin lispro, 8 Units, Subcutaneous, TID With Meals  isosorbide mononitrate, 60 mg, Oral, Daily  [START ON 6/19/2021] losartan, 50 mg, Oral, Q24H  metoprolol succinate XL, 150 mg, Oral, Daily  mirtazapine, 15 mg, Oral, Nightly  montelukast, 10 mg, Oral, Nightly  pantoprazole, 40 mg, Oral, Daily  sodium chloride, 10 mL, Intravenous, Q12H             Assessment/Plan       Symptomatic anemia    Dementia (CMS/HCC)    Gastroesophageal reflux disease    Hyperlipidemia    HTN (hypertension)    Type 2 diabetes mellitus (CMS/HCC)    Atrial fibrillation (CMS/HCC)    Chronic diastolic (congestive) heart failure (CMS/[facility])    History of stroke    GI bleed    Long term current use of anticoagulant therapy    Acute blood loss anemia      1.  Iron deficiency anemia.  Suspected GI bleed.  Scopes today.  2.  Paroxysmal atrial fibrillation.  Remain in sinus.  On metoprolol.  Eliquis on hold since 6/14/2021.  3.  Mild aortic stenosis.  Preserved LV function.  4.  Chronic diastolic CHF.  5.  Hypertension.  BP has a little  high.  Will increase losartan.      Prema Flores, APRN  06/18/21  09:16 EDT

## 2021-06-18 NOTE — PLAN OF CARE
Goal Outcome Evaluation:           Progress: improving  Outcome Summary: pt completed bowel prep for procedure prior to 12 am. pt had a complaint of having a headache. pt given tylenol. pt hgb that was checked during this shift was 8.7 for q8 hr check. pt ambulated with assistance to the bathroom throughout the night. will continue to monitor pt

## 2021-06-18 NOTE — PROGRESS NOTES
[facility] Medicine Services  PROGRESS NOTE    Patient Name: Miri Yung  : 1940  MRN: [id number]    Date of Admission: 2021  Primary Care Physician: Loren Cruz APRN    Subjective   Subjective     CC:  Follow-up anemia    HPI:  No further bleeding.  No other new complaints.    Review of Systems  No current fevers or chills  No current shortness of breath or cough  No current nausea, vomiting, or diarrhea  No current chest pain or palpitations      Objective   Objective     Vital Signs:   Temp:  [97.4 °F (36.3 °C)-98.3 °F (36.8 °C)] 97.6 °F (36.4 °C)  Heart Rate:  [63-75] 75  Resp:  [16-20] 20  BP: ()/(38-70) 102/68        Physical Exam:  Constitutional:Awake, alert  HENT: NCAT, mucous membranes moist, neck supple  Respiratory: Clear to auscultation bilaterally, respiratory effort normal, nonlabored breathing   Cardiovascular: Regular rate and rhythm, normal radial pulses  Gastrointestinal: Positive bowel sounds, soft, nontender, nondistended  Musculoskeletal: Elderly and chronically debilitated appearance, mild lower extremity edema, obese, BMI is 34  Psychiatric: Appropriate affect, cooperative, conversational  Neurologic: No slurred speech or facial droop, follows commands  Skin: No rashes or jaundice, warm      Results Reviewed:  Results from last 7 days   Lab Units 21  0916 21   WBC 10*3/mm3  --  8.16  --  10.11 8.24 6.67   HEMOGLOBIN g/dL 7.8* 7.1* 8.7* 8.1* 7.1* 7.3*   HEMATOCRIT % 28.6* 25.4* 32.3* 29.9* 25.7* 27.2*   PLATELETS 10*3/mm3  --  166  --  180 142 156   INR   --   --   --   --   --  1.64*     Results from last 7 days   Lab Units 2110 2102 21  1518   SODIUM mmol/L 142  --  140 140 136 140   POTASSIUM mmol/L 3.4* 3.8 4.4 3.7 3.9 4.5   CHLORIDE mmol/L 104  --  102 103 98 103   TOTAL CO2 mmol/L  --   --   --   --   --   25   CO2 mmol/L 28.4  --  29.5* 29.9* 28.7  --    BUN mg/dL 11  --  14 10 18 17   CREATININE mg/dL 0.56*  --  0.60 0.49* 0.73 0.66   GLUCOSE mg/dL 101*  --  207* 223* 503*  --    CALCIUM mg/dL 8.4*  --  8.7 8.1* 8.8 8.9   ALT (SGPT) U/L  --   --   --   --  32 31   AST (SGOT) U/L  --   --   --   --  26 38     Estimated Creatinine Clearance: 55.7 mL/min (A) (by C-G formula based on SCr of 0.56 mg/dL (L)).    Microbiology Results Abnormal     Procedure Component Value - Date/Time    COVID PRE-OP / PRE-PROCEDURE SCREENING ORDER (NO ISOLATION) - Swab, Nasopharynx [[id number]]  (Normal) Collected: 06/18/21 1051    Lab Status: Final result Specimen: Swab from Nasopharynx Updated: 06/18/21 1225    Narrative:      The following orders were created for panel order COVID PRE-OP / PRE-PROCEDURE SCREENING ORDER (NO ISOLATION) - Swab, Nasopharynx.  Procedure                               Abnormality         Status                     ---------                               -----------         ------                     COVID-19,[facility] IN-HOUSE...[[id number]]  Normal              Final result                 Please view results for these tests on the individual orders.    COVID-19,[facility] IN-HOU[facility] CEPHEID/RACH NP SWAB IN TRANSPORT MEDIA 8-12 HR TAT - Swab, Nasopharynx [[id number]]  (Normal) Collected: 06/18/21 1051    Lab Status: Final result Specimen: Swab from Nasopharynx Updated: 06/18/21 1225     COVID19 Not Detected    Narrative:      Fact sheet for providers: https://www.fda.gov/media/013817/download     Fact sheet for patients: https://www.fda.gov/media/973822/download    COVID PRE-OP / PRE-PROCEDURE SCREENING ORDER (NO ISOLATION) - Swab, Nasopharynx [[id number]]  (Normal) Collected: 06/14/21 1919    Lab Status: Final result Specimen: Swab from Nasopharynx Updated: 06/14/21 2050    Narrative:      The following orders were created for panel order COVID PRE-OP / PRE-PROCEDURE SCREENING ORDER (NO ISOLATION) - Swab,  Nasopharynx.  Procedure                               Abnormality         Status                     ---------                               -----------         ------                     COVID-19,[facility]-HOU[facility]...[[id number]]  Normal              Final result                 Please view results for these tests on the individual orders.    COVID-19,[facility]-HOU[facility] CEPHEID/RACH NP SWAB IN TRANSPORT MEDIA 8-12 HR TAT - Swab, Nasopharynx [[id number]]  (Normal) Collected: 06/14/21 1919    Lab Status: Final result Specimen: Swab from Nasopharynx Updated: 06/14/21 2050     COVID19 Not Detected    Narrative:      Fact sheet for providers: https://www.fda.gov/media/291631/download     Fact sheet for patients: https://www.fda.gov/media/404418/download          Imaging Results (Last 24 Hours)     ** No results found for the last 24 hours. **          Results for orders placed during the hospital encounter of 04/12/21    Adult Transthoracic Echo Complete W/ Cont if Necessary Per Protocol    Interpretation Summary  · Left ventricular ejection fraction appears to be 66 - 70%. Normal left ventricular cavity size noted. Left ventricular wall thickness is consistent with mild to moderate concentric hypertrophy. All left ventricular wall segments contract normally. Left ventricular diastolic function is consistent with (grade II w/high LAP) pseudonormalization. No evidence of left ventricular thrombus or mass present.  · The left atrial cavity is severely dilated.  · There is mild calcification of the aortic valve. Mild aortic valve regurgitation is present. Mild to moderate aortic valve stenosis is present. Peak velocity of the flow distal to the aortic valve is 274.7 cm/s. Aortic valve maximum pressure gradient is 30.2 mmHg. Aortic valve mean pressure gradient is 16.8 mmHg. Aortic valve area is 1.4 cm2.  · There are 2 echointense structures with minimal mobility in the proximal aorta. The appearance seems most consistent with  concentric calcification of the sinotubular junction however cannot exclude intimal flap. No clear evidence for aortic dissection.  · Calculated right ventricular systolic pressure from tricuspid regurgitation is 47 mmHg.      I have reviewed the medications:  Scheduled Meds:allopurinol, 300 mg, Oral, Daily  atorvastatin, 40 mg, Oral, Daily  buPROPion SR, 150 mg, Oral, Daily With Breakfast  furosemide, 40 mg, Oral, Daily  [START ON 6/19/2021] insulin glargine, 20 Units, Subcutaneous, QAM  insulin lispro, 0-14 Units, Subcutaneous, TID AC  insulin lispro, 8 Units, Subcutaneous, TID With Meals  isosorbide mononitrate, 60 mg, Oral, Daily  [START ON 6/19/2021] losartan, 50 mg, Oral, Q24H  metoprolol succinate XL, 150 mg, Oral, Daily  mirtazapine, 15 mg, Oral, Nightly  montelukast, 10 mg, Oral, Nightly  pantoprazole, 40 mg, Oral, Daily  sodium chloride, 10 mL, Intravenous, Q12H      Continuous Infusions:sodium chloride, 30 mL/hr, Last Rate: 30 mL/hr (06/18/21 1342)      PRN Meds:.•  acetaminophen **OR** acetaminophen **OR** acetaminophen  •  dextrose  •  dextrose  •  glucagon (human recombinant)  •  magnesium sulfate **OR** magnesium sulfate **OR** magnesium sulfate  •  metoprolol tartrate  •  nitroglycerin  •  ondansetron  •  potassium chloride  •  potassium chloride  •  sodium chloride  •  sodium chloride    Assessment/Plan   Assessment & Plan     Active Hospital Problems    Diagnosis  POA   • **Symptomatic anemia [D64.9]  Yes   • GI bleed [K92.2]  Yes   • Long term current use of anticoagulant therapy [Z79.01]  Not Applicable   • Acute blood loss anemia [D62]  Yes   • History of stroke [Z86.73]  Not Applicable   • Chronic diastolic (congestive) heart failure (CMS/HCC) [I50.32]  Yes   • Atrial fibrillation (CMS/HCC) [I48.91]  Yes   • Type 2 diabetes mellitus (CMS/HCC) [E11.9]  Yes   • Hyperlipidemia [E78.5]  Yes   • HTN (hypertension) [I10]  Yes   • Gastroesophageal reflux disease [K21.9]  Yes   • Dementia (CMS/HCC)  [F03.90]  Yes      Resolved Hospital Problems   No resolved problems to display.        Brief Hospital Course to date:  Miri Yung is a 80 y.o. female on long-term anticoagulation who presents with adverse effect of anticoagulation causing GI bleeding and symptomatic anemia.    Discussion/plan for today:  EGD showed grade 1 varices.  Colonoscopy showed nonbleeding hemorrhoids.  Continue PPI.   Plan to restart anticoagulation today and monitor patient's response.  Plan to discuss further with gastroenterology team.  Possible discharge soon once cleared by their service.    Cardiology increased metoprolol succinate remittent episodes of rapid atrial fibrillation.  Currently sinus rhythm this morning.  Return to home Lasix dosing which was twice daily.    Glucose reviewed and hypoglycemia noted.  Her insulin further adjusted today.  Monitor glucose and adjust more as needed.    Continue home cardiac medications.  PT OT for debility.     Potassium improved.  Magnesium 2.1.    DVT Prophylaxis: Mechanical    Disposition: Pending clinical course likely home with family    CODE STATUS:   Code Status and Medical Interventions:   Ordered at: 06/14/21 0257     Code Status:    CPR     Medical Interventions (Level of Support Prior to Arrest):    Full       Isacc Westbrook MD  06/18/21

## 2021-06-18 NOTE — CONSULTS
"Adult Nutrition  Assessment/PES    Patient Name:  Miri Yung  YOB: 1940  MRN: [id number]  Admit Date:  6/14/2021    Assessment Date:  6/18/2021    Comments:  Consult for diet education.  New diagnosis DM2.  Anemia.  Plans for colonoscopy today.  Currently on Clear Liquid Diet.    Spoke with son at bedside regarding diabetic diet.  Discussed consistent carbohydrate diet/carb counting.  Discussed foods that contain carbs, consistent amounts of carbs at each meal, etc.  Gave handouts for home use.    Son reports he lives next door and does all of patient's grocery shopping.    RD to continue to follow as needed.    Reason for Assessment     Row Name 06/18/21 1208          Reason for Assessment    Reason For Assessment  nurse/nurse practitioner consult     Diagnosis  cardiac disease;diabetes diagnosis/complications;gastrointestinal disease;neurologic conditions;psychosocial;infection/sepsis;other (see comments);hematological/related complications HLD, HTN, DM2, CHF, GERD, Afib, CVA, dementia, anxiety, depression, endocardititis, gout, lung mass, MV & AV disease, OA, TR; adm with anemia     Identified At Risk by Screening Criteria  need for education         Nutrition/Diet History     Row Name 06/18/21 1209          Nutrition/Diet History    Typical Food/Fluid Intake  CLD today d/t colonoscopy         Anthropometrics     Row Name 06/18/21 1210          Anthropometrics    Height  157.5 cm (62.01\")        Admit Weight    Admit Weight  -- 187# 6/14        Ideal Body Weight (IBW)    Ideal Body Weight (IBW) (kg)  50.45        Body Mass Index (BMI)    BMI Assessment  BMI 30-34.9: obesity grade I 34.12         Labs/Tests/Procedures/Meds     Row Name 06/18/21 1210          Labs/Procedures/Meds    Lab Results Reviewed  reviewed, pertinent     Lab Results Comments  Gluc, K, Creat, Ca, Hgb, Hct        Diagnostic Tests/Procedures    Diagnostic Test/Procedure Reviewed  reviewed, pertinent     Diagnostic " "Test/Procedures Comments  colonoscopy today        Medications    Pertinent Medications Reviewed  reviewed, pertinent     Pertinent Medications Comments  lasix, lantus, admelog, remeron, protonix         Physical Findings     Row Name 06/18/21 1211          Physical Findings    Overall Physical Appearance  obese;edematous;on oxygen therapy     Skin  other (see comments);edema B=20, excoriation, bruised; mild/moderate edema         Estimated/Assessed Needs     Row Name 06/18/21 1210          Calculation Measurements    Height  157.5 cm (62.01\")         Nutrition Prescription Ordered     Row Name 06/18/21 1212          Nutrition Prescription PO    Current PO Diet  Clear Liquid         Evaluation of Received Nutrient/Fluid Intake     Row Name 06/18/21 1212          PO Evaluation    Number of Meals  1     % PO Intake  50 (prior to CLD)         Problem/Interventions:  Problem 1     Row Name 06/18/21 1213          Nutrition Diagnoses Problem 1    Problem 1  Knowledge Deficit     Etiology (related to)  Medical Diagnosis     Endocrine  DM2     Signs/Symptoms (evidenced by)  Reported  Information Deficit         Intervention Goal     Row Name 06/18/21 1213          Intervention Goal    General  Maintain nutrition;Disease management/therapy;Meet nutritional needs for age/condition     PO  Tolerate PO;Advance diet;PO intake (%)     PO Intake %  75 %     Weight  Appropriate weight loss         Nutrition Intervention     Row Name 06/18/21 1214          Nutrition Intervention    RD/Tech Action  Follow Tx progress;Care plan reviewd         Education/Evaluation     Row Name 06/18/21 1214          Education    Education  Provided education regarding     Provided education regarding  Healthy eating for diabetes     Education Topics  Diabetes;CHO counting        Monitor/Evaluation    Monitor  Per protocol;I&O;Pertinent labs;Weight;Skin status;Symptoms           Electronically signed by:  Lulu Cloud RD  06/18/21 12:15 EDT  "

## 2021-06-19 ENCOUNTER — READMISSION MANAGEMENT (OUTPATIENT)
Dept: CALL CENTER | Facility: HOSPITAL | Age: 81
End: 2021-06-19

## 2021-06-19 VITALS
BODY MASS INDEX: 34.41 KG/M2 | DIASTOLIC BLOOD PRESSURE: 49 MMHG | WEIGHT: 187 LBS | HEART RATE: 64 BPM | TEMPERATURE: 98.2 F | HEIGHT: 62 IN | OXYGEN SATURATION: 95 % | RESPIRATION RATE: 14 BRPM | SYSTOLIC BLOOD PRESSURE: 104 MMHG

## 2021-06-19 PROBLEM — K92.2 GI BLEED: Status: RESOLVED | Noted: 2021-06-15 | Resolved: 2021-06-19

## 2021-06-19 LAB
ANION GAP SERPL CALCULATED.3IONS-SCNC: 10.3 MMOL/L (ref 5–15)
BUN SERPL-MCNC: 9 MG/DL (ref 8–23)
BUN/CREAT SERPL: 16.1 (ref 7–25)
CALCIUM SPEC-SCNC: 8.2 MG/DL (ref 8.6–10.5)
CHLORIDE SERPL-SCNC: 107 MMOL/L (ref 98–107)
CO2 SERPL-SCNC: 25.7 MMOL/L (ref 22–29)
CREAT SERPL-MCNC: 0.56 MG/DL (ref 0.57–1)
DEPRECATED RDW RBC AUTO: 47.8 FL (ref 37–54)
ERYTHROCYTE [DISTWIDTH] IN BLOOD BY AUTOMATED COUNT: 19.9 % (ref 12.3–15.4)
GFR SERPL CREATININE-BSD FRML MDRD: 104 ML/MIN/1.73
GLUCOSE BLDC GLUCOMTR-MCNC: 147 MG/DL (ref 70–130)
GLUCOSE BLDC GLUCOMTR-MCNC: 214 MG/DL (ref 70–130)
GLUCOSE BLDC GLUCOMTR-MCNC: 315 MG/DL (ref 70–130)
GLUCOSE SERPL-MCNC: 142 MG/DL (ref 65–99)
HAV IGM SERPL QL IA: NORMAL
HBV CORE IGM SERPL QL IA: NORMAL
HBV SURFACE AG SERPL QL IA: NORMAL
HCT VFR BLD AUTO: 27.2 % (ref 34–46.6)
HCT VFR BLD AUTO: 27.2 % (ref 34–46.6)
HCT VFR BLD AUTO: 28.2 % (ref 34–46.6)
HCT VFR BLD AUTO: 28.2 % (ref 34–46.6)
HCV AB SER DONR QL: NORMAL
HGB BLD-MCNC: 7.5 G/DL (ref 12–15.9)
HGB BLD-MCNC: 7.5 G/DL (ref 12–15.9)
HGB BLD-MCNC: 7.6 G/DL (ref 12–15.9)
HGB BLD-MCNC: 7.6 G/DL (ref 12–15.9)
MCH RBC QN AUTO: 19.9 PG (ref 26.6–33)
MCHC RBC AUTO-ENTMCNC: 27.6 G/DL (ref 31.5–35.7)
MCV RBC AUTO: 72.3 FL (ref 79–97)
PLATELET # BLD AUTO: 192 10*3/MM3 (ref 140–450)
PMV BLD AUTO: 11.4 FL (ref 6–12)
POTASSIUM SERPL-SCNC: 3.6 MMOL/L (ref 3.5–5.2)
RBC # BLD AUTO: 3.76 10*6/MM3 (ref 3.77–5.28)
SODIUM SERPL-SCNC: 143 MMOL/L (ref 136–145)
WBC # BLD AUTO: 7.34 10*3/MM3 (ref 3.4–10.8)

## 2021-06-19 PROCEDURE — 99232 SBSQ HOSP IP/OBS MODERATE 35: CPT | Performed by: INTERNAL MEDICINE

## 2021-06-19 PROCEDURE — 82962 GLUCOSE BLOOD TEST: CPT

## 2021-06-19 PROCEDURE — 63710000001 INSULIN LISPRO (HUMAN) PER 5 UNITS: Performed by: INTERNAL MEDICINE

## 2021-06-19 PROCEDURE — 80048 BASIC METABOLIC PNL TOTAL CA: CPT | Performed by: INTERNAL MEDICINE

## 2021-06-19 PROCEDURE — 85027 COMPLETE CBC AUTOMATED: CPT | Performed by: INTERNAL MEDICINE

## 2021-06-19 PROCEDURE — 63710000001 INSULIN GLARGINE PER 5 UNITS: Performed by: INTERNAL MEDICINE

## 2021-06-19 PROCEDURE — 85014 HEMATOCRIT: CPT | Performed by: INTERNAL MEDICINE

## 2021-06-19 PROCEDURE — 80074 ACUTE HEPATITIS PANEL: CPT | Performed by: INTERNAL MEDICINE

## 2021-06-19 PROCEDURE — 25010000002 IRON SUCROSE PER 1 MG: Performed by: INTERNAL MEDICINE

## 2021-06-19 PROCEDURE — 83516 IMMUNOASSAY NONANTIBODY: CPT | Performed by: INTERNAL MEDICINE

## 2021-06-19 PROCEDURE — 85018 HEMOGLOBIN: CPT | Performed by: INTERNAL MEDICINE

## 2021-06-19 RX ORDER — ACETAMINOPHEN 325 MG/1
650 TABLET ORAL ONCE
Status: COMPLETED | OUTPATIENT
Start: 2021-06-19 | End: 2021-06-19

## 2021-06-19 RX ORDER — FERROUS SULFATE 325(65) MG
325 TABLET ORAL
Qty: 90 TABLET | Refills: 0 | Status: SHIPPED | OUTPATIENT
Start: 2021-06-20 | End: 2021-07-15 | Stop reason: SDUPTHER

## 2021-06-19 RX ORDER — FERROUS SULFATE 325(65) MG
325 TABLET ORAL
Status: DISCONTINUED | OUTPATIENT
Start: 2021-06-20 | End: 2021-06-19 | Stop reason: HOSPADM

## 2021-06-19 RX ORDER — GLIPIZIDE 5 MG/1
5 TABLET ORAL
Qty: 60 TABLET | Refills: 0 | Status: SHIPPED | OUTPATIENT
Start: 2021-06-19 | End: 2021-07-15

## 2021-06-19 RX ORDER — METOPROLOL SUCCINATE 50 MG/1
150 TABLET, EXTENDED RELEASE ORAL DAILY
Qty: 90 TABLET | Refills: 0 | Status: SHIPPED | OUTPATIENT
Start: 2021-06-19 | End: 2022-01-01 | Stop reason: DRUGHIGH

## 2021-06-19 RX ADMIN — INSULIN LISPRO 8 UNITS: 100 INJECTION, SOLUTION INTRAVENOUS; SUBCUTANEOUS at 16:48

## 2021-06-19 RX ADMIN — FUROSEMIDE 40 MG: 40 TABLET ORAL at 16:59

## 2021-06-19 RX ADMIN — ISOSORBIDE MONONITRATE 60 MG: 60 TABLET ORAL at 08:43

## 2021-06-19 RX ADMIN — ALLOPURINOL 300 MG: 300 TABLET ORAL at 08:43

## 2021-06-19 RX ADMIN — FUROSEMIDE 40 MG: 40 TABLET ORAL at 08:43

## 2021-06-19 RX ADMIN — METOPROLOL SUCCINATE 150 MG: 100 TABLET, EXTENDED RELEASE ORAL at 08:43

## 2021-06-19 RX ADMIN — IRON SUCROSE 100 MG: 20 INJECTION, SOLUTION INTRAVENOUS at 16:58

## 2021-06-19 RX ADMIN — LOSARTAN POTASSIUM 50 MG: 50 TABLET, FILM COATED ORAL at 08:43

## 2021-06-19 RX ADMIN — APIXABAN 5 MG: 5 TABLET, FILM COATED ORAL at 08:43

## 2021-06-19 RX ADMIN — SODIUM CHLORIDE, PRESERVATIVE FREE 10 ML: 5 INJECTION INTRAVENOUS at 08:48

## 2021-06-19 RX ADMIN — BUPROPION HYDROCHLORIDE 150 MG: 150 TABLET, EXTENDED RELEASE ORAL at 08:43

## 2021-06-19 RX ADMIN — ACETAMINOPHEN 650 MG: 325 TABLET, FILM COATED ORAL at 16:58

## 2021-06-19 RX ADMIN — INSULIN LISPRO 8 UNITS: 100 INJECTION, SOLUTION INTRAVENOUS; SUBCUTANEOUS at 08:43

## 2021-06-19 RX ADMIN — ATORVASTATIN CALCIUM 40 MG: 20 TABLET, FILM COATED ORAL at 08:43

## 2021-06-19 RX ADMIN — INSULIN LISPRO 10 UNITS: 100 INJECTION, SOLUTION INTRAVENOUS; SUBCUTANEOUS at 16:59

## 2021-06-19 RX ADMIN — PANTOPRAZOLE SODIUM 40 MG: 40 TABLET, DELAYED RELEASE ORAL at 08:43

## 2021-06-19 RX ADMIN — INSULIN GLARGINE 20 UNITS: 100 INJECTION, SOLUTION SUBCUTANEOUS at 08:43

## 2021-06-19 NOTE — DISCHARGE SUMMARY
[facility] Medicine Services  DISCHARGE SUMMARY    Patient Name: Miri Yung  : 1940  MRN: [id number]    Date of Admission: 2021  5:52 PM  Date of Discharge: 2021  Primary Care Physician: Loren Cruz APRN    Consults     Date and Time Order Name Status Description    6/15/2021  6:52 AM Inpatient Cardiology Consult Completed     2021 11:52 PM Inpatient Gastroenterology Consult Completed     2021  8:31 PM A (on-call MD unless specified) Details Completed           Hospital Course     Presenting Problem:   Anticoagulated by anticoagulation treatment [Z79.01]  Symptomatic anemia [D64.9]  Uncontrolled type 2 diabetes mellitus with hyperglycemia (CMS/HCC) [E11.65]    Active Hospital Problems    Diagnosis  POA   • **Symptomatic anemia [D64.9]  Yes   • Cirrhosis of liver (CMS/HCC) [K74.60]  Yes   • Esophageal varices (CMS/HCC) [I85.00]  Yes   • Splenomegaly due to portal hypertension and liver disease [R16.1]  Yes   • Long term current use of anticoagulant therapy [Z79.01]  Not Applicable   • Acute blood loss anemia [D62]  Yes   • History of stroke [Z86.73]  Not Applicable   • Chronic diastolic (congestive) heart failure (CMS/HCC) [I50.32]  Yes   • Atrial fibrillation (CMS/HCC) [I48.91]  Yes   • Type 2 diabetes mellitus (CMS/HCC) [E11.9]  Yes   • Hyperlipidemia [E78.5]  Yes   • HTN (hypertension) [I10]  Yes   • Gastroesophageal reflux disease [K21.9]  Yes   • Dementia (CMS/HCC) [F03.90]  Yes      Resolved Hospital Problems    Diagnosis Date Resolved POA   • GI bleed [K92.2] 2021 Yes          Hospital Course:  Miri Yung is a 80 y.o. female on long-term anticoagulation who presents with adverse effect of anticoagulation causing GI bleeding and symptomatic anemia.    EGD showed grade 1 varices.  Colonoscopy showed nonbleeding hemorrhoids.  Continue PPI.     Patient had no further bleeding and was restarted on anticoagulation with hemoglobin remaining stable.  Studies  did show iron deficiency and patient will receive IV iron and start oral iron supplementation.    GI team is setting up outpatient follow-up and is sending off some further liver tests prior to discharge that they will follow-up with her in clinic once we have available results.  Patient agrees with plan for close GI follow-up.  She agrees to return to the hospital if she has any further sign of bleeding.    Patient had episodes of rapid atrial fibrillation.  Cardiology increased her metoprolol succinate to 150 mg.  She has returned to her home Lasix dosing which is twice daily and is doing well on this.    Patient has severely uncontrolled diabetes.  A1c is severely elevated.  Patient is not interested in starting insulin therapy at this time and agrees to follow-up closely with primary care provider to continue titration of diabetic regimen.  Plan to add Januvia and glipizide to oral diabetic medications.  I discussed signs symptoms and appropriate treatment for hypoglycemia and patient is agreement with the plan.  Patient reports she has glucometer at home and knows how to monitor her blood sugar.    At the time of discharge take all medications as prescribed, keep all follow-up appointments, and call your doctor or return to the hospital if you have any worsening or concerning symptoms.    Please note that this note was made using Dragon voice recognition software            Day of Discharge     HPI:   Patient feels well.  Wants to discharge home today.  Denies any sign of bleeding.  Not interested in insulin at discharge.    ROS:  No current fevers or chills  No current shortness of breath or cough  No current nausea, vomiting, or diarrhea  No current chest pain or palpitations    Vital Signs:   Temp:  [97.6 °F (36.4 °C)-98.3 °F (36.8 °C)] 98.3 °F (36.8 °C)  Heart Rate:  [67-79] 79  Resp:  [16-20] 18  BP: ()/(38-69) 152/65     Physical Exam:  Constitutional:Awake, alert  HENT: NCAT, mucous membranes  moist, neck supple  Respiratory: Clear to auscultation bilaterally, respiratory effort normal, nonlabored breathing   Cardiovascular: Regular rate and rhythm, normal radial pulses  Gastrointestinal: Positive bowel sounds, soft, nontender, nondistended  Musculoskeletal: Elderly and chronically debilitated appearance, mild lower extremity edema, obese, BMI is 34  Psychiatric: Appropriate affect, cooperative, conversational  Neurologic: No slurred speech or facial droop, follows commands  Skin: No rashes or jaundice, warm    Pertinent  and/or Most Recent Results     Results from last 7 days   Lab Units 06/19/21  0756 06/18/21  2343 06/18/21  1454 06/18/21  0916 06/18/21  0618 06/18/21  0518 06/17/21  2257 06/17/21  1619 06/17/21  0810 06/16/21  0602 06/14/21  1917   WBC 10*3/mm3 7.34  --   --   --   --  8.16  --   --  10.11 8.24 6.67   HEMOGLOBIN g/dL 7.5*  7.5* 7.6* 7.5* 7.8*  --  7.1* 8.7* 7.8* 8.1* 7.1* 7.3*   HEMATOCRIT % 27.2*  27.2* 28.2* 27.4* 28.6*  --  25.4* 32.3* 29.8* 29.9* 25.7* 27.2*   PLATELETS 10*3/mm3 192  --   --   --   --  166  --   --  180 142 156   SODIUM mmol/L 143  --   --   --  142  --   --   --  140 140 136   POTASSIUM mmol/L 3.6  --   --   --  3.4*  --  3.8  --  4.4 3.7 3.9   CHLORIDE mmol/L 107  --   --   --  104  --   --   --  102 103 98   CO2 mmol/L 25.7  --   --   --  28.4  --   --   --  29.5* 29.9* 28.7   BUN mg/dL 9  --   --   --  11  --   --   --  14 10 18   CREATININE mg/dL 0.56*  --   --   --  0.56*  --   --   --  0.60 0.49* 0.73   GLUCOSE mg/dL 142*  --   --   --  101*  --   --   --  207* 223* 503*   CALCIUM mg/dL 8.2*  --   --   --  8.4*  --   --   --  8.7 8.1* 8.8     Results from last 7 days   Lab Units 06/14/21  1917   BILIRUBIN mg/dL 0.5   ALK PHOS U/L 140*   ALT (SGPT) U/L 32   AST (SGOT) U/L 26   PROTIME Seconds 19.2*   INR  1.64*           Invalid input(s): TG, LDLCALC, LDLREALC  Results from last 7 days   Lab Units 06/15/21  0259   HEMOGLOBIN A1C % 11.18*       Brief Urine  Lab Results  (Last result in the past 365 days)      Color   Clarity   Blood   Leuk Est   Nitrite   Protein   CREAT   Urine HCG        01/15/21 1432 Yellow Clear Negative Negative Negative Negative               Microbiology Results Abnormal     Procedure Component Value - Date/Time    COVID PRE-OP / PRE-PROCEDURE SCREENING ORDER (NO ISOLATION) - Swab, Nasopharynx [[id number]]  (Normal) Collected: 06/18/21 1051    Lab Status: Final result Specimen: Swab from Nasopharynx Updated: 06/18/21 1225    Narrative:      The following orders were created for panel order COVID PRE-OP / PRE-PROCEDURE SCREENING ORDER (NO ISOLATION) - Swab, Nasopharynx.  Procedure                               Abnormality         Status                     ---------                               -----------         ------                     COVID-19,[facility] IN-HOUSE...[[id number]]  Normal              Final result                 Please view results for these tests on the individual orders.    COVID-19,[facility] IN-HOU[facility] CEPHEID/RACH NP SWAB IN TRANSPORT MEDIA 8-12 HR TAT - Swab, Nasopharynx [[id number]]  (Normal) Collected: 06/18/21 1051    Lab Status: Final result Specimen: Swab from Nasopharynx Updated: 06/18/21 1225     COVID19 Not Detected    Narrative:      Fact sheet for providers: https://www.fda.gov/media/845857/download     Fact sheet for patients: https://www.fda.gov/media/211692/download    COVID PRE-OP / PRE-PROCEDURE SCREENING ORDER (NO ISOLATION) - Swab, Nasopharynx [[id number]]  (Normal) Collected: 06/14/21 1919    Lab Status: Final result Specimen: Swab from Nasopharynx Updated: 06/14/21 2050    Narrative:      The following orders were created for panel order COVID PRE-OP / PRE-PROCEDURE SCREENING ORDER (NO ISOLATION) - Swab, Nasopharynx.  Procedure                               Abnormality         Status                     ---------                               -----------         ------                     COVID-19,[facility]  IN-HOUSE...[[id number]]  Normal              Final result                 Please view results for these tests on the individual orders.    COVID-19,[facility] CEPHEID/RACH NP SWAB IN TRANSPORT MEDIA 8-12 HR TAT - Swab, Nasopharynx [[id number]]  (Normal) Collected: 06/14/21 1919    Lab Status: Final result Specimen: Swab from Nasopharynx Updated: 06/14/21 2050     COVID19 Not Detected    Narrative:      Fact sheet for providers: https://www.fda.gov/media/113986/download     Fact sheet for patients: https://www.fda.gov/media/112730/download          Imaging Results (All)     Procedure Component Value Units Date/Time    CT Abdomen Pelvis With Contrast [[id number]] Collected: 06/15/21 1416     Updated: 06/15/21 1430    Narrative:      CT ABDOMEN PELVIS W CONTRAST-     CLINICAL HISTORY: Abdominal pain. Recent CTA of the chest showing  findings suspicious for cirrhosis.     TECHNIQUE: Spiral CT images were obtained through the abdomen and pelvis  with oral and IV contrast and were reconstructed in 3 mm thick slices.     Radiation dose reduction techniques were utilized, including automated  exposure control and exposure modulation based on body size.     COMPARISON: CTA of the chest dated 05/07/2021. CT scanning of the  abdomen and pelvis dated 06/18/2014.     FINDINGS: The liver has lobulated margins consistent with cirrhosis.  This is new since the preceding CT scan of the abdomen. There are no  focal hepatic lesions. Mild splenomegaly is also new. The spleen  measures 15.2 cm in greatest transverse diameter. There is evidence of  portal hypertension with recanalization of the umbilical vein and small  esophageal varices. The pancreas and adrenal glands appear within normal  limits. A few small left renal cysts are present. The kidneys are  otherwise unremarkable. The stomach and small and large bowel are  unremarkable. There is no bowel distention. There is a moderate-sized  periumbilical ventral midline abdominal  wall hernia that contains  omental fat and multiple serpiginous mildly dilated mesenteric vessels.  This is new since the preceding CT scan in 2014. The uterus and ovaries  are unremarkable. Images through the lung bases demonstrate small  bilateral posteriorly layering pleural effusions. There is no ascites.       Impression:      Cirrhotic liver morphology with evidence of portal  hypertension and also mild splenomegaly. These findings are new since  the preceding CT scan in 2014. A moderate-sized periumbilical hernia  containing omental tissue and vessels is also new. There are small  bilateral posteriorly layering pleural effusions.     This report was finalized on 6/15/2021 2:26 PM by Dr. Vincent Henriquez M.D.                     Results for orders placed during the hospital encounter of 04/12/21    Adult Transthoracic Echo Complete W/ Cont if Necessary Per Protocol    Interpretation Summary  · Left ventricular ejection fraction appears to be 66 - 70%. Normal left ventricular cavity size noted. Left ventricular wall thickness is consistent with mild to moderate concentric hypertrophy. All left ventricular wall segments contract normally. Left ventricular diastolic function is consistent with (grade II w/high LAP) pseudonormalization. No evidence of left ventricular thrombus or mass present.  · The left atrial cavity is severely dilated.  · There is mild calcification of the aortic valve. Mild aortic valve regurgitation is present. Mild to moderate aortic valve stenosis is present. Peak velocity of the flow distal to the aortic valve is 274.7 cm/s. Aortic valve maximum pressure gradient is 30.2 mmHg. Aortic valve mean pressure gradient is 16.8 mmHg. Aortic valve area is 1.4 cm2.  · There are 2 echointense structures with minimal mobility in the proximal aorta. The appearance seems most consistent with concentric calcification of the sinotubular junction however cannot exclude intimal flap. No clear evidence for  aortic dissection.  · Calculated right ventricular systolic pressure from tricuspid regurgitation is 47 mmHg.      Plan for Follow-up of Pending Labs/Results: These labs will be followed in GI clinic follow-up  Pending Labs     Order Current Status    Anti-Smooth Muscle Antibody Titer In process    Hepatitis Panel, Acute In process    Mitochondrial Antibodies, M2 In process        Discharge Details        Discharge Medications      New Medications      Instructions Start Date   ferrous sulfate 325 (65 FE) MG tablet   325 mg, Oral, Daily With Breakfast   Start Date: June 20, 2021     glipizide 5 MG tablet  Commonly known as: GLUCOTROL   5 mg, Oral, 2 Times Daily Before Meals      Januvia 100 MG tablet  Generic drug: SITagliptin   100 mg, Oral, Daily         Changes to Medications      Instructions Start Date   metoprolol succinate XL 50 MG 24 hr tablet  Commonly known as: TOPROL-XL  What changed:   · medication strength  · how much to take   150 mg, Oral, Daily         Continue These Medications      Instructions Start Date   acetaminophen 500 MG tablet  Commonly known as: TYLENOL   500 mg, Oral, Every 4 Hours PRN      allopurinol 300 MG tablet  Commonly known as: ZYLOPRIM   TAKE 1 TABLET BY MOUTH  DAILY      apixaban 5 MG tablet tablet  Commonly known as: Eliquis   5 mg, Oral, Every 12 Hours      atorvastatin 40 MG tablet  Commonly known as: LIPITOR   40 mg, Oral, Daily      buPROPion  MG 12 hr tablet  Commonly known as: WELLBUTRIN SR   150 mg, Oral, Daily With Breakfast      furosemide 40 MG tablet  Commonly known as: LASIX   40 mg, Oral, 2 Times Daily      isosorbide mononitrate 60 MG 24 hr tablet  Commonly known as: IMDUR   TAKE 1 TABLET BY MOUTH  DAILY      losartan 25 MG tablet  Commonly known as: COZAAR   TAKE 1 TABLET BY MOUTH IN  THE MORNING AND 2 TABLETS  BY MOUTH IN THE EVENING      metFORMIN  MG 24 hr tablet  Commonly known as: GLUCOPHAGE-XR   1,000 mg, Oral, Daily With Breakfast       mirtazapine 15 MG tablet  Commonly known as: REMERON   15 mg, Oral, Nightly      montelukast 10 MG tablet  Commonly known as: SINGULAIR   10 mg, Oral, Nightly      pantoprazole 40 MG EC tablet  Commonly known as: PROTONIX   40 mg, Oral, Daily      potassium chloride 10 MEQ CR tablet   TAKE 1 TABLET BY MOUTH IN  THE MORNING             Allergies   Allergen Reactions   • Decongestant  [Pseudoephedrine]          Discharge Disposition:  Home or Self Care    Diet:  Hospital:  Diet Order   Procedures   • Diet Regular       Activity:  Activity Instructions     Activity as Tolerated                 CODE STATUS:    Code Status and Medical Interventions:   Ordered at: 06/14/21 2225     Code Status:    CPR     Medical Interventions (Level of Support Prior to Arrest):    Full         Additional Instructions for the Follow-ups that You Need to Schedule     Discharge Follow-up with PCP   As directed       Currently Documented PCP:    Loren Cruz APRN    PCP Phone Number:    [phone]     Follow Up Details: Primary care follow-up in 1 week for general hospital follow-up, recommend CBC lab study, continue diabetes management         Discharge Follow-up with Specified Provider: Cardiology clinic follow-up.; 1 Month   As directed      To: Cardiology clinic follow-up.    Follow Up: 1 Month         Discharge Follow-up with Specified Provider: Recommend follow-up in GI clinic within 2 to 4 weeks for continued management for GI bleeding and newly diagnosed liver disease   As directed      To: Recommend follow-up in GI clinic within 2 to 4 weeks for continued management for GI bleeding and newly diagnosed liver disease                     Isacc Westbrook MD  06/19/21      Time Spent on Discharge:  I spent  45  minutes on this discharge activity which included: face-to-face encounter with the patient, reviewing the data in the system, coordination of the care with the nursing staff as well as consultants,  documentation, and entering orders.

## 2021-06-19 NOTE — PLAN OF CARE
Goal Outcome Evaluation:  Plan of Care Reviewed With: patient        Progress: improving  Outcome Summary: Patient has rested quietly tonight, vss assisted to BSC frequently tonight, denies any pain are discomfort, gait is more steady but patient still requires assistance when up, will continue to monitor closely.  Problem: Adult Inpatient Plan of Care  Goal: Absence of Hospital-Acquired Illness or Injury  Intervention: Identify and Manage Fall Risk  Recent Flowsheet Documentation  Taken 6/19/2021 0400 by Ayleen King RN  Safety Promotion/Fall Prevention:   activity supervised   safety round/check completed  Taken 6/19/2021 0200 by Ayleen King RN  Safety Promotion/Fall Prevention:   activity supervised   safety round/check completed  Taken 6/19/2021 0010 by Ayleen King RN  Safety Promotion/Fall Prevention:   activity supervised   safety round/check completed  Taken 6/18/2021 2013 by Ayleen King, RN  Safety Promotion/Fall Prevention:   activity supervised   safety round/check completed

## 2021-06-19 NOTE — PROGRESS NOTES
[facility]  Inpatient Progress Note    Reason for Follow Up: Anemia, rectal bleeding    Subjective     Interval History:   Bidirectional endoscopy yesterday showed small esophageal varices, hiatal hernia, internal hemorrhoids.  Hemoglobin remains low but stable.  No complaints today.  Son with her today.  Pleasantly confused.    Current Facility-Administered Medications:   •  acetaminophen (TYLENOL) tablet 650 mg, 650 mg, Oral, Q4H PRN, 650 mg at 06/18/21 0329 **OR** acetaminophen (TYLENOL) 160 MG/5ML solution 650 mg, 650 mg, Oral, Q4H PRN **OR** acetaminophen (TYLENOL) suppository 650 mg, 650 mg, Rectal, Q4H PRN, Jeremy Darden MD  •  allopurinol (ZYLOPRIM) tablet 300 mg, 300 mg, Oral, Daily, Jeremy Darden MD, 300 mg at 06/19/21 0843  •  apixaban (ELIQUIS) tablet 5 mg, 5 mg, Oral, Q12H, Isacc Westbrook MD, 5 mg at 06/19/21 0843  •  atorvastatin (LIPITOR) tablet 40 mg, 40 mg, Oral, Daily, Jeremy Darden MD, 40 mg at 06/19/21 0843  •  buPROPion SR (WELLBUTRIN SR) 12 hr tablet 150 mg, 150 mg, Oral, Daily With Breakfast, Jeremy Darden MD, 150 mg at 06/19/21 0843  •  dextrose (D50W) 25 g/ 50mL Intravenous Solution 25 g, 25 g, Intravenous, Q15 Min PRN, Jeremy Darden MD  •  dextrose (GLUTOSE) oral gel 15 g, 15 g, Oral, Q15 Min PRN, Jeremy Darden MD  •  furosemide (LASIX) tablet 40 mg, 40 mg, Oral, BID, Isacc Westbrook MD, 40 mg at 06/19/21 0843  •  glucagon (human recombinant) (GLUCAGEN DIAGNOSTIC) injection 1 mg, 1 mg, Subcutaneous, PRN, Jeremy Darden MD  •  insulin glargine (LANTUS, SEMGLEE) injection 20 Units, 20 Units, Subcutaneous, QAM, Jeremy Darden MD, 20 Units at 06/19/21 0843  •  insulin lispro (ADMELOG) injection 0-14 Units, 0-14 Units, Subcutaneous, TID AC, Jeremy Darden MD, 5 Units at 06/18/21 1807  •  insulin lispro (ADMELOG) injection 8 Units, 8 Units, Subcutaneous, TID With Meals, Jeremy Darden MD,  8 Units at 06/19/21 0843  •  isosorbide mononitrate (IMDUR) 24 hr tablet 60 mg, 60 mg, Oral, Daily, Jeremy Darden MD, 60 mg at 06/19/21 0843  •  losartan (COZAAR) tablet 50 mg, 50 mg, Oral, Q24H, Jeremy Darden MD, 50 mg at 06/19/21 0843  •  Magnesium Sulfate 2 gram Bolus, followed by 8 gram infusion (total Mg dose 10 grams)- Mg less than or equal to 1mg/dL, 2 g, Intravenous, PRN **OR** Magnesium Sulfate 2 gram / 50mL Infusion (GIVE X 3 BAGS TO EQUAL 6GM TOTAL DOSE) - Mg 1.1 - 1.5 mg/dl, 2 g, Intravenous, PRN **OR** Magnesium Sulfate 4 gram infusion- Mg 1.6-1.9 mg/dL, 4 g, Intravenous, PRN, Jeremy Darden MD  •  metoprolol succinate XL (TOPROL-XL) 24 hr tablet 150 mg, 150 mg, Oral, Daily, Jeremy Darden MD, 150 mg at 06/19/21 0843  •  metoprolol tartrate (LOPRESSOR) injection 5 mg, 5 mg, Intravenous, Q5 Min PRN, Jeremy Darden MD, 5 mg at 06/16/21 0342  •  mirtazapine (REMERON) tablet 15 mg, 15 mg, Oral, Nightly, Jeremy Darden MD, 15 mg at 06/18/21 2111  •  montelukast (SINGULAIR) tablet 10 mg, 10 mg, Oral, Nightly, Jeremy Darden MD, 10 mg at 06/18/21 2111  •  nitroglycerin (NITROSTAT) SL tablet 0.4 mg, 0.4 mg, Sublingual, Q5 Min PRN, Jeremy Darden MD  •  ondansetron (ZOFRAN) injection 4 mg, 4 mg, Intravenous, Q6H PRN, Jeremy Darden MD, 4 mg at 06/16/21 0028  •  pantoprazole (PROTONIX) EC tablet 40 mg, 40 mg, Oral, Daily, Jeremy Darden MD, 40 mg at 06/19/21 0843  •  potassium chloride (K-DUR,KLOR-CON) ER tablet 40 mEq, 40 mEq, Oral, PRN, Jeremy Darden MD  •  potassium chloride (KLOR-CON) packet 40 mEq, 40 mEq, Oral, PRN, Jeremy Darden MD  •  sodium chloride 0.9 % flush 10 mL, 10 mL, Intravenous, Q12H, Jeremy Darden MD, 10 mL at 06/19/21 0848  •  sodium chloride 0.9 % flush 10 mL, 10 mL, Intravenous, PRN, Jeremy Darden MD  •  sodium chloride 0.9 % infusion, 30 mL/hr, Intravenous, Continuous PRCHRIS, Jeremy Darden MD, Last  Rate: 30 mL/hr at 06/18/21 1342, 30 mL/hr at 06/18/21 1342  Review of Systems:     The following systems were reviewed and negative: Constitution, pulmonary, cardiac, gastrointestinal, genitourinary        Objective     Vital Signs  Temp:  [97.6 °F (36.4 °C)-98.3 °F (36.8 °C)] 98.3 °F (36.8 °C)  Heart Rate:  [67-79] 79  Resp:  [16-20] 18  BP: ()/(38-69) 152/65  Body mass index is 34.19 kg/m².    Intake/Output Summary (Last 24 hours) at 6/19/2021 1022  Last data filed at 6/19/2021 0519  Gross per 24 hour   Intake 200 ml   Output 900 ml   Net -700 ml     No intake/output data recorded.     Physical Exam:   General: patient awake, alert and cooperative, appears stated age, obese   Eyes: Normal lids and lashes, no scleral icterus   Neck: supple, normal ROM   Skin: warm and dry, not jaundiced   Cardiovascular: regular rhythm and rate, no murmurs auscultated   Pulm: clear to auscultation bilaterally, regular and unlabored   Abdomen: soft, nontender, nondistended; normal bowel sounds   Rectal: deferred   Extremities: no rash or edema   Psychiatric: Normal mood and behavior; poor historian     Results Review:     I reviewed the patient's new clinical results.    Results from last 7 days   Lab Units 06/19/21  0756 06/18/21  2343 06/18/21  1454 06/18/21  0518 06/17/21  0810   WBC 10*3/mm3 7.34  --   --  8.16 10.11   HEMOGLOBIN g/dL 7.5*  7.5* 7.6* 7.5* 7.1* 8.1*   HEMATOCRIT % 27.2*  27.2* 28.2* 27.4* 25.4* 29.9*   PLATELETS 10*3/mm3 192  --   --  166 180     Results from last 7 days   Lab Units 06/19/21  0756 06/18/21  0618 06/17/21  2257 06/17/21  0810 06/14/21  1917   SODIUM mmol/L 143 142  --  140 136   POTASSIUM mmol/L 3.6 3.4* 3.8 4.4 3.9   CHLORIDE mmol/L 107 104  --  102 98   CO2 mmol/L 25.7 28.4  --  29.5* 28.7   BUN mg/dL 9 11  --  14 18   CREATININE mg/dL 0.56* 0.56*  --  0.60 0.73   CALCIUM mg/dL 8.2* 8.4*  --  8.7 8.8   BILIRUBIN mg/dL  --   --   --   --  0.5   ALK PHOS U/L  --   --   --   --  140*   ALT  (SGPT) U/L  --   --   --   --  32   AST (SGOT) U/L  --   --   --   --  26   GLUCOSE mg/dL 142* 101*  --  207* 503*     Results from last 7 days   Lab Units 06/14/21  1917   INR  1.64*     Lab Results   Lab Value Date/Time    LIPASE 46 02/23/2016 0629       Radiology:  CT Abdomen Pelvis With Contrast   Final Result   Cirrhotic liver morphology with evidence of portal   hypertension and also mild splenomegaly. These findings are new since   the preceding CT scan in 2014. A moderate-sized periumbilical hernia   containing omental tissue and vessels is also new. There are small   bilateral posteriorly layering pleural effusions.       This report was finalized on 6/15/2021 2:26 PM by Dr. Vincent Henriquez M.D.              Assessment/Plan     Patient Active Problem List   Diagnosis   • Dementia (CMS/HCC)   • Depressive disorder   • Edema   • Abnormal liver function tests   • Gastroesophageal reflux disease   • Gout   • Hyperlipidemia   • HTN (hypertension)   • Insomnia   • Arthropathy of knee   • Memory loss   • Low back pain   • Mitral valve insufficiency   • Tricuspid valve insufficiency   • Urinary tract infection   • Valvular endocarditis   • Long QT interval   • Microcytic anemia   • Anemia   • Seasonal allergies   • Acute cystitis without hematuria   • Chronic fatigue   • Bacterial endocarditis   • Slow transit constipation   • Acute ischemic stroke (CMS/HCC)   • Paroxysmal tachycardia (CMS/HCC)   • Type 2 diabetes mellitus (CMS/HCC)   • Atrial fibrillation (CMS/HCC)   • Morbidly obese (CMS/HCC)   • Chronic diastolic (congestive) heart failure (CMS/HCC)   • Symptomatic anemia   • History of stroke   • Long term current use of anticoagulant therapy   • Acute blood loss anemia   • Cirrhosis of liver (CMS/HCC)   • Esophageal varices (CMS/HCC)   • Splenomegaly due to portal hypertension and liver disease       Impression  1.  Iron deficiency anemia: No findings on EGD and colonoscopy to attribute significant GI  bleeding to    2.  Bright red blood per rectum: She does have hemorrhoids    3.  Anticoagulated for atrial fibrillation    4.  Cirrhosis    5.  Elevated alkaline phosphatase level: With above, GGT elevated    Plan  Complete IV iron  Follow hemoglobin  Will need to consider outpatient video capsule enteroscopy  Will obtain further liver serologies prior to discharge  Will arrange for outpatient hospital follow-up      I discussed the patients findings and my recommendations with patient and family.    All necessary PPE, including face mask and eye protection, were worn during this encounter.  Hand sanitization was performed both before and after the patient interaction.    Over 25 minutes was spent reviewing the patient's chart and records, in discussion with the patient, in examination of the patient, and in discussion with members of the patient's medical team.    Patsy Raygoza MD

## 2021-06-19 NOTE — PROGRESS NOTES
[facility] Progress Note       Encounter Date:21  Patient:Miri Yung  :1940  MRN:[id number]      Chief Complaint: Follow-up paroxysmal atrial fibrillation      Subjective:        No complaints doing well today.    Review of Systems:  Review of Systems   Constitutional: Positive for malaise/fatigue.   Cardiovascular: Negative for chest pain and palpitations.   Respiratory: Positive for shortness of breath.        Medications:  Scheduled Meds:  acetaminophen, 650 mg, Oral, Once   And  iron sucrose, 100 mg, Intravenous, Once  allopurinol, 300 mg, Oral, Daily  apixaban, 5 mg, Oral, Q12H  atorvastatin, 40 mg, Oral, Daily  buPROPion SR, 150 mg, Oral, Daily With Breakfast  [START ON 2021] ferrous sulfate, 325 mg, Oral, Daily With Breakfast  furosemide, 40 mg, Oral, BID  insulin glargine, 20 Units, Subcutaneous, QAM  insulin lispro, 0-14 Units, Subcutaneous, TID AC  insulin lispro, 8 Units, Subcutaneous, TID With Meals  isosorbide mononitrate, 60 mg, Oral, Daily  losartan, 50 mg, Oral, Q24H  metoprolol succinate XL, 150 mg, Oral, Daily  mirtazapine, 15 mg, Oral, Nightly  montelukast, 10 mg, Oral, Nightly  pantoprazole, 40 mg, Oral, Daily  sodium chloride, 10 mL, Intravenous, Q12H    Continuous Infusions:  sodium chloride, 30 mL/hr, Last Rate: 30 mL/hr (21 1342)    PRN Meds:  •  acetaminophen **OR** acetaminophen **OR** acetaminophen  •  dextrose  •  dextrose  •  glucagon (human recombinant)  •  magnesium sulfate **OR** magnesium sulfate **OR** magnesium sulfate  •  metoprolol tartrate  •  nitroglycerin  •  ondansetron  •  potassium chloride  •  potassium chloride  •  sodium chloride  •  sodium chloride         Objective:       Vitals:    21 1935 21 2301 21 0733 21 1357   BP: 146/69 164/61 152/65 104/49   BP Location: Left arm Right arm Right arm Left arm   Patient Position: Sitting Lying Lying Lying   Pulse: 67 75 79 64   Resp:  14   Temp: 97.8  °F (36.6 °C) 97.6 °F (36.4 °C) 98.3 °F (36.8 °C) 98.2 °F (36.8 °C)   TempSrc: Oral Oral Oral Oral   SpO2: 100% 100% 97% 95%   Weight:       Height:               Physical Exam:  Constitutional: Well appearing, well developed, no acute distress   HENT: Oropharynx clear and membrane moist  Eyes: Normal conjunctiva, no sclera icterus.  Neck: Supple, no carotid bruit bilaterally.  Cardiovascular: Regular rate and rhythm, Early peaking systolic murmur over the right upper sternal border, No bilateral lower extremity edema.  Pulmonary: Normal respiratory effort, normal lung sounds, no wheezing.  Abdominal: Soft, nontender, no hepatosplenomegaly, liver is non-pulsatile.  Neurological: Alert and orient x 3.   Skin: Warm, dry, no ecchymosis, no rash.  Psych: Appropriate mood and affect. Normal judgment and insight.           Lab Review:   Results from last 7 days   Lab Units 06/19/21  0756 06/18/21  0618 06/17/21  2257 06/17/21  0810 06/16/21  0602 06/14/21  1917   SODIUM mmol/L 143 142  --  140 140 136   POTASSIUM mmol/L 3.6 3.4* 3.8 4.4 3.7 3.9   CHLORIDE mmol/L 107 104  --  102 103 98   CO2 mmol/L 25.7 28.4  --  29.5* 29.9* 28.7   BUN mg/dL 9 11  --  14 10 18   CREATININE mg/dL 0.56* 0.56*  --  0.60 0.49* 0.73   GLUCOSE mg/dL 142* 101*  --  207* 223* 503*   CALCIUM mg/dL 8.2* 8.4*  --  8.7 8.1* 8.8   AST (SGOT) U/L  --   --   --   --   --  26   ALT (SGPT) U/L  --   --   --   --   --  32         Results from last 7 days   Lab Units 06/19/21  0756 06/18/21  2343 06/18/21  1454 06/18/21  0916 06/18/21  0518 06/17/21  2257 06/17/21  1619 06/17/21  0810 06/16/21  0602 06/14/21 1917   WBC 10*3/mm3 7.34  --   --   --  8.16  --   --  10.11 8.24 6.67   HEMOGLOBIN g/dL 7.5*  7.5* 7.6* 7.5* 7.8* 7.1* 8.7* 7.8* 8.1* 7.1* 7.3*   HEMATOCRIT % 27.2*  27.2* 28.2* 27.4* 28.6* 25.4* 32.3* 29.8* 29.9* 25.7* 27.2*   PLATELETS 10*3/mm3 192  --   --   --  166  --   --  180 142 156     Results from last 7 days   Lab Units 06/14/21 1917    INR  1.64*     Results from last 7 days   Lab Units 06/17/21  2257 06/16/21  0602   MAGNESIUM mg/dL 2.0 2.1           Invalid input(s): LDLCALC                 Assessment:          Diagnosis Plan   1. Symptomatic anemia     2. Uncontrolled type 2 diabetes mellitus with hyperglycemia (CMS/HCC)     3. Anticoagulated by anticoagulation treatment            Plan:       Ms. Yung is a 80 y.o. with past medical history notable for paroxysmal atrial fibrillation and diastolic congestive heart failure who presented to the emergency room on 6/14/2021 with lower extremity edema and significant anemia.  Patient had her anticoagulation held and eventually underwent EGD yesterday.  She was found to have some varices and she was restarted on her anticoagulation.  She has had some paroxysmal atrial fibrillation during this hospital stay but has been otherwise under reasonable control with her current medical regimen.  Sounds like plans are for discharge home today.    Paroxysmal atrial fibrillation:  · Currently in sinus rhythm  · Continue metoprolol  · Restarting anticoagulation with close monitoring of hemoglobin    Chronic diastolic congestive heart failure:  · Reasonably well compensated from volume standpoint    Hypertension:  · Pressure reasonably controlled today continue current medical therapy               Chase Fregoso MD  [facility]  06/19/21  14:23 EDT

## 2021-06-20 NOTE — OUTREACH NOTE
Prep Survey      Responses   [facility] patient discharged from?  [place]   Is LACE score < 7 ?  No   Emergency Room discharge w/ pulse ox?  No   Eligibility  [facility]   Date of Admission  06/14/21   Date of Discharge  06/19/21   Discharge Disposition  Home or Self Care   Discharge diagnosis  Symptomatic anemia   Does the patient have one of the following disease processes/diagnoses(primary or secondary)?  Other   Does the patient have Home health ordered?  Yes   What is the Home health agency?   [facility]   Is there a DME ordered?  No   Prep survey completed?  Yes          Jana Savage RN

## 2021-06-21 ENCOUNTER — TELEPHONE (OUTPATIENT)
Dept: FAMILY MEDICINE CLINIC | Facility: CLINIC | Age: 81
End: 2021-06-21

## 2021-06-21 ENCOUNTER — TRANSITIONAL CARE MANAGEMENT TELEPHONE ENCOUNTER (OUTPATIENT)
Dept: CALL CENTER | Facility: HOSPITAL | Age: 81
End: 2021-06-21

## 2021-06-21 LAB
ACTIN IGG SERPL-ACNC: 4 UNITS (ref 0–19)
MITOCHONDRIA M2 IGG SER-ACNC: <20 UNITS (ref 0–20)

## 2021-06-21 NOTE — OUTREACH NOTE
Call Center TCM Note      Responses   [facility] patient discharged from?  [place]   Does the patient have one of the following disease processes/diagnoses(primary or secondary)?  Other   TCM attempt successful?  Yes   Call start time  1527   Call end time  1533   Discharge diagnosis  Symptomatic anemia   Person spoke with today (if not patient) and relationship  son   Meds reviewed with patient/caregiver?  Yes   Is the patient having any side effects they believe may be caused by any medication additions or changes?  No   Does the patient have all medications ordered at discharge?  Yes   Is the patient taking all medications as directed (includes completed medication regime)?  Yes   Medication comments  States he is needing a prescription for test strips but unsure of what type of meter he has.  He will call into office to let them know which one she has.  She will also need lancets.   Does the patient have a primary care provider?   Yes   Does the patient have an appointment with their PCP within 7 days of discharge?  No   What is preventing the patient from scheduling follow up appointments within 7 days of discharge?  Haven't had time   Nursing Interventions  Educated patient on importance of making appointment, Advised patient to make appointment   Has the patient kept scheduled appointments due by today?  N/A   Comments  Would like to make appt after they know when she will see the GI dr.   What is the Home health agency?   [facility]    Has home health visited the patient within 72 hours of discharge?  Yes   Psychosocial issues?  No   Did the patient receive a copy of their discharge instructions?  Yes   Nursing interventions  Reviewed instructions with patient   What is the patient's perception of their health status since discharge?  Improving   Is the patient/caregiver able to teach back signs and symptoms related to disease process for when to call PCP?  Yes   Is the patient/caregiver able to  teach back signs and symptoms related to disease process for when to call 911?  Yes   Is the patient/caregiver able to teach back the hierarchy of who to call/visit for symptoms/problems? PCP, Specialist, Home health nurse, Urgent Care, ED, 911  Yes   Additional teach back comments  States she is needing test strips for her glucose monitor.  He is unsure of model and will call office with this.  She will also need lancets ordered.  States they also changed her medication for her afib.     TCM call completed?  Yes   Wrap up additional comments  Son to call office to give the model type for glucose monitoring.          Jessica Villalba LPN    6/21/2021, 15:37 EDT

## 2021-06-21 NOTE — TELEPHONE ENCOUNTER
Patients son called to let us know about the   Name of the the flx pen that the patient uses its called the onetouch verio flex lancets     And that the patient needs the lancets and the test strips      the pharmacy that she uses   [id number] - [place], [place] - [address] [address] [address] AT SEC OF [address] [address] & [facility] [address] - [phone]  - [phone]    [address] [address] [address], [facility] [address]   Phone:  [phone]  Fax:  [phone]

## 2021-06-21 NOTE — OUTREACH NOTE
Call Center TCM Note      Responses   [facility] patient discharged from?  [place]   Does the patient have one of the following disease processes/diagnoses(primary or secondary)?  Other   TCM attempt successful?  No   Unsuccessful attempts  Attempt 1          Jessica Villalba LPN    6/21/2021, 13:42 EDT

## 2021-06-22 ENCOUNTER — TELEPHONE (OUTPATIENT)
Dept: GASTROENTEROLOGY | Facility: CLINIC | Age: 81
End: 2021-06-22

## 2021-06-22 DIAGNOSIS — J32.1 CHRONIC FRONTAL SINUSITIS: ICD-10-CM

## 2021-06-22 DIAGNOSIS — F51.01 PRIMARY INSOMNIA: ICD-10-CM

## 2021-06-22 DIAGNOSIS — E11.9 TYPE 2 DIABETES MELLITUS WITHOUT COMPLICATION, WITHOUT LONG-TERM CURRENT USE OF INSULIN (HCC): Primary | ICD-10-CM

## 2021-06-22 RX ORDER — ATORVASTATIN CALCIUM 40 MG/1
40 TABLET, FILM COATED ORAL DAILY
Qty: 90 TABLET | Refills: 0 | Status: SHIPPED | OUTPATIENT
Start: 2021-06-22 | End: 2021-01-01 | Stop reason: SDUPTHER

## 2021-06-22 RX ORDER — MONTELUKAST SODIUM 10 MG/1
10 TABLET ORAL NIGHTLY
Qty: 30 TABLET | Refills: 5 | Status: SHIPPED | OUTPATIENT
Start: 2021-06-22 | End: 2022-01-01

## 2021-06-22 RX ORDER — MIRTAZAPINE 15 MG/1
15 TABLET, FILM COATED ORAL NIGHTLY
Qty: 30 TABLET | Refills: 3 | Status: SHIPPED | OUTPATIENT
Start: 2021-06-22 | End: 2021-01-01 | Stop reason: SDUPTHER

## 2021-06-22 NOTE — TELEPHONE ENCOUNTER
----- Message from Patsy Raygoza MD sent at 6/19/2021  7:32 PM EDT -----  Hosp follow up with Lisa or Earnestine in 4-6 weeks, thx

## 2021-06-28 ENCOUNTER — READMISSION MANAGEMENT (OUTPATIENT)
Dept: CALL CENTER | Facility: HOSPITAL | Age: 81
End: 2021-06-28

## 2021-06-28 NOTE — OUTREACH NOTE
Medical Week 2 Survey      Responses   [facility] patient discharged from?  [place]   Does the patient have one of the following disease processes/diagnoses(primary or secondary)?  Other   Week 2 attempt successful?  Yes   Call start time  0839   Discharge diagnosis  Symptomatic anemia   Call end time  0845   Is patient permission given to speak with other caregiver?  Yes   List who call center can speak with  son   Meds reviewed with patient/caregiver?  Yes   Is the patient having any side effects they believe may be caused by any medication additions or changes?  No   Does the patient have all medications ordered at discharge?  Yes   Is the patient taking all medications as directed (includes completed medication regime)?  Yes   Medication comments  Son takes of them for her.    Does the patient have a primary care provider?   Yes   Does the patient have an appointment with their PCP within 7 days of discharge?  Yes   Has the patient kept scheduled appointments due by today?  N/A   Comments  Her son makes her appts.    What is the [facility] health agency?   [facility]    Has home health visited the patient within 72 hours of discharge?  Yes   Psychosocial issues?  No   Comments  Son helps her.    Did the patient receive a copy of their discharge instructions?  Yes   Nursing interventions  Reviewed instructions with patient   What is the patient's perception of their health status since discharge?  Same   Is the patient/caregiver able to teach back signs and symptoms related to disease process for when to call PCP?  Yes   Is the patient/caregiver able to teach back signs and symptoms related to disease process for when to call 911?  Yes   Is the patient/caregiver able to teach back the hierarchy of who to call/visit for symptoms/problems? PCP, Specialist, Home health nurse, Urgent Care, ED, 911  Yes   If the patient is a current smoker, are they able to teach back resources for cessation?  Not a smoker    Additional teach back comments  She states she feels better in the evening BS running around 160.    Week 2 Call Completed?  Yes          Annelise Frey RN

## 2021-07-01 RX ORDER — BLOOD-GLUCOSE METER
KIT MISCELLANEOUS
Qty: 1 EACH | Refills: 0 | Status: SHIPPED | OUTPATIENT
Start: 2021-07-01 | End: 2022-01-01 | Stop reason: SDUPTHER

## 2021-07-04 ENCOUNTER — READMISSION MANAGEMENT (OUTPATIENT)
Dept: CALL CENTER | Facility: HOSPITAL | Age: 81
End: 2021-07-04

## 2021-07-04 NOTE — OUTREACH NOTE
Medical Week 3 Survey      Responses   [facility] [place] [facility]?  [place]   Does the patient have one of the following disease processes/diagnoses(primary or secondary)?  Other   Week 3 attempt successful?  No   Unsuccessful attempts  Attempt 1          Pam Johnson RN

## 2021-07-06 ENCOUNTER — READMISSION MANAGEMENT (OUTPATIENT)
Dept: CALL CENTER | Facility: HOSPITAL | Age: 81
End: 2021-07-06

## 2021-07-06 NOTE — OUTREACH NOTE
Medical Week 3 Survey      Responses   [facility] patient discharged from?  [place]   Does the patient have one of the following disease processes/diagnoses(primary or secondary)?  Other   Week 3 attempt successful?  Yes   Call start time  1019   Call end time  1021   Discharge diagnosis  Symptomatic anemia   Is patient permission given to speak with other caregiver?  Yes   List who call center can speak with  son   Person spoke with today (if not patient) and relationship  son   Meds reviewed with patient/caregiver?  Yes   Is the patient having any side effects they believe may be caused by any medication additions or changes?  No   Does the patient have all medications ordered at discharge?  Yes   Is the patient taking all medications as directed (includes completed medication regime)?  Yes   Does the patient have a primary care provider?   Yes   Does the patient have an appointment with their PCP within 7 days of discharge?  Yes   Has the patient kept scheduled appointments due by today?  Yes   Did the patient receive a copy of their discharge instructions?  Yes   Nursing interventions  Reviewed instructions with patient   What is the patient's perception of their health status since discharge?  Improving [Her son reports she is doing well. ]   Is the patient/caregiver able to teach back signs and symptoms related to disease process for when to call PCP?  Yes   Is the patient/caregiver able to teach back signs and symptoms related to disease process for when to call 911?  Yes   Is the patient/caregiver able to teach back the hierarchy of who to call/visit for symptoms/problems? PCP, Specialist, Home health nurse, Urgent Care, ED, 911  Yes   If the patient is a current smoker, are they able to teach back resources for cessation?  Not a smoker   Week 3 Call Completed?  Yes   Graduated  Yes   Did the patient feel the follow up calls were helpful during their recovery period?  Yes   Was the number of calls  appropriate?  Yes   Wrap up additional comments   States she is doing better.           Annelise Frey RN

## 2021-07-08 ENCOUNTER — OFFICE VISIT (OUTPATIENT)
Dept: FAMILY MEDICINE CLINIC | Facility: CLINIC | Age: 81
End: 2021-07-08

## 2021-07-08 VITALS
RESPIRATION RATE: 22 BRPM | HEART RATE: 66 BPM | TEMPERATURE: 98 F | SYSTOLIC BLOOD PRESSURE: 126 MMHG | DIASTOLIC BLOOD PRESSURE: 58 MMHG | BODY MASS INDEX: 34.23 KG/M2 | WEIGHT: 186 LBS | HEIGHT: 62 IN

## 2021-07-08 DIAGNOSIS — E11.65 TYPE 2 DIABETES MELLITUS WITH HYPERGLYCEMIA, WITHOUT LONG-TERM CURRENT USE OF INSULIN (HCC): Primary | ICD-10-CM

## 2021-07-08 DIAGNOSIS — D64.9 ANEMIA, UNSPECIFIED TYPE: ICD-10-CM

## 2021-07-08 LAB
EXPIRATION DATE: ABNORMAL
HBA1C MFR BLD: 6.6 %
Lab: ABNORMAL

## 2021-07-08 PROCEDURE — 36415 COLL VENOUS BLD VENIPUNCTURE: CPT | Performed by: NURSE PRACTITIONER

## 2021-07-08 PROCEDURE — 83036 HEMOGLOBIN GLYCOSYLATED A1C: CPT | Performed by: NURSE PRACTITIONER

## 2021-07-08 PROCEDURE — 99214 OFFICE O/P EST MOD 30 MIN: CPT | Performed by: NURSE PRACTITIONER

## 2021-07-08 NOTE — PROGRESS NOTES
"Chief Complaint  Hospital Follow Up Visit, Diabetes, and Anemia    Subjective          Miri Yung presents to [facility]  History of Present Illness   This is a 80-year-old female patient here today to follow-up on recent hospitalization.  Patient was sent through emergency room due to low hemoglobin.  EGD was preformed showing grade 1 varices.  Colonoscopy was negative.  She is currently on PPI and doing well.  She denies any problems today.  She was found to be iron deficient and received IV iron with now on oral supplement.  Hemoglobin at discharge was 7.5.  She also had elevated liver enzymes.  She will follow up with GI outpatient.  She does not recall her appointment today.  Due to her uncontrolled diabetes she was started on glipizide with an addition of Januvia.  Hemoglobin A1c at discharge was 11.1.  She states blood sugar readings at home are 100-188.      Objective   Vital Signs:   /58   Pulse 66   Temp 98 °F (36.7 °C)   Resp 22   Ht 157 cm (61.81\")   Wt 84.4 kg (186 lb)   BMI 34.23 kg/m²     Physical Exam  Vitals and nursing note reviewed.   HENT:      Head: Normocephalic.      Nose: Nose normal.   Eyes:      Pupils: Pupils are equal, round, and reactive to light.   Cardiovascular:      Rate and Rhythm: Normal rate and regular rhythm.      Pulses: Normal pulses.      Heart sounds: Normal heart sounds.   Pulmonary:      Effort: Pulmonary effort is normal. No respiratory distress.      Breath sounds: Normal breath sounds. No wheezing or rales.   Abdominal:      General: Bowel sounds are normal. There is no distension.      Palpations: Abdomen is soft.      Tenderness: There is no abdominal tenderness.   Musculoskeletal:         General: No swelling.      Cervical back: Neck supple.      Right lower leg: No edema.      Left lower leg: No edema.   Skin:     General: Skin is warm and dry.   Neurological:      Mental Status: She is alert and oriented to person, " place, and time.   Psychiatric:         Mood and Affect: Mood normal.        Result Review :                 Assessment and Plan    Diagnoses and all orders for this visit:    1. Type 2 diabetes mellitus with hyperglycemia, without long-term current use of insulin (CMS/[facility]) (Primary)  -     POCT glycated hemoglobin, total  -     Hemoglobin A1c    2. Anemia, unspecified type  -     CBC w MANUAL Differential    Other orders  -     Hemoglobin A1c  -     Please Note  -     Specimen Status Report    Hemoglobin A1c in the office today is 6.6 I will have that ran by lab to clarify.  If this is accurate we will take off glipizide due to diabetes being over controlled and risk of hypoglycemia.  We will also check CBC today for evaluation of hemoglobin.  Patient and daughter-in-law is aware of appointment with gastroenterology on August 3 she will keep that appointment.  I have asked her to return to see me in 2 months or sooner if needed.    I spent a total of 30 minutes with the patient today including face-to-face encounter, reviewing data in the system, coordination of care with the nursing staff as well as consultants, documentation and entering orders.        Follow Up   No follow-ups on file.  Patient was given instructions and counseling regarding her condition or for health maintenance advice. Please see specific information pulled into the AVS if appropriate.

## 2021-07-09 DIAGNOSIS — E11.65 TYPE 2 DIABETES MELLITUS WITH HYPERGLYCEMIA, WITHOUT LONG-TERM CURRENT USE OF INSULIN (HCC): ICD-10-CM

## 2021-07-09 DIAGNOSIS — D64.9 ANEMIA, UNSPECIFIED TYPE: Primary | ICD-10-CM

## 2021-07-09 LAB
BASOPHILS # BLD AUTO: NORMAL 10*3/UL
EOSINOPHIL # BLD AUTO: NORMAL 10*3/UL
EOSINOPHIL NFR BLD AUTO: NORMAL %
HBA1C MFR BLD: NORMAL %
HCT VFR BLD AUTO: NORMAL %
HGB BLD-MCNC: NORMAL G/DL
LYMPHOCYTES # BLD AUTO: NORMAL 10*3/UL
LYMPHOCYTES NFR BLD AUTO: NORMAL %
Lab: NORMAL
MONOCYTES NFR BLD AUTO: NORMAL %
NEUTROPHILS NFR BLD AUTO: NORMAL %
PLATELET # BLD AUTO: NORMAL 10*3/UL
RBC # BLD AUTO: NORMAL 10*6/UL
SPECIMEN STATUS: NORMAL
WBC # BLD AUTO: NORMAL X10E3/UL

## 2021-07-14 LAB
BASOPHILS # BLD AUTO: 0.1 X10E3/UL (ref 0–0.2)
BASOPHILS NFR BLD AUTO: 1 %
EOSINOPHIL # BLD AUTO: 0.1 X10E3/UL (ref 0–0.4)
EOSINOPHIL NFR BLD AUTO: 2 %
ERYTHROCYTE [DISTWIDTH] IN BLOOD BY AUTOMATED COUNT: 22.6 % (ref 11.7–15.4)
HBA1C MFR BLD: 6.5 % (ref 4.8–5.6)
HCT VFR BLD AUTO: 36.2 % (ref 34–46.6)
HGB BLD-MCNC: 10.4 G/DL (ref 11.1–15.9)
IMM GRANULOCYTES # BLD AUTO: 0 X10E3/UL (ref 0–0.1)
IMM GRANULOCYTES NFR BLD AUTO: 0 %
LYMPHOCYTES # BLD AUTO: 1.7 X10E3/UL (ref 0.7–3.1)
LYMPHOCYTES NFR BLD AUTO: 31 %
MCH RBC QN AUTO: 23.6 PG (ref 26.6–33)
MCHC RBC AUTO-ENTMCNC: 28.7 G/DL (ref 31.5–35.7)
MCV RBC AUTO: 82 FL (ref 79–97)
MONOCYTES # BLD AUTO: 0.4 X10E3/UL (ref 0.1–0.9)
MONOCYTES NFR BLD AUTO: 8 %
MORPHOLOGY BLD-IMP: ABNORMAL
NEUTROPHILS # BLD AUTO: 3.2 X10E3/UL (ref 1.4–7)
NEUTROPHILS NFR BLD AUTO: 58 %
PLATELET # BLD AUTO: 109 X10E3/UL (ref 150–450)
RBC # BLD AUTO: 4.41 X10E6/UL (ref 3.77–5.28)
WBC # BLD AUTO: 5.5 X10E3/UL (ref 3.4–10.8)

## 2021-07-15 RX ORDER — FERROUS SULFATE 325(65) MG
325 TABLET ORAL
Qty: 90 TABLET | Refills: 1 | Status: SHIPPED | OUTPATIENT
Start: 2021-07-15 | End: 2022-01-01

## 2021-07-26 ENCOUNTER — TELEPHONE (OUTPATIENT)
Dept: FAMILY MEDICINE CLINIC | Facility: CLINIC | Age: 81
End: 2021-07-26

## 2021-07-26 DIAGNOSIS — I48.0 PAROXYSMAL ATRIAL FIBRILLATION (HCC): ICD-10-CM

## 2021-07-26 NOTE — TELEPHONE ENCOUNTER
----- Message from Josefa Carnes MA sent at 7/26/2021  3:02 PM EDT -----  Regarding: FW:  Contact: [phone]  Please call [facility] and let them know  Thank you  Josefa  ----- Message -----  From: Loren Cruz APRN  Sent: 7/26/2021   2:46 PM EDT  To: Josefa Carnes MA  Subject: RE:                                              Yes that is fine  ----- Message -----  From: Josefa Carnes MA  Sent: 7/26/2021   2:40 PM EDT  To: KWASI Madrigal    Please advise    ----- Message -----  From: Leana Gonzalez  Sent: 7/26/2021   1:49 PM EDT  To: Vernon Loja [facility]    Leti fonseca/Juan Daniel called and patient wants to be dismissed from all Home Health services, so as long as it's okay they will be discharging her this week  Leti's best call back is [phone]

## 2021-07-27 RX ORDER — APIXABAN 5 MG/1
TABLET, FILM COATED ORAL
Qty: 180 TABLET | Refills: 0 | Status: SHIPPED | OUTPATIENT
Start: 2021-07-27 | End: 2021-07-27

## 2021-08-03 DIAGNOSIS — F32.A DEPRESSION, UNSPECIFIED DEPRESSION TYPE: ICD-10-CM

## 2021-08-03 RX ORDER — BUPROPION HYDROCHLORIDE 150 MG/1
150 TABLET, EXTENDED RELEASE ORAL
Qty: 30 TABLET | Refills: 3 | Status: SHIPPED | OUTPATIENT
Start: 2021-08-03 | End: 2021-01-01 | Stop reason: SDUPTHER

## 2021-08-06 ENCOUNTER — TELEPHONE (OUTPATIENT)
Dept: FAMILY MEDICINE CLINIC | Facility: CLINIC | Age: 81
End: 2021-08-06

## 2021-08-06 NOTE — TELEPHONE ENCOUNTER
Spoke with patient and told her to try Flonase and zyrtec and if not better by Monday to call back, we did not have a provider today and if she needed to be seen she could go to the urgent care clinic.

## 2021-08-06 NOTE — TELEPHONE ENCOUNTER
Caller: Miri Yung    Relationship: Self    Best call back number: [phone]    What medication are you requesting:     What are your current symptoms: RUNNY NOSE/SINUS PRESSURE/ DIFFERENT COLOR MUCUS    How long have you been experiencing symptoms: A LONG TIME    Have you had these symptoms before:    [x] Yes  [] No    Have you been treated for these symptoms before:   [x] Yes  [] No    If a prescription is needed, what is your preferred pharmacy and phone number:  [id number] - [facility] [address] [facility] [address] [facility] [address] [place] [address] & [facility] [address] - [phone]  - [phone]  [phone]    Additional notes:

## 2021-08-17 ENCOUNTER — OFFICE VISIT (OUTPATIENT)
Dept: GASTROENTEROLOGY | Facility: CLINIC | Age: 81
End: 2021-08-17

## 2021-08-17 VITALS — HEIGHT: 62 IN | WEIGHT: 185.6 LBS | BODY MASS INDEX: 34.16 KG/M2

## 2021-08-17 DIAGNOSIS — D50.9 IRON DEFICIENCY ANEMIA, UNSPECIFIED IRON DEFICIENCY ANEMIA TYPE: ICD-10-CM

## 2021-08-17 DIAGNOSIS — I85.00 ESOPHAGEAL VARICES WITHOUT BLEEDING, UNSPECIFIED ESOPHAGEAL VARICES TYPE (HCC): ICD-10-CM

## 2021-08-17 DIAGNOSIS — R79.89 ABNORMAL LIVER FUNCTION TESTS: ICD-10-CM

## 2021-08-17 DIAGNOSIS — K92.2 LOWER GI BLEED: ICD-10-CM

## 2021-08-17 DIAGNOSIS — K74.60 CIRRHOSIS OF LIVER WITHOUT ASCITES, UNSPECIFIED HEPATIC CIRRHOSIS TYPE (HCC): Primary | ICD-10-CM

## 2021-08-17 DIAGNOSIS — R93.89 ABNORMAL FINDINGS ON DIAGNOSTIC IMAGING OF OTHER SPECIFIED BODY STRUCTURES: ICD-10-CM

## 2021-08-17 DIAGNOSIS — D37.6 NEOPLASM OF UNCERTAIN BEHAVIOR OF LIVER, GALLBLADDER AND BILE DUCTS: ICD-10-CM

## 2021-08-17 DIAGNOSIS — R79.89 OTHER SPECIFIED ABNORMAL FINDINGS OF BLOOD CHEMISTRY: ICD-10-CM

## 2021-08-17 PROCEDURE — 99214 OFFICE O/P EST MOD 30 MIN: CPT | Performed by: NURSE PRACTITIONER

## 2021-08-17 NOTE — PROGRESS NOTES
Chief Complaint   Patient presents with   • Hospital Follow Up Visit       Miri Yung is a  80 y.o. female here for a hospital follow up visit for GI bleed.    HPI  80-year-old wheelchair-bound female presents today accompanied by her son for hospital follow-up visit for lower GI bleed.  She is a patient of Dr. Aaron.  She was last seen in the office by Dr. Aaron on 11/6/2019.  She is new to me today.  She was seen by our service at [facility] from 6/15 through 6/19/2021.  Initial consult was for lower GI bleeding.  She had been on Eliquis at the time for atrial fibrillation.  She underwent EGD and colonoscopy on 6/18/2021.  No GI bleeding source was found.  She did have some nonbleeding internal hemorrhoids seen on colonoscopy.  EGD did reveal some nonbleeding small varices and a hiatal hernia.  She had a CT scan of the abdomen and pelvis done that showed:    IMPRESSION:  Cirrhotic liver morphology with evidence of portal  hypertension and also mild splenomegaly. These findings are new since  the preceding CT scan in 2014. A moderate-sized periumbilical hernia  containing omental tissue and vessels is also new. There are small  bilateral posteriorly layering pleural effusions.      Alkaline phosphatase was slightly elevated while in the hospital and her GGT was elevated.  Other LFTs were normal.  She was given some IV iron in the hospital.  She was also started on daily iron supplement.  She tells me since being home from the hospital she has not had any rectal bleeding.  She is on daily iron now and denies any issues with constipation.  She tells me as long she takes her daily Metamucil she is doing well.  She does have a history of dementia/CHF and ischemic stroke.  She denies any previous alcohol use.  She tells me the cirrhosis was a new diagnosis for her.  He does have a history of GERD and admits she does well on Protonix 40 mg daily.  She denies any breakthrough reflux at this  time.  She denies any dysphagia, reflux, abdominal pain, nausea and vomiting, diarrhea, rectal bleeding or melena.  She admits her appetite is okay and her weight has dropped 10 pounds since her hospitalization.  Most recent hemoglobin was better at 10.4 on 7/13/2021.  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; generally asymptomatic   • Mitral regurgitation    • Mixed anxiety depressive disorder    • Nonrheumatic tricuspid (valve) insufficiency    • Osteoarthritis    • PAF (paroxysmal atrial fibrillation) (CMS/HCC)    • Sacroiliitis (CMS/HCC)    • Tricuspid regurgitation    • Type 2 diabetes mellitus (CMS/HCC)        Past Surgical History:   Procedure Laterality Date   • CATARACT EXTRACTION     • COLONOSCOPY N/A 3/10/2016    Procedure: COLONOSCOPY TO CECUM;  Surgeon: Shaan Becerra Jr., MD;  Location: [facility] ENDOSCOPY;  Service:    • COLONOSCOPY N/A 6/18/2021    Procedure: COLONOSCOPY TO CECUM AND INTO TERMINAL ILEUM;  Surgeon: Jeremy Darden MD;  Location: [facility] ENDOSCOPY;  Service: Gastroenterology;  Laterality: N/A;  PRE: ANEMIA  POST: HEMORRHOIDS   • ENDOSCOPY N/A 3/10/2016    Procedure: ESOPHAGOGASTRODUODENOSCOPY ;  Surgeon: Shaan Becerra Jr., MD;  Location: [facility] ENDOSCOPY;  Service:    • ENDOSCOPY N/A 6/18/2021    Procedure: ESOPHAGOGASTRODUODENOSCOPY;  Surgeon: Jeremy Darden MD;  Location: [facility] ENDOSCOPY;  Service:  Gastroenterology;  Laterality: N/A;  PRE: ANEMIA  POST: HIATAL HERNIA, SMALL ESOPHAGEAL VARICES   • KNEE SURGERY     • TUBAL ABDOMINAL LIGATION         Scheduled Meds:    Continuous Infusions:No current facility-administered medications for this visit.      PRN Meds:.    Allergies   Allergen Reactions   • Decongestant  [Pseudoephedrine]        Social History     Socioeconomic History   • Marital status:      Spouse name: Not on file   • Number of children: Not on file   • Years of education: Not on file   • Highest education level: Not on file   Tobacco Use   • Smoking status: Never Smoker   • Smokeless tobacco: Never Used   • Tobacco comment: caffeine use    Vaping Use   • Vaping Use: Never used   Substance and Sexual Activity   • Alcohol use: No   • Drug use: No   • Sexual activity: Defer       Family History   Problem Relation Age of Onset   • Kidney disease Mother    • Heart disease Mother    • Emphysema Father    • Heart disease Brother        Review of Systems   Constitutional: Negative for appetite change, chills, diaphoresis, fatigue, fever and unexpected weight change.   HENT: Negative for nosebleeds, postnasal drip, sore throat, trouble swallowing and voice change.    Respiratory: Negative for cough, choking, chest tightness, shortness of breath and wheezing.    Cardiovascular: Negative for chest pain, palpitations and leg swelling.   Gastrointestinal: Negative for abdominal distention, abdominal pain, anal bleeding, blood in stool, constipation, diarrhea, nausea, rectal pain and vomiting.   Endocrine: Negative for polydipsia, polyphagia and polyuria.   Musculoskeletal: Negative for gait problem.   Skin: Negative for rash and wound.   Allergic/Immunologic: Negative for food allergies.   Neurological: Negative for dizziness, speech difficulty and light-headedness.   Psychiatric/Behavioral: Negative for confusion, self-injury, sleep disturbance and suicidal ideas.       There were no vitals filed for  this visit.    Physical Exam  Constitutional:       General: She is not in acute distress.     Appearance: She is well-developed. She is not ill-appearing.   HENT:      Head: Normocephalic.   Eyes:      Pupils: Pupils are equal, round, and reactive to light.   Cardiovascular:      Rate and Rhythm: Normal rate and regular rhythm.      Heart sounds: Normal heart sounds.   Pulmonary:      Effort: Pulmonary effort is normal.      Breath sounds: Normal breath sounds.   Abdominal:      General: Bowel sounds are normal. There is no distension.      Palpations: Abdomen is soft. There is no mass.      Tenderness: There is no abdominal tenderness. There is no guarding or rebound.      Hernia: No hernia is present.   Musculoskeletal:         General: Normal range of motion.      Comments: Ambulates with a wheelchair.   Skin:     General: Skin is warm and dry.   Neurological:      Mental Status: She is alert and oriented to person, place, and time.   Psychiatric:         Speech: Speech normal.         Behavior: Behavior normal.         Judgment: Judgment normal.         No radiology results for the last 7 days     Assessment and plan     1. Cirrhosis of liver without ascites, unspecified hepatic cirrhosis type (CMS/HCC)  - CBC & Differential  - Comprehensive Metabolic Panel  - CEA  - Cancer Antigen 19-9  - Ammonia  - Hepatitis Panel, Acute  - Mitochondrial Antibodies, M2  - Ceruloplasmin  - Alpha - 1 - Antitrypsin  - DAVID  - Anti-Smooth Muscle Antibody Titer  - AFP Tumor Marker  - Protime-INR  - TSH  - Iron Profile    2. Abnormal liver function tests  - CBC & Differential  - Comprehensive Metabolic Panel  - CEA  - Cancer Antigen 19-9  - Ammonia  - Hepatitis Panel, Acute  - Mitochondrial Antibodies, M2  - Ceruloplasmin  - Alpha - 1 - Antitrypsin  - DAVID  - Anti-Smooth Muscle Antibody Titer  - AFP Tumor Marker  - Protime-INR  - TSH  - Iron Profile    3. Lower GI bleed  - CBC & Differential    4. Iron deficiency anemia, unspecified  iron deficiency anemia type  - CBC & Differential    5. Esophageal varices without bleeding, unspecified esophageal varices type (CMS/HCC)    6. Other specified abnormal findings of blood chemistry   - CEA  - Cancer Antigen 19-9  - TSH    7. Neoplasm of uncertain behavior of liver, gallbladder and bile ducts   - CEA  - Cancer Antigen 19-9  - TSH    8. Abnormal findings on diagnostic imaging of other specified body structures   - CEA  - Cancer Antigen 19-9  - TSH    Reviewed medical records with her today.  Had a long discussion with the patient and her son about cirrhosis and the liver testing that we need to continue to do to figure out why she has cirrhosis now.  Most recent hemoglobin was stable at 10.4.  Continue iron daily.  Bowels are moving well daily.  Continue daily Metamucil.  No signs of GI bleeding noted.  At this time the patient wants to hold off on a video capsule study since she does not feel like she is bleeding.  She is back on Eliquis now for her A. fib.  I have asked her to watch for any signs or symptoms of GI bleeding like bright red blood per rectum or black tarry stools.  GERD seems well controlled on Protonix 40 mg daily.  Continue GERD precautions.  We will go ahead and check liver labs today as well as a CBC.  Patient to call the office with any issues.  Patient to follow-up with me in 1 month.  Patient is agreeable to the plan.

## 2021-09-01 NOTE — TELEPHONE ENCOUNTER
PATIENT CALLED AND STATED SHE IS NEEDING TO GET A TOOTH PULLED, BUT IS ON BLOOD THINNERS AND WOULD LIKE TO KNOW IF SHE NEEDS TO STOP TAKING THEM BEFORE THE PROCEDURE     PLEASE ADVISE     [phone]

## 2021-09-01 NOTE — TELEPHONE ENCOUNTER
Spoke with patient son. He stated he told his mother she is not to have any teeth pulled. I did inform him if she needs this answer she should call her cardiologist who has her on the blood thinner. He understood and stated if that happens they would call cardiology.

## 2021-09-01 NOTE — TELEPHONE ENCOUNTER
Pt is having some dental problems and may need an extraction. This was sent to my VM please follow up with Pt...Moira

## 2021-09-01 NOTE — TELEPHONE ENCOUNTER
Ms. Yung has a broken tooth and will need to have it extracted.  They need to know if she can hold her Eliquis and how long to hold it?    Please advise

## 2021-09-02 NOTE — TELEPHONE ENCOUNTER
I called Hardeep back they were only going to cancel the appointment because it was 9/3/21.  Not enough time to give for holding the Eliquis.    Unsure if/when she will schedule.

## 2021-09-02 NOTE — TELEPHONE ENCOUNTER
Advised Hardeep of the risks and told him IF it was necessary, to hold three days and no longer.  He is going to have her cancel the appointment.

## 2021-09-16 NOTE — TELEPHONE ENCOUNTER
Last ov 7/8/21  I have asked her to return to see me in 2 months or sooner if needed.    Last cbc, cmp 6/11/21    Last lipid 7/27/20

## 2021-10-06 NOTE — TELEPHONE ENCOUNTER
Spoke with patient and advise to continue their treatment but if no improvement then see a eye doctor or PCP

## 2021-10-06 NOTE — TELEPHONE ENCOUNTER
Caller: Miri Yung    Relationship to patient: Self    Best call back number: [phone]     Patient is needing: PATIENT HAS A STYE IN BOTH EYES AND IS SELF TREATING WITH [employer] STYE EYE DROPS AND USES A WARM COMPRESS AND  WANTS TO KNOW WHAT ELSE SHE NEEDS TO DO .  PLEASE CALL AND ADVISE .         PHARMACY VERIFIED :  [facility] #[id number] - [facility] [address] [address] [address] OF [place] [address] & [place] [address] - [phone] [facility] [phone]   [phone]

## 2021-11-22 NOTE — TELEPHONE ENCOUNTER
Patient informed that we would have to see her in order to get in her anything other then tylenol. She is on coumadin and was told she could not taking any anti inflammatory meds for the pain. Patient advise to be seen her or the urgent care. Needs to have INR recheck to make sure no abnormal INR is going on.

## 2021-11-22 NOTE — TELEPHONE ENCOUNTER
Caller: Miri Yung    Relationship to patient: Self    Best call back number: [phone] (H)      Patient is needing: PATIENT STATES SHE FELL IN HER KITCHEN ON 11/19/21, BUT WAS ABLE TO GET UP. STATES SHE DID NOT HIT HER HEAD, AND DENIES THAT SHE FEELS THAT ANYTHING IS BROKEN.STATES SHE IS ABLE TO WALK AROUND BUT HAS SOME PAIN AND THE acetaminophen (TYLENOL) 500 MG tablet IS NOT HELPING. WANTS TO KNOW IF SOMETHING CAN BE CALLED IN. PATIENT STATES SHE WOULD COME IN FOR AN APPOINTMENT BUT SHE DOES NOT FEEL LIKE THERE IS ANYTHING HER PCP CAN DO FOR HER. PLEASE ADVISE. THANK YOU.       [facility] #[id number] - [facility] [address] [facility] [address] OF [place] [address] & [place] [address] - [phone] [facility] [phone]   [phone]

## 2021-12-07 NOTE — TELEPHONE ENCOUNTER
Last ov 7/8/21    Hemoglobin A1c in the office today is 6.6 I will have that ran by lab to clarify.  If this is accurate we will take off glipizide due to diabetes being over controlled and risk of hypoglycemia.  We will also check CBC today for evaluation of hemoglobin.  Patient and daughter-in-law is aware of appointment with gastroenterology on August 3 she will keep that appointment.  I have asked her to return to see me in 2 months or sooner if needed    Last labs 8/17/21 but lipid panel

## 2021-12-07 NOTE — TELEPHONE ENCOUNTER
Last ov 7/8/21    Hemoglobin A1c in the office today is 6.6 I will have that ran by lab to clarify.  If this is accurate we will take off glipizide due to diabetes being over controlled and risk of hypoglycemia.  We will also check CBC today for evaluation of hemoglobin.  Patient and daughter-in-law is aware of appointment with gastroenterology on August 3 she will keep that appointment.  I have asked her to return to see me in 2 months or sooner if needed    Last lab  8/17/21 but not by use.

## 2021-12-13 NOTE — TELEPHONE ENCOUNTER
Last ov 7/8/21    Hemoglobin A1c in the office today is 6.6 I will have that ran by lab to clarify.  If this is accurate we will take off glipizide due to diabetes being over controlled and risk of hypoglycemia.  We will also check CBC today for evaluation of hemoglobin.  Patient and daughter-in-law is aware of appointment with gastroenterology on August 3 she will keep that appointment.  I have asked her to return to see me in 2 months or sooner if needed      Last lab 8/17/21 last CMP

## 2021-12-22 NOTE — PROGRESS NOTES
OFFICE VISIT      Date of Office Visit: 2021    Patient Name: Miri Yung  : 1940    Encounter Provider: Jignesh York MD  Referring Provider: No ref. 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  Her last echocardiogram was in  showing normal left ventricular systolic function, grade 2 diastolic dysfunction, aortic valve sclerosis, mild mitral regurgitation as well as mild tricuspid regurgitation.  Her RVSP was borderline at 41.    The patient does not appear to have had atrial fibrillation in well over a year.  On several hospital verbalizations and office visits that she is always been in sinus rhythm.  She has been evaluated for anemia and is presently on iron therapy.  She has not had a hemoglobin checked since this past August.  Her hemoglobin at that time was around 9.  The patient generally complains of weakness and fatigue but she does not complain of any chest discomfort or palpitations.  She does have some chronic lower extremity edema.    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   • Endocarditis     with mitral and tricuspid regurgitation   • Esophageal reflux    • GERD (gastroesophageal reflux disease)    • Gout    • History of blood transfusion     due to anemia   • HTN (hypertension)    • Hyperlipidemia    • Insomnia    • LOM (loss of memory)    • Lung mass    • Mitral and aortic valve disease      secondary to endocarditis; generally asymptomatic   • Mitral regurgitation    • Mixed anxiety depressive disorder    • Nonrheumatic tricuspid (valve) insufficiency    • Osteoarthritis    • PAF (paroxysmal atrial fibrillation) (HCC)    • Sacroiliitis (HCC)    • Tricuspid regurgitation    • Type 2 diabetes mellitus (HCC)          Past Surgical History:   Procedure Laterality Date   • CATARACT EXTRACTION     • COLONOSCOPY N/A 3/10/2016    Procedure: COLONOSCOPY TO CECUM;  Surgeon: Shaan Becerar Jr., MD;  Location: [facility] ENDOSCOPY;  Service:    • COLONOSCOPY N/A 6/18/2021    Procedure: COLONOSCOPY TO CECUM AND INTO TERMINAL ILEUM;  Surgeon: Jeremy Darden MD;  [facility];  Service: Gastroenterology;  Laterality: N/A;  PRE: ANEMIA  POST: HEMORRHOIDS   • ENDOSCOPY N/A 3/10/2016    Procedure: ESOPHAGOGASTRODUODENOSCOPY ;  Surgeon: Shaan Becerra Jr., MD;  [facility];  Service:    • ENDOSCOPY N/A 6/18/2021    Procedure: ESOPHAGOGASTRODUODENOSCOPY;  Surgeon: Jeremy Darden MD;  Location: [facility] ENDOSCOPY;  Service: Gastroenterology;  Laterality: N/A;  PRE: ANEMIA  POST: HIATAL HERNIA, SMALL ESOPHAGEAL VARICES   • KNEE SURGERY     • TUBAL ABDOMINAL LIGATION             Current Outpatient Medications:   •  acetaminophen (TYLENOL) 500 MG tablet, Take 500 mg by mouth every 4 (four) hours as needed for mild pain (1-3) (1-2 TABLETS PRN Q 4-6 HOURS)., Disp: , Rfl:   •  allopurinol (ZYLOPRIM) 300 MG tablet, TAKE 1 TABLET BY MOUTH  DAILY, Disp: 90 tablet, Rfl: 3  •  atorvastatin (LIPITOR) 40 MG tablet, Take 1 tablet by mouth Daily., Disp: 90 tablet, Rfl: 0  •  buPROPion SR (WELLBUTRIN SR) 150 MG 12 hr tablet, Take 1 tablet by mouth Daily With Breakfast., Disp: 30 tablet, Rfl: 0  •  ferrous sulfate 325 (65 FE) MG tablet, Take 1 tablet by mouth Daily With Breakfast., Disp: 90 tablet, Rfl: 1  •  furosemide (LASIX) 40 MG tablet, TAKE 1 TABLET BY MOUTH TWICE DAILY, Disp: 60 tablet, Rfl: 0  •   glucose monitor monitoring kit, One touch meter, Disp: 1 each, Rfl: 0  •  isosorbide mononitrate (IMDUR) 60 MG 24 hr tablet, TAKE 1 TABLET BY MOUTH  DAILY, Disp: 90 tablet, Rfl: 3  •  losartan (COZAAR) 25 MG tablet, TAKE 1 TABLET BY MOUTH IN  THE MORNING AND 2 TABLETS  BY MOUTH IN THE EVENING, Disp: 270 tablet, Rfl: 3  •  metFORMIN ER (GLUCOPHAGE-XR) 500 MG 24 hr tablet, Take 2 tablets by mouth Daily With Breakfast., Disp: 30 tablet, Rfl: 5  •  metoprolol succinate XL (TOPROL-XL) 100 MG 24 hr tablet, TAKE 1 TABLET BY MOUTH  DAILY, Disp: 90 tablet, Rfl: 3  •  metoprolol succinate XL (TOPROL-XL) 50 MG 24 hr tablet, Take 3 tablets by mouth Daily., Disp: 90 tablet, Rfl: 0  •  mirtazapine (REMERON) 15 MG tablet, Take 1 tablet by mouth Every Night., Disp: 30 tablet, Rfl: 0  •  montelukast (SINGULAIR) 10 MG tablet, Take 1 tablet by mouth Every Night., Disp: 30 tablet, Rfl: 5  •  pantoprazole (PROTONIX) 40 MG EC tablet, TAKE 1 TABLET BY MOUTH  DAILY, Disp: 90 tablet, Rfl: 3  •  potassium chloride 10 MEQ CR tablet, TAKE 1 TABLET BY MOUTH IN  THE MORNING, Disp: 90 tablet, Rfl: 3  •  SITagliptin (Januvia) 100 MG tablet, Take 1 tablet by mouth Daily., Disp: 30 tablet, Rfl: 5      Social History     Socioeconomic History   • Marital status:    Tobacco Use   • Smoking status: Never Smoker   • Smokeless tobacco: Never Used   • Tobacco comment: caffeine use    Vaping Use   • Vaping Use: Never used   Substance and Sexual Activity   • Alcohol use: No   • Drug use: No   • Sexual activity: Defer         Review of Systems   Constitutional: Negative.   HENT: Negative.    Eyes: Negative.    Cardiovascular: Positive for leg swelling.   Respiratory: Negative.    Endocrine: Negative.    Skin: Negative.    Musculoskeletal: Negative.    Gastrointestinal: Negative.    Neurological: Negative.    Psychiatric/Behavioral: Negative.        Procedures      ECG 12 Lead    Date/Time: 12/22/2021 12:02 PM  Performed by: Jignesh York  "MD  Authorized by: Jignesh York MD   Comparison: compared with previous ECG from 1/16/2021  Comparison to previous ECG: Atrial fibrillation no longer evident  Rhythm: sinus rhythm  Rate: normal  Conduction: conduction normal  ST Segments: ST segments normal  QRS axis: normal                  Objective:    /68 (BP Location: Right arm, Patient Position: Sitting, Cuff Size: Adult)   Pulse 69   Ht 157.5 cm (62\")   Wt 85.3 kg (188 lb)   SpO2 97%   BMI 34.39 kg/m²         Vitals reviewed.   Constitutional:       Appearance: Well-developed.   Eyes:      Pupils: Pupils are equal, round, and reactive to light.   HENT:      Head: Normocephalic.   Neck:      Thyroid: No thyromegaly.      Vascular: No carotid bruit or JVD.   Pulmonary:      Effort: Pulmonary effort is normal.      Breath sounds: Normal breath sounds.   Cardiovascular:      Normal rate. Regular rhythm. Normal S1. Normal S2.      Murmurs: There is a grade 1/6 systolic murmur.      No gallop.   Pulses:     Intact distal pulses.   Edema:     Peripheral edema absent.   Abdominal:      General: Bowel sounds are normal.      Palpations: Abdomen is soft.   Musculoskeletal:      Cervical back: Normal range of motion. Skin:     General: Skin is warm and dry.      Findings: No erythema.   Neurological:      Mental Status: Alert and oriented to person, place, and time.             Assessment:       Diagnosis Plan   1. Paroxysmal atrial fibrillation (HCC)     2. Primary hypertension     3. Valvular endocarditis     4. Nonrheumatic tricuspid valve regurgitation     5. Nonrheumatic mitral valve regurgitation     6. Chronic diastolic (congestive) heart failure (HCC)       1.  Paroxysmal atrial fibrillation: There is not been any recurrence now in about a year.  I am going to stop her apixaban  2.  Hypertension: Controlled  3.  Endocarditis: No active infection  4.  Mitral regurgitation: Mild  5.  Tricuspid regurgitation: Mild  6.  Chronic diastolic heart " failure: Secondary to hypertension.  No evidence of fluid overload at this point.       Plan:

## 2021-12-29 NOTE — TELEPHONE ENCOUNTER
Rx Refill Note  Requested Prescriptions     Pending Prescriptions Disp Refills    furosemide (LASIX) 40 MG tablet [Pharmacy Med Name: FUROSEMIDE 40MG TABLETS] 180 tablet      Sig: TAKE 1 TABLET BY MOUTH TWICE DAILY    atorvastatin (LIPITOR) 40 MG tablet [Pharmacy Med Name: ATORVASTATIN 40MG TABLETS] 90 tablet 0     Sig: Take 1 tablet by mouth Daily.    metFORMIN ER (GLUCOPHAGE-XR) 500 MG 24 hr tablet [Pharmacy Med Name: METFORMIN ER 500MG 24HR TABS] 60 tablet      Sig: Take 2 tablets by mouth Daily With Breakfast.      Last office visit with prescribing clinician: 7/8/2021      Next office visit with prescribing clinician: Visit date not found         Devon Devries MA  12/29/21, 11:01 EST

## 2022-01-01 ENCOUNTER — APPOINTMENT (OUTPATIENT)
Dept: ULTRASOUND IMAGING | Facility: HOSPITAL | Age: 82
End: 2022-01-01

## 2022-01-01 ENCOUNTER — TELEPHONE (OUTPATIENT)
Dept: FAMILY MEDICINE CLINIC | Facility: CLINIC | Age: 82
End: 2022-01-01

## 2022-01-01 ENCOUNTER — READMISSION MANAGEMENT (OUTPATIENT)
Dept: CALL CENTER | Facility: HOSPITAL | Age: 82
End: 2022-01-01

## 2022-01-01 ENCOUNTER — APPOINTMENT (OUTPATIENT)
Dept: GENERAL RADIOLOGY | Facility: HOSPITAL | Age: 82
End: 2022-01-01

## 2022-01-01 ENCOUNTER — HOSPITAL ENCOUNTER (OUTPATIENT)
Dept: CARDIOLOGY | Facility: HOSPITAL | Age: 82
Discharge: HOME | End: 2022-06-14
Admitting: INTERNAL MEDICINE

## 2022-01-01 ENCOUNTER — HOSPITAL ENCOUNTER (OUTPATIENT)
Dept: CARDIOLOGY | Facility: HOSPITAL | Age: 82
Discharge: HOME | End: 2022-06-15
Admitting: INTERNAL MEDICINE

## 2022-01-01 ENCOUNTER — OFFICE VISIT (OUTPATIENT)
Dept: FAMILY MEDICINE CLINIC | Facility: CLINIC | Age: 82
End: 2022-01-01

## 2022-01-01 ENCOUNTER — HOSPITAL ENCOUNTER (INPATIENT)
Facility: HOSPITAL | Age: 82
LOS: 20 days | End: 2022-08-18
Attending: EMERGENCY MEDICINE | Admitting: STUDENT IN AN ORGANIZED HEALTH CARE EDUCATION/TRAINING PROGRAM

## 2022-01-01 ENCOUNTER — REMOTE PATIENT MONITORING (OUTPATIENT)
Dept: CARDIOLOGY | Facility: CLINIC | Age: 82
End: 2022-01-01

## 2022-01-01 ENCOUNTER — HOSPITAL ENCOUNTER (OUTPATIENT)
Dept: CT IMAGING | Facility: HOSPITAL | Age: 82
Discharge: HOME | End: 2022-07-28

## 2022-01-01 ENCOUNTER — HOSPITAL ENCOUNTER (INPATIENT)
Facility: HOSPITAL | Age: 82
LOS: 4 days | Discharge: HOME HEALTH | End: 2022-05-27
Attending: EMERGENCY MEDICINE | Admitting: INTERNAL MEDICINE

## 2022-01-01 ENCOUNTER — HOSPITAL ENCOUNTER (OUTPATIENT)
Dept: RESPIRATORY THERAPY | Facility: HOSPITAL | Age: 82
Discharge: HOME | End: 2022-06-29
Admitting: INTERNAL MEDICINE

## 2022-01-01 ENCOUNTER — LAB (OUTPATIENT)
Dept: LAB | Facility: HOSPITAL | Age: 82
End: 2022-01-01

## 2022-01-01 ENCOUNTER — APPOINTMENT (OUTPATIENT)
Dept: MRI IMAGING | Facility: HOSPITAL | Age: 82
End: 2022-01-01

## 2022-01-01 ENCOUNTER — OFFICE VISIT (OUTPATIENT)
Dept: GASTROENTEROLOGY | Facility: CLINIC | Age: 82
End: 2022-01-01

## 2022-01-01 ENCOUNTER — HOSPITAL ENCOUNTER (INPATIENT)
Facility: HOSPITAL | Age: 82
LOS: 7 days | Discharge: HOME HEALTH | End: 2022-03-11
Attending: EMERGENCY MEDICINE | Admitting: INTERNAL MEDICINE

## 2022-01-01 ENCOUNTER — APPOINTMENT (OUTPATIENT)
Dept: CT IMAGING | Facility: HOSPITAL | Age: 82
End: 2022-01-01

## 2022-01-01 ENCOUNTER — HOSPITAL ENCOUNTER (INPATIENT)
Facility: HOSPITAL | Age: 82
LOS: 4 days | Discharge: HOME HEALTH | End: 2022-05-06
Attending: EMERGENCY MEDICINE | Admitting: INTERNAL MEDICINE

## 2022-01-01 ENCOUNTER — APPOINTMENT (OUTPATIENT)
Dept: CARDIOLOGY | Facility: HOSPITAL | Age: 82
End: 2022-01-01

## 2022-01-01 ENCOUNTER — APPOINTMENT (OUTPATIENT)
Dept: NEUROLOGY | Facility: HOSPITAL | Age: 82
End: 2022-01-01

## 2022-01-01 ENCOUNTER — TELEPHONE (OUTPATIENT)
Dept: CARDIOLOGY | Facility: HOSPITAL | Age: 82
End: 2022-01-01

## 2022-01-01 ENCOUNTER — TELEPHONE (OUTPATIENT)
Dept: CARDIOLOGY | Facility: CLINIC | Age: 82
End: 2022-01-01

## 2022-01-01 ENCOUNTER — HOSPITAL ENCOUNTER (OUTPATIENT)
Dept: CARDIOLOGY | Facility: HOSPITAL | Age: 82
Discharge: HOME | End: 2022-06-21

## 2022-01-01 ENCOUNTER — HOSPITAL ENCOUNTER (OUTPATIENT)
Dept: CARDIOLOGY | Facility: HOSPITAL | Age: 82
Discharge: HOME | End: 2022-06-27
Admitting: INTERNAL MEDICINE

## 2022-01-01 ENCOUNTER — HOSPITAL ENCOUNTER (OUTPATIENT)
Dept: CARDIOLOGY | Facility: HOSPITAL | Age: 82
Discharge: HOME | End: 2022-06-07
Admitting: INTERNAL MEDICINE

## 2022-01-01 ENCOUNTER — TRANSITIONAL CARE MANAGEMENT TELEPHONE ENCOUNTER (OUTPATIENT)
Dept: CALL CENTER | Facility: HOSPITAL | Age: 82
End: 2022-01-01

## 2022-01-01 ENCOUNTER — TELEPHONE (OUTPATIENT)
Dept: GASTROENTEROLOGY | Facility: CLINIC | Age: 82
End: 2022-01-01

## 2022-01-01 ENCOUNTER — APPOINTMENT (OUTPATIENT)
Dept: SLEEP MEDICINE | Facility: HOSPITAL | Age: 82
End: 2022-01-01

## 2022-01-01 ENCOUNTER — OFFICE VISIT (OUTPATIENT)
Dept: CARDIOLOGY | Facility: CLINIC | Age: 82
End: 2022-01-01

## 2022-01-01 ENCOUNTER — HOSPITAL ENCOUNTER (OUTPATIENT)
Dept: CT IMAGING | Facility: HOSPITAL | Age: 82
Discharge: HOME | End: 2022-03-02
Admitting: NURSE PRACTITIONER

## 2022-01-01 VITALS
BODY MASS INDEX: 36.43 KG/M2 | HEART RATE: 87 BPM | SYSTOLIC BLOOD PRESSURE: 138 MMHG | OXYGEN SATURATION: 80 % | TEMPERATURE: 98.2 F | WEIGHT: 199.21 LBS | RESPIRATION RATE: 22 BRPM | DIASTOLIC BLOOD PRESSURE: 54 MMHG

## 2022-01-01 VITALS — HEIGHT: 62 IN | WEIGHT: 215.2 LBS | TEMPERATURE: 97.1 F | BODY MASS INDEX: 39.6 KG/M2

## 2022-01-01 VITALS
DIASTOLIC BLOOD PRESSURE: 64 MMHG | WEIGHT: 181.4 LBS | HEIGHT: 62 IN | HEART RATE: 97 BPM | OXYGEN SATURATION: 93 % | SYSTOLIC BLOOD PRESSURE: 114 MMHG | BODY MASS INDEX: 33.38 KG/M2

## 2022-01-01 VITALS — BODY MASS INDEX: 31.65 KG/M2 | HEIGHT: 62 IN | WEIGHT: 172 LBS | TEMPERATURE: 96.2 F

## 2022-01-01 VITALS
BODY MASS INDEX: 31.94 KG/M2 | DIASTOLIC BLOOD PRESSURE: 60 MMHG | OXYGEN SATURATION: 96 % | HEART RATE: 81 BPM | SYSTOLIC BLOOD PRESSURE: 104 MMHG | HEIGHT: 62 IN | WEIGHT: 173.6 LBS

## 2022-01-01 VITALS
HEART RATE: 76 BPM | HEIGHT: 62 IN | DIASTOLIC BLOOD PRESSURE: 58 MMHG | OXYGEN SATURATION: 96 % | BODY MASS INDEX: 36.62 KG/M2 | WEIGHT: 199 LBS | SYSTOLIC BLOOD PRESSURE: 108 MMHG | TEMPERATURE: 97.7 F

## 2022-01-01 VITALS
SYSTOLIC BLOOD PRESSURE: 116 MMHG | DIASTOLIC BLOOD PRESSURE: 58 MMHG | TEMPERATURE: 97.7 F | BODY MASS INDEX: 35.04 KG/M2 | WEIGHT: 190.4 LBS | HEART RATE: 120 BPM | HEIGHT: 62 IN | OXYGEN SATURATION: 95 %

## 2022-01-01 VITALS
DIASTOLIC BLOOD PRESSURE: 54 MMHG | HEART RATE: 84 BPM | WEIGHT: 193.4 LBS | SYSTOLIC BLOOD PRESSURE: 104 MMHG | HEIGHT: 62 IN | OXYGEN SATURATION: 95 % | BODY MASS INDEX: 35.59 KG/M2

## 2022-01-01 VITALS
TEMPERATURE: 97.8 F | OXYGEN SATURATION: 91 % | HEIGHT: 62 IN | DIASTOLIC BLOOD PRESSURE: 47 MMHG | HEART RATE: 91 BPM | BODY MASS INDEX: 35.25 KG/M2 | WEIGHT: 191.58 LBS | RESPIRATION RATE: 16 BRPM | SYSTOLIC BLOOD PRESSURE: 98 MMHG

## 2022-01-01 VITALS
HEART RATE: 97 BPM | TEMPERATURE: 98 F | SYSTOLIC BLOOD PRESSURE: 104 MMHG | OXYGEN SATURATION: 97 % | WEIGHT: 155.7 LBS | DIASTOLIC BLOOD PRESSURE: 57 MMHG | BODY MASS INDEX: 28.65 KG/M2 | RESPIRATION RATE: 16 BRPM

## 2022-01-01 VITALS
BODY MASS INDEX: 36.7 KG/M2 | OXYGEN SATURATION: 93 % | WEIGHT: 199.4 LBS | HEIGHT: 62 IN | HEART RATE: 120 BPM | SYSTOLIC BLOOD PRESSURE: 110 MMHG | DIASTOLIC BLOOD PRESSURE: 60 MMHG

## 2022-01-01 VITALS
DIASTOLIC BLOOD PRESSURE: 60 MMHG | RESPIRATION RATE: 22 BRPM | SYSTOLIC BLOOD PRESSURE: 120 MMHG | OXYGEN SATURATION: 96 % | HEIGHT: 62 IN | HEART RATE: 74 BPM | TEMPERATURE: 98 F | BODY MASS INDEX: 34.96 KG/M2 | WEIGHT: 190 LBS

## 2022-01-01 VITALS
HEART RATE: 109 BPM | WEIGHT: 171 LBS | SYSTOLIC BLOOD PRESSURE: 80 MMHG | DIASTOLIC BLOOD PRESSURE: 50 MMHG | HEIGHT: 62 IN | OXYGEN SATURATION: 98 % | BODY MASS INDEX: 31.47 KG/M2

## 2022-01-01 VITALS
HEIGHT: 62 IN | DIASTOLIC BLOOD PRESSURE: 70 MMHG | BODY MASS INDEX: 36.25 KG/M2 | HEART RATE: 97 BPM | WEIGHT: 197 LBS | OXYGEN SATURATION: 93 % | RESPIRATION RATE: 20 BRPM | SYSTOLIC BLOOD PRESSURE: 122 MMHG

## 2022-01-01 VITALS
RESPIRATION RATE: 20 BRPM | BODY MASS INDEX: 39.75 KG/M2 | OXYGEN SATURATION: 96 % | HEART RATE: 79 BPM | WEIGHT: 216 LBS | HEIGHT: 62 IN | SYSTOLIC BLOOD PRESSURE: 98 MMHG | TEMPERATURE: 98.4 F | DIASTOLIC BLOOD PRESSURE: 60 MMHG

## 2022-01-01 VITALS
DIASTOLIC BLOOD PRESSURE: 58 MMHG | BODY MASS INDEX: 29.88 KG/M2 | HEIGHT: 62 IN | TEMPERATURE: 96.9 F | WEIGHT: 162.4 LBS | SYSTOLIC BLOOD PRESSURE: 116 MMHG | OXYGEN SATURATION: 97 % | HEART RATE: 92 BPM

## 2022-01-01 VITALS
HEART RATE: 82 BPM | BODY MASS INDEX: 36.66 KG/M2 | DIASTOLIC BLOOD PRESSURE: 62 MMHG | HEIGHT: 62 IN | WEIGHT: 199.2 LBS | SYSTOLIC BLOOD PRESSURE: 103 MMHG

## 2022-01-01 VITALS
WEIGHT: 197.4 LBS | DIASTOLIC BLOOD PRESSURE: 50 MMHG | OXYGEN SATURATION: 92 % | BODY MASS INDEX: 36.33 KG/M2 | HEIGHT: 62 IN | SYSTOLIC BLOOD PRESSURE: 100 MMHG | HEART RATE: 88 BPM

## 2022-01-01 VITALS
SYSTOLIC BLOOD PRESSURE: 124 MMHG | WEIGHT: 214.6 LBS | OXYGEN SATURATION: 98 % | HEIGHT: 62 IN | DIASTOLIC BLOOD PRESSURE: 68 MMHG | HEART RATE: 98 BPM | BODY MASS INDEX: 39.49 KG/M2 | TEMPERATURE: 98.6 F

## 2022-01-01 VITALS
HEIGHT: 62 IN | OXYGEN SATURATION: 92 % | WEIGHT: 167.9 LBS | RESPIRATION RATE: 18 BRPM | DIASTOLIC BLOOD PRESSURE: 97 MMHG | HEART RATE: 111 BPM | SYSTOLIC BLOOD PRESSURE: 148 MMHG | TEMPERATURE: 97.5 F | BODY MASS INDEX: 30.9 KG/M2

## 2022-01-01 VITALS
BODY MASS INDEX: 36.8 KG/M2 | SYSTOLIC BLOOD PRESSURE: 116 MMHG | RESPIRATION RATE: 20 BRPM | HEART RATE: 88 BPM | WEIGHT: 200 LBS | OXYGEN SATURATION: 91 % | DIASTOLIC BLOOD PRESSURE: 72 MMHG | HEIGHT: 62 IN

## 2022-01-01 VITALS
WEIGHT: 142 LBS | SYSTOLIC BLOOD PRESSURE: 100 MMHG | RESPIRATION RATE: 22 BRPM | HEIGHT: 62 IN | BODY MASS INDEX: 26.13 KG/M2 | HEART RATE: 88 BPM | OXYGEN SATURATION: 97 % | DIASTOLIC BLOOD PRESSURE: 62 MMHG | TEMPERATURE: 97.8 F

## 2022-01-01 VITALS
SYSTOLIC BLOOD PRESSURE: 139 MMHG | BODY MASS INDEX: 29.29 KG/M2 | HEIGHT: 62 IN | TEMPERATURE: 96.6 F | DIASTOLIC BLOOD PRESSURE: 86 MMHG | WEIGHT: 159.17 LBS | OXYGEN SATURATION: 87 %

## 2022-01-01 VITALS — OXYGEN SATURATION: 92 %

## 2022-01-01 DIAGNOSIS — D50.9 IRON DEFICIENCY ANEMIA, UNSPECIFIED IRON DEFICIENCY ANEMIA TYPE: ICD-10-CM

## 2022-01-01 DIAGNOSIS — R18.8 CIRRHOSIS OF LIVER WITH ASCITES, UNSPECIFIED HEPATIC CIRRHOSIS TYPE: ICD-10-CM

## 2022-01-01 DIAGNOSIS — K74.60 CIRRHOSIS OF LIVER WITH ASCITES, UNSPECIFIED HEPATIC CIRRHOSIS TYPE: Primary | ICD-10-CM

## 2022-01-01 DIAGNOSIS — I50.31 ACUTE DIASTOLIC CHF (CONGESTIVE HEART FAILURE): Primary | ICD-10-CM

## 2022-01-01 DIAGNOSIS — E78.00 PURE HYPERCHOLESTEROLEMIA: ICD-10-CM

## 2022-01-01 DIAGNOSIS — R91.1 PULMONARY NODULE: ICD-10-CM

## 2022-01-01 DIAGNOSIS — N17.9 ACUTE RENAL FAILURE, UNSPECIFIED ACUTE RENAL FAILURE TYPE: Primary | ICD-10-CM

## 2022-01-01 DIAGNOSIS — I27.20 PULMONARY HYPERTENSION: Primary | ICD-10-CM

## 2022-01-01 DIAGNOSIS — E87.5 HYPERKALEMIA: ICD-10-CM

## 2022-01-01 DIAGNOSIS — J32.1 CHRONIC FRONTAL SINUSITIS: ICD-10-CM

## 2022-01-01 DIAGNOSIS — K75.81 NASH (NONALCOHOLIC STEATOHEPATITIS): ICD-10-CM

## 2022-01-01 DIAGNOSIS — R60.1 ANASARCA: ICD-10-CM

## 2022-01-01 DIAGNOSIS — G47.39 SLEEP APNEA-LIKE BEHAVIOR: ICD-10-CM

## 2022-01-01 DIAGNOSIS — K74.60 CIRRHOSIS OF LIVER WITHOUT ASCITES, UNSPECIFIED HEPATIC CIRRHOSIS TYPE: ICD-10-CM

## 2022-01-01 DIAGNOSIS — I50.32 CHRONIC HEART FAILURE WITH PRESERVED EJECTION FRACTION: ICD-10-CM

## 2022-01-01 DIAGNOSIS — F51.01 PRIMARY INSOMNIA: ICD-10-CM

## 2022-01-01 DIAGNOSIS — I36.1 NONRHEUMATIC TRICUSPID VALVE REGURGITATION: ICD-10-CM

## 2022-01-01 DIAGNOSIS — R56.9 SEIZURE: ICD-10-CM

## 2022-01-01 DIAGNOSIS — I48.91 ATRIAL FIBRILLATION, UNSPECIFIED TYPE: ICD-10-CM

## 2022-01-01 DIAGNOSIS — I35.0 AORTIC STENOSIS, MILD: ICD-10-CM

## 2022-01-01 DIAGNOSIS — R18.8 CIRRHOSIS OF LIVER WITH ASCITES, UNSPECIFIED HEPATIC CIRRHOSIS TYPE: Primary | ICD-10-CM

## 2022-01-01 DIAGNOSIS — I34.0 NONRHEUMATIC MITRAL VALVE REGURGITATION: ICD-10-CM

## 2022-01-01 DIAGNOSIS — R97.8 ELEVATED CA 19-9 LEVEL: ICD-10-CM

## 2022-01-01 DIAGNOSIS — N18.9 CHRONIC KIDNEY DISEASE, UNSPECIFIED CKD STAGE: ICD-10-CM

## 2022-01-01 DIAGNOSIS — R53.1 GENERALIZED WEAKNESS: ICD-10-CM

## 2022-01-01 DIAGNOSIS — D64.9 ANEMIA, UNSPECIFIED TYPE: ICD-10-CM
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
## 2022-01-01 DIAGNOSIS — I48.91 ATRIAL FIBRILLATION WITH RVR: ICD-10-CM

## 2022-01-01 DIAGNOSIS — E11.9 TYPE 2 DIABETES MELLITUS WITHOUT COMPLICATION, WITHOUT LONG-TERM CURRENT USE OF INSULIN: ICD-10-CM

## 2022-01-01 DIAGNOSIS — F32.A DEPRESSION, UNSPECIFIED DEPRESSION TYPE: Primary | ICD-10-CM

## 2022-01-01 DIAGNOSIS — R41.0 CONFUSION: ICD-10-CM

## 2022-01-01 DIAGNOSIS — I27.20 PULMONARY HYPERTENSION: ICD-10-CM

## 2022-01-01 DIAGNOSIS — I50.9 ACUTE ON CHRONIC CONGESTIVE HEART FAILURE, UNSPECIFIED HEART FAILURE TYPE: ICD-10-CM

## 2022-01-01 DIAGNOSIS — I10 PRIMARY HYPERTENSION: ICD-10-CM

## 2022-01-01 DIAGNOSIS — E87.6 HYPOKALEMIA: ICD-10-CM

## 2022-01-01 DIAGNOSIS — I48.0 PAROXYSMAL ATRIAL FIBRILLATION: ICD-10-CM

## 2022-01-01 DIAGNOSIS — D64.9 ANEMIA, UNSPECIFIED TYPE: Primary | ICD-10-CM

## 2022-01-01 DIAGNOSIS — I50.9 CONGESTIVE HEART FAILURE, UNSPECIFIED HF CHRONICITY, UNSPECIFIED HEART FAILURE TYPE: ICD-10-CM

## 2022-01-01 DIAGNOSIS — R53.1 GENERAL WEAKNESS: ICD-10-CM

## 2022-01-01 DIAGNOSIS — F32.A DEPRESSION, UNSPECIFIED DEPRESSION TYPE: ICD-10-CM

## 2022-01-01 DIAGNOSIS — I50.32 CHRONIC DIASTOLIC HEART FAILURE: ICD-10-CM

## 2022-01-01 DIAGNOSIS — S00.03XA CONTUSION OF SCALP, INITIAL ENCOUNTER: ICD-10-CM

## 2022-01-01 DIAGNOSIS — I48.11 LONGSTANDING PERSISTENT ATRIAL FIBRILLATION: Primary | ICD-10-CM

## 2022-01-01 DIAGNOSIS — I38 VALVULAR ENDOCARDITIS: ICD-10-CM

## 2022-01-01 DIAGNOSIS — R06.02 SHORTNESS OF BREATH: ICD-10-CM

## 2022-01-01 DIAGNOSIS — I48.11 LONGSTANDING PERSISTENT ATRIAL FIBRILLATION: ICD-10-CM

## 2022-01-01 DIAGNOSIS — K74.60 CIRRHOSIS OF LIVER WITH ASCITES, UNSPECIFIED HEPATIC CIRRHOSIS TYPE: ICD-10-CM

## 2022-01-01 DIAGNOSIS — I50.32 CHRONIC DIASTOLIC (CONGESTIVE) HEART FAILURE: ICD-10-CM

## 2022-01-01 DIAGNOSIS — Z79.01 ANTICOAGULATED: ICD-10-CM

## 2022-01-01 DIAGNOSIS — R93.5 ABNORMAL CT OF THE ABDOMEN: ICD-10-CM

## 2022-01-01 DIAGNOSIS — E66.01 MORBIDLY OBESE: ICD-10-CM

## 2022-01-01 DIAGNOSIS — L29.8 CHOLESTATIC PRURITUS: ICD-10-CM

## 2022-01-01 DIAGNOSIS — R77.2 ELEVATED AFP: ICD-10-CM

## 2022-01-01 DIAGNOSIS — R60.9 EDEMA, UNSPECIFIED TYPE: Primary | ICD-10-CM

## 2022-01-01 DIAGNOSIS — I50.32 CHRONIC DIASTOLIC (CONGESTIVE) HEART FAILURE: Primary | ICD-10-CM

## 2022-01-01 DIAGNOSIS — Z23 NEED FOR INFLUENZA VACCINATION: ICD-10-CM

## 2022-01-01 DIAGNOSIS — R77.2 ELEVATED ALPHA FETOPROTEIN: ICD-10-CM

## 2022-01-01 DIAGNOSIS — R60.0 PEDAL EDEMA: ICD-10-CM

## 2022-01-01 DIAGNOSIS — K74.60 CIRRHOSIS OF LIVER WITHOUT ASCITES, UNSPECIFIED HEPATIC CIRRHOSIS TYPE: Primary | ICD-10-CM

## 2022-01-01 DIAGNOSIS — J98.11 ATELECTASIS: ICD-10-CM

## 2022-01-01 DIAGNOSIS — I50.32 CHRONIC HEART FAILURE WITH PRESERVED EJECTION FRACTION: Primary | ICD-10-CM

## 2022-01-01 DIAGNOSIS — K92.2 GASTROINTESTINAL HEMORRHAGE, UNSPECIFIED GASTROINTESTINAL HEMORRHAGE TYPE: ICD-10-CM

## 2022-01-01 DIAGNOSIS — I10 ESSENTIAL HYPERTENSION: ICD-10-CM

## 2022-01-01 DIAGNOSIS — R06.09 DYSPNEA ON EXERTION: Primary | ICD-10-CM

## 2022-01-01 DIAGNOSIS — R40.0 DROWSY: ICD-10-CM

## 2022-01-01 DIAGNOSIS — R06.09 DYSPNEA ON EXERTION: ICD-10-CM

## 2022-01-01 DIAGNOSIS — R55 SYNCOPE, UNSPECIFIED SYNCOPE TYPE: Primary | ICD-10-CM

## 2022-01-01 DIAGNOSIS — Z13.0 SCREENING FOR DEFICIENCY ANEMIA: ICD-10-CM

## 2022-01-01 DIAGNOSIS — Z86.73 HISTORY OF CVA (CEREBROVASCULAR ACCIDENT): ICD-10-CM

## 2022-01-01 LAB
ABO GROUP BLD: NORMAL
ADV 40+41 DNA STL QL NAA+NON-PROBE: NOT DETECTED
AFP-TM SERPL-MCNC: 16.8 NG/ML (ref 0–8.7)
ALBUMIN FLD-MCNC: 1 G/DL
ALBUMIN FLD-MCNC: 1.1 G/DL
ALBUMIN SERPL-MCNC: 2.8 G/DL (ref 3.5–5.2)
ALBUMIN SERPL-MCNC: 2.9 G/DL (ref 3.5–5.2)
ALBUMIN SERPL-MCNC: 3 G/DL (ref 3.5–5.2)
ALBUMIN SERPL-MCNC: 3.1 G/DL (ref 3.5–5.2)
ALBUMIN SERPL-MCNC: 3.2 G/DL (ref 3.5–5.2)
ALBUMIN SERPL-MCNC: 3.2 G/DL (ref 3.5–5.2)
ALBUMIN SERPL-MCNC: 3.3 G/DL (ref 3.5–5.2)
ALBUMIN SERPL-MCNC: 3.5 G/DL (ref 3.5–5.2)
ALBUMIN SERPL-MCNC: 3.6 G/DL (ref 3.5–5.2)
ALBUMIN SERPL-MCNC: 3.6 G/DL (ref 3.5–5.2)
ALBUMIN SERPL-MCNC: 3.7 G/DL (ref 3.5–5.2)
ALBUMIN SERPL-MCNC: 3.8 G/DL (ref 3.5–5.2)
ALBUMIN SERPL-MCNC: 3.8 G/DL (ref 3.5–5.2)
ALBUMIN SERPL-MCNC: 3.9 G/DL (ref 3.5–5.2)
ALBUMIN SERPL-MCNC: 4 G/DL (ref 3.5–5.2)
ALBUMIN SERPL-MCNC: 4 G/DL (ref 3.5–5.2)
ALBUMIN SERPL-MCNC: 4 G/DL (ref 3.6–4.6)
ALBUMIN SERPL-MCNC: 4.1 G/DL (ref 3.5–5.2)
ALBUMIN SERPL-MCNC: 4.1 G/DL (ref 3.6–4.6)
ALBUMIN SERPL-MCNC: 4.3 G/DL (ref 3.6–4.6)
ALBUMIN SERPL-MCNC: 4.4 G/DL (ref 3.6–4.6)
ALBUMIN/GLOB SERPL: 1.4 G/DL
ALBUMIN/GLOB SERPL: 1.5 G/DL
ALBUMIN/GLOB SERPL: 1.9 G/DL
ALBUMIN/GLOB SERPL: 1.9 G/DL
ALBUMIN/GLOB SERPL: 2 G/DL
ALBUMIN/GLOB SERPL: 2.2 G/DL
ALBUMIN/GLOB SERPL: 2.2 G/DL
ALBUMIN/GLOB SERPL: 2.3 G/DL
ALBUMIN/GLOB SERPL: 2.4 G/DL
ALBUMIN/GLOB SERPL: 2.4 G/DL
ALBUMIN/GLOB SERPL: 2.4 {RATIO} (ref 1.2–2.2)
ALBUMIN/GLOB SERPL: 2.7 {RATIO} (ref 1.2–2.2)
ALBUMIN/GLOB SERPL: 3.1 G/DL
ALBUMIN/GLOB SERPL: 3.1 {RATIO} (ref 1.2–2.2)
ALBUMIN/GLOB SERPL: 3.1 {RATIO} (ref 1.2–2.2)
ALP SERPL-CCNC: 104 U/L (ref 39–117)
ALP SERPL-CCNC: 105 U/L (ref 39–117)
ALP SERPL-CCNC: 107 U/L (ref 39–117)
ALP SERPL-CCNC: 108 U/L (ref 39–117)
ALP SERPL-CCNC: 110 U/L (ref 39–117)
ALP SERPL-CCNC: 111 U/L (ref 39–117)
ALP SERPL-CCNC: 121 U/L (ref 39–117)
ALP SERPL-CCNC: 125 IU/L (ref 44–121)
ALP SERPL-CCNC: 125 U/L (ref 39–117)
ALP SERPL-CCNC: 125 U/L (ref 39–117)
ALP SERPL-CCNC: 126 IU/L (ref 44–121)
ALP SERPL-CCNC: 130 IU/L (ref 44–121)
ALP SERPL-CCNC: 132 U/L (ref 39–117)
ALP SERPL-CCNC: 136 U/L (ref 39–117)
ALP SERPL-CCNC: 142 U/L (ref 39–117)
ALP SERPL-CCNC: 148 U/L (ref 39–117)
ALP SERPL-CCNC: 155 IU/L (ref 44–121)
ALP SERPL-CCNC: 162 U/L (ref 39–117)
ALP SERPL-CCNC: 88 U/L (ref 39–117)
ALP SERPL-CCNC: 94 U/L (ref 39–117)
ALP SERPL-CCNC: 96 U/L (ref 39–117)
ALT SERPL W P-5'-P-CCNC: 16 U/L (ref 1–33)
ALT SERPL W P-5'-P-CCNC: 19 U/L (ref 1–33)
ALT SERPL W P-5'-P-CCNC: 19 U/L (ref 1–33)
ALT SERPL W P-5'-P-CCNC: 20 U/L (ref 1–33)
ALT SERPL W P-5'-P-CCNC: 20 U/L (ref 1–33)
ALT SERPL W P-5'-P-CCNC: 22 U/L (ref 1–33)
ALT SERPL W P-5'-P-CCNC: 22 U/L (ref 1–33)
ALT SERPL W P-5'-P-CCNC: 23 U/L (ref 1–33)
ALT SERPL W P-5'-P-CCNC: 24 U/L (ref 1–33)
ALT SERPL W P-5'-P-CCNC: 26 U/L (ref 1–33)
ALT SERPL W P-5'-P-CCNC: 26 U/L (ref 1–33)
ALT SERPL W P-5'-P-CCNC: 27 U/L (ref 1–33)
ALT SERPL W P-5'-P-CCNC: 28 U/L (ref 1–33)
ALT SERPL W P-5'-P-CCNC: 28 U/L (ref 1–33)
ALT SERPL-CCNC: 16 IU/L (ref 0–32)
ALT SERPL-CCNC: 22 IU/L (ref 0–32)
ALT SERPL-CCNC: 23 IU/L (ref 0–32)
ALT SERPL-CCNC: 37 IU/L (ref 0–32)
AMMONIA BLD-SCNC: 20 UMOL/L (ref 11–51)
AMMONIA BLD-SCNC: 21 UMOL/L (ref 11–51)
AMMONIA BLD-SCNC: 24 UMOL/L (ref 11–51)
AMMONIA BLD-SCNC: 32 UMOL/L (ref 11–51)
AMMONIA BLD-SCNC: 36 UMOL/L (ref 11–51)
AMMONIA BLD-SCNC: 43 UMOL/L (ref 11–51)
AMMONIA BLD-SCNC: 63 UMOL/L (ref 11–51)
AMPHET+METHAMPHET UR QL: NEGATIVE
AMPHET+METHAMPHET UR QL: NEGATIVE
ANION GAP SERPL CALCULATED.3IONS-SCNC: 10 MMOL/L (ref 5–15)
ANION GAP SERPL CALCULATED.3IONS-SCNC: 10.1 MMOL/L (ref 5–15)
ANION GAP SERPL CALCULATED.3IONS-SCNC: 10.4 MMOL/L (ref 5–15)
ANION GAP SERPL CALCULATED.3IONS-SCNC: 10.7 MMOL/L (ref 5–15)
ANION GAP SERPL CALCULATED.3IONS-SCNC: 10.8 MMOL/L (ref 5–15)
ANION GAP SERPL CALCULATED.3IONS-SCNC: 10.8 MMOL/L (ref 5–15)
ANION GAP SERPL CALCULATED.3IONS-SCNC: 11 MMOL/L (ref 5–15)
ANION GAP SERPL CALCULATED.3IONS-SCNC: 11.4 MMOL/L (ref 5–15)
ANION GAP SERPL CALCULATED.3IONS-SCNC: 12 MMOL/L (ref 5–15)
ANION GAP SERPL CALCULATED.3IONS-SCNC: 12.8 MMOL/L (ref 5–15)
ANION GAP SERPL CALCULATED.3IONS-SCNC: 12.9 MMOL/L (ref 5–15)
ANION GAP SERPL CALCULATED.3IONS-SCNC: 13 MMOL/L (ref 5–15)
ANION GAP SERPL CALCULATED.3IONS-SCNC: 13.1 MMOL/L (ref 5–15)
ANION GAP SERPL CALCULATED.3IONS-SCNC: 13.9 MMOL/L (ref 5–15)
ANION GAP SERPL CALCULATED.3IONS-SCNC: 15.2 MMOL/L (ref 5–15)
ANION GAP SERPL CALCULATED.3IONS-SCNC: 16.7 MMOL/L (ref 5–15)
ANION GAP SERPL CALCULATED.3IONS-SCNC: 4.7 MMOL/L (ref 5–15)
ANION GAP SERPL CALCULATED.3IONS-SCNC: 4.7 MMOL/L (ref 5–15)
ANION GAP SERPL CALCULATED.3IONS-SCNC: 5.5 MMOL/L (ref 5–15)
ANION GAP SERPL CALCULATED.3IONS-SCNC: 5.8 MMOL/L (ref 5–15)
ANION GAP SERPL CALCULATED.3IONS-SCNC: 6 MMOL/L (ref 5–15)
ANION GAP SERPL CALCULATED.3IONS-SCNC: 6.2 MMOL/L (ref 5–15)
ANION GAP SERPL CALCULATED.3IONS-SCNC: 6.9 MMOL/L (ref 5–15)
ANION GAP SERPL CALCULATED.3IONS-SCNC: 7.7 MMOL/L (ref 5–15)
ANION GAP SERPL CALCULATED.3IONS-SCNC: 8 MMOL/L (ref 5–15)
ANION GAP SERPL CALCULATED.3IONS-SCNC: 9 MMOL/L (ref 5–15)
ANION GAP SERPL CALCULATED.3IONS-SCNC: 9 MMOL/L (ref 5–15)
ANION GAP SERPL CALCULATED.3IONS-SCNC: 9.1 MMOL/L (ref 5–15)
ANION GAP SERPL CALCULATED.3IONS-SCNC: 9.1 MMOL/L (ref 5–15)
ANION GAP SERPL CALCULATED.3IONS-SCNC: 9.4 MMOL/L (ref 5–15)
ANION GAP SERPL CALCULATED.3IONS-SCNC: 9.7 MMOL/L (ref 5–15)
ANION GAP SERPL CALCULATED.3IONS-SCNC: 9.9 MMOL/L (ref 5–15)
AORTIC DIMENSIONLESS INDEX: 0.5 (DI)
APPEARANCE FLD: ABNORMAL
APPEARANCE FLD: ABNORMAL
ARTERIAL PATENCY WRIST A: ABNORMAL
ARTERIAL PATENCY WRIST A: POSITIVE
ASCENDING AORTA: 2.8 CM
ASCENDING AORTA: 3 CM
AST SERPL-CCNC: 17 U/L (ref 1–32)
AST SERPL-CCNC: 18 U/L (ref 1–32)
AST SERPL-CCNC: 19 U/L (ref 1–32)
AST SERPL-CCNC: 20 IU/L (ref 0–40)
AST SERPL-CCNC: 20 U/L (ref 1–32)
AST SERPL-CCNC: 21 U/L (ref 1–32)
AST SERPL-CCNC: 24 U/L (ref 1–32)
AST SERPL-CCNC: 24 U/L (ref 1–32)
AST SERPL-CCNC: 27 IU/L (ref 0–40)
AST SERPL-CCNC: 27 IU/L (ref 0–40)
AST SERPL-CCNC: 27 U/L (ref 1–32)
AST SERPL-CCNC: 28 U/L (ref 1–32)
AST SERPL-CCNC: 29 U/L (ref 1–32)
AST SERPL-CCNC: 30 U/L (ref 1–32)
AST SERPL-CCNC: 35 U/L (ref 1–32)
AST SERPL-CCNC: 38 U/L (ref 1–32)
AST SERPL-CCNC: 39 IU/L (ref 0–40)
AST SERPL-CCNC: 45 U/L (ref 1–32)
AST SERPL-CCNC: 45 U/L (ref 1–32)
ASTRO TYP 1-8 RNA STL QL NAA+NON-PROBE: NOT DETECTED
ATMOSPHERIC PRESS: 747.9 MMHG
ATMOSPHERIC PRESS: 749.7 MMHG
ATMOSPHERIC PRESS: 750.8 MMHG
ATMOSPHERIC PRESS: 755.1 MMHG
BACTERIA FLD CULT: NORMAL
BACTERIA SPEC AEROBE CULT: ABNORMAL
BACTERIA UR CULT: NORMAL
BACTERIA UR CULT: NORMAL
BACTERIA UR QL AUTO: ABNORMAL /HPF
BACTERIA UR QL AUTO: NORMAL /HPF
BACTERIA UR QL AUTO: NORMAL /HPF
BARBITURATES UR QL SCN: NEGATIVE
BARBITURATES UR QL SCN: NEGATIVE
BASE EXCESS BLDA CALC-SCNC: 1.9 MMOL/L (ref 0–2)
BASE EXCESS BLDA CALC-SCNC: 13.3 MMOL/L (ref 0–2)
BASE EXCESS BLDA CALC-SCNC: 8 MMOL/L (ref 0–2)
BASE EXCESS BLDA CALC-SCNC: 8.5 MMOL/L (ref 0–2)
BASOPHILS # BLD AUTO: 0.05 10*3/MM3 (ref 0–0.2)
BASOPHILS # BLD AUTO: 0.05 10*3/MM3 (ref 0–0.2)
BASOPHILS # BLD AUTO: 0.06 10*3/MM3 (ref 0–0.2)
BASOPHILS # BLD AUTO: 0.06 10*3/MM3 (ref 0–0.2)
BASOPHILS # BLD AUTO: 0.1 X10E3/UL (ref 0–0.2)
BASOPHILS NFR BLD AUTO: 0.5 % (ref 0–1.5)
BASOPHILS NFR BLD AUTO: 0.7 % (ref 0–1.5)
BASOPHILS NFR BLD AUTO: 0.8 % (ref 0–1.5)
BASOPHILS NFR BLD AUTO: 0.8 % (ref 0–1.5)
BASOPHILS NFR BLD AUTO: 1 %
BDY SITE: ABNORMAL
BENZODIAZ UR QL SCN: NEGATIVE
BENZODIAZ UR QL SCN: NEGATIVE
BH BB BLOOD EXPIRATION DATE: NORMAL
BH BB BLOOD TYPE BARCODE: 9500
BH BB DISPENSE STATUS: NORMAL
BH BB PRODUCT CODE: NORMAL
BH BB UNIT NUMBER: NORMAL
BH CV ECHO MEAS - ACS: 1.52 CM
BH CV ECHO MEAS - ACS: 1.54 CM
BH CV ECHO MEAS - AO MAX PG: 22.8 MMHG
BH CV ECHO MEAS - AO MAX PG: 40.4 MMHG
BH CV ECHO MEAS - AO MEAN PG: 13 MMHG
BH CV ECHO MEAS - AO MEAN PG: 21.1 MMHG
BH CV ECHO MEAS - AO ROOT DIAM: 2.8 CM
BH CV ECHO MEAS - AO ROOT DIAM: 3 CM
BH CV ECHO MEAS - AO V2 MAX: 238.9 CM/SEC
BH CV ECHO MEAS - AO V2 MAX: 317.6 CM/SEC
BH CV ECHO MEAS - AO V2 VTI: 48.9 CM
BH CV ECHO MEAS - AO V2 VTI: 58.7 CM
BH CV ECHO MEAS - AVA(I,D): 1.18 CM2
BH CV ECHO MEAS - AVA(I,D): 1.35 CM2
BH CV ECHO MEAS - CONTRAST EF 4CH: 47 CM2
BH CV ECHO MEAS - EDV(CUBED): 107.6 ML
BH CV ECHO MEAS - EDV(CUBED): 137.3 ML
BH CV ECHO MEAS - EDV(MOD-SP2): 140 ML
BH CV ECHO MEAS - EDV(MOD-SP2): 76 ML
BH CV ECHO MEAS - EDV(MOD-SP4): 111 ML
BH CV ECHO MEAS - EDV(MOD-SP4): 78 ML
BH CV ECHO MEAS - EF(MOD-BP): 47.8 %
BH CV ECHO MEAS - EF(MOD-BP): 65.8 %
BH CV ECHO MEAS - EF(MOD-SP2): 46.4 %
BH CV ECHO MEAS - EF(MOD-SP2): 68.4 %
BH CV ECHO MEAS - EF(MOD-SP4): 46.8 %
BH CV ECHO MEAS - EF(MOD-SP4): 65.4 %
BH CV ECHO MEAS - ESV(CUBED): 31.4 ML
BH CV ECHO MEAS - ESV(CUBED): 44.2 ML
BH CV ECHO MEAS - ESV(MOD-SP2): 24 ML
BH CV ECHO MEAS - ESV(MOD-SP2): 75 ML
BH CV ECHO MEAS - ESV(MOD-SP4): 27 ML
BH CV ECHO MEAS - ESV(MOD-SP4): 59 ML
BH CV ECHO MEAS - FS: 25.6 %
BH CV ECHO MEAS - FS: 38.8 %
BH CV ECHO MEAS - IVS/LVPW: 0.99 CM
BH CV ECHO MEAS - IVS/LVPW: 0.99 CM
BH CV ECHO MEAS - IVSD: 0.83 CM
BH CV ECHO MEAS - IVSD: 1.13 CM
BH CV ECHO MEAS - LAT PEAK E' VEL: 6.1 CM/SEC
BH CV ECHO MEAS - LAT PEAK E' VEL: 6.7 CM/SEC
BH CV ECHO MEAS - LV DIASTOLIC VOL/BSA (35-75): 49 CM2
BH CV ECHO MEAS - LV MASS(C)D: 150.7 GRAMS
BH CV ECHO MEAS - LV MASS(C)D: 198.7 GRAMS
BH CV ECHO MEAS - LV MAX PG: 6 MMHG
BH CV ECHO MEAS - LV MAX PG: 6.7 MMHG
BH CV ECHO MEAS - LV MEAN PG: 3.5 MMHG
BH CV ECHO MEAS - LV MEAN PG: 3.5 MMHG
BH CV ECHO MEAS - LV SYSTOLIC VOL/BSA (12-30): 17 CM2
BH CV ECHO MEAS - LV V1 MAX: 122.5 CM/SEC
BH CV ECHO MEAS - LV V1 MAX: 129.2 CM/SEC
BH CV ECHO MEAS - LV V1 VTI: 24.6 CM
BH CV ECHO MEAS - LV V1 VTI: 25.5 CM
BH CV ECHO MEAS - LVIDD: 4.8 CM
BH CV ECHO MEAS - LVIDD: 5.2 CM
BH CV ECHO MEAS - LVIDS: 3.2 CM
BH CV ECHO MEAS - LVIDS: 3.5 CM
BH CV ECHO MEAS - LVOT AREA: 2.33 CM2
BH CV ECHO MEAS - LVOT AREA: 3.1 CM2
BH CV ECHO MEAS - LVOT DIAM: 1.72 CM
BH CV ECHO MEAS - LVOT DIAM: 1.99 CM
BH CV ECHO MEAS - LVPWD: 0.83 CM
BH CV ECHO MEAS - LVPWD: 1.14 CM
BH CV ECHO MEAS - MED PEAK E' VEL: 7 CM/SEC
BH CV ECHO MEAS - MR MAX PG: 89.3 MMHG
BH CV ECHO MEAS - MR MAX VEL: 472.4 CM/SEC
BH CV ECHO MEAS - MR MEAN PG: 61.7 MMHG
BH CV ECHO MEAS - MR MEAN VEL: 374.7 CM/SEC
BH CV ECHO MEAS - MR VTI: 126.7 CM
BH CV ECHO MEAS - MV DEC SLOPE: 708.6 CM/SEC2
BH CV ECHO MEAS - MV DEC SLOPE: 800.3 CM/SEC2
BH CV ECHO MEAS - MV DEC TIME: 0.17 MSEC
BH CV ECHO MEAS - MV DEC TIME: 0.19 MSEC
BH CV ECHO MEAS - MV E MAX VEL: 166 CM/SEC
BH CV ECHO MEAS - MV E MAX VEL: 190 CM/SEC
BH CV ECHO MEAS - MV MAX PG: 12.1 MMHG
BH CV ECHO MEAS - MV MAX PG: 12.6 MMHG
BH CV ECHO MEAS - MV MEAN PG: 2.5 MMHG
BH CV ECHO MEAS - MV MEAN PG: 5 MMHG
BH CV ECHO MEAS - MV P1/2T: 61.1 MSEC
BH CV ECHO MEAS - MV V2 VTI: 28.9 CM
BH CV ECHO MEAS - MV V2 VTI: 40.5 CM
BH CV ECHO MEAS - MVA(P1/2T): 3.6 CM2
BH CV ECHO MEAS - MVA(VTI): 1.95 CM2
BH CV ECHO MEAS - MVA(VTI): 1.99 CM2
BH CV ECHO MEAS - PA ACC TIME: 0.07 SEC
BH CV ECHO MEAS - PA ACC TIME: 0.14 SEC
BH CV ECHO MEAS - PA PR(ACCEL): 16 MMHG
BH CV ECHO MEAS - PA PR(ACCEL): 49.3 MMHG
BH CV ECHO MEAS - PA V2 MAX: 106.4 CM/SEC
BH CV ECHO MEAS - PA V2 MAX: 114 CM/SEC
BH CV ECHO MEAS - RAP SYSTOLE: 15 MMHG
BH CV ECHO MEAS - RAP SYSTOLE: 8 MMHG
BH CV ECHO MEAS - RV MAX PG: 2.12 MMHG
BH CV ECHO MEAS - RV MAX PG: 5.8 MMHG
BH CV ECHO MEAS - RV V1 MAX: 120.7 CM/SEC
BH CV ECHO MEAS - RV V1 MAX: 72.8 CM/SEC
BH CV ECHO MEAS - RV V1 VTI: 15.1 CM
BH CV ECHO MEAS - RV V1 VTI: 23.1 CM
BH CV ECHO MEAS - RVOT DIAM: 1.86 CM
BH CV ECHO MEAS - RVSP: 57 MMHG
BH CV ECHO MEAS - RVSP: 73.1 MMHG
BH CV ECHO MEAS - SI(MOD-SP2): 32.7 ML/M2
BH CV ECHO MEAS - SI(MOD-SP4): 32 ML/M2
BH CV ECHO MEAS - SUP REN AO DIAM: 2.2 CM
BH CV ECHO MEAS - SV(LVOT): 57.5 ML
BH CV ECHO MEAS - SV(LVOT): 79.2 ML
BH CV ECHO MEAS - SV(MOD-SP2): 52 ML
BH CV ECHO MEAS - SV(MOD-SP2): 65 ML
BH CV ECHO MEAS - SV(MOD-SP4): 51 ML
BH CV ECHO MEAS - SV(MOD-SP4): 52 ML
BH CV ECHO MEAS - SV(RVOT): 40.8 ML
BH CV ECHO MEAS - TAPSE (>1.6): 1.3 CM
BH CV ECHO MEAS - TAPSE (>1.6): 1.6 CM
BH CV ECHO MEAS - TR MAX PG: 49.8 MMHG
BH CV ECHO MEAS - TR MAX PG: 58.1 MMHG
BH CV ECHO MEAS - TR MAX VEL: 352.9 CM/SEC
BH CV ECHO MEAS - TR MAX VEL: 381 CM/SEC
BH CV ECHO MEASUREMENTS AVERAGE E/E' RATIO: 24.23
BH CV LOWER VASCULAR LEFT COMMON FEMORAL AUGMENT: NORMAL
BH CV LOWER VASCULAR LEFT COMMON FEMORAL COMPETENT: NORMAL
BH CV LOWER VASCULAR LEFT COMMON FEMORAL COMPRESS: NORMAL
BH CV LOWER VASCULAR LEFT COMMON FEMORAL PHASIC: NORMAL
BH CV LOWER VASCULAR LEFT COMMON FEMORAL SPONT: NORMAL
BH CV LOWER VASCULAR LEFT DISTAL FEMORAL COMPRESS: NORMAL
BH CV LOWER VASCULAR LEFT GASTRONEMIUS COMPRESS: NORMAL
BH CV LOWER VASCULAR LEFT GREATER SAPH AK COMPRESS: NORMAL
BH CV LOWER VASCULAR LEFT GREATER SAPH BK COMPRESS: NORMAL
BH CV LOWER VASCULAR LEFT LESSER SAPH COMPRESS: NORMAL
BH CV LOWER VASCULAR LEFT MID FEMORAL AUGMENT: NORMAL
BH CV LOWER VASCULAR LEFT MID FEMORAL COMPETENT: NORMAL
BH CV LOWER VASCULAR LEFT MID FEMORAL COMPRESS: NORMAL
BH CV LOWER VASCULAR LEFT MID FEMORAL PHASIC: NORMAL
BH CV LOWER VASCULAR LEFT MID FEMORAL SPONT: NORMAL
BH CV LOWER VASCULAR LEFT PERONEAL COMPRESS: NORMAL
BH CV LOWER VASCULAR LEFT POPLITEAL AUGMENT: NORMAL
BH CV LOWER VASCULAR LEFT POPLITEAL COMPETENT: NORMAL
BH CV LOWER VASCULAR LEFT POPLITEAL COMPRESS: NORMAL
BH CV LOWER VASCULAR LEFT POPLITEAL PHASIC: NORMAL
BH CV LOWER VASCULAR LEFT POPLITEAL SPONT: NORMAL
BH CV LOWER VASCULAR LEFT POSTERIOR TIBIAL COMPRESS: NORMAL
BH CV LOWER VASCULAR LEFT PROFUNDA FEMORAL COMPRESS: NORMAL
BH CV LOWER VASCULAR LEFT PROXIMAL FEMORAL COMPRESS: NORMAL
BH CV LOWER VASCULAR LEFT SAPHENOFEMORAL JUNCTION COMPRESS: NORMAL
BH CV LOWER VASCULAR RIGHT COMMON FEMORAL AUGMENT: NORMAL
BH CV LOWER VASCULAR RIGHT COMMON FEMORAL COMPETENT: NORMAL
BH CV LOWER VASCULAR RIGHT COMMON FEMORAL COMPRESS: NORMAL
BH CV LOWER VASCULAR RIGHT COMMON FEMORAL PHASIC: NORMAL
BH CV LOWER VASCULAR RIGHT COMMON FEMORAL SPONT: NORMAL
BH CV LOWER VASCULAR RIGHT DISTAL FEMORAL COMPRESS: NORMAL
BH CV LOWER VASCULAR RIGHT GASTRONEMIUS COMPRESS: NORMAL
BH CV LOWER VASCULAR RIGHT GREATER SAPH AK COMPRESS: NORMAL
BH CV LOWER VASCULAR RIGHT GREATER SAPH BK COMPRESS: NORMAL
BH CV LOWER VASCULAR RIGHT LESSER SAPH COMPRESS: NORMAL
BH CV LOWER VASCULAR RIGHT MID FEMORAL AUGMENT: NORMAL
BH CV LOWER VASCULAR RIGHT MID FEMORAL COMPETENT: NORMAL
BH CV LOWER VASCULAR RIGHT MID FEMORAL COMPRESS: NORMAL
BH CV LOWER VASCULAR RIGHT MID FEMORAL PHASIC: NORMAL
BH CV LOWER VASCULAR RIGHT MID FEMORAL SPONT: NORMAL
BH CV LOWER VASCULAR RIGHT PERONEAL COMPRESS: NORMAL
BH CV LOWER VASCULAR RIGHT POPLITEAL AUGMENT: NORMAL
BH CV LOWER VASCULAR RIGHT POPLITEAL COMPETENT: NORMAL
BH CV LOWER VASCULAR RIGHT POPLITEAL COMPRESS: NORMAL
BH CV LOWER VASCULAR RIGHT POPLITEAL PHASIC: NORMAL
BH CV LOWER VASCULAR RIGHT POPLITEAL SPONT: NORMAL
BH CV LOWER VASCULAR RIGHT POSTERIOR TIBIAL COMPRESS: NORMAL
BH CV LOWER VASCULAR RIGHT PROFUNDA FEMORAL COMPRESS: NORMAL
BH CV LOWER VASCULAR RIGHT PROXIMAL FEMORAL COMPRESS: NORMAL
BH CV LOWER VASCULAR RIGHT SAPHENOFEMORAL JUNCTION COMPRESS: NORMAL
BH CV VAS BP RIGHT ARM: NORMAL MMHG
BH CV XLRA - RV BASE: 3 CM
BH CV XLRA - RV BASE: 3.9 CM
BH CV XLRA - RV LENGTH: 6.1 CM
BH CV XLRA - RV LENGTH: 6.5 CM
BH CV XLRA - RV MID: 2.7 CM
BH CV XLRA - RV MID: 3 CM
BH CV XLRA - TDI S': 11.4 CM/SEC
BH CV XLRA - TDI S': 12.2 CM/SEC
BILIRUB BLD-MCNC: NEGATIVE MG/DL
BILIRUB CONJ SERPL-MCNC: 0.2 MG/DL (ref 0–0.3)
BILIRUB CONJ SERPL-MCNC: 0.2 MG/DL (ref 0–0.3)
BILIRUB CONJ SERPL-MCNC: 0.3 MG/DL (ref 0–0.3)
BILIRUB CONJ SERPL-MCNC: <0.2 MG/DL (ref 0–0.3)
BILIRUB INDIRECT SERPL-MCNC: 0.3 MG/DL
BILIRUB INDIRECT SERPL-MCNC: 0.4 MG/DL
BILIRUB INDIRECT SERPL-MCNC: 0.5 MG/DL
BILIRUB INDIRECT SERPL-MCNC: ABNORMAL MG/DL
BILIRUB SERPL-MCNC: 0.3 MG/DL (ref 0–1.2)
BILIRUB SERPL-MCNC: 0.4 MG/DL (ref 0–1.2)
BILIRUB SERPL-MCNC: 0.5 MG/DL (ref 0–1.2)
BILIRUB SERPL-MCNC: 0.6 MG/DL (ref 0–1.2)
BILIRUB SERPL-MCNC: 0.7 MG/DL (ref 0–1.2)
BILIRUB SERPL-MCNC: 0.8 MG/DL (ref 0–1.2)
BILIRUB UR QL STRIP: NEGATIVE
BLD GP AB SCN SERPL QL: NEGATIVE
BUN SERPL-MCNC: 10 MG/DL (ref 8–23)
BUN SERPL-MCNC: 11 MG/DL (ref 8–23)
BUN SERPL-MCNC: 11 MG/DL (ref 8–23)
BUN SERPL-MCNC: 14 MG/DL (ref 8–23)
BUN SERPL-MCNC: 15 MG/DL (ref 8–23)
BUN SERPL-MCNC: 16 MG/DL (ref 8–23)
BUN SERPL-MCNC: 17 MG/DL (ref 8–23)
BUN SERPL-MCNC: 18 MG/DL (ref 8–23)
BUN SERPL-MCNC: 19 MG/DL (ref 8–23)
BUN SERPL-MCNC: 20 MG/DL (ref 8–23)
BUN SERPL-MCNC: 21 MG/DL (ref 8–23)
BUN SERPL-MCNC: 21 MG/DL (ref 8–23)
BUN SERPL-MCNC: 22 MG/DL (ref 8–23)
BUN SERPL-MCNC: 22 MG/DL (ref 8–23)
BUN SERPL-MCNC: 23 MG/DL (ref 8–23)
BUN SERPL-MCNC: 24 MG/DL (ref 8–27)
BUN SERPL-MCNC: 25 MG/DL (ref 8–23)
BUN SERPL-MCNC: 25 MG/DL (ref 8–27)
BUN SERPL-MCNC: 26 MG/DL (ref 8–23)
BUN SERPL-MCNC: 28 MG/DL (ref 8–27)
BUN SERPL-MCNC: 29 MG/DL (ref 8–23)
BUN SERPL-MCNC: 29 MG/DL (ref 8–27)
BUN SERPL-MCNC: 30 MG/DL (ref 8–27)
BUN SERPL-MCNC: 32 MG/DL (ref 8–23)
BUN SERPL-MCNC: 32 MG/DL (ref 8–27)
BUN SERPL-MCNC: 32 MG/DL (ref 8–27)
BUN SERPL-MCNC: 33 MG/DL (ref 8–23)
BUN SERPL-MCNC: 33 MG/DL (ref 8–23)
BUN SERPL-MCNC: 35 MG/DL (ref 8–23)
BUN SERPL-MCNC: 35 MG/DL (ref 8–23)
BUN SERPL-MCNC: 43 MG/DL (ref 8–23)
BUN SERPL-MCNC: 46 MG/DL (ref 8–23)
BUN SERPL-MCNC: 48 MG/DL (ref 8–23)
BUN SERPL-MCNC: 53 MG/DL (ref 8–23)
BUN SERPL-MCNC: 7 MG/DL (ref 8–23)
BUN SERPL-MCNC: 71 MG/DL (ref 8–27)
BUN SERPL-MCNC: 81 MG/DL (ref 8–23)
BUN SERPL-MCNC: 9 MG/DL (ref 8–23)
BUN SERPL-MCNC: 9 MG/DL (ref 8–23)
BUN/CREAT SERPL: 14.3 (ref 7–25)
BUN/CREAT SERPL: 14.6 (ref 7–25)
BUN/CREAT SERPL: 15.5 (ref 7–25)
BUN/CREAT SERPL: 16.4 (ref 7–25)
BUN/CREAT SERPL: 17.2 (ref 7–25)
BUN/CREAT SERPL: 18 (ref 7–25)
BUN/CREAT SERPL: 18.4 (ref 7–25)
BUN/CREAT SERPL: 19 (ref 12–28)
BUN/CREAT SERPL: 19.7 (ref 7–25)
BUN/CREAT SERPL: 20.5 (ref 7–25)
BUN/CREAT SERPL: 20.5 (ref 7–25)
BUN/CREAT SERPL: 22.1 (ref 7–25)
BUN/CREAT SERPL: 22.4 (ref 7–25)
BUN/CREAT SERPL: 22.6 (ref 7–25)
BUN/CREAT SERPL: 22.9 (ref 7–25)
BUN/CREAT SERPL: 23.6 (ref 7–25)
BUN/CREAT SERPL: 24 (ref 7–25)
BUN/CREAT SERPL: 25 (ref 12–28)
BUN/CREAT SERPL: 25 (ref 7–25)
BUN/CREAT SERPL: 25.2 (ref 7–25)
BUN/CREAT SERPL: 25.4 (ref 7–25)
BUN/CREAT SERPL: 26.6 (ref 7–25)
BUN/CREAT SERPL: 26.7 (ref 7–25)
BUN/CREAT SERPL: 27 (ref 12–28)
BUN/CREAT SERPL: 27.1 (ref 7–25)
BUN/CREAT SERPL: 27.3 (ref 7–25)
BUN/CREAT SERPL: 27.5 (ref 7–25)
BUN/CREAT SERPL: 28 (ref 12–28)
BUN/CREAT SERPL: 28.4 (ref 7–25)
BUN/CREAT SERPL: 29 (ref 12–28)
BUN/CREAT SERPL: 29 (ref 12–28)
BUN/CREAT SERPL: 29.2 (ref 7–25)
BUN/CREAT SERPL: 29.3 (ref 7–25)
BUN/CREAT SERPL: 29.4 (ref 7–25)
BUN/CREAT SERPL: 29.7 (ref 7–25)
BUN/CREAT SERPL: 29.7 (ref 7–25)
BUN/CREAT SERPL: 30.2 (ref 7–25)
BUN/CREAT SERPL: 30.3 (ref 7–25)
BUN/CREAT SERPL: 30.5 (ref 7–25)
BUN/CREAT SERPL: 30.6 (ref 7–25)
BUN/CREAT SERPL: 31.8 (ref 7–25)
BUN/CREAT SERPL: 32.8 (ref 7–25)
BUN/CREAT SERPL: 33 (ref 7–25)
BUN/CREAT SERPL: 33.7 (ref 7–25)
BUN/CREAT SERPL: 33.9 (ref 7–25)
BUN/CREAT SERPL: 34.9 (ref 7–25)
BUN/CREAT SERPL: 35.8 (ref 7–25)
BUN/CREAT SERPL: 36.3 (ref 7–25)
BUN/CREAT SERPL: 36.8 (ref 7–25)
BUN/CREAT SERPL: 39 (ref 12–28)
BUN/CREAT SERPL: 44.6 (ref 7–25)
BUN/CREAT SERPL: 49 (ref 12–28)
C CAYETANENSIS DNA STL QL NAA+NON-PROBE: NOT DETECTED
C COLI+JEJ+UPSA DNA STL QL NAA+NON-PROBE: NOT DETECTED
C DIFF TOX GENS STL QL NAA+PROBE: NEGATIVE
CALCIUM SERPL-MCNC: 8.6 MG/DL (ref 8.7–10.3)
CALCIUM SERPL-MCNC: 8.6 MG/DL (ref 8.7–10.3)
CALCIUM SERPL-MCNC: 8.8 MG/DL (ref 8.7–10.3)
CALCIUM SERPL-MCNC: 8.9 MG/DL (ref 8.7–10.3)
CALCIUM SERPL-MCNC: 8.9 MG/DL (ref 8.7–10.3)
CALCIUM SERPL-MCNC: 9 MG/DL (ref 8.7–10.3)
CALCIUM SERPL-MCNC: 9.1 MG/DL (ref 8.7–10.3)
CALCIUM SERPL-MCNC: 9.4 MG/DL (ref 8.7–10.3)
CALCIUM SPEC-SCNC: 8 MG/DL (ref 8.6–10.5)
CALCIUM SPEC-SCNC: 8.1 MG/DL (ref 8.6–10.5)
CALCIUM SPEC-SCNC: 8.1 MG/DL (ref 8.6–10.5)
CALCIUM SPEC-SCNC: 8.3 MG/DL (ref 8.6–10.5)
CALCIUM SPEC-SCNC: 8.4 MG/DL (ref 8.6–10.5)
CALCIUM SPEC-SCNC: 8.5 MG/DL (ref 8.6–10.5)
CALCIUM SPEC-SCNC: 8.6 MG/DL (ref 8.6–10.5)
CALCIUM SPEC-SCNC: 8.7 MG/DL (ref 8.6–10.5)
CALCIUM SPEC-SCNC: 8.7 MG/DL (ref 8.6–10.5)
CALCIUM SPEC-SCNC: 8.8 MG/DL (ref 8.6–10.5)
CALCIUM SPEC-SCNC: 8.9 MG/DL (ref 8.6–10.5)
CALCIUM SPEC-SCNC: 9 MG/DL (ref 8.6–10.5)
CALCIUM SPEC-SCNC: 9.1 MG/DL (ref 8.6–10.5)
CALCIUM SPEC-SCNC: 9.2 MG/DL (ref 8.6–10.5)
CALCIUM SPEC-SCNC: 9.3 MG/DL (ref 8.6–10.5)
CANNABINOIDS SERPL QL: NEGATIVE
CANNABINOIDS SERPL QL: NEGATIVE
CHLORIDE SERPL-SCNC: 100 MMOL/L (ref 98–107)
CHLORIDE SERPL-SCNC: 101 MMOL/L (ref 96–106)
CHLORIDE SERPL-SCNC: 101 MMOL/L (ref 98–107)
CHLORIDE SERPL-SCNC: 102 MMOL/L (ref 96–106)
CHLORIDE SERPL-SCNC: 102 MMOL/L (ref 96–106)
CHLORIDE SERPL-SCNC: 102 MMOL/L (ref 98–107)
CHLORIDE SERPL-SCNC: 102 MMOL/L (ref 98–107)
CHLORIDE SERPL-SCNC: 103 MMOL/L (ref 98–107)
CHLORIDE SERPL-SCNC: 104 MMOL/L (ref 98–107)
CHLORIDE SERPL-SCNC: 104 MMOL/L (ref 98–107)
CHLORIDE SERPL-SCNC: 105 MMOL/L (ref 98–107)
CHLORIDE SERPL-SCNC: 106 MMOL/L (ref 96–106)
CHLORIDE SERPL-SCNC: 106 MMOL/L (ref 96–106)
CHLORIDE SERPL-SCNC: 106 MMOL/L (ref 98–107)
CHLORIDE SERPL-SCNC: 107 MMOL/L (ref 98–107)
CHLORIDE SERPL-SCNC: 111 MMOL/L (ref 98–107)
CHLORIDE SERPL-SCNC: 112 MMOL/L (ref 98–107)
CHLORIDE SERPL-SCNC: 86 MMOL/L (ref 98–107)
CHLORIDE SERPL-SCNC: 90 MMOL/L (ref 98–107)
CHLORIDE SERPL-SCNC: 93 MMOL/L (ref 98–107)
CHLORIDE SERPL-SCNC: 95 MMOL/L (ref 96–106)
CHLORIDE SERPL-SCNC: 96 MMOL/L (ref 96–106)
CHLORIDE SERPL-SCNC: 96 MMOL/L (ref 98–107)
CHLORIDE SERPL-SCNC: 97 MMOL/L (ref 96–106)
CHLORIDE SERPL-SCNC: 97 MMOL/L (ref 98–107)
CHLORIDE SERPL-SCNC: 98 MMOL/L (ref 98–107)
CHLORIDE SERPL-SCNC: 99 MMOL/L (ref 98–107)
CHOLEST SERPL-MCNC: 91 MG/DL (ref 100–199)
CLARITY UR: ABNORMAL
CLARITY UR: CLEAR
CLARITY, POC: CLEAR
CO2 SERPL-SCNC: 17.2 MMOL/L (ref 22–29)
CO2 SERPL-SCNC: 20 MMOL/L (ref 20–29)
CO2 SERPL-SCNC: 21 MMOL/L (ref 20–29)
CO2 SERPL-SCNC: 22 MMOL/L (ref 20–29)
CO2 SERPL-SCNC: 23 MMOL/L (ref 20–29)
CO2 SERPL-SCNC: 23.1 MMOL/L (ref 22–29)
CO2 SERPL-SCNC: 24 MMOL/L (ref 22–29)
CO2 SERPL-SCNC: 24.8 MMOL/L (ref 22–29)
CO2 SERPL-SCNC: 24.9 MMOL/L (ref 22–29)
CO2 SERPL-SCNC: 25 MMOL/L (ref 20–29)
CO2 SERPL-SCNC: 25.6 MMOL/L (ref 22–29)
CO2 SERPL-SCNC: 26 MMOL/L (ref 22–29)
CO2 SERPL-SCNC: 26 MMOL/L (ref 22–29)
CO2 SERPL-SCNC: 27 MMOL/L (ref 20–29)
CO2 SERPL-SCNC: 27 MMOL/L (ref 22–29)
CO2 SERPL-SCNC: 27.2 MMOL/L (ref 22–29)
CO2 SERPL-SCNC: 27.3 MMOL/L (ref 22–29)
CO2 SERPL-SCNC: 28 MMOL/L (ref 20–29)
CO2 SERPL-SCNC: 28.1 MMOL/L (ref 22–29)
CO2 SERPL-SCNC: 28.9 MMOL/L (ref 22–29)
CO2 SERPL-SCNC: 29 MMOL/L (ref 20–29)
CO2 SERPL-SCNC: 29 MMOL/L (ref 22–29)
CO2 SERPL-SCNC: 29 MMOL/L (ref 22–29)
CO2 SERPL-SCNC: 30 MMOL/L (ref 22–29)
CO2 SERPL-SCNC: 30.3 MMOL/L (ref 22–29)
CO2 SERPL-SCNC: 31 MMOL/L (ref 22–29)
CO2 SERPL-SCNC: 31 MMOL/L (ref 22–29)
CO2 SERPL-SCNC: 31.1 MMOL/L (ref 22–29)
CO2 SERPL-SCNC: 32 MMOL/L (ref 22–29)
CO2 SERPL-SCNC: 32.2 MMOL/L (ref 22–29)
CO2 SERPL-SCNC: 33 MMOL/L (ref 22–29)
CO2 SERPL-SCNC: 33 MMOL/L (ref 22–29)
CO2 SERPL-SCNC: 33.6 MMOL/L (ref 22–29)
CO2 SERPL-SCNC: 33.8 MMOL/L (ref 22–29)
CO2 SERPL-SCNC: 33.9 MMOL/L (ref 22–29)
CO2 SERPL-SCNC: 34 MMOL/L (ref 22–29)
CO2 SERPL-SCNC: 34.6 MMOL/L (ref 22–29)
CO2 SERPL-SCNC: 34.9 MMOL/L (ref 22–29)
CO2 SERPL-SCNC: 35.1 MMOL/L (ref 22–29)
CO2 SERPL-SCNC: 36.5 MMOL/L (ref 22–29)
CO2 SERPL-SCNC: 38 MMOL/L (ref 22–29)
CO2 SERPL-SCNC: 38.3 MMOL/L (ref 22–29)
CO2 SERPL-SCNC: 39.2 MMOL/L (ref 22–29)
CO2 SERPL-SCNC: 39.8 MMOL/L (ref 22–29)
CO2 SERPL-SCNC: 40 MMOL/L (ref 22–29)
CO2 SERPL-SCNC: 40.3 MMOL/L (ref 22–29)
CO2 SERPL-SCNC: 41.8 MMOL/L (ref 22–29)
CO2 SERPL-SCNC: 42 MMOL/L (ref 22–29)
CO2 SERPL-SCNC: 42.3 MMOL/L (ref 22–29)
CO2 SERPL-SCNC: 44.3 MMOL/L (ref 22–29)
COCAINE UR QL: NEGATIVE
COCAINE UR QL: NEGATIVE
COD CRY URNS QL: NORMAL /HPF
COLOR FLD: ABNORMAL
COLOR FLD: YELLOW
COLOR UR: ABNORMAL
COLOR UR: YELLOW
CREAT SERPL-MCNC: 0.48 MG/DL (ref 0.57–1)
CREAT SERPL-MCNC: 0.55 MG/DL (ref 0.57–1)
CREAT SERPL-MCNC: 0.58 MG/DL (ref 0.57–1)
CREAT SERPL-MCNC: 0.58 MG/DL (ref 0.57–1)
CREAT SERPL-MCNC: 0.61 MG/DL (ref 0.57–1)
CREAT SERPL-MCNC: 0.62 MG/DL (ref 0.57–1)
CREAT SERPL-MCNC: 0.62 MG/DL (ref 0.57–1)
CREAT SERPL-MCNC: 0.63 MG/DL (ref 0.57–1)
CREAT SERPL-MCNC: 0.63 MG/DL (ref 0.57–1)
CREAT SERPL-MCNC: 0.65 MG/DL (ref 0.57–1)
CREAT SERPL-MCNC: 0.65 MG/DL (ref 0.57–1)
CREAT SERPL-MCNC: 0.66 MG/DL (ref 0.57–1)
CREAT SERPL-MCNC: 0.66 MG/DL (ref 0.57–1)
CREAT SERPL-MCNC: 0.67 MG/DL (ref 0.57–1)
CREAT SERPL-MCNC: 0.68 MG/DL (ref 0.57–1)
CREAT SERPL-MCNC: 0.7 MG/DL (ref 0.57–1)
CREAT SERPL-MCNC: 0.71 MG/DL (ref 0.57–1)
CREAT SERPL-MCNC: 0.71 MG/DL (ref 0.57–1)
CREAT SERPL-MCNC: 0.72 MG/DL (ref 0.57–1)
CREAT SERPL-MCNC: 0.73 MG/DL (ref 0.57–1)
CREAT SERPL-MCNC: 0.75 MG/DL (ref 0.57–1)
CREAT SERPL-MCNC: 0.76 MG/DL (ref 0.57–1)
CREAT SERPL-MCNC: 0.76 MG/DL (ref 0.57–1)
CREAT SERPL-MCNC: 0.77 MG/DL (ref 0.57–1)
CREAT SERPL-MCNC: 0.8 MG/DL (ref 0.57–1)
CREAT SERPL-MCNC: 0.81 MG/DL (ref 0.57–1)
CREAT SERPL-MCNC: 0.82 MG/DL (ref 0.57–1)
CREAT SERPL-MCNC: 0.84 MG/DL (ref 0.57–1)
CREAT SERPL-MCNC: 0.85 MG/DL (ref 0.57–1)
CREAT SERPL-MCNC: 0.93 MG/DL (ref 0.57–1)
CREAT SERPL-MCNC: 0.95 MG/DL (ref 0.57–1)
CREAT SERPL-MCNC: 0.95 MG/DL (ref 0.57–1)
CREAT SERPL-MCNC: 1.01 MG/DL (ref 0.57–1)
CREAT SERPL-MCNC: 1.03 MG/DL (ref 0.57–1)
CREAT SERPL-MCNC: 1.05 MG/DL (ref 0.57–1)
CREAT SERPL-MCNC: 1.06 MG/DL (ref 0.57–1)
CREAT SERPL-MCNC: 1.11 MG/DL (ref 0.57–1)
CREAT SERPL-MCNC: 1.12 MG/DL (ref 0.57–1)
CREAT SERPL-MCNC: 1.13 MG/DL (ref 0.57–1)
CREAT SERPL-MCNC: 1.18 MG/DL (ref 0.57–1)
CREAT SERPL-MCNC: 1.18 MG/DL (ref 0.57–1)
CREAT SERPL-MCNC: 1.21 MG/DL (ref 0.57–1)
CREAT SERPL-MCNC: 1.22 MG/DL (ref 0.57–1)
CREAT SERPL-MCNC: 1.24 MG/DL (ref 0.57–1)
CREAT SERPL-MCNC: 1.36 MG/DL (ref 0.57–1)
CREAT SERPL-MCNC: 1.44 MG/DL (ref 0.57–1)
CREAT SERPL-MCNC: 1.47 MG/DL (ref 0.57–1)
CREAT SERPL-MCNC: 1.48 MG/DL (ref 0.57–1)
CREAT SERPL-MCNC: 1.59 MG/DL (ref 0.57–1)
CREAT SERPL-MCNC: 1.69 MG/DL (ref 0.57–1)
CREAT SERPL-MCNC: 1.73 MG/DL (ref 0.57–1)
CREAT SERPL-MCNC: 2.95 MG/DL (ref 0.57–1)
CREAT UR-MCNC: 20.9 MG/DL
CROSSMATCH INTERPRETATION: NORMAL
CRYPTOSP DNA STL QL NAA+NON-PROBE: NOT DETECTED
CYTO UR: NORMAL
DEPRECATED RDW RBC AUTO: 45 FL (ref 37–54)
DEPRECATED RDW RBC AUTO: 45.8 FL (ref 37–54)
DEPRECATED RDW RBC AUTO: 45.9 FL (ref 37–54)
DEPRECATED RDW RBC AUTO: 46.7 FL (ref 37–54)
DEPRECATED RDW RBC AUTO: 46.8 FL (ref 37–54)
DEPRECATED RDW RBC AUTO: 47 FL (ref 37–54)
DEPRECATED RDW RBC AUTO: 47.2 FL (ref 37–54)
DEPRECATED RDW RBC AUTO: 47.4 FL (ref 37–54)
DEPRECATED RDW RBC AUTO: 47.6 FL (ref 37–54)
DEPRECATED RDW RBC AUTO: 47.6 FL (ref 37–54)
DEPRECATED RDW RBC AUTO: 48 FL (ref 37–54)
DEPRECATED RDW RBC AUTO: 48.6 FL (ref 37–54)
DEPRECATED RDW RBC AUTO: 49 FL (ref 37–54)
DEPRECATED RDW RBC AUTO: 49.1 FL (ref 37–54)
DEPRECATED RDW RBC AUTO: 49.3 FL (ref 37–54)
DEPRECATED RDW RBC AUTO: 49.3 FL (ref 37–54)
DEPRECATED RDW RBC AUTO: 49.5 FL (ref 37–54)
DEPRECATED RDW RBC AUTO: 49.8 FL (ref 37–54)
DEPRECATED RDW RBC AUTO: 50 FL (ref 37–54)
DEPRECATED RDW RBC AUTO: 50.2 FL (ref 37–54)
DEPRECATED RDW RBC AUTO: 50.7 FL (ref 37–54)
DEPRECATED RDW RBC AUTO: 51.2 FL (ref 37–54)
DEPRECATED RDW RBC AUTO: 51.3 FL (ref 37–54)
DEPRECATED RDW RBC AUTO: 51.5 FL (ref 37–54)
DEPRECATED RDW RBC AUTO: 51.5 FL (ref 37–54)
DEPRECATED RDW RBC AUTO: 51.9 FL (ref 37–54)
DEPRECATED RDW RBC AUTO: 56 FL (ref 37–54)
DEPRECATED RDW RBC AUTO: 56.8 FL (ref 37–54)
DEPRECATED RDW RBC AUTO: 57 FL (ref 37–54)
DEPRECATED RDW RBC AUTO: 59 FL (ref 37–54)
DEPRECATED RDW RBC AUTO: 59.3 FL (ref 37–54)
DEPRECATED RDW RBC AUTO: 60.4 FL (ref 37–54)
DEPRECATED RDW RBC AUTO: 62.3 FL (ref 37–54)
DEPRECATED RDW RBC AUTO: 63.3 FL (ref 37–54)
DEPRECATED RDW RBC AUTO: 66.1 FL (ref 37–54)
DIGOXIN SERPL-MCNC: 1.1 NG/ML (ref 0.6–1.2)
DIGOXIN SERPL-MCNC: 1.2 NG/ML (ref 0.6–1.2)
DIGOXIN SERPL-MCNC: 1.4 NG/ML (ref 0.6–1.2)
DIGOXIN SERPL-MCNC: 1.9 NG/ML (ref 0.6–1.2)
E HISTOLYT DNA STL QL NAA+NON-PROBE: NOT DETECTED
EAEC PAA PLAS AGGR+AATA ST NAA+NON-PRB: NOT DETECTED
EC STX1+STX2 GENES STL QL NAA+NON-PROBE: NOT DETECTED
EGFR GENE MUT ANL BLD/T: 56 ML/MIN/1.73
EGFR GENE MUT ANL BLD/T: 61 ML/MIN/1.73
EGFRCR SERPLBLD CKD-EPI 2021: 15.4 ML/MIN/1.73
EGFRCR SERPLBLD CKD-EPI 2021: 29.2 ML/MIN/1.73
EGFRCR SERPLBLD CKD-EPI 2021: 30 ML/MIN/1.73
EGFRCR SERPLBLD CKD-EPI 2021: 32.3 ML/MIN/1.73
EGFRCR SERPLBLD CKD-EPI 2021: 35.2 ML/MIN/1.73
EGFRCR SERPLBLD CKD-EPI 2021: 35.5 ML/MIN/1.73
EGFRCR SERPLBLD CKD-EPI 2021: 36 ML/MIN/1.73
EGFRCR SERPLBLD CKD-EPI 2021: 39 ML/MIN/1.73
EGFRCR SERPLBLD CKD-EPI 2021: 43 ML/MIN/1.73
EGFRCR SERPLBLD CKD-EPI 2021: 44 ML/MIN/1.73
EGFRCR SERPLBLD CKD-EPI 2021: 44.8 ML/MIN/1.73
EGFRCR SERPLBLD CKD-EPI 2021: 46.2 ML/MIN/1.73
EGFRCR SERPLBLD CKD-EPI 2021: 46.2 ML/MIN/1.73
EGFRCR SERPLBLD CKD-EPI 2021: 49 ML/MIN/1.73
EGFRCR SERPLBLD CKD-EPI 2021: 49.2 ML/MIN/1.73
EGFRCR SERPLBLD CKD-EPI 2021: 49.7 ML/MIN/1.73
EGFRCR SERPLBLD CKD-EPI 2021: 52.6 ML/MIN/1.73
EGFRCR SERPLBLD CKD-EPI 2021: 53.2 ML/MIN/1.73
EGFRCR SERPLBLD CKD-EPI 2021: 54.4 ML/MIN/1.73
EGFRCR SERPLBLD CKD-EPI 2021: 59.9 ML/MIN/1.73
EGFRCR SERPLBLD CKD-EPI 2021: 60 ML/MIN/1.73
EGFRCR SERPLBLD CKD-EPI 2021: 68.5 ML/MIN/1.73
EGFRCR SERPLBLD CKD-EPI 2021: 69.5 ML/MIN/1.73
EGFRCR SERPLBLD CKD-EPI 2021: 72.6 ML/MIN/1.73
EGFRCR SERPLBLD CKD-EPI 2021: 73.7 ML/MIN/1.73
EGFRCR SERPLBLD CKD-EPI 2021: 77.1 ML/MIN/1.73
EGFRCR SERPLBLD CKD-EPI 2021: 78.3 ML/MIN/1.73
EGFRCR SERPLBLD CKD-EPI 2021: 78.3 ML/MIN/1.73
EGFRCR SERPLBLD CKD-EPI 2021: 79.6 ML/MIN/1.73
EGFRCR SERPLBLD CKD-EPI 2021: 82.2 ML/MIN/1.73
EGFRCR SERPLBLD CKD-EPI 2021: 83.6 ML/MIN/1.73
EGFRCR SERPLBLD CKD-EPI 2021: 85 ML/MIN/1.73
EGFRCR SERPLBLD CKD-EPI 2021: 85 ML/MIN/1.73
EGFRCR SERPLBLD CKD-EPI 2021: 86.5 ML/MIN/1.73
EGFRCR SERPLBLD CKD-EPI 2021: 87.1 ML/MIN/1.73
EGFRCR SERPLBLD CKD-EPI 2021: 87.4 ML/MIN/1.73
EGFRCR SERPLBLD CKD-EPI 2021: 87.7 ML/MIN/1.73
EGFRCR SERPLBLD CKD-EPI 2021: 87.7 ML/MIN/1.73
EGFRCR SERPLBLD CKD-EPI 2021: 88 ML/MIN/1.73
EGFRCR SERPLBLD CKD-EPI 2021: 88 ML/MIN/1.73
EGFRCR SERPLBLD CKD-EPI 2021: 88.7 ML/MIN/1.73
EGFRCR SERPLBLD CKD-EPI 2021: 88.7 ML/MIN/1.73
EGFRCR SERPLBLD CKD-EPI 2021: 89 ML/MIN/1.73
EGFRCR SERPLBLD CKD-EPI 2021: 89 ML/MIN/1.73
EGFRCR SERPLBLD CKD-EPI 2021: 89.4 ML/MIN/1.73
EGFRCR SERPLBLD CKD-EPI 2021: 90.5 ML/MIN/1.73
EGFRCR SERPLBLD CKD-EPI 2021: 90.5 ML/MIN/1.73
EGFRCR SERPLBLD CKD-EPI 2021: 91.6 ML/MIN/1.73
EGFRCR SERPLBLD CKD-EPI 2021: 94.7 ML/MIN/1.73
EOSINOPHIL # BLD AUTO: 0.03 10*3/MM3 (ref 0–0.4)
EOSINOPHIL # BLD AUTO: 0.08 10*3/MM3 (ref 0–0.4)
EOSINOPHIL # BLD AUTO: 0.1 10*3/MM3 (ref 0–0.4)
EOSINOPHIL # BLD AUTO: 0.1 X10E3/UL (ref 0–0.4)
EOSINOPHIL # BLD AUTO: 0.11 10*3/MM3 (ref 0–0.4)
EOSINOPHIL # BLD AUTO: 0.2 X10E3/UL (ref 0–0.4)
EOSINOPHIL # BLD MANUAL: 0.09 10*3/MM3 (ref 0–0.4)
EOSINOPHIL NFR BLD AUTO: 0.3 % (ref 0.3–6.2)
EOSINOPHIL NFR BLD AUTO: 1 %
EOSINOPHIL NFR BLD AUTO: 1.1 % (ref 0.3–6.2)
EOSINOPHIL NFR BLD AUTO: 1.3 % (ref 0.3–6.2)
EOSINOPHIL NFR BLD AUTO: 1.4 % (ref 0.3–6.2)
EOSINOPHIL NFR BLD AUTO: 2 %
EOSINOPHIL NFR BLD AUTO: 2 %
EOSINOPHIL NFR BLD MANUAL: 1 % (ref 0.3–6.2)
EPEC EAE GENE STL QL NAA+NON-PROBE: NOT DETECTED
ERYTHROCYTE [DISTWIDTH] IN BLOOD BY AUTOMATED COUNT: 15.2 % (ref 11.7–15.4)
ERYTHROCYTE [DISTWIDTH] IN BLOOD BY AUTOMATED COUNT: 17 % (ref 11.7–15.4)
ERYTHROCYTE [DISTWIDTH] IN BLOOD BY AUTOMATED COUNT: 17 % (ref 12.3–15.4)
ERYTHROCYTE [DISTWIDTH] IN BLOOD BY AUTOMATED COUNT: 17.4 % (ref 11.7–15.4)
ERYTHROCYTE [DISTWIDTH] IN BLOOD BY AUTOMATED COUNT: 17.4 % (ref 12.3–15.4)
ERYTHROCYTE [DISTWIDTH] IN BLOOD BY AUTOMATED COUNT: 17.5 % (ref 12.3–15.4)
ERYTHROCYTE [DISTWIDTH] IN BLOOD BY AUTOMATED COUNT: 17.6 % (ref 12.3–15.4)
ERYTHROCYTE [DISTWIDTH] IN BLOOD BY AUTOMATED COUNT: 17.6 % (ref 12.3–15.4)
ERYTHROCYTE [DISTWIDTH] IN BLOOD BY AUTOMATED COUNT: 17.7 % (ref 12.3–15.4)
ERYTHROCYTE [DISTWIDTH] IN BLOOD BY AUTOMATED COUNT: 17.7 % (ref 12.3–15.4)
ERYTHROCYTE [DISTWIDTH] IN BLOOD BY AUTOMATED COUNT: 17.9 % (ref 11.7–15.4)
ERYTHROCYTE [DISTWIDTH] IN BLOOD BY AUTOMATED COUNT: 17.9 % (ref 12.3–15.4)
ERYTHROCYTE [DISTWIDTH] IN BLOOD BY AUTOMATED COUNT: 18 % (ref 12.3–15.4)
ERYTHROCYTE [DISTWIDTH] IN BLOOD BY AUTOMATED COUNT: 18.1 % (ref 12.3–15.4)
ERYTHROCYTE [DISTWIDTH] IN BLOOD BY AUTOMATED COUNT: 18.1 % (ref 12.3–15.4)
ERYTHROCYTE [DISTWIDTH] IN BLOOD BY AUTOMATED COUNT: 18.2 % (ref 12.3–15.4)
ERYTHROCYTE [DISTWIDTH] IN BLOOD BY AUTOMATED COUNT: 18.4 % (ref 11.7–15.4)
ERYTHROCYTE [DISTWIDTH] IN BLOOD BY AUTOMATED COUNT: 18.4 % (ref 12.3–15.4)
ERYTHROCYTE [DISTWIDTH] IN BLOOD BY AUTOMATED COUNT: 18.5 % (ref 11.7–15.4)
ERYTHROCYTE [DISTWIDTH] IN BLOOD BY AUTOMATED COUNT: 18.6 % (ref 12.3–15.4)
ERYTHROCYTE [DISTWIDTH] IN BLOOD BY AUTOMATED COUNT: 18.7 % (ref 12.3–15.4)
ERYTHROCYTE [DISTWIDTH] IN BLOOD BY AUTOMATED COUNT: 18.7 % (ref 12.3–15.4)
ERYTHROCYTE [DISTWIDTH] IN BLOOD BY AUTOMATED COUNT: 18.8 % (ref 11.7–15.4)
ERYTHROCYTE [DISTWIDTH] IN BLOOD BY AUTOMATED COUNT: 18.8 % (ref 12.3–15.4)
ERYTHROCYTE [DISTWIDTH] IN BLOOD BY AUTOMATED COUNT: 19 % (ref 12.3–15.4)
ERYTHROCYTE [DISTWIDTH] IN BLOOD BY AUTOMATED COUNT: 19 % (ref 12.3–15.4)
ERYTHROCYTE [DISTWIDTH] IN BLOOD BY AUTOMATED COUNT: 19.2 % (ref 12.3–15.4)
ERYTHROCYTE [DISTWIDTH] IN BLOOD BY AUTOMATED COUNT: 19.2 % (ref 12.3–15.4)
ERYTHROCYTE [DISTWIDTH] IN BLOOD BY AUTOMATED COUNT: 19.3 % (ref 11.7–15.4)
ERYTHROCYTE [DISTWIDTH] IN BLOOD BY AUTOMATED COUNT: 19.5 % (ref 12.3–15.4)
ERYTHROCYTE [DISTWIDTH] IN BLOOD BY AUTOMATED COUNT: 19.7 % (ref 12.3–15.4)
ERYTHROCYTE [DISTWIDTH] IN BLOOD BY AUTOMATED COUNT: 19.9 % (ref 12.3–15.4)
ERYTHROCYTE [DISTWIDTH] IN BLOOD BY AUTOMATED COUNT: 20 % (ref 12.3–15.4)
ERYTHROCYTE [DISTWIDTH] IN BLOOD BY AUTOMATED COUNT: 20.1 % (ref 12.3–15.4)
ERYTHROCYTE [DISTWIDTH] IN BLOOD BY AUTOMATED COUNT: 21 % (ref 12.3–15.4)
ERYTHROCYTE [DISTWIDTH] IN BLOOD BY AUTOMATED COUNT: 21.4 % (ref 12.3–15.4)
ETEC LTA+ST1A+ST1B TOX ST NAA+NON-PROBE: NOT DETECTED
EXPIRATION DATE: ABNORMAL
EXPIRATION DATE: ABNORMAL
FERRITIN SERPL-MCNC: 27.2 NG/ML (ref 13–150)
FERRITIN SERPL-MCNC: 40.2 NG/ML (ref 13–150)
G LAMBLIA DNA STL QL NAA+NON-PROBE: NOT DETECTED
GAS FLOW AIRWAY: 15 LPM
GAS FLOW AIRWAY: 2 LPM
GAS FLOW AIRWAY: 4 LPM
GAS FLOW AIRWAY: 4 LPM
GLOBULIN SER CALC-MCNC: 1.4 G/DL (ref 1.5–4.5)
GLOBULIN SER CALC-MCNC: 1.4 G/DL (ref 1.5–4.5)
GLOBULIN SER CALC-MCNC: 1.5 G/DL (ref 1.5–4.5)
GLOBULIN SER CALC-MCNC: 1.7 G/DL (ref 1.5–4.5)
GLOBULIN UR ELPH-MCNC: 1.3 GM/DL
GLOBULIN UR ELPH-MCNC: 1.6 GM/DL
GLOBULIN UR ELPH-MCNC: 1.7 GM/DL
GLOBULIN UR ELPH-MCNC: 2 GM/DL
GLOBULIN UR ELPH-MCNC: 2 GM/DL
GLOBULIN UR ELPH-MCNC: 2.2 GM/DL
GLOBULIN UR ELPH-MCNC: 2.2 GM/DL
GLUCOSE BLDC GLUCOMTR-MCNC: 102 MG/DL (ref 70–130)
GLUCOSE BLDC GLUCOMTR-MCNC: 103 MG/DL (ref 70–130)
GLUCOSE BLDC GLUCOMTR-MCNC: 108 MG/DL (ref 70–130)
GLUCOSE BLDC GLUCOMTR-MCNC: 110 MG/DL (ref 70–130)
GLUCOSE BLDC GLUCOMTR-MCNC: 110 MG/DL (ref 70–130)
GLUCOSE BLDC GLUCOMTR-MCNC: 112 MG/DL (ref 70–130)
GLUCOSE BLDC GLUCOMTR-MCNC: 112 MG/DL (ref 70–130)
GLUCOSE BLDC GLUCOMTR-MCNC: 113 MG/DL (ref 70–130)
GLUCOSE BLDC GLUCOMTR-MCNC: 117 MG/DL (ref 70–130)
GLUCOSE BLDC GLUCOMTR-MCNC: 117 MG/DL (ref 70–130)
GLUCOSE BLDC GLUCOMTR-MCNC: 122 MG/DL (ref 70–130)
GLUCOSE BLDC GLUCOMTR-MCNC: 122 MG/DL (ref 70–130)
GLUCOSE BLDC GLUCOMTR-MCNC: 124 MG/DL (ref 70–130)
GLUCOSE BLDC GLUCOMTR-MCNC: 126 MG/DL (ref 70–130)
GLUCOSE BLDC GLUCOMTR-MCNC: 127 MG/DL (ref 70–130)
GLUCOSE BLDC GLUCOMTR-MCNC: 128 MG/DL (ref 70–130)
GLUCOSE BLDC GLUCOMTR-MCNC: 131 MG/DL (ref 70–130)
GLUCOSE BLDC GLUCOMTR-MCNC: 132 MG/DL (ref 70–130)
GLUCOSE BLDC GLUCOMTR-MCNC: 133 MG/DL (ref 70–130)
GLUCOSE BLDC GLUCOMTR-MCNC: 133 MG/DL (ref 70–130)
GLUCOSE BLDC GLUCOMTR-MCNC: 135 MG/DL (ref 70–130)
GLUCOSE BLDC GLUCOMTR-MCNC: 136 MG/DL (ref 70–130)
GLUCOSE BLDC GLUCOMTR-MCNC: 137 MG/DL (ref 70–130)
GLUCOSE BLDC GLUCOMTR-MCNC: 138 MG/DL (ref 70–130)
GLUCOSE BLDC GLUCOMTR-MCNC: 139 MG/DL (ref 70–130)
GLUCOSE BLDC GLUCOMTR-MCNC: 140 MG/DL (ref 70–130)
GLUCOSE BLDC GLUCOMTR-MCNC: 141 MG/DL (ref 70–130)
GLUCOSE BLDC GLUCOMTR-MCNC: 142 MG/DL (ref 70–130)
GLUCOSE BLDC GLUCOMTR-MCNC: 143 MG/DL (ref 70–130)
GLUCOSE BLDC GLUCOMTR-MCNC: 144 MG/DL (ref 70–130)
GLUCOSE BLDC GLUCOMTR-MCNC: 145 MG/DL (ref 70–130)
GLUCOSE BLDC GLUCOMTR-MCNC: 146 MG/DL (ref 70–130)
GLUCOSE BLDC GLUCOMTR-MCNC: 147 MG/DL (ref 70–130)
GLUCOSE BLDC GLUCOMTR-MCNC: 148 MG/DL (ref 70–130)
GLUCOSE BLDC GLUCOMTR-MCNC: 148 MG/DL (ref 70–130)
GLUCOSE BLDC GLUCOMTR-MCNC: 149 MG/DL (ref 70–130)
GLUCOSE BLDC GLUCOMTR-MCNC: 150 MG/DL (ref 70–130)
GLUCOSE BLDC GLUCOMTR-MCNC: 152 MG/DL (ref 70–130)
GLUCOSE BLDC GLUCOMTR-MCNC: 152 MG/DL (ref 70–130)
GLUCOSE BLDC GLUCOMTR-MCNC: 153 MG/DL (ref 70–130)
GLUCOSE BLDC GLUCOMTR-MCNC: 154 MG/DL (ref 70–130)
GLUCOSE BLDC GLUCOMTR-MCNC: 154 MG/DL (ref 70–130)
GLUCOSE BLDC GLUCOMTR-MCNC: 155 MG/DL (ref 70–130)
GLUCOSE BLDC GLUCOMTR-MCNC: 155 MG/DL (ref 70–130)
GLUCOSE BLDC GLUCOMTR-MCNC: 156 MG/DL (ref 70–130)
GLUCOSE BLDC GLUCOMTR-MCNC: 157 MG/DL (ref 70–130)
GLUCOSE BLDC GLUCOMTR-MCNC: 158 MG/DL (ref 70–130)
GLUCOSE BLDC GLUCOMTR-MCNC: 159 MG/DL (ref 70–130)
GLUCOSE BLDC GLUCOMTR-MCNC: 159 MG/DL (ref 70–130)
GLUCOSE BLDC GLUCOMTR-MCNC: 160 MG/DL (ref 70–130)
GLUCOSE BLDC GLUCOMTR-MCNC: 161 MG/DL (ref 70–130)
GLUCOSE BLDC GLUCOMTR-MCNC: 162 MG/DL (ref 70–130)
GLUCOSE BLDC GLUCOMTR-MCNC: 163 MG/DL (ref 70–130)
GLUCOSE BLDC GLUCOMTR-MCNC: 164 MG/DL (ref 70–130)
GLUCOSE BLDC GLUCOMTR-MCNC: 165 MG/DL (ref 70–130)
GLUCOSE BLDC GLUCOMTR-MCNC: 165 MG/DL (ref 70–130)
GLUCOSE BLDC GLUCOMTR-MCNC: 166 MG/DL (ref 70–130)
GLUCOSE BLDC GLUCOMTR-MCNC: 167 MG/DL (ref 70–130)
GLUCOSE BLDC GLUCOMTR-MCNC: 169 MG/DL (ref 70–130)
GLUCOSE BLDC GLUCOMTR-MCNC: 170 MG/DL (ref 70–130)
GLUCOSE BLDC GLUCOMTR-MCNC: 171 MG/DL (ref 70–130)
GLUCOSE BLDC GLUCOMTR-MCNC: 174 MG/DL (ref 70–130)
GLUCOSE BLDC GLUCOMTR-MCNC: 174 MG/DL (ref 70–130)
GLUCOSE BLDC GLUCOMTR-MCNC: 175 MG/DL (ref 70–130)
GLUCOSE BLDC GLUCOMTR-MCNC: 176 MG/DL (ref 70–130)
GLUCOSE BLDC GLUCOMTR-MCNC: 176 MG/DL (ref 70–130)
GLUCOSE BLDC GLUCOMTR-MCNC: 178 MG/DL (ref 70–130)
GLUCOSE BLDC GLUCOMTR-MCNC: 179 MG/DL (ref 70–130)
GLUCOSE BLDC GLUCOMTR-MCNC: 179 MG/DL (ref 70–130)
GLUCOSE BLDC GLUCOMTR-MCNC: 180 MG/DL (ref 70–130)
GLUCOSE BLDC GLUCOMTR-MCNC: 181 MG/DL (ref 70–130)
GLUCOSE BLDC GLUCOMTR-MCNC: 181 MG/DL (ref 70–130)
GLUCOSE BLDC GLUCOMTR-MCNC: 182 MG/DL (ref 70–130)
GLUCOSE BLDC GLUCOMTR-MCNC: 182 MG/DL (ref 70–130)
GLUCOSE BLDC GLUCOMTR-MCNC: 183 MG/DL (ref 70–130)
GLUCOSE BLDC GLUCOMTR-MCNC: 183 MG/DL (ref 70–130)
GLUCOSE BLDC GLUCOMTR-MCNC: 185 MG/DL (ref 70–130)
GLUCOSE BLDC GLUCOMTR-MCNC: 186 MG/DL (ref 70–130)
GLUCOSE BLDC GLUCOMTR-MCNC: 188 MG/DL (ref 70–130)
GLUCOSE BLDC GLUCOMTR-MCNC: 190 MG/DL (ref 70–130)
GLUCOSE BLDC GLUCOMTR-MCNC: 195 MG/DL (ref 70–130)
GLUCOSE BLDC GLUCOMTR-MCNC: 196 MG/DL (ref 70–130)
GLUCOSE BLDC GLUCOMTR-MCNC: 197 MG/DL (ref 70–130)
GLUCOSE BLDC GLUCOMTR-MCNC: 199 MG/DL (ref 70–130)
GLUCOSE BLDC GLUCOMTR-MCNC: 201 MG/DL (ref 70–130)
GLUCOSE BLDC GLUCOMTR-MCNC: 202 MG/DL (ref 70–130)
GLUCOSE BLDC GLUCOMTR-MCNC: 203 MG/DL (ref 70–130)
GLUCOSE BLDC GLUCOMTR-MCNC: 207 MG/DL (ref 70–130)
GLUCOSE BLDC GLUCOMTR-MCNC: 222 MG/DL (ref 70–130)
GLUCOSE BLDC GLUCOMTR-MCNC: 228 MG/DL (ref 70–130)
GLUCOSE BLDC GLUCOMTR-MCNC: 248 MG/DL (ref 70–130)
GLUCOSE BLDC GLUCOMTR-MCNC: 259 MG/DL (ref 70–130)
GLUCOSE BLDC GLUCOMTR-MCNC: 292 MG/DL (ref 70–130)
GLUCOSE FLD-MCNC: 142 MG/DL
GLUCOSE FLD-MCNC: 143 MG/DL
GLUCOSE SERPL-MCNC: 105 MG/DL (ref 65–99)
GLUCOSE SERPL-MCNC: 108 MG/DL (ref 65–99)
GLUCOSE SERPL-MCNC: 113 MG/DL (ref 65–99)
GLUCOSE SERPL-MCNC: 115 MG/DL (ref 65–99)
GLUCOSE SERPL-MCNC: 117 MG/DL (ref 65–99)
GLUCOSE SERPL-MCNC: 119 MG/DL (ref 65–99)
GLUCOSE SERPL-MCNC: 120 MG/DL (ref 65–99)
GLUCOSE SERPL-MCNC: 121 MG/DL (ref 65–99)
GLUCOSE SERPL-MCNC: 122 MG/DL (ref 65–99)
GLUCOSE SERPL-MCNC: 123 MG/DL (ref 65–99)
GLUCOSE SERPL-MCNC: 124 MG/DL (ref 65–99)
GLUCOSE SERPL-MCNC: 125 MG/DL (ref 65–99)
GLUCOSE SERPL-MCNC: 125 MG/DL (ref 65–99)
GLUCOSE SERPL-MCNC: 128 MG/DL (ref 65–99)
GLUCOSE SERPL-MCNC: 130 MG/DL (ref 65–99)
GLUCOSE SERPL-MCNC: 131 MG/DL (ref 65–99)
GLUCOSE SERPL-MCNC: 131 MG/DL (ref 65–99)
GLUCOSE SERPL-MCNC: 133 MG/DL (ref 65–99)
GLUCOSE SERPL-MCNC: 134 MG/DL (ref 65–99)
GLUCOSE SERPL-MCNC: 135 MG/DL (ref 65–99)
GLUCOSE SERPL-MCNC: 135 MG/DL (ref 65–99)
GLUCOSE SERPL-MCNC: 136 MG/DL (ref 65–99)
GLUCOSE SERPL-MCNC: 137 MG/DL (ref 65–99)
GLUCOSE SERPL-MCNC: 138 MG/DL (ref 65–99)
GLUCOSE SERPL-MCNC: 138 MG/DL (ref 65–99)
GLUCOSE SERPL-MCNC: 139 MG/DL (ref 65–99)
GLUCOSE SERPL-MCNC: 141 MG/DL (ref 65–99)
GLUCOSE SERPL-MCNC: 145 MG/DL (ref 65–99)
GLUCOSE SERPL-MCNC: 146 MG/DL (ref 65–99)
GLUCOSE SERPL-MCNC: 146 MG/DL (ref 65–99)
GLUCOSE SERPL-MCNC: 147 MG/DL (ref 65–99)
GLUCOSE SERPL-MCNC: 157 MG/DL (ref 65–99)
GLUCOSE SERPL-MCNC: 159 MG/DL (ref 65–99)
GLUCOSE SERPL-MCNC: 161 MG/DL (ref 65–99)
GLUCOSE SERPL-MCNC: 173 MG/DL (ref 65–99)
GLUCOSE SERPL-MCNC: 178 MG/DL (ref 65–99)
GLUCOSE SERPL-MCNC: 178 MG/DL (ref 65–99)
GLUCOSE SERPL-MCNC: 179 MG/DL (ref 65–99)
GLUCOSE SERPL-MCNC: 181 MG/DL (ref 65–99)
GLUCOSE SERPL-MCNC: 182 MG/DL (ref 65–99)
GLUCOSE SERPL-MCNC: 194 MG/DL (ref 65–99)
GLUCOSE SERPL-MCNC: 214 MG/DL (ref 65–99)
GLUCOSE SERPL-MCNC: 92 MG/DL (ref 65–99)
GLUCOSE UR STRIP-MCNC: ABNORMAL MG/DL
GLUCOSE UR STRIP-MCNC: ABNORMAL MG/DL
GLUCOSE UR STRIP-MCNC: NEGATIVE MG/DL
GRAM STN SPEC: ABNORMAL
GRAM STN SPEC: NORMAL
GRAM STN SPEC: NORMAL
HBA1C MFR BLD: 5.4 % (ref 4.8–5.6)
HBA1C MFR BLD: 5.9 % (ref 4.8–5.6)
HBV SURFACE AG SERPL QL IA: NORMAL
HCO3 BLDA-SCNC: 30.6 MMOL/L (ref 22–28)
HCO3 BLDA-SCNC: 34.8 MMOL/L (ref 22–28)
HCO3 BLDA-SCNC: 35.7 MMOL/L (ref 22–28)
HCO3 BLDA-SCNC: 41.8 MMOL/L (ref 22–28)
HCT VFR BLD AUTO: 22.3 % (ref 34–46.6)
HCT VFR BLD AUTO: 26 % (ref 34–46.6)
HCT VFR BLD AUTO: 26.6 % (ref 34–46.6)
HCT VFR BLD AUTO: 27.8 % (ref 34–46.6)
HCT VFR BLD AUTO: 28.7 % (ref 34–46.6)
HCT VFR BLD AUTO: 28.7 % (ref 34–46.6)
HCT VFR BLD AUTO: 29 % (ref 34–46.6)
HCT VFR BLD AUTO: 29.2 % (ref 34–46.6)
HCT VFR BLD AUTO: 29.2 % (ref 34–46.6)
HCT VFR BLD AUTO: 29.4 % (ref 34–46.6)
HCT VFR BLD AUTO: 29.7 % (ref 34–46.6)
HCT VFR BLD AUTO: 29.8 % (ref 34–46.6)
HCT VFR BLD AUTO: 29.9 % (ref 34–46.6)
HCT VFR BLD AUTO: 30.9 % (ref 34–46.6)
HCT VFR BLD AUTO: 31.3 % (ref 34–46.6)
HCT VFR BLD AUTO: 31.4 % (ref 34–46.6)
HCT VFR BLD AUTO: 31.5 % (ref 34–46.6)
HCT VFR BLD AUTO: 31.8 % (ref 34–46.6)
HCT VFR BLD AUTO: 32 % (ref 34–46.6)
HCT VFR BLD AUTO: 32 % (ref 34–46.6)
HCT VFR BLD AUTO: 32.2 % (ref 34–46.6)
HCT VFR BLD AUTO: 32.2 % (ref 34–46.6)
HCT VFR BLD AUTO: 32.5 % (ref 34–46.6)
HCT VFR BLD AUTO: 32.7 % (ref 34–46.6)
HCT VFR BLD AUTO: 32.8 % (ref 34–46.6)
HCT VFR BLD AUTO: 32.9 % (ref 34–46.6)
HCT VFR BLD AUTO: 33.3 % (ref 34–46.6)
HCT VFR BLD AUTO: 33.3 % (ref 34–46.6)
HCT VFR BLD AUTO: 33.6 % (ref 34–46.6)
HCT VFR BLD AUTO: 33.7 % (ref 34–46.6)
HCT VFR BLD AUTO: 34.1 % (ref 34–46.6)
HCT VFR BLD AUTO: 34.2 % (ref 34–46.6)
HCT VFR BLD AUTO: 35 % (ref 34–46.6)
HCT VFR BLD AUTO: 35.2 % (ref 34–46.6)
HCT VFR BLD AUTO: 35.3 % (ref 34–46.6)
HCT VFR BLD AUTO: 35.8 % (ref 34–46.6)
HCT VFR BLD AUTO: 40.3 % (ref 34–46.6)
HDLC SERPL-MCNC: 45 MG/DL
HEMOCCULT STL QL: POSITIVE
HGB BLD-MCNC: 10.2 G/DL (ref 12–15.9)
HGB BLD-MCNC: 10.6 G/DL (ref 12–15.9)
HGB BLD-MCNC: 11.4 G/DL (ref 11.1–15.9)
HGB BLD-MCNC: 7 G/DL (ref 12–15.9)
HGB BLD-MCNC: 7.5 G/DL (ref 12–15.9)
HGB BLD-MCNC: 7.7 G/DL (ref 11.1–15.9)
HGB BLD-MCNC: 7.8 G/DL (ref 12–15.9)
HGB BLD-MCNC: 7.9 G/DL (ref 11.1–15.9)
HGB BLD-MCNC: 7.9 G/DL (ref 11.1–15.9)
HGB BLD-MCNC: 8 G/DL (ref 12–15.9)
HGB BLD-MCNC: 8 G/DL (ref 12–15.9)
HGB BLD-MCNC: 8.1 G/DL (ref 11.1–15.9)
HGB BLD-MCNC: 8.1 G/DL (ref 12–15.9)
HGB BLD-MCNC: 8.2 G/DL (ref 12–15.9)
HGB BLD-MCNC: 8.3 G/DL (ref 12–15.9)
HGB BLD-MCNC: 8.3 G/DL (ref 12–15.9)
HGB BLD-MCNC: 8.4 G/DL (ref 12–15.9)
HGB BLD-MCNC: 8.5 G/DL (ref 12–15.9)
HGB BLD-MCNC: 8.6 G/DL (ref 12–15.9)
HGB BLD-MCNC: 8.6 G/DL (ref 12–15.9)
HGB BLD-MCNC: 8.8 G/DL (ref 12–15.9)
HGB BLD-MCNC: 8.9 G/DL (ref 12–15.9)
HGB BLD-MCNC: 9 G/DL (ref 12–15.9)
HGB BLD-MCNC: 9.1 G/DL (ref 11.1–15.9)
HGB BLD-MCNC: 9.1 G/DL (ref 12–15.9)
HGB BLD-MCNC: 9.2 G/DL (ref 12–15.9)
HGB BLD-MCNC: 9.2 G/DL (ref 12–15.9)
HGB BLD-MCNC: 9.3 G/DL (ref 11.1–15.9)
HGB BLD-MCNC: 9.3 G/DL (ref 12–15.9)
HGB BLD-MCNC: 9.4 G/DL (ref 12–15.9)
HGB BLD-MCNC: 9.5 G/DL (ref 12–15.9)
HGB BLD-MCNC: 9.6 G/DL (ref 11.1–15.9)
HGB BLD-MCNC: 9.6 G/DL (ref 12–15.9)
HGB BLD-MCNC: 9.7 G/DL (ref 12–15.9)
HGB BLD-MCNC: 9.7 G/DL (ref 12–15.9)
HGB BLD-MCNC: 9.8 G/DL (ref 12–15.9)
HGB BLD-MCNC: 9.9 G/DL (ref 12–15.9)
HGB BLDA-MCNC: 6.5 G/DL (ref 12–17)
HGB BLDA-MCNC: 7.3 G/DL (ref 12–18)
HGB UR QL STRIP.AUTO: ABNORMAL
HGB UR QL STRIP.AUTO: NEGATIVE
HYALINE CASTS UR QL AUTO: ABNORMAL /LPF
HYALINE CASTS UR QL AUTO: NORMAL /LPF
HYALINE CASTS UR QL AUTO: NORMAL /LPF
IMM GRANULOCYTES # BLD AUTO: 0 X10E3/UL (ref 0–0.1)
IMM GRANULOCYTES # BLD AUTO: 0.02 10*3/MM3 (ref 0–0.05)
IMM GRANULOCYTES # BLD AUTO: 0.03 10*3/MM3 (ref 0–0.05)
IMM GRANULOCYTES # BLD AUTO: 0.1 X10E3/UL (ref 0–0.1)
IMM GRANULOCYTES # BLD AUTO: 0.11 10*3/MM3 (ref 0–0.05)
IMM GRANULOCYTES # BLD AUTO: 0.13 10*3/MM3 (ref 0–0.05)
IMM GRANULOCYTES NFR BLD AUTO: 0 %
IMM GRANULOCYTES NFR BLD AUTO: 0.3 % (ref 0–0.5)
IMM GRANULOCYTES NFR BLD AUTO: 0.4 % (ref 0–0.5)
IMM GRANULOCYTES NFR BLD AUTO: 1 %
IMM GRANULOCYTES NFR BLD AUTO: 1.2 % (ref 0–0.5)
IMM GRANULOCYTES NFR BLD AUTO: 1.4 % (ref 0–0.5)
INR PPP: 1.24 (ref 0.9–1.1)
INR PPP: 1.27 (ref 0.9–1.1)
INR PPP: 1.41 (ref 0.9–1.1)
INR PPP: 1.44 (ref 0.9–1.1)
INR PPP: 1.48 (ref 0.9–1.1)
INR PPP: NORMAL
IRON 24H UR-MRATE: 15 MCG/DL (ref 37–145)
IRON 24H UR-MRATE: 24 MCG/DL (ref 37–145)
IRON SATN MFR SERPL: 3 % (ref 20–50)
IRON SATN MFR SERPL: 5 % (ref 20–50)
KETONES UR QL STRIP: NEGATIVE
KETONES UR QL: NEGATIVE
LAB AP CASE REPORT: NORMAL
LDH FLD-CCNC: 51 U/L
LDH FLD-CCNC: 58 U/L
LDLC SERPL CALC-MCNC: 29 MG/DL (ref 0–99)
LEFT ATRIUM VOLUME INDEX: 71.6 ML/M2
LEFT ATRIUM VOLUME INDEX: 88.7 ML/M2
LEUKOCYTE EST, POC: NEGATIVE
LEUKOCYTE ESTERASE UR QL STRIP.AUTO: ABNORMAL
LEUKOCYTE ESTERASE UR QL STRIP.AUTO: NEGATIVE
LV EF 2D ECHO EST: 50 %
LYMPHOCYTES # BLD AUTO: 1.23 10*3/MM3 (ref 0.7–3.1)
LYMPHOCYTES # BLD AUTO: 1.3 X10E3/UL (ref 0.7–3.1)
LYMPHOCYTES # BLD AUTO: 1.35 10*3/MM3 (ref 0.7–3.1)
LYMPHOCYTES # BLD AUTO: 1.39 10*3/MM3 (ref 0.7–3.1)
LYMPHOCYTES # BLD AUTO: 1.4 X10E3/UL (ref 0.7–3.1)
LYMPHOCYTES # BLD AUTO: 1.6 X10E3/UL (ref 0.7–3.1)
LYMPHOCYTES # BLD AUTO: 1.6 X10E3/UL (ref 0.7–3.1)
LYMPHOCYTES # BLD AUTO: 1.8 X10E3/UL (ref 0.7–3.1)
LYMPHOCYTES # BLD AUTO: 2 X10E3/UL (ref 0.7–3.1)
LYMPHOCYTES # BLD AUTO: 2.01 10*3/MM3 (ref 0.7–3.1)
LYMPHOCYTES # BLD MANUAL: 1.22 10*3/MM3 (ref 0.7–3.1)
LYMPHOCYTES NFR BLD AUTO: 15.7 % (ref 19.6–45.3)
LYMPHOCYTES NFR BLD AUTO: 16 %
LYMPHOCYTES NFR BLD AUTO: 16 %
LYMPHOCYTES NFR BLD AUTO: 17 %
LYMPHOCYTES NFR BLD AUTO: 17.6 % (ref 19.6–45.3)
LYMPHOCYTES NFR BLD AUTO: 19 %
LYMPHOCYTES NFR BLD AUTO: 19 %
LYMPHOCYTES NFR BLD AUTO: 19.9 % (ref 19.6–45.3)
LYMPHOCYTES NFR BLD AUTO: 20 %
LYMPHOCYTES NFR BLD AUTO: 20 %
LYMPHOCYTES NFR BLD AUTO: 21 % (ref 19.6–45.3)
LYMPHOCYTES NFR BLD AUTO: 23 %
LYMPHOCYTES NFR BLD MANUAL: 7.2 % (ref 5–12)
LYMPHOCYTES NFR FLD MANUAL: 62 %
LYMPHOCYTES NFR FLD MANUAL: 74 %
Lab: ABNORMAL
MAGNESIUM SERPL-MCNC: 1.7 MG/DL (ref 1.6–2.4)
MAGNESIUM SERPL-MCNC: 1.8 MG/DL (ref 1.6–2.4)
MAGNESIUM SERPL-MCNC: 1.9 MG/DL (ref 1.6–2.4)
MAGNESIUM SERPL-MCNC: 2 MG/DL (ref 1.6–2.4)
MAGNESIUM SERPL-MCNC: 2.1 MG/DL (ref 1.6–2.4)
MAGNESIUM SERPL-MCNC: 2.1 MG/DL (ref 1.6–2.4)
MAGNESIUM SERPL-MCNC: 2.2 MG/DL (ref 1.6–2.4)
MAGNESIUM SERPL-MCNC: 2.3 MG/DL (ref 1.6–2.4)
MAGNESIUM SERPL-MCNC: 2.3 MG/DL (ref 1.6–2.4)
MAXIMAL PREDICTED HEART RATE: 138 BPM
MCH RBC QN AUTO: 19.5 PG (ref 26.6–33)
MCH RBC QN AUTO: 19.5 PG (ref 26.6–33)
MCH RBC QN AUTO: 19.6 PG (ref 26.6–33)
MCH RBC QN AUTO: 19.7 PG (ref 26.6–33)
MCH RBC QN AUTO: 19.8 PG (ref 26.6–33)
MCH RBC QN AUTO: 19.9 PG (ref 26.6–33)
MCH RBC QN AUTO: 20 PG (ref 26.6–33)
MCH RBC QN AUTO: 20.1 PG (ref 26.6–33)
MCH RBC QN AUTO: 20.2 PG (ref 26.6–33)
MCH RBC QN AUTO: 20.3 PG (ref 26.6–33)
MCH RBC QN AUTO: 20.4 PG (ref 26.6–33)
MCH RBC QN AUTO: 20.5 PG (ref 26.6–33)
MCH RBC QN AUTO: 20.5 PG (ref 26.6–33)
MCH RBC QN AUTO: 20.7 PG (ref 26.6–33)
MCH RBC QN AUTO: 20.8 PG (ref 26.6–33)
MCH RBC QN AUTO: 20.8 PG (ref 26.6–33)
MCH RBC QN AUTO: 20.9 PG (ref 26.6–33)
MCH RBC QN AUTO: 21.7 PG (ref 26.6–33)
MCH RBC QN AUTO: 23 PG (ref 26.6–33)
MCH RBC QN AUTO: 23 PG (ref 26.6–33)
MCH RBC QN AUTO: 23.1 PG (ref 26.6–33)
MCH RBC QN AUTO: 23.1 PG (ref 26.6–33)
MCH RBC QN AUTO: 23.2 PG (ref 26.6–33)
MCH RBC QN AUTO: 23.2 PG (ref 26.6–33)
MCH RBC QN AUTO: 23.3 PG (ref 26.6–33)
MCH RBC QN AUTO: 23.4 PG (ref 26.6–33)
MCH RBC QN AUTO: 23.6 PG (ref 26.6–33)
MCH RBC QN AUTO: 23.7 PG (ref 26.6–33)
MCH RBC QN AUTO: 25.5 PG (ref 26.6–33)
MCH RBC QN AUTO: 26.9 PG (ref 26.6–33)
MCH RBC QN AUTO: 27.3 PG (ref 26.6–33)
MCH RBC QN AUTO: 27.3 PG (ref 26.6–33)
MCH RBC QN AUTO: 27.5 PG (ref 26.6–33)
MCH RBC QN AUTO: 27.5 PG (ref 26.6–33)
MCH RBC QN AUTO: 27.6 PG (ref 26.6–33)
MCHC RBC AUTO-ENTMCNC: 25.8 G/DL (ref 31.5–35.7)
MCHC RBC AUTO-ENTMCNC: 26 G/DL (ref 31.5–35.7)
MCHC RBC AUTO-ENTMCNC: 26.1 G/DL (ref 31.5–35.7)
MCHC RBC AUTO-ENTMCNC: 26.2 G/DL (ref 31.5–35.7)
MCHC RBC AUTO-ENTMCNC: 26.3 G/DL (ref 31.5–35.7)
MCHC RBC AUTO-ENTMCNC: 26.3 G/DL (ref 31.5–35.7)
MCHC RBC AUTO-ENTMCNC: 26.4 G/DL (ref 31.5–35.7)
MCHC RBC AUTO-ENTMCNC: 26.5 G/DL (ref 31.5–35.7)
MCHC RBC AUTO-ENTMCNC: 26.6 G/DL (ref 31.5–35.7)
MCHC RBC AUTO-ENTMCNC: 26.9 G/DL (ref 31.5–35.7)
MCHC RBC AUTO-ENTMCNC: 27.1 G/DL (ref 31.5–35.7)
MCHC RBC AUTO-ENTMCNC: 27.3 G/DL (ref 31.5–35.7)
MCHC RBC AUTO-ENTMCNC: 27.3 G/DL (ref 31.5–35.7)
MCHC RBC AUTO-ENTMCNC: 27.4 G/DL (ref 31.5–35.7)
MCHC RBC AUTO-ENTMCNC: 27.5 G/DL (ref 31.5–35.7)
MCHC RBC AUTO-ENTMCNC: 27.6 G/DL (ref 31.5–35.7)
MCHC RBC AUTO-ENTMCNC: 28.3 G/DL (ref 31.5–35.7)
MCHC RBC AUTO-ENTMCNC: 28.5 G/DL (ref 31.5–35.7)
MCHC RBC AUTO-ENTMCNC: 28.7 G/DL (ref 31.5–35.7)
MCHC RBC AUTO-ENTMCNC: 28.8 G/DL (ref 31.5–35.7)
MCHC RBC AUTO-ENTMCNC: 28.9 G/DL (ref 31.5–35.7)
MCHC RBC AUTO-ENTMCNC: 28.9 G/DL (ref 31.5–35.7)
MCHC RBC AUTO-ENTMCNC: 29.2 G/DL (ref 31.5–35.7)
MCHC RBC AUTO-ENTMCNC: 29.2 G/DL (ref 31.5–35.7)
MCHC RBC AUTO-ENTMCNC: 29.3 G/DL (ref 31.5–35.7)
MCHC RBC AUTO-ENTMCNC: 29.4 G/DL (ref 31.5–35.7)
MCHC RBC AUTO-ENTMCNC: 29.4 G/DL (ref 31.5–35.7)
MCHC RBC AUTO-ENTMCNC: 29.5 G/DL (ref 31.5–35.7)
MCHC RBC AUTO-ENTMCNC: 30 G/DL (ref 31.5–35.7)
MCHC RBC AUTO-ENTMCNC: 30.1 G/DL (ref 31.5–35.7)
MCHC RBC AUTO-ENTMCNC: 31.2 G/DL (ref 31.5–35.7)
MCHC RBC AUTO-ENTMCNC: 31.2 G/DL (ref 31.5–35.7)
MCHC RBC AUTO-ENTMCNC: 31.4 G/DL (ref 31.5–35.7)
MCHC RBC AUTO-ENTMCNC: 31.5 G/DL (ref 31.5–35.7)
MCV RBC AUTO: 71.5 FL (ref 79–97)
MCV RBC AUTO: 72.7 FL (ref 79–97)
MCV RBC AUTO: 73.2 FL (ref 79–97)
MCV RBC AUTO: 73.8 FL (ref 79–97)
MCV RBC AUTO: 74 FL (ref 79–97)
MCV RBC AUTO: 74 FL (ref 79–97)
MCV RBC AUTO: 74.3 FL (ref 79–97)
MCV RBC AUTO: 74.7 FL (ref 79–97)
MCV RBC AUTO: 74.8 FL (ref 79–97)
MCV RBC AUTO: 75.1 FL (ref 79–97)
MCV RBC AUTO: 75.2 FL (ref 79–97)
MCV RBC AUTO: 75.2 FL (ref 79–97)
MCV RBC AUTO: 75.4 FL (ref 79–97)
MCV RBC AUTO: 75.6 FL (ref 79–97)
MCV RBC AUTO: 75.7 FL (ref 79–97)
MCV RBC AUTO: 75.7 FL (ref 79–97)
MCV RBC AUTO: 75.9 FL (ref 79–97)
MCV RBC AUTO: 76.3 FL (ref 79–97)
MCV RBC AUTO: 76.4 FL (ref 79–97)
MCV RBC AUTO: 77 FL (ref 79–97)
MCV RBC AUTO: 77.1 FL (ref 79–97)
MCV RBC AUTO: 78 FL (ref 79–97)
MCV RBC AUTO: 78 FL (ref 79–97)
MCV RBC AUTO: 78.8 FL (ref 79–97)
MCV RBC AUTO: 79 FL (ref 79–97)
MCV RBC AUTO: 79.1 FL (ref 79–97)
MCV RBC AUTO: 79.4 FL (ref 79–97)
MCV RBC AUTO: 79.7 FL (ref 79–97)
MCV RBC AUTO: 79.7 FL (ref 79–97)
MCV RBC AUTO: 80.2 FL (ref 79–97)
MCV RBC AUTO: 80.3 FL (ref 79–97)
MCV RBC AUTO: 80.3 FL (ref 79–97)
MCV RBC AUTO: 81 FL (ref 79–97)
MCV RBC AUTO: 81 FL (ref 79–97)
MCV RBC AUTO: 82 FL (ref 79–97)
MCV RBC AUTO: 86 FL (ref 79–97)
MCV RBC AUTO: 87.1 FL (ref 79–97)
MCV RBC AUTO: 87.2 FL (ref 79–97)
MCV RBC AUTO: 87.7 FL (ref 79–97)
MCV RBC AUTO: 88.5 FL (ref 79–97)
MCV RBC AUTO: 89 FL (ref 79–97)
MCV RBC AUTO: 91 FL (ref 79–97)
MCV RBC AUTO: 91.5 FL (ref 79–97)
METHADONE UR QL SCN: NEGATIVE
METHADONE UR QL SCN: NEGATIVE
METHOD: ABNORMAL
METHOD: ABNORMAL
MODALITY: ABNORMAL
MONOCYTES # BLD AUTO: 0.6 X10E3/UL (ref 0.1–0.9)
MONOCYTES # BLD AUTO: 0.7 10*3/MM3 (ref 0.1–0.9)
MONOCYTES # BLD AUTO: 0.7 X10E3/UL (ref 0.1–0.9)
MONOCYTES # BLD AUTO: 0.7 X10E3/UL (ref 0.1–0.9)
MONOCYTES # BLD AUTO: 0.76 10*3/MM3 (ref 0.1–0.9)
MONOCYTES # BLD AUTO: 0.78 10*3/MM3 (ref 0.1–0.9)
MONOCYTES # BLD AUTO: 0.8 X10E3/UL (ref 0.1–0.9)
MONOCYTES # BLD AUTO: 0.8 X10E3/UL (ref 0.1–0.9)
MONOCYTES # BLD AUTO: 0.89 10*3/MM3 (ref 0.1–0.9)
MONOCYTES # BLD AUTO: 0.9 X10E3/UL (ref 0.1–0.9)
MONOCYTES # BLD AUTO: 0.9 X10E3/UL (ref 0.1–0.9)
MONOCYTES # BLD AUTO: 1.2 X10E3/UL (ref 0.1–0.9)
MONOCYTES # BLD: 0.66 10*3/MM3 (ref 0.1–0.9)
MONOCYTES NFR BLD AUTO: 10 %
MONOCYTES NFR BLD AUTO: 10.2 % (ref 5–12)
MONOCYTES NFR BLD AUTO: 11 %
MONOCYTES NFR BLD AUTO: 11.3 % (ref 5–12)
MONOCYTES NFR BLD AUTO: 12 %
MONOCYTES NFR BLD AUTO: 7 %
MONOCYTES NFR BLD AUTO: 8.6 % (ref 5–12)
MONOCYTES NFR BLD AUTO: 9 %
MONOCYTES NFR BLD AUTO: 9 %
MONOCYTES NFR BLD AUTO: 9.3 % (ref 5–12)
MONOCYTES NFR FLD: 16 %
MONOCYTES NFR FLD: 9 %
MONOS+MACROS NFR FLD: 8 %
MR PISA EROA: 0.36 CM2
NEUTROPHILS # BLD AUTO: 5 X10E3/UL (ref 1.4–7)
NEUTROPHILS # BLD AUTO: 5.1 X10E3/UL (ref 1.4–7)
NEUTROPHILS # BLD AUTO: 5.2 X10E3/UL (ref 1.4–7)
NEUTROPHILS # BLD AUTO: 5.2 X10E3/UL (ref 1.4–7)
NEUTROPHILS # BLD AUTO: 5.9 X10E3/UL (ref 1.4–7)
NEUTROPHILS # BLD AUTO: 6 X10E3/UL (ref 1.4–7)
NEUTROPHILS # BLD AUTO: 6.3 X10E3/UL (ref 1.4–7)
NEUTROPHILS # BLD AUTO: 6.4 X10E3/UL (ref 1.4–7)
NEUTROPHILS # BLD AUTO: 7.17 10*3/MM3 (ref 1.7–7)
NEUTROPHILS NFR BLD AUTO: 4.1 10*3/MM3 (ref 1.7–7)
NEUTROPHILS NFR BLD AUTO: 5.33 10*3/MM3 (ref 1.7–7)
NEUTROPHILS NFR BLD AUTO: 6.43 10*3/MM3 (ref 1.7–7)
NEUTROPHILS NFR BLD AUTO: 6.48 10*3/MM3 (ref 1.7–7)
NEUTROPHILS NFR BLD AUTO: 64 %
NEUTROPHILS NFR BLD AUTO: 65 %
NEUTROPHILS NFR BLD AUTO: 66.4 % (ref 42.7–76)
NEUTROPHILS NFR BLD AUTO: 67.5 % (ref 42.7–76)
NEUTROPHILS NFR BLD AUTO: 69 %
NEUTROPHILS NFR BLD AUTO: 69.6 % (ref 42.7–76)
NEUTROPHILS NFR BLD AUTO: 70 %
NEUTROPHILS NFR BLD AUTO: 71 %
NEUTROPHILS NFR BLD AUTO: 72.7 % (ref 42.7–76)
NEUTROPHILS NFR BLD AUTO: 74 %
NEUTROPHILS NFR BLD MANUAL: 78.4 % (ref 42.7–76)
NEUTROPHILS NFR FLD MANUAL: 22 %
NEUTROPHILS NFR FLD MANUAL: 9 %
NITRITE UR QL STRIP: NEGATIVE
NITRITE UR-MCNC: NEGATIVE MG/ML
NOROVIRUS GI+II RNA STL QL NAA+NON-PROBE: NOT DETECTED
NRBC BLD AUTO-RTO: 0 /100 WBC (ref 0–0.2)
NT-PROBNP SERPL-MCNC: 1420 PG/ML (ref 0–1800)
NT-PROBNP SERPL-MCNC: 1518 PG/ML (ref 0–738)
NT-PROBNP SERPL-MCNC: 3381 PG/ML (ref 0–1800)
NT-PROBNP SERPL-MCNC: 4855 PG/ML (ref 0–1800)
NT-PROBNP SERPL-MCNC: 5615 PG/ML (ref 0–1800)
NT-PROBNP SERPL-MCNC: 6817 PG/ML (ref 0–1800)
NT-PROBNP SERPL-MCNC: 892 PG/ML (ref 0–1800)
NUC CELL # FLD: 388 /MM3
NUC CELL # FLD: 606 /MM3
OPIATES UR QL: NEGATIVE
OPIATES UR QL: NEGATIVE
OXYCODONE UR QL SCN: NEGATIVE
OXYCODONE UR QL SCN: NEGATIVE
P SHIGELLOIDES DNA STL QL NAA+NON-PROBE: NOT DETECTED
PATH REPORT.ADDENDUM SPEC: NORMAL
PATH REPORT.FINAL DX SPEC: NORMAL
PATH REPORT.GROSS SPEC: NORMAL
PCO2 BLDA: 58.6 MM HG (ref 35–45)
PCO2 BLDA: 62.6 MM HG (ref 35–45)
PCO2 BLDA: 70.8 MM HG (ref 35–45)
PCO2 BLDA: 77 MM HG (ref 35–45)
PH BLDA: 7.24 PH UNITS (ref 7.35–7.45)
PH BLDA: 7.34 PH UNITS (ref 7.35–7.45)
PH BLDA: 7.36 PH UNITS (ref 7.35–7.45)
PH BLDA: 7.38 PH UNITS (ref 7.35–7.45)
PH UR STRIP.AUTO: 5.5 [PH] (ref 5–8)
PH UR STRIP.AUTO: 6 [PH] (ref 5–8)
PH UR STRIP.AUTO: 6 [PH] (ref 5–8)
PH UR STRIP.AUTO: <=5 [PH] (ref 5–8)
PH UR: 5 [PH] (ref 5–8)
PHOSPHATE SERPL-MCNC: 1.9 MG/DL (ref 2.5–4.5)
PHOSPHATE SERPL-MCNC: 2 MG/DL (ref 2.5–4.5)
PHOSPHATE SERPL-MCNC: 2.1 MG/DL (ref 2.5–4.5)
PHOSPHATE SERPL-MCNC: 2.3 MG/DL (ref 2.5–4.5)
PHOSPHATE SERPL-MCNC: 2.3 MG/DL (ref 2.5–4.5)
PHOSPHATE SERPL-MCNC: 2.5 MG/DL (ref 2.5–4.5)
PHOSPHATE SERPL-MCNC: 2.6 MG/DL (ref 2.5–4.5)
PHOSPHATE SERPL-MCNC: 2.8 MG/DL (ref 2.5–4.5)
PHOSPHATE SERPL-MCNC: 2.9 MG/DL (ref 2.5–4.5)
PHOSPHATE SERPL-MCNC: 3.3 MG/DL (ref 2.5–4.5)
PHOSPHATE SERPL-MCNC: 3.5 MG/DL (ref 2.5–4.5)
PHOSPHATE SERPL-MCNC: 3.6 MG/DL (ref 2.5–4.5)
PHOSPHATE SERPL-MCNC: 3.7 MG/DL (ref 2.5–4.5)
PHOSPHATE SERPL-MCNC: 3.9 MG/DL (ref 2.5–4.5)
PHOSPHATE SERPL-MCNC: 4 MG/DL (ref 2.5–4.5)
PHOSPHATE SERPL-MCNC: 4.7 MG/DL (ref 2.5–4.5)
PHOSPHATE SERPL-MCNC: 4.8 MG/DL (ref 2.5–4.5)
PISA ALIASING VEL: 5.6 M/S
PISA RADIUS: 0.7 CM
PLAT MORPH BLD: NORMAL
PLATELET # BLD AUTO: 116 10*3/MM3 (ref 140–450)
PLATELET # BLD AUTO: 118 10*3/MM3 (ref 140–450)
PLATELET # BLD AUTO: 126 X10E3/UL (ref 150–450)
PLATELET # BLD AUTO: 133 10*3/MM3 (ref 140–450)
PLATELET # BLD AUTO: 146 10*3/MM3 (ref 140–450)
PLATELET # BLD AUTO: 148 10*3/MM3 (ref 140–450)
PLATELET # BLD AUTO: 149 10*3/MM3 (ref 140–450)
PLATELET # BLD AUTO: 150 10*3/MM3 (ref 140–450)
PLATELET # BLD AUTO: 151 10*3/MM3 (ref 140–450)
PLATELET # BLD AUTO: 152 10*3/MM3 (ref 140–450)
PLATELET # BLD AUTO: 153 10*3/MM3 (ref 140–450)
PLATELET # BLD AUTO: 153 X10E3/UL (ref 150–450)
PLATELET # BLD AUTO: 155 10*3/MM3 (ref 140–450)
PLATELET # BLD AUTO: 156 10*3/MM3 (ref 140–450)
PLATELET # BLD AUTO: 158 10*3/MM3 (ref 140–450)
PLATELET # BLD AUTO: 163 10*3/MM3 (ref 140–450)
PLATELET # BLD AUTO: 170 X10E3/UL (ref 150–450)
PLATELET # BLD AUTO: 171 10*3/MM3 (ref 140–450)
PLATELET # BLD AUTO: 171 10*3/MM3 (ref 140–450)
PLATELET # BLD AUTO: 171 X10E3/UL (ref 150–450)
PLATELET # BLD AUTO: 172 10*3/MM3 (ref 140–450)
PLATELET # BLD AUTO: 175 10*3/MM3 (ref 140–450)
PLATELET # BLD AUTO: 175 10*3/MM3 (ref 140–450)
PLATELET # BLD AUTO: 176 10*3/MM3 (ref 140–450)
PLATELET # BLD AUTO: 178 10*3/MM3 (ref 140–450)
PLATELET # BLD AUTO: 178 X10E3/UL (ref 150–450)
PLATELET # BLD AUTO: 181 10*3/MM3 (ref 140–450)
PLATELET # BLD AUTO: 186 X10E3/UL (ref 150–450)
PLATELET # BLD AUTO: 187 10*3/MM3 (ref 140–450)
PLATELET # BLD AUTO: 189 10*3/MM3 (ref 140–450)
PLATELET # BLD AUTO: 189 X10E3/UL (ref 150–450)
PLATELET # BLD AUTO: 193 10*3/MM3 (ref 140–450)
PLATELET # BLD AUTO: 194 X10E3/UL (ref 150–450)
PLATELET # BLD AUTO: 195 10*3/MM3 (ref 140–450)
PLATELET # BLD AUTO: 198 10*3/MM3 (ref 140–450)
PLATELET # BLD AUTO: 210 10*3/MM3 (ref 140–450)
PLATELET # BLD AUTO: 214 10*3/MM3 (ref 140–450)
PLATELET # BLD AUTO: 218 10*3/MM3 (ref 140–450)
PLATELET # BLD AUTO: 228 10*3/MM3 (ref 140–450)
PMV BLD AUTO: 10.1 FL (ref 6–12)
PMV BLD AUTO: 10.5 FL (ref 6–12)
PMV BLD AUTO: 10.6 FL (ref 6–12)
PMV BLD AUTO: 10.7 FL (ref 6–12)
PMV BLD AUTO: 10.8 FL (ref 6–12)
PMV BLD AUTO: 10.8 FL (ref 6–12)
PMV BLD AUTO: 10.9 FL (ref 6–12)
PMV BLD AUTO: 11 FL (ref 6–12)
PMV BLD AUTO: 11 FL (ref 6–12)
PMV BLD AUTO: 11.1 FL (ref 6–12)
PMV BLD AUTO: 11.1 FL (ref 6–12)
PMV BLD AUTO: 11.3 FL (ref 6–12)
PMV BLD AUTO: 11.4 FL (ref 6–12)
PMV BLD AUTO: 11.4 FL (ref 6–12)
PMV BLD AUTO: 11.5 FL (ref 6–12)
PMV BLD AUTO: 11.7 FL (ref 6–12)
PMV BLD AUTO: 11.8 FL (ref 6–12)
PMV BLD AUTO: 11.8 FL (ref 6–12)
PMV BLD AUTO: 11.9 FL (ref 6–12)
PMV BLD AUTO: 12 FL (ref 6–12)
PMV BLD AUTO: 12 FL (ref 6–12)
PMV BLD AUTO: 12.1 FL (ref 6–12)
PMV BLD AUTO: 12.2 FL (ref 6–12)
PMV BLD AUTO: 12.3 FL (ref 6–12)
PMV BLD AUTO: 12.3 FL (ref 6–12)
PMV BLD AUTO: 12.5 FL (ref 6–12)
PO2 BLDA: 58.8 MM HG (ref 80–100)
PO2 BLDA: 68.3 MM HG (ref 80–100)
PO2 BLDA: 72.1 MM HG (ref 80–100)
PO2 BLDA: 91.1 MM HG (ref 80–100)
POTASSIUM SERPL-SCNC: 2.8 MMOL/L (ref 3.5–5.2)
POTASSIUM SERPL-SCNC: 3.1 MMOL/L (ref 3.5–5.2)
POTASSIUM SERPL-SCNC: 3.1 MMOL/L (ref 3.5–5.2)
POTASSIUM SERPL-SCNC: 3.2 MMOL/L (ref 3.5–5.2)
POTASSIUM SERPL-SCNC: 3.3 MMOL/L (ref 3.5–5.2)
POTASSIUM SERPL-SCNC: 3.3 MMOL/L (ref 3.5–5.2)
POTASSIUM SERPL-SCNC: 3.4 MMOL/L (ref 3.5–5.2)
POTASSIUM SERPL-SCNC: 3.4 MMOL/L (ref 3.5–5.2)
POTASSIUM SERPL-SCNC: 3.5 MMOL/L (ref 3.5–5.2)
POTASSIUM SERPL-SCNC: 3.6 MMOL/L (ref 3.5–5.2)
POTASSIUM SERPL-SCNC: 3.7 MMOL/L (ref 3.5–5.2)
POTASSIUM SERPL-SCNC: 3.7 MMOL/L (ref 3.5–5.2)
POTASSIUM SERPL-SCNC: 3.8 MMOL/L (ref 3.5–5.2)
POTASSIUM SERPL-SCNC: 3.9 MMOL/L (ref 3.5–5.2)
POTASSIUM SERPL-SCNC: 3.9 MMOL/L (ref 3.5–5.2)
POTASSIUM SERPL-SCNC: 4 MMOL/L (ref 3.5–5.2)
POTASSIUM SERPL-SCNC: 4.1 MMOL/L (ref 3.5–5.2)
POTASSIUM SERPL-SCNC: 4.2 MMOL/L (ref 3.5–5.2)
POTASSIUM SERPL-SCNC: 4.3 MMOL/L (ref 3.5–5.2)
POTASSIUM SERPL-SCNC: 4.3 MMOL/L (ref 3.5–5.2)
POTASSIUM SERPL-SCNC: 4.4 MMOL/L (ref 3.5–5.2)
POTASSIUM SERPL-SCNC: 4.5 MMOL/L (ref 3.5–5.2)
POTASSIUM SERPL-SCNC: 5.3 MMOL/L (ref 3.5–5.2)
POTASSIUM SERPL-SCNC: 8.4 MMOL/L (ref 3.5–5.2)
PROCALCITONIN SERPL-MCNC: 0.11 NG/ML (ref 0–0.25)
PROT FLD-MCNC: 1.3 G/DL
PROT FLD-MCNC: 1.4 G/DL
PROT SERPL-MCNC: 4.4 G/DL (ref 6–8.5)
PROT SERPL-MCNC: 4.8 G/DL (ref 6–8.5)
PROT SERPL-MCNC: 4.9 G/DL (ref 6–8.5)
PROT SERPL-MCNC: 5 G/DL (ref 6–8.5)
PROT SERPL-MCNC: 5 G/DL (ref 6–8.5)
PROT SERPL-MCNC: 5.2 G/DL (ref 6–8.5)
PROT SERPL-MCNC: 5.2 G/DL (ref 6–8.5)
PROT SERPL-MCNC: 5.3 G/DL (ref 6–8.5)
PROT SERPL-MCNC: 5.3 G/DL (ref 6–8.5)
PROT SERPL-MCNC: 5.4 G/DL (ref 6–8.5)
PROT SERPL-MCNC: 5.4 G/DL (ref 6–8.5)
PROT SERPL-MCNC: 5.5 G/DL (ref 6–8.5)
PROT SERPL-MCNC: 5.6 G/DL (ref 6–8.5)
PROT SERPL-MCNC: 5.7 G/DL (ref 6–8.5)
PROT SERPL-MCNC: 5.7 G/DL (ref 6–8.5)
PROT SERPL-MCNC: 5.8 G/DL (ref 6–8.5)
PROT SERPL-MCNC: 5.9 G/DL (ref 6–8.5)
PROT UR QL STRIP: ABNORMAL
PROT UR QL STRIP: ABNORMAL
PROT UR QL STRIP: NEGATIVE
PROT UR STRIP-MCNC: NEGATIVE MG/DL
PROTHROMBIN TIME: 15.6 SECONDS (ref 11.7–14.2)
PROTHROMBIN TIME: 15.8 SECONDS (ref 11.7–14.2)
PROTHROMBIN TIME: 17 SECONDS (ref 11.7–14.2)
PROTHROMBIN TIME: 17.3 SECONDS (ref 11.7–14.2)
PROTHROMBIN TIME: 17.7 SECONDS (ref 11.7–14.2)
PROTHROMBIN TIME: NORMAL S
QT INTERVAL: 312 MS
QT INTERVAL: 322 MS
QT INTERVAL: 350 MS
QT INTERVAL: 362 MS
QT INTERVAL: 377 MS
QT INTERVAL: 438 MS
RBC # BLD AUTO: 2.56 10*6/MM3 (ref 3.77–5.28)
RBC # BLD AUTO: 2.84 10*6/MM3 (ref 3.77–5.28)
RBC # BLD AUTO: 3.14 10*6/MM3 (ref 3.77–5.28)
RBC # BLD AUTO: 3.3 X10E6/UL (ref 3.77–5.28)
RBC # BLD AUTO: 3.34 X10E6/UL (ref 3.77–5.28)
RBC # BLD AUTO: 3.46 10*6/MM3 (ref 3.77–5.28)
RBC # BLD AUTO: 3.6 10*6/MM3 (ref 3.77–5.28)
RBC # BLD AUTO: 3.7 X10E6/UL (ref 3.77–5.28)
RBC # BLD AUTO: 3.73 10*6/MM3 (ref 3.77–5.28)
RBC # BLD AUTO: 3.74 X10E6/UL (ref 3.77–5.28)
RBC # BLD AUTO: 3.79 10*6/MM3 (ref 3.77–5.28)
RBC # BLD AUTO: 3.85 10*6/MM3 (ref 3.77–5.28)
RBC # BLD AUTO: 3.86 X10E6/UL (ref 3.77–5.28)
RBC # BLD AUTO: 3.89 10*6/MM3 (ref 3.77–5.28)
RBC # BLD AUTO: 3.92 10*6/MM3 (ref 3.77–5.28)
RBC # BLD AUTO: 3.94 X10E6/UL (ref 3.77–5.28)
RBC # BLD AUTO: 3.98 10*6/MM3 (ref 3.77–5.28)
RBC # BLD AUTO: 3.99 10*6/MM3 (ref 3.77–5.28)
RBC # BLD AUTO: 4.04 10*6/MM3 (ref 3.77–5.28)
RBC # BLD AUTO: 4.05 10*6/MM3 (ref 3.77–5.28)
RBC # BLD AUTO: 4.07 X10E6/UL (ref 3.77–5.28)
RBC # BLD AUTO: 4.1 10*6/MM3 (ref 3.77–5.28)
RBC # BLD AUTO: 4.11 10*6/MM3 (ref 3.77–5.28)
RBC # BLD AUTO: 4.12 10*6/MM3 (ref 3.77–5.28)
RBC # BLD AUTO: 4.15 10*6/MM3 (ref 3.77–5.28)
RBC # BLD AUTO: 4.16 10*6/MM3 (ref 3.77–5.28)
RBC # BLD AUTO: 4.16 10*6/MM3 (ref 3.77–5.28)
RBC # BLD AUTO: 4.22 10*6/MM3 (ref 3.77–5.28)
RBC # BLD AUTO: 4.23 10*6/MM3 (ref 3.77–5.28)
RBC # BLD AUTO: 4.25 10*6/MM3 (ref 3.77–5.28)
RBC # BLD AUTO: 4.26 10*6/MM3 (ref 3.77–5.28)
RBC # BLD AUTO: 4.29 10*6/MM3 (ref 3.77–5.28)
RBC # BLD AUTO: 4.32 10*6/MM3 (ref 3.77–5.28)
RBC # BLD AUTO: 4.35 10*6/MM3 (ref 3.77–5.28)
RBC # BLD AUTO: 4.38 10*6/MM3 (ref 3.77–5.28)
RBC # BLD AUTO: 4.4 10*6/MM3 (ref 3.77–5.28)
RBC # BLD AUTO: 4.46 10*6/MM3 (ref 3.77–5.28)
RBC # BLD AUTO: 4.5 10*6/MM3 (ref 3.77–5.28)
RBC # BLD AUTO: 4.51 10*6/MM3 (ref 3.77–5.28)
RBC # BLD AUTO: 4.6 10*6/MM3 (ref 3.77–5.28)
RBC # BLD AUTO: 4.61 10*6/MM3 (ref 3.77–5.28)
RBC # BLD AUTO: 4.72 10*6/MM3 (ref 3.77–5.28)
RBC # BLD AUTO: 4.95 X10E6/UL (ref 3.77–5.28)
RBC # FLD AUTO: 7474 /MM3
RBC # FLD AUTO: ABNORMAL /MM3
RBC # UR STRIP: ABNORMAL /HPF
RBC # UR STRIP: NEGATIVE /UL
RBC # UR STRIP: NORMAL /HPF
RBC # UR STRIP: NORMAL /HPF
RBC MORPH BLD: NORMAL
REF LAB TEST METHOD: ABNORMAL
REF LAB TEST METHOD: NORMAL
REF LAB TEST METHOD: NORMAL
RH BLD: NEGATIVE
RVA RNA STL QL NAA+NON-PROBE: NOT DETECTED
S ENT+BONG DNA STL QL NAA+NON-PROBE: NOT DETECTED
SAO2 % BLDCOA: 86.7 % (ref 92–99)
SAO2 % BLDCOA: 92.4 % (ref 92–99)
SAO2 % BLDCOA: 93 % (ref 92–99)
SAO2 % BLDCOA: 95 % (ref 92–99)
SAPO I+II+IV+V RNA STL QL NAA+NON-PROBE: NOT DETECTED
SARS-COV-2 ORF1AB RESP QL NAA+PROBE: NOT DETECTED
SARS-COV-2 RNA PNL SPEC NAA+PROBE: NOT DETECTED
SARS-COV-2 RNA PNL SPEC NAA+PROBE: NOT DETECTED
SARS-COV-2 RNA RESP QL NAA+PROBE: NOT DETECTED
SARS-COV-2 RNA RESP QL NAA+PROBE: NOT DETECTED
SHIGELLA SP+EIEC IPAH ST NAA+NON-PROBE: NOT DETECTED
SINUS: 2.6 CM
SINUS: 2.7 CM
SODIUM SERPL-SCNC: 133 MMOL/L (ref 136–145)
SODIUM SERPL-SCNC: 134 MMOL/L (ref 136–145)
SODIUM SERPL-SCNC: 135 MMOL/L (ref 136–145)
SODIUM SERPL-SCNC: 136 MMOL/L (ref 136–145)
SODIUM SERPL-SCNC: 137 MMOL/L (ref 136–145)
SODIUM SERPL-SCNC: 138 MMOL/L (ref 136–145)
SODIUM SERPL-SCNC: 139 MMOL/L (ref 134–144)
SODIUM SERPL-SCNC: 140 MMOL/L (ref 134–144)
SODIUM SERPL-SCNC: 140 MMOL/L (ref 136–145)
SODIUM SERPL-SCNC: 141 MMOL/L (ref 134–144)
SODIUM SERPL-SCNC: 141 MMOL/L (ref 134–144)
SODIUM SERPL-SCNC: 141 MMOL/L (ref 136–145)
SODIUM SERPL-SCNC: 142 MMOL/L (ref 134–144)
SODIUM SERPL-SCNC: 142 MMOL/L (ref 136–145)
SODIUM SERPL-SCNC: 143 MMOL/L (ref 134–144)
SODIUM SERPL-SCNC: 143 MMOL/L (ref 136–145)
SODIUM SERPL-SCNC: 144 MMOL/L (ref 136–145)
SODIUM SERPL-SCNC: 145 MMOL/L (ref 136–145)
SODIUM SERPL-SCNC: 146 MMOL/L (ref 136–145)
SODIUM SERPL-SCNC: 148 MMOL/L (ref 136–145)
SODIUM SERPL-SCNC: 149 MMOL/L (ref 136–145)
SODIUM SERPL-SCNC: 151 MMOL/L (ref 136–145)
SODIUM SERPL-SCNC: 152 MMOL/L (ref 136–145)
SODIUM SERPL-SCNC: 153 MMOL/L (ref 136–145)
SODIUM SERPL-SCNC: 156 MMOL/L (ref 136–145)
SODIUM UR-SCNC: 82 MMOL/L
SP GR UR STRIP: 1.01 (ref 1–1.03)
SP GR UR: 1.01 (ref 1–1.03)
SQUAMOUS #/AREA URNS HPF: ABNORMAL /HPF
SQUAMOUS #/AREA URNS HPF: NORMAL /HPF
SQUAMOUS #/AREA URNS HPF: NORMAL /HPF
STJ: 2.7 CM
STRESS TARGET HR: 117 BPM
T&S EXPIRATION DATE: NORMAL
T3FREE SERPL-MCNC: 3.36 PG/ML (ref 2–4.4)
T4 FREE SERPL-MCNC: 1.03 NG/DL (ref 0.93–1.7)
TIBC SERPL-MCNC: 527 MCG/DL (ref 298–536)
TIBC SERPL-MCNC: 568 MCG/DL (ref 298–536)
TOTAL RATE: 18 BREATHS/MINUTE
TOTAL RATE: 18 BREATHS/MINUTE
TOTAL RATE: 20 BREATHS/MINUTE
TOTAL RATE: 30 BREATHS/MINUTE
TRANSFERRIN SERPL-MCNC: 354 MG/DL (ref 200–360)
TRANSFERRIN SERPL-MCNC: 381 MG/DL (ref 200–360)
TRIGL SERPL-MCNC: 86 MG/DL (ref 0–149)
TROPONIN T SERPL-MCNC: <0.01 NG/ML (ref 0–0.03)
TSH SERPL DL<=0.05 MIU/L-ACNC: 0.02 UIU/ML (ref 0.27–4.2)
TSH SERPL DL<=0.05 MIU/L-ACNC: 2.43 UIU/ML (ref 0.27–4.2)
TSH SERPL DL<=0.05 MIU/L-ACNC: 8.1 UIU/ML (ref 0.27–4.2)
UNIT  ABO: NORMAL
UNIT  RH: NORMAL
URATE SERPL-MCNC: 5.5 MG/DL (ref 2.4–5.7)
UROBILINOGEN UR QL STRIP: ABNORMAL
UROBILINOGEN UR QL: NORMAL
V CHOL+PARA+VUL DNA STL QL NAA+NON-PROBE: NOT DETECTED
V CHOLERAE DNA STL QL NAA+NON-PROBE: NOT DETECTED
VARIANT LYMPHS NFR BLD MANUAL: 13.4 % (ref 19.6–45.3)
VIT B12 BLD-MCNC: 991 PG/ML (ref 211–946)
VLDLC SERPL CALC-MCNC: 17 MG/DL (ref 5–40)
WBC # BLD AUTO: 7.4 X10E3/UL (ref 3.4–10.8)
WBC # BLD AUTO: 7.5 X10E3/UL (ref 3.4–10.8)
WBC # BLD AUTO: 7.9 X10E3/UL (ref 3.4–10.8)
WBC # BLD AUTO: 7.9 X10E3/UL (ref 3.4–10.8)
WBC # BLD AUTO: 8.4 X10E3/UL (ref 3.4–10.8)
WBC # BLD AUTO: 9.8 X10E3/UL (ref 3.4–10.8)
WBC # UR STRIP: ABNORMAL /HPF
WBC # UR STRIP: NORMAL /HPF
WBC # UR STRIP: NORMAL /HPF
WBC MORPH BLD: NORMAL
WBC NRBC COR # BLD: 10.3 10*3/MM3 (ref 3.4–10.8)
WBC NRBC COR # BLD: 10.65 10*3/MM3 (ref 3.4–10.8)
WBC NRBC COR # BLD: 10.95 10*3/MM3 (ref 3.4–10.8)
WBC NRBC COR # BLD: 11.15 10*3/MM3 (ref 3.4–10.8)
WBC NRBC COR # BLD: 11.31 10*3/MM3 (ref 3.4–10.8)
WBC NRBC COR # BLD: 12.29 10*3/MM3 (ref 3.4–10.8)
WBC NRBC COR # BLD: 12.34 10*3/MM3 (ref 3.4–10.8)
WBC NRBC COR # BLD: 12.62 10*3/MM3 (ref 3.4–10.8)
WBC NRBC COR # BLD: 13.44 10*3/MM3 (ref 3.4–10.8)
WBC NRBC COR # BLD: 13.81 10*3/MM3 (ref 3.4–10.8)
WBC NRBC COR # BLD: 5.73 10*3/MM3 (ref 3.4–10.8)
WBC NRBC COR # BLD: 5.73 10*3/MM3 (ref 3.4–10.8)
WBC NRBC COR # BLD: 6.18 10*3/MM3 (ref 3.4–10.8)
WBC NRBC COR # BLD: 6.43 10*3/MM3 (ref 3.4–10.8)
WBC NRBC COR # BLD: 6.49 10*3/MM3 (ref 3.4–10.8)
WBC NRBC COR # BLD: 6.49 10*3/MM3 (ref 3.4–10.8)
WBC NRBC COR # BLD: 6.64 10*3/MM3 (ref 3.4–10.8)
WBC NRBC COR # BLD: 6.84 10*3/MM3 (ref 3.4–10.8)
WBC NRBC COR # BLD: 7.59 10*3/MM3 (ref 3.4–10.8)
WBC NRBC COR # BLD: 7.66 10*3/MM3 (ref 3.4–10.8)
WBC NRBC COR # BLD: 7.71 10*3/MM3 (ref 3.4–10.8)
WBC NRBC COR # BLD: 7.79 10*3/MM3 (ref 3.4–10.8)
WBC NRBC COR # BLD: 7.84 10*3/MM3 (ref 3.4–10.8)
WBC NRBC COR # BLD: 8.26 10*3/MM3 (ref 3.4–10.8)
WBC NRBC COR # BLD: 8.31 10*3/MM3 (ref 3.4–10.8)
WBC NRBC COR # BLD: 8.58 10*3/MM3 (ref 3.4–10.8)
WBC NRBC COR # BLD: 8.61 10*3/MM3 (ref 3.4–10.8)
WBC NRBC COR # BLD: 8.85 10*3/MM3 (ref 3.4–10.8)
WBC NRBC COR # BLD: 8.86 10*3/MM3 (ref 3.4–10.8)
WBC NRBC COR # BLD: 8.87 10*3/MM3 (ref 3.4–10.8)
WBC NRBC COR # BLD: 9.14 10*3/MM3 (ref 3.4–10.8)
WBC NRBC COR # BLD: 9.27 10*3/MM3 (ref 3.4–10.8)
WBC NRBC COR # BLD: 9.58 10*3/MM3 (ref 3.4–10.8)
WBC NRBC COR # BLD: 9.59 10*3/MM3 (ref 3.4–10.8)
WBC NRBC COR # BLD: 9.9 10*3/MM3 (ref 3.4–10.8)
Y ENTEROCOL DNA STL QL NAA+NON-PROBE: NOT DETECTED

## 2022-01-01 PROCEDURE — 94660 CPAP INITIATION&MGMT: CPT

## 2022-01-01 PROCEDURE — 83735 ASSAY OF MAGNESIUM: CPT | Performed by: HOSPITALIST

## 2022-01-01 PROCEDURE — 99221 1ST HOSP IP/OBS SF/LOW 40: CPT | Performed by: INTERNAL MEDICINE

## 2022-01-01 PROCEDURE — 84466 ASSAY OF TRANSFERRIN: CPT | Performed by: STUDENT IN AN ORGANIZED HEALTH CARE EDUCATION/TRAINING PROGRAM

## 2022-01-01 PROCEDURE — 84439 ASSAY OF FREE THYROXINE: CPT | Performed by: STUDENT IN AN ORGANIZED HEALTH CARE EDUCATION/TRAINING PROGRAM

## 2022-01-01 PROCEDURE — 82962 GLUCOSE BLOOD TEST: CPT

## 2022-01-01 PROCEDURE — 84100 ASSAY OF PHOSPHORUS: CPT | Performed by: INTERNAL MEDICINE

## 2022-01-01 PROCEDURE — 25010000002 MORPHINE PER 10 MG: Performed by: INTERNAL MEDICINE

## 2022-01-01 PROCEDURE — 94799 UNLISTED PULMONARY SVC/PX: CPT

## 2022-01-01 PROCEDURE — 36600 WITHDRAWAL OF ARTERIAL BLOOD: CPT

## 2022-01-01 PROCEDURE — 85007 BL SMEAR W/DIFF WBC COUNT: CPT | Performed by: PHYSICIAN ASSISTANT

## 2022-01-01 PROCEDURE — 36415 COLL VENOUS BLD VENIPUNCTURE: CPT | Performed by: NURSE PRACTITIONER

## 2022-01-01 PROCEDURE — G0378 HOSPITAL OBSERVATION PER HR: HCPCS

## 2022-01-01 PROCEDURE — 63710000001 INSULIN LISPRO (HUMAN) PER 5 UNITS: Performed by: STUDENT IN AN ORGANIZED HEALTH CARE EDUCATION/TRAINING PROGRAM

## 2022-01-01 PROCEDURE — 99213 OFFICE O/P EST LOW 20 MIN: CPT | Performed by: PHYSICIAN ASSISTANT

## 2022-01-01 PROCEDURE — 80053 COMPREHEN METABOLIC PANEL: CPT | Performed by: INTERNAL MEDICINE

## 2022-01-01 PROCEDURE — 87077 CULTURE AEROBIC IDENTIFY: CPT | Performed by: STUDENT IN AN ORGANIZED HEALTH CARE EDUCATION/TRAINING PROGRAM

## 2022-01-01 PROCEDURE — 80048 BASIC METABOLIC PNL TOTAL CA: CPT | Performed by: INTERNAL MEDICINE

## 2022-01-01 PROCEDURE — 99232 SBSQ HOSP IP/OBS MODERATE 35: CPT | Performed by: PHYSICIAN ASSISTANT

## 2022-01-01 PROCEDURE — 85018 HEMOGLOBIN: CPT | Performed by: NURSE PRACTITIONER

## 2022-01-01 PROCEDURE — 76942 ECHO GUIDE FOR BIOPSY: CPT

## 2022-01-01 PROCEDURE — 99233 SBSQ HOSP IP/OBS HIGH 50: CPT | Performed by: INTERNAL MEDICINE

## 2022-01-01 PROCEDURE — 25010000002 FUROSEMIDE PER 20 MG: Performed by: INTERNAL MEDICINE

## 2022-01-01 PROCEDURE — 99232 SBSQ HOSP IP/OBS MODERATE 35: CPT

## 2022-01-01 PROCEDURE — 92526 ORAL FUNCTION THERAPY: CPT | Performed by: SPEECH-LANGUAGE PATHOLOGIST

## 2022-01-01 PROCEDURE — 83880 ASSAY OF NATRIURETIC PEPTIDE: CPT | Performed by: PHYSICIAN ASSISTANT

## 2022-01-01 PROCEDURE — 25010000002 DIGOXIN PER 500 MCG: Performed by: NURSE PRACTITIONER

## 2022-01-01 PROCEDURE — 84484 ASSAY OF TROPONIN QUANT: CPT | Performed by: PHYSICIAN ASSISTANT

## 2022-01-01 PROCEDURE — 80048 BASIC METABOLIC PNL TOTAL CA: CPT | Performed by: NURSE PRACTITIONER

## 2022-01-01 PROCEDURE — 82803 BLOOD GASES ANY COMBINATION: CPT

## 2022-01-01 PROCEDURE — 70492 CT SFT TSUE NCK W/O & W/DYE: CPT

## 2022-01-01 PROCEDURE — 83880 ASSAY OF NATRIURETIC PEPTIDE: CPT | Performed by: INTERNAL MEDICINE

## 2022-01-01 PROCEDURE — 83735 ASSAY OF MAGNESIUM: CPT | Performed by: INTERNAL MEDICINE

## 2022-01-01 PROCEDURE — 85027 COMPLETE CBC AUTOMATED: CPT | Performed by: STUDENT IN AN ORGANIZED HEALTH CARE EDUCATION/TRAINING PROGRAM

## 2022-01-01 PROCEDURE — 87070 CULTURE OTHR SPECIMN AEROBIC: CPT | Performed by: INTERNAL MEDICINE

## 2022-01-01 PROCEDURE — G0463 HOSPITAL OUTPT CLINIC VISIT: HCPCS

## 2022-01-01 PROCEDURE — 99232 SBSQ HOSP IP/OBS MODERATE 35: CPT | Performed by: NURSE PRACTITIONER

## 2022-01-01 PROCEDURE — 25010000002 LORAZEPAM PER 2 MG: Performed by: EMERGENCY MEDICINE

## 2022-01-01 PROCEDURE — 80048 BASIC METABOLIC PNL TOTAL CA: CPT

## 2022-01-01 PROCEDURE — 99232 SBSQ HOSP IP/OBS MODERATE 35: CPT | Performed by: INTERNAL MEDICINE

## 2022-01-01 PROCEDURE — 99214 OFFICE O/P EST MOD 30 MIN: CPT | Performed by: NURSE PRACTITIONER

## 2022-01-01 PROCEDURE — 25010000002 FUROSEMIDE PER 20 MG: Performed by: NURSE PRACTITIONER

## 2022-01-01 PROCEDURE — 80048 BASIC METABOLIC PNL TOTAL CA: CPT | Performed by: STUDENT IN AN ORGANIZED HEALTH CARE EDUCATION/TRAINING PROGRAM

## 2022-01-01 PROCEDURE — 85610 PROTHROMBIN TIME: CPT | Performed by: INTERNAL MEDICINE

## 2022-01-01 PROCEDURE — 25010000002 FUROSEMIDE PER 20 MG

## 2022-01-01 PROCEDURE — 99284 EMERGENCY DEPT VISIT MOD MDM: CPT

## 2022-01-01 PROCEDURE — 94761 N-INVAS EAR/PLS OXIMETRY MLT: CPT

## 2022-01-01 PROCEDURE — 85610 PROTHROMBIN TIME: CPT | Performed by: EMERGENCY MEDICINE

## 2022-01-01 PROCEDURE — 0 POTASSIUM CHLORIDE 10 MEQ/100ML SOLUTION: Performed by: INTERNAL MEDICINE

## 2022-01-01 PROCEDURE — 1111F DSCHRG MED/CURRENT MED MERGE: CPT | Performed by: NURSE PRACTITIONER

## 2022-01-01 PROCEDURE — 82140 ASSAY OF AMMONIA: CPT

## 2022-01-01 PROCEDURE — 0W993ZX DRAINAGE OF RIGHT PLEURAL CAVITY, PERCUTANEOUS APPROACH, DIAGNOSTIC: ICD-10-PCS | Performed by: RADIOLOGY

## 2022-01-01 PROCEDURE — 84466 ASSAY OF TRANSFERRIN: CPT | Performed by: NURSE PRACTITIONER

## 2022-01-01 PROCEDURE — 80069 RENAL FUNCTION PANEL: CPT | Performed by: INTERNAL MEDICINE

## 2022-01-01 PROCEDURE — 82962 GLUCOSE BLOOD TEST: CPT | Performed by: STUDENT IN AN ORGANIZED HEALTH CARE EDUCATION/TRAINING PROGRAM

## 2022-01-01 PROCEDURE — 84132 ASSAY OF SERUM POTASSIUM: CPT | Performed by: HOSPITALIST

## 2022-01-01 PROCEDURE — 84300 ASSAY OF URINE SODIUM: CPT | Performed by: INTERNAL MEDICINE

## 2022-01-01 PROCEDURE — 84481 FREE ASSAY (FT-3): CPT | Performed by: STUDENT IN AN ORGANIZED HEALTH CARE EDUCATION/TRAINING PROGRAM

## 2022-01-01 PROCEDURE — P9016 RBC LEUKOCYTES REDUCED: HCPCS

## 2022-01-01 PROCEDURE — 84443 ASSAY THYROID STIM HORMONE: CPT | Performed by: STUDENT IN AN ORGANIZED HEALTH CARE EDUCATION/TRAINING PROGRAM

## 2022-01-01 PROCEDURE — 93005 ELECTROCARDIOGRAM TRACING: CPT | Performed by: PHYSICIAN ASSISTANT

## 2022-01-01 PROCEDURE — 97110 THERAPEUTIC EXERCISES: CPT

## 2022-01-01 PROCEDURE — 99222 1ST HOSP IP/OBS MODERATE 55: CPT | Performed by: NURSE PRACTITIONER

## 2022-01-01 PROCEDURE — 97162 PT EVAL MOD COMPLEX 30 MIN: CPT

## 2022-01-01 PROCEDURE — 92610 EVALUATE SWALLOWING FUNCTION: CPT

## 2022-01-01 PROCEDURE — 87186 SC STD MICRODIL/AGAR DIL: CPT | Performed by: STUDENT IN AN ORGANIZED HEALTH CARE EDUCATION/TRAINING PROGRAM

## 2022-01-01 PROCEDURE — 94760 N-INVAS EAR/PLS OXIMETRY 1: CPT

## 2022-01-01 PROCEDURE — 93000 ELECTROCARDIOGRAM COMPLETE: CPT | Performed by: INTERNAL MEDICINE

## 2022-01-01 PROCEDURE — 71045 X-RAY EXAM CHEST 1 VIEW: CPT

## 2022-01-01 PROCEDURE — 82607 VITAMIN B-12: CPT | Performed by: NURSE PRACTITIONER

## 2022-01-01 PROCEDURE — 93010 ELECTROCARDIOGRAM REPORT: CPT | Performed by: INTERNAL MEDICINE

## 2022-01-01 PROCEDURE — 93306 TTE W/DOPPLER COMPLETE: CPT

## 2022-01-01 PROCEDURE — 70450 CT HEAD/BRAIN W/O DYE: CPT

## 2022-01-01 PROCEDURE — 25010000002 PERFLUTREN (DEFINITY) 8.476 MG IN SODIUM CHLORIDE (PF) 0.9 % 10 ML INJECTION: Performed by: INTERNAL MEDICINE

## 2022-01-01 PROCEDURE — 80162 ASSAY OF DIGOXIN TOTAL: CPT | Performed by: INTERNAL MEDICINE

## 2022-01-01 PROCEDURE — 97530 THERAPEUTIC ACTIVITIES: CPT

## 2022-01-01 PROCEDURE — 84484 ASSAY OF TROPONIN QUANT: CPT | Performed by: EMERGENCY MEDICINE

## 2022-01-01 PROCEDURE — 83036 HEMOGLOBIN GLYCOSYLATED A1C: CPT | Performed by: INTERNAL MEDICINE

## 2022-01-01 PROCEDURE — 99222 1ST HOSP IP/OBS MODERATE 55: CPT | Performed by: PSYCHIATRY & NEUROLOGY

## 2022-01-01 PROCEDURE — 93306 TTE W/DOPPLER COMPLETE: CPT | Performed by: INTERNAL MEDICINE

## 2022-01-01 PROCEDURE — 85025 COMPLETE CBC W/AUTO DIFF WBC: CPT | Performed by: INTERNAL MEDICINE

## 2022-01-01 PROCEDURE — 85610 PROTHROMBIN TIME: CPT

## 2022-01-01 PROCEDURE — 84484 ASSAY OF TROPONIN QUANT: CPT | Performed by: INTERNAL MEDICINE

## 2022-01-01 PROCEDURE — 90791 PSYCH DIAGNOSTIC EVALUATION: CPT

## 2022-01-01 PROCEDURE — U0003 INFECTIOUS AGENT DETECTION BY NUCLEIC ACID (DNA OR RNA); SEVERE ACUTE RESPIRATORY SYNDROME CORONAVIRUS 2 (SARS-COV-2) (CORONAVIRUS DISEASE [COVID-19]), AMPLIFIED PROBE TECHNIQUE, MAKING USE OF HIGH THROUGHPUT TECHNOLOGIES AS DESCRIBED BY CMS-2020-01-R: HCPCS | Performed by: INTERNAL MEDICINE

## 2022-01-01 PROCEDURE — 99214 OFFICE O/P EST MOD 30 MIN: CPT | Performed by: INTERNAL MEDICINE

## 2022-01-01 PROCEDURE — 82140 ASSAY OF AMMONIA: CPT | Performed by: STUDENT IN AN ORGANIZED HEALTH CARE EDUCATION/TRAINING PROGRAM

## 2022-01-01 PROCEDURE — 83880 ASSAY OF NATRIURETIC PEPTIDE: CPT

## 2022-01-01 PROCEDURE — 25010000002 PIPERACILLIN SOD-TAZOBACTAM PER 1 G: Performed by: INTERNAL MEDICINE

## 2022-01-01 PROCEDURE — 82248 BILIRUBIN DIRECT: CPT | Performed by: INTERNAL MEDICINE

## 2022-01-01 PROCEDURE — 88305 TISSUE EXAM BY PATHOLOGIST: CPT | Performed by: INTERNAL MEDICINE

## 2022-01-01 PROCEDURE — 25010000002 DIGOXIN PER 500 MCG: Performed by: INTERNAL MEDICINE

## 2022-01-01 PROCEDURE — 63710000001 INSULIN LISPRO (HUMAN) PER 5 UNITS: Performed by: INTERNAL MEDICINE

## 2022-01-01 PROCEDURE — 87086 URINE CULTURE/COLONY COUNT: CPT | Performed by: STUDENT IN AN ORGANIZED HEALTH CARE EDUCATION/TRAINING PROGRAM

## 2022-01-01 PROCEDURE — 89051 BODY FLUID CELL COUNT: CPT | Performed by: INTERNAL MEDICINE

## 2022-01-01 PROCEDURE — 82728 ASSAY OF FERRITIN: CPT | Performed by: NURSE PRACTITIONER

## 2022-01-01 PROCEDURE — 84443 ASSAY THYROID STIM HORMONE: CPT | Performed by: NURSE PRACTITIONER

## 2022-01-01 PROCEDURE — U0005 INFEC AGEN DETEC AMPLI PROBE: HCPCS | Performed by: PHYSICIAN ASSISTANT

## 2022-01-01 PROCEDURE — 85027 COMPLETE CBC AUTOMATED: CPT | Performed by: INTERNAL MEDICINE

## 2022-01-01 PROCEDURE — 94729 DIFFUSING CAPACITY: CPT

## 2022-01-01 PROCEDURE — 85014 HEMATOCRIT: CPT | Performed by: NURSE PRACTITIONER

## 2022-01-01 PROCEDURE — 97535 SELF CARE MNGMENT TRAINING: CPT

## 2022-01-01 PROCEDURE — 80307 DRUG TEST PRSMV CHEM ANLYZR: CPT | Performed by: INTERNAL MEDICINE

## 2022-01-01 PROCEDURE — 86900 BLOOD TYPING SEROLOGIC ABO: CPT

## 2022-01-01 PROCEDURE — 25010000002 CHLOROTHIAZIDE PER 500 MG: Performed by: INTERNAL MEDICINE

## 2022-01-01 PROCEDURE — 90662 IIV NO PRSV INCREASED AG IM: CPT | Performed by: NURSE PRACTITIONER

## 2022-01-01 PROCEDURE — 99214 OFFICE O/P EST MOD 30 MIN: CPT | Performed by: PHYSICIAN ASSISTANT

## 2022-01-01 PROCEDURE — 25010000002 LORAZEPAM PER 2 MG: Performed by: NURSE PRACTITIONER

## 2022-01-01 PROCEDURE — 81003 URINALYSIS AUTO W/O SCOPE: CPT | Performed by: PHYSICIAN ASSISTANT

## 2022-01-01 PROCEDURE — 92526 ORAL FUNCTION THERAPY: CPT

## 2022-01-01 PROCEDURE — 87205 SMEAR GRAM STAIN: CPT | Performed by: INTERNAL MEDICINE

## 2022-01-01 PROCEDURE — 94010 BREATHING CAPACITY TEST: CPT

## 2022-01-01 PROCEDURE — 82272 OCCULT BLD FECES 1-3 TESTS: CPT | Performed by: INTERNAL MEDICINE

## 2022-01-01 PROCEDURE — 99239 HOSP IP/OBS DSCHRG MGMT >30: CPT | Performed by: NURSE PRACTITIONER

## 2022-01-01 PROCEDURE — 80307 DRUG TEST PRSMV CHEM ANLYZR: CPT | Performed by: PHYSICIAN ASSISTANT

## 2022-01-01 PROCEDURE — 93005 ELECTROCARDIOGRAM TRACING: CPT | Performed by: EMERGENCY MEDICINE

## 2022-01-01 PROCEDURE — 06HY33Z INSERTION OF INFUSION DEVICE INTO LOWER VEIN, PERCUTANEOUS APPROACH: ICD-10-PCS | Performed by: STUDENT IN AN ORGANIZED HEALTH CARE EDUCATION/TRAINING PROGRAM

## 2022-01-01 PROCEDURE — 82728 ASSAY OF FERRITIN: CPT | Performed by: STUDENT IN AN ORGANIZED HEALTH CARE EDUCATION/TRAINING PROGRAM

## 2022-01-01 PROCEDURE — 99222 1ST HOSP IP/OBS MODERATE 55: CPT | Performed by: INTERNAL MEDICINE

## 2022-01-01 PROCEDURE — U0005 INFEC AGEN DETEC AMPLI PROBE: HCPCS | Performed by: STUDENT IN AN ORGANIZED HEALTH CARE EDUCATION/TRAINING PROGRAM

## 2022-01-01 PROCEDURE — U0004 COV-19 TEST NON-CDC HGH THRU: HCPCS

## 2022-01-01 PROCEDURE — 93005 ELECTROCARDIOGRAM TRACING: CPT | Performed by: INTERNAL MEDICINE

## 2022-01-01 PROCEDURE — 85025 COMPLETE CBC W/AUTO DIFF WBC: CPT | Performed by: EMERGENCY MEDICINE

## 2022-01-01 PROCEDURE — 74176 CT ABD & PELVIS W/O CONTRAST: CPT

## 2022-01-01 PROCEDURE — 99497 ADVNCD CARE PLAN 30 MIN: CPT | Performed by: NURSE PRACTITIONER

## 2022-01-01 PROCEDURE — 83735 ASSAY OF MAGNESIUM: CPT | Performed by: STUDENT IN AN ORGANIZED HEALTH CARE EDUCATION/TRAINING PROGRAM

## 2022-01-01 PROCEDURE — 99285 EMERGENCY DEPT VISIT HI MDM: CPT

## 2022-01-01 PROCEDURE — 85025 COMPLETE CBC W/AUTO DIFF WBC: CPT | Performed by: PHYSICIAN ASSISTANT

## 2022-01-01 PROCEDURE — 25010000002 CALCIUM GLUCONATE-NACL 1-0.675 GM/50ML-% SOLUTION: Performed by: PHYSICIAN ASSISTANT

## 2022-01-01 PROCEDURE — 87147 CULTURE TYPE IMMUNOLOGIC: CPT | Performed by: INTERNAL MEDICINE

## 2022-01-01 PROCEDURE — 92611 MOTION FLUOROSCOPY/SWALLOW: CPT

## 2022-01-01 PROCEDURE — U0005 INFEC AGEN DETEC AMPLI PROBE: HCPCS | Performed by: EMERGENCY MEDICINE

## 2022-01-01 PROCEDURE — 5A1D70Z PERFORMANCE OF URINARY FILTRATION, INTERMITTENT, LESS THAN 6 HOURS PER DAY: ICD-10-PCS | Performed by: STUDENT IN AN ORGANIZED HEALTH CARE EDUCATION/TRAINING PROGRAM

## 2022-01-01 PROCEDURE — 94618 PULMONARY STRESS TESTING: CPT

## 2022-01-01 PROCEDURE — 80162 ASSAY OF DIGOXIN TOTAL: CPT | Performed by: PHYSICIAN ASSISTANT

## 2022-01-01 PROCEDURE — 99495 TRANSJ CARE MGMT MOD F2F 14D: CPT | Performed by: NURSE PRACTITIONER

## 2022-01-01 PROCEDURE — 74230 X-RAY XM SWLNG FUNCJ C+: CPT

## 2022-01-01 PROCEDURE — 97161 PT EVAL LOW COMPLEX 20 MIN: CPT

## 2022-01-01 PROCEDURE — 97116 GAIT TRAINING THERAPY: CPT

## 2022-01-01 PROCEDURE — 94664 DEMO&/EVAL PT USE INHALER: CPT

## 2022-01-01 PROCEDURE — 25010000002 MORPHINE PER 10 MG: Performed by: NURSE PRACTITIONER

## 2022-01-01 PROCEDURE — 84132 ASSAY OF SERUM POTASSIUM: CPT | Performed by: STUDENT IN AN ORGANIZED HEALTH CARE EDUCATION/TRAINING PROGRAM

## 2022-01-01 PROCEDURE — 82820 HEMOGLOBIN-OXYGEN AFFINITY: CPT | Performed by: INTERNAL MEDICINE

## 2022-01-01 PROCEDURE — 25010000002 DROPERIDOL PER 5 MG: Performed by: PHYSICIAN ASSISTANT

## 2022-01-01 PROCEDURE — 94726 PLETHYSMOGRAPHY LUNG VOLUMES: CPT

## 2022-01-01 PROCEDURE — 5A09557 ASSISTANCE WITH RESPIRATORY VENTILATION, GREATER THAN 96 CONSECUTIVE HOURS, CONTINUOUS POSITIVE AIRWAY PRESSURE: ICD-10-PCS | Performed by: INTERNAL MEDICINE

## 2022-01-01 PROCEDURE — 88112 CYTOPATH CELL ENHANCE TECH: CPT | Performed by: INTERNAL MEDICINE

## 2022-01-01 PROCEDURE — 84145 PROCALCITONIN (PCT): CPT | Performed by: INTERNAL MEDICINE

## 2022-01-01 PROCEDURE — 25010000002 ONDANSETRON PER 1 MG: Performed by: INTERNAL MEDICINE

## 2022-01-01 PROCEDURE — U0003 INFECTIOUS AGENT DETECTION BY NUCLEIC ACID (DNA OR RNA); SEVERE ACUTE RESPIRATORY SYNDROME CORONAVIRUS 2 (SARS-COV-2) (CORONAVIRUS DISEASE [COVID-19]), AMPLIFIED PROBE TECHNIQUE, MAKING USE OF HIGH THROUGHPUT TECHNOLOGIES AS DESCRIBED BY CMS-2020-01-R: HCPCS | Performed by: EMERGENCY MEDICINE

## 2022-01-01 PROCEDURE — 88184 FLOWCYTOMETRY/ TC 1 MARKER: CPT

## 2022-01-01 PROCEDURE — G0008 ADMIN INFLUENZA VIRUS VAC: HCPCS | Performed by: NURSE PRACTITIONER

## 2022-01-01 PROCEDURE — 85018 HEMOGLOBIN: CPT | Performed by: STUDENT IN AN ORGANIZED HEALTH CARE EDUCATION/TRAINING PROGRAM

## 2022-01-01 PROCEDURE — 82570 ASSAY OF URINE CREATININE: CPT | Performed by: INTERNAL MEDICINE

## 2022-01-01 PROCEDURE — U0004 COV-19 TEST NON-CDC HGH THRU: HCPCS | Performed by: STUDENT IN AN ORGANIZED HEALTH CARE EDUCATION/TRAINING PROGRAM

## 2022-01-01 PROCEDURE — 84550 ASSAY OF BLOOD/URIC ACID: CPT | Performed by: NURSE PRACTITIONER

## 2022-01-01 PROCEDURE — 83615 LACTATE (LD) (LDH) ENZYME: CPT | Performed by: INTERNAL MEDICINE

## 2022-01-01 PROCEDURE — 84132 ASSAY OF SERUM POTASSIUM: CPT | Performed by: INTERNAL MEDICINE

## 2022-01-01 PROCEDURE — 80053 COMPREHEN METABOLIC PANEL: CPT | Performed by: PHYSICIAN ASSISTANT

## 2022-01-01 PROCEDURE — 82140 ASSAY OF AMMONIA: CPT | Performed by: PHYSICIAN ASSISTANT

## 2022-01-01 PROCEDURE — 80076 HEPATIC FUNCTION PANEL: CPT | Performed by: HOSPITALIST

## 2022-01-01 PROCEDURE — 86923 COMPATIBILITY TEST ELECTRIC: CPT

## 2022-01-01 PROCEDURE — 86901 BLOOD TYPING SEROLOGIC RH(D): CPT | Performed by: PHYSICIAN ASSISTANT

## 2022-01-01 PROCEDURE — 84157 ASSAY OF PROTEIN OTHER: CPT | Performed by: INTERNAL MEDICINE

## 2022-01-01 PROCEDURE — 84100 ASSAY OF PHOSPHORUS: CPT | Performed by: STUDENT IN AN ORGANIZED HEALTH CARE EDUCATION/TRAINING PROGRAM

## 2022-01-01 PROCEDURE — 83735 ASSAY OF MAGNESIUM: CPT | Performed by: EMERGENCY MEDICINE

## 2022-01-01 PROCEDURE — 87635 SARS-COV-2 COVID-19 AMP PRB: CPT | Performed by: EMERGENCY MEDICINE

## 2022-01-01 PROCEDURE — 87340 HEPATITIS B SURFACE AG IA: CPT | Performed by: INTERNAL MEDICINE

## 2022-01-01 PROCEDURE — 81003 URINALYSIS AUTO W/O SCOPE: CPT | Performed by: NURSE PRACTITIONER

## 2022-01-01 PROCEDURE — 72125 CT NECK SPINE W/O DYE: CPT

## 2022-01-01 PROCEDURE — 99233 SBSQ HOSP IP/OBS HIGH 50: CPT | Performed by: PSYCHIATRY & NEUROLOGY

## 2022-01-01 PROCEDURE — 0 LIDOCAINE 1 % SOLUTION: Performed by: RADIOLOGY

## 2022-01-01 PROCEDURE — 94640 AIRWAY INHALATION TREATMENT: CPT

## 2022-01-01 PROCEDURE — 83540 ASSAY OF IRON: CPT | Performed by: NURSE PRACTITIONER

## 2022-01-01 PROCEDURE — 36415 COLL VENOUS BLD VENIPUNCTURE: CPT | Performed by: STUDENT IN AN ORGANIZED HEALTH CARE EDUCATION/TRAINING PROGRAM

## 2022-01-01 PROCEDURE — 25010000002 NA FERRIC GLUC CPLX PER 12.5 MG: Performed by: STUDENT IN AN ORGANIZED HEALTH CARE EDUCATION/TRAINING PROGRAM

## 2022-01-01 PROCEDURE — 81001 URINALYSIS AUTO W/SCOPE: CPT | Performed by: PHYSICIAN ASSISTANT

## 2022-01-01 PROCEDURE — 80053 COMPREHEN METABOLIC PANEL: CPT | Performed by: EMERGENCY MEDICINE

## 2022-01-01 PROCEDURE — U0005 INFEC AGEN DETEC AMPLI PROBE: HCPCS

## 2022-01-01 PROCEDURE — 76705 ECHO EXAM OF ABDOMEN: CPT

## 2022-01-01 PROCEDURE — U0005 INFEC AGEN DETEC AMPLI PROBE: HCPCS | Performed by: INTERNAL MEDICINE

## 2022-01-01 PROCEDURE — 99231 SBSQ HOSP IP/OBS SF/LOW 25: CPT | Performed by: INTERNAL MEDICINE

## 2022-01-01 PROCEDURE — 82042 OTHER SOURCE ALBUMIN QUAN EA: CPT | Performed by: INTERNAL MEDICINE

## 2022-01-01 PROCEDURE — 81001 URINALYSIS AUTO W/SCOPE: CPT | Performed by: NURSE PRACTITIONER

## 2022-01-01 PROCEDURE — 82140 ASSAY OF AMMONIA: CPT | Performed by: INTERNAL MEDICINE

## 2022-01-01 PROCEDURE — 81001 URINALYSIS AUTO W/SCOPE: CPT | Performed by: INTERNAL MEDICINE

## 2022-01-01 PROCEDURE — 83540 ASSAY OF IRON: CPT | Performed by: STUDENT IN AN ORGANIZED HEALTH CARE EDUCATION/TRAINING PROGRAM

## 2022-01-01 PROCEDURE — 85014 HEMATOCRIT: CPT | Performed by: STUDENT IN AN ORGANIZED HEALTH CARE EDUCATION/TRAINING PROGRAM

## 2022-01-01 PROCEDURE — C9803 HOPD COVID-19 SPEC COLLECT: HCPCS

## 2022-01-01 PROCEDURE — 25010000002 IOPAMIDOL 61 % SOLUTION: Performed by: INTERNAL MEDICINE

## 2022-01-01 PROCEDURE — 36430 TRANSFUSION BLD/BLD COMPNT: CPT

## 2022-01-01 PROCEDURE — 82140 ASSAY OF AMMONIA: CPT | Performed by: NURSE PRACTITIONER

## 2022-01-01 PROCEDURE — 86900 BLOOD TYPING SEROLOGIC ABO: CPT | Performed by: PHYSICIAN ASSISTANT

## 2022-01-01 PROCEDURE — 83880 ASSAY OF NATRIURETIC PEPTIDE: CPT | Performed by: EMERGENCY MEDICINE

## 2022-01-01 PROCEDURE — 81001 URINALYSIS AUTO W/SCOPE: CPT | Performed by: EMERGENCY MEDICINE

## 2022-01-01 PROCEDURE — 82945 GLUCOSE OTHER FLUID: CPT | Performed by: INTERNAL MEDICINE

## 2022-01-01 PROCEDURE — 99231 SBSQ HOSP IP/OBS SF/LOW 25: CPT | Performed by: PSYCHIATRY & NEUROLOGY

## 2022-01-01 PROCEDURE — 86850 RBC ANTIBODY SCREEN: CPT | Performed by: PHYSICIAN ASSISTANT

## 2022-01-01 PROCEDURE — 82272 OCCULT BLD FECES 1-3 TESTS: CPT | Performed by: NURSE PRACTITIONER

## 2022-01-01 PROCEDURE — 25010000002 HEPARIN (PORCINE) PER 1000 UNITS: Performed by: INTERNAL MEDICINE

## 2022-01-01 PROCEDURE — 25010000002 FUROSEMIDE PER 20 MG: Performed by: PHYSICIAN ASSISTANT

## 2022-01-01 PROCEDURE — 36415 COLL VENOUS BLD VENIPUNCTURE: CPT | Performed by: INTERNAL MEDICINE

## 2022-01-01 PROCEDURE — 87635 SARS-COV-2 COVID-19 AMP PRB: CPT | Performed by: INTERNAL MEDICINE

## 2022-01-01 PROCEDURE — 99231 SBSQ HOSP IP/OBS SF/LOW 25: CPT | Performed by: NURSE PRACTITIONER

## 2022-01-01 PROCEDURE — U0004 COV-19 TEST NON-CDC HGH THRU: HCPCS | Performed by: PHYSICIAN ASSISTANT

## 2022-01-01 PROCEDURE — 87493 C DIFF AMPLIFIED PROBE: CPT | Performed by: INTERNAL MEDICINE

## 2022-01-01 PROCEDURE — 63710000001 INSULIN REGULAR HUMAN PER 5 UNITS: Performed by: PHYSICIAN ASSISTANT

## 2022-01-01 PROCEDURE — 25010000002 FUROSEMIDE PER 20 MG: Performed by: EMERGENCY MEDICINE

## 2022-01-01 PROCEDURE — P9040 RBC LEUKOREDUCED IRRADIATED: HCPCS

## 2022-01-01 PROCEDURE — 0097U HC BIOFIRE FILMARRAY GI PANEL: CPT | Performed by: INTERNAL MEDICINE

## 2022-01-01 PROCEDURE — 76775 US EXAM ABDO BACK WALL LIM: CPT

## 2022-01-01 PROCEDURE — 82272 OCCULT BLD FECES 1-3 TESTS: CPT | Performed by: HOSPITALIST

## 2022-01-01 PROCEDURE — 88185 FLOWCYTOMETRY/TC ADD-ON: CPT

## 2022-01-01 PROCEDURE — 97165 OT EVAL LOW COMPLEX 30 MIN: CPT

## 2022-01-01 PROCEDURE — 84484 ASSAY OF TROPONIN QUANT: CPT | Performed by: NURSE PRACTITIONER

## 2022-01-01 PROCEDURE — 87186 SC STD MICRODIL/AGAR DIL: CPT | Performed by: INTERNAL MEDICINE

## 2022-01-01 PROCEDURE — 25010000002 FUROSEMIDE PER 20 MG: Performed by: STUDENT IN AN ORGANIZED HEALTH CARE EDUCATION/TRAINING PROGRAM

## 2022-01-01 PROCEDURE — 93970 EXTREMITY STUDY: CPT

## 2022-01-01 PROCEDURE — 93000 ELECTROCARDIOGRAM COMPLETE: CPT | Performed by: NURSE PRACTITIONER

## 2022-01-01 PROCEDURE — 0W993ZZ DRAINAGE OF RIGHT PLEURAL CAVITY, PERCUTANEOUS APPROACH: ICD-10-PCS | Performed by: RADIOLOGY

## 2022-01-01 PROCEDURE — 81001 URINALYSIS AUTO W/SCOPE: CPT | Performed by: STUDENT IN AN ORGANIZED HEALTH CARE EDUCATION/TRAINING PROGRAM

## 2022-01-01 RX ORDER — LORAZEPAM 2 MG/ML
2 INJECTION INTRAMUSCULAR
Status: DISCONTINUED | OUTPATIENT
Start: 2022-01-01 | End: 2022-01-01 | Stop reason: HOSPADM

## 2022-01-01 RX ORDER — MIRTAZAPINE 15 MG/1
15 TABLET, FILM COATED ORAL NIGHTLY
Status: DISCONTINUED | OUTPATIENT
Start: 2022-01-01 | End: 2022-01-01

## 2022-01-01 RX ORDER — SPIRONOLACTONE 100 MG/1
100 TABLET, FILM COATED ORAL DAILY
Status: DISCONTINUED | OUTPATIENT
Start: 2022-01-01 | End: 2022-01-01

## 2022-01-01 RX ORDER — DIGOXIN 0.25 MG/ML
500 INJECTION INTRAMUSCULAR; INTRAVENOUS ONCE
Status: COMPLETED | OUTPATIENT
Start: 2022-01-01 | End: 2022-01-01

## 2022-01-01 RX ORDER — METOPROLOL SUCCINATE 50 MG/1
150 TABLET, EXTENDED RELEASE ORAL NIGHTLY
Qty: 90 TABLET | Refills: 0 | Status: SHIPPED | OUTPATIENT
Start: 2022-01-01 | End: 2022-01-01 | Stop reason: SDUPTHER

## 2022-01-01 RX ORDER — BUMETANIDE 2 MG/1
2 TABLET ORAL 2 TIMES DAILY
Qty: 90 TABLET | Refills: 0 | Status: SHIPPED | OUTPATIENT
Start: 2022-01-01 | End: 2022-01-01 | Stop reason: SDUPTHER

## 2022-01-01 RX ORDER — LORAZEPAM 2 MG/ML
0.5 INJECTION INTRAMUSCULAR
Status: DISCONTINUED | OUTPATIENT
Start: 2022-01-01 | End: 2022-01-01 | Stop reason: HOSPADM

## 2022-01-01 RX ORDER — BUPROPION HYDROCHLORIDE 150 MG/1
1 TABLET, EXTENDED RELEASE ORAL DAILY
COMMUNITY
Start: 2022-01-01 | End: 2022-01-01

## 2022-01-01 RX ORDER — LOSARTAN POTASSIUM 25 MG/1
25 TABLET ORAL TAKE AS DIRECTED
Status: DISCONTINUED | OUTPATIENT
Start: 2022-01-01 | End: 2022-01-01

## 2022-01-01 RX ORDER — DROPERIDOL 2.5 MG/ML
1.25 INJECTION, SOLUTION INTRAMUSCULAR; INTRAVENOUS ONCE
Status: COMPLETED | OUTPATIENT
Start: 2022-01-01 | End: 2022-01-01

## 2022-01-01 RX ORDER — BLOOD-GLUCOSE METER
KIT MISCELLANEOUS
Qty: 1 EACH | Refills: 0 | Status: SHIPPED | OUTPATIENT
Start: 2022-01-01 | End: 2022-01-01

## 2022-01-01 RX ORDER — AMOXICILLIN AND CLAVULANATE POTASSIUM 875; 125 MG/1; MG/1
1 TABLET, FILM COATED ORAL EVERY 12 HOURS SCHEDULED
Status: COMPLETED | OUTPATIENT
Start: 2022-01-01 | End: 2022-01-01

## 2022-01-01 RX ORDER — ONDANSETRON 2 MG/ML
4 INJECTION INTRAMUSCULAR; INTRAVENOUS EVERY 6 HOURS PRN
Status: DISCONTINUED | OUTPATIENT
Start: 2022-01-01 | End: 2022-01-01 | Stop reason: HOSPADM

## 2022-01-01 RX ORDER — SPIRONOLACTONE 25 MG/1
25 TABLET ORAL DAILY
Status: DISCONTINUED | OUTPATIENT
Start: 2022-01-01 | End: 2022-01-01

## 2022-01-01 RX ORDER — HYDROXYZINE HYDROCHLORIDE 10 MG/1
10 TABLET, FILM COATED ORAL ONCE
Status: COMPLETED | OUTPATIENT
Start: 2022-01-01 | End: 2022-01-01

## 2022-01-01 RX ORDER — ATORVASTATIN CALCIUM 40 MG/1
40 TABLET, FILM COATED ORAL DAILY
Qty: 90 TABLET | Refills: 0 | Status: SHIPPED | OUTPATIENT
Start: 2022-01-01 | End: 2022-01-01

## 2022-01-01 RX ORDER — LOSARTAN POTASSIUM 50 MG/1
50 TABLET ORAL EVERY EVENING
Qty: 90 TABLET | Refills: 0 | Status: SHIPPED | OUTPATIENT
Start: 2022-01-01 | End: 2022-01-01 | Stop reason: HOSPADM

## 2022-01-01 RX ORDER — LORAZEPAM 2 MG/ML
1 INJECTION INTRAMUSCULAR
Status: DISCONTINUED | OUTPATIENT
Start: 2022-01-01 | End: 2022-01-01 | Stop reason: HOSPADM

## 2022-01-01 RX ORDER — NICOTINE POLACRILEX 4 MG
15 LOZENGE BUCCAL
Status: DISCONTINUED | OUTPATIENT
Start: 2022-01-01 | End: 2022-01-01 | Stop reason: SDUPTHER

## 2022-01-01 RX ORDER — NICOTINE POLACRILEX 4 MG
15 LOZENGE BUCCAL
Status: DISCONTINUED | OUTPATIENT
Start: 2022-01-01 | End: 2022-01-01 | Stop reason: HOSPADM

## 2022-01-01 RX ORDER — POTASSIUM CHLORIDE 750 MG/1
40 TABLET, FILM COATED, EXTENDED RELEASE ORAL DAILY
Status: DISCONTINUED | OUTPATIENT
Start: 2022-01-01 | End: 2022-01-01 | Stop reason: HOSPADM

## 2022-01-01 RX ORDER — HYDROXYZINE HYDROCHLORIDE 25 MG/1
25 TABLET, FILM COATED ORAL 3 TIMES DAILY PRN
Status: DISCONTINUED | OUTPATIENT
Start: 2022-01-01 | End: 2022-01-01 | Stop reason: HOSPADM

## 2022-01-01 RX ORDER — CALCIUM GLUCONATE 20 MG/ML
1 INJECTION, SOLUTION INTRAVENOUS ONCE
Status: COMPLETED | OUTPATIENT
Start: 2022-01-01 | End: 2022-01-01

## 2022-01-01 RX ORDER — LACTULOSE 10 G/15ML
20 SOLUTION ORAL 3 TIMES DAILY
Status: DISCONTINUED | OUTPATIENT
Start: 2022-01-01 | End: 2022-01-01

## 2022-01-01 RX ORDER — MULTIPLE VITAMINS W/ MINERALS TAB 9MG-400MCG
1 TAB ORAL DAILY
Status: DISCONTINUED | OUTPATIENT
Start: 2022-01-01 | End: 2022-01-01 | Stop reason: HOSPADM

## 2022-01-01 RX ORDER — FUROSEMIDE 10 MG/ML
80 INJECTION INTRAMUSCULAR; INTRAVENOUS ONCE
Status: COMPLETED | OUTPATIENT
Start: 2022-01-01 | End: 2022-01-01

## 2022-01-01 RX ORDER — METOPROLOL TARTRATE 50 MG/1
TABLET, FILM COATED ORAL
Qty: 360 TABLET | Refills: 0 | Status: SHIPPED | OUTPATIENT
Start: 2022-01-01

## 2022-01-01 RX ORDER — AMOXICILLIN 250 MG/1
500 CAPSULE ORAL EVERY 12 HOURS SCHEDULED
Status: DISCONTINUED | OUTPATIENT
Start: 2022-01-01 | End: 2022-01-01 | Stop reason: HOSPADM

## 2022-01-01 RX ORDER — MIRTAZAPINE 15 MG/1
15 TABLET, FILM COATED ORAL NIGHTLY
Qty: 30 TABLET | Refills: 0 | Status: SHIPPED | OUTPATIENT
Start: 2022-01-01 | End: 2022-01-01 | Stop reason: SDUPTHER

## 2022-01-01 RX ORDER — ATORVASTATIN CALCIUM 20 MG/1
40 TABLET, FILM COATED ORAL DAILY
Status: DISCONTINUED | OUTPATIENT
Start: 2022-01-01 | End: 2022-01-01

## 2022-01-01 RX ORDER — POTASSIUM CHLORIDE 29.8 MG/ML
20 INJECTION INTRAVENOUS
Status: DISPENSED | OUTPATIENT
Start: 2022-01-01 | End: 2022-01-01

## 2022-01-01 RX ORDER — PANTOPRAZOLE SODIUM 40 MG/1
40 TABLET, DELAYED RELEASE ORAL DAILY
Status: DISCONTINUED | OUTPATIENT
Start: 2022-01-01 | End: 2022-01-01

## 2022-01-01 RX ORDER — CARVEDILOL 3.12 MG/1
3.12 TABLET ORAL 2 TIMES DAILY
Status: DISCONTINUED | OUTPATIENT
Start: 2022-01-01 | End: 2022-01-01

## 2022-01-01 RX ORDER — NITROGLYCERIN 0.4 MG/1
0.4 TABLET SUBLINGUAL
Status: DISCONTINUED | OUTPATIENT
Start: 2022-01-01 | End: 2022-01-01 | Stop reason: HOSPADM

## 2022-01-01 RX ORDER — DEXTROSE MONOHYDRATE 50 MG/ML
50 INJECTION, SOLUTION INTRAVENOUS CONTINUOUS
Status: DISCONTINUED | OUTPATIENT
Start: 2022-01-01 | End: 2022-01-01

## 2022-01-01 RX ORDER — METOPROLOL SUCCINATE 50 MG/1
150 TABLET, EXTENDED RELEASE ORAL NIGHTLY
Qty: 270 TABLET | Refills: 0 | Status: SHIPPED | OUTPATIENT
Start: 2022-01-01 | End: 2022-01-01

## 2022-01-01 RX ORDER — MORPHINE SULFATE 2 MG/ML
2 INJECTION, SOLUTION INTRAMUSCULAR; INTRAVENOUS EVERY 4 HOURS PRN
Status: DISCONTINUED | OUTPATIENT
Start: 2022-01-01 | End: 2022-01-01

## 2022-01-01 RX ORDER — POTASSIUM CHLORIDE 750 MG/1
40 TABLET, FILM COATED, EXTENDED RELEASE ORAL AS NEEDED
Status: DISCONTINUED | OUTPATIENT
Start: 2022-01-01 | End: 2022-01-01 | Stop reason: HOSPADM

## 2022-01-01 RX ORDER — GLYCOPYRROLATE 0.2 MG/ML
0.2 INJECTION INTRAMUSCULAR; INTRAVENOUS
Status: DISCONTINUED | OUTPATIENT
Start: 2022-01-01 | End: 2022-01-01 | Stop reason: HOSPADM

## 2022-01-01 RX ORDER — ATORVASTATIN CALCIUM 20 MG/1
40 TABLET, FILM COATED ORAL DAILY
Status: DISCONTINUED | OUTPATIENT
Start: 2022-01-01 | End: 2022-01-01 | Stop reason: HOSPADM

## 2022-01-01 RX ORDER — MIRTAZAPINE 15 MG/1
15 TABLET, FILM COATED ORAL NIGHTLY
Status: DISCONTINUED | OUTPATIENT
Start: 2022-01-01 | End: 2022-01-01 | Stop reason: HOSPADM

## 2022-01-01 RX ORDER — MIRTAZAPINE 15 MG/1
15 TABLET, FILM COATED ORAL NIGHTLY
Qty: 14 TABLET | Refills: 0 | Status: SHIPPED | OUTPATIENT
Start: 2022-01-01 | End: 2022-01-01

## 2022-01-01 RX ORDER — INSULIN LISPRO 100 [IU]/ML
0-9 INJECTION, SOLUTION INTRAVENOUS; SUBCUTANEOUS
Status: DISCONTINUED | OUTPATIENT
Start: 2022-01-01 | End: 2022-01-01 | Stop reason: HOSPADM

## 2022-01-01 RX ORDER — LOSARTAN POTASSIUM 25 MG/1
25 TABLET ORAL EVERY MORNING
Status: DISCONTINUED | OUTPATIENT
Start: 2022-01-01 | End: 2022-01-01

## 2022-01-01 RX ORDER — LANCETS
EACH MISCELLANEOUS
Qty: 100 EACH | Refills: 3 | Status: SHIPPED | OUTPATIENT
Start: 2022-01-01 | End: 2022-01-01

## 2022-01-01 RX ORDER — BUMETANIDE 2 MG/1
2 TABLET ORAL DAILY
Qty: 30 TABLET | Refills: 0 | Status: SHIPPED | OUTPATIENT
Start: 2022-01-01 | End: 2022-01-01 | Stop reason: SDUPTHER

## 2022-01-01 RX ORDER — SITAGLIPTIN 100 MG/1
TABLET, FILM COATED ORAL
Qty: 90 TABLET | OUTPATIENT
Start: 2022-01-01

## 2022-01-01 RX ORDER — FERROUS SULFATE 325(65) MG
325 TABLET ORAL
Status: DISCONTINUED | OUTPATIENT
Start: 2022-01-01 | End: 2022-01-01 | Stop reason: HOSPADM

## 2022-01-01 RX ORDER — HEPARIN SODIUM 1000 [USP'U]/ML
3000 INJECTION, SOLUTION INTRAVENOUS; SUBCUTANEOUS AS NEEDED
Status: DISCONTINUED | OUTPATIENT
Start: 2022-01-01 | End: 2022-01-01 | Stop reason: HOSPADM

## 2022-01-01 RX ORDER — BUMETANIDE 0.25 MG/ML
2 INJECTION INTRAMUSCULAR; INTRAVENOUS ONCE
Status: COMPLETED | OUTPATIENT
Start: 2022-01-01 | End: 2022-01-01

## 2022-01-01 RX ORDER — DEXMEDETOMIDINE HYDROCHLORIDE 4 UG/ML
.2-1.5 INJECTION INTRAVENOUS
Status: DISCONTINUED | OUTPATIENT
Start: 2022-01-01 | End: 2022-01-01

## 2022-01-01 RX ORDER — ESCITALOPRAM OXALATE 10 MG/1
10 TABLET ORAL DAILY
Status: DISCONTINUED | OUTPATIENT
Start: 2022-01-01 | End: 2022-01-01 | Stop reason: HOSPADM

## 2022-01-01 RX ORDER — NICOTINE POLACRILEX 4 MG
15 LOZENGE BUCCAL
Status: DISCONTINUED | OUTPATIENT
Start: 2022-01-01 | End: 2022-01-01

## 2022-01-01 RX ORDER — METOLAZONE 2.5 MG/1
2.5 TABLET ORAL DAILY
Qty: 2 TABLET | Refills: 0 | Status: SHIPPED | OUTPATIENT
Start: 2022-01-01 | End: 2022-01-01

## 2022-01-01 RX ORDER — MORPHINE SULFATE 2 MG/ML
4 INJECTION, SOLUTION INTRAMUSCULAR; INTRAVENOUS
Status: DISCONTINUED | OUTPATIENT
Start: 2022-01-01 | End: 2022-01-01 | Stop reason: HOSPADM

## 2022-01-01 RX ORDER — METOPROLOL SUCCINATE 50 MG/1
150 TABLET, EXTENDED RELEASE ORAL NIGHTLY
Qty: 90 TABLET | Refills: 0 | Status: SHIPPED | OUTPATIENT
Start: 2022-01-01 | End: 2022-01-01

## 2022-01-01 RX ORDER — LIDOCAINE HYDROCHLORIDE 10 MG/ML
10 INJECTION, SOLUTION INFILTRATION; PERINEURAL ONCE
Status: COMPLETED | OUTPATIENT
Start: 2022-01-01 | End: 2022-01-01

## 2022-01-01 RX ORDER — LOSARTAN POTASSIUM 50 MG/1
50 TABLET ORAL EVERY EVENING
Status: DISCONTINUED | OUTPATIENT
Start: 2022-01-01 | End: 2022-01-01 | Stop reason: HOSPADM

## 2022-01-01 RX ORDER — ATORVASTATIN CALCIUM 40 MG/1
40 TABLET, FILM COATED ORAL DAILY
Qty: 90 TABLET | Refills: 0 | Status: SHIPPED | OUTPATIENT
Start: 2022-01-01

## 2022-01-01 RX ORDER — FUROSEMIDE 10 MG/ML
80 INJECTION INTRAMUSCULAR; INTRAVENOUS EVERY 12 HOURS
Status: DISCONTINUED | OUTPATIENT
Start: 2022-01-01 | End: 2022-01-01

## 2022-01-01 RX ORDER — ALLOPURINOL 300 MG/1
300 TABLET ORAL DAILY
Status: DISCONTINUED | OUTPATIENT
Start: 2022-01-01 | End: 2022-01-01 | Stop reason: HOSPADM

## 2022-01-01 RX ORDER — MONTELUKAST SODIUM 10 MG/1
10 TABLET ORAL NIGHTLY
Status: DISCONTINUED | OUTPATIENT
Start: 2022-01-01 | End: 2022-01-01 | Stop reason: HOSPADM

## 2022-01-01 RX ORDER — SPIRONOLACTONE 50 MG/1
50 TABLET, FILM COATED ORAL DAILY
Status: DISCONTINUED | OUTPATIENT
Start: 2022-01-01 | End: 2022-01-01

## 2022-01-01 RX ORDER — TORSEMIDE 100 MG/1
100 TABLET ORAL DAILY
Status: DISCONTINUED | OUTPATIENT
Start: 2022-01-01 | End: 2022-01-01

## 2022-01-01 RX ORDER — SODIUM CHLORIDE 0.9 % (FLUSH) 0.9 %
10 SYRINGE (ML) INJECTION AS NEEDED
Status: DISCONTINUED | OUTPATIENT
Start: 2022-01-01 | End: 2022-01-01

## 2022-01-01 RX ORDER — PANTOPRAZOLE SODIUM 40 MG/10ML
40 INJECTION, POWDER, LYOPHILIZED, FOR SOLUTION INTRAVENOUS
Status: DISCONTINUED | OUTPATIENT
Start: 2022-01-01 | End: 2022-01-01

## 2022-01-01 RX ORDER — POTASSIUM CHLORIDE 7.45 MG/ML
10 INJECTION INTRAVENOUS
Status: DISCONTINUED | OUTPATIENT
Start: 2022-01-01 | End: 2022-01-01

## 2022-01-01 RX ORDER — FUROSEMIDE 80 MG
80 TABLET ORAL
Status: DISCONTINUED | OUTPATIENT
Start: 2022-01-01 | End: 2022-01-01

## 2022-01-01 RX ORDER — POTASSIUM CHLORIDE 7.45 MG/ML
10 INJECTION INTRAVENOUS
Status: DISCONTINUED | OUTPATIENT
Start: 2022-01-01 | End: 2022-01-01 | Stop reason: HOSPADM

## 2022-01-01 RX ORDER — LORAZEPAM 2 MG/ML
1 CONCENTRATE ORAL
Status: DISCONTINUED | OUTPATIENT
Start: 2022-01-01 | End: 2022-01-01 | Stop reason: HOSPADM

## 2022-01-01 RX ORDER — OLANZAPINE 10 MG/1
5 INJECTION, POWDER, LYOPHILIZED, FOR SOLUTION INTRAMUSCULAR ONCE AS NEEDED
Status: COMPLETED | OUTPATIENT
Start: 2022-01-01 | End: 2022-01-01

## 2022-01-01 RX ORDER — METOPROLOL TARTRATE 50 MG/1
50 TABLET, FILM COATED ORAL EVERY 12 HOURS SCHEDULED
Status: DISCONTINUED | OUTPATIENT
Start: 2022-01-01 | End: 2022-01-01

## 2022-01-01 RX ORDER — FUROSEMIDE 10 MG/ML
80 INJECTION INTRAMUSCULAR; INTRAVENOUS EVERY 8 HOURS
Status: DISCONTINUED | OUTPATIENT
Start: 2022-01-01 | End: 2022-01-01

## 2022-01-01 RX ORDER — FUROSEMIDE 40 MG/1
40 TABLET ORAL 2 TIMES DAILY
Qty: 180 TABLET | Refills: 3 | Status: SHIPPED | OUTPATIENT
Start: 2022-01-01 | End: 2022-01-01 | Stop reason: HOSPADM

## 2022-01-01 RX ORDER — METOPROLOL TARTRATE 50 MG/1
50 TABLET, FILM COATED ORAL 2 TIMES DAILY
Status: DISCONTINUED | OUTPATIENT
Start: 2022-01-01 | End: 2022-01-01

## 2022-01-01 RX ORDER — FERROUS SULFATE 325(65) MG
325 TABLET ORAL
Refills: 0 | Status: DISCONTINUED | OUTPATIENT
Start: 2022-01-01 | End: 2022-01-01 | Stop reason: HOSPADM

## 2022-01-01 RX ORDER — LORAZEPAM 2 MG/ML
1 INJECTION INTRAMUSCULAR ONCE
Status: COMPLETED | OUTPATIENT
Start: 2022-01-01 | End: 2022-01-01

## 2022-01-01 RX ORDER — GLYCOPYRROLATE 0.2 MG/ML
0.4 INJECTION INTRAMUSCULAR; INTRAVENOUS
Status: DISCONTINUED | OUTPATIENT
Start: 2022-01-01 | End: 2022-01-01 | Stop reason: HOSPADM

## 2022-01-01 RX ORDER — LORAZEPAM 2 MG/ML
0.5 INJECTION INTRAMUSCULAR ONCE
Status: COMPLETED | OUTPATIENT
Start: 2022-01-01 | End: 2022-01-01

## 2022-01-01 RX ORDER — POTASSIUM CHLORIDE 1.5 G/1.77G
20 POWDER, FOR SOLUTION ORAL ONCE
Status: DISCONTINUED | OUTPATIENT
Start: 2022-01-01 | End: 2022-01-01

## 2022-01-01 RX ORDER — FUROSEMIDE 10 MG/ML
40 INJECTION INTRAMUSCULAR; INTRAVENOUS EVERY 12 HOURS
Status: DISCONTINUED | OUTPATIENT
Start: 2022-01-01 | End: 2022-01-01

## 2022-01-01 RX ORDER — ATROPINE SULFATE 10 MG/ML
2 SOLUTION/ DROPS OPHTHALMIC 2 TIMES DAILY PRN
Status: DISCONTINUED | OUTPATIENT
Start: 2022-01-01 | End: 2022-01-01 | Stop reason: HOSPADM

## 2022-01-01 RX ORDER — SODIUM CHLORIDE 0.9 % (FLUSH) 0.9 %
10 SYRINGE (ML) INJECTION AS NEEDED
Status: DISCONTINUED | OUTPATIENT
Start: 2022-01-01 | End: 2022-01-01 | Stop reason: HOSPADM

## 2022-01-01 RX ORDER — APIXABAN 5 MG/1
TABLET, FILM COATED ORAL
Qty: 180 TABLET | Refills: 3 | OUTPATIENT
Start: 2022-01-01 | End: 2022-01-01 | Stop reason: HOSPADM

## 2022-01-01 RX ORDER — POTASSIUM CHLORIDE 750 MG/1
40 TABLET, FILM COATED, EXTENDED RELEASE ORAL EVERY MORNING
Status: DISCONTINUED | OUTPATIENT
Start: 2022-01-01 | End: 2022-01-01 | Stop reason: HOSPADM

## 2022-01-01 RX ORDER — POTASSIUM CHLORIDE 750 MG/1
10 TABLET, FILM COATED, EXTENDED RELEASE ORAL DAILY
Status: DISCONTINUED | OUTPATIENT
Start: 2022-01-01 | End: 2022-01-01

## 2022-01-01 RX ORDER — BUPROPION HYDROCHLORIDE 100 MG/1
100 TABLET, EXTENDED RELEASE ORAL
Status: DISCONTINUED | OUTPATIENT
Start: 2022-01-01 | End: 2022-01-01

## 2022-01-01 RX ORDER — MORPHINE SULFATE 2 MG/ML
6 INJECTION, SOLUTION INTRAMUSCULAR; INTRAVENOUS
Status: DISCONTINUED | OUTPATIENT
Start: 2022-01-01 | End: 2022-01-01 | Stop reason: HOSPADM

## 2022-01-01 RX ORDER — POTASSIUM CHLORIDE 20 MEQ/1
40 TABLET, EXTENDED RELEASE ORAL EVERY MORNING
Qty: 60 TABLET | Refills: 0 | Status: SHIPPED | OUTPATIENT
Start: 2022-01-01 | End: 2022-01-01 | Stop reason: HOSPADM

## 2022-01-01 RX ORDER — METOPROLOL TARTRATE 50 MG/1
50 TABLET, FILM COATED ORAL EVERY 12 HOURS SCHEDULED
Qty: 60 TABLET | Refills: 0 | Status: SHIPPED | OUTPATIENT
Start: 2022-01-01 | End: 2022-01-01 | Stop reason: SDUPTHER

## 2022-01-01 RX ORDER — ACETAZOLAMIDE 500 MG/5ML
500 INJECTION, POWDER, LYOPHILIZED, FOR SOLUTION INTRAVENOUS ONCE
Status: DISCONTINUED | OUTPATIENT
Start: 2022-01-01 | End: 2022-01-01

## 2022-01-01 RX ORDER — ALLOPURINOL 100 MG/1
200 TABLET ORAL DAILY
Status: DISCONTINUED | OUTPATIENT
Start: 2022-01-01 | End: 2022-01-01 | Stop reason: HOSPADM

## 2022-01-01 RX ORDER — DEXTROSE MONOHYDRATE 25 G/50ML
25 INJECTION, SOLUTION INTRAVENOUS
Status: DISCONTINUED | OUTPATIENT
Start: 2022-01-01 | End: 2022-01-01 | Stop reason: HOSPADM

## 2022-01-01 RX ORDER — PANTOPRAZOLE SODIUM 40 MG/1
40 TABLET, DELAYED RELEASE ORAL DAILY
Status: DISCONTINUED | OUTPATIENT
Start: 2022-01-01 | End: 2022-01-01 | Stop reason: HOSPADM

## 2022-01-01 RX ORDER — POTASSIUM CHLORIDE 1.5 G/1.77G
40 POWDER, FOR SOLUTION ORAL AS NEEDED
Status: DISCONTINUED | OUTPATIENT
Start: 2022-01-01 | End: 2022-01-01 | Stop reason: HOSPADM

## 2022-01-01 RX ORDER — ACETAMINOPHEN 325 MG/1
650 TABLET ORAL EVERY 4 HOURS PRN
Status: DISCONTINUED | OUTPATIENT
Start: 2022-01-01 | End: 2022-01-01 | Stop reason: HOSPADM

## 2022-01-01 RX ORDER — FUROSEMIDE 10 MG/ML
INJECTION INTRAMUSCULAR; INTRAVENOUS
Status: COMPLETED
Start: 2022-01-01 | End: 2022-01-01

## 2022-01-01 RX ORDER — PROMETHAZINE HYDROCHLORIDE 12.5 MG/1
12.5 SUPPOSITORY RECTAL EVERY 4 HOURS PRN
Status: DISCONTINUED | OUTPATIENT
Start: 2022-01-01 | End: 2022-01-01 | Stop reason: HOSPADM

## 2022-01-01 RX ORDER — BUPROPION HYDROCHLORIDE 150 MG/1
150 TABLET, EXTENDED RELEASE ORAL
Qty: 14 TABLET | Refills: 0 | Status: SHIPPED | OUTPATIENT
Start: 2022-01-01 | End: 2022-01-01

## 2022-01-01 RX ORDER — ATORVASTATIN CALCIUM 40 MG/1
1 TABLET, FILM COATED ORAL DAILY
COMMUNITY
Start: 2022-01-01 | End: 2022-01-01

## 2022-01-01 RX ORDER — DIGOXIN 125 MCG
125 TABLET ORAL DAILY
Status: DISCONTINUED | OUTPATIENT
Start: 2022-01-01 | End: 2022-01-01

## 2022-01-01 RX ORDER — HYDROMORPHONE HYDROCHLORIDE 1 MG/ML
0.5 INJECTION, SOLUTION INTRAMUSCULAR; INTRAVENOUS; SUBCUTANEOUS
Status: DISCONTINUED | OUTPATIENT
Start: 2022-01-01 | End: 2022-01-01 | Stop reason: HOSPADM

## 2022-01-01 RX ORDER — NITROGLYCERIN 0.4 MG/1
0.4 TABLET SUBLINGUAL
Status: DISCONTINUED | OUTPATIENT
Start: 2022-01-01 | End: 2022-01-01

## 2022-01-01 RX ORDER — BUMETANIDE 2 MG/1
2 TABLET ORAL DAILY
Status: DISCONTINUED | OUTPATIENT
Start: 2022-01-01 | End: 2022-01-01

## 2022-01-01 RX ORDER — AMOXICILLIN 250 MG
2 CAPSULE ORAL 2 TIMES DAILY
Status: DISCONTINUED | OUTPATIENT
Start: 2022-01-01 | End: 2022-01-01 | Stop reason: HOSPADM

## 2022-01-01 RX ORDER — MORPHINE SULFATE 2 MG/ML
2 INJECTION, SOLUTION INTRAMUSCULAR; INTRAVENOUS ONCE
Status: COMPLETED | OUTPATIENT
Start: 2022-01-01 | End: 2022-01-01

## 2022-01-01 RX ORDER — BUMETANIDE 2 MG/1
2 TABLET ORAL 2 TIMES DAILY
Status: DISCONTINUED | OUTPATIENT
Start: 2022-01-01 | End: 2022-01-01 | Stop reason: HOSPADM

## 2022-01-01 RX ORDER — ISOSORBIDE MONONITRATE 60 MG/1
60 TABLET, EXTENDED RELEASE ORAL DAILY
Status: DISCONTINUED | OUTPATIENT
Start: 2022-01-01 | End: 2022-01-01

## 2022-01-01 RX ORDER — LANOLIN ALCOHOL/MO/W.PET/CERES
CREAM (GRAM) TOPICAL
Qty: 90 TABLET | Refills: 0 | Status: SHIPPED | OUTPATIENT
Start: 2022-01-01 | End: 2022-01-01

## 2022-01-01 RX ORDER — NALOXONE HCL 0.4 MG/ML
0.4 VIAL (ML) INJECTION
Status: DISCONTINUED | OUTPATIENT
Start: 2022-01-01 | End: 2022-01-01 | Stop reason: HOSPADM

## 2022-01-01 RX ORDER — BUPROPION HYDROCHLORIDE 150 MG/1
150 TABLET, EXTENDED RELEASE ORAL
Qty: 30 TABLET | Refills: 5 | Status: SHIPPED | OUTPATIENT
Start: 2022-01-01

## 2022-01-01 RX ORDER — PROMETHAZINE HYDROCHLORIDE 6.25 MG/5ML
12.5 SYRUP ORAL EVERY 4 HOURS PRN
Status: DISCONTINUED | OUTPATIENT
Start: 2022-01-01 | End: 2022-01-01 | Stop reason: HOSPADM

## 2022-01-01 RX ORDER — MORPHINE SULFATE 2 MG/ML
2 INJECTION, SOLUTION INTRAMUSCULAR; INTRAVENOUS ONCE AS NEEDED
Status: COMPLETED | OUTPATIENT
Start: 2022-01-01 | End: 2022-01-01

## 2022-01-01 RX ORDER — BUPROPION HYDROCHLORIDE 150 MG/1
150 TABLET, EXTENDED RELEASE ORAL
Status: DISCONTINUED | OUTPATIENT
Start: 2022-01-01 | End: 2022-01-01 | Stop reason: HOSPADM

## 2022-01-01 RX ORDER — SODIUM CHLORIDE 0.9 % (FLUSH) 0.9 %
10 SYRINGE (ML) INJECTION EVERY 12 HOURS SCHEDULED
Status: DISCONTINUED | OUTPATIENT
Start: 2022-01-01 | End: 2022-01-01

## 2022-01-01 RX ORDER — FUROSEMIDE 10 MG/ML
40 INJECTION INTRAMUSCULAR; INTRAVENOUS EVERY 8 HOURS
Status: DISCONTINUED | OUTPATIENT
Start: 2022-01-01 | End: 2022-01-01

## 2022-01-01 RX ORDER — ALLOPURINOL 300 MG/1
300 TABLET ORAL DAILY
Status: DISCONTINUED | OUTPATIENT
Start: 2022-01-01 | End: 2022-01-01

## 2022-01-01 RX ORDER — OXYCODONE HYDROCHLORIDE 5 MG/1
5 TABLET ORAL EVERY 6 HOURS PRN
Status: DISCONTINUED | OUTPATIENT
Start: 2022-01-01 | End: 2022-01-01

## 2022-01-01 RX ORDER — POTASSIUM CHLORIDE 750 MG/1
40 TABLET, FILM COATED, EXTENDED RELEASE ORAL ONCE
Status: COMPLETED | OUTPATIENT
Start: 2022-01-01 | End: 2022-01-01

## 2022-01-01 RX ORDER — BUMETANIDE 2 MG/1
2 TABLET ORAL DAILY
Status: DISCONTINUED | OUTPATIENT
Start: 2022-01-01 | End: 2022-01-01 | Stop reason: HOSPADM

## 2022-01-01 RX ORDER — BUPROPION HYDROCHLORIDE 150 MG/1
150 TABLET, EXTENDED RELEASE ORAL
Qty: 30 TABLET | Refills: 0 | Status: SHIPPED | OUTPATIENT
Start: 2022-01-01 | End: 2022-01-01 | Stop reason: SDUPTHER

## 2022-01-01 RX ORDER — SITAGLIPTIN 25 MG/1
TABLET, FILM COATED ORAL
Qty: 90 TABLET | Refills: 0 | Status: SHIPPED | OUTPATIENT
Start: 2022-01-01

## 2022-01-01 RX ORDER — METFORMIN HYDROCHLORIDE 500 MG/1
1000 TABLET, EXTENDED RELEASE ORAL
Status: DISCONTINUED | OUTPATIENT
Start: 2022-01-01 | End: 2022-01-01

## 2022-01-01 RX ORDER — BUMETANIDE 2 MG/1
TABLET ORAL
Qty: 90 TABLET | Refills: 0 | Status: SHIPPED | OUTPATIENT
Start: 2022-01-01 | End: 2022-01-01

## 2022-01-01 RX ORDER — AMOXICILLIN 500 MG/1
500 CAPSULE ORAL EVERY 12 HOURS SCHEDULED
Qty: 7 CAPSULE | Refills: 0 | Status: SHIPPED | OUTPATIENT
Start: 2022-01-01 | End: 2022-01-01

## 2022-01-01 RX ORDER — PANTOPRAZOLE SODIUM 40 MG/10ML
40 INJECTION, POWDER, LYOPHILIZED, FOR SOLUTION INTRAVENOUS ONCE
Status: COMPLETED | OUTPATIENT
Start: 2022-01-01 | End: 2022-01-01

## 2022-01-01 RX ORDER — METOPROLOL TARTRATE 50 MG/1
50 TABLET, FILM COATED ORAL EVERY 12 HOURS SCHEDULED
Status: DISCONTINUED | OUTPATIENT
Start: 2022-01-01 | End: 2022-01-01 | Stop reason: HOSPADM

## 2022-01-01 RX ORDER — MIRTAZAPINE 15 MG/1
15 TABLET, FILM COATED ORAL NIGHTLY
Qty: 30 TABLET | Refills: 5 | Status: SHIPPED | OUTPATIENT
Start: 2022-01-01

## 2022-01-01 RX ORDER — INSULIN LISPRO 100 [IU]/ML
0-9 INJECTION, SOLUTION INTRAVENOUS; SUBCUTANEOUS
Status: DISCONTINUED | OUTPATIENT
Start: 2022-01-01 | End: 2022-01-01

## 2022-01-01 RX ORDER — BUPROPION HYDROCHLORIDE 150 MG/1
150 TABLET, EXTENDED RELEASE ORAL
Qty: 14 TABLET | Refills: 0 | Status: SHIPPED | OUTPATIENT
Start: 2022-01-01 | End: 2022-01-01 | Stop reason: SDUPTHER

## 2022-01-01 RX ORDER — DEXTROSE MONOHYDRATE 50 MG/ML
1000 INJECTION, SOLUTION INTRAVENOUS CONTINUOUS
Status: DISCONTINUED | OUTPATIENT
Start: 2022-01-01 | End: 2022-01-01

## 2022-01-01 RX ORDER — PROMETHAZINE HYDROCHLORIDE 25 MG/1
12.5 TABLET ORAL EVERY 4 HOURS PRN
Status: DISCONTINUED | OUTPATIENT
Start: 2022-01-01 | End: 2022-01-01 | Stop reason: HOSPADM

## 2022-01-01 RX ORDER — BUMETANIDE 2 MG/1
2 TABLET ORAL EVERY EVENING
Status: ON HOLD | COMMUNITY
End: 2022-01-01

## 2022-01-01 RX ORDER — ACETAMINOPHEN 500 MG
500 TABLET ORAL EVERY 6 HOURS PRN
Status: DISCONTINUED | OUTPATIENT
Start: 2022-01-01 | End: 2022-01-01

## 2022-01-01 RX ORDER — DIPHENOXYLATE HYDROCHLORIDE AND ATROPINE SULFATE 2.5; .025 MG/1; MG/1
1 TABLET ORAL
Status: DISCONTINUED | OUTPATIENT
Start: 2022-01-01 | End: 2022-01-01 | Stop reason: HOSPADM

## 2022-01-01 RX ORDER — MONTELUKAST SODIUM 10 MG/1
10 TABLET ORAL NIGHTLY
Status: DISCONTINUED | OUTPATIENT
Start: 2022-01-01 | End: 2022-01-01

## 2022-01-01 RX ORDER — SCOLOPAMINE TRANSDERMAL SYSTEM 1 MG/1
1 PATCH, EXTENDED RELEASE TRANSDERMAL
Status: DISCONTINUED | OUTPATIENT
Start: 2022-01-01 | End: 2022-01-01 | Stop reason: HOSPADM

## 2022-01-01 RX ORDER — POTASSIUM CHLORIDE 1.5 G/1.77G
40 POWDER, FOR SOLUTION ORAL AS NEEDED
Status: DISCONTINUED | OUTPATIENT
Start: 2022-01-01 | End: 2022-01-01

## 2022-01-01 RX ORDER — DEXTROSE MONOHYDRATE 25 G/50ML
25 INJECTION, SOLUTION INTRAVENOUS
Status: DISCONTINUED | OUTPATIENT
Start: 2022-01-01 | End: 2022-01-01

## 2022-01-01 RX ORDER — MORPHINE SULFATE 2 MG/ML
1 INJECTION, SOLUTION INTRAMUSCULAR; INTRAVENOUS EVERY 4 HOURS PRN
Status: DISCONTINUED | OUTPATIENT
Start: 2022-01-01 | End: 2022-01-01 | Stop reason: HOSPADM

## 2022-01-01 RX ORDER — TORSEMIDE 20 MG/1
60 TABLET ORAL DAILY
Status: DISCONTINUED | OUTPATIENT
Start: 2022-01-01 | End: 2022-01-01

## 2022-01-01 RX ORDER — MULTIPLE VITAMINS W/ MINERALS TAB 9MG-400MCG
1 TAB ORAL DAILY
COMMUNITY

## 2022-01-01 RX ORDER — HEPARIN SODIUM 1000 [USP'U]/ML
5000 INJECTION, SOLUTION INTRAVENOUS; SUBCUTANEOUS ONCE
Status: DISCONTINUED | OUTPATIENT
Start: 2022-01-01 | End: 2022-01-01 | Stop reason: HOSPADM

## 2022-01-01 RX ORDER — LIDOCAINE HYDROCHLORIDE 10 MG/ML
20 INJECTION, SOLUTION INFILTRATION; PERINEURAL ONCE
Status: DISCONTINUED | OUTPATIENT
Start: 2022-01-01 | End: 2022-01-01 | Stop reason: HOSPADM

## 2022-01-01 RX ORDER — POTASSIUM CHLORIDE 1.5 G/1.77G
40 POWDER, FOR SOLUTION ORAL AS NEEDED
Status: DISCONTINUED | OUTPATIENT
Start: 2022-01-01 | End: 2022-01-01 | Stop reason: SDUPTHER

## 2022-01-01 RX ORDER — IPRATROPIUM BROMIDE AND ALBUTEROL SULFATE 2.5; .5 MG/3ML; MG/3ML
3 SOLUTION RESPIRATORY (INHALATION)
Status: CANCELLED | OUTPATIENT
Start: 2022-01-01

## 2022-01-01 RX ORDER — PANTOPRAZOLE SODIUM 40 MG/1
40 TABLET, DELAYED RELEASE ORAL
Status: DISCONTINUED | OUTPATIENT
Start: 2022-01-01 | End: 2022-01-01 | Stop reason: HOSPADM

## 2022-01-01 RX ORDER — INSULIN LISPRO 100 [IU]/ML
0-7 INJECTION, SOLUTION INTRAVENOUS; SUBCUTANEOUS
Status: DISCONTINUED | OUTPATIENT
Start: 2022-01-01 | End: 2022-01-01

## 2022-01-01 RX ORDER — SPIRONOLACTONE 100 MG/1
100 TABLET, FILM COATED ORAL
Status: DISCONTINUED | OUTPATIENT
Start: 2022-01-01 | End: 2022-01-01 | Stop reason: HOSPADM

## 2022-01-01 RX ORDER — SPIRONOLACTONE 25 MG/1
25 TABLET ORAL DAILY
Qty: 31 TABLET | Refills: 3 | Status: SHIPPED | OUTPATIENT
Start: 2022-01-01

## 2022-01-01 RX ORDER — METOPROLOL SUCCINATE 100 MG/1
100 TABLET, EXTENDED RELEASE ORAL DAILY
Status: DISCONTINUED | OUTPATIENT
Start: 2022-01-01 | End: 2022-01-01

## 2022-01-01 RX ORDER — INSULIN LISPRO 100 [IU]/ML
0-7 INJECTION, SOLUTION INTRAVENOUS; SUBCUTANEOUS
Status: DISCONTINUED | OUTPATIENT
Start: 2022-01-01 | End: 2022-01-01 | Stop reason: SDUPTHER

## 2022-01-01 RX ORDER — CARVEDILOL 12.5 MG/1
12.5 TABLET ORAL 2 TIMES DAILY
Status: DISCONTINUED | OUTPATIENT
Start: 2022-01-01 | End: 2022-01-01

## 2022-01-01 RX ORDER — MORPHINE SULFATE 2 MG/ML
2 INJECTION, SOLUTION INTRAMUSCULAR; INTRAVENOUS
Status: DISCONTINUED | OUTPATIENT
Start: 2022-01-01 | End: 2022-01-01 | Stop reason: HOSPADM

## 2022-01-01 RX ORDER — SODIUM CHLORIDE 0.9 % (FLUSH) 0.9 %
10 SYRINGE (ML) INJECTION EVERY 12 HOURS SCHEDULED
Status: DISCONTINUED | OUTPATIENT
Start: 2022-01-01 | End: 2022-01-01 | Stop reason: HOSPADM

## 2022-01-01 RX ORDER — MORPHINE SULFATE 20 MG/ML
5 SOLUTION ORAL
Status: DISCONTINUED | OUTPATIENT
Start: 2022-01-01 | End: 2022-01-01 | Stop reason: HOSPADM

## 2022-01-01 RX ORDER — MULTIPLE VITAMINS W/ MINERALS TAB 9MG-400MCG
1 TAB ORAL DAILY
Status: DISCONTINUED | OUTPATIENT
Start: 2022-01-01 | End: 2022-01-01

## 2022-01-01 RX ORDER — ALBUMIN (HUMAN) 12.5 G/50ML
12.5 SOLUTION INTRAVENOUS AS NEEDED
Status: ACTIVE | OUTPATIENT
Start: 2022-01-01 | End: 2022-01-01

## 2022-01-01 RX ORDER — ACETAZOLAMIDE 250 MG/1
500 TABLET ORAL 3 TIMES DAILY
Status: COMPLETED | OUTPATIENT
Start: 2022-01-01 | End: 2022-01-01

## 2022-01-01 RX ORDER — SPIRONOLACTONE 100 MG/1
100 TABLET, FILM COATED ORAL 2 TIMES DAILY
Qty: 60 TABLET | Refills: 0 | Status: SHIPPED | OUTPATIENT
Start: 2022-01-01 | End: 2022-01-01 | Stop reason: SDUPTHER

## 2022-01-01 RX ORDER — METOPROLOL TARTRATE 50 MG/1
100 TABLET, FILM COATED ORAL 2 TIMES DAILY
Status: DISCONTINUED | OUTPATIENT
Start: 2022-01-01 | End: 2022-01-01

## 2022-01-01 RX ORDER — DIGOXIN 125 MCG
TABLET ORAL
Qty: 31 TABLET | Refills: 1 | Status: SHIPPED | OUTPATIENT
Start: 2022-01-01

## 2022-01-01 RX ORDER — SITAGLIPTIN 100 MG/1
TABLET, FILM COATED ORAL
Qty: 30 TABLET | Refills: 0 | Status: SHIPPED | OUTPATIENT
Start: 2022-01-01 | End: 2022-01-01

## 2022-01-01 RX ORDER — ONDANSETRON 4 MG/1
4 TABLET, FILM COATED ORAL EVERY 6 HOURS PRN
Status: DISCONTINUED | OUTPATIENT
Start: 2022-01-01 | End: 2022-01-01 | Stop reason: HOSPADM

## 2022-01-01 RX ORDER — POTASSIUM CHLORIDE 20 MEQ/1
20 TABLET, EXTENDED RELEASE ORAL DAILY
Qty: 90 TABLET | Refills: 3 | Status: SHIPPED | OUTPATIENT
Start: 2022-01-01

## 2022-01-01 RX ORDER — ISOSORBIDE MONONITRATE 60 MG/1
60 TABLET, EXTENDED RELEASE ORAL DAILY
Status: DISCONTINUED | OUTPATIENT
Start: 2022-01-01 | End: 2022-01-01 | Stop reason: HOSPADM

## 2022-01-01 RX ORDER — ALPRAZOLAM 0.5 MG/1
0.5 TABLET ORAL ONCE AS NEEDED
Status: COMPLETED | OUTPATIENT
Start: 2022-01-01 | End: 2022-01-01

## 2022-01-01 RX ORDER — LORAZEPAM 2 MG/ML
2 CONCENTRATE ORAL
Status: DISCONTINUED | OUTPATIENT
Start: 2022-01-01 | End: 2022-01-01 | Stop reason: HOSPADM

## 2022-01-01 RX ORDER — POTASSIUM CHLORIDE 750 MG/1
40 TABLET, FILM COATED, EXTENDED RELEASE ORAL AS NEEDED
Status: DISCONTINUED | OUTPATIENT
Start: 2022-01-01 | End: 2022-01-01

## 2022-01-01 RX ORDER — MIRTAZAPINE 15 MG/1
1 TABLET, FILM COATED ORAL DAILY
COMMUNITY
Start: 2022-01-01 | End: 2022-01-01

## 2022-01-01 RX ORDER — ONDANSETRON 2 MG/ML
4 INJECTION INTRAMUSCULAR; INTRAVENOUS EVERY 6 HOURS PRN
Status: DISCONTINUED | OUTPATIENT
Start: 2022-01-01 | End: 2022-01-01

## 2022-01-01 RX ORDER — BUMETANIDE 2 MG/1
2 TABLET ORAL DAILY
Qty: 90 TABLET | Refills: 0 | Status: ON HOLD | OUTPATIENT
Start: 2022-01-01 | End: 2022-01-01 | Stop reason: SDUPTHER

## 2022-01-01 RX ORDER — FUROSEMIDE 10 MG/ML
80 INJECTION INTRAMUSCULAR; INTRAVENOUS EVERY MORNING
Status: DISCONTINUED | OUTPATIENT
Start: 2022-01-01 | End: 2022-01-01

## 2022-01-01 RX ORDER — UREA 10 %
3 LOTION (ML) TOPICAL NIGHTLY PRN
Status: DISCONTINUED | OUTPATIENT
Start: 2022-01-01 | End: 2022-01-01 | Stop reason: HOSPADM

## 2022-01-01 RX ORDER — DAPAGLIFLOZIN 10 MG/1
10 TABLET, FILM COATED ORAL DAILY
Qty: 31 TABLET | Refills: 3 | Status: SHIPPED | OUTPATIENT
Start: 2022-01-01

## 2022-01-01 RX ORDER — BUMETANIDE 2 MG/1
4 TABLET ORAL EVERY MORNING
Status: DISCONTINUED | OUTPATIENT
Start: 2022-01-01 | End: 2022-01-01

## 2022-01-01 RX ORDER — METOLAZONE 5 MG/1
5 TABLET ORAL ONCE
Status: COMPLETED | OUTPATIENT
Start: 2022-01-01 | End: 2022-01-01

## 2022-01-01 RX ORDER — LANOLIN ALCOHOL/MO/W.PET/CERES
CREAM (GRAM) TOPICAL
Qty: 90 TABLET | Refills: 0 | Status: SHIPPED | OUTPATIENT
Start: 2022-01-01

## 2022-01-01 RX ORDER — INSULIN LISPRO 100 [IU]/ML
0-7 INJECTION, SOLUTION INTRAVENOUS; SUBCUTANEOUS
Status: DISCONTINUED | OUTPATIENT
Start: 2022-01-01 | End: 2022-01-01 | Stop reason: HOSPADM

## 2022-01-01 RX ORDER — METOPROLOL TARTRATE 50 MG/1
100 TABLET, FILM COATED ORAL 2 TIMES DAILY
Qty: 120 TABLET | Refills: 1 | Status: SHIPPED | OUTPATIENT
Start: 2022-01-01 | End: 2022-01-01

## 2022-01-01 RX ORDER — DEXTROSE MONOHYDRATE 25 G/50ML
50 INJECTION, SOLUTION INTRAVENOUS ONCE
Status: COMPLETED | OUTPATIENT
Start: 2022-01-01 | End: 2022-01-01

## 2022-01-01 RX ORDER — MONTELUKAST SODIUM 10 MG/1
10 TABLET ORAL NIGHTLY
Qty: 30 TABLET | Refills: 5 | Status: SHIPPED | OUTPATIENT
Start: 2022-01-01 | End: 2022-01-01 | Stop reason: SDUPTHER

## 2022-01-01 RX ORDER — BUMETANIDE 2 MG/1
TABLET ORAL
Qty: 270 TABLET | Refills: 0 | Status: SHIPPED | OUTPATIENT
Start: 2022-01-01

## 2022-01-01 RX ORDER — SITAGLIPTIN 100 MG/1
TABLET, FILM COATED ORAL
Qty: 90 TABLET | Refills: 0 | Status: SHIPPED | OUTPATIENT
Start: 2022-01-01 | End: 2022-01-01 | Stop reason: SDUPTHER

## 2022-01-01 RX ORDER — FUROSEMIDE 10 MG/ML
80 INJECTION INTRAMUSCULAR; INTRAVENOUS EVERY 8 HOURS
Status: COMPLETED | OUTPATIENT
Start: 2022-01-01 | End: 2022-01-01

## 2022-01-01 RX ORDER — FERROUS SULFATE 325(65) MG
325 TABLET ORAL EVERY OTHER DAY
Refills: 0 | Status: DISCONTINUED | OUTPATIENT
Start: 2022-01-01 | End: 2022-01-01

## 2022-01-01 RX ORDER — POTASSIUM CHLORIDE 750 MG/1
40 TABLET, FILM COATED, EXTENDED RELEASE ORAL AS NEEDED
Status: DISCONTINUED | OUTPATIENT
Start: 2022-01-01 | End: 2022-01-01 | Stop reason: SDUPTHER

## 2022-01-01 RX ORDER — LACTULOSE 10 G/15ML
20 SOLUTION ORAL 2 TIMES DAILY
Status: DISCONTINUED | OUTPATIENT
Start: 2022-01-01 | End: 2022-01-01

## 2022-01-01 RX ORDER — KETOROLAC TROMETHAMINE 15 MG/ML
15 INJECTION, SOLUTION INTRAMUSCULAR; INTRAVENOUS EVERY 6 HOURS PRN
Status: DISCONTINUED | OUTPATIENT
Start: 2022-01-01 | End: 2022-01-01 | Stop reason: HOSPADM

## 2022-01-01 RX ORDER — MONTELUKAST SODIUM 10 MG/1
10 TABLET ORAL NIGHTLY
Qty: 30 TABLET | Refills: 5 | Status: SHIPPED | OUTPATIENT
Start: 2022-01-01

## 2022-01-01 RX ORDER — CARVEDILOL 12.5 MG/1
12.5 TABLET ORAL 2 TIMES DAILY
Qty: 180 TABLET | Refills: 3 | Status: SHIPPED | OUTPATIENT
Start: 2022-01-01 | End: 2022-01-01 | Stop reason: HOSPADM

## 2022-01-01 RX ORDER — METOPROLOL TARTRATE 50 MG/1
100 TABLET, FILM COATED ORAL EVERY 12 HOURS SCHEDULED
Status: DISCONTINUED | OUTPATIENT
Start: 2022-01-01 | End: 2022-01-01

## 2022-01-01 RX ORDER — BUPROPION HYDROCHLORIDE 150 MG/1
150 TABLET, EXTENDED RELEASE ORAL
Status: DISCONTINUED | OUTPATIENT
Start: 2022-01-01 | End: 2022-01-01

## 2022-01-01 RX ORDER — SPIRONOLACTONE 100 MG/1
100 TABLET, FILM COATED ORAL 2 TIMES DAILY
Qty: 180 TABLET | Refills: 0 | Status: SHIPPED | OUTPATIENT
Start: 2022-01-01 | End: 2022-01-01 | Stop reason: HOSPADM

## 2022-01-01 RX ORDER — HYDROXYZINE HYDROCHLORIDE 25 MG/1
25 TABLET, FILM COATED ORAL 3 TIMES DAILY PRN
Status: DISCONTINUED | OUTPATIENT
Start: 2022-01-01 | End: 2022-01-01

## 2022-01-01 RX ORDER — POTASSIUM CHLORIDE 750 MG/1
10 TABLET, FILM COATED, EXTENDED RELEASE ORAL EVERY MORNING
Status: DISCONTINUED | OUTPATIENT
Start: 2022-01-01 | End: 2022-01-01

## 2022-01-01 RX ORDER — FAMOTIDINE 20 MG/1
20 TABLET, FILM COATED ORAL
Status: DISCONTINUED | OUTPATIENT
Start: 2022-01-01 | End: 2022-01-01 | Stop reason: HOSPADM

## 2022-01-01 RX ORDER — FUROSEMIDE 10 MG/ML
40 INJECTION INTRAMUSCULAR; INTRAVENOUS EVERY MORNING
Status: DISCONTINUED | OUTPATIENT
Start: 2022-01-01 | End: 2022-01-01

## 2022-01-01 RX ORDER — LOSARTAN POTASSIUM 50 MG/1
50 TABLET ORAL EVERY EVENING
Qty: 30 TABLET | Refills: 0 | Status: SHIPPED | OUTPATIENT
Start: 2022-01-01 | End: 2022-01-01 | Stop reason: SDUPTHER

## 2022-01-01 RX ORDER — MONTELUKAST SODIUM 10 MG/1
1 TABLET ORAL DAILY
COMMUNITY
Start: 2022-01-01 | End: 2022-01-01

## 2022-01-01 RX ORDER — METFORMIN HYDROCHLORIDE 500 MG/1
1000 TABLET, EXTENDED RELEASE ORAL
Qty: 60 TABLET | Refills: 5 | Status: SHIPPED | OUTPATIENT
Start: 2022-01-01 | End: 2022-01-01 | Stop reason: HOSPADM

## 2022-01-01 RX ORDER — POTASSIUM CHLORIDE 750 MG/1
40 TABLET, FILM COATED, EXTENDED RELEASE ORAL ONCE
Status: DISCONTINUED | OUTPATIENT
Start: 2022-01-01 | End: 2022-01-01 | Stop reason: HOSPADM

## 2022-01-01 RX ORDER — LORAZEPAM 2 MG/ML
0.5 CONCENTRATE ORAL
Status: DISCONTINUED | OUTPATIENT
Start: 2022-01-01 | End: 2022-01-01 | Stop reason: HOSPADM

## 2022-01-01 RX ADMIN — DEXMEDETOMIDINE HYDROCHLORIDE 1 MCG/KG/HR: 4 INJECTION INTRAVENOUS at 18:59

## 2022-01-01 RX ADMIN — MULTIPLE VITAMINS W/ MINERALS TAB 1 TABLET: TAB at 08:31

## 2022-01-01 RX ADMIN — METOPROLOL TARTRATE 50 MG: 50 TABLET, FILM COATED ORAL at 09:02

## 2022-01-01 RX ADMIN — LOSARTAN POTASSIUM 50 MG: 50 TABLET ORAL at 18:25

## 2022-01-01 RX ADMIN — APIXABAN 2.5 MG: 2.5 TABLET, FILM COATED ORAL at 10:39

## 2022-01-01 RX ADMIN — MULTIPLE VITAMINS W/ MINERALS TAB 1 TABLET: TAB at 09:29

## 2022-01-01 RX ADMIN — SODIUM BICARBONATE 25 MEQ: 84 INJECTION, SOLUTION INTRAVENOUS at 16:35

## 2022-01-01 RX ADMIN — ATORVASTATIN CALCIUM 40 MG: 20 TABLET, FILM COATED ORAL at 09:08

## 2022-01-01 RX ADMIN — PANTOPRAZOLE SODIUM 40 MG: 40 TABLET, DELAYED RELEASE ORAL at 17:07

## 2022-01-01 RX ADMIN — METOPROLOL TARTRATE 5 MG: 1 INJECTION, SOLUTION INTRAVENOUS at 17:50

## 2022-01-01 RX ADMIN — FUROSEMIDE 80 MG: 10 INJECTION INTRAMUSCULAR; INTRAVENOUS at 03:18

## 2022-01-01 RX ADMIN — CHLOROTHIAZIDE SODIUM 500 MG: 500 INJECTION, POWDER, LYOPHILIZED, FOR SOLUTION INTRAVENOUS at 13:01

## 2022-01-01 RX ADMIN — BUPROPION HYDROCHLORIDE 100 MG: 100 TABLET, EXTENDED RELEASE ORAL at 09:55

## 2022-01-01 RX ADMIN — INSULIN LISPRO 2 UNITS: 100 INJECTION, SOLUTION INTRAVENOUS; SUBCUTANEOUS at 08:04

## 2022-01-01 RX ADMIN — SPIRONOLACTONE 100 MG: 100 TABLET ORAL at 10:13

## 2022-01-01 RX ADMIN — MULTIPLE VITAMINS W/ MINERALS TAB 1 TABLET: TAB at 14:54

## 2022-01-01 RX ADMIN — Medication 10 ML: at 08:41

## 2022-01-01 RX ADMIN — Medication 10 ML: at 21:31

## 2022-01-01 RX ADMIN — RIFAXIMIN 550 MG: 550 TABLET ORAL at 21:55

## 2022-01-01 RX ADMIN — FUROSEMIDE 80 MG: 10 INJECTION, SOLUTION INTRAMUSCULAR; INTRAVENOUS at 08:27

## 2022-01-01 RX ADMIN — BUPROPION HYDROCHLORIDE 100 MG: 100 TABLET, EXTENDED RELEASE ORAL at 09:28

## 2022-01-01 RX ADMIN — Medication 10 ML: at 20:07

## 2022-01-01 RX ADMIN — ATORVASTATIN CALCIUM 40 MG: 20 TABLET, FILM COATED ORAL at 20:45

## 2022-01-01 RX ADMIN — FAMOTIDINE 20 MG: 20 TABLET ORAL at 06:35

## 2022-01-01 RX ADMIN — INSULIN LISPRO 2 UNITS: 100 INJECTION, SOLUTION INTRAVENOUS; SUBCUTANEOUS at 11:51

## 2022-01-01 RX ADMIN — MONTELUKAST SODIUM 10 MG: 10 TABLET, FILM COATED ORAL at 20:17

## 2022-01-01 RX ADMIN — METOPROLOL TARTRATE 25 MG: 25 TABLET ORAL at 12:54

## 2022-01-01 RX ADMIN — APIXABAN 2.5 MG: 2.5 TABLET, FILM COATED ORAL at 21:09

## 2022-01-01 RX ADMIN — MICONAZOLE NITRATE: 2 POWDER TOPICAL at 21:07

## 2022-01-01 RX ADMIN — MIRTAZAPINE 15 MG: 15 TABLET, FILM COATED ORAL at 21:55

## 2022-01-01 RX ADMIN — MICONAZOLE NITRATE: 2 POWDER TOPICAL at 21:18

## 2022-01-01 RX ADMIN — APIXABAN 2.5 MG: 2.5 TABLET, FILM COATED ORAL at 08:31

## 2022-01-01 RX ADMIN — PANTOPRAZOLE SODIUM 40 MG: 40 TABLET, DELAYED RELEASE ORAL at 09:08

## 2022-01-01 RX ADMIN — ACETAMINOPHEN 500 MG: 500 TABLET, FILM COATED ORAL at 01:48

## 2022-01-01 RX ADMIN — MICONAZOLE NITRATE 1 APPLICATION: 2 POWDER TOPICAL at 08:29

## 2022-01-01 RX ADMIN — MIRTAZAPINE 15 MG: 15 TABLET, FILM COATED ORAL at 20:12

## 2022-01-01 RX ADMIN — MIRTAZAPINE 15 MG: 15 TABLET, FILM COATED ORAL at 20:19

## 2022-01-01 RX ADMIN — HYDROXYZINE HYDROCHLORIDE 25 MG: 25 TABLET ORAL at 00:32

## 2022-01-01 RX ADMIN — BUPROPION HYDROCHLORIDE 100 MG: 100 TABLET, EXTENDED RELEASE ORAL at 12:53

## 2022-01-01 RX ADMIN — APIXABAN 2.5 MG: 2.5 TABLET, FILM COATED ORAL at 08:28

## 2022-01-01 RX ADMIN — Medication 10 ML: at 20:12

## 2022-01-01 RX ADMIN — MONTELUKAST SODIUM 10 MG: 10 TABLET, FILM COATED ORAL at 20:29

## 2022-01-01 RX ADMIN — LACTULOSE 20 G: 20 SOLUTION ORAL at 10:13

## 2022-01-01 RX ADMIN — TAZOBACTAM SODIUM AND PIPERACILLIN SODIUM 3.38 G: 375; 3 INJECTION, SOLUTION INTRAVENOUS at 22:26

## 2022-01-01 RX ADMIN — MULTIPLE VITAMINS W/ MINERALS TAB 1 TABLET: TAB at 08:16

## 2022-01-01 RX ADMIN — FUROSEMIDE 80 MG: 10 INJECTION, SOLUTION INTRAMUSCULAR; INTRAVENOUS at 07:06

## 2022-01-01 RX ADMIN — ALLOPURINOL 300 MG: 300 TABLET ORAL at 08:27

## 2022-01-01 RX ADMIN — PANTOPRAZOLE SODIUM 40 MG: 40 TABLET, DELAYED RELEASE ORAL at 06:16

## 2022-01-01 RX ADMIN — DOCUSATE SODIUM 50MG AND SENNOSIDES 8.6MG 2 TABLET: 8.6; 5 TABLET, FILM COATED ORAL at 09:02

## 2022-01-01 RX ADMIN — METOPROLOL TARTRATE 50 MG: 50 TABLET, FILM COATED ORAL at 20:23

## 2022-01-01 RX ADMIN — FUROSEMIDE 40 MG: 10 INJECTION, SOLUTION INTRAMUSCULAR; INTRAVENOUS at 06:30

## 2022-01-01 RX ADMIN — MICONAZOLE NITRATE: 2 POWDER TOPICAL at 08:32

## 2022-01-01 RX ADMIN — POTASSIUM CHLORIDE 40 MEQ: 10 TABLET, EXTENDED RELEASE ORAL at 08:33

## 2022-01-01 RX ADMIN — INSULIN LISPRO 2 UNITS: 100 INJECTION, SOLUTION INTRAVENOUS; SUBCUTANEOUS at 17:59

## 2022-01-01 RX ADMIN — MULTIPLE VITAMINS W/ MINERALS TAB 1 TABLET: TAB at 10:13

## 2022-01-01 RX ADMIN — PANTOPRAZOLE SODIUM 40 MG: 40 INJECTION, POWDER, LYOPHILIZED, FOR SOLUTION INTRAVENOUS at 17:39

## 2022-01-01 RX ADMIN — APIXABAN 2.5 MG: 2.5 TABLET, FILM COATED ORAL at 09:08

## 2022-01-01 RX ADMIN — INSULIN LISPRO 4 UNITS: 100 INJECTION, SOLUTION INTRAVENOUS; SUBCUTANEOUS at 11:29

## 2022-01-01 RX ADMIN — MULTIPLE VITAMINS W/ MINERALS TAB 1 TABLET: TAB at 09:59

## 2022-01-01 RX ADMIN — APIXABAN 5 MG: 5 TABLET, FILM COATED ORAL at 08:01

## 2022-01-01 RX ADMIN — MIRTAZAPINE 15 MG: 15 TABLET, FILM COATED ORAL at 21:43

## 2022-01-01 RX ADMIN — MULTIPLE VITAMINS W/ MINERALS TAB 1 TABLET: TAB at 08:42

## 2022-01-01 RX ADMIN — ALPRAZOLAM 0.5 MG: 0.5 TABLET ORAL at 21:32

## 2022-01-01 RX ADMIN — MONTELUKAST SODIUM 10 MG: 10 TABLET, FILM COATED ORAL at 21:12

## 2022-01-01 RX ADMIN — FUROSEMIDE 80 MG: 10 INJECTION, SOLUTION INTRAMUSCULAR; INTRAVENOUS at 03:11

## 2022-01-01 RX ADMIN — APIXABAN 5 MG: 5 TABLET, FILM COATED ORAL at 09:02

## 2022-01-01 RX ADMIN — ATORVASTATIN CALCIUM 40 MG: 20 TABLET, FILM COATED ORAL at 08:47

## 2022-01-01 RX ADMIN — LORAZEPAM 1 MG: 2 INJECTION INTRAMUSCULAR; INTRAVENOUS at 04:18

## 2022-01-01 RX ADMIN — ISOSORBIDE MONONITRATE 60 MG: 60 TABLET ORAL at 09:22

## 2022-01-01 RX ADMIN — LORAZEPAM 0.5 MG: 2 INJECTION INTRAMUSCULAR; INTRAVENOUS at 09:59

## 2022-01-01 RX ADMIN — FUROSEMIDE 80 MG: 10 INJECTION, SOLUTION INTRAMUSCULAR; INTRAVENOUS at 21:51

## 2022-01-01 RX ADMIN — BUMETANIDE 2 MG: 0.25 INJECTION INTRAMUSCULAR; INTRAVENOUS at 13:41

## 2022-01-01 RX ADMIN — FUROSEMIDE 80 MG: 10 INJECTION, SOLUTION INTRAMUSCULAR; INTRAVENOUS at 16:37

## 2022-01-01 RX ADMIN — MONTELUKAST SODIUM 10 MG: 10 TABLET, FILM COATED ORAL at 20:02

## 2022-01-01 RX ADMIN — LINAGLIPTIN 5 MG: 5 TABLET, FILM COATED ORAL at 09:52

## 2022-01-01 RX ADMIN — ESCITALOPRAM 10 MG: 10 TABLET, FILM COATED ORAL at 10:13

## 2022-01-01 RX ADMIN — POTASSIUM CHLORIDE 40 MEQ: 750 TABLET, EXTENDED RELEASE ORAL at 07:19

## 2022-01-01 RX ADMIN — POTASSIUM CHLORIDE 10 MEQ: 750 TABLET, EXTENDED RELEASE ORAL at 06:23

## 2022-01-01 RX ADMIN — TAZOBACTAM SODIUM AND PIPERACILLIN SODIUM 3.38 G: 375; 3 INJECTION, SOLUTION INTRAVENOUS at 14:09

## 2022-01-01 RX ADMIN — Medication 10 ML: at 09:48

## 2022-01-01 RX ADMIN — MONTELUKAST SODIUM 10 MG: 10 TABLET, FILM COATED ORAL at 20:31

## 2022-01-01 RX ADMIN — DEXMEDETOMIDINE HYDROCHLORIDE 0.4 MCG/KG/HR: 4 INJECTION INTRAVENOUS at 16:03

## 2022-01-01 RX ADMIN — Medication 10 ML: at 08:01

## 2022-01-01 RX ADMIN — TAZOBACTAM SODIUM AND PIPERACILLIN SODIUM 3.38 G: 375; 3 INJECTION, SOLUTION INTRAVENOUS at 06:04

## 2022-01-01 RX ADMIN — LACTULOSE 20 G: 10 SOLUTION ORAL at 10:11

## 2022-01-01 RX ADMIN — BUPROPION HYDROCHLORIDE 100 MG: 100 TABLET, EXTENDED RELEASE ORAL at 09:10

## 2022-01-01 RX ADMIN — MIRTAZAPINE 15 MG: 15 TABLET, FILM COATED ORAL at 20:59

## 2022-01-01 RX ADMIN — Medication 10 ML: at 10:14

## 2022-01-01 RX ADMIN — ALLOPURINOL 300 MG: 300 TABLET ORAL at 08:34

## 2022-01-01 RX ADMIN — MONTELUKAST SODIUM 10 MG: 10 TABLET, FILM COATED ORAL at 20:59

## 2022-01-01 RX ADMIN — Medication 10 ML: at 09:53

## 2022-01-01 RX ADMIN — PANTOPRAZOLE SODIUM 40 MG: 40 TABLET, DELAYED RELEASE ORAL at 09:28

## 2022-01-01 RX ADMIN — ATORVASTATIN CALCIUM 40 MG: 20 TABLET, FILM COATED ORAL at 10:12

## 2022-01-01 RX ADMIN — ALLOPURINOL 200 MG: 300 TABLET ORAL at 10:13

## 2022-01-01 RX ADMIN — APIXABAN 2.5 MG: 2.5 TABLET, FILM COATED ORAL at 20:23

## 2022-01-01 RX ADMIN — LACTULOSE 20 G: 10 SOLUTION ORAL at 09:46

## 2022-01-01 RX ADMIN — ONDANSETRON 4 MG: 2 INJECTION INTRAMUSCULAR; INTRAVENOUS at 20:48

## 2022-01-01 RX ADMIN — METOPROLOL TARTRATE 25 MG: 25 TABLET ORAL at 20:58

## 2022-01-01 RX ADMIN — Medication 10 ML: at 08:31

## 2022-01-01 RX ADMIN — Medication 10 ML: at 08:16

## 2022-01-01 RX ADMIN — HYDROXYZINE HYDROCHLORIDE 25 MG: 25 TABLET ORAL at 10:31

## 2022-01-01 RX ADMIN — ESCITALOPRAM 10 MG: 10 TABLET, FILM COATED ORAL at 09:42

## 2022-01-01 RX ADMIN — DOCUSATE SODIUM 50MG AND SENNOSIDES 8.6MG 2 TABLET: 8.6; 5 TABLET, FILM COATED ORAL at 08:42

## 2022-01-01 RX ADMIN — MONTELUKAST SODIUM 10 MG: 10 TABLET, FILM COATED ORAL at 23:41

## 2022-01-01 RX ADMIN — HYDROXYZINE HYDROCHLORIDE 25 MG: 25 TABLET ORAL at 16:15

## 2022-01-01 RX ADMIN — Medication 10 ML: at 21:09

## 2022-01-01 RX ADMIN — APIXABAN 2.5 MG: 2.5 TABLET, FILM COATED ORAL at 22:25

## 2022-01-01 RX ADMIN — BARIUM SULFATE 1 TEASPOON(S): 0.6 CREAM ORAL at 09:30

## 2022-01-01 RX ADMIN — APIXABAN 5 MG: 5 TABLET, FILM COATED ORAL at 12:11

## 2022-01-01 RX ADMIN — LACTULOSE 20 G: 20 SOLUTION ORAL at 21:56

## 2022-01-01 RX ADMIN — BUPROPION HYDROCHLORIDE 100 MG: 100 TABLET, EXTENDED RELEASE ORAL at 08:20

## 2022-01-01 RX ADMIN — MORPHINE SULFATE 2 MG: 2 INJECTION, SOLUTION INTRAMUSCULAR; INTRAVENOUS at 23:53

## 2022-01-01 RX ADMIN — CHLOROTHIAZIDE SODIUM 500 MG: 500 INJECTION, POWDER, LYOPHILIZED, FOR SOLUTION INTRAVENOUS at 15:24

## 2022-01-01 RX ADMIN — ALLOPURINOL 300 MG: 300 TABLET ORAL at 09:29

## 2022-01-01 RX ADMIN — PANTOPRAZOLE SODIUM 40 MG: 40 INJECTION, POWDER, FOR SOLUTION INTRAVENOUS at 06:02

## 2022-01-01 RX ADMIN — INSULIN LISPRO 2 UNITS: 100 INJECTION, SOLUTION INTRAVENOUS; SUBCUTANEOUS at 12:29

## 2022-01-01 RX ADMIN — BUPROPION HYDROCHLORIDE 150 MG: 150 TABLET, EXTENDED RELEASE ORAL at 08:27

## 2022-01-01 RX ADMIN — INSULIN LISPRO 4 UNITS: 100 INJECTION, SOLUTION INTRAVENOUS; SUBCUTANEOUS at 12:11

## 2022-01-01 RX ADMIN — FAMOTIDINE 20 MG: 20 TABLET ORAL at 08:01

## 2022-01-01 RX ADMIN — SPIRONOLACTONE 25 MG: 25 TABLET ORAL at 12:53

## 2022-01-01 RX ADMIN — APIXABAN 2.5 MG: 2.5 TABLET, FILM COATED ORAL at 21:07

## 2022-01-01 RX ADMIN — SPIRONOLACTONE 100 MG: 100 TABLET, FILM COATED ORAL at 16:48

## 2022-01-01 RX ADMIN — BUPROPION HYDROCHLORIDE 150 MG: 150 TABLET, EXTENDED RELEASE ORAL at 09:53

## 2022-01-01 RX ADMIN — BUPROPION HYDROCHLORIDE 150 MG: 150 TABLET, EXTENDED RELEASE ORAL at 08:35

## 2022-01-01 RX ADMIN — Medication 10 ML: at 12:32

## 2022-01-01 RX ADMIN — ATORVASTATIN CALCIUM 40 MG: 20 TABLET, FILM COATED ORAL at 12:53

## 2022-01-01 RX ADMIN — LOSARTAN POTASSIUM 25 MG: 25 TABLET, FILM COATED ORAL at 06:22

## 2022-01-01 RX ADMIN — SPIRONOLACTONE 25 MG: 25 TABLET ORAL at 09:56

## 2022-01-01 RX ADMIN — METOPROLOL SUCCINATE 150 MG: 100 TABLET, EXTENDED RELEASE ORAL at 21:30

## 2022-01-01 RX ADMIN — Medication 10 ML: at 21:05

## 2022-01-01 RX ADMIN — OLANZAPINE 5 MG: 10 INJECTION, POWDER, FOR SOLUTION INTRAMUSCULAR at 03:14

## 2022-01-01 RX ADMIN — APIXABAN 5 MG: 5 TABLET, FILM COATED ORAL at 09:52

## 2022-01-01 RX ADMIN — MICONAZOLE NITRATE: 2 POWDER TOPICAL at 20:19

## 2022-01-01 RX ADMIN — Medication 10 ML: at 09:52

## 2022-01-01 RX ADMIN — MIRTAZAPINE 15 MG: 15 TABLET, FILM COATED ORAL at 20:31

## 2022-01-01 RX ADMIN — MORPHINE SULFATE 2 MG: 2 INJECTION, SOLUTION INTRAMUSCULAR; INTRAVENOUS at 00:24

## 2022-01-01 RX ADMIN — APIXABAN 2.5 MG: 2.5 TABLET, FILM COATED ORAL at 09:29

## 2022-01-01 RX ADMIN — INSULIN LISPRO 2 UNITS: 100 INJECTION, SOLUTION INTRAVENOUS; SUBCUTANEOUS at 13:56

## 2022-01-01 RX ADMIN — TAZOBACTAM SODIUM AND PIPERACILLIN SODIUM 3.38 G: 375; 3 INJECTION, SOLUTION INTRAVENOUS at 22:16

## 2022-01-01 RX ADMIN — POTASSIUM CHLORIDE 40 MEQ: 10 TABLET, EXTENDED RELEASE ORAL at 09:08

## 2022-01-01 RX ADMIN — INSULIN LISPRO 2 UNITS: 100 INJECTION, SOLUTION INTRAVENOUS; SUBCUTANEOUS at 18:43

## 2022-01-01 RX ADMIN — APIXABAN 2.5 MG: 2.5 TABLET, FILM COATED ORAL at 21:51

## 2022-01-01 RX ADMIN — FERROUS SULFATE TAB 325 MG (65 MG ELEMENTAL FE) 325 MG: 325 (65 FE) TAB at 09:22

## 2022-01-01 RX ADMIN — METOPROLOL TARTRATE 50 MG: 50 TABLET, FILM COATED ORAL at 20:57

## 2022-01-01 RX ADMIN — MIRTAZAPINE 15 MG: 15 TABLET, FILM COATED ORAL at 20:48

## 2022-01-01 RX ADMIN — MULTIPLE VITAMINS W/ MINERALS TAB 1 TABLET: TAB at 08:35

## 2022-01-01 RX ADMIN — METOPROLOL TARTRATE 50 MG: 50 TABLET, FILM COATED ORAL at 20:17

## 2022-01-01 RX ADMIN — RIFAXIMIN 550 MG: 550 TABLET ORAL at 15:18

## 2022-01-01 RX ADMIN — FUROSEMIDE 80 MG: 10 INJECTION, SOLUTION INTRAMUSCULAR; INTRAVENOUS at 16:50

## 2022-01-01 RX ADMIN — METOPROLOL TARTRATE 50 MG: 50 TABLET, FILM COATED ORAL at 21:21

## 2022-01-01 RX ADMIN — DEXTROSE MONOHYDRATE 100 ML/HR: 50 INJECTION, SOLUTION INTRAVENOUS at 06:04

## 2022-01-01 RX ADMIN — APIXABAN 2.5 MG: 2.5 TABLET, FILM COATED ORAL at 20:55

## 2022-01-01 RX ADMIN — POTASSIUM CHLORIDE 40 MEQ: 750 TABLET, EXTENDED RELEASE ORAL at 21:09

## 2022-01-01 RX ADMIN — Medication 10 ML: at 21:43

## 2022-01-01 RX ADMIN — PANTOPRAZOLE SODIUM 40 MG: 40 TABLET, DELAYED RELEASE ORAL at 09:02

## 2022-01-01 RX ADMIN — APIXABAN 2.5 MG: 2.5 TABLET, FILM COATED ORAL at 20:19

## 2022-01-01 RX ADMIN — APIXABAN 5 MG: 5 TABLET, FILM COATED ORAL at 14:36

## 2022-01-01 RX ADMIN — METOPROLOL TARTRATE 50 MG: 50 TABLET, FILM COATED ORAL at 08:42

## 2022-01-01 RX ADMIN — METOPROLOL TARTRATE 25 MG: 25 TABLET ORAL at 21:07

## 2022-01-01 RX ADMIN — LINAGLIPTIN 5 MG: 5 TABLET, FILM COATED ORAL at 08:28

## 2022-01-01 RX ADMIN — FUROSEMIDE 10 MG/HR: 100 INJECTION, SOLUTION INTRAMUSCULAR; INTRAVENOUS at 01:04

## 2022-01-01 RX ADMIN — APIXABAN 2.5 MG: 2.5 TABLET, FILM COATED ORAL at 09:25

## 2022-01-01 RX ADMIN — ATORVASTATIN CALCIUM 40 MG: 20 TABLET, FILM COATED ORAL at 08:29

## 2022-01-01 RX ADMIN — INSULIN LISPRO 2 UNITS: 100 INJECTION, SOLUTION INTRAVENOUS; SUBCUTANEOUS at 12:38

## 2022-01-01 RX ADMIN — MIRTAZAPINE 15 MG: 15 TABLET, FILM COATED ORAL at 21:51

## 2022-01-01 RX ADMIN — DOCUSATE SODIUM 50MG AND SENNOSIDES 8.6MG 2 TABLET: 8.6; 5 TABLET, FILM COATED ORAL at 20:17

## 2022-01-01 RX ADMIN — ATORVASTATIN CALCIUM 40 MG: 20 TABLET, FILM COATED ORAL at 09:28

## 2022-01-01 RX ADMIN — APIXABAN 2.5 MG: 2.5 TABLET, FILM COATED ORAL at 21:06

## 2022-01-01 RX ADMIN — METFORMIN HYDROCHLORIDE 1000 MG: 500 TABLET, EXTENDED RELEASE ORAL at 09:22

## 2022-01-01 RX ADMIN — ALLOPURINOL 300 MG: 300 TABLET ORAL at 09:22

## 2022-01-01 RX ADMIN — METOPROLOL TARTRATE 50 MG: 50 TABLET, FILM COATED ORAL at 22:15

## 2022-01-01 RX ADMIN — INSULIN LISPRO 2 UNITS: 100 INJECTION, SOLUTION INTRAVENOUS; SUBCUTANEOUS at 12:35

## 2022-01-01 RX ADMIN — MICONAZOLE NITRATE: 2 POWDER TOPICAL at 09:43

## 2022-01-01 RX ADMIN — SPIRONOLACTONE 100 MG: 100 TABLET, FILM COATED ORAL at 17:44

## 2022-01-01 RX ADMIN — AMOXICILLIN 500 MG: 250 CAPSULE ORAL at 09:02

## 2022-01-01 RX ADMIN — DEXMEDETOMIDINE HYDROCHLORIDE 0.5 MCG/KG/HR: 4 INJECTION INTRAVENOUS at 15:10

## 2022-01-01 RX ADMIN — BUPROPION HYDROCHLORIDE 150 MG: 150 TABLET, EXTENDED RELEASE ORAL at 09:59

## 2022-01-01 RX ADMIN — CHLOROTHIAZIDE SODIUM 500 MG: 500 INJECTION, POWDER, LYOPHILIZED, FOR SOLUTION INTRAVENOUS at 01:27

## 2022-01-01 RX ADMIN — DEXTROSE MONOHYDRATE 50 ML/HR: 50 INJECTION, SOLUTION INTRAVENOUS at 22:16

## 2022-01-01 RX ADMIN — Medication 3 MG: at 20:02

## 2022-01-01 RX ADMIN — SPIRONOLACTONE 100 MG: 100 TABLET, FILM COATED ORAL at 09:21

## 2022-01-01 RX ADMIN — LINAGLIPTIN 5 MG: 5 TABLET, FILM COATED ORAL at 08:35

## 2022-01-01 RX ADMIN — FERROUS SULFATE TAB 325 MG (65 MG ELEMENTAL FE) 325 MG: 325 (65 FE) TAB at 10:12

## 2022-01-01 RX ADMIN — TAZOBACTAM SODIUM AND PIPERACILLIN SODIUM 3.38 G: 375; 3 INJECTION, SOLUTION INTRAVENOUS at 14:24

## 2022-01-01 RX ADMIN — FUROSEMIDE 80 MG: 10 INJECTION, SOLUTION INTRAMUSCULAR; INTRAVENOUS at 16:03

## 2022-01-01 RX ADMIN — ISOSORBIDE MONONITRATE 60 MG: 60 TABLET ORAL at 08:45

## 2022-01-01 RX ADMIN — PANTOPRAZOLE SODIUM 40 MG: 40 TABLET, DELAYED RELEASE ORAL at 10:25

## 2022-01-01 RX ADMIN — SPIRONOLACTONE 25 MG: 25 TABLET ORAL at 08:31

## 2022-01-01 RX ADMIN — APIXABAN 2.5 MG: 2.5 TABLET, FILM COATED ORAL at 21:55

## 2022-01-01 RX ADMIN — HYDROXYZINE HYDROCHLORIDE 25 MG: 25 TABLET ORAL at 14:36

## 2022-01-01 RX ADMIN — METOPROLOL TARTRATE 50 MG: 50 TABLET, FILM COATED ORAL at 08:27

## 2022-01-01 RX ADMIN — MICONAZOLE NITRATE: 2 POWDER TOPICAL at 09:12

## 2022-01-01 RX ADMIN — SPIRONOLACTONE 100 MG: 100 TABLET, FILM COATED ORAL at 09:54

## 2022-01-01 RX ADMIN — FERROUS SULFATE TAB 325 MG (65 MG ELEMENTAL FE) 325 MG: 325 (65 FE) TAB at 09:08

## 2022-01-01 RX ADMIN — ATORVASTATIN CALCIUM 40 MG: 20 TABLET, FILM COATED ORAL at 08:28

## 2022-01-01 RX ADMIN — Medication 10 ML: at 22:59

## 2022-01-01 RX ADMIN — BUMETANIDE 2 MG: 2 TABLET ORAL at 09:02

## 2022-01-01 RX ADMIN — BUPROPION HYDROCHLORIDE 150 MG: 150 TABLET, EXTENDED RELEASE ORAL at 10:19

## 2022-01-01 RX ADMIN — Medication 10 ML: at 10:26

## 2022-01-01 RX ADMIN — Medication 10 ML: at 08:37

## 2022-01-01 RX ADMIN — HYDROXYZINE HYDROCHLORIDE 10 MG: 10 TABLET ORAL at 18:17

## 2022-01-01 RX ADMIN — FAMOTIDINE 20 MG: 20 TABLET ORAL at 17:44

## 2022-01-01 RX ADMIN — CHLOROTHIAZIDE SODIUM 500 MG: 500 INJECTION, POWDER, LYOPHILIZED, FOR SOLUTION INTRAVENOUS at 13:20

## 2022-01-01 RX ADMIN — DEXMEDETOMIDINE HYDROCHLORIDE 0.8 MCG/KG/HR: 4 INJECTION INTRAVENOUS at 06:35

## 2022-01-01 RX ADMIN — Medication 10 ML: at 21:08

## 2022-01-01 RX ADMIN — FUROSEMIDE 80 MG: 10 INJECTION, SOLUTION INTRAMUSCULAR; INTRAVENOUS at 20:10

## 2022-01-01 RX ADMIN — METOPROLOL TARTRATE 100 MG: 50 TABLET, FILM COATED ORAL at 21:09

## 2022-01-01 RX ADMIN — MORPHINE SULFATE 2 MG: 2 INJECTION, SOLUTION INTRAMUSCULAR; INTRAVENOUS at 12:47

## 2022-01-01 RX ADMIN — FUROSEMIDE 80 MG: 10 INJECTION, SOLUTION INTRAMUSCULAR; INTRAVENOUS at 23:30

## 2022-01-01 RX ADMIN — MORPHINE SULFATE 1 MG: 2 INJECTION, SOLUTION INTRAMUSCULAR; INTRAVENOUS at 20:10

## 2022-01-01 RX ADMIN — FUROSEMIDE 80 MG: 10 INJECTION, SOLUTION INTRAMUSCULAR; INTRAVENOUS at 09:22

## 2022-01-01 RX ADMIN — AMOXICILLIN AND CLAVULANATE POTASSIUM 1 TABLET: 875; 125 TABLET, FILM COATED ORAL at 21:21

## 2022-01-01 RX ADMIN — DEXMEDETOMIDINE HYDROCHLORIDE 0.5 MCG/KG/HR: 4 INJECTION INTRAVENOUS at 06:00

## 2022-01-01 RX ADMIN — SPIRONOLACTONE 25 MG: 25 TABLET ORAL at 08:15

## 2022-01-01 RX ADMIN — Medication 10 ML: at 20:49

## 2022-01-01 RX ADMIN — FERROUS SULFATE TAB 325 MG (65 MG ELEMENTAL FE) 325 MG: 325 (65 FE) TAB at 12:53

## 2022-01-01 RX ADMIN — Medication 10 ML: at 20:30

## 2022-01-01 RX ADMIN — FUROSEMIDE 80 MG: 10 INJECTION, SOLUTION INTRAMUSCULAR; INTRAVENOUS at 00:10

## 2022-01-01 RX ADMIN — Medication 10 ML: at 08:36

## 2022-01-01 RX ADMIN — ATORVASTATIN CALCIUM 40 MG: 20 TABLET, FILM COATED ORAL at 10:05

## 2022-01-01 RX ADMIN — PANTOPRAZOLE SODIUM 40 MG: 40 TABLET, DELAYED RELEASE ORAL at 20:45

## 2022-01-01 RX ADMIN — METOPROLOL TARTRATE 75 MG: 25 TABLET, FILM COATED ORAL at 21:51

## 2022-01-01 RX ADMIN — ESCITALOPRAM 10 MG: 10 TABLET, FILM COATED ORAL at 16:51

## 2022-01-01 RX ADMIN — SPIRONOLACTONE 25 MG: 25 TABLET ORAL at 09:25

## 2022-01-01 RX ADMIN — SPIRONOLACTONE 25 MG: 25 TABLET ORAL at 08:20

## 2022-01-01 RX ADMIN — METOPROLOL TARTRATE 75 MG: 25 TABLET, FILM COATED ORAL at 09:41

## 2022-01-01 RX ADMIN — METOLAZONE 5 MG: 5 TABLET ORAL at 12:29

## 2022-01-01 RX ADMIN — TORSEMIDE 60 MG: 20 TABLET ORAL at 10:13

## 2022-01-01 RX ADMIN — NITROGLYCERIN 0.4 MG: 0.4 TABLET SUBLINGUAL at 11:49

## 2022-01-01 RX ADMIN — APIXABAN 2.5 MG: 2.5 TABLET, FILM COATED ORAL at 21:12

## 2022-01-01 RX ADMIN — ALLOPURINOL 300 MG: 300 TABLET ORAL at 08:00

## 2022-01-01 RX ADMIN — ATORVASTATIN CALCIUM 40 MG: 20 TABLET, FILM COATED ORAL at 08:20

## 2022-01-01 RX ADMIN — METOPROLOL SUCCINATE 100 MG: 100 TABLET, EXTENDED RELEASE ORAL at 08:01

## 2022-01-01 RX ADMIN — ESCITALOPRAM 10 MG: 10 TABLET, FILM COATED ORAL at 10:08

## 2022-01-01 RX ADMIN — Medication 10 ML: at 20:10

## 2022-01-01 RX ADMIN — MORPHINE SULFATE 1 MG: 2 INJECTION, SOLUTION INTRAMUSCULAR; INTRAVENOUS at 21:22

## 2022-01-01 RX ADMIN — POTASSIUM CHLORIDE 40 MEQ: 10 TABLET, EXTENDED RELEASE ORAL at 06:35

## 2022-01-01 RX ADMIN — DIGOXIN 500 MCG: 0.25 INJECTION INTRAMUSCULAR; INTRAVENOUS at 14:36

## 2022-01-01 RX ADMIN — MICONAZOLE NITRATE 1 APPLICATION: 2 POWDER TOPICAL at 09:48

## 2022-01-01 RX ADMIN — MORPHINE SULFATE 1 MG: 2 INJECTION, SOLUTION INTRAMUSCULAR; INTRAVENOUS at 16:15

## 2022-01-01 RX ADMIN — TAZOBACTAM SODIUM AND PIPERACILLIN SODIUM 3.38 G: 375; 3 INJECTION, SOLUTION INTRAVENOUS at 14:35

## 2022-01-01 RX ADMIN — MICONAZOLE NITRATE: 2 POWDER TOPICAL at 21:42

## 2022-01-01 RX ADMIN — MIRTAZAPINE 15 MG: 15 TABLET, FILM COATED ORAL at 21:26

## 2022-01-01 RX ADMIN — MORPHINE SULFATE 1 MG: 2 INJECTION, SOLUTION INTRAMUSCULAR; INTRAVENOUS at 01:24

## 2022-01-01 RX ADMIN — APIXABAN 2.5 MG: 2.5 TABLET, FILM COATED ORAL at 21:26

## 2022-01-01 RX ADMIN — MICONAZOLE NITRATE: 2 POWDER TOPICAL at 09:56

## 2022-01-01 RX ADMIN — INSULIN LISPRO 2 UNITS: 100 INJECTION, SOLUTION INTRAVENOUS; SUBCUTANEOUS at 16:37

## 2022-01-01 RX ADMIN — APIXABAN 2.5 MG: 2.5 TABLET, FILM COATED ORAL at 20:58

## 2022-01-01 RX ADMIN — Medication 10 ML: at 21:26

## 2022-01-01 RX ADMIN — FUROSEMIDE 80 MG: 10 INJECTION, SOLUTION INTRAMUSCULAR; INTRAVENOUS at 03:18

## 2022-01-01 RX ADMIN — FUROSEMIDE 80 MG: 10 INJECTION, SOLUTION INTRAMUSCULAR; INTRAVENOUS at 20:46

## 2022-01-01 RX ADMIN — PANTOPRAZOLE SODIUM 40 MG: 40 INJECTION, POWDER, LYOPHILIZED, FOR SOLUTION INTRAVENOUS at 02:57

## 2022-01-01 RX ADMIN — POTASSIUM CHLORIDE 40 MEQ: 750 TABLET, EXTENDED RELEASE ORAL at 16:00

## 2022-01-01 RX ADMIN — FUROSEMIDE 80 MG: 10 INJECTION, SOLUTION INTRAMUSCULAR; INTRAVENOUS at 12:03

## 2022-01-01 RX ADMIN — LINAGLIPTIN 5 MG: 5 TABLET, FILM COATED ORAL at 08:01

## 2022-01-01 RX ADMIN — MONTELUKAST SODIUM 10 MG: 10 TABLET, FILM COATED ORAL at 21:30

## 2022-01-01 RX ADMIN — APIXABAN 2.5 MG: 2.5 TABLET, FILM COATED ORAL at 20:12

## 2022-01-01 RX ADMIN — FUROSEMIDE 80 MG: 10 INJECTION, SOLUTION INTRAMUSCULAR; INTRAVENOUS at 06:25

## 2022-01-01 RX ADMIN — CALCIUM GLUCONATE 1 G: 20 INJECTION, SOLUTION INTRAVENOUS at 14:58

## 2022-01-01 RX ADMIN — POTASSIUM CHLORIDE 40 MEQ: 10 TABLET, EXTENDED RELEASE ORAL at 06:23

## 2022-01-01 RX ADMIN — MICONAZOLE NITRATE: 2 POWDER TOPICAL at 21:05

## 2022-01-01 RX ADMIN — MICONAZOLE NITRATE: 2 POWDER TOPICAL at 20:23

## 2022-01-01 RX ADMIN — HYDROXYZINE HYDROCHLORIDE 25 MG: 25 TABLET ORAL at 20:29

## 2022-01-01 RX ADMIN — LACTULOSE 20 G: 20 SOLUTION ORAL at 18:43

## 2022-01-01 RX ADMIN — DIGOXIN 500 MCG: 0.25 INJECTION INTRAMUSCULAR; INTRAVENOUS at 17:10

## 2022-01-01 RX ADMIN — ALLOPURINOL 300 MG: 300 TABLET ORAL at 12:11

## 2022-01-01 RX ADMIN — MIRTAZAPINE 15 MG: 15 TABLET, FILM COATED ORAL at 20:56

## 2022-01-01 RX ADMIN — FUROSEMIDE 80 MG: 10 INJECTION, SOLUTION INTRAMUSCULAR; INTRAVENOUS at 08:01

## 2022-01-01 RX ADMIN — FUROSEMIDE 80 MG: 10 INJECTION, SOLUTION INTRAMUSCULAR; INTRAVENOUS at 18:04

## 2022-01-01 RX ADMIN — Medication 10 ML: at 14:55

## 2022-01-01 RX ADMIN — INSULIN LISPRO 2 UNITS: 100 INJECTION, SOLUTION INTRAVENOUS; SUBCUTANEOUS at 12:37

## 2022-01-01 RX ADMIN — BUPROPION HYDROCHLORIDE 150 MG: 150 TABLET, EXTENDED RELEASE ORAL at 08:44

## 2022-01-01 RX ADMIN — MIRTAZAPINE 15 MG: 15 TABLET, FILM COATED ORAL at 21:23

## 2022-01-01 RX ADMIN — MICONAZOLE NITRATE: 2 POWDER TOPICAL at 10:14

## 2022-01-01 RX ADMIN — BUPROPION HYDROCHLORIDE 100 MG: 100 TABLET, EXTENDED RELEASE ORAL at 09:41

## 2022-01-01 RX ADMIN — ALLOPURINOL 300 MG: 300 TABLET ORAL at 10:26

## 2022-01-01 RX ADMIN — BUMETANIDE 2 MG: 2 TABLET ORAL at 08:42

## 2022-01-01 RX ADMIN — DEXMEDETOMIDINE HYDROCHLORIDE 0.6 MCG/KG/HR: 4 INJECTION INTRAVENOUS at 23:41

## 2022-01-01 RX ADMIN — ISOSORBIDE MONONITRATE 60 MG: 60 TABLET ORAL at 09:52

## 2022-01-01 RX ADMIN — ALLOPURINOL 300 MG: 300 TABLET ORAL at 14:17

## 2022-01-01 RX ADMIN — Medication 10 ML: at 08:19

## 2022-01-01 RX ADMIN — CHLOROTHIAZIDE SODIUM 500 MG: 500 INJECTION, POWDER, LYOPHILIZED, FOR SOLUTION INTRAVENOUS at 02:51

## 2022-01-01 RX ADMIN — BUPROPION HYDROCHLORIDE 150 MG: 150 TABLET, EXTENDED RELEASE ORAL at 12:11

## 2022-01-01 RX ADMIN — Medication 10 ML: at 08:47

## 2022-01-01 RX ADMIN — MULTIPLE VITAMINS W/ MINERALS TAB 1 TABLET: TAB at 09:42

## 2022-01-01 RX ADMIN — Medication 10 ML: at 09:19

## 2022-01-01 RX ADMIN — LACTULOSE 20 G: 10 SOLUTION ORAL at 15:18

## 2022-01-01 RX ADMIN — APIXABAN 5 MG: 5 TABLET, FILM COATED ORAL at 08:27

## 2022-01-01 RX ADMIN — FUROSEMIDE 80 MG: 10 INJECTION, SOLUTION INTRAMUSCULAR; INTRAVENOUS at 20:31

## 2022-01-01 RX ADMIN — MORPHINE SULFATE 2 MG: 2 INJECTION, SOLUTION INTRAMUSCULAR; INTRAVENOUS at 00:42

## 2022-01-01 RX ADMIN — MORPHINE SULFATE 1 MG: 2 INJECTION, SOLUTION INTRAMUSCULAR; INTRAVENOUS at 20:29

## 2022-01-01 RX ADMIN — SPIRONOLACTONE 100 MG: 100 TABLET, FILM COATED ORAL at 14:36

## 2022-01-01 RX ADMIN — Medication 10 ML: at 09:43

## 2022-01-01 RX ADMIN — TAZOBACTAM SODIUM AND PIPERACILLIN SODIUM 3.38 G: 375; 3 INJECTION, SOLUTION INTRAVENOUS at 22:46

## 2022-01-01 RX ADMIN — ATORVASTATIN CALCIUM 40 MG: 20 TABLET, FILM COATED ORAL at 08:27

## 2022-01-01 RX ADMIN — IOPAMIDOL 100 ML: 612 INJECTION, SOLUTION INTRAVENOUS at 08:37

## 2022-01-01 RX ADMIN — MONTELUKAST SODIUM 10 MG: 10 TABLET, FILM COATED ORAL at 21:23

## 2022-01-01 RX ADMIN — METOPROLOL TARTRATE 50 MG: 50 TABLET, FILM COATED ORAL at 20:49

## 2022-01-01 RX ADMIN — TAZOBACTAM SODIUM AND PIPERACILLIN SODIUM 3.38 G: 375; 3 INJECTION, SOLUTION INTRAVENOUS at 08:15

## 2022-01-01 RX ADMIN — PANTOPRAZOLE SODIUM 40 MG: 40 TABLET, DELAYED RELEASE ORAL at 06:29

## 2022-01-01 RX ADMIN — TAZOBACTAM SODIUM AND PIPERACILLIN SODIUM 3.38 G: 375; 3 INJECTION, SOLUTION INTRAVENOUS at 23:50

## 2022-01-01 RX ADMIN — MICONAZOLE NITRATE 1 APPLICATION: 2 POWDER TOPICAL at 08:32

## 2022-01-01 RX ADMIN — RIFAXIMIN 550 MG: 550 TABLET ORAL at 09:41

## 2022-01-01 RX ADMIN — MORPHINE SULFATE 1 MG: 2 INJECTION, SOLUTION INTRAMUSCULAR; INTRAVENOUS at 03:02

## 2022-01-01 RX ADMIN — LOSARTAN POTASSIUM 50 MG: 50 TABLET ORAL at 18:03

## 2022-01-01 RX ADMIN — INSULIN LISPRO 4 UNITS: 100 INJECTION, SOLUTION INTRAVENOUS; SUBCUTANEOUS at 12:18

## 2022-01-01 RX ADMIN — Medication 10 ML: at 08:33

## 2022-01-01 RX ADMIN — METOPROLOL TARTRATE 100 MG: 50 TABLET, FILM COATED ORAL at 21:56

## 2022-01-01 RX ADMIN — INSULIN LISPRO 2 UNITS: 100 INJECTION, SOLUTION INTRAVENOUS; SUBCUTANEOUS at 17:26

## 2022-01-01 RX ADMIN — Medication 10 ML: at 21:52

## 2022-01-01 RX ADMIN — INSULIN LISPRO 2 UNITS: 100 INJECTION, SOLUTION INTRAVENOUS; SUBCUTANEOUS at 08:33

## 2022-01-01 RX ADMIN — MIRTAZAPINE 15 MG: 15 TABLET, FILM COATED ORAL at 23:41

## 2022-01-01 RX ADMIN — LACTULOSE 20 G: 20 SOLUTION ORAL at 21:10

## 2022-01-01 RX ADMIN — LOSARTAN POTASSIUM 50 MG: 50 TABLET ORAL at 20:45

## 2022-01-01 RX ADMIN — MONTELUKAST SODIUM 10 MG: 10 TABLET, FILM COATED ORAL at 20:56

## 2022-01-01 RX ADMIN — ALLOPURINOL 300 MG: 300 TABLET ORAL at 10:19

## 2022-01-01 RX ADMIN — ISOSORBIDE MONONITRATE 60 MG: 60 TABLET ORAL at 10:25

## 2022-01-01 RX ADMIN — FUROSEMIDE 40 MG: 10 INJECTION, SOLUTION INTRAMUSCULAR; INTRAVENOUS at 03:32

## 2022-01-01 RX ADMIN — FUROSEMIDE 80 MG: 10 INJECTION, SOLUTION INTRAMUSCULAR; INTRAVENOUS at 15:05

## 2022-01-01 RX ADMIN — TAZOBACTAM SODIUM AND PIPERACILLIN SODIUM 3.38 G: 375; 3 INJECTION, SOLUTION INTRAVENOUS at 15:22

## 2022-01-01 RX ADMIN — LORAZEPAM 1 MG: 2 INJECTION INTRAMUSCULAR; INTRAVENOUS at 13:55

## 2022-01-01 RX ADMIN — PERFLUTREN 1 ML: 6.52 INJECTION, SUSPENSION INTRAVENOUS at 10:50

## 2022-01-01 RX ADMIN — FAMOTIDINE 20 MG: 20 TABLET ORAL at 12:12

## 2022-01-01 RX ADMIN — INSULIN LISPRO 2 UNITS: 100 INJECTION, SOLUTION INTRAVENOUS; SUBCUTANEOUS at 17:40

## 2022-01-01 RX ADMIN — POTASSIUM CHLORIDE 40 MEQ: 10 TABLET, EXTENDED RELEASE ORAL at 09:53

## 2022-01-01 RX ADMIN — LOSARTAN POTASSIUM 50 MG: 50 TABLET ORAL at 17:40

## 2022-01-01 RX ADMIN — Medication 10 ML: at 08:29

## 2022-01-01 RX ADMIN — MORPHINE SULFATE 1 MG: 2 INJECTION, SOLUTION INTRAMUSCULAR; INTRAVENOUS at 22:37

## 2022-01-01 RX ADMIN — INSULIN LISPRO 2 UNITS: 100 INJECTION, SOLUTION INTRAVENOUS; SUBCUTANEOUS at 13:03

## 2022-01-01 RX ADMIN — MONTELUKAST SODIUM 10 MG: 10 TABLET, FILM COATED ORAL at 20:45

## 2022-01-01 RX ADMIN — BUMETANIDE 2 MG: 2 TABLET ORAL at 10:13

## 2022-01-01 RX ADMIN — DEXMEDETOMIDINE HYDROCHLORIDE 0.4 MCG/KG/HR: 4 INJECTION INTRAVENOUS at 20:51

## 2022-01-01 RX ADMIN — FUROSEMIDE 40 MG: 10 INJECTION, SOLUTION INTRAMUSCULAR; INTRAVENOUS at 16:13

## 2022-01-01 RX ADMIN — HEPARIN SODIUM 3000 UNITS: 1000 INJECTION INTRAVENOUS; SUBCUTANEOUS at 22:18

## 2022-01-01 RX ADMIN — FUROSEMIDE 40 MG: 10 INJECTION, SOLUTION INTRAMUSCULAR; INTRAVENOUS at 13:52

## 2022-01-01 RX ADMIN — METOPROLOL TARTRATE 50 MG: 50 TABLET, FILM COATED ORAL at 08:20

## 2022-01-01 RX ADMIN — METOPROLOL TARTRATE 100 MG: 50 TABLET, FILM COATED ORAL at 22:25

## 2022-01-01 RX ADMIN — DEXMEDETOMIDINE HYDROCHLORIDE 0.8 MCG/KG/HR: 4 INJECTION INTRAVENOUS at 08:14

## 2022-01-01 RX ADMIN — POTASSIUM CHLORIDE 40 MEQ: 10 TABLET, EXTENDED RELEASE ORAL at 06:03

## 2022-01-01 RX ADMIN — LOSARTAN POTASSIUM 50 MG: 50 TABLET ORAL at 17:44

## 2022-01-01 RX ADMIN — APIXABAN 2.5 MG: 2.5 TABLET, FILM COATED ORAL at 12:50

## 2022-01-01 RX ADMIN — LORAZEPAM 1 MG: 2 INJECTION INTRAMUSCULAR; INTRAVENOUS at 21:17

## 2022-01-01 RX ADMIN — METOPROLOL TARTRATE 50 MG: 50 TABLET, FILM COATED ORAL at 21:05

## 2022-01-01 RX ADMIN — FERROUS SULFATE TAB 325 MG (65 MG ELEMENTAL FE) 325 MG: 325 (65 FE) TAB at 09:59

## 2022-01-01 RX ADMIN — Medication 10 ML: at 20:55

## 2022-01-01 RX ADMIN — APIXABAN 5 MG: 5 TABLET, FILM COATED ORAL at 21:23

## 2022-01-01 RX ADMIN — METOPROLOL TARTRATE 75 MG: 25 TABLET, FILM COATED ORAL at 09:55

## 2022-01-01 RX ADMIN — FUROSEMIDE 40 MG: 10 INJECTION, SOLUTION INTRAMUSCULAR; INTRAVENOUS at 21:22

## 2022-01-01 RX ADMIN — MONTELUKAST SODIUM 10 MG: 10 TABLET, FILM COATED ORAL at 20:48

## 2022-01-01 RX ADMIN — FERROUS SULFATE TAB 325 MG (65 MG ELEMENTAL FE) 325 MG: 325 (65 FE) TAB at 09:53

## 2022-01-01 RX ADMIN — BUPROPION HYDROCHLORIDE 150 MG: 150 TABLET, EXTENDED RELEASE ORAL at 09:02

## 2022-01-01 RX ADMIN — FUROSEMIDE 40 MG: 10 INJECTION, SOLUTION INTRAMUSCULAR; INTRAVENOUS at 17:26

## 2022-01-01 RX ADMIN — ATORVASTATIN CALCIUM 40 MG: 20 TABLET, FILM COATED ORAL at 08:15

## 2022-01-01 RX ADMIN — ONDANSETRON 4 MG: 2 INJECTION INTRAMUSCULAR; INTRAVENOUS at 18:20

## 2022-01-01 RX ADMIN — METOPROLOL TARTRATE 100 MG: 50 TABLET, FILM COATED ORAL at 08:57

## 2022-01-01 RX ADMIN — BUPROPION HYDROCHLORIDE 100 MG: 100 TABLET, EXTENDED RELEASE ORAL at 08:41

## 2022-01-01 RX ADMIN — Medication 10 ML: at 09:56

## 2022-01-01 RX ADMIN — ESCITALOPRAM 10 MG: 10 TABLET, FILM COATED ORAL at 09:09

## 2022-01-01 RX ADMIN — PANTOPRAZOLE SODIUM 40 MG: 40 INJECTION, POWDER, LYOPHILIZED, FOR SOLUTION INTRAVENOUS at 23:44

## 2022-01-01 RX ADMIN — MULTIPLE VITAMINS W/ MINERALS TAB 1 TABLET: TAB at 10:25

## 2022-01-01 RX ADMIN — PANTOPRAZOLE SODIUM 40 MG: 40 TABLET, DELAYED RELEASE ORAL at 06:43

## 2022-01-01 RX ADMIN — INSULIN LISPRO 2 UNITS: 100 INJECTION, SOLUTION INTRAVENOUS; SUBCUTANEOUS at 17:16

## 2022-01-01 RX ADMIN — APIXABAN 2.5 MG: 2.5 TABLET, FILM COATED ORAL at 20:07

## 2022-01-01 RX ADMIN — RIFAXIMIN 550 MG: 550 TABLET ORAL at 10:08

## 2022-01-01 RX ADMIN — FUROSEMIDE 80 MG: 10 INJECTION, SOLUTION INTRAMUSCULAR; INTRAVENOUS at 09:54

## 2022-01-01 RX ADMIN — Medication 10 ML: at 22:50

## 2022-01-01 RX ADMIN — ATORVASTATIN CALCIUM 40 MG: 20 TABLET, FILM COATED ORAL at 08:42

## 2022-01-01 RX ADMIN — FAMOTIDINE 20 MG: 20 TABLET ORAL at 06:21

## 2022-01-01 RX ADMIN — INSULIN HUMAN 10 UNITS: 100 INJECTION, SOLUTION PARENTERAL at 14:53

## 2022-01-01 RX ADMIN — BUMETANIDE 2 MG: 2 TABLET ORAL at 08:44

## 2022-01-01 RX ADMIN — DEXTROSE MONOHYDRATE 50 ML/HR: 50 INJECTION, SOLUTION INTRAVENOUS at 23:04

## 2022-01-01 RX ADMIN — FUROSEMIDE 40 MG: 10 INJECTION, SOLUTION INTRAMUSCULAR; INTRAVENOUS at 06:12

## 2022-01-01 RX ADMIN — DEXTROSE MONOHYDRATE 1000 ML: 50 INJECTION, SOLUTION INTRAVENOUS at 10:05

## 2022-01-01 RX ADMIN — POTASSIUM CHLORIDE 40 MEQ: 10 TABLET, EXTENDED RELEASE ORAL at 06:10

## 2022-01-01 RX ADMIN — METOPROLOL TARTRATE 25 MG: 25 TABLET ORAL at 20:12

## 2022-01-01 RX ADMIN — Medication 10 ML: at 20:59

## 2022-01-01 RX ADMIN — MICONAZOLE NITRATE: 2 POWDER TOPICAL at 20:52

## 2022-01-01 RX ADMIN — FUROSEMIDE 40 MG: 10 INJECTION, SOLUTION INTRAMUSCULAR; INTRAVENOUS at 22:07

## 2022-01-01 RX ADMIN — DEXMEDETOMIDINE HYDROCHLORIDE 0.6 MCG/KG/HR: 4 INJECTION INTRAVENOUS at 21:17

## 2022-01-01 RX ADMIN — INSULIN LISPRO 2 UNITS: 100 INJECTION, SOLUTION INTRAVENOUS; SUBCUTANEOUS at 17:07

## 2022-01-01 RX ADMIN — MIRTAZAPINE 15 MG: 15 TABLET, FILM COATED ORAL at 20:45

## 2022-01-01 RX ADMIN — INSULIN LISPRO 2 UNITS: 100 INJECTION, SOLUTION INTRAVENOUS; SUBCUTANEOUS at 17:48

## 2022-01-01 RX ADMIN — SPIRONOLACTONE 100 MG: 100 TABLET ORAL at 09:10

## 2022-01-01 RX ADMIN — SPIRONOLACTONE 25 MG: 25 TABLET ORAL at 09:29

## 2022-01-01 RX ADMIN — Medication 10 ML: at 20:48

## 2022-01-01 RX ADMIN — Medication 10 ML: at 20:44

## 2022-01-01 RX ADMIN — SPIRONOLACTONE 25 MG: 25 TABLET ORAL at 08:41

## 2022-01-01 RX ADMIN — FERROUS SULFATE TAB 325 MG (65 MG ELEMENTAL FE) 325 MG: 325 (65 FE) TAB at 09:02

## 2022-01-01 RX ADMIN — METOPROLOL TARTRATE 50 MG: 50 TABLET, FILM COATED ORAL at 08:33

## 2022-01-01 RX ADMIN — DOCUSATE SODIUM 50MG AND SENNOSIDES 8.6MG 2 TABLET: 8.6; 5 TABLET, FILM COATED ORAL at 13:03

## 2022-01-01 RX ADMIN — BUPROPION HYDROCHLORIDE 100 MG: 100 TABLET, EXTENDED RELEASE ORAL at 10:08

## 2022-01-01 RX ADMIN — CHLOROTHIAZIDE SODIUM 500 MG: 500 INJECTION, POWDER, LYOPHILIZED, FOR SOLUTION INTRAVENOUS at 15:09

## 2022-01-01 RX ADMIN — INSULIN LISPRO 2 UNITS: 100 INJECTION, SOLUTION INTRAVENOUS; SUBCUTANEOUS at 11:43

## 2022-01-01 RX ADMIN — APIXABAN 2.5 MG: 2.5 TABLET, FILM COATED ORAL at 09:59

## 2022-01-01 RX ADMIN — HYDROXYZINE HYDROCHLORIDE 25 MG: 25 TABLET ORAL at 21:30

## 2022-01-01 RX ADMIN — Medication 10 ML: at 20:19

## 2022-01-01 RX ADMIN — CARVEDILOL 12.5 MG: 12.5 TABLET, FILM COATED ORAL at 01:58

## 2022-01-01 RX ADMIN — INSULIN LISPRO 4 UNITS: 100 INJECTION, SOLUTION INTRAVENOUS; SUBCUTANEOUS at 13:13

## 2022-01-01 RX ADMIN — SPIRONOLACTONE 25 MG: 25 TABLET ORAL at 09:52

## 2022-01-01 RX ADMIN — APIXABAN 2.5 MG: 2.5 TABLET, FILM COATED ORAL at 10:08

## 2022-01-01 RX ADMIN — FERROUS SULFATE TAB 325 MG (65 MG ELEMENTAL FE) 325 MG: 325 (65 FE) TAB at 14:54

## 2022-01-01 RX ADMIN — HYDROXYZINE HYDROCHLORIDE 25 MG: 25 TABLET ORAL at 23:53

## 2022-01-01 RX ADMIN — Medication 10 ML: at 08:30

## 2022-01-01 RX ADMIN — METOPROLOL TARTRATE 75 MG: 25 TABLET, FILM COATED ORAL at 21:43

## 2022-01-01 RX ADMIN — MONTELUKAST SODIUM 10 MG: 10 TABLET, FILM COATED ORAL at 22:06

## 2022-01-01 RX ADMIN — SPIRONOLACTONE 100 MG: 100 TABLET ORAL at 10:08

## 2022-01-01 RX ADMIN — MONTELUKAST SODIUM 10 MG: 10 TABLET, FILM COATED ORAL at 20:55

## 2022-01-01 RX ADMIN — MULTIPLE VITAMINS W/ MINERALS TAB 1 TABLET: TAB at 08:28

## 2022-01-01 RX ADMIN — TORSEMIDE 60 MG: 20 TABLET ORAL at 10:08

## 2022-01-01 RX ADMIN — APIXABAN 5 MG: 5 TABLET, FILM COATED ORAL at 20:31

## 2022-01-01 RX ADMIN — DEXTROSE MONOHYDRATE 100 ML/HR: 50 INJECTION, SOLUTION INTRAVENOUS at 12:56

## 2022-01-01 RX ADMIN — POTASSIUM CHLORIDE 40 MEQ: 1.5 POWDER, FOR SOLUTION ORAL at 06:02

## 2022-01-01 RX ADMIN — LINAGLIPTIN 5 MG: 5 TABLET, FILM COATED ORAL at 09:21

## 2022-01-01 RX ADMIN — DIGOXIN 125 MCG: 125 TABLET ORAL at 10:19

## 2022-01-01 RX ADMIN — MICONAZOLE NITRATE: 2 POWDER TOPICAL at 10:09

## 2022-01-01 RX ADMIN — DEXMEDETOMIDINE HYDROCHLORIDE 0.8 MCG/KG/HR: 4 INJECTION INTRAVENOUS at 04:26

## 2022-01-01 RX ADMIN — MULTIPLE VITAMINS W/ MINERALS TAB 1 TABLET: TAB at 12:53

## 2022-01-01 RX ADMIN — MIRTAZAPINE 15 MG: 15 TABLET, FILM COATED ORAL at 00:37

## 2022-01-01 RX ADMIN — FUROSEMIDE 40 MG: 10 INJECTION, SOLUTION INTRAMUSCULAR; INTRAVENOUS at 14:06

## 2022-01-01 RX ADMIN — MICONAZOLE NITRATE 1 APPLICATION: 2 POWDER TOPICAL at 21:40

## 2022-01-01 RX ADMIN — POTASSIUM CHLORIDE 40 MEQ: 1.5 POWDER, FOR SOLUTION ORAL at 05:51

## 2022-01-01 RX ADMIN — FUROSEMIDE 10 MG/HR: 100 INJECTION, SOLUTION INTRAMUSCULAR; INTRAVENOUS at 13:29

## 2022-01-01 RX ADMIN — LINAGLIPTIN 5 MG: 5 TABLET, FILM COATED ORAL at 10:13

## 2022-01-01 RX ADMIN — APIXABAN 2.5 MG: 2.5 TABLET, FILM COATED ORAL at 21:21

## 2022-01-01 RX ADMIN — INSULIN LISPRO 2 UNITS: 100 INJECTION, SOLUTION INTRAVENOUS; SUBCUTANEOUS at 16:39

## 2022-01-01 RX ADMIN — MIRTAZAPINE 15 MG: 15 TABLET, FILM COATED ORAL at 21:07

## 2022-01-01 RX ADMIN — METOPROLOL SUCCINATE 100 MG: 100 TABLET, EXTENDED RELEASE ORAL at 09:22

## 2022-01-01 RX ADMIN — DEXMEDETOMIDINE HYDROCHLORIDE 0.6 MCG/KG/HR: 4 INJECTION INTRAVENOUS at 07:07

## 2022-01-01 RX ADMIN — TORSEMIDE 60 MG: 20 TABLET ORAL at 09:42

## 2022-01-01 RX ADMIN — BUPROPION HYDROCHLORIDE 100 MG: 100 TABLET, EXTENDED RELEASE ORAL at 10:13

## 2022-01-01 RX ADMIN — BUMETANIDE 2 MG: 2 TABLET ORAL at 12:25

## 2022-01-01 RX ADMIN — APIXABAN 2.5 MG: 2.5 TABLET, FILM COATED ORAL at 22:15

## 2022-01-01 RX ADMIN — FERROUS SULFATE TAB 325 MG (65 MG ELEMENTAL FE) 325 MG: 325 (65 FE) TAB at 08:42

## 2022-01-01 RX ADMIN — Medication 3 MG: at 20:17

## 2022-01-01 RX ADMIN — INSULIN LISPRO 2 UNITS: 100 INJECTION, SOLUTION INTRAVENOUS; SUBCUTANEOUS at 17:11

## 2022-01-01 RX ADMIN — FUROSEMIDE 40 MG: 10 INJECTION, SOLUTION INTRAMUSCULAR; INTRAVENOUS at 21:43

## 2022-01-01 RX ADMIN — ATORVASTATIN CALCIUM 40 MG: 20 TABLET, FILM COATED ORAL at 09:22

## 2022-01-01 RX ADMIN — MONTELUKAST SODIUM 10 MG: 10 TABLET, FILM COATED ORAL at 20:10

## 2022-01-01 RX ADMIN — LINAGLIPTIN 5 MG: 5 TABLET, FILM COATED ORAL at 08:45

## 2022-01-01 RX ADMIN — BUPROPION HYDROCHLORIDE 100 MG: 100 TABLET, EXTENDED RELEASE ORAL at 09:25

## 2022-01-01 RX ADMIN — PANTOPRAZOLE SODIUM 40 MG: 40 TABLET, DELAYED RELEASE ORAL at 10:12

## 2022-01-01 RX ADMIN — LINAGLIPTIN 5 MG: 5 TABLET, FILM COATED ORAL at 09:08

## 2022-01-01 RX ADMIN — METOPROLOL SUCCINATE 150 MG: 100 TABLET, EXTENDED RELEASE ORAL at 21:23

## 2022-01-01 RX ADMIN — Medication 10 ML: at 20:58

## 2022-01-01 RX ADMIN — SPIRONOLACTONE 100 MG: 100 TABLET, FILM COATED ORAL at 18:01

## 2022-01-01 RX ADMIN — AMOXICILLIN 500 MG: 250 CAPSULE ORAL at 20:01

## 2022-01-01 RX ADMIN — AMOXICILLIN 500 MG: 250 CAPSULE ORAL at 08:42

## 2022-01-01 RX ADMIN — METOPROLOL TARTRATE 5 MG: 1 INJECTION, SOLUTION INTRAVENOUS at 10:39

## 2022-01-01 RX ADMIN — MULTIPLE VITAMINS W/ MINERALS TAB 1 TABLET: TAB at 10:19

## 2022-01-01 RX ADMIN — DEXMEDETOMIDINE HYDROCHLORIDE 0.4 MCG/KG/HR: 4 INJECTION INTRAVENOUS at 19:09

## 2022-01-01 RX ADMIN — BUPROPION HYDROCHLORIDE 150 MG: 150 TABLET, EXTENDED RELEASE ORAL at 09:22

## 2022-01-01 RX ADMIN — MICONAZOLE NITRATE: 2 POWDER TOPICAL at 10:00

## 2022-01-01 RX ADMIN — Medication 10 ML: at 09:58

## 2022-01-01 RX ADMIN — TAZOBACTAM SODIUM AND PIPERACILLIN SODIUM 3.38 G: 375; 3 INJECTION, SOLUTION INTRAVENOUS at 23:22

## 2022-01-01 RX ADMIN — BUPROPION HYDROCHLORIDE 150 MG: 150 TABLET, EXTENDED RELEASE ORAL at 08:01

## 2022-01-01 RX ADMIN — BUMETANIDE 2 MG: 2 TABLET ORAL at 20:17

## 2022-01-01 RX ADMIN — Medication 10 ML: at 13:55

## 2022-01-01 RX ADMIN — FUROSEMIDE 80 MG: 10 INJECTION, SOLUTION INTRAMUSCULAR; INTRAVENOUS at 04:16

## 2022-01-01 RX ADMIN — TAZOBACTAM SODIUM AND PIPERACILLIN SODIUM 3.38 G: 375; 3 INJECTION, SOLUTION INTRAVENOUS at 15:16

## 2022-01-01 RX ADMIN — BUMETANIDE 2 MG: 2 TABLET ORAL at 09:21

## 2022-01-01 RX ADMIN — METOPROLOL SUCCINATE 150 MG: 100 TABLET, EXTENDED RELEASE ORAL at 20:29

## 2022-01-01 RX ADMIN — POTASSIUM CHLORIDE 40 MEQ: 1.5 POWDER, FOR SOLUTION ORAL at 16:03

## 2022-01-01 RX ADMIN — INSULIN LISPRO 2 UNITS: 100 INJECTION, SOLUTION INTRAVENOUS; SUBCUTANEOUS at 18:34

## 2022-01-01 RX ADMIN — ATORVASTATIN CALCIUM 40 MG: 20 TABLET, FILM COATED ORAL at 14:16

## 2022-01-01 RX ADMIN — ACETAMINOPHEN 500 MG: 500 TABLET, FILM COATED ORAL at 14:40

## 2022-01-01 RX ADMIN — BARIUM SULFATE 4 ML: 980 POWDER, FOR SUSPENSION ORAL at 09:30

## 2022-01-01 RX ADMIN — Medication 10 ML: at 09:29

## 2022-01-01 RX ADMIN — Medication 10 ML: at 21:23

## 2022-01-01 RX ADMIN — METOPROLOL TARTRATE 25 MG: 25 TABLET ORAL at 08:16

## 2022-01-01 RX ADMIN — MIRTAZAPINE 15 MG: 15 TABLET, FILM COATED ORAL at 21:30

## 2022-01-01 RX ADMIN — ATORVASTATIN CALCIUM 40 MG: 20 TABLET, FILM COATED ORAL at 08:31

## 2022-01-01 RX ADMIN — TAZOBACTAM SODIUM AND PIPERACILLIN SODIUM 3.38 G: 375; 3 INJECTION, SOLUTION INTRAVENOUS at 06:12

## 2022-01-01 RX ADMIN — POTASSIUM CHLORIDE 40 MEQ: 750 TABLET, EXTENDED RELEASE ORAL at 16:15

## 2022-01-01 RX ADMIN — DEXMEDETOMIDINE HYDROCHLORIDE 0.2 MCG/KG/HR: 4 INJECTION INTRAVENOUS at 16:55

## 2022-01-01 RX ADMIN — MULTIPLE VITAMINS W/ MINERALS TAB 1 TABLET: TAB at 08:27

## 2022-01-01 RX ADMIN — SPIRONOLACTONE 25 MG: 25 TABLET ORAL at 14:54

## 2022-01-01 RX ADMIN — RIFAXIMIN 550 MG: 550 TABLET ORAL at 21:07

## 2022-01-01 RX ADMIN — METOPROLOL TARTRATE 50 MG: 50 TABLET, FILM COATED ORAL at 09:59

## 2022-01-01 RX ADMIN — SODIUM PHOSPHATE, MONOBASIC, MONOHYDRATE 15 MMOL: 276; 142 INJECTION, SOLUTION INTRAVENOUS at 09:50

## 2022-01-01 RX ADMIN — METOPROLOL TARTRATE 100 MG: 50 TABLET, FILM COATED ORAL at 21:07

## 2022-01-01 RX ADMIN — ALLOPURINOL 300 MG: 300 TABLET ORAL at 08:45

## 2022-01-01 RX ADMIN — ATORVASTATIN CALCIUM 40 MG: 20 TABLET, FILM COATED ORAL at 08:34

## 2022-01-01 RX ADMIN — APIXABAN 5 MG: 5 TABLET, FILM COATED ORAL at 11:51

## 2022-01-01 RX ADMIN — BUPROPION HYDROCHLORIDE 150 MG: 150 TABLET, EXTENDED RELEASE ORAL at 08:28

## 2022-01-01 RX ADMIN — Medication 10 ML: at 20:52

## 2022-01-01 RX ADMIN — PANTOPRAZOLE SODIUM 40 MG: 40 INJECTION, POWDER, FOR SOLUTION INTRAVENOUS at 06:12

## 2022-01-01 RX ADMIN — ATORVASTATIN CALCIUM 40 MG: 20 TABLET, FILM COATED ORAL at 09:59

## 2022-01-01 RX ADMIN — BUMETANIDE 2 MG: 2 TABLET ORAL at 12:15

## 2022-01-01 RX ADMIN — APIXABAN 2.5 MG: 2.5 TABLET, FILM COATED ORAL at 08:20

## 2022-01-01 RX ADMIN — PANTOPRAZOLE SODIUM 40 MG: 40 INJECTION, POWDER, FOR SOLUTION INTRAVENOUS at 06:00

## 2022-01-01 RX ADMIN — APIXABAN 2.5 MG: 2.5 TABLET, FILM COATED ORAL at 08:15

## 2022-01-01 RX ADMIN — MULTIPLE VITAMINS W/ MINERALS TAB 1 TABLET: TAB at 09:41

## 2022-01-01 RX ADMIN — MULTIPLE VITAMINS W/ MINERALS TAB 1 TABLET: TAB at 09:09

## 2022-01-01 RX ADMIN — FAMOTIDINE 20 MG: 20 TABLET ORAL at 16:48

## 2022-01-01 RX ADMIN — LOSARTAN POTASSIUM 50 MG: 50 TABLET ORAL at 16:48

## 2022-01-01 RX ADMIN — MICONAZOLE NITRATE: 2 POWDER TOPICAL at 21:24

## 2022-01-01 RX ADMIN — TAZOBACTAM SODIUM AND PIPERACILLIN SODIUM 3.38 G: 375; 3 INJECTION, SOLUTION INTRAVENOUS at 14:49

## 2022-01-01 RX ADMIN — ATORVASTATIN CALCIUM 40 MG: 20 TABLET, FILM COATED ORAL at 09:52

## 2022-01-01 RX ADMIN — SODIUM CHLORIDE 62.5 MG: 9 INJECTION, SOLUTION INTRAVENOUS at 22:24

## 2022-01-01 RX ADMIN — ALLOPURINOL 200 MG: 300 TABLET ORAL at 08:42

## 2022-01-01 RX ADMIN — MORPHINE SULFATE 2 MG: 2 INJECTION, SOLUTION INTRAMUSCULAR; INTRAVENOUS at 10:37

## 2022-01-01 RX ADMIN — FUROSEMIDE 80 MG: 10 INJECTION, SOLUTION INTRAMUSCULAR; INTRAVENOUS at 14:14

## 2022-01-01 RX ADMIN — NITROGLYCERIN 0.4 MG: 0.4 TABLET SUBLINGUAL at 12:01

## 2022-01-01 RX ADMIN — Medication 10 ML: at 21:22

## 2022-01-01 RX ADMIN — PANTOPRAZOLE SODIUM 40 MG: 40 TABLET, DELAYED RELEASE ORAL at 05:27

## 2022-01-01 RX ADMIN — BUPROPION HYDROCHLORIDE 100 MG: 100 TABLET, EXTENDED RELEASE ORAL at 08:31

## 2022-01-01 RX ADMIN — ATORVASTATIN CALCIUM 40 MG: 20 TABLET, FILM COATED ORAL at 09:02

## 2022-01-01 RX ADMIN — ISOSORBIDE MONONITRATE 60 MG: 60 TABLET ORAL at 12:12

## 2022-01-01 RX ADMIN — SPIRONOLACTONE 100 MG: 100 TABLET, FILM COATED ORAL at 18:02

## 2022-01-01 RX ADMIN — FUROSEMIDE 10 MG/HR: 100 INJECTION, SOLUTION INTRAMUSCULAR; INTRAVENOUS at 04:27

## 2022-01-01 RX ADMIN — APIXABAN 5 MG: 5 TABLET, FILM COATED ORAL at 08:45

## 2022-01-01 RX ADMIN — FERROUS SULFATE TAB 325 MG (65 MG ELEMENTAL FE) 325 MG: 325 (65 FE) TAB at 08:28

## 2022-01-01 RX ADMIN — Medication 10 ML: at 09:22

## 2022-01-01 RX ADMIN — PANTOPRAZOLE SODIUM 40 MG: 40 TABLET, DELAYED RELEASE ORAL at 06:30

## 2022-01-01 RX ADMIN — MULTIPLE VITAMINS W/ MINERALS TAB 1 TABLET: TAB at 09:25

## 2022-01-01 RX ADMIN — LACTULOSE 20 G: 10 SOLUTION ORAL at 09:41

## 2022-01-01 RX ADMIN — Medication 10 ML: at 22:18

## 2022-01-01 RX ADMIN — METOPROLOL TARTRATE 100 MG: 50 TABLET, FILM COATED ORAL at 10:13

## 2022-01-01 RX ADMIN — APIXABAN 5 MG: 5 TABLET, FILM COATED ORAL at 20:29

## 2022-01-01 RX ADMIN — METOPROLOL TARTRATE 5 MG: 1 INJECTION, SOLUTION INTRAVENOUS at 03:07

## 2022-01-01 RX ADMIN — FUROSEMIDE 80 MG: 10 INJECTION, SOLUTION INTRAMUSCULAR; INTRAVENOUS at 16:12

## 2022-01-01 RX ADMIN — RIFAXIMIN 550 MG: 550 TABLET ORAL at 21:09

## 2022-01-01 RX ADMIN — METOPROLOL TARTRATE 50 MG: 50 TABLET, FILM COATED ORAL at 20:02

## 2022-01-01 RX ADMIN — FERROUS SULFATE TAB 325 MG (65 MG ELEMENTAL FE) 325 MG: 325 (65 FE) TAB at 08:45

## 2022-01-01 RX ADMIN — POTASSIUM CHLORIDE 40 MEQ: 10 TABLET, EXTENDED RELEASE ORAL at 15:18

## 2022-01-01 RX ADMIN — POTASSIUM CHLORIDE 40 MEQ: 750 TABLET, EXTENDED RELEASE ORAL at 06:41

## 2022-01-01 RX ADMIN — LORAZEPAM 1 MG: 2 LIQUID ORAL at 09:59

## 2022-01-01 RX ADMIN — ALLOPURINOL 300 MG: 300 TABLET ORAL at 09:59

## 2022-01-01 RX ADMIN — MORPHINE SULFATE 2 MG: 2 INJECTION, SOLUTION INTRAMUSCULAR; INTRAVENOUS at 04:55

## 2022-01-01 RX ADMIN — FUROSEMIDE 80 MG: 10 INJECTION, SOLUTION INTRAMUSCULAR; INTRAVENOUS at 11:07

## 2022-01-01 RX ADMIN — METOPROLOL TARTRATE 50 MG: 50 TABLET, FILM COATED ORAL at 20:52

## 2022-01-01 RX ADMIN — INSULIN LISPRO 2 UNITS: 100 INJECTION, SOLUTION INTRAVENOUS; SUBCUTANEOUS at 18:26

## 2022-01-01 RX ADMIN — LORAZEPAM 0.5 MG: 2 INJECTION INTRAMUSCULAR; INTRAVENOUS at 13:29

## 2022-01-01 RX ADMIN — DEXMEDETOMIDINE HYDROCHLORIDE 0.5 MCG/KG/HR: 4 INJECTION INTRAVENOUS at 23:39

## 2022-01-01 RX ADMIN — DROPERIDOL 1.25 MG: 2.5 INJECTION, SOLUTION INTRAMUSCULAR; INTRAVENOUS at 11:31

## 2022-01-01 RX ADMIN — ALLOPURINOL 300 MG: 300 TABLET ORAL at 20:45

## 2022-01-01 RX ADMIN — POTASSIUM PHOSPHATE, MONOBASIC AND POTASSIUM PHOSPHATE, DIBASIC 21 MMOL: 224; 236 INJECTION, SOLUTION, CONCENTRATE INTRAVENOUS at 19:09

## 2022-01-01 RX ADMIN — ATORVASTATIN CALCIUM 40 MG: 20 TABLET, FILM COATED ORAL at 10:13

## 2022-01-01 RX ADMIN — ALBUTEROL SULFATE 10 MG: 2.5 SOLUTION RESPIRATORY (INHALATION) at 15:26

## 2022-01-01 RX ADMIN — PANTOPRAZOLE SODIUM 40 MG: 40 INJECTION, POWDER, FOR SOLUTION INTRAVENOUS at 06:18

## 2022-01-01 RX ADMIN — MORPHINE SULFATE 2 MG: 2 INJECTION, SOLUTION INTRAMUSCULAR; INTRAVENOUS at 04:18

## 2022-01-01 RX ADMIN — MICONAZOLE NITRATE: 2 POWDER TOPICAL at 09:44

## 2022-01-01 RX ADMIN — TAZOBACTAM SODIUM AND PIPERACILLIN SODIUM 3.38 G: 375; 3 INJECTION, SOLUTION INTRAVENOUS at 06:02

## 2022-01-01 RX ADMIN — Medication 10 ML: at 21:13

## 2022-01-01 RX ADMIN — POTASSIUM CHLORIDE 40 MEQ: 1.5 POWDER, FOR SOLUTION ORAL at 11:44

## 2022-01-01 RX ADMIN — MIRTAZAPINE 15 MG: 15 TABLET, FILM COATED ORAL at 22:26

## 2022-01-01 RX ADMIN — INSULIN LISPRO 2 UNITS: 100 INJECTION, SOLUTION INTRAVENOUS; SUBCUTANEOUS at 12:03

## 2022-01-01 RX ADMIN — PANTOPRAZOLE SODIUM 40 MG: 40 TABLET, DELAYED RELEASE ORAL at 09:25

## 2022-01-01 RX ADMIN — CHLOROTHIAZIDE SODIUM 500 MG: 500 INJECTION, POWDER, LYOPHILIZED, FOR SOLUTION INTRAVENOUS at 02:46

## 2022-01-01 RX ADMIN — SPIRONOLACTONE 100 MG: 100 TABLET ORAL at 09:41

## 2022-01-01 RX ADMIN — PANTOPRAZOLE SODIUM 40 MG: 40 TABLET, DELAYED RELEASE ORAL at 09:22

## 2022-01-01 RX ADMIN — INSULIN LISPRO 3 UNITS: 100 INJECTION, SOLUTION INTRAVENOUS; SUBCUTANEOUS at 07:07

## 2022-01-01 RX ADMIN — SPIRONOLACTONE 100 MG: 100 TABLET, FILM COATED ORAL at 08:27

## 2022-01-01 RX ADMIN — FAMOTIDINE 20 MG: 20 TABLET ORAL at 18:25

## 2022-01-01 RX ADMIN — INSULIN LISPRO 2 UNITS: 100 INJECTION, SOLUTION INTRAVENOUS; SUBCUTANEOUS at 13:20

## 2022-01-01 RX ADMIN — MORPHINE SULFATE 2 MG: 2 INJECTION, SOLUTION INTRAMUSCULAR; INTRAVENOUS at 22:07

## 2022-01-01 RX ADMIN — RIFAXIMIN 550 MG: 550 TABLET ORAL at 10:13

## 2022-01-01 RX ADMIN — ACETAZOLAMIDE 500 MG: 250 TABLET ORAL at 10:53

## 2022-01-01 RX ADMIN — INSULIN LISPRO 2 UNITS: 100 INJECTION, SOLUTION INTRAVENOUS; SUBCUTANEOUS at 08:30

## 2022-01-01 RX ADMIN — POTASSIUM CHLORIDE 40 MEQ: 10 TABLET, EXTENDED RELEASE ORAL at 06:21

## 2022-01-01 RX ADMIN — FUROSEMIDE 80 MG: 10 INJECTION, SOLUTION INTRAMUSCULAR; INTRAVENOUS at 15:18

## 2022-01-01 RX ADMIN — Medication 10 ML: at 21:07

## 2022-01-01 RX ADMIN — DEXTROSE MONOHYDRATE 50 ML: 25 INJECTION, SOLUTION INTRAVENOUS at 14:50

## 2022-01-01 RX ADMIN — INSULIN LISPRO 2 UNITS: 100 INJECTION, SOLUTION INTRAVENOUS; SUBCUTANEOUS at 09:43

## 2022-01-01 RX ADMIN — LOSARTAN POTASSIUM 25 MG: 25 TABLET, FILM COATED ORAL at 06:10

## 2022-01-01 RX ADMIN — MICONAZOLE NITRATE: 2 POWDER TOPICAL at 20:20

## 2022-01-01 RX ADMIN — MULTIPLE VITAMINS W/ MINERALS TAB 1 TABLET: TAB at 09:08

## 2022-01-01 RX ADMIN — POTASSIUM CHLORIDE 40 MEQ: 750 TABLET, EXTENDED RELEASE ORAL at 12:09

## 2022-01-01 RX ADMIN — DIGOXIN 500 MCG: 250 INJECTION, SOLUTION INTRAMUSCULAR; INTRAVENOUS; PARENTERAL at 14:33

## 2022-01-01 RX ADMIN — SPIRONOLACTONE 100 MG: 100 TABLET, FILM COATED ORAL at 08:01

## 2022-01-01 RX ADMIN — MIRTAZAPINE 15 MG: 15 TABLET, FILM COATED ORAL at 20:55

## 2022-01-01 RX ADMIN — INSULIN LISPRO 2 UNITS: 100 INJECTION, SOLUTION INTRAVENOUS; SUBCUTANEOUS at 18:15

## 2022-01-01 RX ADMIN — METOPROLOL TARTRATE 50 MG: 50 TABLET, FILM COATED ORAL at 09:08

## 2022-01-01 RX ADMIN — ATORVASTATIN CALCIUM 40 MG: 20 TABLET, FILM COATED ORAL at 09:55

## 2022-01-01 RX ADMIN — FERROUS SULFATE TAB 325 MG (65 MG ELEMENTAL FE) 325 MG: 325 (65 FE) TAB at 08:35

## 2022-01-01 RX ADMIN — PANTOPRAZOLE SODIUM 40 MG: 40 TABLET, DELAYED RELEASE ORAL at 09:59

## 2022-01-01 RX ADMIN — METOPROLOL TARTRATE 5 MG: 1 INJECTION, SOLUTION INTRAVENOUS at 12:47

## 2022-01-01 RX ADMIN — TAZOBACTAM SODIUM AND PIPERACILLIN SODIUM 3.38 G: 375; 3 INJECTION, SOLUTION INTRAVENOUS at 23:02

## 2022-01-01 RX ADMIN — METOPROLOL SUCCINATE 100 MG: 100 TABLET, EXTENDED RELEASE ORAL at 20:45

## 2022-01-01 RX ADMIN — LACTULOSE 20 G: 10 SOLUTION ORAL at 21:08

## 2022-01-01 RX ADMIN — BUPROPION HYDROCHLORIDE 100 MG: 100 TABLET, EXTENDED RELEASE ORAL at 08:28

## 2022-01-01 RX ADMIN — ACETAMINOPHEN 650 MG: 325 TABLET ORAL at 01:57

## 2022-01-01 RX ADMIN — LOSARTAN POTASSIUM 50 MG: 50 TABLET ORAL at 18:02

## 2022-01-01 RX ADMIN — APIXABAN 5 MG: 5 TABLET, FILM COATED ORAL at 09:21

## 2022-01-01 RX ADMIN — APIXABAN 2.5 MG: 2.5 TABLET, FILM COATED ORAL at 09:09

## 2022-01-01 RX ADMIN — FERROUS SULFATE TAB 325 MG (65 MG ELEMENTAL FE) 325 MG: 325 (65 FE) TAB at 12:11

## 2022-01-01 RX ADMIN — DEXMEDETOMIDINE HYDROCHLORIDE 0.7 MCG/KG/HR: 4 INJECTION INTRAVENOUS at 10:28

## 2022-01-01 RX ADMIN — BUPROPION HYDROCHLORIDE 150 MG: 150 TABLET, EXTENDED RELEASE ORAL at 10:12

## 2022-01-01 RX ADMIN — Medication 10 ML: at 09:25

## 2022-01-01 RX ADMIN — MULTIPLE VITAMINS W/ MINERALS TAB 1 TABLET: TAB at 09:55

## 2022-01-01 RX ADMIN — LINAGLIPTIN 5 MG: 5 TABLET, FILM COATED ORAL at 10:25

## 2022-01-01 RX ADMIN — FERROUS SULFATE TAB 325 MG (65 MG ELEMENTAL FE) 325 MG: 325 (65 FE) TAB at 08:01

## 2022-01-01 RX ADMIN — INSULIN LISPRO 2 UNITS: 100 INJECTION, SOLUTION INTRAVENOUS; SUBCUTANEOUS at 12:25

## 2022-01-01 RX ADMIN — APIXABAN 2.5 MG: 2.5 TABLET, FILM COATED ORAL at 14:53

## 2022-01-01 RX ADMIN — METOPROLOL TARTRATE 25 MG: 25 TABLET ORAL at 21:26

## 2022-01-01 RX ADMIN — FUROSEMIDE 80 MG: 10 INJECTION, SOLUTION INTRAMUSCULAR; INTRAVENOUS at 10:42

## 2022-01-01 RX ADMIN — METOPROLOL TARTRATE 50 MG: 50 TABLET, FILM COATED ORAL at 20:54

## 2022-01-01 RX ADMIN — CHLOROTHIAZIDE SODIUM 500 MG: 500 INJECTION, POWDER, LYOPHILIZED, FOR SOLUTION INTRAVENOUS at 13:58

## 2022-01-01 RX ADMIN — NITROGLYCERIN 0.4 MG: 0.4 TABLET SUBLINGUAL at 11:55

## 2022-01-01 RX ADMIN — INSULIN LISPRO 2 UNITS: 100 INJECTION, SOLUTION INTRAVENOUS; SUBCUTANEOUS at 12:01

## 2022-01-01 RX ADMIN — ATORVASTATIN CALCIUM 40 MG: 20 TABLET, FILM COATED ORAL at 11:49

## 2022-01-01 RX ADMIN — MORPHINE SULFATE 1 MG: 2 INJECTION, SOLUTION INTRAMUSCULAR; INTRAVENOUS at 20:45

## 2022-01-01 RX ADMIN — ACETAZOLAMIDE 500 MG: 250 TABLET ORAL at 17:14

## 2022-01-01 RX ADMIN — FAMOTIDINE 20 MG: 20 TABLET ORAL at 18:05

## 2022-01-01 RX ADMIN — PANTOPRAZOLE SODIUM 40 MG: 40 TABLET, DELAYED RELEASE ORAL at 09:11

## 2022-01-01 RX ADMIN — DEXTROSE MONOHYDRATE 100 ML/HR: 50 INJECTION, SOLUTION INTRAVENOUS at 16:45

## 2022-01-01 RX ADMIN — LINAGLIPTIN 5 MG: 5 TABLET, FILM COATED ORAL at 20:45

## 2022-01-01 RX ADMIN — APIXABAN 5 MG: 5 TABLET, FILM COATED ORAL at 21:30

## 2022-01-01 RX ADMIN — APIXABAN 2.5 MG: 2.5 TABLET, FILM COATED ORAL at 20:48

## 2022-01-01 RX ADMIN — Medication 10 ML: at 10:09

## 2022-01-01 RX ADMIN — RIFAXIMIN 550 MG: 550 TABLET ORAL at 21:43

## 2022-01-01 RX ADMIN — TAZOBACTAM SODIUM AND PIPERACILLIN SODIUM 3.38 G: 375; 3 INJECTION, SOLUTION INTRAVENOUS at 06:18

## 2022-01-01 RX ADMIN — ATORVASTATIN CALCIUM 40 MG: 20 TABLET, FILM COATED ORAL at 10:26

## 2022-01-01 RX ADMIN — SPIRONOLACTONE 25 MG: 25 TABLET ORAL at 08:28

## 2022-01-01 RX ADMIN — APIXABAN 2.5 MG: 2.5 TABLET, FILM COATED ORAL at 09:42

## 2022-01-01 RX ADMIN — PANTOPRAZOLE SODIUM 40 MG: 40 INJECTION, POWDER, FOR SOLUTION INTRAVENOUS at 15:40

## 2022-01-01 RX ADMIN — LORAZEPAM 0.5 MG: 2 INJECTION INTRAMUSCULAR; INTRAVENOUS at 10:39

## 2022-01-01 RX ADMIN — BUMETANIDE 2 MG: 2 TABLET ORAL at 09:08

## 2022-01-01 RX ADMIN — DEXMEDETOMIDINE HYDROCHLORIDE 0.6 MCG/KG/HR: 4 INJECTION INTRAVENOUS at 13:03

## 2022-01-01 RX ADMIN — PANTOPRAZOLE SODIUM 40 MG: 40 TABLET, DELAYED RELEASE ORAL at 08:57

## 2022-01-01 RX ADMIN — LACTULOSE 20 G: 20 SOLUTION ORAL at 17:43

## 2022-01-01 RX ADMIN — LACTULOSE 20 G: 20 SOLUTION ORAL at 10:08

## 2022-01-01 RX ADMIN — ALLOPURINOL 300 MG: 300 TABLET ORAL at 09:08

## 2022-01-01 RX ADMIN — Medication 10 ML: at 12:54

## 2022-01-01 RX ADMIN — OXYCODONE HYDROCHLORIDE 5 MG: 5 TABLET ORAL at 18:01

## 2022-01-01 RX ADMIN — MIRTAZAPINE 15 MG: 15 TABLET, FILM COATED ORAL at 20:17

## 2022-01-01 RX ADMIN — LINAGLIPTIN 5 MG: 5 TABLET, FILM COATED ORAL at 12:11

## 2022-01-01 RX ADMIN — SPIRONOLACTONE 100 MG: 100 TABLET ORAL at 16:51

## 2022-01-01 RX ADMIN — APIXABAN 2.5 MG: 2.5 TABLET, FILM COATED ORAL at 22:06

## 2022-01-01 RX ADMIN — DEXTROSE MONOHYDRATE 100 ML/HR: 50 INJECTION, SOLUTION INTRAVENOUS at 03:04

## 2022-01-01 RX ADMIN — BUMETANIDE 2 MG: 2 TABLET ORAL at 20:59

## 2022-01-01 RX ADMIN — MORPHINE SULFATE 1 MG: 2 INJECTION, SOLUTION INTRAMUSCULAR; INTRAVENOUS at 00:31

## 2022-01-01 RX ADMIN — ATORVASTATIN CALCIUM 40 MG: 20 TABLET, FILM COATED ORAL at 09:11

## 2022-01-01 RX ADMIN — FUROSEMIDE 80 MG: 10 INJECTION, SOLUTION INTRAMUSCULAR; INTRAVENOUS at 02:41

## 2022-01-01 RX ADMIN — FUROSEMIDE 40 MG: 10 INJECTION, SOLUTION INTRAMUSCULAR; INTRAVENOUS at 04:14

## 2022-01-01 RX ADMIN — METOPROLOL TARTRATE 25 MG: 25 TABLET ORAL at 09:28

## 2022-01-01 RX ADMIN — MIRTAZAPINE 15 MG: 15 TABLET, FILM COATED ORAL at 21:09

## 2022-01-01 RX ADMIN — ATORVASTATIN CALCIUM 40 MG: 20 TABLET, FILM COATED ORAL at 14:54

## 2022-01-01 RX ADMIN — PANTOPRAZOLE SODIUM 40 MG: 40 TABLET, DELAYED RELEASE ORAL at 08:42

## 2022-01-01 RX ADMIN — PANTOPRAZOLE SODIUM 40 MG: 40 TABLET, DELAYED RELEASE ORAL at 14:53

## 2022-01-01 RX ADMIN — METOPROLOL TARTRATE 25 MG: 25 TABLET ORAL at 09:25

## 2022-01-01 RX ADMIN — BUPROPION HYDROCHLORIDE 150 MG: 150 TABLET, EXTENDED RELEASE ORAL at 09:08

## 2022-01-01 RX ADMIN — ALLOPURINOL 300 MG: 300 TABLET ORAL at 14:53

## 2022-01-01 RX ADMIN — CARVEDILOL 12.5 MG: 12.5 TABLET, FILM COATED ORAL at 10:25

## 2022-01-01 RX ADMIN — Medication 10 ML: at 02:52

## 2022-01-01 RX ADMIN — BUPROPION HYDROCHLORIDE 100 MG: 100 TABLET, EXTENDED RELEASE ORAL at 08:16

## 2022-01-01 RX ADMIN — ISOSORBIDE MONONITRATE 60 MG: 60 TABLET ORAL at 20:45

## 2022-01-01 RX ADMIN — POTASSIUM CHLORIDE 40 MEQ: 10 TABLET, EXTENDED RELEASE ORAL at 08:27

## 2022-01-01 RX ADMIN — MULTIPLE VITAMINS W/ MINERALS TAB 1 TABLET: TAB at 14:17

## 2022-01-01 RX ADMIN — DEXMEDETOMIDINE HYDROCHLORIDE 0.2 MCG/KG/HR: 4 INJECTION INTRAVENOUS at 12:01

## 2022-01-01 RX ADMIN — TAZOBACTAM SODIUM AND PIPERACILLIN SODIUM 3.38 G: 375; 3 INJECTION, SOLUTION INTRAVENOUS at 06:00

## 2022-01-01 RX ADMIN — METOPROLOL TARTRATE 75 MG: 25 TABLET, FILM COATED ORAL at 09:08

## 2022-01-01 RX ADMIN — APIXABAN 2.5 MG: 2.5 TABLET, FILM COATED ORAL at 09:55

## 2022-01-01 RX ADMIN — FAMOTIDINE 20 MG: 20 TABLET ORAL at 06:33

## 2022-01-01 RX ADMIN — PANTOPRAZOLE SODIUM 40 MG: 40 INJECTION, POWDER, LYOPHILIZED, FOR SOLUTION INTRAVENOUS at 10:35

## 2022-01-01 RX ADMIN — INSULIN LISPRO 2 UNITS: 100 INJECTION, SOLUTION INTRAVENOUS; SUBCUTANEOUS at 11:57

## 2022-01-01 RX ADMIN — INSULIN LISPRO 4 UNITS: 100 INJECTION, SOLUTION INTRAVENOUS; SUBCUTANEOUS at 17:43

## 2022-01-01 RX ADMIN — LINAGLIPTIN 5 MG: 5 TABLET, FILM COATED ORAL at 09:22

## 2022-01-01 RX ADMIN — FUROSEMIDE 40 MG: 10 INJECTION, SOLUTION INTRAMUSCULAR; INTRAVENOUS at 17:49

## 2022-01-01 RX ADMIN — PANTOPRAZOLE SODIUM 40 MG: 40 TABLET, DELAYED RELEASE ORAL at 08:33

## 2022-01-01 RX ADMIN — MORPHINE SULFATE 1 MG: 2 INJECTION, SOLUTION INTRAMUSCULAR; INTRAVENOUS at 03:59

## 2022-01-01 RX ADMIN — ATORVASTATIN CALCIUM 40 MG: 20 TABLET, FILM COATED ORAL at 09:42

## 2022-01-01 RX ADMIN — Medication 3 MG: at 23:44

## 2022-01-01 RX ADMIN — Medication 10 ML: at 09:15

## 2022-01-01 RX ADMIN — CHLOROTHIAZIDE SODIUM 500 MG: 500 INJECTION, POWDER, LYOPHILIZED, FOR SOLUTION INTRAVENOUS at 04:30

## 2022-01-01 RX ADMIN — BARIUM SULFATE 55 ML: 0.81 POWDER, FOR SUSPENSION ORAL at 09:30

## 2022-01-01 RX ADMIN — MIRTAZAPINE 15 MG: 15 TABLET, FILM COATED ORAL at 21:05

## 2022-01-01 RX ADMIN — POTASSIUM CHLORIDE 40 MEQ: 10 TABLET, EXTENDED RELEASE ORAL at 17:07

## 2022-01-01 RX ADMIN — HYDROXYZINE HYDROCHLORIDE 25 MG: 25 TABLET ORAL at 01:24

## 2022-01-01 RX ADMIN — FUROSEMIDE 40 MG: 10 INJECTION, SOLUTION INTRAMUSCULAR; INTRAVENOUS at 15:18

## 2022-01-01 RX ADMIN — AMOXICILLIN AND CLAVULANATE POTASSIUM 1 TABLET: 875; 125 TABLET, FILM COATED ORAL at 12:21

## 2022-01-01 RX ADMIN — ATORVASTATIN CALCIUM 40 MG: 20 TABLET, FILM COATED ORAL at 08:01

## 2022-01-01 RX ADMIN — Medication 10 ML: at 21:41

## 2022-01-01 RX ADMIN — MULTIPLE VITAMINS W/ MINERALS TAB 1 TABLET: TAB at 10:08

## 2022-01-01 RX ADMIN — SPIRONOLACTONE 100 MG: 100 TABLET, FILM COATED ORAL at 08:44

## 2022-01-01 RX ADMIN — METOPROLOL SUCCINATE 150 MG: 100 TABLET, EXTENDED RELEASE ORAL at 20:31

## 2022-01-01 RX ADMIN — ISOSORBIDE MONONITRATE 60 MG: 60 TABLET ORAL at 08:01

## 2022-01-01 RX ADMIN — ATORVASTATIN CALCIUM 40 MG: 20 TABLET, FILM COATED ORAL at 09:25

## 2022-01-01 RX ADMIN — LACTULOSE 20 G: 10 SOLUTION ORAL at 21:52

## 2022-01-01 RX ADMIN — Medication 10 ML: at 02:46

## 2022-01-01 RX ADMIN — METOPROLOL TARTRATE 25 MG: 25 TABLET ORAL at 20:07

## 2022-01-01 RX ADMIN — ISOSORBIDE MONONITRATE 60 MG: 60 TABLET ORAL at 08:28

## 2022-01-01 RX ADMIN — FUROSEMIDE 80 MG: 10 INJECTION, SOLUTION INTRAMUSCULAR; INTRAVENOUS at 09:05

## 2022-01-01 RX ADMIN — APIXABAN 2.5 MG: 2.5 TABLET, FILM COATED ORAL at 10:13

## 2022-01-01 RX ADMIN — RIFAXIMIN 550 MG: 550 TABLET ORAL at 21:51

## 2022-01-01 RX ADMIN — AMOXICILLIN AND CLAVULANATE POTASSIUM 1 TABLET: 875; 125 TABLET, FILM COATED ORAL at 08:41

## 2022-01-01 RX ADMIN — Medication 10 ML: at 20:23

## 2022-01-01 RX ADMIN — MULTIPLE VITAMINS W/ MINERALS TAB 1 TABLET: TAB at 09:02

## 2022-01-01 RX ADMIN — FAMOTIDINE 20 MG: 20 TABLET ORAL at 16:37

## 2022-01-01 RX ADMIN — RIFAXIMIN 550 MG: 550 TABLET ORAL at 09:09

## 2022-01-01 RX ADMIN — PANTOPRAZOLE SODIUM 40 MG: 40 TABLET, DELAYED RELEASE ORAL at 05:40

## 2022-01-01 RX ADMIN — BUPROPION HYDROCHLORIDE 150 MG: 150 TABLET, EXTENDED RELEASE ORAL at 08:42

## 2022-01-01 RX ADMIN — LIDOCAINE HYDROCHLORIDE 8 ML: 10 INJECTION, SOLUTION INFILTRATION; PERINEURAL at 11:04

## 2022-01-01 RX ADMIN — MIRTAZAPINE 15 MG: 15 TABLET, FILM COATED ORAL at 20:02

## 2022-01-01 RX ADMIN — MORPHINE SULFATE 2 MG: 2 INJECTION, SOLUTION INTRAMUSCULAR; INTRAVENOUS at 21:16

## 2022-01-01 RX ADMIN — MICONAZOLE NITRATE: 2 POWDER TOPICAL at 08:19

## 2022-01-01 RX ADMIN — FUROSEMIDE 80 MG: 10 INJECTION, SOLUTION INTRAMUSCULAR; INTRAVENOUS at 17:09

## 2022-01-01 RX ADMIN — LACTULOSE 20 G: 20 SOLUTION ORAL at 18:03

## 2022-01-01 RX ADMIN — APIXABAN 2.5 MG: 2.5 TABLET, FILM COATED ORAL at 08:41

## 2022-01-01 RX ADMIN — Medication 10 ML: at 09:26

## 2022-01-01 RX ADMIN — INSULIN LISPRO 2 UNITS: 100 INJECTION, SOLUTION INTRAVENOUS; SUBCUTANEOUS at 08:42

## 2022-01-01 RX ADMIN — POTASSIUM CHLORIDE 40 MEQ: 750 TABLET, EXTENDED RELEASE ORAL at 11:39

## 2022-01-01 RX ADMIN — APIXABAN 2.5 MG: 2.5 TABLET, FILM COATED ORAL at 09:41

## 2022-01-01 RX ADMIN — MIRTAZAPINE 15 MG: 15 TABLET, FILM COATED ORAL at 20:10

## 2022-01-01 RX ADMIN — INSULIN LISPRO 2 UNITS: 100 INJECTION, SOLUTION INTRAVENOUS; SUBCUTANEOUS at 18:51

## 2022-01-01 RX ADMIN — ALLOPURINOL 300 MG: 300 TABLET ORAL at 09:52

## 2022-01-01 RX ADMIN — APIXABAN 2.5 MG: 2.5 TABLET, FILM COATED ORAL at 12:53

## 2022-01-01 RX ADMIN — OXYCODONE HYDROCHLORIDE 5 MG: 5 TABLET ORAL at 09:46

## 2022-01-01 RX ADMIN — MICONAZOLE NITRATE: 2 POWDER TOPICAL at 22:59

## 2022-01-01 RX ADMIN — MIRTAZAPINE 15 MG: 15 TABLET, FILM COATED ORAL at 20:29

## 2022-01-01 RX ADMIN — MIRTAZAPINE 15 MG: 15 TABLET, FILM COATED ORAL at 20:23

## 2022-01-01 RX ADMIN — METOPROLOL TARTRATE 50 MG: 50 TABLET, FILM COATED ORAL at 13:03

## 2022-01-01 RX ADMIN — BARIUM SULFATE 50 ML: 400 SUSPENSION ORAL at 09:30

## 2022-01-01 RX ADMIN — BUMETANIDE 2 MG: 2 TABLET ORAL at 20:01

## 2022-01-01 RX ADMIN — METOPROLOL TARTRATE 100 MG: 50 TABLET, FILM COATED ORAL at 09:41

## 2022-01-01 RX ADMIN — METOPROLOL TARTRATE 50 MG: 50 TABLET, FILM COATED ORAL at 08:31

## 2022-01-01 RX ADMIN — PANTOPRAZOLE SODIUM 40 MG: 40 TABLET, DELAYED RELEASE ORAL at 08:27

## 2022-01-01 RX ADMIN — Medication 10 ML: at 22:17

## 2022-01-01 RX ADMIN — ALLOPURINOL 300 MG: 300 TABLET ORAL at 08:28

## 2022-01-01 RX ADMIN — MICONAZOLE NITRATE: 2 POWDER TOPICAL at 21:53

## 2022-01-01 RX ADMIN — ACETAMINOPHEN 650 MG: 325 TABLET ORAL at 21:00

## 2022-01-01 RX ADMIN — MORPHINE SULFATE 2 MG: 2 INJECTION, SOLUTION INTRAMUSCULAR; INTRAVENOUS at 08:15

## 2022-01-01 RX ADMIN — POTASSIUM CHLORIDE 40 MEQ: 10 TABLET, EXTENDED RELEASE ORAL at 09:58

## 2022-01-01 RX ADMIN — OXYCODONE HYDROCHLORIDE 5 MG: 5 TABLET ORAL at 23:30

## 2022-01-01 RX ADMIN — FUROSEMIDE 80 MG: 10 INJECTION, SOLUTION INTRAMUSCULAR; INTRAVENOUS at 05:16

## 2022-01-01 RX ADMIN — MIRTAZAPINE 15 MG: 15 TABLET, FILM COATED ORAL at 22:06

## 2022-01-01 RX ADMIN — FAMOTIDINE 20 MG: 20 TABLET ORAL at 09:54

## 2022-01-01 RX ADMIN — DEXMEDETOMIDINE HYDROCHLORIDE 0.2 MCG/KG/HR: 4 INJECTION INTRAVENOUS at 15:27

## 2022-01-01 RX ADMIN — ATORVASTATIN CALCIUM 40 MG: 20 TABLET, FILM COATED ORAL at 08:57

## 2022-01-01 RX ADMIN — TORSEMIDE 100 MG: 100 TABLET ORAL at 09:09

## 2022-01-01 RX ADMIN — INSULIN LISPRO 4 UNITS: 100 INJECTION, SOLUTION INTRAVENOUS; SUBCUTANEOUS at 17:55

## 2022-01-01 RX ADMIN — BUPROPION HYDROCHLORIDE 150 MG: 150 TABLET, EXTENDED RELEASE ORAL at 10:25

## 2022-01-01 RX ADMIN — PANTOPRAZOLE SODIUM 40 MG: 40 TABLET, DELAYED RELEASE ORAL at 12:54

## 2022-01-01 RX ADMIN — MORPHINE SULFATE 2 MG: 2 INJECTION, SOLUTION INTRAMUSCULAR; INTRAVENOUS at 21:29

## 2022-01-01 RX ADMIN — MICONAZOLE NITRATE: 2 POWDER TOPICAL at 08:43

## 2022-01-01 RX ADMIN — METOPROLOL SUCCINATE 150 MG: 100 TABLET, EXTENDED RELEASE ORAL at 20:48

## 2022-01-01 RX ADMIN — Medication 10 ML: at 09:47

## 2022-01-01 RX ADMIN — MICONAZOLE NITRATE: 2 POWDER TOPICAL at 21:22

## 2022-01-01 RX ADMIN — PANTOPRAZOLE SODIUM 40 MG: 40 INJECTION, POWDER, LYOPHILIZED, FOR SOLUTION INTRAVENOUS at 22:06

## 2022-01-01 RX ADMIN — METOPROLOL TARTRATE 100 MG: 50 TABLET, FILM COATED ORAL at 10:08

## 2022-01-01 RX ADMIN — METOPROLOL TARTRATE 75 MG: 25 TABLET, FILM COATED ORAL at 20:19

## 2022-01-01 RX ADMIN — MORPHINE SULFATE 2 MG: 2 INJECTION, SOLUTION INTRAMUSCULAR; INTRAVENOUS at 09:59

## 2022-01-01 RX ADMIN — INSULIN LISPRO 2 UNITS: 100 INJECTION, SOLUTION INTRAVENOUS; SUBCUTANEOUS at 09:47

## 2022-01-01 RX ADMIN — BUMETANIDE 2 MG: 2 TABLET ORAL at 10:25

## 2022-01-01 RX ADMIN — OXYCODONE HYDROCHLORIDE 5 MG: 5 TABLET ORAL at 11:14

## 2022-01-01 RX ADMIN — APIXABAN 2.5 MG: 2.5 TABLET, FILM COATED ORAL at 21:43

## 2022-01-01 RX ADMIN — INSULIN LISPRO 4 UNITS: 100 INJECTION, SOLUTION INTRAVENOUS; SUBCUTANEOUS at 08:35

## 2022-01-01 RX ADMIN — PANTOPRAZOLE SODIUM 40 MG: 40 INJECTION, POWDER, LYOPHILIZED, FOR SOLUTION INTRAVENOUS at 06:12

## 2022-01-01 RX ADMIN — MULTIPLE VITAMINS W/ MINERALS TAB 1 TABLET: TAB at 08:20

## 2022-01-01 RX ADMIN — MIRTAZAPINE 15 MG: 15 TABLET, FILM COATED ORAL at 21:21

## 2022-01-01 RX ADMIN — FUROSEMIDE 40 MG: 10 INJECTION, SOLUTION INTRAMUSCULAR; INTRAVENOUS at 06:16

## 2022-01-01 RX ADMIN — INSULIN LISPRO 2 UNITS: 100 INJECTION, SOLUTION INTRAVENOUS; SUBCUTANEOUS at 11:23

## 2022-01-01 RX ADMIN — ATORVASTATIN CALCIUM 40 MG: 20 TABLET, FILM COATED ORAL at 21:12

## 2022-01-01 RX ADMIN — APIXABAN 5 MG: 5 TABLET, FILM COATED ORAL at 20:48

## 2022-01-01 RX ADMIN — LIDOCAINE HYDROCHLORIDE 4 ML: 10 INJECTION, SOLUTION INFILTRATION; PERINEURAL at 15:02

## 2022-01-01 RX ADMIN — APIXABAN 5 MG: 5 TABLET, FILM COATED ORAL at 20:02

## 2022-01-01 RX ADMIN — Medication 10 ML: at 08:28

## 2022-01-01 RX ADMIN — PANTOPRAZOLE SODIUM 40 MG: 40 TABLET, DELAYED RELEASE ORAL at 08:15

## 2022-01-01 RX ADMIN — METOPROLOL TARTRATE 50 MG: 50 TABLET, FILM COATED ORAL at 21:12

## 2022-01-01 RX ADMIN — INSULIN LISPRO 2 UNITS: 100 INJECTION, SOLUTION INTRAVENOUS; SUBCUTANEOUS at 17:43

## 2022-01-01 RX ADMIN — LACTULOSE 20 G: 10 SOLUTION ORAL at 21:43

## 2022-01-01 RX ADMIN — POTASSIUM CHLORIDE 10 MEQ: 7.46 INJECTION, SOLUTION INTRAVENOUS at 14:13

## 2022-01-01 RX ADMIN — DIGOXIN 500 MCG: 0.25 INJECTION INTRAMUSCULAR; INTRAVENOUS at 10:28

## 2022-01-01 RX ADMIN — APIXABAN 2.5 MG: 2.5 TABLET, FILM COATED ORAL at 20:56

## 2022-01-01 RX ADMIN — MORPHINE SULFATE 2 MG: 2 INJECTION, SOLUTION INTRAMUSCULAR; INTRAVENOUS at 00:04

## 2022-01-01 RX ADMIN — MIRTAZAPINE 15 MG: 15 TABLET, FILM COATED ORAL at 21:12

## 2022-01-01 RX ADMIN — APIXABAN 5 MG: 5 TABLET, FILM COATED ORAL at 20:10

## 2022-01-01 RX ADMIN — CARVEDILOL 3.12 MG: 3.12 TABLET, FILM COATED ORAL at 20:59

## 2022-01-01 RX ADMIN — FUROSEMIDE 40 MG: 10 INJECTION, SOLUTION INTRAMUSCULAR; INTRAVENOUS at 06:43

## 2022-01-01 RX ADMIN — LINAGLIPTIN 5 MG: 5 TABLET, FILM COATED ORAL at 09:59

## 2022-01-01 RX ADMIN — PANTOPRAZOLE SODIUM 40 MG: 40 INJECTION, POWDER, LYOPHILIZED, FOR SOLUTION INTRAVENOUS at 15:43

## 2022-01-01 RX ADMIN — INSULIN LISPRO 2 UNITS: 100 INJECTION, SOLUTION INTRAVENOUS; SUBCUTANEOUS at 12:41

## 2022-01-01 RX ADMIN — ALLOPURINOL 200 MG: 300 TABLET ORAL at 09:01

## 2022-01-01 RX ADMIN — TAZOBACTAM SODIUM AND PIPERACILLIN SODIUM 3.38 G: 375; 3 INJECTION, SOLUTION INTRAVENOUS at 11:09

## 2022-01-01 RX ADMIN — POTASSIUM CHLORIDE 40 MEQ: 1.5 POWDER, FOR SOLUTION ORAL at 15:16

## 2022-01-01 RX ADMIN — DEXTROSE MONOHYDRATE 50 ML/HR: 50 INJECTION, SOLUTION INTRAVENOUS at 01:49

## 2022-01-01 RX ADMIN — APIXABAN 2.5 MG: 2.5 TABLET, FILM COATED ORAL at 08:35

## 2022-01-01 RX ADMIN — INSULIN LISPRO 2 UNITS: 100 INJECTION, SOLUTION INTRAVENOUS; SUBCUTANEOUS at 12:21

## 2022-01-01 RX ADMIN — FERROUS SULFATE TAB 325 MG (65 MG ELEMENTAL FE) 325 MG: 325 (65 FE) TAB at 10:26

## 2022-01-04 NOTE — TELEPHONE ENCOUNTER
Last refill  7/8/21  Hemoglobin A1c in the office today is 6.6 I will have that ran by lab to clarify.  If this is accurate we will take off glipizide due to diabetes being over controlled and risk of hypoglycemia.  We will also check CBC today for evaluation of hemoglobin.  Patient and daughter-in-law is aware of appointment with gastroenterology on August 3 she will keep that appointment.  I have asked her to return to see me in 2 months or sooner if needed.     I spent a total of 30 minutes with the patient today including face-to-face encounter, reviewing data in the system, coordination of care with the nursing staff as well as consultants, documentation and entering orders.

## 2022-01-10 NOTE — TELEPHONE ENCOUNTER
Last ov 7/8/21      Hemoglobin A1c in the office today is 6.6 I will have that ran by lab to clarify.  If this is accurate we will take off glipizide due to diabetes being over controlled and risk of hypoglycemia.  We will also check CBC today for evaluation of hemoglobin.  Patient and daughter-in-law is aware of appointment with gastroenterology on August 3 she will keep that appointment.  I have asked her to return to see me in 2 months or sooner if needed.     I spent a total of 30 minutes with the patient today including face-to-face encounter, reviewing data in the system, coordination of care with the nursing staff as well as consultants, documentation and entering orders.        Last lab 7/13/21    Hgb alc is much improved but too controlled. Spoke with sonnguyen we will stop glipizide for now. He will continue to monitor BS and I will see her back in 2-3 months. Hgb has improved as well./ Continue iron

## 2022-01-10 NOTE — TELEPHONE ENCOUNTER
Caller: Hardeep Yung    Relationship: Emergency Contact    Best call back number: [phone]    Requested Prescriptions:   Requested Prescriptions     Pending Prescriptions Disp Refills   • SITagliptin (Januvia) 100 MG tablet 30 tablet 5     Sig: Take 1 tablet by mouth Daily.        [id number] [address] [facility] [address] [facility] OF [place] [address] & [place] [address] - [phone] [facility] [phone] FX     Additional details provided by patient: PATIENT HAS 3 DAYS LEFT    Does the patient have less than a 3 day supply:  [x] Yes  [] No    Pernell Ovalle Rep   01/10/22 15:19 EST

## 2022-01-17 NOTE — TELEPHONE ENCOUNTER
Caller: Miri Yung    Relationship: Self    Best call back number: [phone]    Requested Prescriptions:   Requested Prescriptions     Pending Prescriptions Disp Refills   • buPROPion SR (WELLBUTRIN SR) 150 MG 12 hr tablet 14 tablet 0     Sig: Take 1 tablet by mouth Daily With Breakfast.        Pharmacy where request should be sent: [id number] [address] [address] AT [facility] OF [place] [address] & [place] [address] - [phone] [facility] [phone] FX     Additional details provided by patient: OUT    Does the patient have less than a 3 day supply:  [x] Yes  [] No    Jatinder Beck   01/17/22 12:39 EST

## 2022-01-17 NOTE — TELEPHONE ENCOUNTER
Is she doing Homecare? Should we refil her Rx? It was denied due to appointment needed however her last visits were homecare

## 2022-01-27 NOTE — TELEPHONE ENCOUNTER
Last ov 7/8/21    Hemoglobin A1c in the office today is 6.6 I will have that ran by lab to clarify.  If this is accurate we will take off glipizide due to diabetes being over controlled and risk of hypoglycemia.  We will also check CBC today for evaluation of hemoglobin.  Patient and daughter-in-law is aware of appointment with gastroenterology on August 3 she will keep that appointment.  I have asked her to return to see me in 2 months or sooner if needed.     I spent a total of 30 minutes with the patient today including face-to-face encounter, reviewing data in the system, coordination of care with the nursing staff as well as consultants, documentation and entering orders.           Follow Up   No follow-ups on file.  Patient was given instructions and counseling regarding her condition or for health maintenance advice. Please see specific information pulled into the AVS if appropriate.         Last lab 7/13/21      Hgb alc is much improved but too controlled. Spoke with marie we will stop glipizide for now. He will continue to monitor BS and I will see her back in 2-3 months. Hgb has improved as well./ Continue iron    1/27/22 spoke with Hardeep her son and he made a appointment for 1/31/22. She was advised to fast for lab work. Will refill medicines for a 30 day only until her appointment

## 2022-02-18 NOTE — TELEPHONE ENCOUNTER
Verbal orders for the patient needed for home health     Nursing and pt      -Juan Daniel Gonzalez is the home health advisor   Her phone number below    [phone]     <-- Click to add NO pertinent Past Medical History

## 2022-03-03 NOTE — TELEPHONE ENCOUNTER
Caller: Hardeep Yung    Relationship: Emergency Contact    Best call back number: [phone]       What is the best time to reach you:ANYTIME     Who are you requesting to speak with (clinical staff, provider,  specific staff member): CLINICAL STAFF     What was the call regarding: HARDEEP IS CALLING TO LET STEPHENIE SANDI KNOW THAT THE PATIENT HAS AN APPOINTMENT WITH THE [facility] DOCTOR ON 3/4/22 AT 10 AM.     Do you require a callback: YES IF ANY QUESTIONS

## 2022-03-03 NOTE — PROGRESS NOTES
"Chief Complaint  Foot Swelling and Leg Swelling    Subjective          Miri Yung presents to [facility]  History of Present Illness  This is a 81-year-old female patient here today to follow-up on increased weight gain.  Patient has appointment with cardiology on Wednesday at 930.  I recently increased her Lasix to 80 mg daily from 40 mg daily.  Her weight has not improved.  I did order a CT scan of her abdomen and pelvis due to cirrhosis of the liver which showed mild ascites.  She did have mild bilateral pleural effusions.  She is also being followed by GI for her cirrhosis of the liver.  She has got lost with appointments due to Covid and snow.  Her son is with her today.  I did reach out to GI and they will suggest getting her seen by Dr. Aaron. She does admit to drinking a lot of crystal light at home. She has backed off of that since our last. She denies shortness of breath but does say when she lays flat. This is not a change since last visit.       Objective   Vital Signs:   BP 98/60   Pulse 79   Temp 98.4 °F (36.9 °C)   Resp 20   Ht 157 cm (61.81\")   Wt 98 kg (216 lb)   SpO2 96%   BMI 39.75 kg/m²     Physical Exam  Vitals and nursing note reviewed.   HENT:      Head: Normocephalic.      Nose: Nose normal.   Eyes:      Pupils: Pupils are equal, round, and reactive to light.   Cardiovascular:      Rate and Rhythm: Normal rate and regular rhythm.      Pulses: Normal pulses.      Heart sounds: Normal heart sounds.   Pulmonary:      Effort: Pulmonary effort is normal. No respiratory distress.      Breath sounds: Normal breath sounds. No wheezing or rales.   Abdominal:      General: Bowel sounds are normal. There is no distension.      Tenderness: There is no abdominal tenderness.   Musculoskeletal:         General: No swelling.      Cervical back: Neck supple.      Right lower leg: Edema present.      Left lower leg: Edema present.      Comments: General anasarca "   Skin:     General: Skin is warm and dry.   Neurological:      Mental Status: She is alert and oriented to person, place, and time.   Psychiatric:         Mood and Affect: Mood normal.        Result Review :                 Assessment and Plan    Diagnoses and all orders for this visit:    1. Edema, unspecified type (Primary)  -     Basic Metabolic Panel    I will check a BMP today.  I have given the son GI office phone number to make an appointment right away with Dr. Campos.  Have also educated the patient and son the importance of reporting to the emergency room if symptoms of shortness of breath or chest pain increase.  Patient will monitor her p.o. intake and salt intake closely.  We discussed weighing herself every morning.  Patient and son verbalizes understanding.    I did speak with GI office later today and patient does have an appointment with nurse practitioner in the morning    Follow Up   No follow-ups on file.  Patient was given instructions and counseling regarding her condition or for health maintenance advice. Please see specific information pulled into the AVS if appropriate.

## 2022-03-04 PROBLEM — R60.1 ANASARCA: Status: ACTIVE | Noted: 2022-01-01

## 2022-03-04 NOTE — PROGRESS NOTES
Chief Complaint   Patient presents with   • Cirrhosis   • Ascites       Miri Yung is a  81 y.o. female here for a follow up visit for cirrhosis.    HPI  81-year-old wheelchair-bound female presents today accompanied by her son for follow-up visit for cirrhosis.  She is a patient of Dr. Aaron.  She was last seen in the office by me on 8/17/2021.  At that time she had been diagnosed with a GI bleed and had extensive work-up done in the hospital for it.  EGD and colonoscopy done did not find any identifiable bleeding source.  EGD did show small varices and hiatal hernia.  Colonoscopy showed nonbleeding internal hemorrhoids.  She was also diagnosed with cirrhosis at that time.  She had liver work-up including an elevated AFP and CA 19-9.  Patient has been lost to follow-up since that visit.     According to the patient's son the patient has just recently collected a bunch of fluid and become very swollen in her belly, arms and legs.  The patient reports that her legs are quite painful including her ankles.  The son tells me she has recently gained 30 pounds.  Her PCP did do a outpatient CT scan of the abdomen and pelvis earlier this week that showed:    IMPRESSION:  1. There is a minimal volume of ascites within the abdomen, but there is  fairly extensive third spacing of fluid within the body wall, anasarca,  and there has been increase in the size of a small right pleural  effusion. There is a new tiny pericardial effusion.  2. Cirrhotic liver morphology and splenomegaly appear largely unchanged    According to the patient's chart cardiology did increase her Lasix from 40 mg twice daily to 80 mg twice daily just this week.  They also increased her potassium supplement.  Patient son tells me the patient's only lost 1 pound with that change.  The son tells me he is quite worried about his mom because now she seems short of breath with any exertion at all.  The patient tells me that her legs are quite painful and  red.  Patient reports her bowels are moving but a lot of times they are loose.  She does have a history of A. fib and CHF.  Also has a history of dementia.  She is taking a daily iron supplement.  Most recent hemoglobin was low at 9.3.  Patient denies any rectal bleeding or melena at this time.  However she tells me with her iron it does make her stools dark.  According to records patient has gained at least 25 pounds since early January of this year.  Patient has history of ischemic stroke in the past.  She denies any dysphagia, reflux, nausea and vomiting.  She admits her abdominal pain is getting worse.  She denies drinking any alcohol.  She does have a history of type 2 diabetes.  She denies any significant GI family history at this time.  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; generally asymptomatic   • Mitral regurgitation    • Mixed anxiety depressive disorder    • Nonrheumatic tricuspid (valve) insufficiency    • Osteoarthritis    • PAF (paroxysmal atrial fibrillation) (HCC)    • Sacroiliitis (HCC)    • Tricuspid regurgitation    • Type 2 diabetes mellitus (HCC)        Past Surgical History:   Procedure Laterality Date   • CATARACT EXTRACTION     • COLONOSCOPY N/A 3/10/2016    Procedure: COLONOSCOPY TO CECUM;  Surgeon: Shaan Becerra Jr., MD;  Location: [facility] ENDOSCOPY;  Service:    • COLONOSCOPY N/A 6/18/2021    Procedure: COLONOSCOPY TO CECUM AND  INTO TERMINAL ILEUM;  Surgeon: Jeremy Darden MD;  Location: [facility] ENDOSCOPY;  Service: Gastroenterology;  Laterality: N/A;  PRE: ANEMIA  POST: HEMORRHOIDS   • ENDOSCOPY N/A 3/10/2016    Procedure: ESOPHAGOGASTRODUODENOSCOPY ;  Surgeon: Shaan Becerra Jr., MD;  Location: [facility] ENDOSCOPY;  Service:    • ENDOSCOPY N/A 6/18/2021    Procedure: ESOPHAGOGASTRODUODENOSCOPY;  Surgeon: Jeremy Darden MD;  Location: [facility] ENDOSCOPY;  Service: Gastroenterology;  Laterality: N/A;  PRE: ANEMIA  POST: HIATAL HERNIA, SMALL ESOPHAGEAL VARICES   • KNEE SURGERY     • TUBAL ABDOMINAL LIGATION         Scheduled Meds:    Continuous Infusions:No current facility-administered medications for this visit.      PRN Meds:.    Allergies   Allergen Reactions   • Decongestant  [Pseudoephedrine]        Social History     Socioeconomic History   • Marital status:    Tobacco Use   • Smoking status: Never Smoker   • Smokeless tobacco: Never Used   • Tobacco comment: caffeine use    Vaping Use   • Vaping Use: Never used   Substance and Sexual Activity   • Alcohol use: No   • Drug use: No   • Sexual activity: Defer       Family History   Problem Relation Age of Onset   • Kidney disease Mother    • Heart disease Mother    • Emphysema Father    • Heart disease Brother        Review of Systems   Constitutional: Positive for fatigue and unexpected weight change. Negative for appetite change, chills, diaphoresis and fever.   HENT: Negative for nosebleeds, postnasal drip, sore throat, trouble swallowing and voice change.    Respiratory: Positive for shortness of breath. Negative for cough, choking, chest tightness, wheezing and stridor.    Cardiovascular: Positive for leg swelling. Negative for chest pain and palpitations.   Gastrointestinal: Positive for abdominal distention, abdominal pain, diarrhea and nausea. Negative for anal bleeding, blood in stool, constipation, rectal pain and vomiting.   Endocrine: Negative for polydipsia,  polyphagia and polyuria.   Musculoskeletal: Negative for gait problem.   Skin: Negative for rash and wound.   Allergic/Immunologic: Negative for food allergies.   Neurological: Negative for dizziness, speech difficulty and light-headedness.   Psychiatric/Behavioral: Negative for confusion, self-injury, sleep disturbance and suicidal ideas.       Vitals:    03/04/22 0958   Temp: 97.1 °F (36.2 °C)       Physical Exam  Constitutional:       General: She is not in acute distress.     Appearance: She is well-developed. She is not ill-appearing.   HENT:      Head: Normocephalic.   Eyes:      Pupils: Pupils are equal, round, and reactive to light.   Cardiovascular:      Rate and Rhythm: Normal rate and regular rhythm.      Heart sounds: Normal heart sounds.   Pulmonary:      Effort: Pulmonary effort is normal.      Breath sounds: Normal breath sounds.   Abdominal:      General: Bowel sounds are normal. There is distension.      Palpations: Abdomen is soft. There is no mass.      Tenderness: There is no abdominal tenderness. There is no guarding or rebound.      Hernia: No hernia is present.      Comments: Moderate ascites noted on exam today.    Musculoskeletal:         General: Swelling and tenderness present. Normal range of motion.      Right lower leg: Edema present.      Left lower leg: Edema present.      Comments: + 3 pitting edema to both lower legs. Both lower extremities are erythematous and inflamed.    Skin:     General: Skin is warm and dry.      Findings: Erythema and rash present.   Neurological:      Mental Status: She is alert and oriented to person, place, and time. Mental status is at baseline.   Psychiatric:         Speech: Speech normal.         Behavior: Behavior normal.         Judgment: Judgment normal.         CT Abdomen Pelvis Without Contrast    Result Date: 3/3/2022  1. There is a minimal volume of ascites within the abdomen, but there is fairly extensive third spacing of fluid within the body  wall, anasarca, and there has been increase in the size of a small right pleural effusion. There is a new tiny pericardial effusion. 2. Cirrhotic liver morphology and splenomegaly appear largely unchanged.  Discussed with KWASI Madrigal.  This report was finalized on 3/3/2022 12:17 PM by Dr. Ashley Robles M.D.         Diagnoses and all orders for this visit:    1. Dyspnea on exertion (Primary)    2. Cirrhosis of liver with ascites, unspecified hepatic cirrhosis type (HCC)    3. Anasarca    4. Abnormal CT of the abdomen    5. Iron deficiency anemia, unspecified iron deficiency anemia type    6. Elevated CA 19-9 level    7. Elevated AFP    After reviewing most recent imaging and lab results with the patient and her son today I am quite concerned about her.  I do not feel comfortable changing any of her diuretics given that she is already on 80 mg of Lasix twice a day and is still continuing to gain weight. She definitely has fluid overload with worsening ascites and anasarca. She really needs to be in a monitored setting given IV diuretics to get some of this fluid off.  I am worried about possible cellulitis of both lower extremities as well.  I am also worried about her lower hemoglobin at 9.3.  Especially given her history of previous GI bleeds.  At this time given her history and current symptoms recommend the patient go on over to the [facility] ER for immediate evaluation.  I will have my nursing staff take her and her son on over there for immediate evaluation.  Patient and son are agreeable to the plan.  I will go ahead and have them make follow-up with me next week to continue to watch her closely.

## 2022-03-04 NOTE — ED TRIAGE NOTES
Pt arrived from PCP office with complaints of a 30lbs weight gain since a week ago. Pt reports edema to her legs and abd.     Pt was wearing a mask during assessment.  This RN wore appropriate PPE

## 2022-03-04 NOTE — H&P
Internal medicine history and physical  INTERNAL MEDICINE   [facility]       Patient Identification:  Name: Miri uYng  Age: 81 y.o.  Sex: female  :  1940  MRN: [id number]                   Primary Care Physician: Loren Cruz APRN                               Date of admission:3/4/2022    Chief Complaint: Sent to the emergency room by her primary care provider because of the weight gain of 30 pounds in the last week.  Patient was brought to the emergency room by her son.    History of Present Illness:   Patient is a 81-year-old female with history of nonalcoholic steatohepatitis, cirrhosis of the liver and prior history of atrial fibrillation and type 2 diabetes who has been followed by gastroenterology service for nonalcoholic steatohepatitis.  Patient has been progressively getting swollen and has gained 30 pounds and has increasing edema of the lower extremities and abdominal wall distention.  Patient denies any significant shortness of breath but does admit to get out of breath when she lays on certain ways either flat on her side.  Patient was recently evaluated by her primary care provider for increasing weight gain and Lasix for increased from 40 twice daily to 80 twice daily without significant improvement.  Today patient was seen by gastroenterology nurse practitioner and from there patient was sent to the emergency room for further evaluation.  Patient denies any fever or chills.  Work-up in the emergency room included chest x-ray which showed evidence of fluid overload and atelectasis at the right lung base.  Patient did have a CT scan of the abdomen pelvis performed in the outpatient setting 2 days ago which shows minimal volume of ascites and extensive pelvis patient and body wall edema and anasarca noted.  Patient was noted to have splenomegaly which is unchanged and liver had cirrhotic morphology.  Patient has received a dose of Lasix 80 mg IV in the emergency  room with some improvement in her symptoms and decrease in swelling and she is urinating a lot.  She is being admitted for further care.  Patient was seen couple of months ago by her cardiologist and one of the medicines were discontinued which it turns out to be her blood thinner as she was noted that she did not have any recurrence of atrial fibrillation for more than a year.  This activity took place during her cardiologist visit with Dr. York  on 12/22/2022.    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; generally asymptomatic   • Mitral regurgitation    • Mixed anxiety depressive disorder    • Nonrheumatic tricuspid (valve) insufficiency    • Osteoarthritis    • PAF (paroxysmal atrial fibrillation) (HCC)    • Sacroiliitis (HCC)    • Tricuspid regurgitation    • Type 2 diabetes mellitus (HCC)      Past Surgical History:  Past Surgical History:   Procedure Laterality Date   • CATARACT EXTRACTION     • COLONOSCOPY N/A 3/10/2016    Procedure: COLONOSCOPY TO CECUM;  Surgeon: Shaan Becerra Jr., MD;  Location: [facility] ENDOSCOPY;  Service:    • COLONOSCOPY N/A 6/18/2021    Procedure: COLONOSCOPY TO CECUM AND INTO TERMINAL ILEUM;  Surgeon: Jeremy Darden MD;  Location: [facility] ENDOSCOPY;  Service: Gastroenterology;  Laterality: N/A;  PRE: ANEMIA  POST: HEMORRHOIDS   • ENDOSCOPY N/A 3/10/2016    Procedure: ESOPHAGOGASTRODUODENOSCOPY ;   Surgeon: Shaan Becerra Jr., MD;  Location: [facility] ENDOSCOPY;  Service:    • ENDOSCOPY N/A 6/18/2021    Procedure: ESOPHAGOGASTRODUODENOSCOPY;  Surgeon: Jeremy Darden MD;  Location: [facility] ENDOSCOPY;  Service: Gastroenterology;  Laterality: N/A;  PRE: ANEMIA  POST: HIATAL HERNIA, SMALL ESOPHAGEAL VARICES   • KNEE SURGERY     • TUBAL ABDOMINAL LIGATION        Home Meds:  (Not in a hospital admission)    Current Meds:   No current facility-administered medications for this encounter.    Current Outpatient Medications:   •  acetaminophen (TYLENOL) 500 MG tablet, Take 500 mg by mouth every 4 (four) hours as needed for mild pain (1-3) (1-2 TABLETS PRN Q 4-6 HOURS)., Disp: , Rfl:   •  allopurinol (ZYLOPRIM) 300 MG tablet, TAKE 1 TABLET BY MOUTH  DAILY, Disp: 90 tablet, Rfl: 3  •  atorvastatin (LIPITOR) 40 MG tablet, TAKE 1 TABLET BY MOUTH DAILY, Disp: 90 tablet, Rfl: 0  •  buPROPion SR (WELLBUTRIN SR) 150 MG 12 hr tablet, Take 1 tablet by mouth Daily With Breakfast., Disp: 30 tablet, Rfl: 5  •  ferrous sulfate 325 (65 FE) MG tablet, Take 1 tablet by mouth Daily With Breakfast., Disp: 90 tablet, Rfl: 1  •  furosemide (LASIX) 40 MG tablet, Take 1 tablet by mouth 2 (Two) Times a Day for 30 days. (Patient taking differently: Take 40 mg by mouth 4 (Four) Times a Day. For this week), Disp: 180 tablet, Rfl: 3  •  glucose blood test strip, BID. Test  In the AM and again in the PM, Disp: 100 each, Rfl: 3  •  glucose monitor monitoring kit, One touch meter, Disp: 1 each, Rfl: 0  •  isosorbide mononitrate (IMDUR) 60 MG 24 hr tablet, TAKE 1 TABLET BY MOUTH  DAILY, Disp: 90 tablet, Rfl: 3  •  Lancets (onetouch ultrasoft) lancets, Test BID, Disp: 100 each, Rfl: 3  •  losartan (COZAAR) 25 MG tablet, TAKE 1 TABLET BY MOUTH IN  THE MORNING AND 2 TABLETS  BY MOUTH IN THE EVENING, Disp: 270 tablet, Rfl: 3  •  metFORMIN ER (GLUCOPHAGE-XR) 500 MG 24 hr tablet, Take 2 tablets by mouth Daily With Breakfast for 30 days., Disp: 60 tablet,  Rfl: 5  •  metoprolol succinate XL (TOPROL-XL) 100 MG 24 hr tablet, TAKE 1 TABLET BY MOUTH  DAILY, Disp: 90 tablet, Rfl: 3  •  mirtazapine (REMERON) 15 MG tablet, Take 1 tablet by mouth Every Night., Disp: 30 tablet, Rfl: 5  •  montelukast (SINGULAIR) 10 MG tablet, Take 1 tablet by mouth Every Night., Disp: 30 tablet, Rfl: 5  •  pantoprazole (PROTONIX) 40 MG EC tablet, TAKE 1 TABLET BY MOUTH  DAILY, Disp: 90 tablet, Rfl: 3  •  potassium chloride 10 MEQ CR tablet, TAKE 1 TABLET BY MOUTH IN  THE MORNING (Patient taking differently: Double up on medicine this week for edema), Disp: 90 tablet, Rfl: 3  •  SITagliptin (Januvia) 100 MG tablet, Take 1 tablet by mouth Daily., Disp: 90 tablet, Rfl: 1  Allergies:  Allergies   Allergen Reactions   • Decongestant  [Pseudoephedrine]      Social History:   Social History     Tobacco Use   • Smoking status: Never Smoker   • Smokeless tobacco: Never Used   • Tobacco comment: caffeine use    Substance Use Topics   • Alcohol use: No      Family History:  Family History   Problem Relation Age of Onset   • Kidney disease Mother    • Heart disease Mother    • Emphysema Father    • Heart disease Brother           Review of Systems  See history of present illness and past medical history.  Constitutional: Positive for fatigue and unexpected weight change. Negative for appetite change, chills, diaphoresis and fever.   HENT: Negative for nosebleeds, postnasal drip, sore throat, trouble swallowing and voice change.    Respiratory: Positive for shortness of breath. Negative for cough, choking, chest tightness, wheezing and stridor.    Cardiovascular: Positive for leg swelling. Negative for chest pain and palpitations.   Gastrointestinal: Positive for abdominal distention, abdominal pain, diarrhea and nausea. Negative for anal bleeding, blood in stool, constipation, rectal pain and vomiting.   Endocrine: Negative for polydipsia, polyphagia and polyuria.   Musculoskeletal: Negative for gait  "problem.   Skin: Negative for rash and wound.   Allergic/Immunologic: Negative for food allergies.   Neurological: Negative for dizziness, speech difficulty and light-headedness.   Psychiatric/Behavioral: Negative for confusion, self-injury, sleep disturbance and suicidal ideas.   Remainder of ROS is negative.      Vitals:   /67   Pulse 112   Temp 97.5 °F (36.4 °C) (Tympanic)   Resp 22   Ht 157.5 cm (62\")   Wt 98 kg (216 lb)   SpO2 96%   BMI 39.51 kg/m²   I/O: No intake or output data in the 24 hours ending 03/04/22 6833  Exam:  Patient is examined using the personal protective equipment as per guidelines from infection control for this particular patient as enacted.  Hand washing was performed before and after patient interaction.  General Appearance:    Alert, cooperative, no distress, appears stated age   Head:    Normocephalic, without obvious abnormality, atraumatic   Eyes:    PERRL, conjunctiva/corneas pale, EOM's intact, both eyes   Ears:    Normal external ear canals, both ears   Nose:   Nares normal, septum midline, mucosa normal, no drainage    or sinus tenderness   Throat:   Lips, tongue, gums normal; oral mucosa pink and moist   Neck:   Supple, symmetrical, trachea midline, no adenopathy;     thyroid:  no enlargement/tenderness/nodules; no carotid    bruit or JVD   Back:     Symmetric, no curvature, ROM normal, no CVA tenderness   Lungs:     Clear to auscultation bilaterally, respirations unlabored   Chest Wall:    No tenderness or deformity    Heart:    Regular rate and rhythm, S1 and S2 normal, no murmur, rub   or gallop   Abdomen:    Soft and nondistended and has umbilical hernia which is not inflamed.  Not a significant amount of ascites noted significant abdominal wall edema noted   Extremities:  Bilateral lower extremity edema with no obvious cellulitis   Pulses:   Pulses palpable in all extremities; symmetric all extremities   Skin:  Generalized body wall edema noted   Neurologic:  " Alert and oriented and grossly nonfocal examination       Data Review:      I reviewed the patient's new clinical results.  Results from last 7 days   Lab Units 03/04/22  1200 02/28/22  1553   WBC 10*3/mm3 7.66 7.4   HEMOGLOBIN g/dL 9.6* 9.3*   PLATELETS 10*3/mm3 175 153     Results from last 7 days   Lab Units 03/04/22  1200 03/03/22  1220 02/28/22  1553   SODIUM mmol/L 142 143 141   POTASSIUM mmol/L 3.8 4.2 4.2   CHLORIDE mmol/L 105 106 106   TOTAL CO2 mmol/L  --  21 20   CO2 mmol/L 23.1  --   --    BUN mg/dL 26* 25 29*   CREATININE mg/dL 1.03* 0.93 1.01*   CALCIUM mg/dL 9.0 8.6* 9.0   GLUCOSE mg/dL 113* 121* 122*     Microbiology Results (last 10 days)     Procedure Component Value - Date/Time    COVID PRE-OP / PRE-PROCEDURE SCREENING ORDER (NO ISOLATION) - Swab, Nasopharynx [[id number]]  (Normal) Collected: 03/04/22 1206    Lab Status: Final result Specimen: Swab from Nasopharynx Updated: 03/04/22 1258    Narrative:      The following orders were created for panel order COVID PRE-OP / PRE-PROCEDURE SCREENING ORDER (NO ISOLATION) - Swab, Nasopharynx.  Procedure                               Abnormality         Status                     ---------                               -----------         ------                     COVID-19,[facility]HOU[facility]...[[id number]]  Normal              Final result                 Please view results for these tests on the individual orders.    COVID-19,[facility]-HOU[facility] CEPHEID/RACH NP SWAB IN TRANSPORT MEDIA 8-12 HR TAT - Swab, Nasopharynx [[id number]]  (Normal) Collected: 03/04/22 1206    Lab Status: Final result Specimen: Swab from Nasopharynx Updated: 03/04/22 1258     COVID19 Not Detected    Narrative:      Fact sheet for providers: https://www.fda.gov/media/527842/download    Fact sheet for patients: https://www.fda.gov/media/425367/download    Test performed by PCR.        CT Abdomen Pelvis Without Contrast    Result Date: 3/3/2022  1. There is a minimal volume of ascites within  the abdomen, but there is fairly extensive third spacing of fluid within the body wall, anasarca, and there has been increase in the size of a small right pleural effusion. There is a new tiny pericardial effusion. 2. Cirrhotic liver morphology and splenomegaly appear largely unchanged.  Discussed with KWASI Madrigal.  This report was finalized on 3/3/2022 12:17 PM by Dr. Ashley Robles M.D.      XR Chest 1 View    Result Date: 3/4/2022  1. FINDINGS consistent with mild congestive heart failure. There may be an element of pneumonia or atelectasis in the right lung base as well.  This report was finalized on 3/4/2022 12:54 PM by Dr. Asher Wallace M.D.      ECG 12 Lead   Final Result   HEART RATE= 103  bpm   RR Interval= 581  ms   OK Interval=   ms   P Horizontal Axis=   deg   P Front Axis=   deg   QRSD Interval= 113  ms   QT Interval= 377  ms   QRS Axis= 89  deg   T Wave Axis= 119  deg   - ABNORMAL ECG -   Atrial fibrillation   Ventricular premature complex   Low voltage, extremity leads   Borderline prolonged QT interval   Poor quality tracing repeat   Electronically Signed By: Tanner Garcia ([facility]) ([facility]) 04-Mar-2022 16:36:41   Date and Time of Study: 2022-03-04 11:57:10              Assessment:  Active Hospital Problems    Diagnosis  POA   • **Cirrhosis of liver (HCC) [K74.60]  Yes   • Anasarca [R60.1]  Unknown   • Splenomegaly due to portal hypertension and liver disease [R16.1]  Yes   • Esophageal varices (HCC) [I85.00]  Yes   • Long term current use of anticoagulant therapy [Z79.01]  Not Applicable   • Symptomatic anemia [D64.9]  Yes   • Chronic diastolic (congestive) heart failure (HCC) [I50.32]  Yes   • Atrial fibrillation (HCC) [I48.91]  Yes   • Type 2 diabetes mellitus (HCC) [E11.9]  Yes   • Gastroesophageal reflux disease [K21.9]  Yes   • HTN (hypertension) [I10]  Yes       Plan: See admitting orders  · Continue with IV diuretics  · Consult gastroenterology service  · Continue with Accu-Cheks and  sliding scale coverage and watch for hypoglycemia  · Patient has atrial fibrillation but currently not on anticoagulation therapy as couple of months ago during her visit with Dr. York on December 22, 2021 it was noted that patient did not have any recurrence of atrial fibrillation and her apixaban was discontinued.  Her EKG is of poor quality but raises suspicion for recurrence of atrial fibrillation and hence we will consult cardiology service to reevaluate and reconsider her management of paroxysmal atrial fibrillation including possibility of reintroduction of anticoagulation therapy.  · Continue antihypertensive regimen and management of diastolic congestive heart failure.  · Monitor closely for any electrolyte abnormalities.    Devon Mcneill MD   3/4/2022  18:53 EST  Much of this encounter note is an electronic transcription/translation of spoken language to printed text. The electronic translation of spoken language may permit erroneous, or at times, nonsensical words or phrases to be inadvertently transcribed; Although I have reviewed the note for such errors, some may still exist

## 2022-03-04 NOTE — ED PROVIDER NOTES
EMERGENCY DEPARTMENT ENCOUNTER    Room Number:  N543/1  Date of encounter:  3/4/2022  PCP: Loren Cruz APRN  Historian: Patient      HPI:  Chief Complaint: Weight gain and swelling to legs      Context: Miri Yung is a 81 y.o. female who presents to the ED c/o 30 pound weight gain, bilateral lower extremity swelling and abdominal swelling.  The patient was seen by GI NP today who noted that she had an outpatient CT earlier this week that showed mild ascites but marked third spacing of fluid, anasarca with an increase in right pleural effusion and small pericardial effusion.  She also noted that her PCP had increased her Lasix from 40 twice daily to 80 twice daily last week along with increasing her potassium.  She is only lost 1 pound from that increase her diuretics, thus she sent her to the emergency room for further evaluation and care.  The patient complains of feeling uncomfortable her abdomen and legs.  She reports mild dyspnea on exertion and orthopnea but denies chest pain.  She denies fevers, chills, cough or known Covid exposures.        PAST MEDICAL HISTORY  Active Ambulatory Problems     Diagnosis Date Noted   • Dementia (CMS/HCC) 02/08/2016   • Depressive disorder 02/08/2016   • Edema 02/08/2016   • Abnormal liver function tests 02/08/2016   • Gastroesophageal reflux disease 02/08/2016   • Gout 02/08/2016   • Hyperlipidemia 02/08/2016   • HTN (hypertension) 02/08/2016   • Insomnia 02/08/2016   • Arthropathy of knee 02/08/2016   • Memory loss 02/08/2016   • Low back pain 02/08/2016   • Mitral valve insufficiency 02/08/2016   • Tricuspid valve insufficiency 02/08/2016   • Urinary tract infection 02/08/2016   • Valvular endocarditis 02/08/2016   • Long QT interval 02/19/2016   • Microcytic anemia 03/03/2016   • Anemia 03/04/2016   • Seasonal allergies 08/19/2016   • Acute cystitis without hematuria 07/25/2017   • Chronic fatigue 02/28/2018   • Bacterial endocarditis 10/31/2018   • Slow transit  constipation 06/06/2019   • Acute ischemic stroke (HCC) 08/05/2019   • Paroxysmal tachycardia (HCC) 08/05/2019   • Type 2 diabetes mellitus (HCC) 08/05/2019   • Atrial fibrillation (HCC) 12/18/2019   • Morbidly obese (HCC) 08/20/2020   • Chronic diastolic (congestive) heart failure (HCC) 11/16/2020   • Symptomatic anemia 06/14/2021   • History of stroke 06/14/2021   • Long term current use of anticoagulant therapy 06/15/2021   • Acute blood loss anemia 06/15/2021   • Cirrhosis of liver (HCC) 06/18/2021   • Esophageal varices (HCC) 06/18/2021   • Splenomegaly due to portal hypertension and liver disease 06/18/2021     Resolved Ambulatory Problems     Diagnosis Date Noted   • Left lower quadrant pain 02/08/2016   • Acute sinusitis 02/08/2016   • Atopic rhinitis 02/08/2016   • Arthritis 02/08/2016   • Hematochezia 02/08/2016   • Pain in joint 02/08/2016   • Lung mass 02/08/2016   • Muscle pain 02/08/2016   • Inflammation of sacroiliac joint (HCC) 02/08/2016   • Stress incontinence in female 02/08/2016   • Urinary urgency 02/08/2016   • Precordial pain 02/19/2016   • High risk medication use 02/19/2016   • Chest pain 02/19/2016   • Iron deficiency anemia due to chronic blood loss 03/03/2016   • Pulmonary hypertension (HCC) 03/30/2016   • Fatigue due to exposure 05/05/2016   • Health care maintenance 09/28/2016   • Need for vaccination 10/17/2016   • Atypical chest pain 12/14/2016   • Diarrhea 02/16/2017   • GI bleed 06/15/2021     Past Medical History:   Diagnosis Date   • Anxiety    • Congestive heart failure (CHF) (HCC)    • Depression    • Encephalopathy    • Endocarditis    • Esophageal reflux    • GERD (gastroesophageal reflux disease)    • History of blood transfusion    • LOM (loss of memory)    • Mitral and aortic valve disease    • Mitral regurgitation    • Mixed anxiety depressive disorder    • Nonrheumatic tricuspid (valve) insufficiency    • Osteoarthritis    • PAF (paroxysmal atrial fibrillation) ([facility])    •  Sacroiliitis (HCC)    • Tricuspid regurgitation          PAST SURGICAL HISTORY  Past Surgical History:   Procedure Laterality Date   • CATARACT EXTRACTION     • COLONOSCOPY N/A 3/10/2016    Procedure: COLONOSCOPY TO CECUM;  Surgeon: Shaan Becerra Jr., MD;  Location: [facility] ENDOSCOPY;  Service:    • COLONOSCOPY N/A 6/18/2021    Procedure: COLONOSCOPY TO CECUM AND INTO TERMINAL ILEUM;  Surgeon: Jeremy Darden MD;  Location: [facility] ENDOSCOPY;  Service: Gastroenterology;  Laterality: N/A;  PRE: ANEMIA  POST: HEMORRHOIDS   • ENDOSCOPY N/A 3/10/2016    Procedure: ESOPHAGOGASTRODUODENOSCOPY ;  Surgeon: Shaan Becerra Jr., MD;  Location: [facility] ENDOSCOPY;  Service:    • ENDOSCOPY N/A 6/18/2021    Procedure: ESOPHAGOGASTRODUODENOSCOPY;  Surgeon: Jeremy Darden MD;  Location: [facility] ENDOSCOPY;  Service: Gastroenterology;  Laterality: N/A;  PRE: ANEMIA  POST: HIATAL HERNIA, SMALL ESOPHAGEAL VARICES   • KNEE SURGERY     • TUBAL ABDOMINAL LIGATION           FAMILY HISTORY  Family History   Problem Relation Age of Onset   • Kidney disease Mother    • Heart disease Mother    • Emphysema Father    • Heart disease Brother          SOCIAL HISTORY  Social History     Socioeconomic History   • Marital status:    Tobacco Use   • Smoking status: Never Smoker   • Smokeless tobacco: Never Used   • Tobacco comment: caffeine use    Vaping Use   • Vaping Use: Never used   Substance and Sexual Activity   • Alcohol use: No   • Drug use: No   • Sexual activity: Defer         ALLERGIES  Decongestant  [pseudoephedrine]        REVIEW OF SYSTEMS  Review of Systems     Patient denies headache, neck pain, chest pain, vomiting, diarrhea or focal neuro deficit  All systems reviewed and negative except for those discussed in HPI.     PHYSICAL EXAM    I have reviewed the triage vital signs and nursing notes.    ED Triage Vitals [03/04/22 1038]   Temp Heart Rate Resp BP SpO2   -- 116 18 -- 94 %      Temp src Heart Rate Source Patient  Position BP Location FiO2 (%)   -- Monitor -- -- --       GENERAL: 81-year-old well developed, well nourished in mild distress  HENT: NCAT, neck supple, trachea midline  EYES: no scleral icterus, PERRL, normal conjunctivae  CV: regular rhythm, regular rate, no murmur  RESPIRATORY: Mild respiratory distress with Rales in bases bilaterally  ABDOMEN: soft, but distended with diminished bowel sounds  MUSCULOSKELETAL: no gross deformity, 4+ pedal edema, no calf tenderness  NEURO: alert,  sensory and motor function of extremities intact, speech clear, mental status normal  SKIN: warm, dry, left greater than right lower extremity has some mild erythema but no warmth or fluctuance  PSYCH:  Appropriate mood and affect      PPE  Pt does not present with symptoms for COVID19; however, I was wearing a N95 mask and goggles throughout all patient interaction.    Vital signs and nursing notes reviewed.      LAB RESULTS  Recent Results (from the past 24 hour(s))   ECG 12 Lead    Collection Time: 03/04/22 11:57 AM   Result Value Ref Range    QT Interval 377 ms   Comprehensive Metabolic Panel    Collection Time: 03/04/22 12:00 PM    Specimen: Blood   Result Value Ref Range    Glucose 113 (H) 65 - 99 mg/dL    BUN 26 (H) 8 - 23 mg/dL    Creatinine 1.03 (H) 0.57 - 1.00 mg/dL    Sodium 142 136 - 145 mmol/L    Potassium 3.8 3.5 - 5.2 mmol/L    Chloride 105 98 - 107 mmol/L    CO2 23.1 22.0 - 29.0 mmol/L    Calcium 9.0 8.6 - 10.5 mg/dL    Total Protein 5.8 (L) 6.0 - 8.5 g/dL    Albumin 4.10 3.50 - 5.20 g/dL    ALT (SGPT) 26 1 - 33 U/L    AST (SGOT) 24 1 - 32 U/L    Alkaline Phosphatase 121 (H) 39 - 117 U/L    Total Bilirubin 0.5 0.0 - 1.2 mg/dL    Globulin 1.7 gm/dL    A/G Ratio 2.4 g/dL    BUN/Creatinine Ratio 25.2 (H) 7.0 - 25.0    Anion Gap 13.9 5.0 - 15.0 mmol/L    eGFR 54.4 (L) >60.0 mL/min/1.73   Protime-INR    Collection Time: 03/04/22 12:00 PM    Specimen: Blood   Result Value Ref Range    Protime 15.6 (H) 11.7 - 14.2 Seconds    INR  1.24 (H) 0.90 - 1.10   BNP    Collection Time: 03/04/22 12:00 PM    Specimen: Blood   Result Value Ref Range    proBNP 1,420.0 0.0-1,800.0 pg/mL   Troponin    Collection Time: 03/04/22 12:00 PM    Specimen: Blood   Result Value Ref Range    Troponin T <0.010 0.000 - 0.030 ng/mL   Magnesium    Collection Time: 03/04/22 12:00 PM    Specimen: Blood   Result Value Ref Range    Magnesium 2.0 1.6 - 2.4 mg/dL   CBC Auto Differential    Collection Time: 03/04/22 12:00 PM    Specimen: Blood   Result Value Ref Range    WBC 7.66 3.40 - 10.80 10*3/mm3    RBC 4.11 3.77 - 5.28 10*6/mm3    Hemoglobin 9.6 (L) 12.0 - 15.9 g/dL    Hematocrit 33.7 (L) 34.0 - 46.6 %    MCV 82.0 79.0 - 97.0 fL    MCH 23.4 (L) 26.6 - 33.0 pg    MCHC 28.5 (L) 31.5 - 35.7 g/dL    RDW 17.4 (H) 12.3 - 15.4 %    RDW-SD 51.2 37.0 - 54.0 fl    MPV 12.5 (H) 6.0 - 12.0 fL    Platelets 175 140 - 450 10*3/mm3    Neutrophil % 69.6 42.7 - 76.0 %    Lymphocyte % 17.6 (L) 19.6 - 45.3 %    Monocyte % 10.2 5.0 - 12.0 %    Eosinophil % 1.4 0.3 - 6.2 %    Basophil % 0.8 0.0 - 1.5 %    Immature Grans % 0.4 0.0 - 0.5 %    Neutrophils, Absolute 5.33 1.70 - 7.00 10*3/mm3    Lymphocytes, Absolute 1.35 0.70 - 3.10 10*3/mm3    Monocytes, Absolute 0.78 0.10 - 0.90 10*3/mm3    Eosinophils, Absolute 0.11 0.00 - 0.40 10*3/mm3    Basophils, Absolute 0.06 0.00 - 0.20 10*3/mm3    Immature Grans, Absolute 0.03 0.00 - 0.05 10*3/mm3    nRBC 0.0 0.0 - 0.2 /100 WBC   COVID-19,[facility] IN-HOUSE CEPHEID/RACH NP SWAB IN TRANSPORT MEDIA 8-12 HR TAT - Swab, Nasopharynx    Collection Time: 03/04/22 12:06 PM    Specimen: Nasopharynx; Swab   Result Value Ref Range    COVID19 Not Detected Not Detected - Ref. Range   Urinalysis With Microscopic If Indicated (No Culture) - Urine, Clean Catch    Collection Time: 03/04/22  1:29 PM    Specimen: Urine, Clean Catch   Result Value Ref Range    Color, UA Yellow Yellow, Straw    Appearance, UA Clear Clear    pH, UA <=5.0 5.0 - 8.0    Specific [place], UA 1.013  1.005 - 1.030    Glucose, UA Negative Negative    Ketones, UA Negative Negative    Bilirubin, UA Negative Negative    Blood, UA Negative Negative    Protein, UA Negative Negative    Leuk Esterase, UA Trace (A) Negative    Nitrite, UA Negative Negative    Urobilinogen, UA 0.2 E.U./dL 0.2 - 1.0 E.U./dL   Urinalysis, Microscopic Only - Urine, Clean Catch    Collection Time: 03/04/22  1:29 PM    Specimen: Urine, Clean Catch   Result Value Ref Range    RBC, UA None Seen None Seen, 0-2 /HPF    WBC, UA 0-2 None Seen, 0-2 /HPF    Bacteria, UA None Seen None Seen /HPF    Squamous Epithelial Cells, UA 0-2 None Seen, 0-2 /HPF    Hyaline Casts, UA 0-2 None Seen /LPF    Calcium Oxalate Crystals, UA Small/1+ None Seen /HPF    Methodology Manual Light Microscopy        Ordered the above labs and independently reviewed the results.        RADIOLOGY  XR Chest 1 View    Result Date: 3/4/2022  XR CHEST 1 VW-  03/04/2022  HISTORY: Shortness of breath.  Heart size is mildly enlarged. Lungs are underinflated with some vascular crowding. There appears be a small amount of fluid in the minor fissure on the right. Mild vascular congestion is seen. There is haziness of the right lung base which may represent combination of atelectasis, pneumonia and pleural fluid.      1. FINDINGS consistent with mild congestive heart failure. There may be an element of pneumonia or atelectasis in the right lung base as well.  This report was finalized on 3/4/2022 12:54 PM by Dr. Asher Wallace M.D.        I ordered the above noted radiological studies. Independently reviewed by me and discussed with radiologist.  See dictation above for official radiology interpretation.      PROCEDURES    Procedures        MEDICATIONS GIVEN IN ER    Medications   linagliptin (TRADJENTA) tablet 5 mg (5 mg Oral Given 3/4/22 2045)   potassium chloride (K-DUR,KLOR-CON) ER tablet 10 mEq (has no administration in time range)   pantoprazole (PROTONIX) EC tablet 40 mg (40 mg  Oral Given 3/4/22 2045)   montelukast (SINGULAIR) tablet 10 mg (10 mg Oral Given 3/4/22 2045)   mirtazapine (REMERON) tablet 15 mg (15 mg Oral Given 3/4/22 2045)   metoprolol succinate XL (TOPROL-XL) 24 hr tablet 100 mg (100 mg Oral Given 3/4/22 2045)   metFORMIN ER (GLUCOPHAGE-XR) 24 hr tablet 1,000 mg (has no administration in time range)   isosorbide mononitrate (IMDUR) 24 hr tablet 60 mg (60 mg Oral Given 3/4/22 2045)   ferrous sulfate tablet 325 mg (has no administration in time range)   buPROPion SR (WELLBUTRIN SR) 12 hr tablet 150 mg (has no administration in time range)   atorvastatin (LIPITOR) tablet 40 mg (40 mg Oral Given 3/4/22 2045)   allopurinol (ZYLOPRIM) tablet 300 mg (300 mg Oral Given 3/4/22 2045)   sodium chloride 0.9 % flush 10 mL (10 mL Intravenous Given 3/4/22 2044)   sodium chloride 0.9 % flush 10 mL (has no administration in time range)   dextrose (GLUTOSE) oral gel 15 g (has no administration in time range)   dextrose (D50W) (25 g/50 mL) IV injection 25 g (has no administration in time range)   glucagon (human recombinant) (GLUCAGEN DIAGNOSTIC) injection 1 mg (has no administration in time range)   nitroglycerin (NITROSTAT) SL tablet 0.4 mg (has no administration in time range)   insulin lispro (ADMELOG) injection 0-9 Units (has no administration in time range)   morphine injection 1 mg (1 mg Intravenous Given 3/4/22 2045)     And   naloxone (NARCAN) injection 0.4 mg (has no administration in time range)   ondansetron (ZOFRAN) injection 4 mg (has no administration in time range)   furosemide (LASIX) injection 80 mg (80 mg Intravenous Given 3/4/22 2046)   losartan (COZAAR) tablet 25 mg (has no administration in time range)   losartan (COZAAR) tablet 50 mg (50 mg Oral Given 3/4/22 2045)   furosemide (LASIX) injection 80 mg (80 mg Intravenous Given 3/4/22 1203)   LORazepam (ATIVAN) injection 0.5 mg (0.5 mg Intravenous Given 3/4/22 1329)         PROGRESS, DATA ANALYSIS, CONSULTS, AND MEDICAL  DECISION MAKING    All labs have been independently reviewed by me.  All radiology studies have been reviewed by me and discussed with radiologist dictating report.   EKG's independently reviewed by me.  Discussion below represents my analysis of pertinent findings related to patient's condition, differential diagnosis, treatment plan and final disposition.      ED Course as of 03/04/22 2125   Fri Mar 04, 2022   1142 I will obtain labs, EKG and chest x-ray for further evaluation.  I will give the patient a dose of IV Lasix while awaiting her work-up. [GP]   1204 EKG    EKG time: 1157  Rhythm/Rate: Atrial fibrillation 103  No Acute Ischemia  Non-Specific ST-T changes    Unchanged compared to prior on 6/16/2021    Interpreted Contemporaneously by me.  Independently viewed by me     [GP]   1310 On repeat examination patient's room air saturations are 94% at rest. She states she feels somewhat anxious and is asking for something to help her relax. I will give her a dose of Ativan and consult the hospitalist for admission. [GP]   1315 My interpretation of patient's chest x-ray does have mild CHF. [GP]   1315 With the patient's diffuse anasarca, shortness of breath and weight gain not responsive to already doubling her diuretic I believe she will need to be admitted to the hospital for further diuresis. [GP]   1353 I discussed the case with Dr. Mcneill from [facility] who will admit the patient to a telemetry bed. [GP]      ED Course User Index  [GP] Rich Lazaro MD           The differential diagnosis includes but is not limited to pneumonia, congestive heart failure, pulmonary embolism, pleural effusion, acute coronary syndrome, chronic obstructive pulmonary disease exacerbation, or pneumothorax.        AS OF 21:25 EST VITALS:    BP - 124/77  HR - 107  TEMP - 97.5 °F (36.4 °C) (Tympanic)  02 SATS - 97%        DIAGNOSIS  Final diagnoses:   Cirrhosis of liver without ascites, unspecified hepatic cirrhosis type (HCC)   Anasarca    Pedal edema   Congestive heart failure, unspecified HF chronicity, unspecified heart failure type (HCC)         DISPOSITION  ADMISSION    Discussed treatment plan and reason for admission with pt/family and admitting physician.  Pt/family voiced understanding of the plan for admission for further testing/treatment as needed.        EMR Dragon/Transcription disclaimer:   Much of this encounter note is an electronic transcription/translation of spoken language to printed text.        Rich Lazaro MD  03/04/22 [id number]

## 2022-03-05 PROBLEM — I48.91 ATRIAL FIBRILLATION WITH RVR: Status: ACTIVE | Noted: 2022-01-01

## 2022-03-05 NOTE — CONSULTS
[facility]  Initial Inpatient Consult Note    Referring Provider: Osito    Reason for Consultation: Cirrhosis    Subjective     History of present illness:    81 y.o. female KOO cirrhotic, admitted through our office to [facility] for abdominal swelling and ascites.  EGD colonoscopy earlier this year without source for anemia found.  She is accumulating fluid.  Abdomen and lower extremities.  Upwards of 30 or 40 pounds gained recently.  No rectal bleeding or melena at this time.  No confusion or memory loss.  No fevers, chills.  No nausea or vomiting.  CAT scan performed 2 days ago results below.    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; generally asymptomatic   • Mitral regurgitation    • Mixed anxiety depressive disorder    • Nonrheumatic tricuspid (valve) insufficiency    • Osteoarthritis    • PAF (paroxysmal atrial fibrillation) (HCC)    • Sacroiliitis (HCC)    • Tricuspid regurgitation    • Type 2 diabetes mellitus (HCC)      Past Surgical History:  Past Surgical History:   Procedure Laterality Date   • CATARACT EXTRACTION     • COLONOSCOPY N/A 3/10/2016    Procedure: COLONOSCOPY TO CECUM;  Surgeon: Shaan Becerra Jr., MD;  Location: [facility] ENDOSCOPY;  Service:    • COLONOSCOPY N/A 6/18/2021    Procedure: COLONOSCOPY TO CECUM AND INTO TERMINAL ILEUM;  Surgeon: Jeremy Darden MD;  Location: [facility]  ENDOSCOPY;  Service: Gastroenterology;  Laterality: N/A;  PRE: ANEMIA  POST: HEMORRHOIDS   • ENDOSCOPY N/A 3/10/2016    Procedure: ESOPHAGOGASTRODUODENOSCOPY ;  Surgeon: Shaan Becerra Jr., MD;  Location: [facility] ENDOSCOPY;  Service:    • ENDOSCOPY N/A 6/18/2021    Procedure: ESOPHAGOGASTRODUODENOSCOPY;  Surgeon: Jeremy Darden MD;  Location: [facility] ENDOSCOPY;  Service: Gastroenterology;  Laterality: N/A;  PRE: ANEMIA  POST: HIATAL HERNIA, SMALL ESOPHAGEAL VARICES   • KNEE SURGERY     • TUBAL ABDOMINAL LIGATION        Social History:   Social History     Tobacco Use   • Smoking status: Never Smoker   • Smokeless tobacco: Never Used   • Tobacco comment: caffeine use    Substance Use Topics   • Alcohol use: No      Family History:  Family History   Problem Relation Age of Onset   • Kidney disease Mother    • Heart disease Mother    • Emphysema Father    • Heart disease Brother        Home Meds:  Medications Prior to Admission   Medication Sig Dispense Refill Last Dose   • acetaminophen (TYLENOL) 500 MG tablet Take 500 mg by mouth every 4 (four) hours as needed for mild pain (1-3) (1-2 TABLETS PRN Q 4-6 HOURS).      • allopurinol (ZYLOPRIM) 300 MG tablet TAKE 1 TABLET BY MOUTH  DAILY 90 tablet 3    • atorvastatin (LIPITOR) 40 MG tablet TAKE 1 TABLET BY MOUTH DAILY 90 tablet 0    • buPROPion SR (WELLBUTRIN SR) 150 MG 12 hr tablet Take 1 tablet by mouth Daily With Breakfast. 30 tablet 5    • ferrous sulfate 325 (65 FE) MG tablet Take 1 tablet by mouth Daily With Breakfast. 90 tablet 1    • glucose blood test strip BID. Test  In the AM and again in the  each 3    • glucose monitor monitoring kit One touch meter 1 each 0    • isosorbide mononitrate (IMDUR) 60 MG 24 hr tablet TAKE 1 TABLET BY MOUTH  DAILY 90 tablet 3    • Lancets (onetouch ultrasoft) lancets Test  each 3    • losartan (COZAAR) 25 MG tablet TAKE 1 TABLET BY MOUTH IN  THE MORNING AND 2 TABLETS  BY MOUTH IN THE EVENING 270 tablet 3    •  metoprolol succinate XL (TOPROL-XL) 100 MG 24 hr tablet TAKE 1 TABLET BY MOUTH  DAILY 90 tablet 3    • mirtazapine (REMERON) 15 MG tablet Take 1 tablet by mouth Every Night. 30 tablet 5    • montelukast (SINGULAIR) 10 MG tablet Take 1 tablet by mouth Every Night. 30 tablet 5    • pantoprazole (PROTONIX) 40 MG EC tablet TAKE 1 TABLET BY MOUTH  DAILY 90 tablet 3    • potassium chloride 10 MEQ CR tablet TAKE 1 TABLET BY MOUTH IN  THE MORNING (Patient taking differently: Double up on medicine this week for edema) 90 tablet 3    • SITagliptin (Januvia) 100 MG tablet Take 1 tablet by mouth Daily. 90 tablet 1    • furosemide (LASIX) 40 MG tablet Take 1 tablet by mouth 2 (Two) Times a Day for 30 days. (Patient taking differently: Take 40 mg by mouth 4 (Four) Times a Day. For this week) 180 tablet 3    • metFORMIN ER (GLUCOPHAGE-XR) 500 MG 24 hr tablet Take 2 tablets by mouth Daily With Breakfast for 30 days. 60 tablet 5      Current Meds:   allopurinol, 300 mg, Oral, Daily  atorvastatin, 40 mg, Oral, Daily  buPROPion SR, 150 mg, Oral, Daily With Breakfast  ferrous sulfate, 325 mg, Oral, Daily With Breakfast  furosemide, 80 mg, Intravenous, Q12H  insulin lispro, 0-9 Units, Subcutaneous, TID AC  isosorbide mononitrate, 60 mg, Oral, Daily  linagliptin, 5 mg, Oral, Daily  losartan, 25 mg, Oral, QAM  losartan, 50 mg, Oral, Q PM  metFORMIN ER, 1,000 mg, Oral, Daily With Breakfast  metoprolol succinate XL, 100 mg, Oral, Daily  mirtazapine, 15 mg, Oral, Nightly  montelukast, 10 mg, Oral, Nightly  pantoprazole, 40 mg, Oral, Daily  potassium chloride, 10 mEq, Oral, QAM  sodium chloride, 10 mL, Intravenous, Q12H      Allergies:  Allergies   Allergen Reactions   • Decongestant  [Pseudoephedrine]      Review of Systems  She has weakness of fatigue all other systems reviewed and negative     Objective     Vital Signs  Temp:  [97.5 °F (36.4 °C)-98.1 °F (36.7 °C)] 98.1 °F (36.7 °C)  Heart Rate:  [102-124] 106  Resp:  [18-22] 18  BP:  (106-124)/(47-81) 120/71  Physical Exam:  General Appearance:    Alert, cooperative, in no acute distress   Head:    Normocephalic, without obvious abnormality, atraumatic   Eyes:          conjunctivae and sclerae normal, no   icterus   Throat:   no thrush, oral mucosa moist   Neck:   Supple, no adenopathy   Lungs:     Clear to auscultation bilaterally    Heart:    Regular rhythm and normal rate    Chest Wall:    No abnormalities observed   Abdomen:     Soft, nondistended, nontender; normal bowel sounds   Extremities:   no edema, no redness   Skin:   No bruising or rash   Psychiatric:  normal mood and insight     Results Review:   I reviewed the patient's new clinical results.    Results from last 7 days   Lab Units 03/05/22  0632 03/04/22  1200 02/28/22  1553   WBC 10*3/mm3 6.18 7.66 7.4   HEMOGLOBIN g/dL 9.1* 9.6* 9.3*   HEMATOCRIT % 30.9* 33.7* 30.9*   PLATELETS 10*3/mm3 156 175 153     Results from last 7 days   Lab Units 03/05/22  0632 03/04/22  1200 03/03/22  1220 02/28/22  1553   SODIUM mmol/L 145 142 143 141   POTASSIUM mmol/L 3.3* 3.8 4.2 4.2   CHLORIDE mmol/L 107 105 106 106   TOTAL CO2 mmol/L  --   --  21 20   CO2 mmol/L 26.0 23.1  --   --    BUN mg/dL 23 26* 25 29*   CREATININE mg/dL 0.81 1.03* 0.93 1.01*   CALCIUM mg/dL 8.3* 9.0 8.6* 9.0   BILIRUBIN mg/dL 0.5 0.5  --  0.5   ALK PHOS U/L 108 121*  --  125*   ALT (SGPT) U/L 19 26  --  23   AST (SGOT) U/L 18 24  --  27   GLUCOSE mg/dL 105* 113* 121* 122*     Results from last 7 days   Lab Units 03/05/22  0632 03/04/22  1200   INR  1.27* 1.24*     Lab Results   Lab Value Date/Time    LIPASE 46 02/23/2016 0635       Radiology:  XR Chest 1 View   Final Result   1. FINDINGS consistent with mild congestive heart failure. There may be   an element of pneumonia or atelectasis in the right lung base as well.       This report was finalized on 3/4/2022 12:54 PM by Dr. Asher Wallace M.D.              Assessment/Plan   Patient Active Problem List   Diagnosis    • Dementia (CMS/HCC)   • Depressive disorder   • Edema   • Abnormal liver function tests   • Gastroesophageal reflux disease   • Gout   • Hyperlipidemia   • HTN (hypertension)   • Insomnia   • Arthropathy of knee   • Memory loss   • Low back pain   • Mitral valve insufficiency   • Tricuspid valve insufficiency   • Urinary tract infection   • Valvular endocarditis   • Long QT interval   • Microcytic anemia   • Anemia   • Seasonal allergies   • Acute cystitis without hematuria   • Chronic fatigue   • Bacterial endocarditis   • Slow transit constipation   • Acute ischemic stroke (HCC)   • Paroxysmal tachycardia (HCC)   • Type 2 diabetes mellitus (HCC)   • Atrial fibrillation (HCC)   • Morbidly obese (HCC)   • Chronic diastolic (congestive) heart failure (HCC)   • Symptomatic anemia   • History of stroke   • Long term current use of anticoagulant therapy   • Acute blood loss anemia   • Cirrhosis of liver (HCC)   • Esophageal varices (HCC)   • Splenomegaly due to portal hypertension and liver disease   • Anasarca     FINDINGS: The cirrhotic morphology of the liver and the splenomegaly are  stable. There is a tiny amount of perihepatic fluid, but there is  extensive third spacing of fluid within the body wall. The abdominal  varices appear largely unchanged. There are more prominent shotty nodes  at the jorgito hepatis and retroperitoneum, which are likely reactive.  There are small bilateral pleural effusions, greater on the right and  the right effusion is larger than previously. There is also a new tiny  pericardial effusion. Noncontrasted gallbladder, pancreas, adrenals, and  kidneys appear unremarkable other than a few stable renal cysts. No  acute bowel abnormality is seen. Uterus and adnexa appear unremarkable.  There are moderate abdominal aortic atherosclerotic changes without  aneurysmal dilatation. There is no significant change in the moderate  size periumbilical ventral hernia containing fat. There is  advanced  multilevel lumbar spondylosis.     IMPRESSION:  1. There is a minimal volume of ascites within the abdomen, but there is  fairly extensive third spacing of fluid within the body wall, anasarca,  and there has been increase in the size of a small right pleural  effusion. There is a new tiny pericardial effusion.  2. Cirrhotic liver morphology and splenomegaly appear largely unchanged.      Assessment:  1. Muse cirrhosis  2. Lower extremity edema with ascites    Plan:  · IV diuresis per primary team  · Weigh daily  · Follow I's and O's  · Watch BMP daily for alterations in electrolytes or creatinine with IV diuresis  · No utility in paracentesis at this time as CAT scan shows a tiny volume of ascites      I discussed the patients findings and my recommendations with patient and nursing staff.    Chase Choudhary MD

## 2022-03-05 NOTE — CONSULTS
Patient Name: Miri Yung  :1940  81 y.o.    Date of Admission: 3/4/2022  Date of Consultation:  22  Encounter Provider: Selene Culp MD  Place of Service: [facility] CARDIOLOGY  Referring Provider: Devon Mcneill MD  Patient Care Team:  Loren Cruz APRN as PCP - General (Family Medicine)      Chief complaint: weight gain of 30 pounds in one week    Reason for consult: afib, AC recommendations    History of Present Illness:    This is an 81-year-old female with a history of ischemic stroke, congestive heart failure, paroxysmal atrial fibrillation, dementia, hypertension and hyperlipidemia.  She has a history of nonalcoholic steatosis and is followed by gastroenterology.  She sees Dr. York.  In 2021, he stopped her apixaban because she had no recurrence of atrial fibrillation.    She presented to the emergency department on 3/4/2022 with a 30 pound weight gain in 1 week with increasing edema of her lower extremities and abdominal distention.  She had orthopnea but not significant short windedness otherwise and her ECG showed atrial fibrillation.  Chest x-ray showed mild congestive heart failure and she was given 80 of IV Lasix and admitted for diuresis and heart failure.    Echo done 2021 showed ejection fraction of 66-70% with grade 2 diastolic dysfunction, mild to moderate concentric left hypertrophy, severely dilated left atrium, mild to moderate aortic stenosis with mild aortic insufficiency, RVSP 47 mmHg.      Vitals were well controlled on arrival except for mildly elevated heart rate.  She is anemic with a hemoglobin of 9.1 and her potassium this morning was 3.3.  Her troponin and proBNP have been negative.    At home, she had lower extremity edema and some numbness.  She was not feeling her heart racing or skipping and she had no chest pain or pressure.  She has no dyspnea at rest.  She is overall feeling somewhat better but  still has orthopnea.    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; generally asymptomatic   • Mitral regurgitation    • Mixed anxiety depressive disorder    • Nonrheumatic tricuspid (valve) insufficiency    • Osteoarthritis    • PAF (paroxysmal atrial fibrillation) (HCC)    • Sacroiliitis (HCC)    • Tricuspid regurgitation    • Type 2 diabetes mellitus (HCC)        Past Surgical History:   Procedure Laterality Date   • CATARACT EXTRACTION     • COLONOSCOPY N/A 3/10/2016    Procedure: COLONOSCOPY TO CECUM;  Surgeon: Shaan Becerra Jr., MD;  Location: [facility] ENDOSCOPY;  Service:    • COLONOSCOPY N/A 6/18/2021    Procedure: COLONOSCOPY TO CECUM AND INTO TERMINAL ILEUM;  Surgeon: Jeremy Darden MD;  Location: [facility] ENDOSCOPY;  Service: Gastroenterology;  Laterality: N/A;  PRE: ANEMIA  POST: HEMORRHOIDS   • ENDOSCOPY N/A 3/10/2016    Procedure: ESOPHAGOGASTRODUODENOSCOPY ;  Surgeon: Shaan Becerra Jr., MD;  Location: [facility] ENDOSCOPY;  Service:    • ENDOSCOPY N/A 6/18/2021    Procedure: ESOPHAGOGASTRODUODENOSCOPY;  Surgeon: Jeremy Darden MD;  Location: [facility] ENDOSCOPY;  Service: Gastroenterology;  Laterality: N/A;  PRE: ANEMIA  POST: HIATAL HERNIA, SMALL ESOPHAGEAL VARICES   • KNEE SURGERY     • TUBAL ABDOMINAL LIGATION           Prior to Admission medications    Medication Sig Start Date End Date Taking? Authorizing Provider   acetaminophen (TYLENOL)  500 MG tablet Take 500 mg by mouth every 4 (four) hours as needed for mild pain (1-3) (1-2 TABLETS PRN Q 4-6 HOURS).   Yes Provider, MD Sabina   allopurinol (ZYLOPRIM) 300 MG tablet TAKE 1 TABLET BY MOUTH  DAILY 9/10/21  Yes Ely Ramos APRN   atorvastatin (LIPITOR) 40 MG tablet TAKE 1 TABLET BY MOUTH DAILY 12/29/21  Yes Loren Cruz APRN   buPROPion SR (WELLBUTRIN SR) 150 MG 12 hr tablet Take 1 tablet by mouth Daily With Breakfast. 1/31/22  Yes Loren Cruz APRN   ferrous sulfate 325 (65 FE) MG tablet Take 1 tablet by mouth Daily With Breakfast. 7/15/21  Yes Loren Cruz APRN   glucose blood test strip BID. Test  In the AM and again in the PM 1/31/22  Yes Loren Cruz APRN   glucose monitor monitoring kit One touch meter 1/31/22  Yes Loren Cruz APRN   isosorbide mononitrate (IMDUR) 60 MG 24 hr tablet TAKE 1 TABLET BY MOUTH  DAILY 9/10/21  Yes Ely Ramos APRN   Lancets (onetouch ultrasoft) lancets Test BID 1/31/22  Yes Loren Cruz APRN   losartan (COZAAR) 25 MG tablet TAKE 1 TABLET BY MOUTH IN  THE MORNING AND 2 TABLETS  BY MOUTH IN THE EVENING 9/10/21  Yes Ely Ramos APRN   metoprolol succinate XL (TOPROL-XL) 100 MG 24 hr tablet TAKE 1 TABLET BY MOUTH  DAILY 9/10/21  Yes Ely Ramos APRN   mirtazapine (REMERON) 15 MG tablet Take 1 tablet by mouth Every Night. 1/31/22  Yes Loren Cruz APRN   montelukast (SINGULAIR) 10 MG tablet Take 1 tablet by mouth Every Night. 1/31/22  Yes Loren Cruz APRN   pantoprazole (PROTONIX) 40 MG EC tablet TAKE 1 TABLET BY MOUTH  DAILY 9/10/21  Yes Ely Ramos APRN   potassium chloride 10 MEQ CR tablet TAKE 1 TABLET BY MOUTH IN  THE MORNING  Patient taking differently: Double up on medicine this week for edema 9/10/21  Yes Ely Ramos APRN   SITagliptin (Januvia) 100 MG tablet Take 1 tablet by mouth Daily. 1/31/22  Yes Loren Cruz APRN   furosemide (LASIX) 40 MG tablet Take 1 tablet by  mouth 2 (Two) Times a Day for 30 days.  Patient taking differently: Take 40 mg by mouth 4 (Four) Times a Day. For this week 1/31/22 3/2/22  Loren Cruz APRN   metFORMIN ER (GLUCOPHAGE-XR) 500 MG 24 hr tablet Take 2 tablets by mouth Daily With Breakfast for 30 days. 1/31/22 3/2/22  Loren Cruz APRN       Allergies   Allergen Reactions   • Decongestant  [Pseudoephedrine]        Social History     Socioeconomic History   • Marital status:    Tobacco Use   • Smoking status: Never Smoker   • Smokeless tobacco: Never Used   • Tobacco comment: caffeine use    Vaping Use   • Vaping Use: Never used   Substance and Sexual Activity   • Alcohol use: No   • Drug use: No   • Sexual activity: Defer       Family History   Problem Relation Age of Onset   • Kidney disease Mother    • Heart disease Mother    • Emphysema Father    • Heart disease Brother        REVIEW OF SYSTEMS:   All systems reviewed.  Pertinent positives identified in HPI.  All other systems are negative.      Objective:     Vitals:    03/04/22 2322 03/04/22 2327 03/05/22 0545 03/05/22 0724   BP: 108/65   120/71   BP Location: Left arm   Left arm   Patient Position: Lying   Lying   Pulse: (!) 124 102  106   Resp: 18   18   Temp:    98.1 °F (36.7 °C)   TempSrc:    Oral   SpO2: 93%   90%   Weight:   94 kg (207 lb 3.7 oz)    Height:         Body mass index is 37.9 kg/m².    General Appearance:    Alert, cooperative, in no acute distress   Head:    Normocephalic, without obvious abnormality, atraumatic   Eyes:            Lids and lashes normal, conjunctivae and sclerae normal, no icterus, no pallor, corneas clear   Ears:    Ears appear intact with no abnormalities noted   Throat:   No oral lesions, no thrush, oral mucosa moist   Neck:   No adenopathy, supple, trachea midline, no thyromegaly, no carotid bruit, no JVD   Back:     No kyphosis present, no scoliosis present, no skin lesions, erythema or scars, no tenderness to percussion or palpation,  range of motion normal   Lungs:     Clear to auscultation, respirations regular, even and unlabored    Heart:    irregular rhythm and mildly tachycardic rate, normal S1 and S2, no murmur, no gallop, no rub, no click   Chest Wall:    No abnormalities observed   Abdomen:     Normal bowel sounds, no masses, no organomegaly, soft, nontender, nondistended, no guarding, no rebound  tenderness   Extremities:   Moves all extremities well, no edema, no cyanosis, no redness   Pulses:   Pulses palpable and equal bilaterally. Normal radial, carotid, dorsalis pedis and posterior tibial pulses bilaterally.    Skin:  Psychiatric:   No bleeding, bruising or rash    Alert and oriented x 3, normal mood and affect   Lab Review:     Results from last 7 days   Lab Units 03/05/22  0632   SODIUM mmol/L 145   POTASSIUM mmol/L 3.3*   CHLORIDE mmol/L 107   CO2 mmol/L 26.0   BUN mg/dL 23   CREATININE mg/dL 0.81   CALCIUM mg/dL 8.3*   BILIRUBIN mg/dL 0.5   ALK PHOS U/L 108   ALT (SGPT) U/L 19   AST (SGOT) U/L 18   GLUCOSE mg/dL 105*     Results from last 7 days   Lab Units 03/04/22  1200   TROPONIN T ng/mL <0.010     Results from last 7 days   Lab Units 03/05/22  0632   WBC 10*3/mm3 6.18   HEMOGLOBIN g/dL 9.1*   HEMATOCRIT % 30.9*   PLATELETS 10*3/mm3 156     Results from last 7 days   Lab Units 03/05/22  0632 03/04/22  1200   INR  1.27* 1.24*     Results from last 7 days   Lab Units 03/04/22  1200   MAGNESIUM mg/dL 2.0                  EKG      I personally viewed and interpreted the patient's EKG/Telemetry data.        Assessment and Plan:       1. Acute volume overload, pulmonary edema noted on chest x-ray, troponin and proBNP negative.  This may be due to A. fib with rapid ventricular response.  We will try to control heart rate and repeat echocardiogram.  No evidence of acute coronary syndrome.  2. Atrial fibrillation with rapid ventricular response, restart Eliquis 5 mg twice daily  3. Hypertension, blood pressure well  controlled  4. Hyperlipidemia.    Agree with continued diuresis.  Check echocardiogram, IV dig x1 to control heart rate.  Blood pressures overall well controlled.  I think she likely got into some heart failure issues due to the A. fib with rapid rate and diastolic issues.  We will continue to treat.    Selene Culp MD  03/05/22  13:08 EST

## 2022-03-05 NOTE — PLAN OF CARE
Goal Outcome Evaluation:  Plan of Care Reviewed With: patient        Progress: no change  Outcome Evaluation: Pt A&Ox4, with periods of confusion at baseline. Son at bedside on and off throughout shift and involved in care and attentive to pt. Pt c/o pain all over body. Morphine given per MAR.Pt has had 4 loose Bms this shift. LHA and GI notified.H&H ordered Q12h and occcult stool & GI PCR stool sample collected and sent to lab. Abd is distended and nontender to palpation. Pt up to BSC with assist x1. Contact spore isolation precautions initiated to r/o c diff. Will CTM the rest of my shift.

## 2022-03-05 NOTE — PROGRESS NOTES
[facility] Clinical Pharmacy Services: C. Difficile Medication Changes       Pharmacy has been consulted to look over Miri Yung's profile to check patient's medications for changes due to C. Difficile diagnosis per Dr. Mane's request.       Current C. Diff Regimen: not ordered yet    Assessment/Plan/Changes       1. Proton pump inhibitor: Protonix - order has been discontinued  2. Antiperistalic agents or stool softeners/laxatives: none ordered at this time      Thank you for allowing me to participate in your patient's care.  Please call pharmacy with any questions or concerns.     Fidel Gutiérrez, PharmD  Clinical Pharmacist

## 2022-03-05 NOTE — PROGRESS NOTES
Name: Miri Yung ADMIT: 3/4/2022   : 1940  PCP: Loren Cruz APRN    MRN: [id number] LOS: 1 days   AGE/SEX: 81 y.o. female  ROOM: [facility]     Subjective   Subjective   92 to 93% on room air.  Easily short of breath with minimal exertion sitting in bed.  She feels anxious.  Abdominal distention is stable.  Diffuse edema.  She states 2 weeks ago she was able to walk around her yard with an assist device.  No chest pain, nausea, vomiting, fever, chills, diarrhea or confusion  Review of Systems see above     Objective   Objective   Vital Signs  Temp:  [97.5 °F (36.4 °C)-98.1 °F (36.7 °C)] 98.1 °F (36.7 °C)  Heart Rate:  [102-124] 106  Resp:  [18-22] 18  BP: (106-124)/(47-81) 120/71  SpO2:  [90 %-99 %] 90 %  on   ;   Device (Oxygen Therapy): room air  Body mass index is 37.9 kg/m².  Physical Exam  Vitals reviewed.   Constitutional:       Appearance: She is well-developed. She is obese. She is ill-appearing.   HENT:      Head: Normocephalic and atraumatic.      Mouth/Throat:      Mouth: Mucous membranes are moist.   Eyes:      General: No scleral icterus.  Cardiovascular:      Rate and Rhythm: Rhythm irregularly irregular.   Pulmonary:      Breath sounds: Rales present. No wheezing or rhonchi.      Comments: Conversational dyspnea.  Breath sounds diminished  Chest:      Chest wall: No tenderness.   Abdominal:      General: Bowel sounds are normal. There is no distension.      Palpations: Abdomen is soft. There is no mass.      Tenderness: There is no abdominal tenderness.      Hernia: No hernia is present.   Musculoskeletal:      Right lower leg: Edema present.      Left lower leg: Edema present.      Comments: Diffuse anasarca.    Skin:     General: Skin is warm and dry.      Coloration: Skin is not jaundiced.   Neurological:      Mental Status: She is alert and oriented to person, place, and time.   Psychiatric:         Mood and Affect: Mood normal.         Behavior: Behavior normal.          Thought Content: Thought content normal.         Results Review     I reviewed the patient's new clinical results.  Results from last 7 days   Lab Units 03/05/22  0632 03/04/22  1200 02/28/22  1553   WBC 10*3/mm3 6.18 7.66 7.4   HEMOGLOBIN g/dL 9.1* 9.6* 9.3*   PLATELETS 10*3/mm3 156 175 153     Results from last 7 days   Lab Units 03/05/22  0632 03/04/22  1200 03/03/22  1220 02/28/22  1553   SODIUM mmol/L 145 142 143 141   POTASSIUM mmol/L 3.3* 3.8 4.2 4.2   CHLORIDE mmol/L 107 105 106 106   TOTAL CO2 mmol/L  --   --  21 20   CO2 mmol/L 26.0 23.1  --   --    BUN mg/dL 23 26* 25 29*   CREATININE mg/dL 0.81 1.03* 0.93 1.01*   GLUCOSE mg/dL 105* 113* 121* 122*     Results from last 7 days   Lab Units 03/05/22  0632 03/04/22  1200 02/28/22  1553   ALBUMIN g/dL 3.70 4.10 4.1   BILIRUBIN mg/dL 0.5 0.5 0.5   ALK PHOS U/L 108 121* 125*   AST (SGOT) U/L 18 24 27   ALT (SGPT) U/L 19 26 23     Results from last 7 days   Lab Units 03/05/22  0632 03/04/22  1200 03/03/22  1220 02/28/22  1553   CALCIUM mg/dL 8.3* 9.0 8.6* 9.0   ALBUMIN g/dL 3.70 4.10  --  4.1   MAGNESIUM mg/dL  --  2.0  --   --        COVID19   Date Value Ref Range Status   03/04/2022 Not Detected Not Detected - Ref. Range Final   06/18/2021 Not Detected Not Detected - Ref. Range Final     Hemoglobin A1C   Date/Time Value Ref Range Status   03/05/2022 0632 5.90 (H) 4.80 - 5.60 % Final     Glucose   Date/Time Value Ref Range Status   03/05/2022 1106 128 70 - 130 mg/dL Final     Comment:     Meter: [id number] : [id number] Stacaioszen Crystal NA   03/05/2022 0614 110 70 - 130 mg/dL Final     Comment:     Meter: [id number] : [id number] Lex Bhagat  RN       Intake/Output Summary (Last 24 hours) at 3/5/2022 1136  Last data filed at 3/5/2022 1000  Gross per 24 hour   Intake 240 ml   Output 450 ml   Net -210 ml     I/O last 3 completed shifts:  In: -   Out: 450 [Urine:450]    XR Chest 1 View  Narrative: XR CHEST 1 VW-  03/04/2022     HISTORY: Shortness of  breath.     Heart size is mildly enlarged. Lungs are underinflated with some  vascular crowding. There appears be a small amount of fluid in the minor  fissure on the right. Mild vascular congestion is seen. There is  haziness of the right lung base which may represent combination of  atelectasis, pneumonia and pleural fluid.     Impression: 1. FINDINGS consistent with mild congestive heart failure. There may be  an element of pneumonia or atelectasis in the right lung base as well.     This report was finalized on 3/4/2022 12:54 PM by Dr. Asher Wallace M.D.       Scheduled Medications  allopurinol, 300 mg, Oral, Daily  atorvastatin, 40 mg, Oral, Daily  buPROPion SR, 150 mg, Oral, Daily With Breakfast  ferrous sulfate, 325 mg, Oral, Daily With Breakfast  furosemide, 80 mg, Intravenous, Q12H  insulin lispro, 0-9 Units, Subcutaneous, TID AC  isosorbide mononitrate, 60 mg, Oral, Daily  linagliptin, 5 mg, Oral, Daily  losartan, 25 mg, Oral, QAM  losartan, 50 mg, Oral, Q PM  metFORMIN ER, 1,000 mg, Oral, Daily With Breakfast  metoprolol succinate XL, 100 mg, Oral, Daily  mirtazapine, 15 mg, Oral, Nightly  montelukast, 10 mg, Oral, Nightly  pantoprazole, 40 mg, Oral, Daily  potassium chloride, 10 mEq, Oral, QAM  sodium chloride, 10 mL, Intravenous, Q12H    Infusions   Diet  Diet Regular; Cardiac, Consistent Carbohydrate, Renal       Assessment/Plan     Active Hospital Problems    Diagnosis  POA   • **Cirrhosis of liver (HCC) [K74.60]  Yes   • Anasarca [R60.1]  Yes   • Splenomegaly due to portal hypertension and liver disease [R16.1]  Yes   • Esophageal varices (HCC) [I85.00]  Yes   • Symptomatic anemia [D64.9]  Yes   • History of CVA (cerebrovascular accident) [Z86.73]  Not Applicable   • Chronic diastolic (congestive) heart failure (HCC) [I50.32]  Yes   • Atrial fibrillation (HCC) [I48.91]  Yes   • Type 2 diabetes mellitus (HCC) [E11.9]  Yes   • Gastroesophageal reflux disease [K21.9]  Yes   • HTN (hypertension)  [I10]  Yes   • Dementia (CMS/HCC) [F03.90]  Yes      Resolved Hospital Problems   No resolved problems to display.       81 y.o. female admitted with Cirrhosis of liver (HCC).  Direct admitted through the GI office for abdominal distention increasing ascites.  · Muse cirrhosis/anasarca      CT scan with small amount of ascites present.  No LVP ordered.  Acute on chronic CHF contributing to volume overload.  Chest x-ray with mild CHF.  BNP normal  - Home dose Lasix 40 mg twice daily but had been increased to 80 mg twice daily at home with continued fluid retention. Continue 80 mg twice daily IV. Weight down 9lbs. Low na diet. Strict IO.  Add aldactone. K3.3 but monitor with ARB.      · Acute on chronic diastolic CHF/Atrial fibrillation/JOSEPH  Followed by Dr. York.  Stopped apixaban 12/21.   atrial fibrillation with rate 115.   Consult cardiology to assist with afib and diuresis. Check Echo and TSH    · Hypokalemia-3.3.  Adding Aldactone.  Replace per protocol.    · Elevated AFP/elevated CA 19-9-Per GI    · DM2  Correctional insulin. A1c 5.9    · Anxiety situational-likely related to acute issues.  Add Atarax as needed.     · Anemia  Hemoglobin slowly trending down not far from baseline.  No overt bleeding. She is on ferrous sulfate.     Consult PT     ·   DVT prophylaxis.SCDs for now pending cardiology plans for OAC  · Full code.  · Discussed with patient, family, nursing staff and Dr Mane.   Greater than 50% of time spent in counseling and/or coordination of care. Total face/floor time 35 minutes.      KWASI Harris  [facility]  03/05/22  11:29 EST

## 2022-03-06 PROBLEM — R19.5 OCCULT GI BLEEDING: Status: ACTIVE | Noted: 2022-01-01

## 2022-03-06 NOTE — PLAN OF CARE
Goal Outcome Evaluation:  Plan of Care Reviewed With: patient           Outcome Evaluation: pt adm from MD office with JOSEPH, inc swelling B LEs. Pt with KOO cirrhosis, ascites, anasarca, R pleural effusion. Pt presents with weakness and impaired functional mobility from baseline. Pt lives alone, uses cane or rolling walker, is independent and can stay on first floor, son lives next door. Pt is pleasant and cooperative, on 2L o2 with o2 sats 90%, she was able to get out of bed and ambulate short distance with rwx amd CGA, 20 ft, o2 sats 92% after walking. Pt will benefit from PT while admitted to maximize potential for safe discharge home    Patient was intermittently wearing a face mask during this therapy encounter. Therapist used appropriate personal protective equipment including eye protection, mask, and gloves.  Mask used was standard procedure mask. Appropriate PPE was worn during the entire therapy session. Hand hygiene was completed before and after therapy session. Patient is not in enhanced droplet precautions.

## 2022-03-06 NOTE — PROGRESS NOTES
[facility]  Inpatient Progress Note    Reason for Follow Up: Muse cirrhosis with volume overload    Subjective     Interval History:   Diuresing with the help of cardiology    Current Facility-Administered Medications:   •  allopurinol (ZYLOPRIM) tablet 300 mg, 300 mg, Oral, Daily, Devon Mcneill MD, 300 mg at 03/06/22 0800  •  apixaban (ELIQUIS) tablet 5 mg, 5 mg, Oral, Q12H, Selene Culp MD, 5 mg at 03/06/22 0801  •  atorvastatin (LIPITOR) tablet 40 mg, 40 mg, Oral, Daily, Devon Mcneill MD, 40 mg at 03/06/22 0801  •  buPROPion SR (WELLBUTRIN SR) 12 hr tablet 150 mg, 150 mg, Oral, Daily With Breakfast, Devon Mcneill MD, 150 mg at 03/06/22 0801  •  dextrose (D50W) (25 g/50 mL) IV injection 25 g, 25 g, Intravenous, Q15 Min PRN, Devon Mcneill MD  •  dextrose (GLUTOSE) oral gel 15 g, 15 g, Oral, Q15 Min PRN, Devon Mcneill MD  •  famotidine (PEPCID) tablet 20 mg, 20 mg, Oral, BID AC, Chase Choudhary MD, 20 mg at 03/06/22 0801  •  ferrous sulfate tablet 325 mg, 325 mg, Oral, Daily With Breakfast, Devon Mcneill MD, 325 mg at 03/06/22 0801  •  furosemide (LASIX) injection 80 mg, 80 mg, Intravenous, Q12H, Devon Mcneill MD, 80 mg at 03/06/22 0801  •  glucagon (human recombinant) (GLUCAGEN DIAGNOSTIC) injection 1 mg, 1 mg, Intramuscular, Q15 Min PRN, Devon Mcneill MD  •  hydrOXYzine (ATARAX) tablet 25 mg, 25 mg, Oral, TID PRN, Alena Guerrero APRN, 25 mg at 03/06/22 0124  •  insulin lispro (ADMELOG) injection 0-9 Units, 0-9 Units, Subcutaneous, TID ACOsito Jawed, MD, 2 Units at 03/06/22 1151  •  isosorbide mononitrate (IMDUR) 24 hr tablet 60 mg, 60 mg, Oral, Daily, Devon Mcneill MD, 60 mg at 03/06/22 0801  •  linagliptin (TRADJENTA) tablet 5 mg, 5 mg, Oral, Daily, Devon Mcneill MD, 5 mg at 03/06/22 0801  •  losartan (COZAAR) tablet 50 mg, 50 mg, Oral, Q PM, Devon Mcneill MD, 50 mg at 03/05/22 1740  •  metoprolol succinate XL (TOPROL-XL) 24 hr tablet 150 mg, 150 mg, Oral, Nightly,  Selene Culp MD  •  mirtazapine (REMERON) tablet 15 mg, 15 mg, Oral, Nightly, Devon Mcneill MD, 15 mg at 03/05/22 2010  •  montelukast (SINGULAIR) tablet 10 mg, 10 mg, Oral, Nightly, Devon Mcneill MD, 10 mg at 03/05/22 2010  •  morphine injection 1 mg, 1 mg, Intravenous, Q4H PRN, 1 mg at 03/06/22 0124 **AND** naloxone (NARCAN) injection 0.4 mg, 0.4 mg, Intravenous, Q5 Min PRN, Devon Mcneill MD  •  nitroglycerin (NITROSTAT) SL tablet 0.4 mg, 0.4 mg, Sublingual, Q5 Min PRN, Devon Mcneill MD  •  ondansetron (ZOFRAN) injection 4 mg, 4 mg, Intravenous, Q6H PRN, Devon Mcneill MD  •  Pharmacy Consult, , Does not apply, Continuous PRN, Chase Choudhary MD  •  potassium chloride (K-DUR,KLOR-CON) ER tablet 40 mEq, 40 mEq, Oral, PRN, 40 mEq at 03/05/22 1615 **OR** potassium chloride (KLOR-CON) packet 40 mEq, 40 mEq, Oral, PRN **OR** potassium chloride 10 mEq in 100 mL IVPB, 10 mEq, Intravenous, Q1H PRN, Oli Mane MD  •  potassium chloride (K-DUR,KLOR-CON) ER tablet 40 mEq, 40 mEq, Oral, QAM, Selene Culp MD, 40 mEq at 03/06/22 0610  •  sodium chloride 0.9 % flush 10 mL, 10 mL, Intravenous, Q12H, Devon Mcneill MD, 10 mL at 03/06/22 0801  •  sodium chloride 0.9 % flush 10 mL, 10 mL, Intravenous, PRN, Devon Mcneill MD  •  spironolactone (ALDACTONE) tablet 100 mg, 100 mg, Oral, Daily, Alena Guerrero, APRN, 100 mg at 03/06/22 0801  Review of Systems:    There is weakness and fatigue all other systems reviewed and negative    Objective     Vital Signs  Temp:  [97.9 °F (36.6 °C)-98.3 °F (36.8 °C)] 98 °F (36.7 °C)  Heart Rate:  [] 85  Resp:  [18-20] 20  BP: (104-126)/(57-99) 126/99  Body mass index is 37.82 kg/m².    Intake/Output Summary (Last 24 hours) at 3/6/2022 1604  Last data filed at 3/6/2022 1430  Gross per 24 hour   Intake 360 ml   Output 1500 ml   Net -1140 ml     I/O this shift:  In: 360 [P.O.:360]  Out: 1500 [Urine:1500]     Physical Exam:   General: patient awake, alert and  cooperative   Eyes: Normal lids and lashes, no scleral icterus   Neck: supple, normal ROM   Skin: warm and dry, not jaundiced   Cardiovascular: regular rhythm and rate, no murmurs auscultated   Pulm: clear to auscultation bilaterally, regular and unlabored   Abdomen: soft, nontender, nondistended; normal bowel sounds   Extremities: no rash or edema   Psychiatric: Normal mood and behavior; memory intact     Results Review:     I reviewed the patient's new clinical results.    Results from last 7 days   Lab Units 03/06/22  0805 03/05/22 2007 03/05/22 0632 03/04/22  1200   WBC 10*3/mm3 6.84  --  6.18 7.66   HEMOGLOBIN g/dL 9.2* 9.4* 9.1* 9.6*   HEMATOCRIT % 31.5* 31.5* 30.9* 33.7*   PLATELETS 10*3/mm3 149  --  156 175     Results from last 7 days   Lab Units 03/06/22  0805 03/05/22 0632 03/04/22  1200   SODIUM mmol/L 145 145 142   POTASSIUM mmol/L 3.9 3.3* 3.8   CHLORIDE mmol/L 107 107 105   CO2 mmol/L 27.0 26.0 23.1   BUN mg/dL 19 23 26*   CREATININE mg/dL 0.84 0.81 1.03*   CALCIUM mg/dL 8.6 8.3* 9.0   BILIRUBIN mg/dL 0.5 0.5 0.5   ALK PHOS U/L 111 108 121*   ALT (SGPT) U/L 19 19 26   AST (SGOT) U/L 20 18 24   GLUCOSE mg/dL 131* 105* 113*     Results from last 7 days   Lab Units 03/05/22 0632 03/04/22  1200   INR  1.27* 1.24*     Lab Results   Lab Value Date/Time    LIPASE 46 02/23/2016 0635       Radiology:  XR Chest 1 View   Final Result   1. FINDINGS consistent with mild congestive heart failure. There may be   an element of pneumonia or atelectasis in the right lung base as well.       This report was finalized on 3/4/2022 12:54 PM by Dr. Asher Wallace M.D.              Assessment/Plan     Patient Active Problem List   Diagnosis   • Dementia (CMS/HCC)   • Depressive disorder   • Edema   • Abnormal liver function tests   • Gastroesophageal reflux disease   • Gout   • Hyperlipidemia   • HTN (hypertension)   • Insomnia   • Arthropathy of knee   • Memory loss   • Low back pain   • Mitral valve insufficiency    • Tricuspid valve insufficiency   • Urinary tract infection   • Valvular endocarditis   • Long QT interval   • Microcytic anemia   • Anemia   • Seasonal allergies   • Acute cystitis without hematuria   • Chronic fatigue   • Bacterial endocarditis   • Slow transit constipation   • Acute ischemic stroke (HCC)   • Paroxysmal tachycardia (HCC)   • Type 2 diabetes mellitus (HCC)   • Atrial fibrillation (HCC)   • Morbidly obese (HCC)   • Chronic diastolic (congestive) heart failure (HCC)   • Symptomatic anemia   • History of CVA (cerebrovascular accident)   • Long term current use of anticoagulant therapy   • Acute blood loss anemia   • Cirrhosis of liver (HCC)   • Esophageal varices (HCC)   • Splenomegaly due to portal hypertension and liver disease   • Anasarca   • Atrial fibrillation with RVR (HCC)   • Occult GI bleeding       Assessment:  1. Muse cirrhosis  2. Lower extremity edema with ascites     Plan:  · IV diuresis per primary team and cardiology attending  · Weigh daily  · Follow I's and O's  · Watch BMP daily for alterations in electrolytes or creatinine with IV diuresis (electrolytes and creatinine normal today)  · No utility in paracentesis at this time as CAT scan shows a tiny volume of ascites    I discussed the patients findings and my recommendations with patient and nursing staff.    Chase Choudhary MD

## 2022-03-06 NOTE — PROGRESS NOTES
LOS: 2 days   Patient Care Team:  Loren Cruz APRN as PCP - General (Family Medicine)    Chief Complaint:     F/u pulmonary edema    Interval History:     She said her breathing is much better today.  She is not coughing up anything.  She still some mild lower extremity edema.  She denies chest pain or nausea.  She is up in a chair eating lunch.    Objective   Vital Signs  Temp:  [97.9 °F (36.6 °C)-98.3 °F (36.8 °C)] 97.9 °F (36.6 °C)  Heart Rate:  [116-118] 118  Resp:  [18] 18  BP: (102-115)/(55-82) 115/57    Intake/Output Summary (Last 24 hours) at 3/6/2022 1215  Last data filed at 3/6/2022 0928  Gross per 24 hour   Intake 360 ml   Output 800 ml   Net -440 ml       Comfortable NAD  Neck supple, no JVD or thyromegaly appreciated  S1/S2 RRR, no m/r/g  Lungs CTA B with wheezing at bilateral bases, normal effort  Abdomen S/NT/ND (+) BS, no HSM appreciated  Extremities warm, no clubbing, cyanosis, 2+ lower extremity edema with erythema  No visible or palpable skin lesions  A/Ox4, mood and affect appropriate    Results Review:      Results from last 7 days   Lab Units 03/06/22  0805 03/05/22 0632 03/04/22  1200   SODIUM mmol/L 145 145 142   POTASSIUM mmol/L 3.9 3.3* 3.8   CHLORIDE mmol/L 107 107 105   CO2 mmol/L 27.0 26.0 23.1   BUN mg/dL 19 23 26*   CREATININE mg/dL 0.84 0.81 1.03*   GLUCOSE mg/dL 131* 105* 113*   CALCIUM mg/dL 8.6 8.3* 9.0     Results from last 7 days   Lab Units 03/04/22  1200   TROPONIN T ng/mL <0.010     Results from last 7 days   Lab Units 03/06/22  0805 03/05/22 2007 03/05/22  0632 03/04/22  1200   WBC 10*3/mm3 6.84  --  6.18 7.66   HEMOGLOBIN g/dL 9.2* 9.4* 9.1* 9.6*   HEMATOCRIT % 31.5* 31.5* 30.9* 33.7*   PLATELETS 10*3/mm3 149  --  156 175     Results from last 7 days   Lab Units 03/05/22  0632 03/04/22  1200   INR  1.27* 1.24*         Results from last 7 days   Lab Units 03/05/22 2007   MAGNESIUM mg/dL 2.0           I reviewed the patient's new clinical results.  I personally  viewed and interpreted the patient's EKG/Telemetry data        Medication Review:   allopurinol, 300 mg, Oral, Daily  apixaban, 5 mg, Oral, Q12H  atorvastatin, 40 mg, Oral, Daily  buPROPion SR, 150 mg, Oral, Daily With Breakfast  famotidine, 20 mg, Oral, BID AC  ferrous sulfate, 325 mg, Oral, Daily With Breakfast  furosemide, 80 mg, Intravenous, Q12H  insulin lispro, 0-9 Units, Subcutaneous, TID AC  isosorbide mononitrate, 60 mg, Oral, Daily  linagliptin, 5 mg, Oral, Daily  losartan, 50 mg, Oral, Q PM  metoprolol succinate XL, 150 mg, Oral, Nightly  mirtazapine, 15 mg, Oral, Nightly  montelukast, 10 mg, Oral, Nightly  potassium chloride, 40 mEq, Oral, QAM  sodium chloride, 10 mL, Intravenous, Q12H  spironolactone, 100 mg, Oral, Daily        Pharmacy Consult,         Assessment/Plan       Cirrhosis of liver (HCC)    Dementia (CMS/HCC)    Gastroesophageal reflux disease    HTN (hypertension)    Type 2 diabetes mellitus (HCC)    Atrial fibrillation (HCC)    Chronic diastolic (congestive) heart failure (HCC)    Symptomatic anemia    History of CVA (cerebrovascular accident)    Esophageal varices (HCC)    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 Acute volume overload, pulmonary edema noted on chest x-ray, troponin and proBNP negative.  This may be due to A. fib with rapid ventricular response.   No evidence of acute coronary syndrome.  2. Atrial fibrillation with rapid ventricular response, restart Eliquis 5 mg twice daily.  Heart rate is overall better but still little fast.  Increase metoprolol succinate to 150 mg nightly.  IV digoxin was given earlier this morning.  3. Hypertension, blood pressure well controlled  4. Hyperlipidemia.      Await echo. Dig IV today and stop am losartan due to hypotension.  Will follow.    Selene Culp MD  03/06/22  12:15 EST

## 2022-03-06 NOTE — PLAN OF CARE
Goal Outcome Evaluation:  Plan of Care Reviewed With: patient        Progress: no change  Outcome Evaluation: Pt A&Ox4 with periods of confusion and hx of dementia. VSS on 2 L NC. Pt began to get a bit anxious and started c/o pain everywhere. PRN morphine and atarax given per MAR and pt resting well. Pt up to BSC with assist x1. Son at bedside this afternoon and attentive to pt and participating in care. All questions answered. Adria in palce. Will CTM the rest of my shift.

## 2022-03-06 NOTE — PLAN OF CARE
Goal Outcome Evaluation:  Plan of Care Reviewed With: patient        Progress: no change  Outcome Evaluation: Patients o2 dropped this shift and patient put on o2 when asleep, all other vitals stable. Patient reports pain this shift and anxiety and PRN medication given. Patient awake most of shift and has periods of confusion but easily reoirented. Will continue to monitor.

## 2022-03-06 NOTE — THERAPY EVALUATION
Patient Name: Miri Yung  : 1940    MRN: [id number]                              Today's Date: 3/6/2022       Admit Date: 3/4/2022    Visit Dx:     ICD-10-CM ICD-9-CM   1. Cirrhosis of liver without ascites, unspecified hepatic cirrhosis type (HCC)  K74.60 571.5   2. Anasarca  R60.1 782.3   3. Pedal edema  R60.0 782.3   4. Congestive heart failure, unspecified HF chronicity, unspecified heart failure type (HCC)  I50.9 428.0     Patient Active Problem List   Diagnosis   • Dementia (CMS/HCC)   • Depressive disorder   • Edema   • Abnormal liver function tests   • Gastroesophageal reflux disease   • Gout   • Hyperlipidemia   • HTN (hypertension)   • Insomnia   • Arthropathy of knee   • Memory loss   • Low back pain   • Mitral valve insufficiency   • Tricuspid valve insufficiency   • Urinary tract infection   • Valvular endocarditis   • Long QT interval   • Microcytic anemia   • Anemia   • Seasonal allergies   • Acute cystitis without hematuria   • Chronic fatigue   • Bacterial endocarditis   • Slow transit constipation   • Acute ischemic stroke (HCC)   • Paroxysmal tachycardia (HCC)   • Type 2 diabetes mellitus (HCC)   • Atrial fibrillation (HCC)   • Morbidly obese (HCC)   • Chronic diastolic (congestive) heart failure (HCC)   • Symptomatic anemia   • History of CVA (cerebrovascular accident)   • Long term current use of anticoagulant therapy   • Acute blood loss anemia   • Cirrhosis of liver (HCC)   • Esophageal varices (HCC)   • Splenomegaly due to portal hypertension and liver disease   • Anasarca   • Atrial fibrillation with RVR (HCC)   • Occult GI bleeding     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   • Endocarditis     with mitral and tricuspid regurgitation   • Esophageal reflux    • GERD  (gastroesophageal reflux disease)    • Gout    • History of blood transfusion     due to anemia   • HTN (hypertension)    • Hyperlipidemia    • Insomnia    • LOM (loss of memory)    • Lung mass    • 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; generally asymptomatic   • Mitral regurgitation    • Mixed anxiety depressive disorder    • Nonrheumatic tricuspid (valve) insufficiency    • Osteoarthritis    • PAF (paroxysmal atrial fibrillation) (HCC)    • Sacroiliitis (HCC)    • Tricuspid regurgitation    • Type 2 diabetes mellitus (HCC)      Past Surgical History:   Procedure Laterality Date   • CATARACT EXTRACTION     • COLONOSCOPY N/A 3/10/2016    Procedure: COLONOSCOPY TO CECUM;  Surgeon: Shaan Becerra Jr., MD;  Location: [facility] ENDOSCOPY;  Service:    • COLONOSCOPY N/A 6/18/2021    Procedure: COLONOSCOPY TO CECUM AND INTO TERMINAL ILEUM;  Surgeon: Jeremy Darden MD;  Location: [facility] ENDOSCOPY;  Service: Gastroenterology;  Laterality: N/A;  PRE: ANEMIA  POST: HEMORRHOIDS   • ENDOSCOPY N/A 3/10/2016    Procedure: ESOPHAGOGASTRODUODENOSCOPY ;  Surgeon: Shaan Becerra Jr., MD;  Location: [facility] ENDOSCOPY;  Service:    • ENDOSCOPY N/A 6/18/2021    Procedure: ESOPHAGOGASTRODUODENOSCOPY;  Surgeon: Jeremy Darden MD;  Location: [facility] ENDOSCOPY;  Service: Gastroenterology;  Laterality: N/A;  PRE: ANEMIA  POST: HIATAL HERNIA, SMALL ESOPHAGEAL VARICES   • KNEE SURGERY     • TUBAL ABDOMINAL LIGATION        General Information     Row Name 03/06/22 1241          Physical Therapy Time and Intention    Document Type evaluation  -PC     Mode of Treatment physical therapy  -PC     Row Name 03/06/22 1241          General Information    Patient Profile Reviewed yes  -PC     Prior Level of Function independent:  -PC     Existing Precautions/Restrictions fall  -PC     Row Name 03/06/22 1241          Living Environment    People in Home alone  -PC     Row Name 03/06/22 1241          Home Main Entrance     Number of Stairs, Main Entrance one  along the front walk from driveway to house there are 4 paces with one step  from one another  -PC     Row Name 03/06/22 1241          Stairs Within Home, Primary    Stairs, Within Home, Primary stays on main level  -PC     Row Name 03/06/22 1241          Cognition    Orientation Status (Cognition) oriented x 4  -PC     Row Name 03/06/22 1241          Safety Issues, Functional Mobility    Impairments Affecting Function (Mobility) endurance/activity tolerance;strength  -PC           User Key  (r) = Recorded By, (t) = Taken By, (c) = Cosigned By    Initials Name Provider Type    PC Maddie Fairchild, PT Physical Therapist               Mobility     Row Name 03/06/22 1243          Bed Mobility    Bed Mobility supine-sit;sit-supine  -PC     Supine-Sit [place] (Bed Mobility) contact guard  -PC     Row Name 03/06/22 1243          Sit-Stand Transfer    Sit-Stand [place] (Transfers) contact guard  -PC     Assistive Device (Sit-Stand Transfers) walker, front-wheeled  -PC     Row Name 03/06/22 1243          Gait/Stairs (Locomotion)    [place] Level (Gait) contact guard  -PC     Assistive Device (Gait) walker, front-wheeled  -PC     Distance in Feet (Gait) 20 ft  -PC     Deviations/Abnormal Patterns (Gait) base of support, wide;gait speed decreased;stride length decreased  -PC     Bilateral Gait Deviations forward flexed posture  -PC           User Key  (r) = Recorded By, (t) = Taken By, (c) = Cosigned By    Initials Name Provider Type     Maddie Fairchild, PT Physical Therapist               Obj/Interventions     Row Name 03/06/22 1244          Range of Motion Comprehensive    General Range of Motion no range of motion deficits identified  -     Row Name 03/06/22 1244          Strength Comprehensive (MMT)    Comment, General Manual Muscle Testing (MMT) Assessment no focal deficits, general weakness, fatigues quickly  -PC           User Key  (r) = Recorded By, (t) =  Taken By, (c) = Cosigned By    Initials Name Provider Type    PC Maddie Fairchild, PT Physical Therapist               Goals/Plan     Row Name 03/06/22 1250          Bed Mobility Goal 1 (PT)    Activity/Assistive Device (Bed Mobility Goal 1, PT) sit to supine/supine to sit  -PC     [place] Level/Cues Needed (Bed Mobility Goal 1, PT) modified independence  -PC     Time Frame (Bed Mobility Goal 1, PT) 1 week  -PC     Row Name 03/06/22 1250          Transfer Goal 1 (PT)    Activity/Assistive Device (Transfer Goal 1, PT) sit-to-stand/stand-to-sit  -PC     [place] Level/Cues Needed (Transfer Goal 1, PT) modified independence  -PC     Time Frame (Transfer Goal 1, PT) 1 week  -PC     Row Name 03/06/22 1250          Gait Training Goal 1 (PT)    Activity/Assistive Device (Gait Training Goal 1, PT) gait (walking locomotion);assistive device use  -PC     [place] Level (Gait Training Goal 1, PT) modified independence  -PC     Distance (Gait Training Goal 1, PT) 40 ft  -PC     Time Frame (Gait Training Goal 1, PT) 1 week  -PC           User Key  (r) = Recorded By, (t) = Taken By, (c) = Cosigned By    Initials Name Provider Type    PC Maddie Fairchild, PT Physical Therapist               Clinical Impression     Row Name 03/06/22 1244          Pain    Pretreatment Pain Rating 0/10 - no pain  -PC     Row Name 03/06/22 1244          Plan of Care Review    Plan of Care Reviewed With patient  -PC     Outcome Evaluation pt adm from MD office with JOSEPH, inc swelling B LEs. Pt with KOO cirrhosis, ascites, anasarca, R pleural effusion. Pt presents with weakness and impaired functional mobility from baseline. Pt lives alone, uses cane or rolling walker, is independent and can stay on first floor, son lives next door. Pt is pleasant and cooperative, on 2L o2 with o2 sats 90%, she was able to get out of bed and ambulate short distance with rwx amd CGA, 20 ft, o2 sats 92% after walking. Pt will benefit from PT while admitted to  maximize potential for safe discharge home  -PC     Row Name 03/06/22 1244          Therapy Assessment/Plan (PT)    Rehab Potential (PT) good, to achieve stated therapy goals  -PC     Criteria for Skilled Interventions Met (PT) yes;meets criteria  -PC     Row Name 03/06/22 1244          Vital Signs    Pre SpO2 (%) 90  -PC     O2 Delivery Pre Treatment supplemental O2  -PC     Post SpO2 (%) 92  -PC     O2 Delivery Post Treatment supplemental O2  -PC     Row Name 03/06/22 1244          Positioning and Restraints    Pre-Treatment Position in bed  -PC     Post Treatment Position chair  -PC     In Chair sitting;call light within reach;encouraged to call for assist;with family/caregiver  no alarm box available on floor, nursing aware  -PC           User Key  (r) = Recorded By, (t) = Taken By, (c) = Cosigned By    Initials Name Provider Type    Maddie Kline, PT Physical Therapist               Outcome Measures     Row Name 03/06/22 1250          How much help from another person do you currently need...    Turning from your back to your side while in flat bed without using bedrails? 3  -PC     Moving from lying on back to sitting on the side of a flat bed without bedrails? 3  -PC     Moving to and from a bed to a chair (including a wheelchair)? 3  -PC     Standing up from a chair using your arms (e.g., wheelchair, bedside chair)? 3  -PC     Climbing 3-5 steps with a railing? 2  -PC     To walk in hospital room? 3  -PC     AM-PAC 6 Clicks Score (PT) 17  -PC     Row Name 03/06/22 1250          Functional Assessment    Outcome Measure Options AM-PAC 6 Clicks Basic Mobility (PT)  -PC           User Key  (r) = Recorded By, (t) = Taken By, (c) = Cosigned By    Initials Name Provider Type    Maddie Kline PT Physical Therapist                             Physical Therapy Education                 Title: PT OT SLP Therapies (Done)     Topic: Physical Therapy (Done)     Point: Mobility training (Done)     Learning  Progress Summary           Patient Acceptance, E,D, DU by PC at 3/6/2022 1251                   Point: Home exercise program (Done)     Learning Progress Summary           Patient Acceptance, E,D, DU by PC at 3/6/2022 1251                   Point: Body mechanics (Done)     Learning Progress Summary           Patient Acceptance, E,D, DU by PC at 3/6/2022 1251                   Point: Precautions (Done)     Learning Progress Summary           Patient Acceptance, E,D, DU by PC at 3/6/2022 1251                               User Key     Initials Effective Dates Name Provider Type Discipline     06/16/21 -  Maddie Fairchild PT Physical Therapist PT              PT Recommendation and Plan  Planned Therapy Interventions (PT): bed mobility training, gait training, transfer training, strengthening  Plan of Care Reviewed With: patient  Outcome Evaluation: pt adm from MD office with JOSEPH, inc swelling B LEs. Pt with KOO cirrhosis, ascites, anasarca, R pleural effusion. Pt presents with weakness and impaired functional mobility from baseline. Pt lives alone, uses cane or rolling walker, is independent and can stay on first floor, son lives next door. Pt is pleasant and cooperative, on 2L o2 with o2 sats 90%, she was able to get out of bed and ambulate short distance with rwx amd CGA, 20 ft, o2 sats 92% after walking. Pt will benefit from PT while admitted to maximize potential for safe discharge home     Time Calculation:    PT Charges     Row Name 03/06/22 1251             Time Calculation    Start Time 1138  -PC      Stop Time 1200  -PC      Time Calculation (min) 22 min  -PC      PT Received On 03/06/22  -PC      PT - Next Appointment 03/07/22  -PC      PT Goal Re-Cert Due Date 03/13/22  -PC            User Key  (r) = Recorded By, (t) = Taken By, (c) = Cosigned By    Initials Name Provider Type    PC Maddie Fairchild, PT Physical Therapist              Therapy Charges for Today     Code Description Service Date Service  Provider Modifiers Qty    95937025433 HC PT THER PROC EA 15 MIN 3/6/2022 Maddie Fairchild, PT GP 1    62992300813 HC PT EVAL MOD COMPLEXITY 2 3/6/2022 Maddie Fairchild, PT GP 1          PT G-Codes  Outcome Measure Options: AM-PAC 6 Clicks Basic Mobility (PT)  AM-PAC 6 Clicks Score (PT): 17    Maddie Fairchild, PT  3/6/2022

## 2022-03-06 NOTE — PROGRESS NOTES
Name: Miri Yung ADMIT: 3/4/2022   : 1940  PCP: Loren Cruz APRN    MRN: [id number] LOS: 2 days   AGE/SEX: 81 y.o. female  ROOM: [facility]     Subjective   Subjective   Mild decrease in the dyspnea and lower extremity edema.  Positive dry cough and occasional wheeze.  No fever or chills.  No hemoptysis.  No PND.  No orthopnea.  No chest pain.  No palpitation.    Review of Systems  GI.  No abdominal pain or nausea or vomiting.  Positive loose bowel movement blackish without fresh bright blood per rectum.  .  Positive polyuria no dysuria or hematuria.  CNS.  No headache.  No dizziness.  No focal neurological symptoms     Objective   Objective   Vital Signs  Temp:  [97.9 °F (36.6 °C)-98.3 °F (36.8 °C)] 97.9 °F (36.6 °C)  Heart Rate:  [116-118] 118  Resp:  [18] 18  BP: (102-115)/(55-82) 115/57  SpO2:  [90 %-95 %] 90 %  on  Flow (L/min):  [2] 2;   Device (Oxygen Therapy): nasal cannula    Intake/Output Summary (Last 24 hours) at 3/6/2022 1138  Last data filed at 3/6/2022 0928  Gross per 24 hour   Intake 360 ml   Output 800 ml   Net -440 ml     Body mass index is 37.82 kg/m².      22  1900 22  0545 22  0524   Weight: 94.2 kg (207 lb 10.8 oz) 94 kg (207 lb 3.7 oz) 93.8 kg (206 lb 12.8 oz)     Physical Exam  General.  Elderly female.  She is obese.  Alert.  Oriented x2.  In no apparent pain.  In mild respiratory distress.   Eyes.  Status post bilateral cataract surgery.  Pupils equal round and reactive.  Intact extraocular musculature.  No pallor or jaundice.  Normal conjunctive and lids.   Oral cavity.    Moist mucous membrane with no lesions.   Neck.  Supple.  No JVD.  No lymphadenopathy or thyromegaly.   Cardiovascular.  Controlled rate atrial fibrillation grade 2 systolic murmur.   Chest.  Poor bilateral air entry with scattered bilateral expiratory wheeze (improved from yesterday)..   Abdomen.  Obese.  Soft lax.  No tenderness.  No organomegaly.  No guarding or rebound.   Positive reducible umbilical hernia.  Positive peau d'orange.   Extremities.  +1 to +2 bilateral lower extremity edema with redness (improved)..   CNS.  No acute focal neurological deficits     Results Review:      Results from last 7 days   Lab Units 03/06/22  0805 03/05/22  0632 03/04/22  1200 03/03/22  1220 02/28/22  1553   SODIUM mmol/L 145 145 142 143 141   POTASSIUM mmol/L 3.9 3.3* 3.8 4.2 4.2   CHLORIDE mmol/L 107 107 105 106 106   TOTAL CO2 mmol/L  --   --   --  21 20   CO2 mmol/L 27.0 26.0 23.1  --   --    BUN mg/dL 19 23 26* 25 29*   CREATININE mg/dL 0.84 0.81 1.03* 0.93 1.01*   GLUCOSE mg/dL 131* 105* 113* 121* 122*   CALCIUM mg/dL 8.6 8.3* 9.0 8.6* 9.0   AST (SGOT) U/L 20 18 24  --  27   ALT (SGPT) U/L 19 19 26  --  23     Estimated Creatinine Clearance: 55.1 mL/min (by C-G formula based on SCr of 0.84 mg/dL).  Results from last 7 days   Lab Units 03/05/22  0632   HEMOGLOBIN A1C % 5.90*     Results from last 7 days   Lab Units 03/06/22  1120 03/06/22  0621 03/05/22  1625 03/05/22  1106 03/05/22  0614   GLUCOSE mg/dL 158* 131* 124 128 110     Results from last 7 days   Lab Units 03/04/22  1200   TROPONIN T ng/mL <0.010     Results from last 7 days   Lab Units 03/04/22  1200 02/28/22  1553   PROBNP pg/mL 1,420.0 1,518*     Results from last 7 days   Lab Units 03/05/22  0632   TSH uIU/mL 0.020*     Results from last 7 days   Lab Units 03/05/22 2007 03/04/22  1200   MAGNESIUM mg/dL 2.0 2.0           Invalid input(s): LDLCALC  Results from last 7 days   Lab Units 03/06/22  0805 03/05/22 2007 03/05/22  0632 03/04/22  1200 03/04/22  1200 02/28/22  1553   WBC 10*3/mm3 6.84  --  6.18  --  7.66 7.4   HEMOGLOBIN g/dL 9.2* 9.4* 9.1*  --  9.6* 9.3*   HEMATOCRIT % 31.5* 31.5* 30.9*  --  33.7* 30.9*   PLATELETS 10*3/mm3 149  --  156  --  175 153   MCV fL 79.1  --  78.8*   < > 82.0 78*   MCH pg 23.1*  --  23.2*   < > 23.4* 23.6*   MCHC g/dL 29.2*  --  29.4*   < > 28.5* 30.1*   RDW % 17.4*  --  17.5*   < > 17.4* 17.0*    RDW-SD fl 49.3  --  49.5   < > 51.2  --    MPV fL 11.7  --  12.2*   < > 12.5*  --    NEUTROPHIL % %  --   --  66.4   < > 69.6 70   LYMPHOCYTE % %  --   --  19.9   < > 17.6* 17   MONOCYTES % %  --   --  11.3   < > 10.2 11   EOSINOPHIL % %  --   --  1.3   < > 1.4 1   BASOPHIL % %  --   --  0.8   < > 0.8 1   IMM GRAN % %  --   --  0.3   < > 0.4  --    NEUTROS ABS 10*3/mm3  --   --  4.10   < > 5.33 5.2   LYMPHS ABS 10*3/mm3  --   --  1.23   < > 1.35 1.3   MONOS ABS 10*3/mm3  --   --  0.70   < > 0.78 0.8   EOS ABS 10*3/mm3  --   --  0.08   < > 0.11 0.1   BASOS ABS 10*3/mm3  --   --  0.05   < > 0.06 0.1   IMMATURE GRANS (ABS) 10*3/mm3  --   --  0.02   < > 0.03  --    NRBC /100 WBC  --   --  0.0   < > 0.0  --     < > = values in this interval not displayed.     Results from last 7 days   Lab Units 03/05/22  0632 03/04/22  1200   INR  1.27* 1.24*                 Results from last 7 days   Lab Units 03/05/22  2006   AMMONIA umol/L 24         Results from last 7 days   Lab Units 03/05/22  1740   ADENOVIRUS  Not Detected     Results from last 7 days   Lab Units 03/04/22  1329   NITRITE UA  Negative   WBC UA /HPF 0-2   BACTERIA UA /HPF None Seen   SQUAM EPITHEL UA /HPF 0-2     Results from last 7 days   Lab Units 03/05/22  0632   URIC ACID mg/dL 5.5       Imaging:  Imaging Results (Last 24 Hours)     ** No results found for the last 24 hours. **             I reviewed the patient's new clinical results / labs / tests / procedures      Assessment/Plan     Active Hospital Problems    Diagnosis  POA   • **Cirrhosis of liver (HCC) [K74.60]  Yes   • Occult GI bleeding [R19.5]  Yes   • Atrial fibrillation with RVR (HCC) [I48.91]  Yes   • Anasarca [R60.1]  Yes   • Splenomegaly due to portal hypertension and liver disease [R16.1]  Yes   • Esophageal varices (HCC) [I85.00]  Yes   • Symptomatic anemia [D64.9]  Yes   • History of CVA (cerebrovascular accident) [Z86.73]  Not Applicable   • Chronic diastolic (congestive) heart failure  (HCC) [I50.32]  Yes   • Atrial fibrillation (HCC) [I48.91]  Yes   • Type 2 diabetes mellitus (HCC) [E11.9]  Yes   • Gastroesophageal reflux disease [K21.9]  Yes   • HTN (hypertension) [I10]  Yes   • Dementia (CMS/HCC) [F03.90]  Yes      Resolved Hospital Problems   No resolved problems to display.         1.  Volume overload secondary to acute diastolic congestive heart failure and liver cirrhosis   look below for management.   2.  Acute on chronic diastolic congestive heart failure/history of hypertension/rapid atrial fibrillation.    Blood pressure under good control and is on the soft side.  Status post IV digoxin .  Improved rate.  No evidence of angina.continue Imdur/Cozaar/Eliquis/Toprol/Aldactone/IV Lasix..  Awaiting 2D echo.  Chest x-ray with pulmonary edema.  Troponin and proBNP are negative.  Appreciate cardiology help.    Input/output are negative.  3.  KOO liver cirrhosis/portal hypertension with splenomegaly/esophageal paresis/history of GERD.    CT scan of the abdomen with small ascites and there is no indication for paracentesis. continue IV Lasix/Protonix/Aldactone. Normal ammonia.  Normal liver function test .  Appreciate GI consult.   4.  Chronic disease Anemia/occult GI bleed with heme positive stools.  Anemia work-up noted.  Might need an EGD and colonoscopy as an outpatient.   Stable hemoglobin.  Continue iron.  5.  History of CVA/dementia continue Lipitor/Eliquis   Negative CNS examination.  Normal ammonia.  6. Hypokalemia.    Normal magnesium.  Resolved with substitution.  7. Type 2 diabetes.  A1c 5.9.  Continue Tradjenta and sliding scale insulin.   Acceptable Accu-Cheks.  Discussed my findings and plan of treatment with the patient/son.      Oli Mane MD  [facility]  03/06/22  11:38 EST

## 2022-03-07 NOTE — THERAPY TREATMENT NOTE
Patient Name: Miri Yung  : 1940    MRN: [id number]                              Today's Date: 3/7/2022       Admit Date: 3/4/2022    Visit Dx:     ICD-10-CM ICD-9-CM   1. Cirrhosis of liver without ascites, unspecified hepatic cirrhosis type (HCC)  K74.60 571.5   2. Anasarca  R60.1 782.3   3. Pedal edema  R60.0 782.3   4. Congestive heart failure, unspecified HF chronicity, unspecified heart failure type (HCC)  I50.9 428.0     Patient Active Problem List   Diagnosis   • Dementia (CMS/HCC)   • Depressive disorder   • Edema   • Abnormal liver function tests   • Gastroesophageal reflux disease   • Gout   • Hyperlipidemia   • HTN (hypertension)   • Insomnia   • Arthropathy of knee   • Memory loss   • Low back pain   • Mitral valve insufficiency   • Tricuspid valve insufficiency   • Urinary tract infection   • Valvular endocarditis   • Long QT interval   • Microcytic anemia   • Anemia   • Seasonal allergies   • Acute cystitis without hematuria   • Chronic fatigue   • Bacterial endocarditis   • Slow transit constipation   • Acute ischemic stroke (HCC)   • Paroxysmal tachycardia (HCC)   • Type 2 diabetes mellitus (HCC)   • Atrial fibrillation (HCC)   • Morbidly obese (HCC)   • Chronic diastolic (congestive) heart failure (HCC)   • Symptomatic anemia   • History of CVA (cerebrovascular accident)   • Long term current use of anticoagulant therapy   • Acute blood loss anemia   • Cirrhosis of liver (HCC)   • Esophageal varices (HCC)   • Splenomegaly due to portal hypertension and liver disease   • Anasarca   • Atrial fibrillation with RVR (HCC)   • Occult GI bleeding     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   • Endocarditis     with mitral and tricuspid regurgitation   • Esophageal reflux    • GERD  (gastroesophageal reflux disease)    • Gout    • History of blood transfusion     due to anemia   • HTN (hypertension)    • Hyperlipidemia    • Insomnia    • LOM (loss of memory)    • Lung mass    • 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; generally asymptomatic   • Mitral regurgitation    • Mixed anxiety depressive disorder    • Nonrheumatic tricuspid (valve) insufficiency    • Osteoarthritis    • PAF (paroxysmal atrial fibrillation) (HCC)    • Sacroiliitis (HCC)    • Tricuspid regurgitation    • Type 2 diabetes mellitus (HCC)      Past Surgical History:   Procedure Laterality Date   • CATARACT EXTRACTION     • COLONOSCOPY N/A 3/10/2016    Procedure: COLONOSCOPY TO CECUM;  Surgeon: Shaan Becerra Jr., MD;  Location: [facility] ENDOSCOPY;  Service:    • COLONOSCOPY N/A 6/18/2021    Procedure: COLONOSCOPY TO CECUM AND INTO TERMINAL ILEUM;  Surgeon: Jeremy Darden MD;  Location: [facility] ENDOSCOPY;  Service: Gastroenterology;  Laterality: N/A;  PRE: ANEMIA  POST: HEMORRHOIDS   • ENDOSCOPY N/A 3/10/2016    Procedure: ESOPHAGOGASTRODUODENOSCOPY ;  Surgeon: Shaan Becerra Jr., MD;  Location: [facility] ENDOSCOPY;  Service:    • ENDOSCOPY N/A 6/18/2021    Procedure: ESOPHAGOGASTRODUODENOSCOPY;  Surgeon: Jeremy Darden MD;  Location: [facility] ENDOSCOPY;  Service: Gastroenterology;  Laterality: N/A;  PRE: ANEMIA  POST: HIATAL HERNIA, SMALL ESOPHAGEAL VARICES   • KNEE SURGERY     • TUBAL ABDOMINAL LIGATION        General Information     Row Name 03/07/22 1643          Physical Therapy Time and Intention    Document Type therapy note (daily note)  -     Mode of Treatment physical therapy  -     Row Name 03/07/22 1643          General Information    Patient Profile Reviewed yes  -PC     Existing Precautions/Restrictions fall;oxygen therapy device and L/min  -PC     Row Name 03/07/22 1643          Cognition    Orientation Status (Cognition) oriented x 4  -PC     Row Name 03/07/22 1643          Safety  Issues, Functional Mobility    Impairments Affecting Function (Mobility) endurance/activity tolerance;strength  -PC           User Key  (r) = Recorded By, (t) = Taken By, (c) = Cosigned By    Initials Name Provider Type    Maddie Kline PT Physical Therapist               Mobility     Row Name 03/07/22 1644          Bed Mobility    Bed Mobility supine-sit;sit-supine  -PC     Supine-Sit [place] (Bed Mobility) contact guard  -PC     Row Name 03/07/22 1644          Sit-Stand Transfer    Sit-Stand [place] (Transfers) contact guard  -PC     Assistive Device (Sit-Stand Transfers) walker, front-wheeled  -PC     Row Name 03/07/22 1644          Gait/Stairs (Locomotion)    [place] Level (Gait) contact guard  -PC     Assistive Device (Gait) walker, front-wheeled  -PC     Distance in Feet (Gait) 25 ft  -PC     Deviations/Abnormal Patterns (Gait) base of support, wide;gait speed decreased;stride length decreased  -PC     Bilateral Gait Deviations forward flexed posture  -PC           User Key  (r) = Recorded By, (t) = Taken By, (c) = Cosigned By    Initials Name Provider Type    Maddie Kline PT Physical Therapist               Obj/Interventions    No documentation.                Goals/Plan    No documentation.                Clinical Impression     Row Name 03/07/22 1644          Pain    Pretreatment Pain Rating 0/10 - no pain  -PC     Row Name 03/07/22 1644          Plan of Care Review    Plan of Care Reviewed With patient  -PC     Outcome Evaluation pt was able to walk a little farther today with walker and CGA, on O2 at 4L, she fatigues quickly and had forward flexed posture but gait was steady, cont to plan on discharge with family  -PC     Row Name 03/07/22 1644          Positioning and Restraints    Pre-Treatment Position in bed  -PC     Post Treatment Position chair  -PC     In Chair reclined;call light within reach;encouraged to call for assist  -PC           User Key  (r) = Recorded By, (t)  = Taken By, (c) = Cosigned By    Initials Name Provider Type    PC Maddie Fairchild, PT Physical Therapist               Outcome Measures     Row Name 03/07/22 3356          How much help from another person do you currently need...    Turning from your back to your side while in flat bed without using bedrails? 3  -PC     Moving from lying on back to sitting on the side of a flat bed without bedrails? 3  -PC     Moving to and from a bed to a chair (including a wheelchair)? 3  -PC     Standing up from a chair using your arms (e.g., wheelchair, bedside chair)? 3  -PC     Climbing 3-5 steps with a railing? 2  -PC     To walk in hospital room? 3  -PC     AM-PAC 6 Clicks Score (PT) 17  -PC           User Key  (r) = Recorded By, (t) = Taken By, (c) = Cosigned By    Initials Name Provider Type    PC Maddie Fairchild PT Physical Therapist                             Physical Therapy Education                 Title: PT OT SLP Therapies (Done)     Topic: Physical Therapy (Done)     Point: Mobility training (Done)     Learning Progress Summary           Patient Acceptance, E,D, DU by PC at 3/6/2022 1251                   Point: Home exercise program (Done)     Learning Progress Summary           Patient Acceptance, E,D, DU by PC at 3/6/2022 1251                   Point: Body mechanics (Done)     Learning Progress Summary           Patient Acceptance, E,D, DU by PC at 3/6/2022 1251                   Point: Precautions (Done)     Learning Progress Summary           Patient Acceptance, E,D, DU by PC at 3/6/2022 1251                               User Key     Initials Effective Dates Name Provider Type Discipline     06/16/21 -  Maddie Fairchild PT Physical Therapist PT              PT Recommendation and Plan  Planned Therapy Interventions (PT): bed mobility training, gait training, transfer training, strengthening  Plan of Care Reviewed With: patient  Outcome Evaluation: pt was able to walk a little farther today with walker  and CGA, on O2 at 4L, she fatigues quickly and had forward flexed posture but gait was steady, cont to plan on discharge with family     Time Calculation:    PT Charges     Row Name 03/07/22 1655             Time Calculation    Start Time 1607  -PC      Stop Time 1620  -PC      Time Calculation (min) 13 min  -PC      PT Received On 03/07/22  -PC      PT - Next Appointment 03/08/22  -PC            User Key  (r) = Recorded By, (t) = Taken By, (c) = Cosigned By    Initials Name Provider Type    PC Maddie Fairchild, PT Physical Therapist              Therapy Charges for Today     Code Description Service Date Service Provider Modifiers Qty    16818102744 HC PT THER PROC EA 15 MIN 3/6/2022 Maddie Fairchild, PT GP 1    57376372265 HC PT EVAL MOD COMPLEXITY 2 3/6/2022 Maddie Fairchild, PT GP 1    53492075465 HC PT THER PROC EA 15 MIN 3/7/2022 Maddie Fairchild, PT GP 1          PT G-Codes  Outcome Measure Options: AM-PAC 6 Clicks Basic Mobility (PT)  AM-PAC 6 Clicks Score (PT): 17    Maddie Fairchild PT  3/7/2022

## 2022-03-07 NOTE — PLAN OF CARE
Goal Outcome Evaluation:           Progress: no change  Outcome Evaluation: Pt A/OX4, has periods of confusion, VSS, ON 2L per N/C, O2sat 93-95%, prn Atarax given once this shift for itching and anxiety, c/o pain all over, prn morphine given, purewick intact and to wall suction, tolerating well, will continue to monitor the rest of my shift

## 2022-03-07 NOTE — PLAN OF CARE
Goal Outcome Evaluation:  Plan of Care Reviewed With: patient           Outcome Evaluation: pt was able to walk a little farther today with walker and CGA, on O2 at 4L, she fatigues quickly and had forward flexed posture but gait was steady, cont to plan on discharge with family    Patient was intermittently wearing a face mask during this therapy encounter. Therapist used appropriate personal protective equipment including eye protection, mask, and gloves.  Mask used was standard procedure mask. Appropriate PPE was worn during the entire therapy session. Hand hygiene was completed before and after therapy session. Patient is not in enhanced droplet precautions.

## 2022-03-07 NOTE — PROGRESS NOTES
Name: Miri Yung ADMIT: 3/4/2022   : 1940  PCP: Loren Cruz APRN    MRN: [id number] LOS: 3 days   AGE/SEX: 81 y.o. female  ROOM: [facility]     Subjective   Subjective   Continues with dyspnea and lower extremity edema.  Positive dry cough and occasional wheeze.  No fever or chills.  No hemoptysis.  No PND.  No orthopnea.  No chest pain.  No palpitation.    Review of Systems    GI.  No abdominal pain or nausea or vomiting.  Positive loose bowel movement blackish without fresh bright blood per rectum.  .  Positive polyuria no dysuria or hematuria.  CNS.  No headache.  No dizziness.  No focal neurological symptoms     Objective   Objective   Vital Signs  Temp:  [97 °F (36.1 °C)-98.2 °F (36.8 °C)] 98.2 °F (36.8 °C)  Heart Rate:  [85-93] 88  Resp:  [20] 20  BP: (117-154)/(59-99) 138/75  SpO2:  [93 %-95 %] 93 %  on  Flow (L/min):  [2] 2;   Device (Oxygen Therapy): nasal cannula    Intake/Output Summary (Last 24 hours) at 3/7/2022 1151  Last data filed at 3/7/2022 0916  Gross per 24 hour   Intake 120 ml   Output 2580 ml   Net -2460 ml     Body mass index is 37.68 kg/m².      22  0545 22  0524 22  1049   Weight: 94 kg (207 lb 3.7 oz) 93.8 kg (206 lb 12.8 oz) 93.4 kg (206 lb)     Physical Exam    General.  Elderly female.  She is obese.  Alert.  Oriented x2.  In no apparent pain.  In mild respiratory distress.   Obese  Eyes.  Status post bilateral cataract surgery.  Pupils equal round and reactive.  Intact extraocular musculature.  No pallor or jaundice.  Normal conjunctive and lids.   Oral cavity.    Moist mucous membrane with no lesions.   Neck.  Supple.  No JVD.  No lymphadenopathy or thyromegaly.   Cardiovascular.  Controlled rate atrial fibrillation grade 2 systolic murmur.   Chest.  Poor bilateral air entry with  bilateral expiratory wheeze .   Abdomen.  Obese.  Soft lax.  No tenderness.  No organomegaly.  No guarding or rebound.  Positive reducible umbilical hernia.  Positive  peau d'orange.   Extremities.   +2 bilateral lower extremity edema with redness (improved)..   CNS.  No acute focal neurological deficits     Results Review:      Results from last 7 days   Lab Units 03/07/22  0639 03/06/22  0805 03/05/22  0632 03/04/22  1200 03/03/22  1220 02/28/22  1553   SODIUM mmol/L 144 145 145 142 143 141   POTASSIUM mmol/L 4.3 3.9 3.3* 3.8 4.2 4.2   CHLORIDE mmol/L 106 107 107 105 106 106   TOTAL CO2 mmol/L  --   --   --   --  21 20   CO2 mmol/L 27.2 27.0 26.0 23.1  --   --    BUN mg/dL 17 19 23 26* 25 29*   CREATININE mg/dL 0.72 0.84 0.81 1.03* 0.93 1.01*   GLUCOSE mg/dL 130* 131* 105* 113* 121* 122*   CALCIUM mg/dL 8.8 8.6 8.3* 9.0 8.6* 9.0   AST (SGOT) U/L 28 20 18 24  --  27   ALT (SGPT) U/L 23 19 19 26  --  23     Estimated Creatinine Clearance: 57.7 mL/min (by C-G formula based on SCr of 0.72 mg/dL).  Results from last 7 days   Lab Units 03/05/22  0632   HEMOGLOBIN A1C % 5.90*     Results from last 7 days   Lab Units 03/07/22  1137 03/07/22  0627 03/06/22  2124 03/06/22  1617 03/06/22  1120 03/06/22  0621 03/05/22  1625 03/05/22  1106   GLUCOSE mg/dL 188* 138* 161* 108 158* 131* 124 128     Results from last 7 days   Lab Units 03/04/22  1200   TROPONIN T ng/mL <0.010     Results from last 7 days   Lab Units 03/04/22  1200 02/28/22  1553   PROBNP pg/mL 1,420.0 1,518*     Results from last 7 days   Lab Units 03/05/22  0632   TSH uIU/mL 0.020*     Results from last 7 days   Lab Units 03/05/22 2007 03/04/22  1200   MAGNESIUM mg/dL 2.0 2.0           Invalid input(s): LDLCALC  Results from last 7 days   Lab Units 03/07/22  0639 03/06/22 2006 03/06/22  0805 03/05/22 2007 03/05/22  0632 03/04/22  1200 03/04/22  1200 02/28/22  1553   WBC 10*3/mm3 8.31  --  6.84  --  6.18  --  7.66 7.4   HEMOGLOBIN g/dL 9.4* 9.1* 9.2* 9.4* 9.1*  --  9.6* 9.3*   HEMATOCRIT % 32.2* 31.3* 31.5* 31.5* 30.9*  --  33.7* 30.9*   PLATELETS 10*3/mm3 153  --  149  --  156  --  175 153   MCV fL 79.7  --  79.1  --  78.8*    < > 82.0 78*   MCH pg 23.3*  --  23.1*  --  23.2*   < > 23.4* 23.6*   MCHC g/dL 29.2*  --  29.2*  --  29.4*   < > 28.5* 30.1*   RDW % 17.0*  --  17.4*  --  17.5*   < > 17.4* 17.0*   RDW-SD fl 48.6  --  49.3  --  49.5   < > 51.2  --    MPV fL 11.9  --  11.7  --  12.2*   < > 12.5*  --    NEUTROPHIL % %  --   --   --   --  66.4   < > 69.6 70   LYMPHOCYTE % %  --   --   --   --  19.9   < > 17.6* 17   MONOCYTES % %  --   --   --   --  11.3   < > 10.2 11   EOSINOPHIL % %  --   --   --   --  1.3   < > 1.4 1   BASOPHIL % %  --   --   --   --  0.8   < > 0.8 1   IMM GRAN % %  --   --   --   --  0.3   < > 0.4  --    NEUTROS ABS 10*3/mm3  --   --   --   --  4.10   < > 5.33 5.2   LYMPHS ABS 10*3/mm3  --   --   --   --  1.23   < > 1.35 1.3   MONOS ABS 10*3/mm3  --   --   --   --  0.70   < > 0.78 0.8   EOS ABS 10*3/mm3  --   --   --   --  0.08   < > 0.11 0.1   BASOS ABS 10*3/mm3  --   --   --   --  0.05   < > 0.06 0.1   IMMATURE GRANS (ABS) 10*3/mm3  --   --   --   --  0.02   < > 0.03  --    NRBC /100 WBC  --   --   --   --  0.0   < > 0.0  --     < > = values in this interval not displayed.     Results from last 7 days   Lab Units 03/05/22  0632 03/04/22  1200   INR  1.27* 1.24*                 Results from last 7 days   Lab Units 03/05/22  2006   AMMONIA umol/L 24         Results from last 7 days   Lab Units 03/05/22  1740   ADENOVIRUS  Not Detected     Results from last 7 days   Lab Units 03/04/22  1329   NITRITE UA  Negative   WBC UA /HPF 0-2   BACTERIA UA /HPF None Seen   SQUAM EPITHEL UA /HPF 0-2     Results from last 7 days   Lab Units 03/05/22  0632   URIC ACID mg/dL 5.5       Imaging:  Imaging Results (Last 24 Hours)     ** No results found for the last 24 hours. **             I reviewed the patient's new clinical results / labs / tests / procedures      Assessment/Plan     Active Hospital Problems    Diagnosis  POA   • **Cirrhosis of liver (HCC) [K74.60]  Yes   • Occult GI bleeding [R19.5]  Yes   • Atrial fibrillation  with RVR (HCC) [I48.91]  Yes   • Anasarca [R60.1]  Yes   • Splenomegaly due to portal hypertension and liver disease [R16.1]  Yes   • Esophageal varices (HCC) [I85.00]  Yes   • Symptomatic anemia [D64.9]  Yes   • History of CVA (cerebrovascular accident) [Z86.73]  Not Applicable   • Chronic diastolic (congestive) heart failure (HCC) [I50.32]  Yes   • Atrial fibrillation (HCC) [I48.91]  Yes   • Type 2 diabetes mellitus (HCC) [E11.9]  Yes   • Gastroesophageal reflux disease [K21.9]  Yes   • HTN (hypertension) [I10]  Yes   • Dementia (CMS/HCC) [F03.90]  Yes      Resolved Hospital Problems   No resolved problems to display.         1.  Volume overload secondary to acute diastolic congestive heart failure and liver cirrhosis   look below for management.   2.  Acute on chronic diastolic congestive heart failure/history of hypertension/rapid atrial fibrillation.    Blood pressure under good control .Status post IV digoxin .  Improved rate.  No evidence of angina.continue Imdur/Cozaar/Eliquis/Toprol/Aldactone/IV Lasix.  Still significantly volume overloaded I will increase IV Lasix/p.o. Aldactone and add diuretics IV..  Awaiting 2D echo.  Chest x-ray with pulmonary edema.  Troponin and proBNP are negative.  Appreciate cardiology help.    Input/output are negative.  3.  KOO liver cirrhosis/portal hypertension with splenomegaly/esophageal paresis/history of GERD.    CT scan of the abdomen with small ascites and there is no indication for paracentesis. continue IV Lasix/Protonix/Aldactone.  Input and output is negative however the patient still significantly volume overloaded and will need more aggressive diuretics.  We will add Diuril/increase IV Lasix/increase Aldactone.  Normal ammonia.  Normal liver function test .  Appreciate GI consult.   4.  Chronic disease Anemia/occult GI bleed with heme positive stools.  Anemia work-up noted.  Might need an EGD and colonoscopy as an outpatient.   Stable hemoglobin.  Continue iron.   GI following  5.  History of CVA/dementia continue Lipitor/Eliquis   Negative CNS examination.  Normal ammonia.  6. Hypokalemia.    Normal magnesium.  Resolved with substitution.  7. Type 2 diabetes.  A1c 5.9.  Continue Tradjenta and sliding scale insulin.   Acceptable Accu-Cheks.  8.  VTE prophylaxis.  Patient anticoagulated    Discussed my findings and plan of treatment with the patient.  Disposition.  To be determined based on clinical course      Oli Mane MD  [facility]  03/07/22  11:51 EST

## 2022-03-07 NOTE — PROGRESS NOTES
[facility]  Inpatient Progress Note    Reason for Followup:  Cirrhosis    Subjective     Interval History:   Feels like lower extremity edema is improving    Current Facility-Administered Medications:   •  allopurinol (ZYLOPRIM) tablet 300 mg, 300 mg, Oral, Daily, Devon Mcneill MD, 300 mg at 03/06/22 0800  •  apixaban (ELIQUIS) tablet 5 mg, 5 mg, Oral, Q12H, Selene Culp MD, 5 mg at 03/06/22 2031  •  atorvastatin (LIPITOR) tablet 40 mg, 40 mg, Oral, Daily, Devon Mcneill MD, 40 mg at 03/06/22 0801  •  buPROPion SR (WELLBUTRIN SR) 12 hr tablet 150 mg, 150 mg, Oral, Daily With Breakfast, Devon Mcneill MD, 150 mg at 03/06/22 0801  •  dextrose (D50W) (25 g/50 mL) IV injection 25 g, 25 g, Intravenous, Q15 Min PRN, Devon Mcneill MD  •  dextrose (GLUTOSE) oral gel 15 g, 15 g, Oral, Q15 Min PRN, Devon Mcneill MD  •  famotidine (PEPCID) tablet 20 mg, 20 mg, Oral, BID AC, Chase Choudhary MD, 20 mg at 03/06/22 1825  •  ferrous sulfate tablet 325 mg, 325 mg, Oral, Daily With Breakfast, Devon Mcneill MD, 325 mg at 03/06/22 0801  •  furosemide (LASIX) injection 80 mg, 80 mg, Intravenous, Q12H, Devon Mcneill MD, 80 mg at 03/06/22 2031  •  glucagon (human recombinant) (GLUCAGEN DIAGNOSTIC) injection 1 mg, 1 mg, Intramuscular, Q15 Min PRN, Devon Mcneill MD  •  hydrOXYzine (ATARAX) tablet 25 mg, 25 mg, Oral, TID PRN, Alena Guerrero APRN, 25 mg at 03/06/22 2353  •  insulin lispro (ADMELOG) injection 0-9 Units, 0-9 Units, Subcutaneous, TID AC, Devon Mcneill MD, 2 Units at 03/06/22 1151  •  isosorbide mononitrate (IMDUR) 24 hr tablet 60 mg, 60 mg, Oral, Daily, Devon Mcneill MD, 60 mg at 03/06/22 0801  •  linagliptin (TRADJENTA) tablet 5 mg, 5 mg, Oral, Daily, Devon Mcneill MD, 5 mg at 03/06/22 0801  •  losartan (COZAAR) tablet 50 mg, 50 mg, Oral, Q PM, Devon Mcneill MD, 50 mg at 03/06/22 1825  •  metoprolol succinate XL (TOPROL-XL) 24 hr tablet 150 mg, 150 mg, Oral, Nightly, Selene Culp  MD, 150 mg at 03/06/22 2031  •  mirtazapine (REMERON) tablet 15 mg, 15 mg, Oral, Nightly, Devon Mcneill MD, 15 mg at 03/06/22 2031  •  montelukast (SINGULAIR) tablet 10 mg, 10 mg, Oral, Nightly, Devon Mcneill MD, 10 mg at 03/06/22 2031  •  morphine injection 1 mg, 1 mg, Intravenous, Q4H PRN, 1 mg at 03/07/22 0302 **AND** naloxone (NARCAN) injection 0.4 mg, 0.4 mg, Intravenous, Q5 Min PRN, Devon Mcneill MD  •  nitroglycerin (NITROSTAT) SL tablet 0.4 mg, 0.4 mg, Sublingual, Q5 Min PRN, Devon Mcneill MD  •  ondansetron (ZOFRAN) injection 4 mg, 4 mg, Intravenous, Q6H PRN, Devon Mcneill MD  •  Pharmacy Consult, , Does not apply, Continuous PRN, Chase Choudhary MD  •  potassium chloride (K-DUR,KLOR-CON) ER tablet 40 mEq, 40 mEq, Oral, PRN, 40 mEq at 03/05/22 1615 **OR** potassium chloride (KLOR-CON) packet 40 mEq, 40 mEq, Oral, PRN **OR** potassium chloride 10 mEq in 100 mL IVPB, 10 mEq, Intravenous, Q1H PRN, Oli Mane MD  •  potassium chloride (K-DUR,KLOR-CON) ER tablet 40 mEq, 40 mEq, Oral, Suni GAYTAN Rebecca M, MD, 40 mEq at 03/07/22 0603  •  sodium chloride 0.9 % flush 10 mL, 10 mL, Intravenous, Q12H, Devon Mcneill MD, 10 mL at 03/06/22 2141  •  sodium chloride 0.9 % flush 10 mL, 10 mL, Intravenous, PRN, Devon Mcneill MD  •  spironolactone (ALDACTONE) tablet 100 mg, 100 mg, Oral, Daily, Alena Guerrero, APRN, 100 mg at 03/06/22 0801  Review of Systems:    All systems were reviewed and negative except for:  Respiratory: positive for  cough, productive    Objective     Vital Signs  Temp:  [97 °F (36.1 °C)-98 °F (36.7 °C)] 97.8 °F (36.6 °C)  Heart Rate:  [85-93] 90  Resp:  [20] 20  BP: (117-136)/(59-99) 117/59  Body mass index is 37.82 kg/m².    Intake/Output Summary (Last 24 hours) at 3/7/2022 0747  Last data filed at 3/7/2022 0126  Gross per 24 hour   Intake 360 ml   Output 3030 ml   Net -2670 ml     No intake/output data recorded.     Physical Exam:   General: patient awake, alert and  cooperative     Abdomen: soft, nontender, nondistended; normal bowel sounds   Rectal: deferred   Extremities: no rash or edema   Psychiatric: Normal mood and behavior; memory intact     Results Review:     I reviewed the patient's new clinical results.    Results from last 7 days   Lab Units 03/07/22  0639 03/06/22 2006 03/06/22 0805 03/05/22 2007 03/05/22  0632   WBC 10*3/mm3 8.31  --  6.84  --  6.18   HEMOGLOBIN g/dL 9.4* 9.1* 9.2*   < > 9.1*   HEMATOCRIT % 32.2* 31.3* 31.5*   < > 30.9*   PLATELETS 10*3/mm3 153  --  149  --  156    < > = values in this interval not displayed.     Results from last 7 days   Lab Units 03/06/22  0805 03/05/22  0632 03/04/22  1200   SODIUM mmol/L 145 145 142   POTASSIUM mmol/L 3.9 3.3* 3.8   CHLORIDE mmol/L 107 107 105   CO2 mmol/L 27.0 26.0 23.1   BUN mg/dL 19 23 26*   CREATININE mg/dL 0.84 0.81 1.03*   CALCIUM mg/dL 8.6 8.3* 9.0   BILIRUBIN mg/dL 0.5 0.5 0.5   ALK PHOS U/L 111 108 121*   ALT (SGPT) U/L 19 19 26   AST (SGOT) U/L 20 18 24   GLUCOSE mg/dL 131* 105* 113*     Results from last 7 days   Lab Units 03/05/22  0632 03/04/22  1200   INR  1.27* 1.24*     Lab Results   Lab Value Date/Time    LIPASE 46 02/23/2016 0635       Assessment/Plan   Assessment:   1.  KOO cirrhosis  2.  LE edema    All problems new to me today    Plan:   Cirrhosis currently well compensated, low MELD.  She also has normal albumin, source of LE edema less likely to be from her liver disease.  Continue diuresis per primary team/cardiology.      She can f/u in office after discharge for continued management of her chronic liver disease    We will see again as needed    I discussed the patient's findings and my recommendations with patient.         Chase Hernandez M.D.  [facility]  [address] [place] [address] [place] [address]  [phone]

## 2022-03-07 NOTE — PROGRESS NOTES
Hospital Follow Up    LOS:  LOS: 3 days   Patient Name: Miri Yung  Age/Sex: 81 y.o. female  : 1940  MRN: [id number]    Day of Service: 22   Length of Stay: 3  Encounter Provider: KWASI Poole  Place of Service: [facility]  Patient Care Team:  Loren Cruz APRN as PCP - General (Family Medicine)    Subjective:     Chief Complaint: chf    Interval History: Short of breath. Tells me she had a smothering spell    Objective:     Objective:  Temp:  [97 °F (36.1 °C)-98.2 °F (36.8 °C)] 98.2 °F (36.8 °C)  Heart Rate:  [85-93] 88  Resp:  [20] 20  BP: (117-154)/(59-99) 154/66     Intake/Output Summary (Last 24 hours) at 3/7/2022 1006  Last data filed at 3/7/2022 0916  Gross per 24 hour   Intake 120 ml   Output 2580 ml   Net -2460 ml     Body mass index is 37.82 kg/m².      22  1900 22  0545 22  0524   Weight: 94.2 kg (207 lb 10.8 oz) 94 kg (207 lb 3.7 oz) 93.8 kg (206 lb 12.8 oz)     Weight change:     Physical Exam:   General Appearance:    Awake alert and oriented in no acute distress.   Color:  Skin:  Neuro:  HEENT:    Lungs:     Pink  Warm and dry  No focal, motor or sensory deficits  Neck supple, pupils equal, round and reactive. No JVD, No Bruit  Tachypneic, labored, decreased air movement with wheezes and rales    Heart:  irreg/irreg rate and rhythm, S1 and S2, no murmur, no gallop, no rub. No edema, DP/PT pulses are 2+   Chest Wall:    No abnormalities observed   Abdomen:     Normal bowel sounds, no masses, no organomegaly, soft        non-tender, non-distended, no guarding, no ascites noted   Extremities:   Moves all extremities well, no edema, no cyanosis, no redness       Lab Review:   Results from last 7 days   Lab Units 22  0639 03/06/22  0805   SODIUM mmol/L 144 145   POTASSIUM mmol/L 4.3 3.9   CHLORIDE mmol/L 106 107   CO2 mmol/L 27.2 27.0   BUN mg/dL 17 19   CREATININE mg/dL 0.72 0.84   GLUCOSE mg/dL 130*  131*   CALCIUM mg/dL 8.8 8.6   AST (SGOT) U/L 28 20   ALT (SGPT) U/L 23 19     Results from last 7 days   Lab Units 03/04/22  1200   TROPONIN T ng/mL <0.010     Results from last 7 days   Lab Units 03/07/22  0639 03/06/22 2006 03/06/22  0805   WBC 10*3/mm3 8.31  --  6.84   HEMOGLOBIN g/dL 9.4* 9.1* 9.2*   HEMATOCRIT % 32.2* 31.3* 31.5*   PLATELETS 10*3/mm3 153  --  149     Results from last 7 days   Lab Units 03/05/22  0632 03/04/22  1200   INR  1.27* 1.24*     Results from last 7 days   Lab Units 03/05/22 2007 03/04/22 1200   MAGNESIUM mg/dL 2.0 2.0           Invalid input(s): LDLCALC  Results from last 7 days   Lab Units 03/04/22  1200 02/28/22  1553   PROBNP pg/mL 1,420.0 1,518*     Results from last 7 days   Lab Units 03/05/22  0632   TSH uIU/mL 0.020*     I reviewed the patient's new clinical results.  I personally viewed and interpreted the patient's EKG  Current Medications:   Scheduled Meds:allopurinol, 300 mg, Oral, Daily  apixaban, 5 mg, Oral, Q12H  atorvastatin, 40 mg, Oral, Daily  buPROPion SR, 150 mg, Oral, Daily With Breakfast  famotidine, 20 mg, Oral, BID AC  ferrous sulfate, 325 mg, Oral, Daily With Breakfast  furosemide, 80 mg, Intravenous, Q12H  insulin lispro, 0-9 Units, Subcutaneous, TID AC  isosorbide mononitrate, 60 mg, Oral, Daily  linagliptin, 5 mg, Oral, Daily  losartan, 50 mg, Oral, Q PM  metoprolol succinate XL, 150 mg, Oral, Nightly  mirtazapine, 15 mg, Oral, Nightly  montelukast, 10 mg, Oral, Nightly  potassium chloride, 40 mEq, Oral, QAM  sodium chloride, 10 mL, Intravenous, Q12H  spironolactone, 100 mg, Oral, Daily      Continuous Infusions:     Allergies:  Allergies   Allergen Reactions   • Decongestant  [Pseudoephedrine]        Assessment:     1. Acute CHF  2. Afib with rvr  Rate control on eliquis  3. Essential HTN  4. Hyperlipidemia    Plan:       Not diuresing well. I am going to give a dose of metolazone. I/O negative but weight is not down and appears short of air at rest.      Cece Miramontes, KWASI  03/07/22  10:06 EST  Electronically signed by KWASI Poole, 03/07/22, 10:06 AM EST.

## 2022-03-08 NOTE — PROGRESS NOTES
Hospital Follow Up    LOS:  LOS: 4 days   Patient Name: Miri Yung  Age/Sex: 81 y.o. female  : 1940  MRN: [id number]    Day of Service: 22   Length of Stay: 4  Encounter Provider: KWASI Poole  Place of Service: [facility] C[facility]OLOG[facility]  Patient Care Team:  Loren Cruz APRN as PCP - General (Family Medicine)    Subjective:     Chief Complaint: CHF    Interval History: OOB in chair worked with therapy. States breathing improved.    Objective:     Objective:  Temp:  [98.3 °F (36.8 °C)-98.7 °F (37.1 °C)] 98.7 °F (37.1 °C)  Heart Rate:  [] 97  Resp:  [18] 18  BP: (110-163)/(57-78) 122/75     Intake/Output Summary (Last 24 hours) at 3/8/2022 1126  Last data filed at 3/8/2022 0521  Gross per 24 hour   Intake 500 ml   Output 3550 ml   Net -3050 ml     Body mass index is 37.68 kg/m².      22  0545 22  0524 22  1049   Weight: 94 kg (207 lb 3.7 oz) 93.8 kg (206 lb 12.8 oz) 93.4 kg (206 lb)     Weight change:     Physical Exam:   General Appearance:    Awake alert and oriented in no acute distress.   Color:  Skin:  Neuro:  HEENT:  Lungs:     Pink  Warm and dry  No focal, motor or sensory deficits  Neck supple, pupils equal, round and reactive. + JVD, No Bruit  Diminished with wheezes to auscultation,respirations regular, even and  unlabored    Heart:    Regular rate and rhythm, S1 and S2, no murmur, no gallop, no rub. No edema, DP/PT pulses are 2+   Chest Wall:    No abnormalities observed   Abdomen:     Normal bowel sounds, no masses, no organomegaly, soft        non-tender, non-distended, no guarding, no ascites noted   Extremities:   Moves all extremities well, no edema, no cyanosis, no redness       Lab Review:   Results from last 7 days   Lab Units 22  0942 22  0746 22  0639   SODIUM mmol/L 141 141 144   POTASSIUM mmol/L 3.5 4.2 4.3   CHLORIDE mmol/L 97* 98 106   CO2 mmol/L 34.0* 32.2* 27.2   BUN mg/dL 15 14 17    CREATININE mg/dL 0.73 0.71 0.72   GLUCOSE mg/dL 194* 122* 130*   CALCIUM mg/dL 9.0 9.0 8.8   AST (SGOT) U/L  --  45* 28   ALT (SGPT) U/L  --  27 23     Results from last 7 days   Lab Units 03/04/22  1200   TROPONIN T ng/mL <0.010     Results from last 7 days   Lab Units 03/08/22  0746 03/07/22  2117 03/07/22  0639   WBC 10*3/mm3 7.59  --  8.31   HEMOGLOBIN g/dL 9.1*  9.1* 9.4* 9.4*   HEMATOCRIT % 30.9*  30.9* 32.0* 32.2*   PLATELETS 10*3/mm3 116*  --  153     Results from last 7 days   Lab Units 03/05/22  0632 03/04/22  1200   INR  1.27* 1.24*     Results from last 7 days   Lab Units 03/05/22 2007 03/04/22  1200   MAGNESIUM mg/dL 2.0 2.0           Invalid input(s): LDLCALC  Results from last 7 days   Lab Units 03/04/22  1200   PROBNP pg/mL 1,420.0     Results from last 7 days   Lab Units 03/05/22  0632   TSH uIU/mL 0.020*     I reviewed the patient's new clinical results.  I personally viewed and interpreted the patient's EKG  Current Medications:   Scheduled Meds:allopurinol, 300 mg, Oral, Daily  apixaban, 5 mg, Oral, Q12H  atorvastatin, 40 mg, Oral, Daily  buPROPion SR, 150 mg, Oral, Daily With Breakfast  chlorothiazide (DIURIL) IVPB, 500 mg, Intravenous, Q12H  famotidine, 20 mg, Oral, BID AC  ferrous sulfate, 325 mg, Oral, Daily With Breakfast  furosemide, 80 mg, Intravenous, Q8H  insulin lispro, 0-9 Units, Subcutaneous, TID AC  isosorbide mononitrate, 60 mg, Oral, Daily  linagliptin, 5 mg, Oral, Daily  losartan, 50 mg, Oral, Q PM  metoprolol succinate XL, 150 mg, Oral, Nightly  mirtazapine, 15 mg, Oral, Nightly  montelukast, 10 mg, Oral, Nightly  potassium chloride, 40 mEq, Oral, QAM  sodium chloride, 10 mL, Intravenous, Q12H  spironolactone, 100 mg, Oral, BID      Continuous Infusions:     Allergies:  Allergies   Allergen Reactions   • Decongestant  [Pseudoephedrine]        Assessment:          1. Acute Diastolic CHF EF 50%  2. Afib with rvr  Rate control on eliquis  3. Essential HTN  4. Hyperlipidemia  5.  Pulmonary hypertension with RVSP 73mmHg  6. Mild AS/Mild MS    Plan:       Fluid balance negative. Labs are stable. Would continue with IV lasix. Monitor labs daily. Spoke with nurse about daily weights. Echo reviewed from yesterday and EF 50%. Pulmonary hypertension noted.     KWASI Poole  03/08/22  11:26 EST  Electronically signed by KWASI Poole, 03/08/22, 11:26 AM EST.

## 2022-03-08 NOTE — PLAN OF CARE
Problem: Adult Inpatient Plan of Care  Goal: Plan of Care Review  Outcome: Ongoing, Progressing  Flowsheets (Taken 3/8/2022 1807)  Plan of Care Reviewed With: patient  Outcome Evaluation: Patient maintained activity with no sob, hr escalated at times but So2 wnl and maintained on 4 liters of oxygen. Continued diuresis today with good output.  Goal: Absence of Hospital-Acquired Illness or Injury  Outcome: Ongoing, Progressing  Intervention: Identify and Manage Fall Risk  Recent Flowsheet Documentation  Taken 3/8/2022 1801 by Purnima Jeong RN  Safety Promotion/Fall Prevention:   toileting scheduled   safety round/check completed   room organization consistent   nonskid shoes/slippers when out of bed   lighting adjusted   fall prevention program maintained   clutter free environment maintained  Taken 3/8/2022 1757 by Purnima Jeong RN  Safety Promotion/Fall Prevention:   activity supervised   toileting scheduled   safety round/check completed   room organization consistent   nonskid shoes/slippers when out of bed   lighting adjusted   fall prevention program maintained   clutter free environment maintained  Taken 3/8/2022 1637 by Purnima Jeong RN  Safety Promotion/Fall Prevention:   activity supervised   toileting scheduled   safety round/check completed   room organization consistent   nonskid shoes/slippers when out of bed   lighting adjusted   fall prevention program maintained   clutter free environment maintained  Taken 3/8/2022 1403 by Purnima Jeong RN  Safety Promotion/Fall Prevention:   activity supervised   toileting scheduled   safety round/check completed   room organization consistent   nonskid shoes/slippers when out of bed   lighting adjusted   fall prevention program maintained   clutter free environment maintained  Taken 3/8/2022 1114 by Purnima Jeong RN  Safety Promotion/Fall Prevention:   activity supervised   toileting scheduled   safety round/check completed   room organization consistent   nonskid  shoes/slippers when out of bed   lighting adjusted   fall prevention program maintained   clutter free environment maintained  Taken 3/8/2022 1014 by Purnima Jeong RN  Safety Promotion/Fall Prevention:   activity supervised   toileting scheduled   safety round/check completed   room organization consistent   nonskid shoes/slippers when out of bed   mobility aid in reach   lighting adjusted   fall prevention program maintained   clutter free environment maintained  Taken 3/8/2022 0945 by Purnima Jeong RN  Safety Promotion/Fall Prevention:   activity supervised   toileting scheduled   safety round/check completed   room organization consistent   lighting adjusted   fall prevention program maintained   clutter free environment maintained  Intervention: Prevent Skin Injury  Recent Flowsheet Documentation  Taken 3/8/2022 1801 by Purnima Jeong RN  Body Position: right  Taken 3/8/2022 1757 by Purnima Jeong RN  Body Position: right  Taken 3/8/2022 1637 by Purnima Jeong RN  Body Position: left  Taken 3/8/2022 1403 by Purnima Jeong RN  Body Position: sitting up in bed  Taken 3/8/2022 1114 by Purnima Jeong RN  Body Position: left  Taken 3/8/2022 1014 by Purnima Jeong RN  Body Position: left  Taken 3/8/2022 0945 by Purnima Jeong RN  Body Position:   left   tilted  Skin Protection: adhesive use limited  Intervention: Prevent and Manage VTE (Venous Thromboembolism) Risk  Recent Flowsheet Documentation  Taken 3/8/2022 1801 by Purnima Jeong RN  Activity Management: activity adjusted per tolerance  Taken 3/8/2022 1757 by Purnima Jeong RN  Activity Management: activity adjusted per tolerance  Taken 3/8/2022 1637 by Purnima Jeong RN  Activity Management: activity adjusted per tolerance  Taken 3/8/2022 1403 by Purnima Jeong RN  Activity Management: activity adjusted per tolerance  Taken 3/8/2022 1114 by Purnima Jeong RN  Activity Management: activity adjusted per tolerance  Taken 3/8/2022 1014 by Purnima Jeong RN  Activity Management: activity  adjusted per tolerance  Taken 3/8/2022 0945 by Purnima Jeong RN  Activity Management: activity adjusted per tolerance  VTE Prevention/Management:   right   left   bilateral  Range of Motion: active ROM (range of motion) encouraged  Intervention: Prevent Infection  Recent Flowsheet Documentation  Taken 3/8/2022 1801 by Purnima Jeong RN  Infection Prevention:   environmental surveillance performed   hand hygiene promoted   single patient room provided  Taken 3/8/2022 1757 by Purnima Jeong RN  Infection Prevention:   environmental surveillance performed   hand hygiene promoted   single patient room provided  Taken 3/8/2022 1637 by Purnima Jeong RN  Infection Prevention: environmental surveillance performed  Taken 3/8/2022 1403 by Purnima Jeong RN  Infection Prevention:   environmental surveillance performed   hand hygiene promoted   single patient room provided  Taken 3/8/2022 1114 by Purnima Jeong RN  Infection Prevention:   hand hygiene promoted   environmental surveillance performed   single patient room provided  Taken 3/8/2022 1014 by Purnima Jeong RN  Infection Prevention:   environmental surveillance performed   hand hygiene promoted   single patient room provided  Taken 3/8/2022 0945 by Purnima Jeong RN  Infection Prevention:   environmental surveillance performed   hand hygiene promoted   single patient room provided  Goal: Optimal Comfort and Wellbeing  Outcome: Ongoing, Progressing  Intervention: Provide Person-Centered Care  Recent Flowsheet Documentation  Taken 3/8/2022 0945 by Purnima Jeong RN  Trust Relationship/Rapport:   care explained   choices provided   emotional support provided   empathic listening provided   questions answered   questions encouraged   reassurance provided   thoughts/feelings acknowledged  Goal: Readiness for Transition of Care  Outcome: Ongoing, Progressing     Problem: Fall Injury Risk  Goal: Absence of Fall and Fall-Related Injury  Outcome: Ongoing, Progressing  Intervention: Identify  and Manage Contributors  Recent Flowsheet Documentation  Taken 3/8/2022 1801 by Purnima Jeong RN  Medication Review/Management: medications reviewed  Taken 3/8/2022 1757 by Purnima Jeong RN  Medication Review/Management: medications reviewed  Taken 3/8/2022 1014 by Purnima Jeong RN  Medication Review/Management: medications reviewed  Intervention: Promote Injury-Free Environment  Recent Flowsheet Documentation  Taken 3/8/2022 1801 by Purnima Jeong RN  Safety Promotion/Fall Prevention:   toileting scheduled   safety round/check completed   room organization consistent   nonskid shoes/slippers when out of bed   lighting adjusted   fall prevention program maintained   clutter free environment maintained  Taken 3/8/2022 1757 by Purnima Jeong RN  Safety Promotion/Fall Prevention:   activity supervised   toileting scheduled   safety round/check completed   room organization consistent   nonskid shoes/slippers when out of bed   lighting adjusted   fall prevention program maintained   clutter free environment maintained  Taken 3/8/2022 1637 by Purnima Jeong RN  Safety Promotion/Fall Prevention:   activity supervised   toileting scheduled   safety round/check completed   room organization consistent   nonskid shoes/slippers when out of bed   lighting adjusted   fall prevention program maintained   clutter free environment maintained  Taken 3/8/2022 1403 by Purnima Jeong RN  Safety Promotion/Fall Prevention:   activity supervised   toileting scheduled   safety round/check completed   room organization consistent   nonskid shoes/slippers when out of bed   lighting adjusted   fall prevention program maintained   clutter free environment maintained  Taken 3/8/2022 1114 by Purnima Jeong RN  Safety Promotion/Fall Prevention:   activity supervised   toileting scheduled   safety round/check completed   room organization consistent   nonskid shoes/slippers when out of bed   lighting adjusted   fall prevention program maintained   clutter  free environment maintained  Taken 3/8/2022 1014 by Purnima Jeong, RN  Safety Promotion/Fall Prevention:   activity supervised   toileting scheduled   safety round/check completed   room organization consistent   nonskid shoes/slippers when out of bed   mobility aid in reach   lighting adjusted   fall prevention program maintained   clutter free environment maintained  Taken 3/8/2022 0945 by Purnima Jeong, RN  Safety Promotion/Fall Prevention:   activity supervised   toileting scheduled   safety round/check completed   room organization consistent   lighting adjusted   fall prevention program maintained   clutter free environment maintained   Goal Outcome Evaluation:  Plan of Care Reviewed With: patient           Outcome Evaluation: Patient maintained activity with no sob, hr escalated at times but So2 wnl and maintained on 4 liters of oxygen. Continued diuresis today with good output.

## 2022-03-08 NOTE — PROGRESS NOTES
Name: Miri Yung ADMIT: 3/4/2022   : 1940  PCP: Loren Cruz APRN    MRN: [id number] LOS: 4 days   AGE/SEX: 81 y.o. female  ROOM: [facility]     Subjective   Subjective    Currently complains of leg pain from swelling. At the moment denies shortness of breath. No chest pain. She is supine without dyspnea.    Review of Systems   Constitutional: Negative for fever.   Respiratory: Negative for cough and shortness of breath.    Cardiovascular: Positive for leg swelling. Negative for chest pain.   Gastrointestinal: Negative for abdominal pain, diarrhea, nausea and vomiting.   Genitourinary: Negative for dysuria.   Neurological: Negative for headaches.      Objective   Objective   Vital Signs  Temp:  [98.3 °F (36.8 °C)-98.7 °F (37.1 °C)] 98.7 °F (37.1 °C)  Heart Rate:  [] 97  Resp:  [18] 18  BP: (110-163)/(57-78) 122/75  SpO2:  [90 %-98 %] 91 %  on  Flow (L/min):  [4] 4;   Device (Oxygen Therapy): nasal cannula    Intake/Output Summary (Last 24 hours) at 3/8/2022 1324  Last data filed at 3/8/2022 1320  Gross per 24 hour   Intake 600 ml   Output 3550 ml   Net -2950 ml     Body mass index is 37.68 kg/m².      22  0545 22  0524 22  1049   Weight: 94 kg (207 lb 3.7 oz) 93.8 kg (206 lb 12.8 oz) 93.4 kg (206 lb)     Physical Exam  Constitutional:       General: She is not in acute distress.     Appearance: She is obese. She is ill-appearing. She is not toxic-appearing.   HENT:      Right Ear: External ear normal.      Left Ear: External ear normal.      Nose: Nose normal.   Eyes:      Conjunctiva/sclera: Conjunctivae normal.   Cardiovascular:      Rate and Rhythm: Normal rate. Rhythm irregular.   Pulmonary:      Effort: No respiratory distress.      Breath sounds: Normal breath sounds. No wheezing or rhonchi.   Abdominal:      General: Bowel sounds are normal.      Palpations: Abdomen is soft.      Tenderness: There is no abdominal tenderness. There is no guarding or rebound.    Musculoskeletal:      Right lower le+ Edema present.      Left lower le+ Edema present.   Skin:     General: Skin is warm and dry.   Neurological:      General: No focal deficit present.      Mental Status: She is alert and oriented to person, place, and time.   Psychiatric:         Mood and Affect: Mood normal.         Behavior: Behavior normal.         Thought Content: Thought content normal.       Results Review:      Results from last 7 days   Lab Units 22  0942 22  0746 22  0639 22  0805 22  0632 22  1200 22  1220   SODIUM mmol/L 141 141 144 145 145 142 143   POTASSIUM mmol/L 3.5 4.2 4.3 3.9 3.3* 3.8 4.2   CHLORIDE mmol/L 97* 98 106 107 107 105 106   TOTAL CO2 mmol/L  --   --   --   --   --   --  21   CO2 mmol/L 34.0* 32.2* 27.2 27.0 26.0 23.1  --    BUN mg/dL 15 14 17 19 23 26* 25   CREATININE mg/dL 0.73 0.71 0.72 0.84 0.81 1.03* 0.93   GLUCOSE mg/dL 194* 122* 130* 131* 105* 113* 121*   CALCIUM mg/dL 9.0 9.0 8.8 8.6 8.3* 9.0 8.6*   AST (SGOT) U/L  --  45* 28 20 18 24  --    ALT (SGPT) U/L  --  27 23 19 19 26  --      Estimated Creatinine Clearance: 57.7 mL/min (by C-G formula based on SCr of 0.73 mg/dL).     CBC:      Lab 22  0746 227 22  0639 22  0805 22  2007 22  0632 22  1200   WBC 7.59  --  8.31  --  6.84  --  6.18 7.66   HEMOGLOBIN 9.1*  9.1*   < > 9.4*   < > 9.2*   < > 9.1* 9.6*   HEMATOCRIT 30.9*  30.9*   < > 32.2*   < > 31.5*   < > 30.9* 33.7*   PLATELETS 116*  --  153  --  149  --  156 175   NEUTROS ABS  --   --   --   --   --   --  4.10 5.33   IMMATURE GRANS (ABS)  --   --   --   --   --   --  0.02 0.03   LYMPHS ABS  --   --   --   --   --   --  1.23 1.35   MONOS ABS  --   --   --   --   --   --  0.70 0.78   EOS ABS  --   --   --   --   --   --  0.08 0.11   MCV 80.3  --  79.7  --  79.1  --  78.8* 82.0    < > = values in this interval not displayed.       Results from last 7 days   Lab  Units 03/05/22  0632   HEMOGLOBIN A1C % 5.90*     Results from last 7 days   Lab Units 03/08/22  1054 03/08/22  0627 03/07/22  2105 03/07/22  1620 03/07/22  1137 03/07/22  0627 03/06/22  2124 03/06/22  1617   GLUCOSE mg/dL 179* 147* 154* 149* 188* 138* 161* 108     Results from last 7 days   Lab Units 03/04/22  1200   TROPONIN T ng/mL <0.010     Results from last 7 days   Lab Units 03/04/22  1200   PROBNP pg/mL 1,420.0     Results from last 7 days   Lab Units 03/05/22  0632   TSH uIU/mL 0.020*     Results from last 7 days   Lab Units 03/05/22 2007 03/04/22  1200   MAGNESIUM mg/dL 2.0 2.0     Scheduled Meds  allopurinol, 300 mg, Oral, Daily  apixaban, 5 mg, Oral, Q12H  atorvastatin, 40 mg, Oral, Daily  buPROPion SR, 150 mg, Oral, Daily With Breakfast  chlorothiazide (DIURIL) IVPB, 500 mg, Intravenous, Q12H  famotidine, 20 mg, Oral, BID AC  ferrous sulfate, 325 mg, Oral, Daily With Breakfast  furosemide, 80 mg, Intravenous, Q8H  insulin lispro, 0-9 Units, Subcutaneous, TID AC  isosorbide mononitrate, 60 mg, Oral, Daily  linagliptin, 5 mg, Oral, Daily  losartan, 50 mg, Oral, Q PM  metoprolol succinate XL, 150 mg, Oral, Nightly  mirtazapine, 15 mg, Oral, Nightly  montelukast, 10 mg, Oral, Nightly  potassium chloride, 40 mEq, Oral, QAM  sodium chloride, 10 mL, Intravenous, Q12H  spironolactone, 100 mg, Oral, BID    Continuous Infusions   PRN Meds  dextrose  •  dextrose  •  glucagon (human recombinant)  •  hydrOXYzine  •  Morphine **AND** naloxone  •  nitroglycerin  •  ondansetron  •  oxyCODONE  •  potassium chloride **OR** potassium chloride **OR** potassium chloride  •  sodium chloride     Results for orders placed during the hospital encounter of 03/04/22    Adult Transthoracic Echo Complete W/ Cont if Necessary Per Protocol    Interpretation Summary  · Aortic valve maximum pressure gradient is 22.8 mmHg. Aortic valve mean pressure gradient is 13 mmHg.  · Mild aortic valve stenosis is present. Aortic valve area is  1.18 cm2.  · Calculated right ventricular systolic pressure from tricuspid regurgitation is 73.1 mmHg.  · There is moderate, bileaflet mitral valve thickening present.  · Mild mitral valve stenosis is present.  · Severe pulmonary hypertension is present.  · Left atrial volume is severely increased.  · Mild to moderate mitral valve regurgitation is present with an eccentric jet noted.  · Estimated left ventricular EF = 50% Left ventricular systolic function is normal.     I have personally reviewed:  [x]  Laboratory   [x]  Microbiology   [x]  Radiology   [x]  EKG/Telemetry     Assessment/Plan     Active Hospital Problems    Diagnosis  POA   • **Cirrhosis of liver (HCC) [K74.60]  Yes   • Occult GI bleeding [R19.5]  Yes   • Atrial fibrillation with RVR (HCC) [I48.91]  Yes   • Anasarca [R60.1]  Yes   • Splenomegaly due to portal hypertension and liver disease [R16.1]  Yes   • Esophageal varices (HCC) [I85.00]  Yes   • Symptomatic anemia [D64.9]  Yes   • History of CVA (cerebrovascular accident) [Z86.73]  Not Applicable   • Chronic diastolic (congestive) heart failure (HCC) [I50.32]  Yes   • Atrial fibrillation (HCC) [I48.91]  Yes   • Type 2 diabetes mellitus (HCC) [E11.9]  Yes   • Gastroesophageal reflux disease [K21.9]  Yes   • HTN (hypertension) [I10]  Yes   • Dementia (CMS/HCC) [F03.90]  Yes      Resolved Hospital Problems   No resolved problems to display.     Volume overload secondary acute on chronic diastolic congestive heart failure, history of hypertension, rapid atrial fibrillation.   · IV furosemide per cardiology  · Almost 3 L out last 24 hours  · Hypertension controlled  · check dopplers LE edema r/o DVT    KOO liver cirrhosis, portal hypertension, history of GERD.  · Currently compensated, low MELD     Chronic disease Anemia/occult GI bleed with heme positive stools  · Occult blood positive. Probably EGD and colonoscopy as an outpatient.   Stable hemoglobin. Iron. GI following    History of CVA/dementia  continue Lipitor/Eliquis      Hypokalemia.    Normal magnesium.  Resolved    Type 2 diabetes.  A1c 5.9.  Continue Tradjenta and sliding scale insulin.   Acceptable Accu-Cheks.    • Discussed with patient and nursing staff   • Discharge: Home with assistance of son     Jaun Monsalve MD   [facility]  03/08/22  13:24 EST

## 2022-03-08 NOTE — PLAN OF CARE
Goal Outcome Evaluation:  Plan of Care Reviewed With: patient        Progress: no change  Outcome Evaluation: Pt. A/O with periods of confusion, takes medications whole, VSS, on 4L O2 via N/C, c/o leg pain prn pain medication given, purewick intact to wall suction, yellow urine output in canister, will continue to monitor the rest of this shift

## 2022-03-08 NOTE — THERAPY TREATMENT NOTE
Patient Name: Miri Yung  : 1940    MRN: [id number]                              Today's Date: 3/8/2022       Admit Date: 3/4/2022    Visit Dx:     ICD-10-CM ICD-9-CM   1. Cirrhosis of liver without ascites, unspecified hepatic cirrhosis type (HCC)  K74.60 571.5   2. Anasarca  R60.1 782.3   3. Pedal edema  R60.0 782.3   4. Congestive heart failure, unspecified HF chronicity, unspecified heart failure type (HCC)  I50.9 428.0     Patient Active Problem List   Diagnosis   • Dementia (CMS/HCC)   • Depressive disorder   • Edema   • Abnormal liver function tests   • Gastroesophageal reflux disease   • Gout   • Hyperlipidemia   • HTN (hypertension)   • Insomnia   • Arthropathy of knee   • Memory loss   • Low back pain   • Mitral valve insufficiency   • Tricuspid valve insufficiency   • Urinary tract infection   • Valvular endocarditis   • Long QT interval   • Microcytic anemia   • Anemia   • Seasonal allergies   • Acute cystitis without hematuria   • Chronic fatigue   • Bacterial endocarditis   • Slow transit constipation   • Acute ischemic stroke (HCC)   • Paroxysmal tachycardia (HCC)   • Type 2 diabetes mellitus (HCC)   • Atrial fibrillation (HCC)   • Morbidly obese (HCC)   • Chronic diastolic (congestive) heart failure (HCC)   • Symptomatic anemia   • History of CVA (cerebrovascular accident)   • Long term current use of anticoagulant therapy   • Acute blood loss anemia   • Cirrhosis of liver (HCC)   • Esophageal varices (HCC)   • Splenomegaly due to portal hypertension and liver disease   • Anasarca   • Atrial fibrillation with RVR (HCC)   • Occult GI bleeding     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   • Endocarditis     with mitral and tricuspid regurgitation   • Esophageal reflux    • GERD  (gastroesophageal reflux disease)    • Gout    • History of blood transfusion     due to anemia   • HTN (hypertension)    • Hyperlipidemia    • Insomnia    • LOM (loss of memory)    • Lung mass    • 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; generally asymptomatic   • Mitral regurgitation    • Mixed anxiety depressive disorder    • Nonrheumatic tricuspid (valve) insufficiency    • Osteoarthritis    • PAF (paroxysmal atrial fibrillation) (HCC)    • Sacroiliitis (HCC)    • Tricuspid regurgitation    • Type 2 diabetes mellitus (HCC)      Past Surgical History:   Procedure Laterality Date   • CATARACT EXTRACTION     • COLONOSCOPY N/A 3/10/2016    Procedure: COLONOSCOPY TO CECUM;  Surgeon: Shaan Becerra Jr., MD;  Location: [facility] ENDOSCOPY;  Service:    • COLONOSCOPY N/A 6/18/2021    Procedure: COLONOSCOPY TO CECUM AND INTO TERMINAL ILEUM;  Surgeon: Jeremy Darden MD;  Location: [facility] ENDOSCOPY;  Service: Gastroenterology;  Laterality: N/A;  PRE: ANEMIA  POST: HEMORRHOIDS   • ENDOSCOPY N/A 3/10/2016    Procedure: ESOPHAGOGASTRODUODENOSCOPY ;  Surgeon: Shaan Becerra Jr., MD;  Location: [facility] ENDOSCOPY;  Service:    • ENDOSCOPY N/A 6/18/2021    Procedure: ESOPHAGOGASTRODUODENOSCOPY;  Surgeon: Jeremy Darden MD;  Location: [facility] ENDOSCOPY;  Service: Gastroenterology;  Laterality: N/A;  PRE: ANEMIA  POST: HIATAL HERNIA, SMALL ESOPHAGEAL VARICES   • KNEE SURGERY     • TUBAL ABDOMINAL LIGATION        General Information     Row Name 03/08/22 0940          Physical Therapy Time and Intention    Document Type therapy note (daily note)  -PC     Mode of Treatment physical therapy  -     Row Name 03/08/22 0940          General Information    Existing Precautions/Restrictions fall;oxygen therapy device and L/min  -PC     Barriers to Rehab medically complex  -PC           User Key  (r) = Recorded By, (t) = Taken By, (c) = Cosigned By    Initials Name Provider Type    PC Maddie Fairchild PT  Physical Therapist               Mobility     Row Name 03/08/22 0943          Bed Mobility    Bed Mobility supine-sit;sit-supine  -PC     Supine-Sit [place] (Bed Mobility) contact guard  -PC     Row Name 03/08/22 0943          Sit-Stand Transfer    Sit-Stand [place] (Transfers) contact guard  -PC     Assistive Device (Sit-Stand Transfers) walker, front-wheeled  -PC     Row Name 03/08/22 0943          Gait/Stairs (Locomotion)    [place]on Level (Gait) contact guard  -PC     Assistive Device (Gait) walker, front-wheeled  -PC     Distance in Feet (Gait) 25 ft  -PC     Deviations/Abnormal Patterns (Gait) base of support, wide;gait speed decreased;stride length decreased  -PC     Bilateral Gait Deviations forward flexed posture  -PC           User Key  (r) = Recorded By, (t) = Taken By, (c) = Cosigned By    Initials Name Provider Type    PC Maddie Fairchild, PT Physical Therapist               Obj/Interventions     Row Name 03/08/22 0944          Motor Skills    Therapeutic Exercise --  10 reps seated AP, LAQ, hip flexion  -PC           User Key  (r) = Recorded By, (t) = Taken By, (c) = Cosigned By    Initials Name Provider Type    PC Maddie Fairchild, PT Physical Therapist               Goals/Plan    No documentation.                Clinical Impression     Row Name 03/08/22 0945          Pain    Pre/Posttreatment Pain Comment pt c/o generalized pain all over, moans and groans as she is getting up, did not rate pain  -PC     Row Name 03/08/22 0945          Plan of Care Review    Plan of Care Reviewed With patient  -PC     Outcome Evaluation Pt with improved toleration of activity today with being less SOA , still only walking a short distance, gait is slow and pt fatigues, o2 sats 97% on return to sitting after walking, pt cont to plan on going home with assist of son at discharge  -PC     Row Name 03/08/22 0945          Vital Signs    Post SpO2 (%) 97  -PC     O2 Delivery Post Treatment supplemental O2  -PC      Row Name 03/08/22 0945          Positioning and Restraints    Pre-Treatment Position in bed  -PC     Post Treatment Position chair  -PC     In Chair reclined;call light within reach;encouraged to call for assist;exit alarm on  -PC           User Key  (r) = Recorded By, (t) = Taken By, (c) = Cosigned By    Initials Name Provider Type    PC Maddie Fairchild, PT Physical Therapist               Outcome Measures     Row Name 03/08/22 0951          How much help from another person do you currently need...    Turning from your back to your side while in flat bed without using bedrails? 3  -PC     Moving from lying on back to sitting on the side of a flat bed without bedrails? 3  -PC     Moving to and from a bed to a chair (including a wheelchair)? 3  -PC     Standing up from a chair using your arms (e.g., wheelchair, bedside chair)? 3  -PC     Climbing 3-5 steps with a railing? 2  -PC     To walk in hospital room? 3  -PC     AM-PAC 6 Clicks Score (PT) 17  -PC           User Key  (r) = Recorded By, (t) = Taken By, (c) = Cosigned By    Initials Name Provider Type    PC Maddie Fairchild, PT Physical Therapist                             Physical Therapy Education                 Title: PT OT SLP Therapies (Done)     Topic: Physical Therapy (Done)     Point: Mobility training (Done)     Learning Progress Summary           Patient Acceptance, E,D, DU by PC at 3/8/2022 0951    Acceptance, E, VU,NR by  at 3/7/2022 1819    Acceptance, E,D, DU by PC at 3/6/2022 1251                   Point: Home exercise program (Done)     Learning Progress Summary           Patient Acceptance, E,D, DU by PC at 3/8/2022 0951    Acceptance, E, VU,NR by  at 3/7/2022 1819    Acceptance, E,D, DU by PC at 3/6/2022 1251                   Point: Body mechanics (Done)     Learning Progress Summary           Patient Acceptance, E,D, DU by PC at 3/8/2022 0951    Acceptance, E, VU,NR by  at 3/7/2022 1819    Acceptance, E,D, DU by PC at 3/6/2022 1251                    Point: Precautions (Done)     Learning Progress Summary           Patient Acceptance, E,D, DU by PC at 3/8/2022 0951    Acceptance, E, VU,NR by  at 3/7/2022 1819    Acceptance, E,D, DU by PC at 3/6/2022 1251                               User Key     Initials Effective Dates Name Provider Type Discipline     06/16/21 -  Maddie Fairchild, PT Physical Therapist PT     10/20/20 -  Brook Fowler RN Registered Nurse Nurse              PT Recommendation and Plan  Planned Therapy Interventions (PT): bed mobility training, gait training, transfer training, strengthening  Plan of Care Reviewed With: patient  Outcome Evaluation: Pt with improved toleration of activity today with being less SOA , still only walking a short distance, gait is slow and pt fatigues, o2 sats 97% on return to sitting after walking, pt cont to plan on going home with assist of son at discharge     Time Calculation:    PT Charges     Row Name 03/08/22 0952             Time Calculation    Start Time 0855  -PC      Stop Time 0920  -PC      Time Calculation (min) 25 min  -PC      PT Received On 03/08/22  -PC      PT - Next Appointment 03/09/22  -PC            User Key  (r) = Recorded By, (t) = Taken By, (c) = Cosigned By    Initials Name Provider Type    PC Maddie Fairchild, PT Physical Therapist              Therapy Charges for Today     Code Description Service Date Service Provider Modifiers Qty    10593276439 HC PT THER PROC EA 15 MIN 3/7/2022 Maddie Fairchild, PT GP 1    32056055665 HC PT THER PROC EA 15 MIN 3/8/2022 Maddie Fairchild, PT GP 2          PT G-Codes  Outcome Measure Options: AM-PAC 6 Clicks Basic Mobility (PT)  AM-PAC 6 Clicks Score (PT): 17    Maddie Fairchild PT  3/8/2022

## 2022-03-08 NOTE — PLAN OF CARE
Goal Outcome Evaluation:  Plan of Care Reviewed With: patient           Outcome Evaluation: Pt with improved toleration of activity today with being less SOA , still only walking a short distance, gait is slow and pt fatigues, o2 sats 97% on return to sitting after walking, pt cont to plan on going home with assist of son at discharge    Patient was intermittently wearing a face mask during this therapy encounter. Therapist used appropriate personal protective equipment including eye protection, mask, and gloves.  Mask used was standard procedure mask. Appropriate PPE was worn during the entire therapy session. Hand hygiene was completed before and after therapy session. Patient is not in enhanced droplet precautions.

## 2022-03-08 NOTE — PLAN OF CARE
Goal Outcome Evaluation:  Plan of Care Reviewed With: patient, son           Outcome Evaluation: Alert and oriented x4 with periods of confusion, able to make all needs known clearly.  Now on O2 5L d/t Saturation drop into 70's but patient has been up in recliner this shift. up to BSC x1 BM today.  Patient has been Afib on the monitor, will CTM

## 2022-03-09 NOTE — PLAN OF CARE
Goal Outcome Evaluation:  Plan of Care Reviewed With: patient        Progress: improving  Outcome Evaluation: Pt was able to walk farther with rwx (75 ft) and required less assist, gait is steadier, pt able to tolerate activity, no SOA, o2 sats 96% after pt walked with 4L o2    Patient was intermittently wearing a face mask during this therapy encounter. Therapist used appropriate personal protective equipment including eye protection, mask, and gloves.  Mask used was standard procedure mask. Appropriate PPE was worn during the entire therapy session. Hand hygiene was completed before and after therapy session. Patient is not in enhanced droplet precautions.

## 2022-03-09 NOTE — PLAN OF CARE
Problem: Adult Inpatient Plan of Care  Goal: Plan of Care Review  Outcome: Ongoing, Progressing  Flowsheets (Taken 3/9/2022 1616)  Plan of Care Reviewed With: patient  Outcome Evaluation: Patient is alert and oriented times four but is forgetful and sometimes has episodes of disoriented to situation such as saying things like I like having a party with you. Today she has been tired and has times when she has slept or napped between tasks. She continues to be diuresed with good output. Her son visited today and he reported that the family is working on a home plan to stay with the pateint.  Goal: Optimal Comfort and Wellbeing  Outcome: Ongoing, Progressing  Intervention: Provide Person-Centered Care  Recent Flowsheet Documentation  Taken 3/9/2022 1300 by Purnima Jeong RN  Trust Relationship/Rapport:   care explained   choices provided   emotional support provided   empathic listening provided   questions answered   questions encouraged   reassurance provided   thoughts/feelings acknowledged  Taken 3/9/2022 0843 by Purnima Jeong RN  Trust Relationship/Rapport:   care explained   emotional support provided   choices provided   empathic listening provided   questions answered   questions encouraged   reassurance provided   thoughts/feelings acknowledged  Goal: Readiness for Transition of Care  Outcome: Ongoing, Progressing     Problem: Fall Injury Risk  Goal: Absence of Fall and Fall-Related Injury  Outcome: Ongoing, Progressing  Intervention: Identify and Manage Contributors  Recent Flowsheet Documentation  Taken 3/9/2022 1414 by Purnima Jeong RN  Medication Review/Management: medications reviewed  Taken 3/9/2022 1300 by Purnima Jeong RN  Self-Care Promotion: BADL personal objects within reach  Taken 3/9/2022 1218 by Purnima Jeong RN  Medication Review/Management: medications reviewed  Taken 3/9/2022 1000 by Purnima Jeong RN  Medication Review/Management: medications reviewed  Taken 3/9/2022 0947 by Purnima Jeong  RN  Medication Review/Management: medications reviewed  Taken 3/9/2022 0843 by Purnima Jeong RN  Medication Review/Management: medications reviewed  Self-Care Promotion:   BADL personal objects within reach   independence encouraged  Intervention: Promote Injury-Free Environment  Recent Flowsheet Documentation  Taken 3/9/2022 1414 by Purnima Jeong RN  Safety Promotion/Fall Prevention:   activity supervised   toileting scheduled   safety round/check completed   room organization consistent   nonskid shoes/slippers when out of bed   fall prevention program maintained   clutter free environment maintained  Taken 3/9/2022 1300 by Purnima Jeong RN  Safety Promotion/Fall Prevention: activity supervised  Taken 3/9/2022 1218 by Purnima Jeong RN  Safety Promotion/Fall Prevention: activity supervised  Taken 3/9/2022 1000 by Purnima Jeong RN  Safety Promotion/Fall Prevention:   activity supervised   fall prevention program maintained   clutter free environment maintained  Taken 3/9/2022 0947 by Purnima Jeong RN  Safety Promotion/Fall Prevention:   toileting scheduled   safety round/check completed   room organization consistent   activity supervised   clutter free environment maintained   fall prevention program maintained  Taken 3/9/2022 0843 by Purnima Jeong RN  Safety Promotion/Fall Prevention:   activity supervised   toileting scheduled   safety round/check completed   room organization consistent   nonskid shoes/slippers when out of bed   fall prevention program maintained   lighting adjusted   clutter free environment maintained     Problem: Diabetes Comorbidity  Goal: Blood Glucose Level Within Targeted Range  Outcome: Ongoing, Progressing  Intervention: Monitor and Manage Glycemia  Recent Flowsheet Documentation  Taken 3/9/2022 1300 by Purnima Jeong RN  Glycemic Management: blood glucose monitored  Taken 3/9/2022 0843 by Purnima Jeong RN  Glycemic Management:   blood glucose monitored   oral hydration promoted     Problem:  Heart Failure Comorbidity  Goal: Maintenance of Heart Failure Symptom Control  Outcome: Ongoing, Progressing  Intervention: Maintain Heart Failure-Management  Recent Flowsheet Documentation  Taken 3/9/2022 1414 by Purnima Jeong RN  Medication Review/Management: medications reviewed  Taken 3/9/2022 1218 by Purnima Jeong RN  Medication Review/Management: medications reviewed  Taken 3/9/2022 1000 by Purnima Jeong RN  Medication Review/Management: medications reviewed  Taken 3/9/2022 0947 by Purnima Jeong RN  Medication Review/Management: medications reviewed  Taken 3/9/2022 0843 by Purnima Jeong RN  Medication Review/Management: medications reviewed     Problem: Hypertension Comorbidity  Goal: Blood Pressure in Desired Range  Outcome: Ongoing, Progressing  Intervention: Maintain Blood Pressure Management  Recent Flowsheet Documentation  Taken 3/9/2022 1414 by Purnima Jeong RN  Medication Review/Management: medications reviewed  Taken 3/9/2022 1218 by Purnima Jeong RN  Medication Review/Management: medications reviewed  Taken 3/9/2022 1000 by Purnima Jeong RN  Medication Review/Management: medications reviewed  Taken 3/9/2022 0947 by Purnima Jeong RN  Medication Review/Management: medications reviewed  Taken 3/9/2022 0843 by Purnima Jeong RN  Syncope Management: position changed slowly  Medication Review/Management: medications reviewed     Problem: Pain Chronic (Persistent) (Comorbidity Management)  Goal: Acceptable Pain Control and Functional Ability  Outcome: Ongoing, Progressing  Intervention: Manage Persistent Pain  Recent Flowsheet Documentation  Taken 3/9/2022 1414 by Purnima Jeong RN  Medication Review/Management: medications reviewed  Taken 3/9/2022 1218 by Purnima Jeong RN  Medication Review/Management: medications reviewed  Taken 3/9/2022 1000 by Purnima Jeong RN  Medication Review/Management: medications reviewed  Taken 3/9/2022 0947 by Purnima Jeong RN  Medication Review/Management: medications reviewed  Taken 3/9/2022  0843 by Adenike, Purnima, RN  Medication Review/Management: medications reviewed  Intervention: Optimize Psychosocial Wellbeing  Recent Flowsheet Documentation  Taken 3/9/2022 1300 by Purnima Jeong RN  Supportive Measures:   active listening utilized   positive reinforcement provided  Family/Support System Care: support provided  Taken 3/9/2022 0843 by Purnima Jeong RN  Supportive Measures:   active listening utilized   positive reinforcement provided   self-care encouraged  Family/Support System Care: support provided     Problem: Skin Injury Risk Increased  Goal: Skin Health and Integrity  Outcome: Ongoing, Progressing  Intervention: Promote and Optimize Oral Intake  Recent Flowsheet Documentation  Taken 3/9/2022 1300 by Purnima Jeong RN  Oral Nutrition Promotion: rest periods promoted  Taken 3/9/2022 0843 by Purnima Jeong RN  Oral Nutrition Promotion: rest periods promoted  Intervention: Optimize Skin Protection  Recent Flowsheet Documentation  Taken 3/9/2022 1414 by Purnima Jeong RN  Head of Bed (HOB) Positioning: HOB at 15 degrees  Taken 3/9/2022 1300 by Purnima Jeong RN  Pressure Reduction Techniques: sit time limited to 2 hours  Head of Bed (HOB) Positioning: HOB at 20 degrees  Pressure Reduction Devices:   alternating pressure pump (ADD)   heel offloading device utilized   positioning supports utilized  Skin Protection: incontinence pads utilized  Taken 3/9/2022 1218 by Purnima Jeong RN  Head of Bed (HOB) Positioning: HOB at 20-30 degrees  Taken 3/9/2022 0843 by Purnima Jeong RN  Pressure Reduction Techniques:   positioned off wounds   rest period provided between sit times   weight shift assistance provided  Head of Bed (HOB) Positioning: HOB at 60-90 degrees  Pressure Reduction Devices:   alternating pressure pump (ADD)   pressure-redistributing mattress utilized   heel offloading device utilized  Skin Protection:   adhesive use limited   incontinence pads utilized   Goal Outcome Evaluation:  Plan of Care Reviewed  With: patient           Outcome Evaluation: Patient is alert and oriented times four but is forgetful and sometimes has episodes of disoriented to situation such as saying things like I like having a party with you. Today she has been tired and has times when she has slept or napped between tasks. She continues to be diuresed with good output. Her son visited today and he reported that the family is working on a home plan to stay with the pateint.

## 2022-03-09 NOTE — PROGRESS NOTES
Hospital Follow Up    LOS:  LOS: 5 days   Patient Name: Miri Yung  Age/Sex: 81 y.o. female  : 1940  MRN: [id number]    Day of Service: 22   Length of Stay: 5  Encounter Provider: KWASI Poole  Place of Service: [facility] CARDIOLOGY  Patient Care Team:  Loren Cruz APRN as PCP - General (Family Medicine)    Subjective:     Chief Complaint: Diastolic CHF, afib with rvr    Interval History: SR on monitor today. OOB in chair. Breathing better     Objective:     Objective:  Temp:  [98.1 °F (36.7 °C)-99.9 °F (37.7 °C)] 98.3 °F (36.8 °C)  Heart Rate:  [] 110  Resp:  [18-22] 22  BP: (114-146)/(54-81) 146/74     Intake/Output Summary (Last 24 hours) at 3/9/2022 1043  Last data filed at 3/9/2022 0930  Gross per 24 hour   Intake 460 ml   Output 3925 ml   Net -3465 ml     Body mass index is 35.69 kg/m².      22  0524 22  1049 22  0257   Weight: 93.8 kg (206 lb 12.8 oz) 93.4 kg (206 lb) 88.5 kg (195 lb 1.7 oz)     Weight change: -4.941 kg (-10 lb 14.3 oz)    Physical Exam:   General Appearance:    Awake alert and oriented in no acute distress.   Color:  Skin:  Neuro:  HEENT:    Lungs:     Pink  Warm and dry  No focal, motor or sensory deficits  Neck supple, pupils equal, round and reactive. No JVD, No Bruit  Scattered wheezesto auscultation,respirations regular, even and                  unlabored    Heart:    Regular rate and rhythm, S1 and S2, no murmur, no gallop, no rub. Trace pedal edema, DP/PT pulses are 2+   Chest Wall:    No abnormalities observed   Abdomen:     Normal bowel sounds, no masses, no organomegaly, soft        non-tender, non-distended, no guarding, no ascites noted   Extremities:   Moves all extremities well, no edema, no cyanosis, no redness       Lab Review:   Results from last 7 days   Lab Units 03/09/22  0712 22  0942 22  0746   SODIUM mmol/L 140 141 141   POTASSIUM mmol/L 3.6 3.5 4.2   CHLORIDE  mmol/L 90* 97* 98   CO2 mmol/L 42.0* 34.0* 32.2*   BUN mg/dL 17 15 14   CREATININE mg/dL 0.77 0.73 0.71   GLUCOSE mg/dL 135* 194* 122*   CALCIUM mg/dL 8.4* 9.0 9.0   AST (SGOT) U/L 38*  --  45*   ALT (SGPT) U/L 28  --  27     Results from last 7 days   Lab Units 03/04/22  1200   TROPONIN T ng/mL <0.010     Results from last 7 days   Lab Units 03/09/22  0712 03/08/22 2042 03/08/22  0746   WBC 10*3/mm3 7.79  --  7.59   HEMOGLOBIN g/dL 9.5* 9.7* 9.1*  9.1*   HEMATOCRIT % 32.9* 33.6* 30.9*  30.9*   PLATELETS 10*3/mm3 152  --  116*     Results from last 7 days   Lab Units 03/05/22  0632 03/04/22  1200   INR  1.27* 1.24*     Results from last 7 days   Lab Units 03/05/22 2007 03/04/22  1200   MAGNESIUM mg/dL 2.0 2.0           Invalid input(s): LDLCALC  Results from last 7 days   Lab Units 03/04/22  1200   PROBNP pg/mL 1,420.0     Results from last 7 days   Lab Units 03/05/22  0632   TSH uIU/mL 0.020*     I reviewed the patient's new clinical results.  I personally viewed and interpreted the patient's EKG  Current Medications:   Scheduled Meds:allopurinol, 300 mg, Oral, Daily  apixaban, 5 mg, Oral, Q12H  atorvastatin, 40 mg, Oral, Daily  buPROPion SR, 150 mg, Oral, Daily With Breakfast  chlorothiazide (DIURIL) IVPB, 500 mg, Intravenous, Q12H  famotidine, 20 mg, Oral, BID AC  ferrous sulfate, 325 mg, Oral, Daily With Breakfast  furosemide, 80 mg, Intravenous, Q8H  insulin lispro, 0-9 Units, Subcutaneous, TID AC  isosorbide mononitrate, 60 mg, Oral, Daily  linagliptin, 5 mg, Oral, Daily  losartan, 50 mg, Oral, Q PM  metoprolol succinate XL, 150 mg, Oral, Nightly  mirtazapine, 15 mg, Oral, Nightly  montelukast, 10 mg, Oral, Nightly  potassium chloride, 40 mEq, Oral, QAM  sodium chloride, 10 mL, Intravenous, Q12H  spironolactone, 100 mg, Oral, BID      Continuous Infusions:     Allergies:  Allergies   Allergen Reactions   • Decongestant  [Pseudoephedrine]        Assessment:       Cirrhosis of liver (HCC)    Dementia  (CMS/HCC)    Gastroesophageal reflux disease    HTN (hypertension)    Type 2 diabetes mellitus (HCC)    Atrial fibrillation (HCC)    Chronic diastolic (congestive) heart failure (HCC)    Symptomatic anemia    History of CVA (cerebrovascular accident)    Esophageal varices (HCC)    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 Acute Diastolic CHF EF 50%  2. Afib with rvr  Rate control on eliquis  3. Essential HTN  4. Hyperlipidemia  5. Pulmonary hypertension with RVSP 73mmHg  6. Mild AS/Mild MS    Plan:         Volume wise looks better. SR on monitor with betablocker, Anticoagulation with Eliquis. I am going to switch the diuretic over to PO today.   KWASI Poole  03/09/22  10:43 EST  Electronically signed by KWASI Poole, 03/09/22, 10:43 AM EST.

## 2022-03-09 NOTE — PROGRESS NOTES
Name: Miri Yung ADMIT: 3/4/2022   : 1940  PCP: Loren Cruz APRN    MRN: [id number] LOS: 5 days   AGE/SEX: 81 y.o. female  ROOM: [facility]     Subjective   Subjective    Currently denies shortness of breath.    Review of Systems   Constitutional: Negative for fever.   Respiratory: Negative for cough and shortness of breath.    Cardiovascular: Negative for chest pain.   Gastrointestinal: Negative for abdominal pain, diarrhea, nausea and vomiting.   Genitourinary: Negative for dysuria.   Neurological: Negative for headaches.      Objective   Objective   Vital Signs  Temp:  [98 °F (36.7 °C)-99.9 °F (37.7 °C)] 98 °F (36.7 °C)  Heart Rate:  [] 106  Resp:  [18-22] 20  BP: (114-146)/(54-81) 118/74  SpO2:  [91 %-97 %] 95 %  on  Flow (L/min):  [4] 4;   Device (Oxygen Therapy): nasal cannula    Intake/Output Summary (Last 24 hours) at 3/9/2022 1641  Last data filed at 3/9/2022 1513  Gross per 24 hour   Intake 220 ml   Output 5625 ml   Net -5405 ml     Body mass index is 35.69 kg/m².      22  0524 22  1049 22  0257   Weight: 93.8 kg (206 lb 12.8 oz) 93.4 kg (206 lb) 88.5 kg (195 lb 1.7 oz)     Physical Exam  Constitutional:       General: She is not in acute distress.     Appearance: She is obese. She is ill-appearing. She is not toxic-appearing.   HENT:      Right Ear: External ear normal.      Left Ear: External ear normal.      Nose: Nose normal.   Eyes:      Conjunctiva/sclera: Conjunctivae normal.   Cardiovascular:      Rate and Rhythm: Normal rate. Rhythm irregular.   Pulmonary:      Effort: No respiratory distress.      Breath sounds: Normal breath sounds. No wheezing or rhonchi.   Abdominal:      General: Bowel sounds are normal.      Palpations: Abdomen is soft.      Tenderness: There is no abdominal tenderness. There is no guarding or rebound.   Musculoskeletal:      Right lower leg: Edema present.      Left lower leg: Edema present.      Comments: trace   Skin:      General: Skin is warm and dry.   Neurological:      General: No focal deficit present.      Mental Status: She is alert and oriented to person, place, and time.   Psychiatric:         Mood and Affect: Mood normal.         Behavior: Behavior normal.         Thought Content: Thought content normal.       Results Review:      Results from last 7 days   Lab Units 03/09/22  0712 03/08/22  0942 03/08/22  0746 03/07/22  0639 03/06/22  0805 03/05/22  0632 03/04/22  1200   SODIUM mmol/L 140 141 141 144 145 145 142   POTASSIUM mmol/L 3.6 3.5 4.2 4.3 3.9 3.3* 3.8   CHLORIDE mmol/L 90* 97* 98 106 107 107 105   CO2 mmol/L 42.0* 34.0* 32.2* 27.2 27.0 26.0 23.1   BUN mg/dL 17 15 14 17 19 23 26*   CREATININE mg/dL 0.77 0.73 0.71 0.72 0.84 0.81 1.03*   GLUCOSE mg/dL 135* 194* 122* 130* 131* 105* 113*   CALCIUM mg/dL 8.4* 9.0 9.0 8.8 8.6 8.3* 9.0   AST (SGOT) U/L 38*  --  45* 28 20 18 24   ALT (SGPT) U/L 28  --  27 23 19 19 26     Estimated Creatinine Clearance: 56.1 mL/min (by C-G formula based on SCr of 0.77 mg/dL).     CBC:      Lab 03/09/22  0712 03/08/22 2042 03/08/22  0746 03/07/22 2117 03/07/22  0639 03/06/22 2006 03/06/22  0805 03/05/22  2007 03/05/22  0632 03/04/22  1200   WBC 7.79  --  7.59  --  8.31  --  6.84  --  6.18 7.66   HEMOGLOBIN 9.5*   < > 9.1*  9.1*   < > 9.4*   < > 9.2*   < > 9.1* 9.6*   HEMATOCRIT 32.9*   < > 30.9*  30.9*   < > 32.2*   < > 31.5*   < > 30.9* 33.7*   PLATELETS 152  --  116*  --  153  --  149  --  156 175   NEUTROS ABS  --   --   --   --   --   --   --   --  4.10 5.33   IMMATURE GRANS (ABS)  --   --   --   --   --   --   --   --  0.02 0.03   LYMPHS ABS  --   --   --   --   --   --   --   --  1.23 1.35   MONOS ABS  --   --   --   --   --   --   --   --  0.70 0.78   EOS ABS  --   --   --   --   --   --   --   --  0.08 0.11   MCV 80.2  --  80.3  --  79.7  --  79.1  --  78.8* 82.0    < > = values in this interval not displayed.     Results from last 7 days   Lab Units 03/05/22  0632   HEMOGLOBIN  A1C % 5.90*     Results from last 7 days   Lab Units 03/09/22  1632 03/09/22  1107 03/09/22  0612 03/08/22  2151 03/08/22  1612 03/08/22  1054 03/08/22  0627 03/07/22  2105   GLUCOSE mg/dL 144* 146* 140* 132* 183* 179* 147* 154*     Results from last 7 days   Lab Units 03/04/22  1200   TROPONIN T ng/mL <0.010     Results from last 7 days   Lab Units 03/04/22  1200   PROBNP pg/mL 1,420.0     Results from last 7 days   Lab Units 03/05/22  0632   TSH uIU/mL 0.020*     Results from last 7 days   Lab Units 03/05/22 2007 03/04/22  1200   MAGNESIUM mg/dL 2.0 2.0     Scheduled Meds  allopurinol, 300 mg, Oral, Daily  apixaban, 5 mg, Oral, Q12H  atorvastatin, 40 mg, Oral, Daily  bumetanide, 2 mg, Oral, Daily  buPROPion SR, 150 mg, Oral, Daily With Breakfast  chlorothiazide (DIURIL) IVPB, 500 mg, Intravenous, Q12H  famotidine, 20 mg, Oral, BID AC  ferrous sulfate, 325 mg, Oral, Daily With Breakfast  insulin lispro, 0-9 Units, Subcutaneous, TID AC  isosorbide mononitrate, 60 mg, Oral, Daily  linagliptin, 5 mg, Oral, Daily  losartan, 50 mg, Oral, Q PM  metoprolol succinate XL, 150 mg, Oral, Nightly  mirtazapine, 15 mg, Oral, Nightly  montelukast, 10 mg, Oral, Nightly  potassium chloride, 40 mEq, Oral, QAM  sodium chloride, 10 mL, Intravenous, Q12H  spironolactone, 100 mg, Oral, BID    Continuous Infusions   PRN Meds  dextrose  •  dextrose  •  glucagon (human recombinant)  •  hydrOXYzine  •  Morphine **AND** naloxone  •  nitroglycerin  •  ondansetron  •  oxyCODONE  •  potassium chloride **OR** potassium chloride **OR** potassium chloride  •  sodium chloride     Intake/Output Summary (Last 24 hours) at 3/9/2022 1646  Last data filed at 3/9/2022 1513  Gross per 24 hour   Intake 220 ml   Output 5625 ml   Net -5405 ml      Results for orders placed during the hospital encounter of 03/04/22    Duplex Venous Lower Extremity - Bilateral CAR    Interpretation Summary  · Normal bilateral lower extremity venous duplex scan.      Assessment/Plan     Active Hospital Problems    Diagnosis  POA   • **Cirrhosis of liver (HCC) [K74.60]  Yes   • Occult GI bleeding [R19.5]  Yes   • Atrial fibrillation with RVR (HCC) [I48.91]  Yes   • Anasarca [R60.1]  Yes   • Splenomegaly due to portal hypertension and liver disease [R16.1]  Yes   • Esophageal varices (HCC) [I85.00]  Yes   • Symptomatic anemia [D64.9]  Yes   • History of CVA (cerebrovascular accident) [Z86.73]  Not Applicable   • Chronic diastolic (congestive) heart failure (HCC) [I50.32]  Yes   • Atrial fibrillation (HCC) [I48.91]  Yes   • Type 2 diabetes mellitus (HCC) [E11.9]  Yes   • Gastroesophageal reflux disease [K21.9]  Yes   • HTN (hypertension) [I10]  Yes   • Dementia (CMS/HCC) [F03.90]  Yes      Resolved Hospital Problems   No resolved problems to display.     Volume overload secondary acute on chronic diastolic congestive heart failure, history of hypertension, rapid atrial fibrillation.   · Diuretics to p.o. per cardiology  · over 5 L out last 24 hours  · Hypertension controlled    KOO liver cirrhosis, portal hypertension, history of GERD.  · Currently compensated, low MELD     Chronic disease Anemia/occult GI bleed with heme positive stools  · Occult blood positive. Probably EGD and colonoscopy as an outpatient.   Stable hemoglobin. Iron. GI following    History of CVA/dementia continue Lipitor/Eliquis      Hypokalemia.    Normal magnesium.  Resolved    Type 2 diabetes.  A1c 5.9.  Continue Tradjenta and sliding scale insulin.   Acceptable Accu-Cheks.    • Discussed with patient and nursing staff   • Discharge: Home with assistance of son 1 to 2 days     Jaun Jerry Monsalve MD   [facility]  03/09/22  16:41 EST

## 2022-03-09 NOTE — PLAN OF CARE
Problem: Adult Inpatient Plan of Care  Goal: Plan of Care Review  Outcome: Ongoing, Progressing  Flowsheets (Taken 3/9/2022 0446)  Plan of Care Reviewed With: patient  Outcome Evaluation: VSS, Pt able to answer orientation questions correctly, however throughout the night she has random converstations by herself and calling for people who she said she hadn't spoke to for a while. C/O of leg cramps and pain given prn meds and helped with repositioning to help provide comfort, Had to remind her to keep her O2 on, to maintain her O2 sats. Continued to diuresis  and encouraged pt to be independent. WCTM.   Goal Outcome Evaluation:  Plan of Care Reviewed With: patient           Outcome Evaluation: VSS, Pt able to answer orientation questions correctly, however throughout the night she has random converstations by herself and calling for people who she said she hadn't spoke to for a while. C/O of leg cramps and pain given prn meds and helped with repositioning to help provide comfort, Had to remind her to keep her O2 on, to maintain her O2 sats. Continued to diuresis  and encouraged pt to be independent. WCTM.

## 2022-03-09 NOTE — THERAPY TREATMENT NOTE
Patient Name: Miri Yung  : 1940    MRN: [id number]                              Today's Date: 3/9/2022       Admit Date: 3/4/2022    Visit Dx:     ICD-10-CM ICD-9-CM   1. Cirrhosis of liver without ascites, unspecified hepatic cirrhosis type (HCC)  K74.60 571.5   2. Anasarca  R60.1 782.3   3. Pedal edema  R60.0 782.3   4. Congestive heart failure, unspecified HF chronicity, unspecified heart failure type (HCC)  I50.9 428.0     Patient Active Problem List   Diagnosis   • Dementia (CMS/HCC)   • Depressive disorder   • Edema   • Abnormal liver function tests   • Gastroesophageal reflux disease   • Gout   • Hyperlipidemia   • HTN (hypertension)   • Insomnia   • Arthropathy of knee   • Memory loss   • Low back pain   • Mitral valve insufficiency   • Tricuspid valve insufficiency   • Urinary tract infection   • Valvular endocarditis   • Long QT interval   • Microcytic anemia   • Anemia   • Seasonal allergies   • Acute cystitis without hematuria   • Chronic fatigue   • Bacterial endocarditis   • Slow transit constipation   • Acute ischemic stroke (HCC)   • Paroxysmal tachycardia (HCC)   • Type 2 diabetes mellitus (HCC)   • Atrial fibrillation (HCC)   • Morbidly obese (HCC)   • Chronic diastolic (congestive) heart failure (HCC)   • Symptomatic anemia   • History of CVA (cerebrovascular accident)   • Long term current use of anticoagulant therapy   • Acute blood loss anemia   • Cirrhosis of liver (HCC)   • Esophageal varices (HCC)   • Splenomegaly due to portal hypertension and liver disease   • Anasarca   • Atrial fibrillation with RVR (HCC)   • Occult GI bleeding     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   • Endocarditis     with mitral and tricuspid regurgitation   • Esophageal reflux    • GERD  (gastroesophageal reflux disease)    • Gout    • History of blood transfusion     due to anemia   • HTN (hypertension)    • Hyperlipidemia    • Insomnia    • LOM (loss of memory)    • Lung mass    • 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; generally asymptomatic   • Mitral regurgitation    • Mixed anxiety depressive disorder    • Nonrheumatic tricuspid (valve) insufficiency    • Osteoarthritis    • PAF (paroxysmal atrial fibrillation) (HCC)    • Sacroiliitis (HCC)    • Tricuspid regurgitation    • Type 2 diabetes mellitus (HCC)      Past Surgical History:   Procedure Laterality Date   • CATARACT EXTRACTION     • COLONOSCOPY N/A 3/10/2016    Procedure: COLONOSCOPY TO CECUM;  Surgeon: Shaan Becerra Jr., MD;  Location: [facility] ENDOSCOPY;  Service:    • COLONOSCOPY N/A 6/18/2021    Procedure: COLONOSCOPY TO CECUM AND INTO TERMINAL ILEUM;  Surgeon: Jeremy Darden MD;  Location: [facility] ENDOSCOPY;  Service: Gastroenterology;  Laterality: N/A;  PRE: ANEMIA  POST: HEMORRHOIDS   • ENDOSCOPY N/A 3/10/2016    Procedure: ESOPHAGOGASTRODUODENOSCOPY ;  Surgeon: Shaan Becerra Jr., MD;  Location: [facility] ENDOSCOPY;  Service:    • ENDOSCOPY N/A 6/18/2021    Procedure: ESOPHAGOGASTRODUODENOSCOPY;  Surgeon: Jeremy Darden MD;  Location: [facility] ENDOSCOPY;  Service: Gastroenterology;  Laterality: N/A;  PRE: ANEMIA  POST: HIATAL HERNIA, SMALL ESOPHAGEAL VARICES   • KNEE SURGERY     • TUBAL ABDOMINAL LIGATION        General Information     Row Name 03/09/22 1620          Physical Therapy Time and Intention    Document Type therapy note (daily note)  -PC     Mode of Treatment physical therapy  -     Row Name 03/09/22 1621          General Information    Existing Precautions/Restrictions fall;oxygen therapy device and L/min  -PC           User Key  (r) = Recorded By, (t) = Taken By, (c) = Cosigned By    Initials Name Provider Type    PC Maddie Fairchild PT Physical Therapist               Mobility     Row  Name 03/09/22 1621          Bed Mobility    Bed Mobility supine-sit;sit-supine  -PC     Supine-Sit Yates (Bed Mobility) supervision  -PC     Assistive Device (Bed Mobility) bed rails  -PC     Row Name 03/09/22 1621          Sit-Stand Transfer    Sit-Stand Yates (Transfers) contact guard;supervision  -PC     Assistive Device (Sit-Stand Transfers) walker, front-wheeled  -PC     Row Name 03/09/22 1621          Gait/Stairs (Locomotion)    Yates Level (Gait) contact guard;supervision  -PC     Assistive Device (Gait) walker, front-wheeled  -PC     Distance in Feet (Gait) 75 ft  -PC     Deviations/Abnormal Patterns (Gait) base of support, wide;gait speed decreased;stride length decreased  -PC     Bilateral Gait Deviations forward flexed posture  -PC           User Key  (r) = Recorded By, (t) = Taken By, (c) = Cosigned By    Initials Name Provider Type    PC Maddie Fairchild, PT Physical Therapist               Obj/Interventions     Row Name 03/09/22 1623          Motor Skills    Therapeutic Exercise --  seated AP, LAQ, hip flexion 10 reps  -PC           User Key  (r) = Recorded By, (t) = Taken By, (c) = Cosigned By    Initials Name Provider Type    PC Maddie Fairchild, PT Physical Therapist               Goals/Plan    No documentation.                Clinical Impression     Row Name 03/09/22 1623          Pain    Pretreatment Pain Rating 0/10 - no pain  -PC     Row Name 03/09/22 1623          Plan of Care Review    Plan of Care Reviewed With patient  -PC     Progress improving  -PC     Outcome Evaluation Pt was able to walk farther with rwx (75 ft) and required less assist, gait is steadier, pt able to tolerate activity, no SOA, o2 sats 96% after pt walked with 4L o2  -PC     Row Name 03/09/22 1623          Vital Signs    Pre SpO2 (%) 94  -PC     O2 Delivery Pre Treatment supplemental O2  -PC     Post SpO2 (%) 96  -PC     O2 Delivery Post Treatment supplemental O2  -PC     Row Name 03/09/22 1623           Positioning and Restraints    Pre-Treatment Position in bed  -PC     Post Treatment Position chair  -PC     In Chair reclined;call light within reach;encouraged to call for assist;exit alarm on  -PC           User Key  (r) = Recorded By, (t) = Taken By, (c) = Cosigned By    Initials Name Provider Type    PC Maddie Fairchild PT Physical Therapist               Outcome Measures     Row Name 03/09/22 1634          How much help from another person do you currently need...    Turning from your back to your side while in flat bed without using bedrails? 4  -PC     Moving from lying on back to sitting on the side of a flat bed without bedrails? 3  -PC     Moving to and from a bed to a chair (including a wheelchair)? 3  -PC     Standing up from a chair using your arms (e.g., wheelchair, bedside chair)? 3  -PC     Climbing 3-5 steps with a railing? 2  -PC     To walk in hospital room? 3  -PC     AM-PAC 6 Clicks Score (PT) 18  -PC           User Key  (r) = Recorded By, (t) = Taken By, (c) = Cosigned By    Initials Name Provider Type    PC Maddie Fairchild PT Physical Therapist                             Physical Therapy Education                 Title: PT OT SLP Therapies (Done)     Topic: Physical Therapy (Done)     Point: Mobility training (Done)     Learning Progress Summary           Patient Acceptance, E,D, DU by PC at 3/9/2022 1634    Acceptance, E,D, DU by PC at 3/8/2022 0951    Acceptance, E, VU,NR by  at 3/7/2022 1819    Acceptance, E,D, DU by PC at 3/6/2022 1251                   Point: Home exercise program (Done)     Learning Progress Summary           Patient Acceptance, E,D, DU by PC at 3/9/2022 1634    Acceptance, E,D, DU by PC at 3/8/2022 0951    Acceptance, E, VU,NR by  at 3/7/2022 1819    Acceptance, E,D, DU by PC at 3/6/2022 1251                   Point: Body mechanics (Done)     Learning Progress Summary           Patient Acceptance, E,D, DU by PC at 3/9/2022 1634    Acceptance, E,D, DU by PC at  3/8/2022 0951    Acceptance, E, VU,NR by  at 3/7/2022 1819    Acceptance, E,D, DU by PC at 3/6/2022 1251                   Point: Precautions (Done)     Learning Progress Summary           Patient Acceptance, E,D, DU by PC at 3/9/2022 1634    Acceptance, E,D, DU by PC at 3/8/2022 0951    Acceptance, E, VU,NR by  at 3/7/2022 1819    Acceptance, E,D, DU by PC at 3/6/2022 1251                               User Key     Initials Effective Dates Name Provider Type Discipline     06/16/21 -  Maddie Fairchild, PT Physical Therapist PT     10/20/20 -  Brook Fowler RN Registered Nurse Nurse              PT Recommendation and Plan  Planned Therapy Interventions (PT): bed mobility training, gait training, transfer training, strengthening  Plan of Care Reviewed With: patient  Progress: improving  Outcome Evaluation: Pt was able to walk farther with rwx (75 ft) and required less assist, gait is steadier, pt able to tolerate activity, no SOA, o2 sats 96% after pt walked with 4L o2     Time Calculation:    PT Charges     Row Name 03/09/22 1635             Time Calculation    Start Time 1538  -PC      Stop Time 1605  -PC      Time Calculation (min) 27 min  -PC      PT Received On 03/09/22  -PC      PT - Next Appointment 03/10/22  -PC            User Key  (r) = Recorded By, (t) = Taken By, (c) = Cosigned By    Initials Name Provider Type    PC Maddie Fairchild, PT Physical Therapist              Therapy Charges for Today     Code Description Service Date Service Provider Modifiers Qty    14854852957 HC PT THER PROC EA 15 MIN 3/8/2022 Maddie Fairchild, PT GP 2    61138290502 HC PT THER PROC EA 15 MIN 3/9/2022 Maddie Fairchild, PT GP 2          PT G-Codes  Outcome Measure Options: AM-PAC 6 Clicks Basic Mobility (PT)  AM-PAC 6 Clicks Score (PT): 18    Maddie Fairchild PT  3/9/2022

## 2022-03-10 NOTE — PLAN OF CARE
Goal Outcome Evaluation:  Plan of Care Reviewed With: patient        Progress: improving  Outcome Evaluation: pt cont to show steady improvement with mobility, able to walk 70 ft in room with walker and supervision on O2, O2 sats >90% after walking, pt is appropriate to go home with son, pt does have some confusion, but follows commands consistently, needs frequent verbal cues    Patient was intermittently wearing a face mask during this therapy encounter. Therapist used appropriate personal protective equipment including eye protection, mask, and gloves.  Mask used was standard procedure mask. Appropriate PPE was worn during the entire therapy session. Hand hygiene was completed before and after therapy session. Patient is not in enhanced droplet precautions.

## 2022-03-10 NOTE — DISCHARGE PLACEMENT REQUEST
"Bree Yung (81 y.o. Female)             Date of Birth   1940    Social Security Number       Address   [address] [place] [id number]    Home Phone   [phone]    MRN   [id number]       [facility]    Marital Status                               Admission Date   3/4/22    Admission Type   Emergency    Admitting Provider   Devon Mcneill MD    Attending Provider   Jaun Monsalve MD    Department, Room/Bed   [address], [address]       Discharge Date       Discharge Disposition       Discharge Destination                               Attending Provider: Jaun Monsalve MD    Allergies: Decongestant  [Pseudoephedrine]    Isolation: None   Infection: None   Code Status: CPR   Advance Care Planning Activity    Ht: 157.5 cm (62\")   Wt: 86.9 kg (191 lb 9.3 oz)    Admission Cmt: None   Principal Problem: Cirrhosis of liver (HCC) [K74.60]                 Active Insurance as of 3/4/2022     Primary Coverage     Payor Plan Insurance Group Employer/Plan Group    MEDICARE MEDICARE A & B      Payor Plan Address Payor Plan Phone Number Payor Plan Fax Number Effective Dates    [address] [phone]  11/1/2004 - None Entered    [facility] [id number]       Subscriber Name Subscriber Birth Date Member ID       BREE YUNG 1940 [id number]           Secondary Coverage     Payor Plan Insurance Group Employer/Plan Group    [employer]P [employer]AR HE[employer] C[employer] OPTIONS      Payor Plan Address Payor Plan Phone Number Payor Plan Fax Number Effective Dates    [facility] [phone]  1/1/2016 - None Entered    [address]       [facility] [id number]       Subscriber Name Subscriber Birth Date Member ID       BREE YUNG 1940 [id number]                 Emergency Contacts      (Rel.) Home Phone Work Phone Mobile Phone    Hardeep Yung (Son) [phone] -- [phone]    Brian Yung (Son) [phone] -- [phone]    Ashley Israel (Other) -- -- " [phone]

## 2022-03-10 NOTE — PROGRESS NOTES
HOSPITAL FOLLOW UP NOTE    Patient Name: Miri Yung  Patient : 1940        Date of Service:03/10/22  Provider of Service: Jignesh York MD  Place of Service: [facility]  Referral Provider: Devon Mcneill MD          Follow Up: Atrial fibrillation, diastolic congestive heart failure    Interval Hx: Patient back in atrial fibrillation today.  Blood pressure remained stable.  Weight appears to be decreasing.  Continues to diurese.        OBJECTIVE  Temp:  [97.6 °F (36.4 °C)-98.5 °F (36.9 °C)] 97.6 °F (36.4 °C)  Heart Rate:  [] 88  Resp:  [18-20] 18  BP: (107-118)/(49-85) 112/59     Intake/Output Summary (Last 24 hours) at 3/10/2022 0829  Last data filed at 3/9/2022 2324  Gross per 24 hour   Intake 220 ml   Output 4000 ml   Net -3780 ml     Body mass index is 35.04 kg/m².      22  1049 22  0257 03/10/22  0410   Weight: 93.4 kg (206 lb) 88.5 kg (195 lb 1.7 oz) 86.9 kg (191 lb 9.3 oz)         Physical Exam:   Vitals reviewed.   Constitutional:       Appearance: Well-developed.   Eyes:      Pupils: Pupils are equal, round, and reactive to light.   HENT:      Head: Normocephalic.   Neck:      Thyroid: No thyromegaly.      Vascular: No carotid bruit or JVD.   Pulmonary:      Effort: Pulmonary effort is normal.   Cardiovascular:      Normal rate. Irregularly irregular rhythm. Normal S1. Normal S2.      Murmurs: There is no murmur.      No gallop.   Pulses:     Intact distal pulses.   Edema:     Peripheral edema present.     Ankle: bilateral trace edema of the ankle.     Feet: bilateral trace edema of the feet.  Abdominal:      General: Bowel sounds are normal.      Palpations: Abdomen is soft.   Musculoskeletal:      Cervical back: Normal range of motion. Skin:     General: Skin is warm and dry.      Findings: No erythema.   Neurological:      Mental Status: Alert and oriented to person, place, and time.           CURRENT MEDS    Scheduled Meds:allopurinol, 300 mg, Oral,  Daily  apixaban, 5 mg, Oral, Q12H  atorvastatin, 40 mg, Oral, Daily  bumetanide, 2 mg, Oral, Daily  buPROPion SR, 150 mg, Oral, Daily With Breakfast  chlorothiazide (DIURIL) IVPB, 500 mg, Intravenous, Q12H  famotidine, 20 mg, Oral, BID AC  ferrous sulfate, 325 mg, Oral, Daily With Breakfast  insulin lispro, 0-9 Units, Subcutaneous, TID AC  isosorbide mononitrate, 60 mg, Oral, Daily  linagliptin, 5 mg, Oral, Daily  losartan, 50 mg, Oral, Q PM  metoprolol succinate XL, 150 mg, Oral, Nightly  mirtazapine, 15 mg, Oral, Nightly  montelukast, 10 mg, Oral, Nightly  potassium chloride, 40 mEq, Oral, QAM  sodium chloride, 10 mL, Intravenous, Q12H  spironolactone, 100 mg, Oral, BID      Continuous Infusions:       Lab Review:   Results from last 7 days   Lab Units 03/09/22  0712 03/08/22  0942 03/08/22  0746   SODIUM mmol/L 140 141 141   POTASSIUM mmol/L 3.6 3.5 4.2   CHLORIDE mmol/L 90* 97* 98   CO2 mmol/L 42.0* 34.0* 32.2*   BUN mg/dL 17 15 14   CREATININE mg/dL 0.77 0.73 0.71   GLUCOSE mg/dL 135* 194* 122*   CALCIUM mg/dL 8.4* 9.0 9.0   AST (SGOT) U/L 38*  --  45*   ALT (SGPT) U/L 28  --  27         Results from last 7 days   Lab Units 03/10/22  0725 03/09/22  1949 03/09/22  0712   WBC 10*3/mm3 7.71  --  7.79   HEMOGLOBIN g/dL 9.9*  9.9* 10.6* 9.5*   HEMATOCRIT % 34.2  34.2 35.8 32.9*   PLATELETS 10*3/mm3 152  --  152     Results from last 7 days   Lab Units 03/05/22  0632 03/04/22  1200   INR  1.27* 1.24*     Results from last 7 days   Lab Units 03/05/22 2007 03/04/22  1200   MAGNESIUM mg/dL 2.0 2.0         Results from last 7 days   Lab Units 03/04/22  1200   PROBNP pg/mL 1,420.0     Results from last 7 days   Lab Units 03/05/22  0632   TSH uIU/mL 0.020*       I personally reviewed the patient's ECG and telemetry data    ASSESSMENT & PLAN    Cirrhosis of liver (HCC)    Dementia (CMS/HCC)    Gastroesophageal reflux disease    HTN (hypertension)    Type 2 diabetes mellitus (HCC)    Atrial fibrillation (HCC)    Chronic  diastolic (congestive) heart failure (HCC)    Symptomatic anemia    History of CVA (cerebrovascular accident)    Esophageal varices (HCC)    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  Acute diastolic heart failure: Continues to diurese on present medication.  2.  Atrial fibrillation: Rate variable.  Remains on Eliquis  3.  Pulmonary hypertension: Severe  4.  Anasarca  5.  Dementia  6.  Cirrhosis  Continue supportive care.  No changes in medication today.  Jignesh York MD  03/10/22

## 2022-03-10 NOTE — CASE MANAGEMENT/SOCIAL WORK
Continued Stay Note  [facility]     Patient Name: Miri Yung  MRN: [id number]  Today's Date: 3/10/2022    Admit Date: 3/4/2022     Discharge Plan     Row Name 03/10/22 1149       Plan    Plan Home with son and referral to [facility]    Provided Post Acute Provider List? N/A    Refused Provider List Comment Wants to use [facility] again    Patient/Family in Agreement with Plan yes    Plan Comments Reviewed clinicals, anticipate dc 1-2 days. [facility] called rosalba Meier [phone] and left  requesting call back to discuss dc plans. [facility] spoke with pts rosalba Meier via inbound call, introduced self and discussed dc planning HH vs SNF and pts confusion. He states pt not normally confused and believes its related to the pain medication, as pt was not on that prior to admission. CCP explained would let medical team know. Explained if pt confused at dc will need 24/7 care or SNF. He is agreeable to stay with pt at her house at dc and have Home Health again with [facility]. [facility] made referral to [facility]. Updated MD and RN   RNCCP               Discharge Codes    No documentation.               Expected Discharge Date and Time     Expected Discharge Date Expected Discharge Time    Mar 11, 2022

## 2022-03-10 NOTE — THERAPY TREATMENT NOTE
Patient Name: Miri Yung  : 1940    MRN: [id number]                              Today's Date: 3/10/2022       Admit Date: 3/4/2022    Visit Dx:     ICD-10-CM ICD-9-CM   1. Cirrhosis of liver without ascites, unspecified hepatic cirrhosis type (HCC)  K74.60 571.5   2. Anasarca  R60.1 782.3   3. Pedal edema  R60.0 782.3   4. Congestive heart failure, unspecified HF chronicity, unspecified heart failure type (HCC)  I50.9 428.0     Patient Active Problem List   Diagnosis   • Dementia (CMS/HCC)   • Depressive disorder   • Edema   • Abnormal liver function tests   • Gastroesophageal reflux disease   • Gout   • Hyperlipidemia   • HTN (hypertension)   • Insomnia   • Arthropathy of knee   • Memory loss   • Low back pain   • Mitral valve insufficiency   • Tricuspid valve insufficiency   • Urinary tract infection   • Valvular endocarditis   • Long QT interval   • Microcytic anemia   • Anemia   • Seasonal allergies   • Acute cystitis without hematuria   • Chronic fatigue   • Bacterial endocarditis   • Slow transit constipation   • Acute ischemic stroke (HCC)   • Paroxysmal tachycardia (HCC)   • Type 2 diabetes mellitus (HCC)   • Atrial fibrillation (HCC)   • Morbidly obese (HCC)   • Chronic diastolic (congestive) heart failure (HCC)   • Symptomatic anemia   • History of CVA (cerebrovascular accident)   • Long term current use of anticoagulant therapy   • Acute blood loss anemia   • Cirrhosis of liver (HCC)   • Esophageal varices (HCC)   • Splenomegaly due to portal hypertension and liver disease   • Anasarca   • Atrial fibrillation with RVR (HCC)   • Occult GI bleeding     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   • Endocarditis     with mitral and tricuspid regurgitation   • Esophageal reflux    • GERD  (gastroesophageal reflux disease)    • Gout    • History of blood transfusion     due to anemia   • HTN (hypertension)    • Hyperlipidemia    • Insomnia    • LOM (loss of memory)    • Lung mass    • 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; generally asymptomatic   • Mitral regurgitation    • Mixed anxiety depressive disorder    • Nonrheumatic tricuspid (valve) insufficiency    • Osteoarthritis    • PAF (paroxysmal atrial fibrillation) (HCC)    • Sacroiliitis (HCC)    • Tricuspid regurgitation    • Type 2 diabetes mellitus (HCC)      Past Surgical History:   Procedure Laterality Date   • CATARACT EXTRACTION     • COLONOSCOPY N/A 3/10/2016    Procedure: COLONOSCOPY TO CECUM;  Surgeon: Shaan Becerra Jr., MD;  Location: [facility] ENDOSCOPY;  Service:    • COLONOSCOPY N/A 6/18/2021    Procedure: COLONOSCOPY TO CECUM AND INTO TERMINAL ILEUM;  Surgeon: Jeremy Darden MD;  Location: [facility] ENDOSCOPY;  Service: Gastroenterology;  Laterality: N/A;  PRE: ANEMIA  POST: HEMORRHOIDS   • ENDOSCOPY N/A 3/10/2016    Procedure: ESOPHAGOGASTRODUODENOSCOPY ;  Surgeon: Shaan Becerra Jr., MD;  Location: [facility] ENDOSCOPY;  Service:    • ENDOSCOPY N/A 6/18/2021    Procedure: ESOPHAGOGASTRODUODENOSCOPY;  Surgeon: Jeremy Darden MD;  Location: [facility] ENDOSCOPY;  Service: Gastroenterology;  Laterality: N/A;  PRE: ANEMIA  POST: HIATAL HERNIA, SMALL ESOPHAGEAL VARICES   • KNEE SURGERY     • TUBAL ABDOMINAL LIGATION        General Information     Row Name 03/10/22 1623          Physical Therapy Time and Intention    Document Type therapy note (daily note)  -PC     Mode of Treatment physical therapy  -     Row Name 03/10/22 1623          General Information    Existing Precautions/Restrictions fall;oxygen therapy device and L/min  -PC           User Key  (r) = Recorded By, (t) = Taken By, (c) = Cosigned By    Initials Name Provider Type    PC Maddie Fairchild PT Physical Therapist               Mobility     Row  Name 03/10/22 1624          Bed Mobility    Bed Mobility supine-sit;sit-supine  -PC     Supine-Sit [place] (Bed Mobility) supervision  -PC     Assistive Device (Bed Mobility) bed rails  -PC     Row Name 03/10/22 1624          Sit-Stand Transfer    Sit-Stand [place] (Transfers) contact guard;supervision  -PC     Assistive Device (Sit-Stand Transfers) walker, front-wheeled  -PC     Row Name 03/10/22 1624          Gait/Stairs (Locomotion)    [place] Level (Gait) contact guard;supervision  -PC     Assistive Device (Gait) walker, front-wheeled  -PC     Distance in Feet (Gait) 70 ft  -PC     Deviations/Abnormal Patterns (Gait) base of support, wide;gait speed decreased;stride length decreased  -PC     Bilateral Gait Deviations forward flexed posture  -PC           User Key  (r) = Recorded By, (t) = Taken By, (c) = Cosigned By    Initials Name Provider Type    PC Maddie Fairchild, PT Physical Therapist               Obj/Interventions     Row Name 03/10/22 1625          Motor Skills    Therapeutic Exercise --  10 reps AP, LAQ  -PC           User Key  (r) = Recorded By, (t) = Taken By, (c) = Cosigned By    Initials Name Provider Type    PC Maddie Fairchild, PT Physical Therapist               Goals/Plan    No documentation.                Clinical Impression     Row Name 03/10/22 1625          Pain    Pretreatment Pain Rating 0/10 - no pain  -PC     Row Name 03/10/22 1625          Plan of Care Review    Plan of Care Reviewed With patient  -PC     Progress improving  -PC     Outcome Evaluation pt cont to show steady improvement with mobility, able to walk 70 ft in room with walker and supervision on O2, O2 sats >90% after walking, pt is appropriate to go home with son, pt does have some confusion, but follows commands consistently, needs frequent verbal cues  -PC     Row Name 03/10/22 1625          Vital Signs    Pre SpO2 (%) 94  -PC     O2 Delivery Pre Treatment supplemental O2  -PC     O2 Delivery Intra  Treatment supplemental O2  -PC     Post SpO2 (%) 90  -PC     O2 Delivery Post Treatment supplemental O2  -PC     Row Name 03/10/22 1625          Positioning and Restraints    Pre-Treatment Position in bed  -PC     Post Treatment Position chair  -PC     In Chair reclined;call light within reach;encouraged to call for assist;exit alarm on  -PC           User Key  (r) = Recorded By, (t) = Taken By, (c) = Cosigned By    Initials Name Provider Type    PC Maddie Fairchild, PT Physical Therapist               Outcome Measures     Row Name 03/10/22 1632          How much help from another person do you currently need...    Turning from your back to your side while in flat bed without using bedrails? 4  -PC     Moving from lying on back to sitting on the side of a flat bed without bedrails? 3  -PC     Moving to and from a bed to a chair (including a wheelchair)? 3  -PC     Standing up from a chair using your arms (e.g., wheelchair, bedside chair)? 3  -PC     Climbing 3-5 steps with a railing? 2  -PC     To walk in hospital room? 3  -PC     AM-PAC 6 Clicks Score (PT) 18  -PC           User Key  (r) = Recorded By, (t) = Taken By, (c) = Cosigned By    Initials Name Provider Type    PC Maddie Fairchild, PT Physical Therapist                             Physical Therapy Education                 Title: PT OT SLP Therapies (Done)     Topic: Physical Therapy (Done)     Point: Mobility training (Done)     Learning Progress Summary           Patient Acceptance, E,D, DU by PC at 3/9/2022 1634    Acceptance, E,D, DU by PC at 3/8/2022 0951    Acceptance, E, VU,NR by  at 3/7/2022 1819    Acceptance, E,D, DU by PC at 3/6/2022 1251                   Point: Home exercise program (Done)     Learning Progress Summary           Patient Acceptance, E,D, DU by PC at 3/9/2022 1634    Acceptance, E,D, DU by PC at 3/8/2022 0951    Acceptance, E, VU,NR by  at 3/7/2022 1819    Acceptance, E,D, DU by PC at 3/6/2022 1251                   Point: Body  mechanics (Done)     Learning Progress Summary           Patient Acceptance, E,D, DU by PC at 3/9/2022 1634    Acceptance, E,D, DU by PC at 3/8/2022 0951    Acceptance, E, VU,NR by  at 3/7/2022 1819    Acceptance, E,D, DU by PC at 3/6/2022 1251                   Point: Precautions (Done)     Learning Progress Summary           Patient Acceptance, E,D, DU by PC at 3/9/2022 1634    Acceptance, E,D, DU by PC at 3/8/2022 0951    Acceptance, E, VU,NR by  at 3/7/2022 1819    Acceptance, E,D, DU by PC at 3/6/2022 1251                               User Key     Initials Effective Dates Name Provider Type Discipline     06/16/21 -  Maddie Fairchild PT Physical Therapist PT     10/20/20 -  Brook Fowler RN Registered Nurse Nurse              PT Recommendation and Plan  Planned Therapy Interventions (PT): bed mobility training, gait training, transfer training, strengthening  Plan of Care Reviewed With: patient  Progress: improving  Outcome Evaluation: pt cont to show steady improvement with mobility, able to walk 70 ft in room with walker and supervision on O2, O2 sats >90% after walking, pt is appropriate to go home with son, pt does have some confusion, but follows commands consistently, needs frequent verbal cues     Time Calculation:    PT Charges     Row Name 03/10/22 1633             Time Calculation    Start Time 1602  -PC      Stop Time 1616  -PC      Time Calculation (min) 14 min  -PC      PT Received On 03/10/22  -PC      PT - Next Appointment 03/11/22  -PC            User Key  (r) = Recorded By, (t) = Taken By, (c) = Cosigned By    Initials Name Provider Type    PC Maddie Fairchild, PT Physical Therapist              Therapy Charges for Today     Code Description Service Date Service Provider Modifiers Qty    20621081777 HC PT THER PROC EA 15 MIN 3/9/2022 Maddie Fairchild, PT GP 2    76213519303 HC PT THER PROC EA 15 MIN 3/10/2022 Maddie Fairchild, PT GP 1          PT G-Codes  Outcome Measure Options: AM-PAC  6 Clicks Basic Mobility (PT)  -PAC 6 Clicks Score (PT): 18    Maddie Fairchild, PT  3/10/2022

## 2022-03-10 NOTE — PROGRESS NOTES
Name: Miri Yung ADMIT: 3/4/2022   : 1940  PCP: Loren Cruz APRN    MRN: [id number] LOS: 6 days   AGE/SEX: 81 y.o. female  ROOM: [facility]     Subjective   Subjective    Currently denies shortness of breath. Discussed with nursing staff. Some sundowning and hospital delirium.    Review of Systems   Constitutional: Negative for fever.   Respiratory: Negative for cough and shortness of breath.    Cardiovascular: Negative for chest pain.   Gastrointestinal: Negative for abdominal pain, diarrhea, nausea and vomiting.   Genitourinary: Negative for dysuria.   Neurological: Negative for headaches.      Objective   Objective   Vital Signs  Temp:  [97.6 °F (36.4 °C)-98.5 °F (36.9 °C)] 97.6 °F (36.4 °C)  Heart Rate:  [] 88  Resp:  [18] 18  BP: (107-113)/(49-85) 112/59  SpO2:  [95 %-97 %] 95 %  on  Flow (L/min):  [4] 4;   Device (Oxygen Therapy): nasal cannula    Intake/Output Summary (Last 24 hours) at 3/10/2022 1255  Last data filed at 3/10/2022 0849  Gross per 24 hour   Intake 340 ml   Output 2600 ml   Net -2260 ml     Body mass index is 35.04 kg/m².      22  1049 22  0257 03/10/22  0410   Weight: 93.4 kg (206 lb) 88.5 kg (195 lb 1.7 oz) 86.9 kg (191 lb 9.3 oz)     Physical Exam  Constitutional:       General: She is not in acute distress.     Appearance: She is obese. She is ill-appearing. She is not toxic-appearing.   HENT:      Right Ear: External ear normal.      Left Ear: External ear normal.      Nose: Nose normal.   Eyes:      Conjunctiva/sclera: Conjunctivae normal.   Cardiovascular:      Rate and Rhythm: Normal rate. Rhythm irregular.   Pulmonary:      Effort: No respiratory distress.      Breath sounds: Normal breath sounds. No wheezing or rhonchi.   Abdominal:      General: Bowel sounds are normal.      Palpations: Abdomen is soft.      Tenderness: There is no abdominal tenderness. There is no guarding or rebound.   Musculoskeletal:      Right lower leg: Edema present.       Left lower leg: Edema present.      Comments: trace   Skin:     General: Skin is warm and dry.   Neurological:      General: No focal deficit present.      Mental Status: She is alert and oriented to person, place, and time.   Psychiatric:         Mood and Affect: Mood normal.         Behavior: Behavior normal.         Thought Content: Thought content normal.       Results Review:      Results from last 7 days   Lab Units 03/10/22  0725 03/09/22  0712 03/08/22  0942 03/08/22  0746 03/07/22  0639 03/06/22  0805 03/05/22  0632 03/04/22  1200   SODIUM mmol/L 134* 140 141 141 144 145 145 142   POTASSIUM mmol/L 4.1 3.6 3.5 4.2 4.3 3.9 3.3* 3.8   CHLORIDE mmol/L 86* 90* 97* 98 106 107 107 105   CO2 mmol/L 38.3* 42.0* 34.0* 32.2* 27.2 27.0 26.0 23.1   BUN mg/dL 19 17 15 14 17 19 23 26*   CREATININE mg/dL 0.76 0.77 0.73 0.71 0.72 0.84 0.81 1.03*   GLUCOSE mg/dL 138* 135* 194* 122* 130* 131* 105* 113*   CALCIUM mg/dL 8.9 8.4* 9.0 9.0 8.8 8.6 8.3* 9.0   AST (SGOT) U/L 35* 38*  --  45* 28 20 18 24   ALT (SGPT) U/L 28 28  --  27 23 19 19 26     Estimated Creatinine Clearance: 55.5 mL/min (by C-G formula based on SCr of 0.76 mg/dL).     CBC:      Lab 03/10/22  0725 03/09/22  1949 03/09/22  0712 03/08/22  2042 03/08/22  0746 03/07/22 2117 03/07/22  0639 03/06/22  2006 03/06/22  0805 03/05/22 2007 03/05/22  0632 03/04/22  1200   WBC 7.71  --  7.79  --  7.59  --  8.31  --  6.84  --  6.18 7.66   HEMOGLOBIN 9.9*  9.9*   < > 9.5*   < > 9.1*  9.1*   < > 9.4*   < > 9.2*   < > 9.1* 9.6*   HEMATOCRIT 34.2  34.2   < > 32.9*   < > 30.9*  30.9*   < > 32.2*   < > 31.5*   < > 30.9* 33.7*   PLATELETS 152  --  152  --  116*  --  153  --  149  --  156 175   NEUTROS ABS  --   --   --   --   --   --   --   --   --   --  4.10 5.33   IMMATURE GRANS (ABS)  --   --   --   --   --   --   --   --   --   --  0.02 0.03   LYMPHS ABS  --   --   --   --   --   --   --   --   --   --  1.23 1.35   MONOS ABS  --   --   --   --   --   --   --   --   --   --   0.70 0.78   EOS ABS  --   --   --   --   --   --   --   --   --   --  0.08 0.11   MCV 79.7  --  80.2  --  80.3  --  79.7  --  79.1  --  78.8* 82.0    < > = values in this interval not displayed.     Results from last 7 days   Lab Units 03/05/22  0632   HEMOGLOBIN A1C % 5.90*     Results from last 7 days   Lab Units 03/10/22  1200 03/10/22  0601 03/09/22  1951 03/09/22  1632 03/09/22  1107 03/09/22  0612 03/08/22  2151 03/08/22  1612   GLUCOSE mg/dL 137* 147* 175* 144* 146* 140* 132* 183*     Results from last 7 days   Lab Units 03/04/22  1200   TROPONIN T ng/mL <0.010     Results from last 7 days   Lab Units 03/04/22  1200   PROBNP pg/mL 1,420.0     Results from last 7 days   Lab Units 03/05/22  0632   TSH uIU/mL 0.020*     Results from last 7 days   Lab Units 03/05/22 2007 03/04/22  1200   MAGNESIUM mg/dL 2.0 2.0     Scheduled Meds  allopurinol, 300 mg, Oral, Daily  apixaban, 5 mg, Oral, Q12H  atorvastatin, 40 mg, Oral, Daily  bumetanide, 2 mg, Oral, Daily  buPROPion SR, 150 mg, Oral, Daily With Breakfast  chlorothiazide (DIURIL) IVPB, 500 mg, Intravenous, Q12H  famotidine, 20 mg, Oral, BID AC  ferrous sulfate, 325 mg, Oral, Daily With Breakfast  insulin lispro, 0-9 Units, Subcutaneous, TID AC  isosorbide mononitrate, 60 mg, Oral, Daily  linagliptin, 5 mg, Oral, Daily  losartan, 50 mg, Oral, Q PM  metoprolol succinate XL, 150 mg, Oral, Nightly  mirtazapine, 15 mg, Oral, Nightly  montelukast, 10 mg, Oral, Nightly  potassium chloride, 40 mEq, Oral, QAM  sodium chloride, 10 mL, Intravenous, Q12H  spironolactone, 100 mg, Oral, BID    Continuous Infusions   PRN Meds  dextrose  •  dextrose  •  glucagon (human recombinant)  •  hydrOXYzine  •  Morphine **AND** naloxone  •  nitroglycerin  •  ondansetron  •  potassium chloride **OR** potassium chloride **OR** potassium chloride  •  sodium chloride     Intake/Output Summary (Last 24 hours) at 3/10/2022 1255  Last data filed at 3/10/2022 0849  Gross per 24 hour   Intake 340  ml   Output 2600 ml   Net -2260 ml      Results for orders placed during the hospital encounter of 03/04/22    Duplex Venous Lower Extremity - Bilateral CAR    Interpretation Summary  · Normal bilateral lower extremity venous duplex scan.     I have personally reviewed:  [x]  Laboratory   [x]  EKG/Telemetry     Assessment/Plan     Active Hospital Problems    Diagnosis  POA   • **Cirrhosis of liver (HCC) [K74.60]  Yes   • Occult GI bleeding [R19.5]  Yes   • Atrial fibrillation with RVR (HCC) [I48.91]  Yes   • Anasarca [R60.1]  Yes   • Splenomegaly due to portal hypertension and liver disease [R16.1]  Yes   • Esophageal varices (HCC) [I85.00]  Yes   • Symptomatic anemia [D64.9]  Yes   • History of CVA (cerebrovascular accident) [Z86.73]  Not Applicable   • Chronic diastolic (congestive) heart failure (HCC) [I50.32]  Yes   • Atrial fibrillation (HCC) [I48.91]  Yes   • Type 2 diabetes mellitus (HCC) [E11.9]  Yes   • Gastroesophageal reflux disease [K21.9]  Yes   • HTN (hypertension) [I10]  Yes   • Dementia (CMS/HCC) [F03.90]  Yes      Resolved Hospital Problems   No resolved problems to display.     Volume overload secondary acute on chronic diastolic congestive heart failure, history of hypertension, rapid atrial fibrillation.   · Diuretics now p.o.   · over 2 L out last 24 hours  · Hypertension controlled    KOO liver cirrhosis, portal hypertension, history of GERD.  · Currently compensated, low MELD     Chronic disease Anemia/occult GI bleed with heme positive stools  · Occult blood positive. Probably EGD and colonoscopy as an outpatient.   Stable hemoglobin. Iron. GI following    History of CVA/dementia continue Lipitor/Eliquis      Hypokalemia.    Normal magnesium.  Resolved    Type 2 diabetes.  A1c 5.9.  Continue Tradjenta and sliding scale insulin.   Acceptable Accu-Cheks.    • Discussed with patient and nursing staff   • Discharge: Home with son 1 to 2 days     Jaun Jerry Monsalve MD   [facility]  03/10/22  12:55 EST

## 2022-03-10 NOTE — PLAN OF CARE
Problem: Adult Inpatient Plan of Care  Goal: Plan of Care Review  Outcome: Ongoing, Progressing  Flowsheets (Taken 3/10/2022 0335)  Plan of Care Reviewed With: patient  Outcome Evaluation: Pt confused during this shift, got out of bed and sat in the recliner next to her bed. Pt removed O2 sensor, oxygen, monitor leads, purewick and brief. Pt stated she didn't know where she was or how to got to the hospital. Attempted to reorientate the pt, without success. Cleaned and assisted pt back to bed ensured the bed alarms were still on and will CTM.   Goal Outcome Evaluation:  Plan of Care Reviewed With: patient           Outcome Evaluation: Pt confused during this shift, got out of bed and sat in the recliner next to her bed. Pt removed O2 sensor, oxygen, monitor leads, purewick and brief. Pt stated she didn't know where she was or how to got to the hospital. Attempted to reorientate the pt, without success. Cleaned and assisted pt back to bed ensured the bed alarms were still on and will CTM.

## 2022-03-11 NOTE — NURSING NOTE
While working with PT/OT O2 was removed to obtain RA sat. Sat was 84%. 4L NC was placed back on patient, sat now 94%. Will CTM the rest of my shift.

## 2022-03-11 NOTE — OUTREACH NOTE
Prep Survey    Flowsheet Row Responses   [facility]thodoxy facility patient discharged from? [place]   Is LACE score < 7 ? No   Emergency Room discharge w/ pulse ox? No   Eligibility [facility]   Date of Admission 03/04/22   Date of Discharge 03/11/22   Discharge Disposition Home-Health Care Sv   Discharge diagnosis cirrhosis of liver, occult GI Bleeding.   Does the patient have one of the following disease processes/diagnoses(primary or secondary)? Other   Does the patient have Home health ordered? Yes   What is the Home health agency?  [facility]    Is there a DME ordered? Yes   What DME was ordered? and [facility]  O2   Prep survey completed? Yes          CHRIST RAMIREZ - Registered Nurse

## 2022-03-11 NOTE — DISCHARGE SUMMARY
[place] [facility]    Date of Discharge:  3/11/2022    PCP: Loren Cruz APRN    Discharge Diagnosis:   Active Hospital Problems    Diagnosis  POA   • **Cirrhosis of liver (HCC) [K74.60]  Yes   • Occult GI bleeding [R19.5]  Yes   • Atrial fibrillation with RVR (HCC) [I48.91]  Yes   • Anasarca [R60.1]  Yes   • Splenomegaly due to portal hypertension and liver disease [R16.1]  Yes   • Esophageal varices (HCC) [I85.00]  Yes   • Symptomatic anemia [D64.9]  Yes   • History of CVA (cerebrovascular accident) [Z86.73]  Not Applicable   • Chronic diastolic (congestive) heart failure (HCC) [I50.32]  Yes   • Atrial fibrillation (HCC) [I48.91]  Yes   • Type 2 diabetes mellitus (HCC) [E11.9]  Yes   • Gastroesophageal reflux disease [K21.9]  Yes   • HTN (hypertension) [I10]  Yes   • Dementia (CMS/HCC) [F03.90]  Yes      Resolved Hospital Problems   No resolved problems to display.          Consults     Date and Time Order Name Status Description    3/5/2022  5:27 AM Inpatient Cardiology Consult Completed     3/4/2022  7:06 PM Inpatient Gastroenterology Consult Completed     3/4/2022  1:17 PM [facility] (on-call MD unless specified) Details          Hospital Course  81 y.o. female admitted with volume overload and acute on chronic diastolic CHF. Patient also has a history of cirrhosis, hypertension, atrial fibrillation. She had rapid rate atrial fibrillation and cardiology help to manage her rate control. She was previously on Eliquis and that was stopped in December because she had no recurrence of atrial fibrillation and it was restarted this admission. She was diuresed with improvement in her symptoms and exam. Her weight has come down to 191 pounds. On admission she was 215 pounds. Recommend follow-up BMP early next week to make sure electrolytes are stable and to make sure she is not getting any worsening contraction alkalosis. Would also recommend follow-up hemoglobin at the  same time.    Patient has a history of anemia and heme positive stools as well as GI bleed in the past. She has had EGD and colonoscopy in the past without any identifiable bleeding source and had elevated AFP and CA 19-9 but was lost to follow-up. GI plans to follow-up with her in their office after discharge for continued management. Hemoglobin has been stable during hospitalization after being restarted on anticoagulation.    She is now requiring oxygen and will be sent home on some oxygen. On admission she did not need any. Follow-up chest x-ray was done today that showed improved aeration but showed some atelectasis or infiltrate. Suspect this is all from atelectasis causing hypoxia. She has been less mobile in the hospital. She has no fever or leukocytosis. She has no new respiratory complaints (no cough) in fact her shortness of breath is improved. Do not suspect PE since she is on anticoagulation. Do not anticipate she will need oxygen for long when she is more ambulatory.    She plans on going home with family. They declined SNF. Will ask home health to see.    I discussed the patient's findings and my recommendations with patient and nursing staff.    Condition on Discharge: Improved.     Temp:  [97.6 °F (36.4 °C)-98.4 °F (36.9 °C)] 97.8 °F (36.6 °C)  Heart Rate:  [] 91  Resp:  [16-20] 16  BP: ()/(47-88) 98/47  Body mass index is 35.04 kg/m².    Physical Exam  Constitutional:       General: She is not in acute distress.     Appearance: Normal appearance. She is not toxic-appearing.   HENT:      Head: Normocephalic and atraumatic.      Right Ear: External ear normal.      Left Ear: External ear normal.      Nose: Nose normal.   Eyes:      Conjunctiva/sclera: Conjunctivae normal.   Cardiovascular:      Rate and Rhythm: Normal rate. Rhythm irregular.   Pulmonary:      Effort: Pulmonary effort is normal. No respiratory distress.      Breath sounds: Normal breath sounds. No wheezing or rhonchi.  "  Abdominal:      General: Bowel sounds are normal. There is no distension.      Palpations: Abdomen is soft.      Tenderness: There is no abdominal tenderness. There is no guarding or rebound.   Musculoskeletal:         General: No swelling.      Comments: longitudinal wrinkles from diuresis   Skin:     General: Skin is warm and dry.   Neurological:      General: No focal deficit present.      Mental Status: She is alert.      Comments: Oriented to self, [place], year 22, situation \"fluid\"   Psychiatric:         Mood and Affect: Mood normal.         Behavior: Behavior normal.         Thought Content: Thought content normal.       Disposition: Home or Self Care       Discharge Medications      New Medications      Instructions Start Date   bumetanide 2 MG tablet  Commonly known as: BUMEX   2 mg, Oral, Daily   Start Date: March 12, 2022     Eliquis 5 MG tablet tablet  Generic drug: apixaban   5 mg, Oral, Every 12 Hours Scheduled      potassium chloride 20 MEQ CR tablet  Commonly known as: K-DUR,KLOR-CON  Replaces: potassium chloride 10 MEQ CR tablet   40 mEq, Oral, Every Morning   Start Date: March 12, 2022     spironolactone 100 MG tablet  Commonly known as: ALDACTONE   100 mg, Oral, 2 Times Daily         Changes to Medications      Instructions Start Date   losartan 50 MG tablet  Commonly known as: COZAAR  What changed:   · medication strength  · See the new instructions.   50 mg, Oral, Every Evening      metoprolol succinate XL 50 MG 24 hr tablet  Commonly known as: TOPROL-XL  What changed:   · medication strength  · how much to take  · when to take this   150 mg, Oral, Nightly         Continue These Medications      Instructions Start Date   allopurinol 300 MG tablet  Commonly known as: ZYLOPRIM   TAKE 1 TABLET BY MOUTH  DAILY      atorvastatin 40 MG tablet  Commonly known as: LIPITOR   40 mg, Oral, Daily      buPROPion  MG 12 hr tablet  Commonly known as: WELLBUTRIN SR   150 mg, Oral, Daily With " Breakfast      ferrous sulfate 325 (65 FE) MG tablet   325 mg, Oral, Daily With Breakfast      glucose blood test strip   BID. Test  In the AM and again in the PM      glucose monitor monitoring kit   One touch meter      isosorbide mononitrate 60 MG 24 hr tablet  Commonly known as: IMDUR   TAKE 1 TABLET BY MOUTH  DAILY      metFORMIN  MG 24 hr tablet  Commonly known as: GLUCOPHAGE-XR   1,000 mg, Oral, Daily With Breakfast      mirtazapine 15 MG tablet  Commonly known as: REMERON   15 mg, Oral, Nightly      montelukast 10 MG tablet  Commonly known as: SINGULAIR   10 mg, Oral, Nightly      onetouch ultrasoft lancets   Test BID      pantoprazole 40 MG EC tablet  Commonly known as: PROTONIX   40 mg, Oral, Daily      SITagliptin 100 MG tablet  Commonly known as: Januvia   100 mg, Oral, Daily         Stop These Medications    acetaminophen 500 MG tablet  Commonly known as: TYLENOL     furosemide 40 MG tablet  Commonly known as: LASIX     potassium chloride 10 MEQ CR tablet  Replaced by: potassium chloride 20 MEQ CR tablet           Diet Instructions     Diet: Regular, Consistent Carbohydrate, Cardiac, Specialty Diet; Thin Liquids, No Restrictions; Low Sodium; 2,000 mg Na      Discharge Diet:  Regular  Consistent Carbohydrate  Cardiac  Specialty Diet       Fluid Consistency: Thin Liquids, No Restrictions    Specialty Diets: Low Sodium    Low Sodium Restriction: 2,000 mg Na    Fluid Restriction per day: 1500 mL Fluid         Activity Instructions     Activity as Tolerated           Additional Instructions for the Follow-ups that You Need to Schedule     Ambulatory Referral to Home Health (Hospital)   As directed      Face to Face Visit Date: 3/11/2022    Follow-up provider for Plan of Care?: I treated the patient in an acute care facility and will not continue treatment after discharge.    Follow-up provider: STEPHENIE JUNIOR [[id number]]    Reason/Clinical Findings: chf    Describe mobility limitations that make  leaving home difficult: chf    Nursing/Therapeutic Services Requested: Skilled Nursing    Skilled nursing orders: CHF management    Frequency: 1 Week 1         Call MD for problems / concerns.   As directed         Follow-up Information     Loren Cruz APRN .    Specialty: Family Medicine  Contact information:  [address] [facility] [address] [phone]             Chase Hernandez MD. Schedule an appointment as soon as possible for a visit.    Specialties: Gastroenterology, Internal Medicine  Why: Follow-up cirrhosis  Contact information:  [address]  [place] [id number] [phone]             Jignesh York MD Follow up.    Specialty: Cardiology  Contact information:  [address] [place] [id number] [phone]             Evie Posadas APRN Follow up in 1 week(s).    Specialties: Cardiology, Nurse Practitioner  Contact information:  [address] [place] [address]  [phone]                           Jaun Monsalve MD  03/11/22  15:05 EST    Discharge time spent greater than 30 minutes.

## 2022-03-11 NOTE — DISCHARGE INSTRUCTIONS
64 ounce fluid restriction daily  2,000mg sodium restriction daily  Check daily weight   Call if gaining 2-3 lbs in 24 hours.

## 2022-03-11 NOTE — CASE MANAGEMENT/SOCIAL WORK
Continued Stay Note  [facility]     Patient Name: Miri Yung  MRN: [id number]  Today's Date: 3/11/2022    Admit Date: 3/4/2022     Discharge Plan     Row Name 03/11/22 1522       Plan    Plan Home with [facility] and family with [facility] for dme o2    Patient/Family in Agreement with Plan yes    Plan Comments DC orders noted. Orders for home o2 also. CCP called Harde[facility] via outbound call and he denies DME Preference and agreeable for [facility]. CCP called Marlene/Sabino for referral and she will deliver o2 to room. Updated DELORES Dahl.CCP notified Shanna/Juan Daniel. Russell ESTRELLA/CCP    Final Discharge Disposition Code 06 - home with home health care    Final Note Home with family and [facility] and [facility] for dme. russell estrella/ccp               Discharge Codes    No documentation.               Expected Discharge Date and Time     Expected Discharge Date Expected Discharge Time    Mar 11, 2022             Anayeli Farrell RN

## 2022-03-11 NOTE — PLAN OF CARE
Goal Outcome Evaluation:  Plan of Care Reviewed With: patient, son (her son was exiting as we entered, but he states someone will be staying with her for now)        Progress: improving  Outcome Evaluation: Pt required cues , but improved FARIHA, slight improvement in posture by end of session; pt agreed to amb in mayer, but c/o fatigue after 60ft, returned to room and pt agreed to perf LE exer; pt did have SATS decr to 85% on RA as she did not want to use O2, she states she does not use at home , but charting states she has had in the home at some point    Patient was wearing a face mask during this therapy encounter. Therapist used appropriate personal protective equipment including eye protection, mask, and gloves.  Mask used was standard procedure mask. Appropriate PPE was worn during the entire therapy session. Hand hygiene was completed before and after therapy session. Patient is not in enhanced droplet precautions.

## 2022-03-11 NOTE — THERAPY TREATMENT NOTE
Patient Name: Miri Yung  : 1940    MRN: [id number]                              Today's Date: 3/11/2022       Admit Date: 3/4/2022    Visit Dx:     ICD-10-CM ICD-9-CM   1. Cirrhosis of liver without ascites, unspecified hepatic cirrhosis type (HCC)  K74.60 571.5   2. Anasarca  R60.1 782.3   3. Pedal edema  R60.0 782.3   4. Congestive heart failure, unspecified HF chronicity, unspecified heart failure type (HCC)  I50.9 428.0   5. Chronic diastolic (congestive) heart failure (HCC)  I50.32 428.32     428.0   6. Atelectasis  J98.11 518.0     Patient Active Problem List   Diagnosis   • Dementia (CMS/HCC)   • Depressive disorder   • Edema   • Abnormal liver function tests   • Gastroesophageal reflux disease   • Gout   • Hyperlipidemia   • HTN (hypertension)   • Insomnia   • Arthropathy of knee   • Memory loss   • Low back pain   • Mitral valve insufficiency   • Tricuspid valve insufficiency   • Urinary tract infection   • Valvular endocarditis   • Long QT interval   • Microcytic anemia   • Anemia   • Seasonal allergies   • Acute cystitis without hematuria   • Chronic fatigue   • Bacterial endocarditis   • Slow transit constipation   • Acute ischemic stroke (HCC)   • Paroxysmal tachycardia (HCC)   • Type 2 diabetes mellitus (HCC)   • Atrial fibrillation (HCC)   • Morbidly obese (HCC)   • Chronic diastolic (congestive) heart failure (HCC)   • Symptomatic anemia   • History of CVA (cerebrovascular accident)   • Long term current use of anticoagulant therapy   • Acute blood loss anemia   • Cirrhosis of liver (HCC)   • Esophageal varices (HCC)   • Splenomegaly due to portal hypertension and liver disease   • Anasarca   • Atrial fibrillation with RVR (HCC)   • Occult GI bleeding     Past Medical History:   Diagnosis Date   • Acute ischemic stroke (HCC)    • Anxiety    • Atrial fibrillation (HCC)    • Congestive heart failure (CHF) (HCC)    • Dementia (HCC)    • Depression    • Edema    • Encephalopathy      secondary to endocarditis   • Endocarditis     with mitral and tricuspid regurgitation   • Esophageal reflux    • GERD (gastroesophageal reflux disease)    • Gout    • History of blood transfusion     due to anemia   • HTN (hypertension)    • Hyperlipidemia    • Insomnia    • LOM (loss of memory)    • Lung mass    • 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; generally asymptomatic   • Mitral regurgitation    • Mixed anxiety depressive disorder    • Nonrheumatic tricuspid (valve) insufficiency    • Osteoarthritis    • PAF (paroxysmal atrial fibrillation) (HCC)    • Sacroiliitis (HCC)    • Tricuspid regurgitation    • Type 2 diabetes mellitus (HCC)      Past Surgical History:   Procedure Laterality Date   • CATARACT EXTRACTION     • COLONOSCOPY N/A 3/10/2016    Procedure: COLONOSCOPY TO CECUM;  Surgeon: Shaan Becerra Jr., MD;  Location: [facility] ENDOSCOPY;  Service:    • COLONOSCOPY N/A 6/18/2021    Procedure: COLONOSCOPY TO CECUM AND INTO TERMINAL ILEUM;  Surgeon: Jeremy Darden MD;  Location: [facility] ENDOSCOPY;  Service: Gastroenterology;  Laterality: N/A;  PRE: ANEMIA  POST: HEMORRHOIDS   • ENDOSCOPY N/A 3/10/2016    Procedure: ESOPHAGOGASTRODUODENOSCOPY ;  Surgeon: Shaan Becerra Jr., MD;  Location: [facility] ENDOSCOPY;  Service:    • ENDOSCOPY N/A 6/18/2021    Procedure: ESOPHAGOGASTRODUODENOSCOPY;  Surgeon: Jeremy Darden MD;  Location: [facility] ENDOSCOPY;  Service: Gastroenterology;  Laterality: N/A;  PRE: ANEMIA  POST: HIATAL HERNIA, SMALL ESOPHAGEAL VARICES   • KNEE SURGERY     • TUBAL ABDOMINAL LIGATION        General Information     Row Name 03/11/22 1702          Physical Therapy Time and Intention    Document Type therapy note (daily note);discharge treatment  -     Mode of Treatment individual therapy;physical therapy  -     Row Name 03/11/22 1702          General Information    Patient Profile Reviewed yes  -JM     Existing Precautions/Restrictions fall;oxygen therapy device  and L/min  -     Row Name 03/11/22 1702          Cognition    Orientation Status (Cognition) oriented x 3  -     Row Name 03/11/22 1702          Safety Issues, Functional Mobility    Impairments Affecting Function (Mobility) balance;strength  -           User Key  (r) = Recorded By, (t) = Taken By, (c) = Cosigned By    Initials Name Provider Type    Nisha Truijllo PTA Physical Therapy Assistant               Mobility     Row Name 03/11/22 1702          Bed Mobility    Supine-Sit [place] (Bed Mobility) contact guard;verbal cues  -     Sit-Supine [place] (Bed Mobility) not tested  -     Assistive Device (Bed Mobility) bed rails;head of bed elevated  -     Row Name 03/11/22 1702          Sit-Stand Transfer    Sit-Stand [place] (Transfers) contact guard;verbal cues  cues for hand placement  -     Assistive Device (Sit-Stand Transfers) walker, front-wheeled  -     Row Name 03/11/22 1702          Gait/Stairs (Locomotion)    [place] Level (Gait) contact guard;verbal cues  -     Assistive Device (Gait) walker, front-wheeled  -     Distance in Feet (Gait) 60ft, slight assist to guide rwx, heather on turns  -     Deviations/Abnormal Patterns (Gait) ismael decreased;stride length decreased  improved FARIHA w/a few cues  -     Bilateral Gait Deviations forward flexed posture  -           User Key  (r) = Recorded By, (t) = Taken By, (c) = Cosigned By    Initials Name Provider Type    Nisha Trujillo PTA Physical Therapy Assistant               Obj/Interventions     Row Name 03/11/22 1704          Motor Skills    Therapeutic Exercise --  LAQS, seated MIP x10 reps, encouraged for home  -           User Key  (r) = Recorded By, (t) = Taken By, (c) = Cosigned By    Initials Name Provider Type    Nisha Trujillo PTA Physical Therapy Assistant               Goals/Plan    No documentation.                Clinical Impression     Row Name 03/11/22 1705          Pain    Pretreatment  Pain Rating 0/10 - no pain  -     Posttreatment Pain Rating 0/10 - no pain  -     Row Name 03/11/22 1705          Plan of Care Review    Plan of Care Reviewed With patient;son  her son was exiting as we entered, but he states someone will be staying with her for now  -     Progress improving  -     Outcome Evaluation Pt required cues , but improved FARIHA, slight improvement in posture by end of session; pt agreed to amb in mayer, but c/o fatigue after 60ft, returned to room and pt agreed to perf LE exer; pt did have SATS decr to 85% on RA as she did not want to use O2, she states she does not use at home , but charting states she has had in the home at some point  -     Row Name 03/11/22 1705          Therapy Assessment/Plan (PT)    Rehab Potential (PT) good, to achieve stated therapy goals  -     Criteria for Skilled Interventions Met (PT) yes  -     Row Name 03/11/22 1705          Vital Signs    Pre SpO2 (%) 94  -JM     O2 Delivery Pre Treatment supplemental O2  4L  -JM     Intra SpO2 (%) 85  -JM     O2 Delivery Intra Treatment room air  -     Post SpO2 (%) 94  -JM     O2 Delivery Post Treatment supplemental O2  4L  -JM     Row Name 03/11/22 1705          Positioning and Restraints    Pre-Treatment Position in bed  -     Post Treatment Position chair  -JM     In Chair reclined;call light within reach;encouraged to call for assist;exit alarm on;with nsg  -           User Key  (r) = Recorded By, (t) = Taken By, (c) = Cosigned By    Initials Name Provider Type    Nisha Trujillo PTA Physical Therapy Assistant               Outcome Measures     Row Name 03/11/22 1710          How much help from another person do you currently need...    Turning from your back to your side while in flat bed without using bedrails? 3  -JM     Moving from lying on back to sitting on the side of a flat bed without bedrails? 3  -JM     Moving to and from a bed to a chair (including a wheelchair)? 3  -JM     Standing  up from a chair using your arms (e.g., wheelchair, bedside chair)? 3  -JM     Climbing 3-5 steps with a railing? 2  -JM     To walk in hospital room? 3  -JM     AM-PAC 6 Clicks Score (PT) 17  -JM           User Key  (r) = Recorded By, (t) = Taken By, (c) = Cosigned By    Initials Name Provider Type    Nisha Trujillo PTA Physical Therapy Assistant                             Physical Therapy Education                 Title: PT OT SLP Therapies (Resolved)     Topic: Physical Therapy (Resolved)     Point: Mobility training (Resolved)     Learning Progress Summary           Patient Acceptance, E,D, DU by  at 3/9/2022 1634    Acceptance, E,D, DU by  at 3/8/2022 0951    Acceptance, E, VU,NR by  at 3/7/2022 1819    Acceptance, E,D, DU by  at 3/6/2022 1251                   Point: Home exercise program (Resolved)     Learning Progress Summary           Patient Acceptance, E,D, DU by  at 3/9/2022 1634    Acceptance, E,D, DU by  at 3/8/2022 0951    Acceptance, E, VU,NR by  at 3/7/2022 1819    Acceptance, E,D, DU by  at 3/6/2022 1251                   Point: Body mechanics (Resolved)     Learning Progress Summary           Patient Acceptance, E,D, DU by  at 3/9/2022 1634    Acceptance, E,D, DU by  at 3/8/2022 0951    Acceptance, E, VU,NR by  at 3/7/2022 1819    Acceptance, E,D, DU by  at 3/6/2022 1251                   Point: Precautions (Resolved)     Learning Progress Summary           Patient Acceptance, E,D, DU by  at 3/9/2022 1634    Acceptance, E,D, DU by  at 3/8/2022 0951    Acceptance, E, VU,NR by  at 3/7/2022 1819    Acceptance, E,D, DU by  at 3/6/2022 1251                               User Key     Initials Effective Dates Name Provider Type Discipline    PC 06/16/21 -  Maddie Fairchild PT Physical Therapist PT     10/20/20 -  Brook Fowler, RN Registered Nurse Nurse              PT Recommendation and Plan     Plan of Care Reviewed With: patient, son (her son was exiting as we  entered, but he states someone will be staying with her for now)  Progress: improving  Outcome Evaluation: Pt required cues , but improved FARIHA, slight improvement in posture by end of session; pt agreed to amb in mayer, but c/o fatigue after 60ft, returned to room and pt agreed to perf LE exer; pt did have SATS decr to 85% on RA as she did not want to use O2, she states she does not use at home , but charting states she has had in the home at some point     Time Calculation:    PT Charges     Row Name 03/11/22 1711             Time Calculation    Start Time 1045  -      Stop Time 1055  -JM      Time Calculation (min) 10 min  -      PT Received On 03/11/22  -JASS            User Key  (r) = Recorded By, (t) = Taken By, (c) = Cosigned By    Initials Name Provider Type    Nisha Trujillo PTA Physical Therapy Assistant              Therapy Charges for Today     Code Description Service Date Service Provider Modifiers Qty    76317072247 HC PT THER PROC EA 15 MIN 3/11/2022 Nisha Guerrero PTA GP 1          PT G-Codes  Outcome Measure Options: AM-PAC 6 Clicks Basic Mobility (PT)  AM-PAC 6 Clicks Score (PT): 17    Nisha Guerrero PTA  3/11/2022

## 2022-03-11 NOTE — PLAN OF CARE
Goal Outcome Evaluation:  Plan of Care Reviewed With: patient        Progress: improving  Outcome Evaluation: Pt A&Ox4 this shift with periods of forgetfulness. Was very anxious at the beginning of the shift. PRN med given per MAR. plan is home with HH per son. Possible d/c soon when cleared by cardio. No complaints at this time, will CTM the rest of my shift.

## 2022-03-11 NOTE — CASE MANAGEMENT/SOCIAL WORK
Case Management Discharge Note      Final Note: Home with family and [facility]  and [facility] for dme. rosette cooper/ccp    Provided Post Acute Provider List?: N/A  Refused Provider List Comment: Wants to use Juan Daniel  again    Selected Continued Care - Admitted Since 3/4/2022     Destination    No services have been selected for the patient.              Durable Medical Equipment     Service Provider Selected Services Address Phone Fax Patient Preferred    [facility]  D[facility] [address], [place] [address] [phone] --          Dialysis/Infusion    No services have been selected for the patient.              Home Medical Care     Service Provider Selected Services Address Phone Fax Patient Preferred    [facility]  [facility] [address], [place] [address] [phone] --          Therapy    No services have been selected for the patient.              Community Resources    No services have been selected for the patient.              Community & DME    No services have been selected for the patient.                       Final Discharge Disposition Code: 06 - home with home health care

## 2022-03-11 NOTE — PROGRESS NOTES
Hospital Follow Up    LOS:  LOS: 7 days   Patient Name: Miri Yung  Age/Sex: 81 y.o. female  : 1940  MRN: [id number]    Day of Service: 22   Length of Stay: 7  Encounter Provider: KWASI Poole  Place of Service: [facility] CARDIOLOGY  Patient Care Team:  Loren Cruz APRN as PCP - General (Family Medicine)    Subjective:     Chief Complaint: Diastolic heart failure, A. fib    Interval History: No shortness of breath.  Wants to go home today    Objective:     Objective:  Temp:  [97.6 °F (36.4 °C)-98.6 °F (37 °C)] 97.8 °F (36.6 °C)  Heart Rate:  [] 93  Resp:  [16-20] 16  BP: ()/(45-88) 155/77     Intake/Output Summary (Last 24 hours) at 3/11/2022 1119  Last data filed at 3/11/2022 0500  Gross per 24 hour   Intake 240 ml   Output 2350 ml   Net -2110 ml     Body mass index is 35.04 kg/m².      22  1049 22  0257 03/10/22  0410   Weight: 93.4 kg (206 lb) 88.5 kg (195 lb 1.7 oz) 86.9 kg (191 lb 9.3 oz)     Weight change:     Physical Exam:   General Appearance:    Awake alert and oriented in no acute distress.   Color:  Skin:  Neuro:  HEENT:    Lungs:     Pink  Warm and dry  No focal, motor or sensory deficits  Neck supple, pupils equal, round and reactive. No JVD, No Bruit  Scattered wheezes to auscultation,respirations regular, even and                  unlabored    Heart:    Regular rate and rhythm, S1 and S2, no murmur, no gallop, no rub. No edema, DP/PT pulses are 2+   Chest Wall:    No abnormalities observed   Abdomen:     Normal bowel sounds, no masses, no organomegaly, soft        non-tender, non-distended, no guarding, no ascites noted   Extremities:   Moves all extremities well, no edema, no cyanosis, no redness       Lab Review:   Results from last 7 days   Lab Units 22  0733 03/10/22  0725   SODIUM mmol/L 143 134*   POTASSIUM mmol/L 4.0 4.1   CHLORIDE mmol/L 93* 86*   CO2 mmol/L 42.3* 38.3*   BUN mg/dL 19 19    CREATININE mg/dL 0.65 0.76   GLUCOSE mg/dL 135* 138*   CALCIUM mg/dL 9.0 8.9   AST (SGOT) U/L 29 35*   ALT (SGPT) U/L 26 28     Results from last 7 days   Lab Units 03/04/22  1200   TROPONIN T ng/mL <0.010     Results from last 7 days   Lab Units 03/11/22  0733 03/10/22  2052 03/10/22  0725   WBC 10*3/mm3 5.73  --  7.71   HEMOGLOBIN g/dL 9.8* 9.7* 9.9*  9.9*   HEMATOCRIT % 34.2 35.2 34.2  34.2   PLATELETS 10*3/mm3 118*  --  152     Results from last 7 days   Lab Units 03/05/22  0632 03/04/22  1200   INR  1.27* 1.24*     Results from last 7 days   Lab Units 03/05/22 2007 03/04/22  1200   MAGNESIUM mg/dL 2.0 2.0           Invalid input(s): LDLCALC  Results from last 7 days   Lab Units 03/04/22  1200   PROBNP pg/mL 1,420.0     Results from last 7 days   Lab Units 03/05/22  0632   TSH uIU/mL 0.020*     I reviewed the patient's new clinical results.  I personally viewed and interpreted the patient's EKG  Current Medications:   Scheduled Meds:allopurinol, 300 mg, Oral, Daily  apixaban, 5 mg, Oral, Q12H  atorvastatin, 40 mg, Oral, Daily  bumetanide, 2 mg, Oral, Daily  buPROPion SR, 150 mg, Oral, Daily With Breakfast  chlorothiazide (DIURIL) IVPB, 500 mg, Intravenous, Q12H  famotidine, 20 mg, Oral, BID AC  ferrous sulfate, 325 mg, Oral, Daily With Breakfast  insulin lispro, 0-9 Units, Subcutaneous, TID AC  isosorbide mononitrate, 60 mg, Oral, Daily  linagliptin, 5 mg, Oral, Daily  losartan, 50 mg, Oral, Q PM  metoprolol succinate XL, 150 mg, Oral, Nightly  mirtazapine, 15 mg, Oral, Nightly  montelukast, 10 mg, Oral, Nightly  potassium chloride, 40 mEq, Oral, QAM  sodium chloride, 10 mL, Intravenous, Q12H  spironolactone, 100 mg, Oral, BID      Continuous Infusions:     Allergies:  Allergies   Allergen Reactions   • Decongestant  [Pseudoephedrine]        Assessment:       Cirrhosis of liver (HCC)    Dementia (CMS/HCC)    Gastroesophageal reflux disease    HTN (hypertension)    Type 2 diabetes mellitus (HCC)    Atrial  fibrillation (HCC)    Chronic diastolic (congestive) heart failure (HCC)    Symptomatic anemia    History of CVA (cerebrovascular accident)    Esophageal varices (HCC)    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 Acute Diastolic CHF EF 50%  2. Afib with rvr  Rate control on eliquis  3. Essential HTN  4. Hyperlipidemia  5. Pulmonary hypertension with RVSP 73mmHg  6. Mild AS/Mild MS       Plan:   Heart rate stable on the monitor in sinus rhythm.  Anticoagulated with Eliquis and on beta-blocker.  Weight down from yesterday on oral Bumex.  We will bring her to the office in a week for follow-up.        KWASI Poole  03/11/22  11:19 EST  Electronically signed by KWASI Poole, 03/11/22, 11:19 AM EST.

## 2022-03-14 NOTE — OUTREACH NOTE
Call Center TCM Note    Flowsheet Row Responses   [facility] patient discharged from? [facility]   Does the patient have one of the following disease processes/diagnoses(primary or secondary)? Other   TCM attempt successful? Yes   Call start time 1122   Call end time 1126   Discharge diagnosis cirrhosis of liver, occult GI Bleeding.   Person spoke with today (if not patient) and relationship Hardeep-Son   Meds reviewed with patient/caregiver? Yes   Is the patient having any side effects they believe may be caused by any medication additions or changes? No   Does the patient have all medications ordered at discharge? Yes   Is the patient taking all medications as directed (includes completed medication regime)? Yes   Does the patient have a primary care provider?  Yes   Does the patient have an appointment with their PCP within 7 days of discharge? Yes   Comments regarding PCP Hospital d/c f/u appt on 3/15/22 @8:15am   Has the patient kept scheduled appointments due by today? N/A   What is the Home health agency?  [facility]     Has home health visited the patient within 72 hours of discharge? Call prior to 72 hours   What DME was ordered? [employer]   Has all DME been delivered? Yes   DME comments 4LO2   Psychosocial issues? No   Comments Enc son to purchase a pulse ox to monitor pt's pulse and O2 sats   Did the patient receive a copy of their discharge instructions? Yes   Nursing interventions Reviewed instructions with patient   What is the patient's perception of their health status since discharge? Improving   Is the patient/caregiver able to teach back signs and symptoms related to disease process for when to call PCP? Yes   Is the patient/caregiver able to teach back signs and symptoms related to disease process for when to call 911? Yes   Is the patient/caregiver able to teach back the hierarchy of who to call/visit for symptoms/problems? PCP, Specialist, Home health nurse, Urgent Care, ED, 911 Yes   If  the patient is a current smoker, are they able to teach back resources for cessation? Not a smoker   TCM call completed? Yes          LEWIS MORALES RN    3/14/2022, 11:28 EDT

## 2022-03-15 NOTE — PROGRESS NOTES
Transitional Care Follow Up Visit  Subjective     Miri Yung is a 81 y.o. female who presents for a transitional care management visit.    Within 48 business hours after discharge our office contacted her via telephone to coordinate her care and needs.      I reviewed and discussed the details of that call along with the discharge summary, hospital problems, inpatient lab results, inpatient diagnostic studies, and consultation reports with Miri.     Current outpatient and discharge medications have been reconciled for the patient.  Reviewed by: KWASI Madrigal      Date of TCM Phone Call 3/11/2022   [facility]   Date of Admission 3/4/2022   Date of Discharge 3/11/2022   Discharge Disposition [facility]     Risk for Readmission (LACE) No data recorded      History of Present Illness   This is a 81-year-old female patient here today for follow-up after recent hospital visit.  Patient was admitted to the hospital due to volume overload and acute on chronic diastolic CHF.  She has a history of cirrhosis, hypertension.  Her atrial fibrillation was under control and she was taken off blood thinners due to anemia.  Work-up for anemia showed cirrhosis of the liver which she is being followed by for GI.  Recently her weight had been increasing with no success with increasing Lasix.  She was admitted to the hospital for further evaluation and was found to be in atrial fibrillation with CHF.  Her GI work-up did show elevated AFP and CA 19.  Patient was restarted on Eliquis in the hospital due to her recurrence of atrial fibrillation.  She was diuresed and is now on Bumex 2 mg daily.  Her weight is down to 142 pounds today.  Her son is with her and states she has lost 4 pounds since discharge.  They are monitoring her weight closely at home.  She is on a fluid and sodium restriction.  Hemoglobin remains low at 9.4.  She does not report any bleeding.  Metoprolol and Cozaar was adjusted in the  hospital.  Patient denies any lightheaded or dizziness.  No reports of chest pain or shortness of breath.  She is requiring continuous oxygen at home.  They are keeping track of her O2 sats and numbers are staying above 95%.  She is not on her oxygen today and doing well.  She does have follow-up appointment with cardio next week    Objective      The following portions of the patient's history were reviewed and updated as appropriate: allergies, current medications, past family history, past medical history, past social history, past surgical history and problem list.    Review of Systems   Constitutional: Negative for activity change, appetite change and fever.   Respiratory: Negative for cough, chest tightness and shortness of breath.    Cardiovascular: Positive for leg swelling (improving). Negative for chest pain.   Gastrointestinal: Negative for abdominal distention, abdominal pain and blood in stool.   Neurological: Negative for dizziness, weakness and light-headedness.   Hematological: Does not bruise/bleed easily.   Psychiatric/Behavioral: Negative for confusion.       Objective   Physical Exam  Vitals and nursing note reviewed.   HENT:      Head: Normocephalic.      Nose: Nose normal.   Eyes:      Pupils: Pupils are equal, round, and reactive to light.   Cardiovascular:      Rate and Rhythm: Normal rate and regular rhythm.      Pulses: Normal pulses.      Heart sounds: Normal heart sounds.   Pulmonary:      Effort: Pulmonary effort is normal. No respiratory distress.      Breath sounds: Normal breath sounds. No wheezing or rales.   Abdominal:      General: Bowel sounds are normal. There is no distension.      Tenderness: There is no abdominal tenderness.   Musculoskeletal:         General: No swelling.      Cervical back: Neck supple.      Right lower leg: Edema present.      Left lower leg: Edema present.   Skin:     General: Skin is warm and dry.   Neurological:      Mental Status: She is alert and  oriented to person, place, and time.   Psychiatric:         Mood and Affect: Mood normal.         Assessment/Plan   Diagnoses and all orders for this visit:    1. Acute diastolic CHF (congestive heart failure) (HCC) (Primary)  -     Basic metabolic panel    2. Anemia, unspecified type  -     CBC w AUTO Differential    3. Cirrhosis of liver without ascites, unspecified hepatic cirrhosis type (HCC)    4. Essential hypertension      We will check BMP and CBC today.  Patient has a follow-up appointment with GI and cardiology.  Patient will monitor her weight and limit sodium and increase fluids.  I have asked her to follow-up with me in 2 to 3 months or sooner if needed

## 2022-03-15 NOTE — PROGRESS NOTES
Chief Complaint  Hospital Follow Up Visit    Subjective     {Problem List  Visit Diagnosis   Encounters  Notes  Medications  Labs  Result Review Imaging  Media :23}     Miri Yung presents to [facility]  History of Present Illness  This is a 81-year-old female patient being seen today after recent hospital follow-up  81 y.o. female admitted with volume overload and acute on chronic diastolic CHF. Patient also has a history of cirrhosis, hypertension, atrial fibrillation. She had rapid rate atrial fibrillation and cardiology help to manage her rate control. She was previously on Eliquis and that was stopped in December because she had no recurrence of atrial fibrillation and it was restarted this admission. She was diuresed with improvement in her symptoms and exam. Her weight has come down to 191 pounds. On admission she was 215 pounds. Recommend follow-up BMP early next week to make sure electrolytes are stable and to make sure she is not getting any worsening contraction alkalosis. Would also recommend follow-up hemoglobin at the same time.     Patient has a history of anemia and heme positive stools as well as GI bleed in the past. She has had EGD and colonoscopy in the past without any identifiable bleeding source and had elevated AFP and CA 19-9 but was lost to follow-up. GI plans to follow-up with her in their office after discharge for continued management. Hemoglobin has been stable during hospitalization after being restarted on anticoagulation.     She is now requiring oxygen and will be sent home on some oxygen. On admission she did not need any. Follow-up chest x-ray was done today that showed improved aeration but showed some atelectasis or infiltrate. Suspect this is all from atelectasis causing hypoxia. She has been less mobile in the hospital. She has no fever or leukocytosis. She has no new respiratory complaints (no cough) in fact her shortness of breath  "is improved. Do not suspect PE since she is on anticoagulation. Do not anticipate she will need oxygen for long when she is more ambulatory.    Back on eliquise, on bumex, kdur, spironalactone  Change losartan, metroprolol 75  Stop tylenol  Lost 4 lbs since discharge  Wearing o2 at home   Cardio in 1 week  Dr rodas in 1 months  Objective   Vital Signs:   /62   Pulse 88   Temp 97.8 °F (36.6 °C)   Resp 22   Ht 157 cm (61.81\")   Wt 64.4 kg (142 lb)   SpO2 97%   BMI 26.13 kg/m²     Physical Exam   Result Review :{Labs  Result Review  Imaging  Med Tab  Media  Procedures :23}   {The following data was reviewed by (Optional):60096}  {Ambulatory Labs (Optional):38892}  {Data reviewed (Optional):72136:::1}          Assessment and Plan {CC Problem List  Visit Diagnosis   ROS  Review (Popup)  Health Maintenance  Quality  BestPractice  Medications  SmartSets  SnapShot Encounters  Media :23}   There are no diagnoses linked to this encounter.  {Time Spent (Optional):74934}  Follow Up {Instructions Charge Capture  Follow-up Communications :23}  No follow-ups on file.  Patient was given instructions and counseling regarding her condition or for health maintenance advice. Please see specific information pulled into the AVS if appropriate.       "

## 2022-03-16 PROBLEM — I48.0 PAROXYSMAL ATRIAL FIBRILLATION (HCC): Status: ACTIVE | Noted: 2019-12-18

## 2022-03-16 NOTE — PROGRESS NOTES
"Chief Complaint  Hospital Follow Up Visit (Dyspnea on exertion)    Subjective          History of Present Illness    Miri Yung is a  81 y.o. female presents for hospital follow-up on Muse cirrhosis with abdominal distention discharged on 3/11/22.  She is a patient of Dr. Aaron.  She is new to me.    Hospital discharge note:  \"...admitted with volume overload and acute on chronic diastolic CHF. Patient also has a history of cirrhosis, hypertension, atrial fibrillation. She had rapid rate atrial fibrillation and cardiology help to manage her rate control. She was previously on Eliquis and that was stopped in December because she had no recurrence of atrial fibrillation and it was restarted this admission. She was diuresed with improvement in her symptoms and exam. Her weight has come down to 191 pounds. On admission she was 215 pounds.   Patient has a history of anemia and heme positive stools as well as GI bleed in the past. She has had EGD and colonoscopy in the past without any identifiable bleeding source and had elevated AFP and CA 19-9 but was lost to follow-up. GI plans to follow-up with her in their office after discharge for continued management. Hemoglobin has been stable during hospitalization after being restarted on anticoagulation.\"  ---------------------------------------------------------------------------------------    She is currently on Bumex 2 mg daily and spironolactone 100 mg twice daily. She reports significant improvement in swelling, lost 44lbs of water weight since hospital stay. No SOA. Lower extremity swelling mildly.    She admits to generalized pruritis that started yesterday. No rash. Denies abdominal pain, distention, dysphagia, N/V, heartburn, constipation, diarrhea, melena, hematochezia.    She also takes Eliquis 5 mg twice daily for A. Fib. Dr. York.    3/15/2022 labs showed BMP with normal sodium and potassium.  Creatinine 0.95, BUN 28; CBC with hemoglobin 11.4 and normal " "MCV.    6/18/21 Colonoscopy without any identifiable bleeding source/NBIH.  EGD did show small varices and hiatal hernia.     3/2/22 CT scan performed showed a tiny amount of ascites but extensive 3rd spacing/anasarca. Cirrhotic and splenomegaly-unchanged.    Objective   Vital Signs:   Temp 96.2 °F (35.7 °C)   Ht 157 cm (61.81\")   Wt 78 kg (172 lb)   BMI 31.65 kg/m²       Physical Exam  Vitals reviewed.   Constitutional:       General: She is awake. She is not in acute distress.     Appearance: Normal appearance. She is well-developed and well-groomed.   HENT:      Head: Normocephalic.   Pulmonary:      Effort: Pulmonary effort is normal. No respiratory distress.   Abdominal:      General: There is no distension.      Palpations: Abdomen is soft.      Tenderness: There is no abdominal tenderness. There is no guarding.   Musculoskeletal:      Right ankle: Swelling present. Tenderness present.      Left ankle: Swelling present. No tenderness.      Comments: Hard scaling of skin over ankles and feet. Minimal erythema, mild nonpitting edema.    Skin:     Coloration: Skin is not pale.   Neurological:      Mental Status: She is alert and oriented to person, place, and time.      Gait: Gait is intact.   Psychiatric:         Mood and Affect: Mood and affect normal.         Speech: Speech normal.         Behavior: Behavior is cooperative.         Judgment: Judgment normal.          Result Review :             Assessment and Plan    Diagnoses and all orders for this visit:    1. Cirrhosis of liver with ascites, unspecified hepatic cirrhosis type (HCC) (Primary)    2. Cholestatic pruritus    Anasarca doing better since hospital stay and diuretic change. Continue same.    She does have new onset pruritis and we discussed options for relief. Advised to try OTC antihistamines although we did talk about side effects given her age. Can also try lotion. Could consider pepcid. Hx of HE so would recommend avoid bile salts due to " possible constipation which may precipitate HE flare. Pruritis has only been occurring a couple days.    Will ask Dr. Aaron when she recommends repeat EGD--recommended in 2023 per Dr. Aaron.    Also sent message to her cardiologist who she sees in 2 weeks to see about changing her metoprolol to carvedilol or nadolol.    HCC Surveillance imaging due 9/2022, AFP in June.    Follow Up   Return in about 3 months (around 6/16/2022) for Dr. Hernandez or Melody.    Cristina dictation used throughout this note.     Melody Rodríguez PA-C

## 2022-03-16 NOTE — PROGRESS NOTES
Date of Office Visit: 2022  Encounter Provider: KWAIS Christensen  Place of Service: [facility] CARDIOLOGY  Patient Name: Miri Yung  :1940    Chief Complaint   Patient presents with   • Paroxysmal atrial fibrillation   • Hospital Follow Up Visit   :     HPI: Miri Yung is a 81 y.o. female who is a patient of Dr. York.  She is new to me today and presents for 1 week hospital follow-up.  She was admitted on -  with volume overload secondary to acute on chronic diastolic CHF.  She reported to the emergency room with 30 pound weight gain over one week.  She has a history of paroxysmal atrial fibrillation, ischemic stroke, hyperlipidemia, hypertension and dementia.  She also has history of nonalcoholic cirrhosis and is followed by GI .  EKG showed atrial fibrillation with rapid ventricular rate.  Her troponin and BNP were negative.  She was previously on Eliquis which was stopped in December because she had had no recurrence of A. fib; however, it was restarted on this admission.  She also responded well to IV diuresis for her acute diastolic heart failure.      2D echocardiogram on this recent admission shows normal LV systolic function, EF 50%, mild aortic stenosis, mild mitral stenosis, mild to moderate mitral regurgitation, severe pulmonary hypertension with RVSP 73 mmHg    2D echo in 2021 showed normal EF 66 to 70% with grade 2 diastolic dysfunction, mild to moderate LVH, severely dilated left atrium, mild to moderate aortic stenosis with mild aortic insufficiency, RVSP 47 mmHg.    Today patient presents with no complaints.  Lower extremity edema has improved.  Continues on all discharge medications.  EKG shows controlled A. Fib.  Pressure well controlled.  She presents with her son who is unaware of what medications she is taking.  On next visit, she will bring her current medication list.    Previous testing and notes have been reviewed  by me.   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; generally asymptomatic   • Mitral regurgitation    • Mixed anxiety depressive disorder    • Nonrheumatic tricuspid (valve) insufficiency    • Osteoarthritis    • PAF (paroxysmal atrial fibrillation) (HCC)    • Sacroiliitis (HCC)    • Tricuspid regurgitation    • Type 2 diabetes mellitus (HCC)        Past Surgical History:   Procedure Laterality Date   • CATARACT EXTRACTION     • COLONOSCOPY N/A 3/10/2016    Procedure: COLONOSCOPY TO CECUM;  Surgeon: Shaan Becerra Jr., MD;  Location: [facility] ENDOSCOPY;  Service:    • COLONOSCOPY N/A 6/18/2021    Procedure: COLONOSCOPY TO CECUM AND INTO TERMINAL ILEUM;  Surgeon: Jeremy Darden MD;  Location: [facility] ENDOSCOPY;  Service: Gastroenterology;  Laterality: N/A;  PRE: ANEMIA  POST: HEMORRHOIDS   • ENDOSCOPY N/A 3/10/2016    Procedure: ESOPHAGOGASTRODUODENOSCOPY ;  Surgeon: Shaan Becerra Jr., MD;  Location: [facility] ENDOSCOPY;  Service:    • ENDOSCOPY N/A 6/18/2021    Procedure: ESOPHAGOGASTRODUODENOSCOPY;  Surgeon: Jeremy Darden MD;  Location: [facility] ENDOSCOPY;  Service: Gastroenterology;  Laterality: N/A;  PRE: ANEMIA  POST: HIATAL HERNIA, SMALL ESOPHAGEAL VARICES   • KNEE SURGERY     • TUBAL ABDOMINAL LIGATION         Social History     Socioeconomic History   • Marital status:    Tobacco Use   • Smoking status: Never Smoker   • Smokeless tobacco:  Never Used   • Tobacco comment: caffeine use    Vaping Use   • Vaping Use: Never used   Substance and Sexual Activity   • Alcohol use: No   • Drug use: No   • Sexual activity: Defer       Family History   Problem Relation Age of Onset   • Kidney disease Mother    • Heart disease Mother    • Emphysema Father    • Heart disease Brother        Review of Systems   Constitutional: Negative.   HENT: Negative.    Eyes: Negative.    Cardiovascular: Positive for irregular heartbeat and leg swelling.   Respiratory: Negative.    Endocrine: Negative.    Hematologic/Lymphatic:        On Eliquis   Skin: Negative.    Musculoskeletal: Positive for muscle weakness.   Gastrointestinal: Negative.    Genitourinary: Negative.    Neurological: Negative.    Psychiatric/Behavioral: Negative.    Allergic/Immunologic: Negative.        Allergies   Allergen Reactions   • Decongestant  [Pseudoephedrine]          Current Outpatient Medications:   •  allopurinol (ZYLOPRIM) 300 MG tablet, TAKE 1 TABLET BY MOUTH  DAILY, Disp: 90 tablet, Rfl: 3  •  apixaban (ELIQUIS) 5 MG tablet tablet, Take 1 tablet by mouth Every 12 (Twelve) Hours. Indications: Atrial Fibrillation, Disp: 60 tablet, Rfl: 0  •  atorvastatin (LIPITOR) 40 MG tablet, TAKE 1 TABLET BY MOUTH DAILY, Disp: 90 tablet, Rfl: 0  •  bumetanide (BUMEX) 2 MG tablet, Take 1 tablet by mouth Daily., Disp: 30 tablet, Rfl: 0  •  buPROPion SR (WELLBUTRIN SR) 150 MG 12 hr tablet, Take 1 tablet by mouth Daily With Breakfast., Disp: 30 tablet, Rfl: 5  •  ferrous sulfate 325 (65 FE) MG tablet, Take 1 tablet by mouth Daily With Breakfast., Disp: 90 tablet, Rfl: 1  •  glucose blood test strip, BID. Test  In the AM and again in the PM, Disp: 100 each, Rfl: 3  •  glucose monitor monitoring kit, One touch meter, Disp: 1 each, Rfl: 0  •  isosorbide mononitrate (IMDUR) 60 MG 24 hr tablet, TAKE 1 TABLET BY MOUTH  DAILY, Disp: 90 tablet, Rfl: 3  •  Lancets (onetouch ultrasoft) lancets, Test BID, Disp: 100 each,  "Rfl: 3  •  losartan (COZAAR) 50 MG tablet, Take 1 tablet by mouth Every Evening., Disp: 30 tablet, Rfl: 0  •  metoprolol succinate XL (TOPROL-XL) 50 MG 24 hr tablet, Take 3 tablets by mouth Every Night., Disp: 90 tablet, Rfl: 0  •  mirtazapine (REMERON) 15 MG tablet, Take 1 tablet by mouth Every Night., Disp: 30 tablet, Rfl: 5  •  montelukast (SINGULAIR) 10 MG tablet, Take 1 tablet by mouth Every Night., Disp: 30 tablet, Rfl: 5  •  pantoprazole (PROTONIX) 40 MG EC tablet, TAKE 1 TABLET BY MOUTH  DAILY, Disp: 90 tablet, Rfl: 3  •  potassium chloride (K-DUR,KLOR-CON) 20 MEQ CR tablet, Take 2 tablets by mouth Every Morning., Disp: 60 tablet, Rfl: 0  •  SITagliptin (Januvia) 100 MG tablet, Take 1 tablet by mouth Daily., Disp: 90 tablet, Rfl: 1  •  spironolactone (ALDACTONE) 100 MG tablet, Take 1 tablet by mouth 2 (Two) Times a Day., Disp: 60 tablet, Rfl: 0  •  metFORMIN ER (GLUCOPHAGE-XR) 500 MG 24 hr tablet, Take 2 tablets by mouth Daily With Breakfast for 30 days., Disp: 60 tablet, Rfl: 5      Objective:     Vitals:    03/18/22 1150   BP: 104/60   BP Location: Left arm   Patient Position: Sitting   Pulse: 81   SpO2: 96%   Weight: 78.7 kg (173 lb 9.6 oz)   Height: 157 cm (61.81\")     Body mass index is 31.95 kg/m².     Cardiac studies:  - 2D Echocardiogram 3/7/2022:  Normal LV systolic function, EF 50%  Normal RV size and systolic function  Severely dilated LA with severely increased LA volume  Mild calcification of aortic valve, no AI, mild AS (max pressure gradient 22.8 mmHg, mean pressure gradient 13 mmHg, SELINA 1.18 cm², AV dimensionless index 0.50)  Moderate bileaflet MV thickening, mild to moderate MR with eccentric jet noted, mild MS  No dilation of aortic root    -2D Echocardiogram 4/12/2021:  Normal LV function, EF 66 to 70%, wall thickness consistent with mild to moderate LVH, 2 diastolic dysfunction  Severely dilated LA  Mild AI, mild to moderate AS (mean pressure gradient 30.2 mmHg, mean pressure gradient " 16.8 mmHg, SELINA 1.4 cm²)  Mild to moderate MR with eccentric jet noted, no significant MS  2 echointense structures with him minimal mobility in the proximal aorta, appearance seems most consistent with concentric calcification of sinotubular junction however cannot exclude intimal flap, no clear evidence for aortic dissection  Calculated RVSP from TR 47 mmHg    11-day Holter monitor 9/6/2019:  Underlying rhythm sinus.  PVCs occurred moderately frequently, post unifocal but occasional couplets were noted into triplets.  One 4 beat run in SVT at rate of 130 bpm.  Atrial fibrillation and atrial flutter occurred throughout the entire tracing.  To be amounted to approximately 3% of total recording time with an average heart rate of 160 bpm.  Current PACs.  No pauses or high degree AV block    2D Echocardiogram 8/6/2019: (technically difficult)  Borderline dilated LV, EF 56 to 60%, grade 2 diastolic dysfunction  Normal RV size and systolic function  Mildly dilated LA, negative saline test results  No AI, no AS  Mild MR  Mild TR,  RVSP 41 mmHg  Normal IVC with normal inspiratory collapse      PHYSICAL EXAM:    Constitutional:       Appearance: Not in distress. Chronically ill-appearing.   Neck:      Vascular: No JVR. JVD normal.   Pulmonary:      Effort: Pulmonary effort is normal.      Breath sounds: Normal breath sounds. No wheezing. No rhonchi. No rales.   Chest:      Chest wall: Not tender to palpatation.   Cardiovascular:      PMI at left midclavicular line. Normal rate. Regular rhythm. Normal S1. Normal S2.      Murmurs: There is a systolic murmur.      No gallop. No click. No rub.   Pulses:     Intact distal pulses.   Edema:     Peripheral edema present.     Ankle: bilateral pitting edema of the ankle.     Feet: bilateral pitting edema of the feet.  Abdominal:      General: Bowel sounds are normal.      Palpations: Abdomen is soft.      Tenderness: There is no abdominal tenderness.   Musculoskeletal: Normal range of  motion.         General: No tenderness. Skin:     General: Skin is warm and dry.   Neurological:      General: No focal deficit present.      Mental Status: Alert and oriented to person, place and time.           ECG 12 Lead    Date/Time: 3/18/2022 12:33 PM  Performed by: Evie Posadas APRN  Authorized by: Evie Posadas APRN   Comparison: compared with previous ECG from 3/4/2022  Similar to previous ECG  Rhythm: atrial fibrillation  Rate: normal  BPM: 103  T flattening: V6  QRS axis: left                Assessment:       Diagnosis Plan   1. Chronic diastolic (congestive) heart failure (HCC)     2. Paroxysmal atrial fibrillation (HCC)     3. Primary hypertension     4. Nonrheumatic mitral valve regurgitation     5. Cirrhosis of liver without ascites, unspecified hepatic cirrhosis type (HCC)     6. History of CVA (cerebrovascular accident)       Orders Placed This Encounter   Procedures   • ECG 12 Lead     This order was created via procedure documentation     Order Specific Question:   Release to patient     Answer:   Immediate          Plan:       1.  Paroxysmal atrial fibrillation: Rate control with metoprolol, anticoagulation with Eliquis 5 mg twice daily.  2.  Chronic diastolic heart failure: Normal LV function, and all appropriate medications including beta-blocker, statin, ARB, loop diuretic and potassium sparing diuretic.  Normal renal function.  3.  Hypertension: Controlled on current medication  4.  Hyperlipidemia: Target range on statin therapy  5.  Nonrheumatic mitral valve insufficiency: Mild to moderate MR, mild MS  6.  Mild aortic stenosis: No issues  7.  Diabetes type 2: Well-controlled on insulin and oral medication.  Hemoglobin A1c 5.9  8.  Low TSH: 0.02 on recent admission    Patient has follow-up appoint with Dr. York in April.  She will call sooner for any questions or concerns       Your medication list          Accurate as of March 18, 2022 12:36 PM. If you have any questions,  ask your nurse or doctor.            CONTINUE taking these medications      Instructions Last Dose Given Next Dose Due   allopurinol 300 MG tablet  Commonly known as: ZYLOPRIM      TAKE 1 TABLET BY MOUTH  DAILY       atorvastatin 40 MG tablet  Commonly known as: LIPITOR      TAKE 1 TABLET BY MOUTH DAILY       bumetanide 2 MG tablet  Commonly known as: BUMEX      Take 1 tablet by mouth Daily.       buPROPion  MG 12 hr tablet  Commonly known as: WELLBUTRIN SR      Take 1 tablet by mouth Daily With Breakfast.       Eliquis 5 MG tablet tablet  Generic drug: apixaban      Take 1 tablet by mouth Every 12 (Twelve) Hours. Indications: Atrial Fibrillation       ferrous sulfate 325 (65 FE) MG tablet      Take 1 tablet by mouth Daily With Breakfast.       glucose blood test strip      BID. Test  In the AM and again in the PM       glucose monitor monitoring kit      One touch meter       isosorbide mononitrate 60 MG 24 hr tablet  Commonly known as: IMDUR      TAKE 1 TABLET BY MOUTH  DAILY       losartan 50 MG tablet  Commonly known as: COZAAR      Take 1 tablet by mouth Every Evening.       metFORMIN  MG 24 hr tablet  Commonly known as: GLUCOPHAGE-XR      Take 2 tablets by mouth Daily With Breakfast for 30 days.       metoprolol succinate XL 50 MG 24 hr tablet  Commonly known as: TOPROL-XL      Take 3 tablets by mouth Every Night.       mirtazapine 15 MG tablet  Commonly known as: REMERON      Take 1 tablet by mouth Every Night.       montelukast 10 MG tablet  Commonly known as: SINGULAIR      Take 1 tablet by mouth Every Night.       onetouch ultrasoft lancets      Test BID       pantoprazole 40 MG EC tablet  Commonly known as: PROTONIX      TAKE 1 TABLET BY MOUTH  DAILY       potassium chloride 20 MEQ CR tablet  Commonly known as: K-DUR,KLOR-CON      Take 2 tablets by mouth Every Morning.       SITagliptin 100 MG tablet  Commonly known as: Januvia      Take 1 tablet by mouth Daily.       spironolactone 100 MG  tablet  Commonly known as: ALDACTONE      Take 1 tablet by mouth 2 (Two) Times a Day.                As always, it has been a pleasure to participate in your patient's care.      Sincerely,       KWASI Ríos

## 2022-03-21 NOTE — TELEPHONE ENCOUNTER
Caller: Miri Yung CARLY    Relationship: Self    Best call back number: [phone]     What medications are you currently taking:   Current Outpatient Medications on File Prior to Visit   Medication Sig Dispense Refill   • allopurinol (ZYLOPRIM) 300 MG tablet TAKE 1 TABLET BY MOUTH  DAILY 90 tablet 3   • apixaban (ELIQUIS) 5 MG tablet tablet Take 1 tablet by mouth Every 12 (Twelve) Hours. Indications: Atrial Fibrillation 60 tablet 0   • atorvastatin (LIPITOR) 40 MG tablet TAKE 1 TABLET BY MOUTH DAILY 90 tablet 0   • bumetanide (BUMEX) 2 MG tablet Take 1 tablet by mouth Daily. 30 tablet 0   • buPROPion SR (WELLBUTRIN SR) 150 MG 12 hr tablet Take 1 tablet by mouth Daily With Breakfast. 30 tablet 5   • ferrous sulfate 325 (65 FE) MG tablet Take 1 tablet by mouth Daily With Breakfast. 90 tablet 1   • glucose blood test strip BID. Test  In the AM and again in the  each 3   • glucose monitor monitoring kit One touch meter 1 each 0   • isosorbide mononitrate (IMDUR) 60 MG 24 hr tablet TAKE 1 TABLET BY MOUTH  DAILY 90 tablet 3   • Lancets (onetouch ultrasoft) lancets Test  each 3   • losartan (COZAAR) 50 MG tablet Take 1 tablet by mouth Every Evening. 30 tablet 0   • metFORMIN ER (GLUCOPHAGE-XR) 500 MG 24 hr tablet Take 2 tablets by mouth Daily With Breakfast for 30 days. 60 tablet 5   • metoprolol succinate XL (TOPROL-XL) 50 MG 24 hr tablet Take 3 tablets by mouth Every Night. 90 tablet 0   • mirtazapine (REMERON) 15 MG tablet Take 1 tablet by mouth Every Night. 30 tablet 5   • montelukast (SINGULAIR) 10 MG tablet Take 1 tablet by mouth Every Night. 30 tablet 5   • pantoprazole (PROTONIX) 40 MG EC tablet TAKE 1 TABLET BY MOUTH  DAILY 90 tablet 3   • potassium chloride (K-DUR,KLOR-CON) 20 MEQ CR tablet Take 2 tablets by mouth Every Morning. 60 tablet 0   • SITagliptin (Januvia) 100 MG tablet Take 1 tablet by mouth Daily. 90 tablet 1   • spironolactone (ALDACTONE) 100 MG tablet Take 1 tablet by mouth 2 (Two)  Times a Day. 60 tablet 0     No current facility-administered medications on file prior to visit.        ADDITIONAL NOTES:    PATIENT HAS BEEN UP FOR THE LAST 24 HOURS IN PAIN.  SHE WAS TOLD BY HER LIVER DOCTOR NOT TO TAKE TYLENOL FOR PAIN.  SHE WOULD LIKE TO KNOW WHAT SHE CAN TAKE FOR PAIN.    [id number] - [facility] [address] [address] [address] AT [place] [address] [address] [address] - [phone] [facility] [phone] FX    PLEASE CALL PATIENT AND ADVISE.

## 2022-03-23 NOTE — OUTREACH NOTE
Medical Week 2 Survey    Flowsheet Row Responses   [facility] patient discharged from? [place]   Does the patient have one of the following disease processes/diagnoses(primary or secondary)? Other   Week 2 attempt successful? Yes   Call start time 0846   Discharge diagnosis cirrhosis of liver, occult GI Bleeding.   Call end time 0856   Meds reviewed with patient/caregiver? Yes   Is the patient taking all medications as directed (includes completed medication regime)? Yes   Comments regarding appointments 3-23-22 with PT    Has the patient kept scheduled appointments due by today? Yes  [3-15-22 with PCP ]   Home health comments PT coming today per pt    DME comments when she checked her O2 with her pulse ox, she was not able to get a reading - she thinks she is not doing it correctly and will let her son check it to make sure it working    Psychosocial issues? No   What is the patient's perception of their health status since discharge? New symptoms unrelated to diagnosis  [Stomach upset today ]   Is the patient/caregiver able to teach back signs and symptoms related to disease process for when to call 911? Yes   Week 2 Call Completed? Yes   Wrap up additional comments pt is not sure she is using her pulse ox correctly- encouraged her to have PT check it for her today during their visit today- to call 911 if she feels SOB           ALPHONSO H - Registered Nurse

## 2022-04-01 NOTE — TELEPHONE ENCOUNTER
Last ov 3/15/22        Last lab 1/31/22    Dm and Lipids are stable. Hemoglobin is still low but stable. Please follow up with GI dr for appt. Continue po iron.

## 2022-04-01 NOTE — OUTREACH NOTE
Medical Week 3 Survey    Flowsheet Row Responses   [facility] patient discharged from? [place]   Does the patient have one of the following disease processes/diagnoses(primary or secondary)? Other   Week 3 attempt successful? Yes   Call start time 0809   Call end time 0815   Meds reviewed with patient/caregiver? Yes   Is the patient taking all medications as directed (includes completed medication regime)? Yes   Comments regarding appointments Pt has followed up with cardiology.   Has the patient kept scheduled appointments due by today? Yes   Has home health visited the patient within 72 hours of discharge? Yes   What DME was ordered? Pt no longer using 02.   DME comments Pt reports 02 keeping her awake @ night. Pt no longer using 02.   What is the patient's perception of their health status since discharge? Improving   Week 3 Call Completed? Yes   Wrap up additional comments Pt reports feeling well. Pt did report some nausea but states caused by dinner last evening.          AMY MCKINNON - Registered Nurse

## 2022-04-01 NOTE — TELEPHONE ENCOUNTER
----- Message from Mayra Aaron MD sent at 3/31/2022  3:34 PM EDT -----  Would change her metoprolol to nonselective BB (nadolol or coreg) for bleeding prophylaxis - could consider repeat egd 2023    ----- Message -----  From: Melody Rodríguez PA-C  Sent: 3/20/2022   5:59 PM EDT  To: Mayra Aaron MD    80 y/o female with hx of KOO cirrhosis recently admitted for anasarca. Doing better on diuretic changes. UTD on HCC surveillance imaging. AFP due in June. EGD last 6/2021 with small varices and hiatal hernia. When do you want repeat EGD?

## 2022-04-01 NOTE — TELEPHONE ENCOUNTER
I spoke to her son who is her point of contact.  I advised that Dr. Aaron would like to switch her metoprolol to either carvedilol or nadolol to better protect her against the esophageal varices.  She does see Dr. York in 2 weeks.  Her son has been keeping track of her blood pressure he reports that one arm was 107/58 this evening and the other one was 110/70.  Heart rate was in the 70s he believes.    I will send a message to Dr. York to see if he would be willing to switch her beta-blocker as noted above at her upcoming office visit.

## 2022-04-06 NOTE — TELEPHONE ENCOUNTER
Low blood pressures recorded 80/60 beginning of when care tenders started seeing her     Today's blood pressure was  102/60  All blood pressure medication is taken at night  DEJON

## 2022-04-06 NOTE — TELEPHONE ENCOUNTER
I have no number to be able to contact care tenders about this patient.  They do need to contact her cardiologist ASAP about her low blood pressures.  I am not certain how we reach them.

## 2022-04-12 NOTE — OUTREACH NOTE
Medical Week 4 Survey    Flowsheet Row Responses   [facility] patient discharged from? [place]   Does the patient have one of the following disease processes/diagnoses(primary or secondary)? Other   Week 4 attempt successful? Yes   Call start time 1252   Call end time 1254   Discharge diagnosis cirrhosis of liver, occult GI Bleeding.   Meds reviewed with patient/caregiver? Yes   Is the patient taking all medications as directed (includes completed medication regime)? Yes   Has the patient kept scheduled appointments due by today? Yes   Is the patient still receiving Home Health Services? Yes   What is the patient's perception of their health status since discharge? Improving   Additional teach back comments C/o being tired   Week 4 Call Completed? Yes   Would the patient like one additional call? No   Graduated Yes   Is the patient interested in additional calls from an ambulatory ?  NOTE:  applies to high risk patients requiring additional follow-up. Gabrielle VITALE - Registered Nurse

## 2022-04-12 NOTE — TELEPHONE ENCOUNTER
Caller: Hardeep Yung    Relationship: Emergency Contact    Best call back number: [phone]    Requested Prescriptions:   Requested Prescriptions     Pending Prescriptions Disp Refills   • losartan (COZAAR) 50 MG tablet 30 tablet 0     Sig: Take 1 tablet by mouth Every Evening.   • bumetanide (BUMEX) 2 MG tablet 30 tablet 0     Sig: Take 1 tablet by mouth Daily.   • metoprolol succinate XL (TOPROL-XL) 50 MG 24 hr tablet 90 tablet 0     Sig: Take 3 tablets by mouth Every Night.   • spironolactone (ALDACTONE) 100 MG tablet 60 tablet 0     Sig: Take 1 tablet by mouth 2 (Two) Times a Day.        Pharmacy where request should be sent: [id number] - [address] AT [facility] [address] [place] [address] & [address] [phone] [facility] [phone] FX     Additional details provided by patient: PATIENT HAS 2 DAYS LEFT. THESE WERE PRESCRIBED BY THE HOSPITAL AND NEEDS PCP TO REFILL.     Does the patient have less than a 3 day supply:  [x] Yes  [] No    Pernell Bynum Rep   04/12/22 08:24 EDT

## 2022-04-13 NOTE — TELEPHONE ENCOUNTER
Last ov 3/15/22    We will check BMP and CBC today.  Patient has a follow-up appointment with GI and cardiology.  Patient will monitor her weight and limit sodium and increase fluids.  I have asked her to follow-up with me in 2 to 3 months or sooner if needed      Last lab

## 2022-04-19 PROBLEM — I35.0 AORTIC STENOSIS, MILD: Status: ACTIVE | Noted: 2022-01-01

## 2022-04-19 PROBLEM — I48.11 LONGSTANDING PERSISTENT ATRIAL FIBRILLATION (HCC): Status: ACTIVE | Noted: 2019-12-18

## 2022-04-19 NOTE — PROGRESS NOTES
OFFICE VISIT      Date of Office Visit: 2022    Patient Name: Miri Yung  : 1940    Encounter Provider: Jignesh York MD  Referring Provider: No ref. provider found  Primary Care Provider: Loren Cruz APRN  Place of Service: [facility] CARDIOLOGY        Chief Complaint   Patient presents with   • Paroxysmal atrial fibrillation   • Hospital Follow Up Visit     1 Month     History of Present Illness    The patient is an 81-year-old white female with atrial fibrillation, diastolic heart failure, hypertension who returns to the office today for a follow-up evaluation.    The patient was hospitalized in 2022.  She presented with a 30 pound weight gain and findings consistent with acute on chronic diastolic heart failure.  An echocardiogram was performed that showed normal left ventricular systolic function with an ejection fraction of 50%.  In addition the patient had mild aortic stenosis, mild mitral stenosis, mild to moderate mitral regurgitation and severe pulmonary hypertension with an RVSP of 73.    The patient has known alcoholic cirrhosis.  I received a message from Dr. Campos concerning changing her beta-blocker.  We will change to carvedilol.    The patient states that she feels reasonably well.  She is not complaining of any shortness of breath in particular.  Her edema has improved greatly.  She denies fatigue issues but she does have some balance problems.  During the hospitalization her creatinine was normal.  Other laboratory work was essentially unremarkable.      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   • Endocarditis     with mitral and tricuspid regurgitation   • Esophageal reflux    • GERD (gastroesophageal reflux  disease)    • Gout    • History of blood transfusion     due to anemia   • HTN (hypertension)    • Hyperlipidemia    • Insomnia    • LOM (loss of memory)    • Lung mass    • 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; generally asymptomatic   • Mitral regurgitation    • Mixed anxiety depressive disorder    • Nonrheumatic tricuspid (valve) insufficiency    • Osteoarthritis    • PAF (paroxysmal atrial fibrillation) (HCC)    • Sacroiliitis (HCC)    • Tricuspid regurgitation    • Type 2 diabetes mellitus (HCC)          Past Surgical History:   Procedure Laterality Date   • CATARACT EXTRACTION     • COLONOSCOPY N/A 3/10/2016    Procedure: COLONOSCOPY TO CECUM;  Surgeon: Shaan Becerra Jr., MD;  Location: [facility] ENDOSCOPY;  Service:    • COLONOSCOPY N/A 6/18/2021    Procedure: COLONOSCOPY TO CECUM AND INTO TERMINAL ILEUM;  Surgeon: Jeremy Darden MD;  Location: [facility] ENDOSCOPY;  Service: Gastroenterology;  Laterality: N/A;  PRE: ANEMIA  POST: HEMORRHOIDS   • ENDOSCOPY N/A 3/10/2016    Procedure: ESOPHAGOGASTRODUODENOSCOPY ;  Surgeon: Shaan Becerra Jr., MD;  Location: [facility] ENDOSCOPY;  Service:    • ENDOSCOPY N/A 6/18/2021    Procedure: ESOPHAGOGASTRODUODENOSCOPY;  Surgeon: Jeremy Darden MD;  Location: [facility] ENDOSCOPY;  Service: Gastroenterology;  Laterality: N/A;  PRE: ANEMIA  POST: HIATAL HERNIA, SMALL ESOPHAGEAL VARICES   • KNEE SURGERY     • TUBAL ABDOMINAL LIGATION             Current Outpatient Medications:   •  allopurinol (ZYLOPRIM) 300 MG tablet, TAKE 1 TABLET BY MOUTH  DAILY, Disp: 90 tablet, Rfl: 3  •  apixaban (ELIQUIS) 5 MG tablet tablet, Take 1 tablet by mouth Every 12 (Twelve) Hours. Indications: Atrial Fibrillation, Disp: 60 tablet, Rfl: 0  •  atorvastatin (LIPITOR) 40 MG tablet, TAKE 1 TABLET BY MOUTH DAILY, Disp: 90 tablet, Rfl: 0  •  bumetanide (BUMEX) 2 MG tablet, Take 1 tablet by mouth Daily., Disp: 90 tablet, Rfl: 0  •  buPROPion SR (WELLBUTRIN SR) 150 MG 12 hr  tablet, Take 1 tablet by mouth Daily With Breakfast., Disp: 30 tablet, Rfl: 5  •  ferrous sulfate 325 (65 FE) MG EC tablet, TAKE 1 TABLET BY MOUTH EVERY DAY WITH BREAKFAST, Disp: 90 tablet, Rfl: 0  •  glucose blood test strip, BID. Test  In the AM and again in the PM, Disp: 100 each, Rfl: 3  •  glucose monitor monitoring kit, One touch meter, Disp: 1 each, Rfl: 0  •  isosorbide mononitrate (IMDUR) 60 MG 24 hr tablet, TAKE 1 TABLET BY MOUTH  DAILY, Disp: 90 tablet, Rfl: 3  •  Lancets (onetouch ultrasoft) lancets, Test BID, Disp: 100 each, Rfl: 3  •  losartan (COZAAR) 50 MG tablet, Take 1 tablet by mouth Every Evening., Disp: 90 tablet, Rfl: 0  •  mirtazapine (REMERON) 15 MG tablet, Take 1 tablet by mouth Every Night., Disp: 30 tablet, Rfl: 5  •  montelukast (SINGULAIR) 10 MG tablet, Take 1 tablet by mouth Every Night., Disp: 30 tablet, Rfl: 5  •  multivitamin with minerals (Multivitamin Women 50+) tablet tablet, Take 1 tablet by mouth Daily., Disp: , Rfl:   •  pantoprazole (PROTONIX) 40 MG EC tablet, TAKE 1 TABLET BY MOUTH  DAILY, Disp: 90 tablet, Rfl: 3  •  potassium chloride (K-DUR,KLOR-CON) 20 MEQ CR tablet, Take 2 tablets by mouth Every Morning., Disp: 60 tablet, Rfl: 0  •  SITagliptin (Januvia) 100 MG tablet, Take 1 tablet by mouth Daily., Disp: 90 tablet, Rfl: 1  •  spironolactone (ALDACTONE) 100 MG tablet, Take 1 tablet by mouth 2 (Two) Times a Day., Disp: 180 tablet, Rfl: 0  •  carvedilol (COREG) 12.5 MG tablet, Take 1 tablet by mouth 2 (Two) Times a Day., Disp: 180 tablet, Rfl: 3  •  metFORMIN ER (GLUCOPHAGE-XR) 500 MG 24 hr tablet, Take 2 tablets by mouth Daily With Breakfast for 30 days., Disp: 60 tablet, Rfl: 5      Social History     Socioeconomic History   • Marital status:    Tobacco Use   • Smoking status: Never Smoker   • Smokeless tobacco: Never Used   • Tobacco comment: caffeine use    Vaping Use   • Vaping Use: Never used   Substance and Sexual Activity   • Alcohol use: No   • Drug use: No  "  • Sexual activity: Defer         Review of Systems   Constitutional: Negative.   HENT: Negative.    Eyes: Negative.    Cardiovascular: Negative.    Respiratory: Negative.    Endocrine: Negative.    Skin: Negative.    Musculoskeletal: Negative.    Gastrointestinal: Negative.    Neurological: Negative.    Psychiatric/Behavioral: Negative.        Procedures      ECG 12 Lead    Date/Time: 4/19/2022 11:34 AM  Performed by: Jignesh York MD  Authorized by: Jignesh York MD   Comparison: compared with previous ECG from 3/18/2022  Similar to previous ECG  Rhythm: atrial fibrillation  Rate: normal  Conduction: conduction normal  QRS axis: normal  Other findings: low voltage  Comments: Lateral ST-T wave changes which are unchanged from previous                Objective:    BP (!) 80/50 (BP Location: Left arm, Patient Position: Sitting)   Pulse 109   Ht 157 cm (61.81\")   Wt 77.6 kg (171 lb)   SpO2 98%   BMI 31.47 kg/m²         Vitals reviewed.   Constitutional:       Appearance: Well-developed.   Eyes:      Pupils: Pupils are equal, round, and reactive to light.   HENT:      Head: Normocephalic.   Neck:      Thyroid: No thyromegaly.      Vascular: No carotid bruit or JVD.   Pulmonary:      Effort: Pulmonary effort is normal.      Breath sounds: Normal breath sounds.   Cardiovascular:      Normal rate. Irregularly irregular rhythm. Normal S1. Normal S2.      Murmurs: There is a grade 1/6 systolic murmur.      No gallop. No click. No rub.   Pulses:     Intact distal pulses.   Edema:     Peripheral edema absent.   Abdominal:      General: Bowel sounds are normal.      Palpations: Abdomen is soft.   Musculoskeletal:      Cervical back: Normal range of motion. Skin:     General: Skin is warm and dry.      Findings: No erythema.   Neurological:      Mental Status: Alert and oriented to person, place, and time.             Assessment & Plan:       Diagnosis Plan   1. Chronic diastolic (congestive) heart failure " (HCC)     2. Valvular endocarditis     3. Nonrheumatic mitral valve regurgitation     4. Nonrheumatic tricuspid valve regurgitation     5. Primary hypertension     6. Pure hypercholesterolemia     7. Aortic stenosis, mild     8. Pulmonary hypertension (HCC)     9. Longstanding persistent atrial fibrillation (HCC)           1.  Chronic diastolic congestive heart failure: The patient remains on medical therapy.  She is on Bumex.  We will change her metoprolol succinate to carvedilol.  She also remains on losartan and Aldactone.  2.  Atrial fibrillation: This appears to be permanent.  Anticoagulated with apixaban  3.  Valvular heart disease: Previous history of endocarditis.  Mild aortic and mitral stenosis, mild to moderate mitral regurgitation  4.  Pulmonary hypertension: Severe  5.  Diabetes mellitus, type II: On oral therapy  6.  Hyperlipidemia: On statin therapy  7.  Hypertension: Controlled

## 2022-04-20 NOTE — TELEPHONE ENCOUNTER
Valarie from [facility] called stating Mrs. Yung's HR was 135 with her BP being 90/50.    Please advise

## 2022-04-20 NOTE — TELEPHONE ENCOUNTER
I called Valarie with [facility] [employer] to let her know that Isosorbide has been discontinued.  I left a voice mail message.    I also called Mrs. Yung's son, Hardeep, to let him know as well.  He verbalized understanding.    Crystal

## 2022-05-02 PROBLEM — N17.9 ACUTE RENAL FAILURE (HCC): Status: ACTIVE | Noted: 2022-01-01

## 2022-05-02 NOTE — OP NOTE
Date of Admission:  5/2/2022  Today's Date:  05/02/22  Gifyt Sher MD  [facility]    Procedure Note  Non-tunneled central venous catheter placement for hemodialysis    Pre-op Diagnosis:   Hyperkalemia    Post-op Diagnosis:     Same     Procedure:      1) Insertion of non-tunneled central venous catheter (00633)  2) Ultrasound-guided vascular access (01705)    Surgeon: Gifty Sher MD    Assistant:  None    Anesthesia:   lidocaine 1% without epinephrine    Estimated Blood Loss:   Minimal    Complications:  None    Findings:   Successful placement of non tunneled dialysis catheter    Indications:    The patient is an 81 y.o. female referred for acute dialysis catheter placement secondary to Hyperkalemia.  Written consent was obtained.  The risks, benefits, and turns were also discussed.  The patient was identified via the arm band and hospital assigned identification number.  Immediately prior to the procedure a timeout was called to verify the correct patient, procedure, equipment, support staff and the site/side was marked as required.       Procedure:  Site: Right common femoral vein  Preparation: skin prepped with 2% chlorhexidine  Skin prep agent dried: skin prep agent completely dried prior to procedure  Sterile barriers: all five maximum sterile barriers used - cap, mask, sterile gown, sterile gloves, and large sterile sheet  Hand hygiene: hand hygiene performed prior to central venous catheter insertion  Patient position: Reverse Trendelenberg  Catheter type: double lumen with pigtail  Catheter size: 14 Fr 24 cm in the Right common femoral vein  Ultrasound guidance: yes, with photographic documentation recorded in the chart  Number of attempts: 1  Successful placement: yes  Post-procedure: line sutured and dressing applied  Assessment: blood return through all ports and was locked with concentrated heparin (1000units/cc)  Patient tolerated the procedure well with no immediate  complications    Gifty Sher MD     Date: 5/2/2022  Time: 18:21 EDT    Active Hospital Problems    Diagnosis  POA   • Acute renal failure (HCC) [N17.9]  Yes      Resolved Hospital Problems   No resolved problems to display.

## 2022-05-02 NOTE — ED TRIAGE NOTES
Staff masked    Pt from home via ems, family called for soa, ems found pt in mild distress, low bp/hr

## 2022-05-02 NOTE — ED PROVIDER NOTES
MD ATTESTATION NOTE    The CABRERA and I have discussed this patient's history, physical exam, and treatment plan.    I provided a substantive portion of the care of this patient. I personally performed the physical exam, in its entirety. The attached note describes my personal findings.      Miri Yung is a 81 y.o. female who presents to the ED c/o shortness of breath and generalized weakness.  Onset today.  Son states that they talked multiple times yesterday and she was her normal self.    On exam:  GENERAL: Agitated in bed  HENT: nares patent  EYES: no scleral icterus  CV: regular rhythm, regular rate  RESPIRATORY: normal effort, clear to auscultation bilaterally  ABDOMEN: soft, nontender  MUSCULOSKELETAL: no deformity  NEURO: alert, moves all extremities, follows commands  SKIN: warm, dry    Labs  Recent Results (from the past 24 hour(s))   Comprehensive Metabolic Panel    Collection Time: 05/02/22  1:53 PM    Specimen: Blood   Result Value Ref Range    Glucose 119 (H) 65 - 99 mg/dL    BUN 81 (H) 8 - 23 mg/dL    Creatinine 2.95 (H) 0.57 - 1.00 mg/dL    Sodium 133 (L) 136 - 145 mmol/L    Potassium 8.4 (C) 3.5 - 5.2 mmol/L    Chloride 103 98 - 107 mmol/L    CO2 17.2 (L) 22.0 - 29.0 mmol/L    Calcium 8.9 8.6 - 10.5 mg/dL    Total Protein 5.5 (L) 6.0 - 8.5 g/dL    Albumin 3.80 3.50 - 5.20 g/dL    ALT (SGPT) 22 1 - 33 U/L    AST (SGOT) 19 1 - 32 U/L    Alkaline Phosphatase 88 39 - 117 U/L    Total Bilirubin 0.3 0.0 - 1.2 mg/dL    Globulin 1.7 gm/dL    A/G Ratio 2.2 g/dL    BUN/Creatinine Ratio 27.5 (H) 7.0 - 25.0    Anion Gap 12.8 5.0 - 15.0 mmol/L    eGFR 15.4 (L) >60.0 mL/min/1.73   BNP    Collection Time: 05/02/22  1:53 PM    Specimen: Blood   Result Value Ref Range    proBNP 892.0 0.0 - 1,800.0 pg/mL   Troponin    Collection Time: 05/02/22  1:53 PM    Specimen: Blood   Result Value Ref Range    Troponin T <0.010 0.000 - 0.030 ng/mL   CBC Auto Differential    Collection Time: 05/02/22  1:53 PM    Specimen: Blood    Result Value Ref Range    WBC 9.59 3.40 - 10.80 10*3/mm3    RBC 2.84 (L) 3.77 - 5.28 10*6/mm3    Hemoglobin 7.8 (L) 12.0 - 15.9 g/dL    Hematocrit 26.0 (L) 34.0 - 46.6 %    MCV 91.5 79.0 - 97.0 fL    MCH 27.5 26.6 - 33.0 pg    MCHC 30.0 (L) 31.5 - 35.7 g/dL    RDW 19.9 (H) 12.3 - 15.4 %    RDW-SD 66.1 (H) 37.0 - 54.0 fl    MPV 12.0 6.0 - 12.0 fL    Platelets 218 140 - 450 10*3/mm3    Neutrophil % 67.5 42.7 - 76.0 %    Lymphocyte % 21.0 19.6 - 45.3 %    Monocyte % 9.3 5.0 - 12.0 %    Eosinophil % 0.3 0.3 - 6.2 %    Basophil % 0.5 0.0 - 1.5 %    Immature Grans % 1.4 (H) 0.0 - 0.5 %    Neutrophils, Absolute 6.48 1.70 - 7.00 10*3/mm3    Lymphocytes, Absolute 2.01 0.70 - 3.10 10*3/mm3    Monocytes, Absolute 0.89 0.10 - 0.90 10*3/mm3    Eosinophils, Absolute 0.03 0.00 - 0.40 10*3/mm3    Basophils, Absolute 0.05 0.00 - 0.20 10*3/mm3    Immature Grans, Absolute 0.13 (H) 0.00 - 0.05 10*3/mm3    nRBC 0.0 0.0 - 0.2 /100 WBC   Ammonia    Collection Time: 05/02/22  1:53 PM    Specimen: Blood   Result Value Ref Range    Ammonia 36 11 - 51 umol/L   ECG 12 Lead    Collection Time: 05/02/22  2:06 PM   Result Value Ref Range    QT Interval 438 ms   Type & Screen    Collection Time: 05/02/22  3:02 PM    Specimen: Blood   Result Value Ref Range    ABO Type O     RH type Negative     Antibody Screen Negative     T&S Expiration Date 5/5/2022 11:59:59 PM    Urinalysis With Microscopic If Indicated (No Culture) - Urine, Catheter    Collection Time: 05/02/22  3:07 PM    Specimen: Urine, Catheter   Result Value Ref Range    Color, UA Yellow Yellow, Straw    Appearance, UA Clear Clear    pH, UA <=5.0 5.0 - 8.0    Specific Gravity, UA 1.008 1.005 - 1.030    Glucose, UA Negative Negative    Ketones, UA Negative Negative    Bilirubin, UA Negative Negative    Blood, UA Negative Negative    Protein, UA Negative Negative    Leuk Esterase, UA Trace (A) Negative    Nitrite, UA Negative Negative    Urobilinogen, UA 0.2 E.U./dL 0.2 - 1.0 E.U./dL    Urinalysis, Microscopic Only - Urine, Catheter    Collection Time: 05/02/22  3:07 PM    Specimen: Urine, Catheter   Result Value Ref Range    RBC, UA 0-2 None Seen, 0-2 /HPF    WBC, UA 0-2 None Seen, 0-2 /HPF    Bacteria, UA None Seen None Seen /HPF    Squamous Epithelial Cells, UA 0-2 None Seen, 0-2 /HPF    Hyaline Casts, UA 0-2 None Seen /LPF    Methodology Automated Microscopy    Sodium, Urine, Random - Urine, Catheter    Collection Time: 05/02/22  3:07 PM    Specimen: Urine, Catheter   Result Value Ref Range    Sodium, Urine 82 mmol/L   Creatinine, Urine, Random - Urine, Catheter    Collection Time: 05/02/22  3:07 PM    Specimen: Urine, Catheter   Result Value Ref Range    Creatinine, Urine 20.9 mg/dL       Radiology  XR Chest 1 View    Result Date: 5/2/2022  XR CHEST 1 VW-  HISTORY: Female who is 81 years-old,  short of breath  TECHNIQUE: Frontal view of the chest  COMPARISON: 03/11/2022  FINDINGS: The heart size is borderline. Aorta is calcified. Pulmonary vasculature is unremarkable. Likely scarring or atelectasis in the right lower lung. No focal pulmonary consolidation, pleural effusion, or pneumothorax. No acute osseous process.      No focal pulmonary consolidation. Borderline heart size. Follow-up as clinical indications persist.  This report was finalized on 5/2/2022 1:54 PM by Dr. Hernan Fraser M.D.        Medical Decision Making:  ED Course as of 05/02/22 1658   Mon May 02, 2022   1428 Patient's hemoglobin is 7.8 which is down 4 points from a month ago.  I did a Hemoccult card which was positive.  Patient's potassium is 8.4.  I have ordered hyperkalemia meds.  She is also in acute renal failure. [AH]   1459 Discussed the patient with Dr. Preston who agrees to admit inpatient for telemetry. [AH]   1504 Potassium(!!): 8.4 [TD]   1505 EKG interpreted by myself.  Time 1406.  Sinus rhythm.  Heart rate 74.  First-degree AV block.  QRS complex 173.  Peaked T waves. [TD]   1523 Discussed the patient  with Dr. Shine nephrology.  He states he will put a Shiley catheter in the patient and go ahead and dialyze her. [AH]      ED Course User Index  [AH] Elsa Buck PA  [TD] Alex Briggs II, MD           PPE: Both the patient and I wore a surgical mask throughout the entire patient encounter. I wore protective goggles.     Diagnosis  Final diagnoses:   Acute renal failure, unspecified acute renal failure type (HCC)   Hyperkalemia   Gastrointestinal hemorrhage, unspecified gastrointestinal hemorrhage type        Alex Briggs II, MD  05/02/22 4422

## 2022-05-02 NOTE — CONSULTS
[facility] Consult Note      Patient Name: Miri Yung  : 1940  MRN: [id number]  Primary Care Physician:  Loren Cruz APRN  Referring Physician: No ref. provider found  Date of admission: 2022    Subjective     Reason for Consult:  YOANA and hyperK    HPI:   Miri Yung is a 81 y.o. female with well-preserved renal function at baseline, brought to the hospital today by family after she was found confused, weak, and short of breath.  SCR 3.0 with non-hemolyzed K 8.4.  Full PMH outlined below; pertinent is use of losartan, potassium supplement, spironolactone; atrial fibrillation on AC; DM2; hypertension.    · Family states that she was her usual self as of very early this morning  · Not aware of any fever or chills; family also not aware of any urinary complaints  · She does not answer questions regarding pain, shortness of breath, or chest pain    Review of Systems:   Not obtainable from the patient    Personal History     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; generally asymptomatic   • Mitral regurgitation    • Mixed anxiety depressive disorder    • Nonrheumatic tricuspid (valve) insufficiency    • Osteoarthritis    • PAF (paroxysmal atrial fibrillation) (HCC)    • Sacroiliitis (HCC)    • Tricuspid regurgitation    • Type 2 diabetes mellitus (HCC)        Past Surgical History:   Procedure Laterality Date    • CATARACT EXTRACTION     • COLONOSCOPY N/A 3/10/2016    Procedure: COLONOSCOPY TO CECUM;  Surgeon: Shaan Becerra Jr., MD;  Location:  [facility];  Service:    • COLONOSCOPY N/A 6/18/2021    Procedure: COLONOSCOPY TO CECUM AND INTO TERMINAL ILEUM;  Surgeon: Jeremy Darden MD;  [facility];  Service: Gastroenterology;  Laterality: N/A;  PRE: ANEMIA  POST: HEMORRHOIDS   • ENDOSCOPY N/A 3/10/2016    Procedure: ESOPHAGOGASTRODUODENOSCOPY ;  Surgeon: Shaan Becerra Jr., MD;  Location:  [facility];  Service:    • ENDOSCOPY N/A 6/18/2021    Procedure: ESOPHAGOGASTRODUODENOSCOPY;  Surgeon: Jeremy Darden MD;  [facility];  Service: Gastroenterology;  Laterality: N/A;  PRE: ANEMIA  POST: HIATAL HERNIA, SMALL ESOPHAGEAL VARICES   • KNEE SURGERY     • TUBAL ABDOMINAL LIGATION         Family History: family history includes Emphysema in her father; Heart disease in her brother and mother; Kidney disease in her mother.    Social History:  reports that she has never smoked. She has never used smokeless tobacco. She reports that she does not drink alcohol and does not use drugs.    Home Medications:  Prior to Admission medications    Medication Sig Start Date End Date Taking? Authorizing Provider   allopurinol (ZYLOPRIM) 300 MG tablet TAKE 1 TABLET BY MOUTH  DAILY 9/10/21   Ely Ramos APRN   apixaban (ELIQUIS) 5 MG tablet tablet Take 1 tablet by mouth Every 12 (Twelve) Hours. Indications: Atrial Fibrillation 4/19/22   Jignesh York MD   atorvastatin (LIPITOR) 40 MG tablet TAKE 1 TABLET BY MOUTH DAILY 3/14/22   Loren Cruz APRN   bumetanide (BUMEX) 2 MG tablet Take 1 tablet by mouth Daily. 4/13/22   Loren Cruz APRN   buPROPion SR (WELLBUTRIN SR) 150 MG 12 hr tablet Take 1 tablet by mouth Daily With Breakfast. 1/31/22   Loren Cruz APRN   carvedilol (COREG) 12.5 MG tablet Take 1 tablet by mouth 2 (Two) Times a Day. 4/19/22   Jignesh York  MD JOY   ferrous sulfate 325 (65 FE) MG EC tablet TAKE 1 TABLET BY MOUTH EVERY DAY WITH BREAKFAST 4/1/22   Loren Cruz APRN   glucose blood test strip BID. Test  In the AM and again in the PM 1/31/22   Loren Cruz APRN   glucose monitor monitoring kit One touch meter 1/31/22   Loren Cruz APRN   isosorbide mononitrate (IMDUR) 60 MG 24 hr tablet TAKE 1 TABLET BY MOUTH  DAILY 9/10/21   Ely Ramos APRN   Lancets (onetouch ultrasoft) lancets Test BID 1/31/22   Loren Cruz APRN   losartan (COZAAR) 50 MG tablet Take 1 tablet by mouth Every Evening. 4/13/22   Loren Cruz APRN   metFORMIN ER (GLUCOPHAGE-XR) 500 MG 24 hr tablet Take 2 tablets by mouth Daily With Breakfast for 30 days. 1/31/22 3/2/22  Loren Cruz APRN   mirtazapine (REMERON) 15 MG tablet Take 1 tablet by mouth Every Night. 1/31/22   Loren Cruz APRN   montelukast (SINGULAIR) 10 MG tablet Take 1 tablet by mouth Every Night. 1/31/22   Loren Cruz APRN   multivitamin with minerals (Multivitamin Women 50+) tablet tablet Take 1 tablet by mouth Daily.    Provider, MD Sabina   pantoprazole (PROTONIX) 40 MG EC tablet TAKE 1 TABLET BY MOUTH  DAILY 9/10/21   Ely Ramos APRN   potassium chloride (K-DUR,KLOR-CON) 20 MEQ CR tablet Take 2 tablets by mouth Every Morning. 3/12/22   Jaun Monsalve MD   SITagliptin (Januvia) 100 MG tablet Take 1 tablet by mouth Daily. 1/31/22   Loren Cruz APRN   spironolactone (ALDACTONE) 100 MG tablet Take 1 tablet by mouth 2 (Two) Times a Day. 4/13/22   Loren Cruz APRN       Allergies:  Allergies   Allergen Reactions   • Decongestant  [Pseudoephedrine]        Objective     Vitals:   Heart Rate:  [60-70] 70  Resp:  [28] 28  BP: (116-126)/(44-48) 126/44    Intake/Output Summary (Last 24 hours) at 5/2/2022 1458  Last data filed at 5/2/2022 1450  Gross per 24 hour   Intake 550 ml   Output --   Net 550 ml       Physical Exam:   Constitutional:  awake, agitated, writhing in bed, crying at times; overweight  HEENT: Sclera anicteric, no conjunctival injection  Neck: Supple, difficult exam for bruit given moaning, no JVD  Respiratory: Coarse BS, nonlabored respiration  Cardiovascular: Irregularly irregular, tachycardic, no rub  Gastrointestinal: BS +, soft, no grimacing, and nondistended  : No palpable bladder; external catheter  Musculoskeletal: Trace edema  Psychiatric: Very restless, agitated, cooperative, not oriented  Neurologic:  moving all extremities tho not always purposefully, minimal speech   Skin: Warm and dry       Scheduled Meds:     calcium gluconate, 1 g, Intravenous, Once  pantoprazole, 40 mg, Intravenous, Once      IV Meds:   pantoprazole, 40 mg        Results Reviewed:   I have personally reviewed the results from the time of this admission to 5/2/2022 14:58 EDT     Lab Results   Component Value Date    GLUCOSE 119 (H) 05/02/2022    CALCIUM 8.9 05/02/2022     (L) 05/02/2022    K 8.4 (C) 05/02/2022    CO2 17.2 (L) 05/02/2022     05/02/2022    BUN 81 (H) 05/02/2022    CREATININE 2.95 (H) 05/02/2022    EGFRIFAFRI 77 01/31/2022    EGFRIFNONA 67 01/31/2022    BCR 27.5 (H) 05/02/2022    ANIONGAP 12.8 05/02/2022      Lab Results   Component Value Date    MG 2.0 03/05/2022    ALBUMIN 3.80 05/02/2022           Assessment / Plan     ASSESSMENT:  1.  YOANA in the setting of hypotension and compromised renal autoregulation due to losartan.  Severe hyperkalemia due to YOANA, potassium supplement, losartan, and Aldactone.  Anion gap 13.  Urinalysis negative for blood and protein  2.  Confusion and agitation, abrupt onset  3.  Atrial fibrillation on AC: Rapid rates now  4.  Hypotension  5.  DM2: metformin stopped.  AG presently normal  6.  Anemia, dramatic      PLAN:  1.  Given likelihood for persistence of hyperkalemia in face of recent Aldactone use, favor initiating hemodialysis today to address hyperK  2.  Will ask vascular for Jac  catheter, and check Hep B sAg prior to start of HD  3.  Bladder scan and FENa  4.  Ca gluc infusing; s/p insulin and gluc.  Will also give one amp of bicarb  5.  Check UDS      Thank you for involving us in the care of Miri Yung.  Please feel free to call with any questions.    Carlo Shine MD  05/02/22  14:58 EDT    [facility]  [phone]      Much of this encounter note is an electronic transcription/translation of spoken language to printed text. The electronic translation of spoken language may permit erroneous, or at times, nonsensical words or phrases to be inadvertently transcribed; Although I have reviewed the note for such errors, some may still exist.

## 2022-05-02 NOTE — ED PROVIDER NOTES
EMERGENCY DEPARTMENT ENCOUNTER    Room Number:  E465/1  Date seen:  5/2/2022  Time seen: 13:41 EDT  PCP: Loren Cruz APRN  Historian: patient      HPI:  Chief Complaint: shortness of breath    Context: Miri Yung is a 81 y.o. female who presents to the ED for evaluation of shortness of breath and generalized weakness earlier today.  It is hard to get history from this patient as she is very anxious at the moment.  Family lives with her states that she gets this way every time she comes into the hospital and they typically have to give her something to calm her down.  Patient does use oxygen at home only at night.  She has a history of CHF.  She is on Eliquis for history of atrial fibrillation.  She denies any fever, chills, cough.  Patient's family states that she was trying to get up earlier and did not have the strength to go to the bathroom.      PAST MEDICAL HISTORY  Active Ambulatory Problems     Diagnosis Date Noted   • Dementia (CMS/[facility]) 02/08/2016   • Depressive disorder 02/08/2016   • Edema 02/08/2016   • Abnormal liver function tests 02/08/2016   • Gastroesophageal reflux disease 02/08/2016   • Gout 02/08/2016   • Hyperlipidemia 02/08/2016   • HTN (hypertension) 02/08/2016   • Insomnia 02/08/2016   • Arthropathy of knee 02/08/2016   • Memory loss 02/08/2016   • Low back pain 02/08/2016   • Mitral valve insufficiency 02/08/2016   • Tricuspid valve insufficiency 02/08/2016   • Urinary tract infection 02/08/2016   • Valvular endocarditis 02/08/2016   • Long QT interval 02/19/2016   • Microcytic anemia 03/03/2016   • Anemia 03/04/2016   • Pulmonary hypertension (HCC) 03/30/2016   • Seasonal allergies 08/19/2016   • Acute cystitis without hematuria 07/25/2017   • Chronic fatigue 02/28/2018   • Bacterial endocarditis 10/31/2018   • Slow transit constipation 06/06/2019   • Acute ischemic stroke (HCC) 08/05/2019   • Paroxysmal tachycardia (HCC) 08/05/2019   • Type 2 diabetes mellitus ([facility]) 08/05/2019    • Longstanding persistent atrial fibrillation (HCC) 12/18/2019   • Morbidly obese (HCC) 08/20/2020   • Chronic diastolic (congestive) heart failure (HCC) 11/16/2020   • Symptomatic anemia 06/14/2021   • History of CVA (cerebrovascular accident) 06/14/2021   • Long term current use of anticoagulant therapy 06/15/2021   • Acute blood loss anemia 06/15/2021   • Cirrhosis of liver (HCC) 06/18/2021   • Esophageal varices (HCC) 06/18/2021   • Splenomegaly due to portal hypertension and liver disease 06/18/2021   • Anasarca 03/04/2022   • Atrial fibrillation with RVR (HCC) 03/05/2022   • Occult GI bleeding 03/06/2022   • Aortic stenosis, mild 04/19/2022     Resolved Ambulatory Problems     Diagnosis Date Noted   • Left lower quadrant pain 02/08/2016   • Acute sinusitis 02/08/2016   • Atopic rhinitis 02/08/2016   • Arthritis 02/08/2016   • Hematochezia 02/08/2016   • Pain in joint 02/08/2016   • Lung mass 02/08/2016   • Muscle pain 02/08/2016   • Inflammation of sacroiliac joint (HCC) 02/08/2016   • Stress incontinence in female 02/08/2016   • Urinary urgency 02/08/2016   • Precordial pain 02/19/2016   • High risk medication use 02/19/2016   • Chest pain 02/19/2016   • Iron deficiency anemia due to chronic blood loss 03/03/2016   • Fatigue due to exposure 05/05/2016   • Health care maintenance 09/28/2016   • Need for vaccination 10/17/2016   • Atypical chest pain 12/14/2016   • Diarrhea 02/16/2017   • GI bleed 06/15/2021     Past Medical History:   Diagnosis Date   • Anxiety    • Atrial fibrillation (HCC)    • Congestive heart failure (CHF) (HCC)    • Depression    • Encephalopathy    • Endocarditis    • Esophageal reflux    • GERD (gastroesophageal reflux disease)    • History of blood transfusion    • LOM (loss of memory)    • Mitral and aortic valve disease    • Mitral regurgitation    • Mixed anxiety depressive disorder    • Nonrheumatic tricuspid (valve) insufficiency    • Osteoarthritis    • PAF (paroxysmal atrial  fibrillation) (HCC)    • Sacroiliitis (HCC)    • Tricuspid regurgitation          PAST SURGICAL HISTORY  Past Surgical History:   Procedure Laterality Date   • CATARACT EXTRACTION     • COLONOSCOPY N/A 3/10/2016    Procedure: COLONOSCOPY TO CECUM;  Surgeon: Shaan Becerra Jr., MD;  Location: [facility] ENDOSCOPY;  Service:    • COLONOSCOPY N/A 6/18/2021    Procedure: COLONOSCOPY TO CECUM AND INTO TERMINAL ILEUM;  Surgeon: Jeremy Darden MD;  [facility];  Service: Gastroenterology;  Laterality: N/A;  PRE: ANEMIA  POST: HEMORRHOIDS   • ENDOSCOPY N/A 3/10/2016    Procedure: ESOPHAGOGASTRODUODENOSCOPY ;  Surgeon: Shaan Becerra Jr., MD;  [facility];  Service:    • ENDOSCOPY N/A 6/18/2021    Procedure: ESOPHAGOGASTRODUODENOSCOPY;  Surgeon: Jeremy Darden MD;  Location: [facility] ENDOSCOPY;  Service: Gastroenterology;  Laterality: N/A;  PRE: ANEMIA  POST: HIATAL HERNIA, SMALL ESOPHAGEAL VARICES   • KNEE SURGERY     • TUBAL ABDOMINAL LIGATION           FAMILY HISTORY  Family History   Problem Relation Age of Onset   • Kidney disease Mother    • Heart disease Mother    • Emphysema Father    • Heart disease Brother          SOCIAL HISTORY  Social History     Socioeconomic History   • Marital status:    Tobacco Use   • Smoking status: Never Smoker   • Smokeless tobacco: Never Used   • Tobacco comment: caffeine use    Vaping Use   • Vaping Use: Never used   Substance and Sexual Activity   • Alcohol use: No   • Drug use: No   • Sexual activity: Defer         ALLERGIES  Decongestant  [pseudoephedrine]        REVIEW OF SYSTEMS  Review of Systems   Unable to perform ROS: Acuity of condition   Respiratory: Positive for shortness of breath.    Neurological: Positive for weakness (generalized).   Psychiatric/Behavioral: The patient is nervous/anxious.         All systems reviewed and negative except for those discussed in HPI.       PHYSICAL EXAM  ED Triage Vitals [05/02/22 1303]   Temp  "Heart Rate Resp BP SpO2   -- 60 28 116/48 97 %      Temp src Heart Rate Source Patient Position BP Location FiO2 (%)   -- -- -- -- --         GENERAL: patient seems moderately distressed. Extremely anxious, constantly moving around in bed stating she feels like she's \"crawling out of her skin\"  HENT: atraumatic  EYES: no scleral icterus  CV: regular rhythm, regular rate  RESPIRATORY: normal effort. CTAB. No wheezes, rales, or rhonci  ABDOMEN: soft, nontender  MUSCULOSKELETAL: no deformity  NEURO: alert, moves all extremities, follows commands  SKIN: warm, dry    Vital signs and nursing notes reviewed.          LAB RESULTS  Recent Results (from the past 24 hour(s))   Comprehensive Metabolic Panel    Collection Time: 05/02/22  1:53 PM    Specimen: Blood   Result Value Ref Range    Glucose 119 (H) 65 - 99 mg/dL    BUN 81 (H) 8 - 23 mg/dL    Creatinine 2.95 (H) 0.57 - 1.00 mg/dL    Sodium 133 (L) 136 - 145 mmol/L    Potassium 8.4 (C) 3.5 - 5.2 mmol/L    Chloride 103 98 - 107 mmol/L    CO2 17.2 (L) 22.0 - 29.0 mmol/L    Calcium 8.9 8.6 - 10.5 mg/dL    Total Protein 5.5 (L) 6.0 - 8.5 g/dL    Albumin 3.80 3.50 - 5.20 g/dL    ALT (SGPT) 22 1 - 33 U/L    AST (SGOT) 19 1 - 32 U/L    Alkaline Phosphatase 88 39 - 117 U/L    Total Bilirubin 0.3 0.0 - 1.2 mg/dL    Globulin 1.7 gm/dL    A/G Ratio 2.2 g/dL    BUN/Creatinine Ratio 27.5 (H) 7.0 - 25.0    Anion Gap 12.8 5.0 - 15.0 mmol/L    eGFR 15.4 (L) >60.0 mL/min/1.73   BNP    Collection Time: 05/02/22  1:53 PM    Specimen: Blood   Result Value Ref Range    proBNP 892.0 0.0 - 1,800.0 pg/mL   Troponin    Collection Time: 05/02/22  1:53 PM    Specimen: Blood   Result Value Ref Range    Troponin T <0.010 0.000 - 0.030 ng/mL   CBC Auto Differential    Collection Time: 05/02/22  1:53 PM    Specimen: Blood   Result Value Ref Range    WBC 9.59 3.40 - 10.80 10*3/mm3    RBC 2.84 (L) 3.77 - 5.28 10*6/mm3    Hemoglobin 7.8 (L) 12.0 - 15.9 g/dL    Hematocrit 26.0 (L) 34.0 - 46.6 %    MCV 91.5 " 79.0 - 97.0 fL    MCH 27.5 26.6 - 33.0 pg    MCHC 30.0 (L) 31.5 - 35.7 g/dL    RDW 19.9 (H) 12.3 - 15.4 %    RDW-SD 66.1 (H) 37.0 - 54.0 fl    MPV 12.0 6.0 - 12.0 fL    Platelets 218 140 - 450 10*3/mm3    Neutrophil % 67.5 42.7 - 76.0 %    Lymphocyte % 21.0 19.6 - 45.3 %    Monocyte % 9.3 5.0 - 12.0 %    Eosinophil % 0.3 0.3 - 6.2 %    Basophil % 0.5 0.0 - 1.5 %    Immature Grans % 1.4 (H) 0.0 - 0.5 %    Neutrophils, Absolute 6.48 1.70 - 7.00 10*3/mm3    Lymphocytes, Absolute 2.01 0.70 - 3.10 10*3/mm3    Monocytes, Absolute 0.89 0.10 - 0.90 10*3/mm3    Eosinophils, Absolute 0.03 0.00 - 0.40 10*3/mm3    Basophils, Absolute 0.05 0.00 - 0.20 10*3/mm3    Immature Grans, Absolute 0.13 (H) 0.00 - 0.05 10*3/mm3    nRBC 0.0 0.0 - 0.2 /100 WBC   Ammonia    Collection Time: 05/02/22  1:53 PM    Specimen: Blood   Result Value Ref Range    Ammonia 36 11 - 51 umol/L   ECG 12 Lead    Collection Time: 05/02/22  2:06 PM   Result Value Ref Range    QT Interval 438 ms   Type & Screen    Collection Time: 05/02/22  3:02 PM    Specimen: Blood   Result Value Ref Range    ABO Type O     RH type Negative     Antibody Screen Negative     T&S Expiration Date 5/5/2022 11:59:59 PM    COVID-19,APTIMA PANTHER(KRUNAL),[facility], NP/OP SWAB IN UTM/VTM/[facility] TRANSPORT MEDIA,24 HR TAT - Swab, Nasopharynx    Collection Time: 05/02/22  3:04 PM    Specimen: Nasopharynx; Swab   Result Value Ref Range    COVID19 Not Detected Not Detected - Ref. Range   Urinalysis With Microscopic If Indicated (No Culture) - Urine, Catheter    Collection Time: 05/02/22  3:07 PM    Specimen: Urine, Catheter   Result Value Ref Range    Color, UA Yellow Yellow, Straw    Appearance, UA Clear Clear    pH, UA <=5.0 5.0 - 8.0    Specific Gravity, UA 1.008 1.005 - 1.030    Glucose, UA Negative Negative    Ketones, UA Negative Negative    Bilirubin, UA Negative Negative    Blood, UA Negative Negative    Protein, UA Negative Negative    Leuk Esterase, UA Trace (A) Negative     Nitrite, UA Negative Negative    Urobilinogen, UA 0.2 E.U./dL 0.2 - 1.0 E.U./dL   Urinalysis, Microscopic Only - Urine, Catheter    Collection Time: 05/02/22  3:07 PM    Specimen: Urine, Catheter   Result Value Ref Range    RBC, UA 0-2 None Seen, 0-2 /HPF    WBC, UA 0-2 None Seen, 0-2 /HPF    Bacteria, UA None Seen None Seen /HPF    Squamous Epithelial Cells, UA 0-2 None Seen, 0-2 /HPF    Hyaline Casts, UA 0-2 None Seen /LPF    Methodology Automated Microscopy    Sodium, Urine, Random - Urine, Catheter    Collection Time: 05/02/22  3:07 PM    Specimen: Urine, Catheter   Result Value Ref Range    Sodium, Urine 82 mmol/L   Creatinine, Urine, Random - Urine, Catheter    Collection Time: 05/02/22  3:07 PM    Specimen: Urine, Catheter   Result Value Ref Range    Creatinine, Urine 20.9 mg/dL       Ordered the above labs and independently reviewed the results.        RADIOLOGY  XR Chest 1 View    Result Date: 5/2/2022  Narrative: XR CHEST 1 VW-  HISTORY: Female who is 81 years-old,  short of breath  TECHNIQUE: Frontal view of the chest  COMPARISON: 03/11/2022  FINDINGS: The heart size is borderline. Aorta is calcified. Pulmonary vasculature is unremarkable. Likely scarring or atelectasis in the right lower lung. No focal pulmonary consolidation, pleural effusion, or pneumothorax. No acute osseous process.      Impression: No focal pulmonary consolidation. Borderline heart size. Follow-up as clinical indications persist.  This report was finalized on 5/2/2022 1:54 PM by Dr. Hernan Fraser M.D.        I ordered the above noted radiological studies. Reviewed by me and discussed with radiologist.  See dictation for official radiology interpretation.    Critical Care  Performed by: Elsa Buck PA  Authorized by: Alex Briggs II, MD     Critical care provider statement:     Critical care time (minutes):  30    Critical care was necessary to treat or prevent imminent or life-threatening deterioration of the  following conditions:  Renal failure    Critical care was time spent personally by me on the following activities:  Development of treatment plan with patient or surrogate, discussions with consultants, examination of patient, obtaining history from patient or surrogate, ordering and performing treatments and interventions, ordering and review of laboratory studies, ordering and review of radiographic studies, pulse oximetry, re-evaluation of patient's condition and review of old charts    I assumed direction of critical care for this patient from another provider in my specialty: no      Care discussed with: admitting provider          MEDICATIONS GIVEN IN ER  Medications   sodium chloride 0.9 % flush 10 mL (10 mL Intravenous Given 5/2/22 1355)   pantoprazole (PROTONIX) injection 40 mg (40 mg Intravenous Given 5/2/22 1540)     And   pantoprazole (PROTONIX) 40 mg in 100mL NS IVPB (40 mg Intravenous New Bag 5/2/22 1543)   albumin human 25 % IV SOLN 12.5 g (has no administration in time range)   lidocaine (XYLOCAINE) 1 % injection 20 mL (has no administration in time range)   heparin (porcine) injection 5,000 Units (has no administration in time range)   nitroglycerin (NITROSTAT) SL tablet 0.4 mg (has no administration in time range)   acetaminophen (TYLENOL) tablet 650 mg (has no administration in time range)   ondansetron (ZOFRAN) tablet 4 mg (has no administration in time range)     Or   ondansetron (ZOFRAN) injection 4 mg (has no administration in time range)   melatonin tablet 3 mg (has no administration in time range)   LORazepam (ATIVAN) injection 1 mg (1 mg Intravenous Given 5/2/22 1355)   dextrose (D50W) (25 g/50 mL) IV injection 50 mL (50 mL Intravenous Given 5/2/22 1450)   insulin regular (humuLIN R,novoLIN R) injection 10 Units (10 Units Intravenous Given 5/2/22 1453)   calcium gluconate 1g/50ml 0.675% NaCl IV SOLN (0 g Intravenous Stopped 5/2/22 1625)   albuterol (PROVENTIL) nebulizer solution 0.5% 2.5  mg/0.5mL (10 mg Nebulization Given 5/2/22 1526)   sodium bicarbonate injection 8.4% 25 mEq (25 mEq Intravenous Given 5/2/22 1635)       MEDICAL RECORD REVIEW  I reviewed the patient's records.  Patient was admitted on 3/4/2022 and discharged 3/11/2022 with volume overload and acute on chronic diastolic CHF.  She was discharged home with oxygen.      PROGRESS, DATA ANALYSIS, CONSULTS, AND MEDICAL DECISION MAKING    All labs have been independently reviewed by me.  All radiology studies have been reviewed by me. Discussion below represents my analysis of pertinent findings related to patient's condition, differential diagnosis, treatment plan and final disposition.    DDX includes but not limited to metabolic encephalopathy, pneumonia, UTI, CHF, bronchitis, viral syndrome.       ED Course as of 05/02/22 2009   Mon May 02, 2022   1428 Patient's hemoglobin is 7.8 which is down 4 points from a month ago.  Hemoccult was positive. Protonix bolus and drip was ordered.  Patient's potassium is 8.4.  I have ordered hyperkalemia meds as well (insulin, dextrose, and calcium gluconate.  She is also in acute renal failure (creatine of 2.95, no hx of CKD)- will consult nephrology. [AH]    I informed patient and patient's son of need for admission as she is very sick. I informed them that she likely has an acute GI bleed as well as acute renal failure and elevated potassium.  Informed them of consult with nephrology as as well as starting medications to help with hyperkalemia and GI bleed.      1459 Discussed the patient with Dr. Preston who agrees to admit inpatient for telemetry. [AH]   1504 Potassium(!!): 8.4 [TD]   1505 EKG interpreted by myself.  Time 1406.  Sinus rhythm.  Heart rate 74.  First-degree AV block.  QRS complex 173.  Peaked T waves. [TD]   1523 Discussed the patient with Dr. Shine nephrology.  He states he will put a Shiley catheter in the patient and go ahead and dialyze her. [AH]      ED Course User Index  [AH]  Elsa Buck PA  [TD] Alex Briggs II, MD           Reviewed pt's history and workup with Dr. Briggs.  After a bedside evaluation; they agree with the plan of care      Patient's disposition is admission.    Patient was placed in face mask in first look. Patient was wearing facemask each time I entered the room and throughout our encounter. I wore full protective equipment throughout this patient encounter including a face mask, eye shield, gown and gloves. Hand hygiene was performed before donning protective equipment and after removal when leaving the room.        DIAGNOSIS  Final diagnoses:   Acute renal failure, unspecified acute renal failure type (HCC)   Hyperkalemia   Gastrointestinal hemorrhage, unspecified gastrointestinal hemorrhage type           Latest Documented Vital Signs:  As of 20:09 EDT  BP- 121/50 HR- 101 Temp- 97.5 °F (36.4 °C) (Oral) O2 sat- 100%         Elsa Buck PA  05/02/22 2020

## 2022-05-02 NOTE — CONSULTS
Name: Miri Yung ADMIT: 2022   : 1940  PCP: Loren Cruz APRN    MRN: [id number] LOS: 0 days   AGE/SEX: 81 y.o. female  ROOM:      Consults  Gifty Sher MD     LOS: 0 days   Patient Care Team:  Loren Cruz APRN as PCP - General (Family Medicine)    Subjective     History of Present Illness  81 y.o. female with a history of acute stroke, atrial fibrillation, CHF, dementia, depression, GERD, HTN, HLD, and type 2 diabetes mellitus who presented to the hospital today with acute shortness of breath, confusion, and weakness.  She was found to have acute kidney injury and hyperkalemia and she is in need of urgent dialysis.  She is quite confused and unable to provide any history.  History provided by her family and via chart review.      Review of Systems   Unable to perform ROS: Mental status change       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; generally asymptomatic   • Mitral regurgitation    • Mixed anxiety depressive disorder    • Nonrheumatic tricuspid (valve) insufficiency    • Osteoarthritis    • PAF (paroxysmal atrial fibrillation) (HCC)    • Sacroiliitis (HCC)    • Tricuspid regurgitation    • Type 2 diabetes mellitus (HCC)        Past Surgical History:   Procedure Laterality Date   • CATARACT EXTRACTION     • COLONOSCOPY N/A 3/10/2016    Procedure: COLONOSCOPY TO CECUM;  Surgeon: Shaan Becerra Jr., MD;   Location:  [facility];  Service:    • COLONOSCOPY N/A 6/18/2021    Procedure: COLONOSCOPY TO CECUM AND INTO TERMINAL ILEUM;  Surgeon: Jeremy Darden MD;  Location:  [facility];  Service: Gastroenterology;  Laterality: N/A;  PRE: ANEMIA  POST: HEMORRHOIDS   • ENDOSCOPY N/A 3/10/2016    Procedure: ESOPHAGOGASTRODUODENOSCOPY ;  Surgeon: Shaan Becerra Jr., MD;  [facility];  Service:    • ENDOSCOPY N/A 6/18/2021    Procedure: ESOPHAGOGASTRODUODENOSCOPY;  Surgeon: Jeremy Darden MD;  [facility];  Service: Gastroenterology;  Laterality: N/A;  PRE: ANEMIA  POST: HIATAL HERNIA, SMALL ESOPHAGEAL VARICES   • KNEE SURGERY     • TUBAL ABDOMINAL LIGATION         Family History   Problem Relation Age of Onset   • Kidney disease Mother    • Heart disease Mother    • Emphysema Father    • Heart disease Brother        Social History     Tobacco Use   • Smoking status: Never Smoker   • Smokeless tobacco: Never Used   • Tobacco comment: caffeine use    Vaping Use   • Vaping Use: Never used   Substance Use Topics   • Alcohol use: No   • Drug use: No       Allergies: Decongestant  [pseudoephedrine]    (Not in a hospital admission)    heparin (porcine), 5,000 Units, Intravenous, Once  lidocaine, 20 mL, Infiltration, Once      pantoprazole, 40 mg, Last Rate: 40 mg (05/02/22 1543)      [COMPLETED] Insert peripheral IV **AND** sodium chloride      Objective   Temp:  [96.3 °F (35.7 °C)] 96.3 °F (35.7 °C)  Heart Rate:  [] 97  Resp:  [28] 28  BP: (109-126)/(44-53) 124/47    I/O this shift:  In: 550 [I.V.:500; IV Piggyback:50]  Out: -     Physical Exam  Constitutional:       Appearance: She is normal weight.      Comments: Confused, intermittently falls asleep and startles awake, non sensical speech   HENT:      Head: Normocephalic and atraumatic.      Right Ear: External ear normal.      Left Ear: External ear normal.      Nose: Nose normal.      Mouth/Throat:      Mouth: Mucous  membranes are moist.      Pharynx: Oropharynx is clear.   Eyes:      Conjunctiva/sclera: Conjunctivae normal.      Pupils: Pupils are equal, round, and reactive to light.   Cardiovascular:      Rate and Rhythm: Normal rate and regular rhythm.   Pulmonary:      Effort: Pulmonary effort is normal. No respiratory distress.   Abdominal:      General: Abdomen is flat.      Palpations: Abdomen is soft.      Comments: Large umbilical/ventral hernia   Musculoskeletal:         General: Normal range of motion.      Cervical back: Normal range of motion and neck supple.      Right lower leg: No edema.      Left lower leg: No edema.   Skin:     General: Skin is warm and dry.      Capillary Refill: Capillary refill takes less than 2 seconds.   Neurological:      Mental Status: She is alert. She is disoriented.         Results from last 7 days   Lab Units 05/02/22  1353   WBC 10*3/mm3 9.59   HEMOGLOBIN g/dL 7.8*   PLATELETS 10*3/mm3 218     Results from last 7 days   Lab Units 05/02/22  1353   SODIUM mmol/L 133*   POTASSIUM mmol/L 8.4*   CHLORIDE mmol/L 103   CO2 mmol/L 17.2*   BUN mg/dL 81*   CREATININE mg/dL 2.95*   GLUCOSE mg/dL 119*   Estimated Creatinine Clearance: 14.1 mL/min (A) (by C-G formula based on SCr of 2.95 mg/dL (H)).      Imaging Studies:  n/a      Active Hospital Problems    Diagnosis  POA   • Acute renal failure (HCC) [N17.9]  Yes      Resolved Hospital Problems   No resolved problems to display.     Problem Points:  4:  Patient has a new problem, with additional work-up planned  Total problem points:4 or more    Data Points:  1:  I have reviewed or order clinical lab test  2:  I have personally and independently review of image, tracing, or specimen  2:  I have reviewed and summation of old records and/or discussed the patients care with another health care provider  Total data points:4 or more    Risk Points:  High:  Any illness that poses threat to life or body funciton    MDM Prob point Data point Risk   SF  1 1 Minimal   Low 2 2 Low   Mod 3 3 Moderate   High 4 4 High     Code MDM History Exam Time   02446 SF/Low Detailed Detailed 30   46620 Mod Comprehensive Comprehensive 50   68455 High Comprehensive Comprehensive 70     Detailed history:  4 elements HPI or status of 3 chronic problems; 2-9 system ROS  Comprehensive:  4 elements HPI or status of 3 chronic problems;  10 system ROS    Detailed Exam:  12 findings from any organ system  Comprehensive Exam:  2 findings from each of 9 systems.     [place]    Assessment/Plan       Acute renal failure (HCC)        81 y.o. female with multiple medical problems including atrial fibrillation on eliquis who presented with acute renal failure and hyperkalemia in need of emergent dialysis    -Shiley catheter placed in right femoral vein  -due to confusion and altered mental status, will continue 3 point restraints so line will not be discontinued by the patient  -d/w nephrology, who plans to do a hemodialysis treatment tonight    I discussed the patients findings and my recommendations with patient, family, nursing staff and consulting provider.  Please call my office with any question: [phone]    Gifty Sher MD  22  18:22 EDT

## 2022-05-03 NOTE — PLAN OF CARE
Problem: Adult Inpatient Plan of Care  Goal: Plan of Care Review  Outcome: Ongoing, Progressing  Flowsheets (Taken 5/3/2022 2875)  Progress: no change  Plan of Care Reviewed With: patient  Outcome Evaluation: Pt arrive to [facility] with acute renal failure. Pt received STAT dialysis for potassium of 8.4. Right femoral dsg C/D/I. Pt anxious while receiving dialysis 1x dose of xanax given. While pt receiving dialysis, HR sustaining 150s-170s, BP 86/51, STAT EKG, mag, K, troponin ordered. Dialysis RN stopped tx and Dr. Conley aware. EKG showed a fib with RVR, Melody Curtis aware, no new orders. Dr. Conley and Nisha Guerrero aware of recheck K of 4.5, no new orders. Pt remains in 3-point restraints for pt safety. PRN Tylenol given for RLE pain per MAR. Protonix gtt continued. Pt stable and needs met at this time.

## 2022-05-03 NOTE — THERAPY EVALUATION
Patient Name: Miri Yung  : 1940    MRN: [id number]                              Today's Date: 5/3/2022       Admit Date: 2022    Visit Dx:     ICD-10-CM ICD-9-CM   1. Acute renal failure, unspecified acute renal failure type (HCC)  N17.9 584.9   2. Hyperkalemia  E87.5 276.7   3. Gastrointestinal hemorrhage, unspecified gastrointestinal hemorrhage type  K92.2 578.9     Patient Active Problem List   Diagnosis   • Dementia (CMS/HCC)   • Depressive disorder   • Edema   • Abnormal liver function tests   • Gastroesophageal reflux disease   • Gout   • Hyperlipidemia   • HTN (hypertension)   • Insomnia   • Arthropathy of knee   • Memory loss   • Low back pain   • Mitral valve insufficiency   • Tricuspid valve insufficiency   • Urinary tract infection   • Valvular endocarditis   • Long QT interval   • Microcytic anemia   • Anemia   • Pulmonary hypertension (HCC)   • Seasonal allergies   • Acute cystitis without hematuria   • Chronic fatigue   • Bacterial endocarditis   • Slow transit constipation   • Acute ischemic stroke (HCC)   • Paroxysmal tachycardia (HCC)   • Type 2 diabetes mellitus (HCC)   • Longstanding persistent atrial fibrillation (HCC)   • Morbidly obese (HCC)   • Chronic diastolic (congestive) heart failure (HCC)   • Symptomatic anemia   • History of CVA (cerebrovascular accident)   • Long term current use of anticoagulant therapy   • Acute blood loss anemia   • Cirrhosis of liver (HCC)   • Esophageal varices (HCC)   • Splenomegaly due to portal hypertension and liver disease   • Anasarca   • Atrial fibrillation with RVR (HCC)   • Occult GI bleeding   • Aortic stenosis, mild   • Acute renal failure (HCC)     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   • Endocarditis     with mitral and  tricuspid regurgitation   • Esophageal reflux    • GERD (gastroesophageal reflux disease)    • Gout    • History of blood transfusion     due to anemia   • HTN (hypertension)    • Hyperlipidemia    • Insomnia    • LOM (loss of memory)    • Lung mass    • 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; generally asymptomatic   • Mitral regurgitation    • Mixed anxiety depressive disorder    • Nonrheumatic tricuspid (valve) insufficiency    • Osteoarthritis    • PAF (paroxysmal atrial fibrillation) (HCC)    • Sacroiliitis (HCC)    • Tricuspid regurgitation    • Type 2 diabetes mellitus (HCC)      Past Surgical History:   Procedure Laterality Date   • CATARACT EXTRACTION     • COLONOSCOPY N/A 3/10/2016    Procedure: COLONOSCOPY TO CECUM;  Surgeon: Shaan Becerra Jr., MD;  Location: [facility] ENDOSCOPY;  Service:    • COLONOSCOPY N/A 6/18/2021    Procedure: COLONOSCOPY TO CECUM AND INTO TERMINAL ILEUM;  Surgeon: Jeremy Darden MD;  Location: [facility] ENDOSCOPY;  Service: Gastroenterology;  Laterality: N/A;  PRE: ANEMIA  POST: HEMORRHOIDS   • ENDOSCOPY N/A 3/10/2016    Procedure: ESOPHAGOGASTRODUODENOSCOPY ;  Surgeon: Shaan Becerra Jr., MD;  Location: [facility] ENDOSCOPY;  Service:    • ENDOSCOPY N/A 6/18/2021    Procedure: ESOPHAGOGASTRODUODENOSCOPY;  Surgeon: Jeremy Darden MD;  Location: [facility] ENDOSCOPY;  Service: Gastroenterology;  Laterality: N/A;  PRE: ANEMIA  POST: HIATAL HERNIA, SMALL ESOPHAGEAL VARICES   • KNEE SURGERY     • TUBAL ABDOMINAL LIGATION        General Information     Row Name 05/03/22 0843          Physical Therapy Time and Intention    Document Type evaluation  -EE     Mode of Treatment individual therapy;physical therapy  -EE     Row Name 05/03/22 0843          General Information    Prior Level of Function independent:;all household mobility;community mobility  uses rwx  -EE     Existing Precautions/Restrictions fall  -EE     Barriers to Rehab medically complex  -EE     Row  "Name 05/03/22 0843          Living Environment    People in Home alone  -EE     Row Name 05/03/22 0843          Home Main Entrance    Number of Stairs, Main Entrance other (see comments)  unclear, pt states there is a \"big step up to the sidewalk\"  -EE     Row Name 05/03/22 0843          Cognition    Orientation Status (Cognition) oriented x 3  -EE     Row Name 05/03/22 0843          Safety Issues, Functional Mobility    Impairments Affecting Function (Mobility) endurance/activity tolerance;strength  -EE           User Key  (r) = Recorded By, (t) = Taken By, (c) = Cosigned By    Initials Name Provider Type    Beth Maynard PT Physical Therapist               Mobility     Row Name 05/03/22 0844          Bed Mobility    Bed Mobility supine-sit;sit-supine  -EE     Supine-Sit [place] (Bed Mobility) minimum assist (75% patient effort)  -EE     Sit-Supine [place] (Bed Mobility) minimum assist (75% patient effort)  -EE     Assistive Device (Bed Mobility) head of bed elevated;bed rails  -EE     Row Name 05/03/22 0844          Transfers    Comment, (Transfers) Pt HR elevated as high as 159 while sitting EOB. Pt returned to supine and further activity deferred.  -EE           User Key  (r) = Recorded By, (t) = Taken By, (c) = Cosigned By    Initials Name Provider Type    Beth Maynard, [place] Physical Therapist               Obj/Interventions     Row Name 05/03/22 0844          Range of Motion Comprehensive    General Range of Motion bilateral lower extremity ROM WFL  -EE     Row Name 05/03/22 0844          Strength Comprehensive (MMT)    General Manual Muscle Testing (MMT) Assessment lower extremity strength deficits identified  -EE     Comment, General Manual Muscle Testing (MMT) Assessment gen weakness, B LEs grossly 3+/5  -EE     Row Name 05/03/22 0844          Motor Skills    Therapeutic Exercise other (see comments)  seated B ankle pumps, LAQ, marching x 10 reps  -EE     Row Name 05/03/22 0844          " Balance    Balance Assessment sitting static balance  -EE     Static Sitting Balance supervision  -EE     Position, Sitting Balance unsupported;sitting edge of bed  -EE     Row Name 05/03/22 0844          Sensory Assessment (Somatosensory)    Sensory Assessment (Somatosensory) not tested  -EE           User Key  (r) = Recorded By, (t) = Taken By, (c) = Cosigned By    Initials Name Provider Type    EE Beth Staton, PT Physical Therapist               Goals/Plan     Row Name 05/03/22 0847          Bed Mobility Goal 1 (PT)    Activity/Assistive Device (Bed Mobility Goal 1, PT) sit to supine/supine to sit  -EE     [place] Level/Cues Needed (Bed Mobility Goal 1, PT) modified independence  -EE     Time Frame (Bed Mobility Goal 1, PT) 1 week  -EE     Progress/Outcomes (Bed Mobility Goal 1, PT) goal ongoing  -EE     Row Name 05/03/22 0847          Transfer Goal 1 (PT)    Activity/Assistive Device (Transfer Goal 1, PT) sit-to-stand/stand-to-sit;walker, rolling  -EE     [place] Level/Cues Needed (Transfer Goal 1, PT) standby assist  -EE     Time Frame (Transfer Goal 1, PT) 1 week  -EE     Progress/Outcome (Transfer Goal 1, PT) goal ongoing  -EE     Row Name 05/03/22 0847          Gait Training Goal 1 (PT)    Activity/Assistive Device (Gait Training Goal 1, PT) gait (walking locomotion);walker, rolling  -EE     [place] Level (Gait Training Goal 1, PT) contact guard required  -EE     Distance (Gait Training Goal 1, PT) 150  -EE     Time Frame (Gait Training Goal 1, PT) 1 week  -EE     Progress/Outcome (Gait Training Goal 1, PT) goal ongoing  -EE     Row Name 05/03/22 0847          Therapy Assessment/Plan (PT)    Planned Therapy Interventions (PT) balance training;bed mobility training;gait training;home exercise program;patient/family education;ROM (range of motion);stair training;strengthening;transfer training;postural re-education  -EE           User Key  (r) = Recorded By, (t) = Taken By, (c) = Cosigned By     Initials Name Provider Type    EE Beth Staton, PT Physical Therapist               Clinical Impression     Row Name 05/03/22 0845          Pain    Pretreatment Pain Rating 0/10 - no pain  -EE     Posttreatment Pain Rating 0/10 - no pain  -EE     Row Name 05/03/22 0845          Plan of Care Review    Outcome Evaluation Pt is an 80 yo female who presented from home with SOA and weakness. Diagnosed with acute renal failure, afib with RVR, hx of CHF, DM. Prior to admission, pt was living at home alone and used rwx for mobility. Pt states son checks in on her as needed. Upon exam, pt demonstrates generalized weakness and decreased endurance limiting mobility. Pt required min A with bed mobility. Transfers not attempted this date due to elevated HR as high as 159 while pt sitting and exercising at EOB. Pt will continue to benefit from PT for ongoing strengthening and mobility training.  -EE     Row Name 05/03/22 0845          Therapy Assessment/Plan (PT)    Rehab Potential (PT) good, to achieve stated therapy goals  -EE     Criteria for Skilled Interventions Met (PT) yes;meets criteria;skilled treatment is necessary  -EE     Therapy Frequency (PT) 5 times/wk  -EE     Row Name 05/03/22 0845          Vital Signs    Pretreatment Heart Rate (beats/min) 119  -EE     Intratreatment Heart Rate (beats/min) 159   -EE     Posttreatment Heart Rate (beats/min) 131  -EE     Pre Patient Position Supine  -EE     Intra Patient Position Sitting  -EE     Post Patient Position Supine  -EE     Row Name 05/03/22 0845          Positioning and Restraints    Pre-Treatment Position in bed  -EE     Post Treatment Position bed  -EE     In Bed fowlers;call light within reach;encouraged to call for assist;exit alarm on;with nsg;SCD pump applied  -EE     Restraints released:;reapplied:;soft limb  3 point restraints reapplied at end of PT session  -EE           User Key  (r) = Recorded By, (t) = Taken By, (c) = Cosigned By    Initials Name Provider  Type    EE Beth Staton, [place] Physical Therapist               Outcome Measures     Row Name 05/03/22 0848          How much help from another person do you currently need...    Turning from your back to your side while in flat bed without using bedrails? 3  -EE     Moving from lying on back to sitting on the side of a flat bed without bedrails? 3  -EE     Moving to and from a bed to a chair (including a wheelchair)? 3  -EE     Standing up from a chair using your arms (e.g., wheelchair, bedside chair)? 3  -EE     Climbing 3-5 steps with a railing? 1  -EE     To walk in hospital room? 2  -EE     AM-PAC 6 Clicks Score (PT) 15  -EE     Highest level of mobility 4 --> Transferred to chair/commode  -EE     Row Name 05/03/22 0848          Functional Assessment    Outcome Measure Options AM-PAC 6 Clicks Basic Mobility (PT)  -EE           User Key  (r) = Recorded By, (t) = Taken By, (c) = Cosigned By    Initials Name Provider Type    EE Beth Staton PT Physical Therapist                             Physical Therapy Education                 Title: PT OT SLP Therapies (In Progress)     Topic: Physical Therapy (In Progress)     Point: Mobility training (Done)     Learning Progress Summary           Patient Acceptance, E,D, VU,NR by EE at 5/3/2022 0848                   Point: Home exercise program (Done)     Learning Progress Summary           Patient Acceptance, E,D, VU,NR by EE at 5/3/2022 0848                   Point: Body mechanics (Not Started)     Learner Progress:  Not documented in this visit.          Point: Precautions (Not Started)     Learner Progress:  Not documented in this visit.                      User Key     Initials Effective Dates Name Provider Type Discipline    EE 06/16/21 -  Beth Staton PT Physical Therapist PT              PT Recommendation and Plan  Planned Therapy Interventions (PT): balance training, bed mobility training, gait training, home exercise program, patient/family education, ROM (range  of motion), stair training, strengthening, transfer training, postural re-education  Outcome Evaluation: Pt is an 82 yo female who presented from home with SOA and weakness. Diagnosed with acute renal failure, afib with RVR, hx of CHF, DM. Prior to admission, pt was living at home alone and used rwx for mobility. Pt states son checks in on her as needed. Upon exam, pt demonstrates generalized weakness and decreased endurance limiting mobility. Pt required min A with bed mobility. Transfers not attempted this date due to elevated HR as high as 159 while pt sitting and exercising at EOB. Pt will continue to benefit from PT for ongoing strengthening and mobility training.     Time Calculation:    PT Charges     Row Name 05/03/22 0849             Time Calculation    Start Time 0821  -EE      Stop Time 0837  -EE      Time Calculation (min) 16 min  -EE      PT Received On 05/03/22  -EE      PT - Next Appointment 05/04/22  -EE      PT Goal Re-Cert Due Date 05/10/22  -EE              Time Calculation- PT    Total Timed Code Minutes- PT 8 minute(s)  -EE              Timed Charges    07460 - PT Therapeutic Exercise Minutes 8  -EE              Total Minutes    Timed Charges Total Minutes 8  -EE       Total Minutes 8  -EE            User Key  (r) = Recorded By, (t) = Taken By, (c) = Cosigned By    Initials Name Provider Type    EE Beth Staton, PT Physical Therapist              Therapy Charges for Today     Code Description Service Date Service Provider Modifiers Qty    [id number]  PT THER PROC EA 15 MIN 5/3/2022 Beth Staton, PT GP 1    71623009428 HC PT EVAL MOD COMPLEXITY 2 5/3/2022 Beth Staton, PT GP 1          PT G-Codes  Outcome Measure Options: AM-PAC 6 Clicks Basic Mobility (PT)  AM-PAC 6 Clicks Score (PT): 15      Patient was not wearing a face mask during this therapy encounter. Therapist used appropriate personal protective equipment including mask and gloves.  Mask used was standard procedure mask. Appropriate  PPE was worn during the entire therapy session. Hand hygiene was completed before and after therapy session. Patient is not in enhanced droplet precautions.     Beth Staton, PT  5/3/2022

## 2022-05-03 NOTE — NURSING NOTE
2330 dialysis without incident or c/o.  2 hours into dialysis pt went into afib with rvr. asymptomatic. blood returned and treatment stopped. dr. vasquez notified. new orders rec'd to leave patient off hd. send stat k and call result. no fluid removal. no meds administered. pt. stable with afib and rate at this time.

## 2022-05-03 NOTE — PLAN OF CARE
Goal Outcome Evaluation:              Outcome Evaluation: Pt HR remains elevated this shift, 120s-130s. Pt asymptomatic. MD aware. Cardiology seen pt. Pt remains in restraints while shiley remains in right femoral. Dressing on shiley changed this shift. Pt experiencing hematuria. MD aware. UA sent to lab. Pt went for renal US. No other complaints, will continue to monitor.

## 2022-05-03 NOTE — CONSULTS
Patient Name: Miri Yung  :1940  81 y.o.    Date of Admission: 2022  Date of Consultation:  22  Encounter Provider: KWASI Bazan  Place of Service: [facility] CARDIOLOGY  Referring Provider: Adele Preston MD  Patient Care Team:  Loren Cruz APRN as PCP - General (Family Medicine)      Chief complaint: altered mental status, hyperkalemia    Reason for consultation : AF with RVR, hematuria on anticoagulation    History of Present Illness: Ms. Yung is an 81 year old woman followed by Dr. York for paroxysmal atrial fibrillation, chronic diastolic heart failure, and aortic valve calcification with mild to moderate aortic stenosis. She has a prior history of bacterial endocarditis.  She has KOO cirrhosis.     She was admitted in 2021 with anemia. She had an EGD which showed grade 1 varices. Colonoscopy with non bleeding hemorrhoids. Her anticoagulation was restarted.     In 2021 she saw Dr. York and anticoagulation was stopped as she had not had recurrence of atrial fibrillation.     She was admitted in 2022 with heart failure and atrial fibrillation with RVR. She was diuresed. apixaban was restarted. She was in SR at discharge. Echo showed preserved LVEF, severe pulmonary hypertension, mild to moderate MR. She followed up with KWASI and was doing well.     She was seen in the office by Dr. York on . We were asked to consider changing to nonselective beta blocker. Metoprolol was changed to carvedilol. She was otherwise doing well. Meds at that time bumex, ismn, losartan, spironolactone, carvedilol.     [facility] called on  - low BP. ISMN stopped.     She presented to the emergency room yesterday with altered mental status. She was found to have acute kidney injury with hyperkalemia in the setting of ARB/aldactone use. Vascular was consulted for access and she had urgent hemodialysis.  She was in SR on admission and has since gone into atrial fibrillation with RVR. She is on carvedilol. Her BP is low.     She started having gross hematuria. apixaban is on hold.     Patient states she was doing pretty well prior to admission. Weight seems to be down quite a bit --- she was 171 lbs at last office visit with Dr. York in April. Weight documented here 159 lbs. She is lying flat for a dressing change without orthopnea. She denies cp/pressure. No light headed or dizziness. She does not feel her heart racing.     Echo 3/5/22  · Aortic valve maximum pressure gradient is 22.8 mmHg. Aortic valve mean pressure gradient is 13 mmHg.  · Mild aortic valve stenosis is present. Aortic valve area is 1.18 cm2.  · Calculated right ventricular systolic pressure from tricuspid regurgitation is 73.1 mmHg.  · There is moderate, bileaflet mitral valve thickening present.  · Mild mitral valve stenosis is present.  · Severe pulmonary hypertension is present.  · Left atrial volume is severely increased.  · Mild to moderate mitral valve regurgitation is present with an eccentric jet noted.  · Estimated left ventricular EF = 50% Left ventricular systolic function is normal.        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; generally asymptomatic   • Mitral regurgitation    • Mixed anxiety depressive disorder    • Nonrheumatic tricuspid (valve) insufficiency    •  Osteoarthritis    • PAF (paroxysmal atrial fibrillation) (HCC)    • Sacroiliitis (HCC)    • Tricuspid regurgitation    • Type 2 diabetes mellitus (HCC)        Past Surgical History:   Procedure Laterality Date   • CATARACT EXTRACTION     • COLONOSCOPY N/A 3/10/2016    Procedure: COLONOSCOPY TO CECUM;  Surgeon: Shaan Becerra Jr., MD;  Location:  [facility];  Service:    • COLONOSCOPY N/A 6/18/2021    Procedure: COLONOSCOPY TO CECUM AND INTO TERMINAL ILEUM;  Surgeon: Jeremy Darden MD;  Location:  [facility];  Service: Gastroenterology;  Laterality: N/A;  PRE: ANEMIA  POST: HEMORRHOIDS   • ENDOSCOPY N/A 3/10/2016    Procedure: ESOPHAGOGASTRODUODENOSCOPY ;  Surgeon: Shaan Becerra Jr., MD;  Location:  [facility];  Service:    • ENDOSCOPY N/A 6/18/2021    Procedure: ESOPHAGOGASTRODUODENOSCOPY;  Surgeon: Jeremy Darden MD;  Location:  [facility];  Service: Gastroenterology;  Laterality: N/A;  PRE: ANEMIA  POST: HIATAL HERNIA, SMALL ESOPHAGEAL VARICES   • KNEE SURGERY     • TUBAL ABDOMINAL LIGATION           Prior to Admission medications    Medication Sig Start Date End Date Taking? Authorizing Provider   allopurinol (ZYLOPRIM) 300 MG tablet TAKE 1 TABLET BY MOUTH  DAILY 9/10/21   Ely Ramos APRN   apixaban (ELIQUIS) 5 MG tablet tablet Take 1 tablet by mouth Every 12 (Twelve) Hours. Indications: Atrial Fibrillation 4/19/22   Jignesh York MD   atorvastatin (LIPITOR) 40 MG tablet TAKE 1 TABLET BY MOUTH DAILY 3/14/22   Loren Cruz APRN   bumetanide (BUMEX) 2 MG tablet Take 1 tablet by mouth Daily. 4/13/22   Loren Cruz APRN   buPROPion SR (WELLBUTRIN SR) 150 MG 12 hr tablet Take 1 tablet by mouth Daily With Breakfast. 1/31/22   Loren Cruz APRN   carvedilol (COREG) 12.5 MG tablet Take 1 tablet by mouth 2 (Two) Times a Day. 4/19/22   Jignesh York MD   ferrous sulfate 325 (65 FE) MG EC tablet TAKE 1 TABLET BY MOUTH EVERY DAY WITH BREAKFAST 4/1/22    Loren Cruz APRN   glucose blood test strip BID. Test  In the AM and again in the PM 1/31/22   Loren Cruz APRN   glucose monitor monitoring kit One touch meter 1/31/22   Loren Cruz APRN   isosorbide mononitrate (IMDUR) 60 MG 24 hr tablet TAKE 1 TABLET BY MOUTH  DAILY 9/10/21   Ely Ramos APRN   Lancets (onetouch ultrasoft) lancets Test BID 1/31/22   Loren Cruz APRN   losartan (COZAAR) 50 MG tablet Take 1 tablet by mouth Every Evening. 4/13/22   Loren Cruz APRN   metFORMIN ER (GLUCOPHAGE-XR) 500 MG 24 hr tablet Take 2 tablets by mouth Daily With Breakfast for 30 days. 1/31/22 3/2/22  Loren Cruz APRN   mirtazapine (REMERON) 15 MG tablet Take 1 tablet by mouth Every Night. 1/31/22   Loren Cruz APRN   montelukast (SINGULAIR) 10 MG tablet Take 1 tablet by mouth Every Night. 1/31/22   Loren Cruz APRN   multivitamin with minerals (Multivitamin Women 50+) tablet tablet Take 1 tablet by mouth Daily.    Provider, MD Sabina   pantoprazole (PROTONIX) 40 MG EC tablet TAKE 1 TABLET BY MOUTH  DAILY 9/10/21   Ely Ramos APRN   potassium chloride (K-DUR,KLOR-CON) 20 MEQ CR tablet Take 2 tablets by mouth Every Morning. 3/12/22   Jaun Monsalve MD   SITagliptin (Januvia) 100 MG tablet Take 1 tablet by mouth Daily. 1/31/22   Loren Cruz APRN   spironolactone (ALDACTONE) 100 MG tablet Take 1 tablet by mouth 2 (Two) Times a Day. 4/13/22   Loren Cruz APRN       Allergies   Allergen Reactions   • Decongestant  [Pseudoephedrine]        Social History     Socioeconomic History   • Marital status:    Tobacco Use   • Smoking status: Never Smoker   • Smokeless tobacco: Never Used   • Tobacco comment: caffeine use    Vaping Use   • Vaping Use: Never used   Substance and Sexual Activity   • Alcohol use: No   • Drug use: No   • Sexual activity: Defer       Family History   Problem Relation Age of Onset   • Kidney disease Mother    •  Heart disease Mother    • Emphysema Father    • Heart disease Brother        REVIEW OF SYSTEMS:   All systems reviewed.  Pertinent positives identified in HPI.  All other systems are negative.      Objective:     Vitals:    05/03/22 0607 05/03/22 0722 05/03/22 1208 05/03/22 1241   BP: 107/54 109/54 104/58 95/55   BP Location: Left arm Left arm  Left arm   Patient Position: Lying Lying  Lying   Pulse: 114  111 (!) 126   Resp: 18 18 18 18   Temp: 97.6 °F (36.4 °C) 98.6 °F (37 °C) 97.7 °F (36.5 °C) 98.2 °F (36.8 °C)   TempSrc: Oral Oral Oral Oral   SpO2: 94%  90% 93%   Weight:         Body mass index is 29.43 kg/m².    Physical Exam:  Constitutional: She is oriented to person, place, and time. She appears well-developed. She does not appear ill.   HENT:   Head: Normocephalic and atraumatic. Head is without contusion.   Right Ear: Hearing normal. No drainage.   Left Ear: Hearing normal. No drainage.   Nose: No nasal deformity. No epistaxis.   Eyes: Lids are normal. Right eye exhibits no exudate. Left eye exhibits no exudate.  Neck: No JVD present.   Cardiovascular: Normal rate, irregular rhythm and normal heart sounds.    Pulses:       Posterior tibial pulses are 2+ on the right side, and 2+ on the left side.   Pulmonary/Chest: Effort normal and breath sounds normal.   Abdominal: Soft. Normal appearance and bowel sounds are normal. There is no tenderness.   Musculoskeletal: Normal range of motion.        Right shoulder: She exhibits no deformity.        Left shoulder: She exhibits no deformity.   Neurological: She is alert and oriented to person, place, and time. She has normal strength.   Skin: Skin is warm, dry and intact. No rash noted.   Psychiatric: She has a normal mood and affect. Her behavior is normal. Thought content normal.   Vitals reviewed.    Lab Review:     Results from last 7 days   Lab Units 05/03/22  0436 05/03/22  0002 05/02/22  1353   SODIUM mmol/L 136  --  133*   POTASSIUM mmol/L 5.3*   < > 8.4*    CHLORIDE mmol/L 102  --  103   CO2 mmol/L 24.0  --  17.2*   BUN mg/dL 33*  --  81*   CREATININE mg/dL 1.47*  --  2.95*   CALCIUM mg/dL 8.5*  --  8.9   BILIRUBIN mg/dL  --   --  0.3   ALK PHOS U/L  --   --  88   ALT (SGPT) U/L  --   --  22   AST (SGOT) U/L  --   --  19   GLUCOSE mg/dL 92  --  119*    < > = values in this interval not displayed.     Results from last 7 days   Lab Units 05/03/22  0002 05/02/22  1353   TROPONIN T ng/mL <0.010 <0.010     Results from last 7 days   Lab Units 05/03/22  0436   WBC 10*3/mm3 5.73   HEMOGLOBIN g/dL 7.0*   HEMATOCRIT % 22.3*   PLATELETS 10*3/mm3 149         Results from last 7 days   Lab Units 05/03/22  0002   MAGNESIUM mg/dL 1.8                          Current Facility-Administered Medications:   •  acetaminophen (TYLENOL) tablet 650 mg, 650 mg, Oral, Q4H PRN, Adele Preston MD, 650 mg at 05/03/22 0157  •  albumin human 25 % IV SOLN 12.5 g, 12.5 g, Intravenous, PRN, Carlo Shine MD  •  allopurinol (ZYLOPRIM) tablet 300 mg, 300 mg, Oral, Daily, Adele Preston MD, 300 mg at 05/03/22 1026  •  atorvastatin (LIPITOR) tablet 40 mg, 40 mg, Oral, Daily, Adele Preston MD, 40 mg at 05/03/22 1026  •  bumetanide (BUMEX) tablet 2 mg, 2 mg, Oral, BID, Jessica Eaton MD  •  buPROPion SR (WELLBUTRIN SR) 12 hr tablet 150 mg, 150 mg, Oral, Daily With Breakfast, Adele Preston MD, 150 mg at 05/03/22 1025  •  carvedilol (COREG) tablet 3.125 mg, 3.125 mg, Oral, BID, Anny Haney, APRN  •  digoxin (LANOXIN) injection 500 mcg, 500 mcg, Intravenous, Once, Anny Haney, APRN  •  ferrous sulfate tablet 325 mg, 325 mg, Oral, Daily With Breakfast, Stingl, Adele Hoffman MD, 325 mg at 05/03/22 1026  •  heparin (porcine) injection 3,000 Units, 3,000 Units, Intracatheter, PRN, Carlo Shine MD, 3,000 Units at 05/02/22 2218  •  heparin (porcine) injection 5,000 Units, 5,000 Units, Intravenous, Once, Gifty Sher MD  •  isosorbide  mononitrate (IMDUR) 24 hr tablet 60 mg, 60 mg, Oral, Daily, Adele Preston MD, 60 mg at 05/03/22 1025  •  lidocaine (XYLOCAINE) 1 % injection 20 mL, 20 mL, Infiltration, Once, Gifty Sher MD  •  linagliptin (TRADJENTA) tablet 5 mg, 5 mg, Oral, Daily, Adele Preston MD, 5 mg at 05/03/22 1025  •  melatonin tablet 3 mg, 3 mg, Oral, Nightly PRN, Adele Preston MD  •  mirtazapine (REMERON) tablet 15 mg, 15 mg, Oral, Nightly, Adele Preston MD, 15 mg at 05/02/22 2206  •  montelukast (SINGULAIR) tablet 10 mg, 10 mg, Oral, Nightly, Adele Preston MD, 10 mg at 05/02/22 2206  •  multivitamin with minerals 1 tablet, 1 tablet, Oral, Daily, Adele Preston MD, 1 tablet at 05/03/22 1025  •  nitroglycerin (NITROSTAT) SL tablet 0.4 mg, 0.4 mg, Sublingual, Q5 Min PRN, Adele Preston MD  •  ondansetron (ZOFRAN) tablet 4 mg, 4 mg, Oral, Q6H PRN **OR** ondansetron (ZOFRAN) injection 4 mg, 4 mg, Intravenous, Q6H PRN, Adele Preston MD  •  [COMPLETED] pantoprazole (PROTONIX) injection 40 mg, 40 mg, Intravenous, Once, 40 mg at 05/02/22 1540 **AND** pantoprazole (PROTONIX) 40 mg in 100mL NS IVPB, 40 mg, Intravenous, Continuous, Elsa Buck PA, Last Rate: 20 mL/hr at 05/03/22 1035, 40 mg at 05/03/22 1035  •  pantoprazole (PROTONIX) EC tablet 40 mg, 40 mg, Oral, Daily, Adele Preston MD, 40 mg at 05/03/22 1025  •  [COMPLETED] Insert peripheral IV, , , Once **AND** sodium chloride 0.9 % flush 10 mL, 10 mL, Intravenous, PRN, Elsa Buck, PA, 10 mL at 05/02/22 5885    Assessment and Plan:       1. Acute kidney injury with hyperkalemia  2. Paroxysmal atrial fibrillation , now with RVR. Low BP. Reduce carvedilol. Will give a dose of IV digoxin. Consider changing to selective beta blocker given hypotension.   3. Chronic diastolic heart failure, if weight is accurate - she is down quite a bit from last office visit with Dr. York (171 lbs --> 159 lbs). Oral  diuretics restarted today per nephrology.   4. KOO cirrhosis, grade 1 varices noted on prior EGD.   5. Pulmonary hypertension  6. Mild to moderate mitral valve regurgitation  7. Anemia, acute blood loss in setting of gross hematuria. Anticoagulation appropriately held at this time.    Adjust beta blocker due to hypotension. Will give a dose of digoxin and follow HR. AC on hold as above. Will follow.     Anny Haney, KWASI  05/03/22  14:11 EDT

## 2022-05-03 NOTE — PLAN OF CARE
Goal Outcome Evaluation:              Outcome Evaluation: Pt is an 80 yo female who presented from home with SOA and weakness. Diagnosed with acute renal failure, afib with RVR, hx of CHF, DM. Prior to admission, pt was living at home alone and used rwx for mobility. Pt states son checks in on her as needed. Upon exam, pt demonstrates generalized weakness and decreased endurance limiting mobility. Pt required min A with bed mobility. Transfers not attempted this date due to elevated HR as high as 159 while pt sitting and exercising at EOB. Pt will continue to benefit from PT for ongoing strengthening and mobility training.

## 2022-05-03 NOTE — PROGRESS NOTES
Name: Miri Yung ADMIT: 2022   : 1940  PCP: Loren Cruz APRN    MRN: [id number] LOS: 1 days   AGE/SEX: 81 y.o. female  ROOM: [facility]     Subjective   Subjective   This is a 81-year-old woman with a past medical history of stroke, atrial fibrillation, CHF, dementia, type 2 diabetes who presented to the hospital with acute onset shortness of air, confusion, and weakness.  Initial laboratory evaluation revealed acute kidney injury with hyperkalemia and she underwent urgent dialysis after having a Shiley catheter placed by vascular surgery.  Her hemoglobin also dropped to 7.0.  Per nursing report, hematuria was noted in her urine collection.      Review of Systems      GENERAL: No fevers, chills, sweats.    RESPIRATORY: No cough, shortness of breath, hemoptysis or wheezing.   CVS: No chest pain, palpitations, orthopnea, dyspnea on exertion   GI: no n/v      Objective   Objective   Vital Signs  Temp:  [96.3 °F (35.7 °C)-98.6 °F (37 °C)] 98.2 °F (36.8 °C)  Heart Rate:  [] 126  Resp:  [18-28] 18  BP: ()/(45-81) 95/55  SpO2:  [90 %-100 %] 93 %  on  Flow (L/min):  [1] 1;   Device (Oxygen Therapy): room air  Body mass index is 29.43 kg/m².  Physical Exam  General: Oriented x3, no acute distress  CV: Irregularly irregular, tachycardic  CV: Clear to auscultation bilaterally  Abdomen: Soft, nontender, nondistended.  Extremities: Right femoral Shiley in place  Extremities: No edema  Neuro: Oriented x3, no gross FND appreciated   Results Review     I reviewed the patient's new clinical results.  Results from last 7 days   Lab Units 22  0436 22  1353   WBC 10*3/mm3 5.73 9.59   HEMOGLOBIN g/dL 7.0* 7.8*   PLATELETS 10*3/mm3 149 218     Results from last 7 days   Lab Units 22  0436 22  0002 22  1353   SODIUM mmol/L 136  --  133*   POTASSIUM mmol/L 5.3* 4.5 8.4*   CHLORIDE mmol/L 102  --  103   CO2 mmol/L 24.0  --  17.2*   BUN mg/dL 33*  --  81*   CREATININE mg/dL  1.47*  --  2.95*   GLUCOSE mg/dL 92  --  119*   Estimated Creatinine Clearance: 27.4 mL/min (A) (by C-G formula based on SCr of 1.47 mg/dL (H)).  Results from last 7 days   Lab Units 05/02/22  1353   ALBUMIN g/dL 3.80   BILIRUBIN mg/dL 0.3   ALK PHOS U/L 88   AST (SGOT) U/L 19   ALT (SGPT) U/L 22     Results from last 7 days   Lab Units 05/03/22  0436 05/03/22  0002 05/02/22  1353   CALCIUM mg/dL 8.5*  --  8.9   ALBUMIN g/dL  --   --  3.80   MAGNESIUM mg/dL  --  1.8  --        COVID19   Date Value Ref Range Status   05/02/2022 Not Detected Not Detected - Ref. Range Final   03/04/2022 Not Detected Not Detected - Ref. Range Final     Glucose   Date/Time Value Ref Range Status   05/03/2022 1125 165 (H) 70 - 130 mg/dL Final     Comment:     Meter: [id number] : [id number] [place]       XR Chest 1 View  Narrative: XR CHEST 1 VW-     HISTORY: Female who is 81 years-old,  short of breath     TECHNIQUE: Frontal view of the chest     COMPARISON: 03/11/2022     FINDINGS: The heart size is borderline. Aorta is calcified. Pulmonary  vasculature is unremarkable. Likely scarring or atelectasis in the right  lower lung. No focal pulmonary consolidation, pleural effusion, or  pneumothorax. No acute osseous process.     Impression: No focal pulmonary consolidation. Borderline heart size.  Follow-up as clinical indications persist.     This report was finalized on 5/2/2022 1:54 PM by Dr. Hernan Fraser M.D.       Scheduled Medications  allopurinol, 300 mg, Oral, Daily  atorvastatin, 40 mg, Oral, Daily  bumetanide, 2 mg, Oral, BID  buPROPion SR, 150 mg, Oral, Daily With Breakfast  carvedilol, 12.5 mg, Oral, BID  ferrous sulfate, 325 mg, Oral, Daily With Breakfast  heparin (porcine), 5,000 Units, Intravenous, Once  isosorbide mononitrate, 60 mg, Oral, Daily  lidocaine, 20 mL, Infiltration, Once  linagliptin, 5 mg, Oral, Daily  mirtazapine, 15 mg, Oral, Nightly  montelukast, 10 mg, Oral, Nightly  multivitamin with  minerals, 1 tablet, Oral, Daily  pantoprazole, 40 mg, Oral, Daily    Infusions  pantoprazole, 40 mg, Last Rate: 40 mg (05/03/22 1035)    Diet  Diet Regular; Cardiac, Consistent Carbohydrate, Renal       Assessment/Plan     Active Hospital Problems    Diagnosis  POA   • Acute renal failure (HCC) [N17.9]  Yes      Resolved Hospital Problems   No resolved problems to display.       81 y.o. female admitted with <principal problem not specified>.    Anemia 2/2  Iron deficiency  Hemoglobin 7.0, down from 7.8  Monitor PRBCs  Check fecal occult blood test    Renal failure  Hyperkalemia  Hematuria  Nephrology consulted  Urinalysis and renal ultrasound ordered  No dialysis today  Bumex ordered, 2 mg twice daily per nephrology  Trend potassium    A. fib with RVR  Diastolic heart failure  Cardiology consulted  Holding Eliquis in the setting of hematuria and downtrending hemoglobin  Continue Imdur, atorvastatin, coreg    Type 2 diabetes  Currently on Tradjenta.  will continue      · SCDs for DVT prophylaxis.  · Full code.  · Discussed with patient and nursing staff.  · Anticipate discharge home with family later this week.      Marcos Villalta MD  [facility]  05/03/22  14:07 EDT     I wore protective equipment throughout this patient encounter including a face mask, gloves and protective eyewear.  Hand hygiene was performed before donning protective equipment and after removal when leaving the room.

## 2022-05-03 NOTE — PROGRESS NOTES
[facility] Progress Note      Patient Name: Miri Yung  : 1940  MRN: [id number]  Primary Care Physician:  Loren Cruz APRN  Date of admission: 2022    Subjective     Interval History:   Follow up YOANA.  Son at bedside. Eating. Denies soa.  No pain. SP dialysis yesterday.  RN reports gross hematuria. Purwik cannister emptied and changed, so no urine currently to visualize.  Some oozing at femoral shiley site.  RN to change dressing.     Review of Systems:   As noted above    Objective     Vitals:   Temp:  [96.3 °F (35.7 °C)-98.6 °F (37 °C)] 98.2 °F (36.8 °C)  Heart Rate:  [] 126  Resp:  [18-28] 18  BP: ()/(44-81) 95/55  Flow (L/min):  [1] 1    Intake/Output Summary (Last 24 hours) at 5/3/2022 1304  Last data filed at 5/3/2022 0945  Gross per 24 hour   Intake 290 ml   Output 650 ml   Net -360 ml       Physical Exam:    General Appearance: alert, oriented x 3, no acute distress   Skin: warm and dry  HEENT: oral mucosa normal, nonicteric sclera  Neck: supple, no JVD  Lungs: Clear to auscultation.   Heart: irreg,irrreg, tachy.  Abdomen: soft, nontender, nondistended,  + bs, large umbilical hernia. RIght femoral shiley with some dried blood around biopatch.   : purwik.   Extremities: no edema, cyanosis or clubbing  Neuro: normal speech and mental status     Scheduled Meds:     allopurinol, 300 mg, Oral, Daily  atorvastatin, 40 mg, Oral, Daily  bumetanide, 2 mg, Oral, Daily  buPROPion SR, 150 mg, Oral, Daily With Breakfast  carvedilol, 12.5 mg, Oral, BID  ferrous sulfate, 325 mg, Oral, Daily With Breakfast  heparin (porcine), 5,000 Units, Intravenous, Once  isosorbide mononitrate, 60 mg, Oral, Daily  lidocaine, 20 mL, Infiltration, Once  linagliptin, 5 mg, Oral, Daily  mirtazapine, 15 mg, Oral, Nightly  montelukast, 10 mg, Oral, Nightly  multivitamin with minerals, 1 tablet, Oral, Daily  pantoprazole, 40 mg, Oral, Daily      IV Meds:   pantoprazole, 40 mg,  Last Rate: 40 mg (05/03/22 1035)        Results Reviewed:   I have personally reviewed the results from the time of this admission to 5/3/2022 13:04 EDT     Results from last 7 days   Lab Units 05/03/22  0436 05/03/22  0002 05/02/22  1353   SODIUM mmol/L 136  --  133*   POTASSIUM mmol/L 5.3* 4.5 8.4*   CHLORIDE mmol/L 102  --  103   CO2 mmol/L 24.0  --  17.2*   BUN mg/dL 33*  --  81*   CREATININE mg/dL 1.47*  --  2.95*   CALCIUM mg/dL 8.5*  --  8.9   BILIRUBIN mg/dL  --   --  0.3   ALK PHOS U/L  --   --  88   ALT (SGPT) U/L  --   --  22   AST (SGOT) U/L  --   --  19   GLUCOSE mg/dL 92  --  119*       Estimated Creatinine Clearance: 27.4 mL/min (A) (by C-G formula based on SCr of 1.47 mg/dL (H)).    Results from last 7 days   Lab Units 05/03/22  0002   MAGNESIUM mg/dL 1.8             Results from last 7 days   Lab Units 05/03/22  0436 05/02/22  1353   WBC 10*3/mm3 5.73 9.59   HEMOGLOBIN g/dL 7.0* 7.8*   PLATELETS 10*3/mm3 149 218             Assessment / Plan     ASSESSMENT:  1. YOANA, hyperkalemia. Likely due to loss of autoregulation on ARB, potassium supplement, spironolactone.   SP dialysis yesterday with improved waste products.  K better.  Non-oliguric. Gross hematuria today. Purwik catheter empty, UA ordered. Will check renal US.   Initial UA was completely benign. No dialysis Today.  2. Anemia.  Iron deficiency.  Hg down further.  Check stool hemoccult.  Getting a unit of PRBC now.   3. Afib RVR. Ask cardiology to see. Currently off anticoagulation.  4. Chronic diastolic heart failure.   PLAN:  1. No dialysis today  2. Renal US  3. Recheck UA  4. Stool hemoccult.   5. DW son.   Jessica Eaton MD  05/03/22  13:04 EDT    [facility]  [phone]

## 2022-05-03 NOTE — DISCHARGE PLACEMENT REQUEST
"Bree Yung (81 y.o. Female)             Date of Birth   1940    Social Security Number       Address   [address] [place] [address]    Home Phone   [phone]    MRN   [id number]       [facility]    Marital Status                               Admission Date   5/2/22    Admission Type   Emergency    Admitting Provider   Adele Preston MD    Attending Provider   Marcos Villalta MD    Department, Room/Bed   [address], [address]/1       Discharge Date       Discharge Disposition       Discharge Destination                               Attending Provider: Marcos Villalta MD    Allergies: Decongestant  [Pseudoephedrine]    Isolation: None   Infection: None   Code Status: CPR   Advance Care Planning Activity    Ht: 157 cm (61.81\")   Wt: 72.5 kg (159 lb 14.8 oz)    Admission Cmt: None   Principal Problem: None                Active Insurance as of 5/2/2022     Primary Coverage     Payor Plan Insurance Group Employer/Plan Group    MEDICARE MEDICARE A & B      Payor Plan Address Payor Plan Phone Number Payor Plan Fax Number Effective Dates    [address] [phone]  11/1/2004 - None Entered    [facility] [address]       Subscriber Name Subscriber Birth Date Member ID       BREE YUNG 1940 [id number]           Secondary Coverage     Payor Plan Insurance Group Employer/Plan Group    [employer] SUP AAR HEALTH CARE OPTIONS      Payor Plan Address Payor Plan Phone Number Payor Plan Fax Number Effective Dates    [facility] [phone]  1/1/2016 - None Entered    [address]       [facility] [address]       Subscriber Name Subscriber Birth Date Member ID       BREE YUNG 1940 [id number]                 Emergency Contacts      (Rel.) Home Phone Work Phone Mobile Phone    Hardeep Yung (Son) [phone] -- [phone]    Brian Yung (Son) [phone] -- [phone]    Ashley Israel (Other) -- -- [phone]            "

## 2022-05-03 NOTE — H&P
HISTORY AND PHYSICAL   [facility]        Date of Admission: 2022  Patient Identification:  Name: Miri Yung  Age: 81 y.o.  Sex: female  :  1940  MRN: [id number]                     Primary Care Physician: Loren Cruz APRN    Chief Complaint:  81 year old female who was sent in from home with shortness of breath and weakness; she has a history of dementia and cannot give any history; she has been anxious;     History of Present Illness:   As above    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; generally asymptomatic   • Mitral regurgitation    • Mixed anxiety depressive disorder    • Nonrheumatic tricuspid (valve) insufficiency    • Osteoarthritis    • PAF (paroxysmal atrial fibrillation) (HCC)    • Sacroiliitis (HCC)    • Tricuspid regurgitation    • Type 2 diabetes mellitus (HCC)      Past Surgical History:  Past Surgical History:   Procedure Laterality Date   • CATARACT EXTRACTION     • COLONOSCOPY N/A 3/10/2016    Procedure: COLONOSCOPY TO CECUM;  Surgeon: Shaan Becerra Jr., MD;  Location: [facility] ENDOSCOPY;  Service:    • COLONOSCOPY N/A 2021    Procedure: COLONOSCOPY TO CECUM AND INTO TERMINAL ILEUM;  Surgeon: Jeremy Darden MD;  Location: [facility] ENDOSCOPY;  Service: Gastroenterology;  Laterality: N/A;  PRE: ANEMIA  POST: HEMORRHOIDS   • ENDOSCOPY N/A 3/10/2016    Procedure:  ESOPHAGOGASTRODUODENOSCOPY ;  Surgeon: Shaan Becerra Jr., MD;  Location: [facility] ENDOSCOPY;  Service:    • ENDOSCOPY N/A 6/18/2021    Procedure: ESOPHAGOGASTRODUODENOSCOPY;  Surgeon: Jeremy Darden MD;  Location: [facility] ENDOSCOPY;  Service: Gastroenterology;  Laterality: N/A;  PRE: ANEMIA  POST: HIATAL HERNIA, SMALL ESOPHAGEAL VARICES   • KNEE SURGERY     • TUBAL ABDOMINAL LIGATION        Home Meds:  Medications Prior to Admission   Medication Sig Dispense Refill Last Dose   • allopurinol (ZYLOPRIM) 300 MG tablet TAKE 1 TABLET BY MOUTH  DAILY 90 tablet 3    • apixaban (ELIQUIS) 5 MG tablet tablet Take 1 tablet by mouth Every 12 (Twelve) Hours. Indications: Atrial Fibrillation 28 tablet 0    • atorvastatin (LIPITOR) 40 MG tablet TAKE 1 TABLET BY MOUTH DAILY 90 tablet 0    • bumetanide (BUMEX) 2 MG tablet Take 1 tablet by mouth Daily. 90 tablet 0    • buPROPion SR (WELLBUTRIN SR) 150 MG 12 hr tablet Take 1 tablet by mouth Daily With Breakfast. 30 tablet 5    • carvedilol (COREG) 12.5 MG tablet Take 1 tablet by mouth 2 (Two) Times a Day. 180 tablet 3    • ferrous sulfate 325 (65 FE) MG EC tablet TAKE 1 TABLET BY MOUTH EVERY DAY WITH BREAKFAST 90 tablet 0    • glucose blood test strip BID. Test  In the AM and again in the  each 3    • glucose monitor monitoring kit One touch meter 1 each 0    • isosorbide mononitrate (IMDUR) 60 MG 24 hr tablet TAKE 1 TABLET BY MOUTH  DAILY 90 tablet 3    • Lancets (onetouch ultrasoft) lancets Test  each 3    • losartan (COZAAR) 50 MG tablet Take 1 tablet by mouth Every Evening. 90 tablet 0    • metFORMIN ER (GLUCOPHAGE-XR) 500 MG 24 hr tablet Take 2 tablets by mouth Daily With Breakfast for 30 days. 60 tablet 5    • mirtazapine (REMERON) 15 MG tablet Take 1 tablet by mouth Every Night. 30 tablet 5    • montelukast (SINGULAIR) 10 MG tablet Take 1 tablet by mouth Every Night. 30 tablet 5    • multivitamin with minerals (Multivitamin Women 50+) tablet tablet Take 1  tablet by mouth Daily.      • pantoprazole (PROTONIX) 40 MG EC tablet TAKE 1 TABLET BY MOUTH  DAILY 90 tablet 3    • potassium chloride (K-DUR,KLOR-CON) 20 MEQ CR tablet Take 2 tablets by mouth Every Morning. 60 tablet 0    • SITagliptin (Januvia) 100 MG tablet Take 1 tablet by mouth Daily. 90 tablet 1    • spironolactone (ALDACTONE) 100 MG tablet Take 1 tablet by mouth 2 (Two) Times a Day. 180 tablet 0        Allergies:  Allergies   Allergen Reactions   • Decongestant  [Pseudoephedrine]      Immunizations:  Immunization History   Administered Date(s) Administered   • COVID-19 (PFIZER) PURPLE CAP 2021, 2021   • FLUAD TRI 65YR+ 2019   • Fluad Quad 65+ 2019, 09/15/2020   • Fluzone High Dose =>65 Years ([employer] ONLY) 2016   • Fluzone High-Dose 65+yrs 2022   • Influenza TIV (IM) 2015   • Pneumococcal Polysaccharide (PPSV23) 2006     Social History:   Social History     Social History Narrative   • Not on file     Social History     Socioeconomic History   • Marital status:    Tobacco Use   • Smoking status: Never Smoker   • Smokeless tobacco: Never Used   • Tobacco comment: caffeine use    Vaping Use   • Vaping Use: Never used   Substance and Sexual Activity   • Alcohol use: No   • Drug use: No   • Sexual activity: Defer       Family History:  Family History   Problem Relation Age of Onset   • Kidney disease Mother    • Heart disease Mother    • Emphysema Father    • Heart disease Brother         Review of Systems  Not obtainable  Objective:  T Max 24 hrs: Temp (24hrs), Av.5 °F (36.4 °C), Min:96.3 °F (35.7 °C), Max:98.6 °F (37 °C)    Vitals Ranges:   Temp:  [96.3 °F (35.7 °C)-98.6 °F (37 °C)] 98.6 °F (37 °C)  Heart Rate:  [] 96  Resp:  [18-28] 18  BP: (109-133)/(44-81) 133/56      Exam:  /56   Pulse 96   Temp 98.6 °F (37 °C) (Oral)   Resp 18   SpO2 97%     General Appearance:    Alert, cooperative, no distress, appears stated age   Head:     Normocephalic, without obvious abnormality, atraumatic   Eyes:    PERRL, conjunctivae/corneas clear, EOM's intact, both eyes   Ears:    Normal external ear canals, both ears   Nose:   Nares normal, septum midline, mucosa normal, no drainage    or sinus tenderness   Throat:   Lips, mucosa, and tongue normal   Neck:   Supple, symmetrical, trachea midline, no adenopathy;     thyroid:  no enlargement/tenderness/nodules; no carotid    bruit or JVD   Back:     Symmetric, no curvature, ROM normal, no CVA tenderness   Lungs:     Decreased breath sounds bilaterally, respirations unlabored   Chest Wall:    No tenderness or deformity    Heart:    Regular rate and rhythm, S1 and S2 normal, no murmur, rub   or gallop   Abdomen:     Soft, nontender, bowel sounds active all four quadrants,     no masses, no hepatomegaly, no splenomegaly   Extremities:   Extremities normal, atraumatic, no cyanosis or edema   Pulses:   2+ and symmetric all extremities   Skin:   Skin color, texture, turgor normal, no rashes or lesions               .    Data Review:  Labs in chart were reviewed.  WBC   Date Value Ref Range Status   05/02/2022 9.59 3.40 - 10.80 10*3/mm3 Final     Hemoglobin   Date Value Ref Range Status   05/02/2022 7.8 (L) 12.0 - 15.9 g/dL Final     Hematocrit   Date Value Ref Range Status   05/02/2022 26.0 (L) 34.0 - 46.6 % Final     Platelets   Date Value Ref Range Status   05/02/2022 218 140 - 450 10*3/mm3 Final     Sodium   Date Value Ref Range Status   05/02/2022 133 (L) 136 - 145 mmol/L Final     Potassium   Date Value Ref Range Status   05/02/2022 8.4 (C) 3.5 - 5.2 mmol/L Final     Chloride   Date Value Ref Range Status   05/02/2022 103 98 - 107 mmol/L Final     CO2   Date Value Ref Range Status   05/02/2022 17.2 (L) 22.0 - 29.0 mmol/L Final     BUN   Date Value Ref Range Status   05/02/2022 81 (H) 8 - 23 mg/dL Final     Creatinine   Date Value Ref Range Status   05/02/2022 2.95 (H) 0.57 - 1.00 mg/dL Final     Glucose   Date  Value Ref Range Status   05/02/2022 119 (H) 65 - 99 mg/dL Final     Calcium   Date Value Ref Range Status   05/02/2022 8.9 8.6 - 10.5 mg/dL Final     AST (SGOT)   Date Value Ref Range Status   05/02/2022 19 1 - 32 U/L Final     ALT (SGPT)   Date Value Ref Range Status   05/02/2022 22 1 - 33 U/L Final     Alkaline Phosphatase   Date Value Ref Range Status   05/02/2022 88 39 - 117 U/L Final                Imaging Results (All)     Procedure Component Value Units Date/Time    XR Chest 1 View [[id number]] Collected: 05/02/22 1352     Updated: 05/02/22 1357    Narrative:      XR CHEST 1 VW-     HISTORY: Female who is 81 years-old,  short of breath     TECHNIQUE: Frontal view of the chest     COMPARISON: 03/11/2022     FINDINGS: The heart size is borderline. Aorta is calcified. Pulmonary  vasculature is unremarkable. Likely scarring or atelectasis in the right  lower lung. No focal pulmonary consolidation, pleural effusion, or  pneumothorax. No acute osseous process.       Impression:      No focal pulmonary consolidation. Borderline heart size.  Follow-up as clinical indications persist.     This report was finalized on 5/2/2022 1:54 PM by Dr. Hernan Fraser M.D.               Assessment:  Active Hospital Problems    Diagnosis  POA   • Acute renal failure (HCC) [N17.9]  Yes      Resolved Hospital Problems   No resolved problems to display.   hyperkalemia  Atrial fibrillation  chf  Diabetes  Hypertension  anemia    Plan:  Emergent dialysis is in progress  accu checks, insulin sliding scale  Appreciate help from vascular and nephrology  Monitor on telemetry  Dpayam patient and ED provider    Adele Preston MD  5/2/2022  21:30 EDT

## 2022-05-03 NOTE — CASE MANAGEMENT/SOCIAL WORK
Discharge Planning Assessment  [facility]     Patient Name: Miri Yung  MRN: [id number]  Today's Date: 5/3/2022    Admit Date: 5/2/2022     Discharge Needs Assessment     Row Name 05/03/22 1427       Living Environment    People in Home alone    Current Living Arrangements home    Primary Care Provided by self    Provides Primary Care For no one    Family Caregiver if Needed child(yissel), adult    Family Caregiver Names Neil and Richard sons/POA's and Ashley sister in law    Quality of Family Relationships supportive;helpful    Able to Return to Prior Arrangements yes       Resource/Environmental Concerns    Resource/Environmental Concerns none    Transportation Concerns no car       Transition Planning    Patient/Family Anticipates Transition to home with help/services    Patient/Family Anticipated Services at Transition home health care    Transportation Anticipated family or friend will provide       Discharge Needs Assessment    Readmission Within the Last 30 Days no previous admission in last 30 days    Current Outpatient/Agency/Support Group homecare agency    Equipment Currently Used at Home walker, rolling;glucometer;oxygen    Concerns to be Addressed discharge planning    Anticipated Changes Related to Illness none    Equipment Needed After Discharge none    Outpatient/Agency/Support Group Needs homecare agency    Discharge Facility/Level of Care Needs home with home health    Provided Post Acute Provider List? N/A    N/A Provider List Comment Pt current with [facility]    Provided Post Acute Provider Quality & Resource List? N/A    N/A Quality & Resource List Comment Pt current with [facility]    Current Discharge Risk lives alone               Discharge Plan     Row Name 05/03/22 1429       Plan    Plan Discharge home with [facility].    Patient/Family in Agreement with Plan yes    Plan Comments I spoke with the patient and her sonHardeep at bedside with her permission. I introduced myself and  explained my role as .  I verified the information on the facesheet and her PCP as KWASI Perez. The patient advised that her physical address is [address] [place], [place] [id number].  The patient uses [facility] in [facility] and she is able to  and pay for her medications.  We discussed Meds to Bed enrollment and she is agreeable to same.  I updated her information in Epic.  The patient lives in a single story home.  She lives alone.  There are 4 steps to enter and she is able to enter and maneuver around her home with no issues.  She is independent with her ADL’s. Her family will pick her up at discharge.  The patient has a rolling walker, glucometer and oxygen, provided by [employer] at home and denied needing any DME at discharge. I offered the patient information regarding home health and other community resources and she advised that she is current with [facility].  The patient will discharge home with her family to assist as needed and home health and she is agreeable to that plan. We further discussed the possibility of SNF placement for STR and the patient’s son is agreeable to that if needed.  He further stated that if the patient is at her baseline at discharge she won’t need it.  The patient denied having any further questions or concerns at this time. CM will continue to follow for further needs.              Continued Care and Services - Admitted Since 5/2/2022     Home Medical Care     Service Provider Request Status Selected Services Address Phone Fax Patient Preferred    [facility]- ,[place]  Pending - Request Sent N/A [address], [address], [facility] [address] [phone] --            Selected Continued Care - Prior Encounters Includes selections from prior encounters from 2/1/2022 to 5/3/2022    Discharged on 3/11/2022 Admission date: 3/4/2022 - Discharge disposition: Home-Health Care Svc    Durable Medical Equipment      Service Provider Selected Services Address Phone Fax Patient Preferred    [facility] [place]  Durable Medical Equipment [address], [facility] [address] [phone] --          Home Medical Care     Service Provider Selected Services Address Phone Fax Patient Preferred    [facility] [place]  Home Health Services [address] [facility], [address], [facility] [address] [phone] --                    Expected Discharge Date and Time     Expected Discharge Date Expected Discharge Time    May 4, 2022          Demographic Summary     Row Name 05/03/22 1426       General Information    Admission Type inpatient    Arrived From home    Referral Source admission list    Reason for Consult discharge planning    Preferred Language English       Contact Information    Permission Granted to Share Info With                Functional Status    No documentation.                Psychosocial    No documentation.                Abuse/Neglect    No documentation.                Legal    No documentation.                Substance Abuse    No documentation.                Patient Forms    No documentation.                   Crystal Mckee RN

## 2022-05-04 NOTE — PLAN OF CARE
Goal Outcome Evaluation:              Outcome Evaluation: Pts dialysis catheter removed this morning Pt tolerted well, site soft, clean dry and intact. Pt taken out of restraints. Tolerated well.  Pt ambulating in room assist x1. +BM. sample sent for occult stool. Metoprolol added, HR on 90s-100s. No other complaints, will continue to monitor.

## 2022-05-04 NOTE — PROGRESS NOTES
Name: Miri Yung ADMIT: 2022   : 1940  PCP: Loren Cruz APRN    MRN: [id number] LOS: 2 days   AGE/SEX: 81 y.o. female  ROOM: [facility]/     Subjective   Subjective   This is a 81-year-old woman with a past medical history of stroke, atrial fibrillation, CHF, dementia, type 2 diabetes who presented to the hospital with acute onset shortness of air, confusion, and weakness.  Initial laboratory evaluation revealed acute kidney injury with hyperkalemia and she underwent urgent dialysis after having a Shiley catheter placed by vascular surgery.  Her hemoglobin also dropped to 7.0.  Per nursing report, hematuria was noted in her urine collection.    :  Renal ultrasound with echogenic structure concerning for hematoma or possible lesion.  Urology has been consulted.  Creatinine increased from 1.4-1.7 today.  Her Coreg has been discontinued and she has been started on metoprolol tartrate 50 mg every 12 hours for A. fib with RVR.  Hb improved to 9.3 (up from 7.0 after 1 unit of pRBCs)  FBOT+, however I am not convinced that this is from stool and seepage of urine into her rectum in the setting of recent gross hematuria could also result in a positive test.        Review of Systems      GENERAL: No fevers, chills, sweats.    RESPIRATORY: No cough, shortness of breath, hemoptysis or wheezing.   CVS: No chest pain, palpitations, orthopnea, dyspnea on exertion   GI: no n/v      Objective   Objective   Vital Signs  Temp:  [97.7 °F (36.5 °C)-98.8 °F (37.1 °C)] 97.7 °F (36.5 °C)  Heart Rate:  [104-126] 104  Resp:  [18] 18  BP: ()/(50-71) 125/71  SpO2:  [90 %-95 %] 93 %  on   ;   Device (Oxygen Therapy): room air  Body mass index is 29.45 kg/m².  Physical Exam  General: Oriented x3, no acute distress  CV: Irregularly irregular, tachycardic  CV: Clear to auscultation bilaterally  Abdomen: Soft, nontender, nondistended.  Extremities: Right femoral Shiley in place  Extremities: No edema  Neuro: Oriented  x3, no gross FND appreciated   Results Review     I reviewed the patient's new clinical results.  Results from last 7 days   Lab Units 05/04/22  0501 05/03/22  2102 05/03/22 0436 05/02/22  1353   WBC 10*3/mm3 6.64  --  5.73 9.59   HEMOGLOBIN g/dL 9.3* 8.2* 7.0* 7.8*   PLATELETS 10*3/mm3 163  --  149 218     Results from last 7 days   Lab Units 05/04/22  0501 05/03/22 0436 05/03/22 0002 05/02/22  1353   SODIUM mmol/L 138 136  --  133*   POTASSIUM mmol/L 4.4 5.3* 4.5 8.4*   CHLORIDE mmol/L 102 102  --  103   CO2 mmol/L 25.6 24.0  --  17.2*   BUN mg/dL 46* 33*  --  81*   CREATININE mg/dL 1.73* 1.47*  --  2.95*   GLUCOSE mg/dL 117* 92  --  119*   Estimated Creatinine Clearance: 23.3 mL/min (A) (by C-G formula based on SCr of 1.73 mg/dL (H)).  Results from last 7 days   Lab Units 05/04/22  0501 05/02/22  1353   ALBUMIN g/dL 3.90 3.80   BILIRUBIN mg/dL 0.5 0.3   ALK PHOS U/L 94 88   AST (SGOT) U/L 21 19   ALT (SGPT) U/L 27 22     Results from last 7 days   Lab Units 05/04/22  0501 05/03/22 0436 05/03/22 0002 05/02/22  1353   CALCIUM mg/dL 8.9 8.5*  --  8.9   ALBUMIN g/dL 3.90  --   --  3.80   MAGNESIUM mg/dL  --   --  1.8  --    PHOSPHORUS mg/dL 3.7  --   --   --        COVID19   Date Value Ref Range Status   05/02/2022 Not Detected Not Detected - Ref. Range Final   03/04/2022 Not Detected Not Detected - Ref. Range Final     Glucose   Date/Time Value Ref Range Status   05/03/2022 1125 165 (H) 70 - 130 mg/dL Final     Comment:     Meter: [id number] : [id number] QUALIA (formerly known as LocalResponse)       US Renal Bilateral  Narrative: US RENAL BILATERAL-     INDICATIONS: Acute kidney injury, hematuria     TECHNIQUE: ULTRASOUND OF THE KIDNEYS AND URINARY BLADDER.     COMPARISON: CT from 03/02/2022     FINDINGS:     The right kidney measures 8.5 centimeters, the left kidney measures 10.6  centimeters.     Multiple left renal cysts are demonstrated, largest 2.4 cm. No  hydronephrosis or echogenic nephrolithiasis. Right extrarenal  pelvis  appears stable.     Posterior inferiorly in the urinary bladder, echogenic structure  measuring 4.3 x 1.5 cm, 2.6 cm CC, without demonstrated internal  vascularity, could for example represent hematoma or bladder wall  lesion, further evaluation with CT urogram, direct visualization can be  obtained. The urinary bladder otherwise appears unremarkable. Bilateral  ureteral jets were observed.     Impression: Echogenic structure in the urinary bladder could be hematoma or possibly  lesion, further evaluation recommended. No hydronephrosis or echogenic  nephrolithiasis. Left renal cysts     This report was finalized on 5/3/2022 7:34 PM by Dr. Hernan Fraser M.D.       Scheduled Medications  allopurinol, 200 mg, Oral, Daily  atorvastatin, 40 mg, Oral, Daily  bumetanide, 2 mg, Oral, BID  buPROPion SR, 150 mg, Oral, Daily With Breakfast  ferrous sulfate, 325 mg, Oral, Daily With Breakfast  heparin (porcine), 5,000 Units, Intravenous, Once  lidocaine, 20 mL, Infiltration, Once  linagliptin, 5 mg, Oral, Daily  metoprolol tartrate, 50 mg, Oral, Q12H  mirtazapine, 15 mg, Oral, Nightly  montelukast, 10 mg, Oral, Nightly  multivitamin with minerals, 1 tablet, Oral, Daily  pantoprazole, 40 mg, Oral, Daily    Infusions   Diet  Diet Regular; Cardiac, Consistent Carbohydrate, Renal       Assessment/Plan     Active Hospital Problems    Diagnosis  POA   • Acute renal failure (HCC) [N17.9]  Yes      Resolved Hospital Problems   No resolved problems to display.       81 y.o. female admitted with <principal problem not specified>.    Anemia 2/2  Iron deficiency  Improved with 1 unit of PRBCs.  Hemoglobin today is 9.3  FBOT positive. No signs of active bleeding.   however I am not convinced that this is from stool and seepage of urine into her rectum in the setting of recent gross hematuria could also result in a positive test.  No urgent indication for endoscopy, especially since she is hemodynamically stable and her  hemoglobin is stable.    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 Protonix    Renal failure  Hyperkalemia  Hematuria  Nephrology consulted  UA with gross hematuria. FBOT positive   No dialysis today  Bumex ordered, 2 mg twice daily per nephrology  Trend potassium    A. fib with RVR  Diastolic heart failure  Cardiology consulted  Holding Eliquis in the setting of hematuria and downtrending hemoglobin  Coreg d/c'd due to low Bps. Started on metoprolol tartrate for rate control  Continue Imdur, atorvastatin    Type 2 diabetes  Currently on Tradjenta. Blood sugars well controlled.   Will discontinue and switch to sliding scale       · SCDs for DVT prophylaxis.  · Full code.  · Discussed with patient and nursing staff.  · Anticipate discharge home with family later this week.      Marcos Villalta MD  [facility]  05/04/22  11:04 EDT     I wore protective equipment throughout this patient encounter including a face mask, gloves and protective eyewear.  Hand hygiene was performed before donning protective equipment and after removal when leaving the room.

## 2022-05-04 NOTE — PLAN OF CARE
Goal Outcome Evaluation:  Plan of Care Reviewed With: patient        Progress: no change  Outcome Evaluation: HR low 100s. Restraints in place. Pt has some restlessness. Pt reminded that not to move around or touch rt femoral shiley which is still intact. Hematuria noted in Purewick canister. Protonix drip infusing. NAD. Will CTM.

## 2022-05-04 NOTE — SIGNIFICANT NOTE
05/04/22 1347   OTHER   Discipline occupational therapist   Rehab Time/Intention   Session Not Performed other (see comments)  (pt with femoral shiley, being pulled needs to be flat after. Still with restraints. Will follow-up tomorrow)   Recommendation   OT - Next Appointment 05/05/22

## 2022-05-04 NOTE — PROGRESS NOTES
[facility] Progress Note      Patient Name: Miri Yung  : 1940  MRN: [id number]  Primary Care Physician:  Loren Cruz APRN  Date of admission: 2022    Subjective     Interval History:   Follow up YOANA.  Gross hematuria yesterday. Confirmed on UA. Off anticoagulation. Urine in purwik now dark christie, but only small amount.   Feels hungry. No pain. Not soa.     Review of Systems:   As noted above    Objective     Vitals:   Temp:  [97.7 °F (36.5 °C)-98.8 °F (37.1 °C)] 97.7 °F (36.5 °C)  Heart Rate:  [104-126] 104  Resp:  [18] 18  BP: ()/(50-71) 125/71    Intake/Output Summary (Last 24 hours) at 2022 0852  Last data filed at 2022 0725  Gross per 24 hour   Intake 800 ml   Output 1900 ml   Net -1100 ml       Physical Exam:    General Appearance: alert, oriented x 3, no acute distress   Skin: warm and dry  HEENT: oral mucosa normal, nonicteric sclera  Neck: supple, no JVD  Lungs: Clear to auscultation.   Heart: irreg,irrreg, tachy.  Abdomen: soft, nontender, nondistended,  + bs, large umbilical hernia. RIght femoral shiley with some dried blood around biopatch.   : purwik.   Extremities: no edema, cyanosis or clubbing  Neuro: normal speech and mental status     Scheduled Meds:     allopurinol, 300 mg, Oral, Daily  atorvastatin, 40 mg, Oral, Daily  bumetanide, 2 mg, Oral, BID  buPROPion SR, 150 mg, Oral, Daily With Breakfast  ferrous sulfate, 325 mg, Oral, Daily With Breakfast  heparin (porcine), 5,000 Units, Intravenous, Once  lidocaine, 20 mL, Infiltration, Once  linagliptin, 5 mg, Oral, Daily  metoprolol tartrate, 50 mg, Oral, Q12H  mirtazapine, 15 mg, Oral, Nightly  montelukast, 10 mg, Oral, Nightly  multivitamin with minerals, 1 tablet, Oral, Daily  pantoprazole, 40 mg, Oral, Daily      IV Meds:   pantoprazole, 40 mg, Last Rate: 40 mg (22)        Results Reviewed:   I have personally reviewed the results from the time of this admission to  5/4/2022 08:52 EDT     Results from last 7 days   Lab Units 05/04/22  0501 05/03/22  0436 05/03/22  0002 05/02/22  1353   SODIUM mmol/L 138 136  --  133*   POTASSIUM mmol/L 4.4 5.3* 4.5 8.4*   CHLORIDE mmol/L 102 102  --  103   CO2 mmol/L 25.6 24.0  --  17.2*   BUN mg/dL 46* 33*  --  81*   CREATININE mg/dL 1.73* 1.47*  --  2.95*   CALCIUM mg/dL 8.9 8.5*  --  8.9   BILIRUBIN mg/dL 0.5  --   --  0.3   ALK PHOS U/L 94  --   --  88   ALT (SGPT) U/L 27  --   --  22   AST (SGOT) U/L 21  --   --  19   GLUCOSE mg/dL 117* 92  --  119*       Estimated Creatinine Clearance: 23.3 mL/min (A) (by C-G formula based on SCr of 1.73 mg/dL (H)).    Results from last 7 days   Lab Units 05/04/22  0501 05/03/22  0002   MAGNESIUM mg/dL  --  1.8   PHOSPHORUS mg/dL 3.7  --              Results from last 7 days   Lab Units 05/04/22  0501 05/03/22 2102 05/03/22  0436 05/02/22  1353   WBC 10*3/mm3 6.64  --  5.73 9.59   HEMOGLOBIN g/dL 9.3* 8.2* 7.0* 7.8*   PLATELETS 10*3/mm3 163  --  149 218             Assessment / Plan     ASSESSMENT:  1. YOANA, hyperkalemia. Likely due to loss of autoregulation on ARB, potassium supplement, spironolactone.   SP dialysis x1 5/2 with improved waste products yesterday.  K better. Today, very slight increase in crt.   Non-oliguric. Gross hematuria with renal US showing hematoma or lesion in bladder.  to see.   No need for dialysis.  DC femoral shiley .   2. Anemia.  Iron deficiency.  Hg stable after one unit PRBC yesterday.    3. Afib RVR. Currently off anticoagulation. Cardiology stopped coreg and changed to metoprolol.   4. Chronic diastolic heart failure.   5. 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 Currently on protonix drip.   PLAN:  1. No dialysis today  2. Dc femoral shiley catheter .  3. Continue po bumex .  4.  to see for hematuria and bladder abnormality on renal US.   Jessica Eaton MD  05/04/22  08:52 EDT    [facility]  [phone]

## 2022-05-04 NOTE — PROGRESS NOTES
Patient Care Team:  Loren Cruz APRN as PCP - General (Family Medicine)    Chief Complaint: Follow-up atrial fibrillation with a rapid ventricular rate.    Interval History:   Still with mild RVR.  In restraints.    Objective   Vital Signs  Temp:  [97.7 °F (36.5 °C)-98.8 °F (37.1 °C)] 97.7 °F (36.5 °C)  Heart Rate:  [104-126] 104  Resp:  [18] 18  BP: ()/(50-71) 125/71    Intake/Output Summary (Last 24 hours) at 5/4/2022 0841  Last data filed at 5/4/2022 0725  Gross per 24 hour   Intake 800 ml   Output 1900 ml   Net -1100 ml     Flowsheet Rows    Flowsheet Row First Filed Value   Admission Height --   Admission Weight 72.5 kg (159 lb 14.8 oz) Documented at 05/02/2022 2000          Temp:  [97.7 °F (36.5 °C)-98.8 °F (37.1 °C)] 97.7 °F (36.5 °C)  Heart Rate:  [104-126] 104  Resp:  [18] 18  BP: ()/(50-71) 125/71    Intake/Output Summary (Last 24 hours) at 5/4/2022 0841  Last data filed at 5/4/2022 0725  Gross per 24 hour   Intake 800 ml   Output 1900 ml   Net -1100 ml     Flowsheet Rows    Flowsheet Row First Filed Value   Admission Height --   Admission Weight 72.5 kg (159 lb 14.8 oz) Documented at 05/02/2022 2000          General Appearance:    Alert, cooperative, in no acute distress   Head:    Normocephalic, without obvious abnormality, atraumatic       Neck/Lymph   No adenopathy, supple, no thyromegaly, no carotid bruit, no    JVD   Lungs:     Clear to auscultation bilaterally, no wheezes, rales, or     rhonchi    Cardiac:   Tachycardic, irregular gular rhythm, no murmur, no rub, no gallop   Chest Wall:    No abnormalities observed   GI:     Normal bowel sounds, soft, nontender, nondistended,            no rebound tenderness   Extremities:   No cyanosis, clubbing, or edema   Circulatory/Peripheral Vascular :   Pulses palpable and equal bilaterally   Integumentary:   No bleeding or rash. Normal temperature         allopurinol, 300 mg, Oral, Daily  atorvastatin, 40 mg, Oral, Daily  bumetanide, 2  mg, Oral, BID  buPROPion SR, 150 mg, Oral, Daily With Breakfast  carvedilol, 3.125 mg, Oral, BID  ferrous sulfate, 325 mg, Oral, Daily With Breakfast  heparin (porcine), 5,000 Units, Intravenous, Once  isosorbide mononitrate, 60 mg, Oral, Daily  lidocaine, 20 mL, Infiltration, Once  linagliptin, 5 mg, Oral, Daily  mirtazapine, 15 mg, Oral, Nightly  montelukast, 10 mg, Oral, Nightly  multivitamin with minerals, 1 tablet, Oral, Daily  pantoprazole, 40 mg, Oral, Daily        pantoprazole, 40 mg, Last Rate: 40 mg (05/04/22 0612)        Results Review:    Results from last 7 days   Lab Units 05/04/22  0501   SODIUM mmol/L 138   POTASSIUM mmol/L 4.4   CHLORIDE mmol/L 102   CO2 mmol/L 25.6   BUN mg/dL 46*   CREATININE mg/dL 1.73*   GLUCOSE mg/dL 117*   CALCIUM mg/dL 8.9     Results from last 7 days   Lab Units 05/03/22  0002 05/02/22  1353   TROPONIN T ng/mL <0.010 <0.010     Results from last 7 days   Lab Units 05/04/22  0501   WBC 10*3/mm3 6.64   HEMOGLOBIN g/dL 9.3*   HEMATOCRIT % 31.5*   PLATELETS 10*3/mm3 163             Results from last 7 days   Lab Units 05/03/22  0002   MAGNESIUM mg/dL 1.8         @LABRCNT(bnp)@  I reviewed the patient's new clinical results.  I personally viewed and interpreted the patient's EKG/Telemetry data            Assessment/Plan   1. Acute kidney injury with hyperkalemia: Potassium improved today.  Creatinine slightly worse  2. Paroxysmal atrial fibrillation , now with RVR.  I have stopped the carvedilol today and we will move over to metoprolol tartrate to allow for more rate control with less blood pressure effect.  Stop Imdur to allow for more blood pressure room.  Hold off on additional digoxin dosing.  3. Chronic diastolic heart failure.  Diuretics per renal.  4. KOO cirrhosis, grade 1 varices noted on prior EGD.   5. Pulmonary hypertension  6. Mild to moderate mitral valve regurgitation  7. Anemia, acute blood loss in setting of gross hematuria. Anticoagulation appropriately held  at this time.

## 2022-05-05 NOTE — PLAN OF CARE
"Goal Outcome Evaluation:  Plan of Care Reviewed With: patient        Progress: improving  Outcome Evaluation: Pt demonstrates improved strength and endurance with therapy. Pt able to tolerate increased activity and ambulated in hallway with rwx and assist x1. Pt will continue to benefit from PT for ongoing strengthening in order to assist with return to PLOF. May also benefit from  PT at [place] as pt acknowledges she feels unsteady at times and \"doesn't trust herself\" to ambulate without rwx.  "

## 2022-05-05 NOTE — PLAN OF CARE
Goal Outcome Evaluation:  Plan of Care Reviewed With: patient, son           Outcome Evaluation: Pt is a 81 y.o female admitted to [facility] with generalized weakness, SOB, acute renal failure. HD cathether placed 5/2 and STAT diaylsis completed. Pt reports feeling much better today. She is able to complete her ADLs, transfers, and functional mobility SBA. She denies any fatique with activites. She has all needed DME at [facility]. She appears at her functional baseline. She lives alone. Anticipate her to [place] back home, no further OT needs this date.    OT wore a mask, eye protection, gloves, and completed hand hygiene. Pt wore a mask.

## 2022-05-05 NOTE — CASE MANAGEMENT/SOCIAL WORK
Continued Stay Note  [facility]     Patient Name: Miri Yung  MRN: [id number]  Today's Date: 5/5/2022    Admit Date: 5/2/2022     Discharge Plan     Row Name 05/05/22 1553       Plan    Plan Comments I spoke with the patient at bedside and she denied any needs at discharge.  She is hoping to discharge home tomorrow.  I also spoke with her son that advised one of her family members will be able to pick her up at discharge. CM will follow.               Discharge Codes    No documentation.               Expected Discharge Date and Time     Expected Discharge Date Expected Discharge Time    May 4, 2022             Crystal Mckee RN

## 2022-05-05 NOTE — PLAN OF CARE
Goal Outcome Evaluation:  Plan of Care Reviewed With: patient    Progress: improving  Outcome Evaluation: Vitals stable. No c/o pain or SOA. Remains in Afib, eliquis restarted today. SCDs in place. Patient walked in hallway with PT. 1 BM, no blood in urine. Potential discharge home tomorrow with home health. Patient stable and needs met at this time.

## 2022-05-05 NOTE — PLAN OF CARE
Problem: Restraint, Nonbehavioral (Nonviolent)  Goal: Absence of Harm or Injury  Outcome: Ongoing, Progressing  Intervention: Protect Dignity, Rights, and Personal Wellbeing  Recent Flowsheet Documentation  Taken 5/4/2022 2020 by Anaid Pate RN  Trust Relationship/Rapport:   care explained   questions answered   questions encouraged  Intervention: Protect Skin and Joint Integrity  Recent Flowsheet Documentation  Taken 5/5/2022 0400 by Anaid Pate RN  Body Position: position changed independently  Taken 5/5/2022 0200 by Anaid Pate RN  Body Position: position changed independently  Taken 5/5/2022 0000 by Anaid Pate RN  Body Position: position changed independently  Taken 5/4/2022 2200 by Anaid Pate RN  Body Position: position changed independently  Taken 5/4/2022 2020 by Anaid Pate RN  Body Position: position changed independently  Range of Motion: active ROM (range of motion) encouraged     Problem: Fall Injury Risk  Goal: Absence of Fall and Fall-Related Injury  Outcome: Ongoing, Progressing  Intervention: Identify and Manage Contributors  Recent Flowsheet Documentation  Taken 5/5/2022 0400 by Anaid Pate RN  Medication Review/Management: medications reviewed  Taken 5/5/2022 0200 by Anaid Pate RN  Medication Review/Management: medications reviewed  Taken 5/5/2022 0000 by Anaid Pate RN  Medication Review/Management: medications reviewed  Taken 5/4/2022 2200 by Anaid Pate RN  Medication Review/Management: medications reviewed  Taken 5/4/2022 2020 by Anaid Pate RN  Medication Review/Management: medications reviewed  Intervention: Promote Injury-Free Environment  Recent Flowsheet Documentation  Taken 5/5/2022 0400 by Anaid Pate RN  Safety Promotion/Fall Prevention:   activity supervised   fall prevention program maintained   nonskid shoes/slippers when out of bed   safety round/check completed  Taken 5/5/2022 0200 by Anaid Pate RN  Safety Promotion/Fall Prevention:    activity supervised   fall prevention program maintained   nonskid shoes/slippers when out of bed   safety round/check completed  Taken 5/5/2022 0000 by nAaid Pate RN  Safety Promotion/Fall Prevention:   activity supervised   fall prevention program maintained   nonskid shoes/slippers when out of bed   safety round/check completed  Taken 5/4/2022 2200 by Anaid Pate RN  Safety Promotion/Fall Prevention:   activity supervised   fall prevention program maintained   nonskid shoes/slippers when out of bed   safety round/check completed  Taken 5/4/2022 2020 by Anaid Pate RN  Safety Promotion/Fall Prevention:   activity supervised   fall prevention program maintained   nonskid shoes/slippers when out of bed   safety round/check completed     Problem: Adult Inpatient Plan of Care  Goal: Plan of Care Review  Outcome: Ongoing, Progressing  Goal: Patient-Specific Goal (Individualized)  Outcome: Ongoing, Progressing  Goal: Absence of Hospital-Acquired Illness or Injury  Outcome: Ongoing, Progressing  Intervention: Identify and Manage Fall Risk  Recent Flowsheet Documentation  Taken 5/5/2022 0400 by Anaid Pate RN  Safety Promotion/Fall Prevention:   activity supervised   fall prevention program maintained   nonskid shoes/slippers when out of bed   safety round/check completed  Taken 5/5/2022 0200 by Anaid Pate RN  Safety Promotion/Fall Prevention:   activity supervised   fall prevention program maintained   nonskid shoes/slippers when out of bed   safety round/check completed  Taken 5/5/2022 0000 by Anaid Pate RN  Safety Promotion/Fall Prevention:   activity supervised   fall prevention program maintained   nonskid shoes/slippers when out of bed   safety round/check completed  Taken 5/4/2022 2200 by Anaid Pate RN  Safety Promotion/Fall Prevention:   activity supervised   fall prevention program maintained   nonskid shoes/slippers when out of bed   safety round/check completed  Taken 5/4/2022 2020 by  Bright, Anaid, RN  Safety Promotion/Fall Prevention:   activity supervised   fall prevention program maintained   nonskid shoes/slippers when out of bed   safety round/check completed  Intervention: Prevent Skin Injury  Recent Flowsheet Documentation  Taken 5/5/2022 0400 by Anaid Paet RN  Body Position: position changed independently  Taken 5/5/2022 0200 by Anaid Pate RN  Body Position: position changed independently  Taken 5/5/2022 0000 by Anaid Pate RN  Body Position: position changed independently  Taken 5/4/2022 2200 by Anaid Pate RN  Body Position: position changed independently  Taken 5/4/2022 2020 by Anaid Pate RN  Body Position: position changed independently  Intervention: Prevent and Manage VTE (Venous Thromboembolism) Risk  Recent Flowsheet Documentation  Taken 5/5/2022 0400 by Anaid Pate RN  Activity Management: activity adjusted per tolerance  Taken 5/5/2022 0200 by Anaid Pate RN  Activity Management: activity adjusted per tolerance  Taken 5/5/2022 0000 by Anaid Pate RN  Activity Management: activity adjusted per tolerance  Taken 5/4/2022 2200 by Anaid Pate RN  Activity Management: activity adjusted per tolerance  Taken 5/4/2022 2020 by Anaid Pate RN  Activity Management: activity adjusted per tolerance  VTE Prevention/Management:   bilateral   sequential compression devices on  Range of Motion: active ROM (range of motion) encouraged  Intervention: Prevent Infection  Recent Flowsheet Documentation  Taken 5/5/2022 0400 by Anaid Pate RN  Infection Prevention:   hand hygiene promoted   rest/sleep promoted   personal protective equipment utilized  Taken 5/5/2022 0200 by Anaid Pate RN  Infection Prevention:   hand hygiene promoted   personal protective equipment utilized   rest/sleep promoted  Taken 5/5/2022 0000 by Anaid Pate RN  Infection Prevention:   hand hygiene promoted   personal protective equipment utilized   rest/sleep promoted  Taken 5/4/2022  2200 by Anaid Pate RN  Infection Prevention:   hand hygiene promoted   personal protective equipment utilized   rest/sleep promoted  Taken 5/4/2022 2020 by Anaid Pate RN  Infection Prevention:   hand hygiene promoted   personal protective equipment utilized   rest/sleep promoted  Goal: Optimal Comfort and Wellbeing  Outcome: Ongoing, Progressing  Intervention: Provide Person-Centered Care  Recent Flowsheet Documentation  Taken 5/4/2022 2020 by Anaid Pate RN  Trust Relationship/Rapport:   care explained   questions answered   questions encouraged  Goal: Readiness for Transition of Care  Outcome: Ongoing, Progressing     Problem: Diabetes Comorbidity  Goal: Blood Glucose Level Within Targeted Range  Outcome: Ongoing, Progressing     Problem: Heart Failure Comorbidity  Goal: Maintenance of Heart Failure Symptom Control  Outcome: Ongoing, Progressing  Intervention: Maintain Heart Failure-Management  Recent Flowsheet Documentation  Taken 5/5/2022 0400 by Anaid Pate RN  Medication Review/Management: medications reviewed  Taken 5/5/2022 0200 by Anaid Pate RN  Medication Review/Management: medications reviewed  Taken 5/5/2022 0000 by Anaid Pate RN  Medication Review/Management: medications reviewed  Taken 5/4/2022 2200 by Anaid Pate RN  Medication Review/Management: medications reviewed  Taken 5/4/2022 2020 by Anaid Pate RN  Medication Review/Management: medications reviewed     Problem: Hypertension Comorbidity  Goal: Blood Pressure in Desired Range  Outcome: Ongoing, Progressing  Intervention: Maintain Blood Pressure Management  Recent Flowsheet Documentation  Taken 5/5/2022 0400 by Anaid Pate RN  Medication Review/Management: medications reviewed  Taken 5/5/2022 0200 by Anaid Pate RN  Medication Review/Management: medications reviewed  Taken 5/5/2022 0000 by Anaid Pate RN  Medication Review/Management: medications reviewed  Taken 5/4/2022 2200 by Anaid Pate RN  Medication  Review/Management: medications reviewed  Taken 5/4/2022 2020 by Anaid Pate RN  Medication Review/Management: medications reviewed     Problem: Skin Injury Risk Increased  Goal: Skin Health and Integrity  Outcome: Ongoing, Progressing  Intervention: Optimize Skin Protection  Recent Flowsheet Documentation  Taken 5/5/2022 0400 by Anaid Pate RN  Head of Bed ([facility]) Positioning: HOB at 20-30 degrees  Taken 5/5/2022 0200 by Anaid Pate RN  Head of Bed (HOB) Positioning: HOB at 20-30 degrees  Taken 5/5/2022 0000 by Anaid Pate RN  Head of Bed (HOB) Positioning: HOB at 30-45 degrees  Taken 5/4/2022 2200 by Anaid Pate RN  Head of Bed (HOB) Positioning: HOB at 20-30 degrees  Taken 5/4/2022 2020 by Anaid Pate RN  Head of Bed (HOB) Positioning: HOB at 30-45 degrees   Goal Outcome Evaluation:  VS stable. Pt remained in Afib with a controled rate. Melatonin given to help with sleep, see mar. Pt rested throughout the night with no issues or complaints.

## 2022-05-05 NOTE — THERAPY TREATMENT NOTE
Patient Name: Miri Yung  : 1940    MRN: [id number]                              Today's Date: 2022       Admit Date: 2022    Visit Dx:     ICD-10-CM ICD-9-CM   1. Acute renal failure, unspecified acute renal failure type (HCC)  N17.9 584.9   2. Hyperkalemia  E87.5 276.7   3. Gastrointestinal hemorrhage, unspecified gastrointestinal hemorrhage type  K92.2 578.9     Patient Active Problem List   Diagnosis   • Dementia (CMS/HCC)   • Depressive disorder   • Edema   • Abnormal liver function tests   • Gastroesophageal reflux disease   • Gout   • Hyperlipidemia   • HTN (hypertension)   • Insomnia   • Arthropathy of knee   • Memory loss   • Low back pain   • Mitral valve insufficiency   • Tricuspid valve insufficiency   • Urinary tract infection   • Valvular endocarditis   • Long QT interval   • Microcytic anemia   • Anemia   • Pulmonary hypertension (HCC)   • Seasonal allergies   • Acute cystitis without hematuria   • Chronic fatigue   • Bacterial endocarditis   • Slow transit constipation   • Acute ischemic stroke (HCC)   • Paroxysmal tachycardia (HCC)   • Type 2 diabetes mellitus (HCC)   • Longstanding persistent atrial fibrillation (HCC)   • Morbidly obese (HCC)   • Chronic diastolic (congestive) heart failure (HCC)   • Symptomatic anemia   • History of CVA (cerebrovascular accident)   • Long term current use of anticoagulant therapy   • Acute blood loss anemia   • Cirrhosis of liver (HCC)   • Esophageal varices (HCC)   • Splenomegaly due to portal hypertension and liver disease   • Anasarca   • Atrial fibrillation with RVR (HCC)   • Occult GI bleeding   • Aortic stenosis, mild   • Acute renal failure (HCC)     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   • Endocarditis     with mitral and  tricuspid regurgitation   • Esophageal reflux    • GERD (gastroesophageal reflux disease)    • Gout    • History of blood transfusion     due to anemia   • HTN (hypertension)    • Hyperlipidemia    • Insomnia    • LOM (loss of memory)    • Lung mass    • 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; generally asymptomatic   • Mitral regurgitation    • Mixed anxiety depressive disorder    • Nonrheumatic tricuspid (valve) insufficiency    • Osteoarthritis    • PAF (paroxysmal atrial fibrillation) (HCC)    • Sacroiliitis (HCC)    • Tricuspid regurgitation    • Type 2 diabetes mellitus (HCC)      Past Surgical History:   Procedure Laterality Date   • CATARACT EXTRACTION     • COLONOSCOPY N/A 3/10/2016    Procedure: COLONOSCOPY TO CECUM;  Surgeon: Shaan Becerra Jr., MD;  Location: [facility] ENDOSCOPY;  Service:    • COLONOSCOPY N/A 6/18/2021    Procedure: COLONOSCOPY TO CECUM AND INTO TERMINAL ILEUM;  Surgeon: Jeremy Darden MD;  Location: [facility] ENDOSCOPY;  Service: Gastroenterology;  Laterality: N/A;  PRE: ANEMIA  POST: HEMORRHOIDS   • ENDOSCOPY N/A 3/10/2016    Procedure: ESOPHAGOGASTRODUODENOSCOPY ;  Surgeon: Shaan Becerra Jr., MD;  Location: [facility] ENDOSCOPY;  Service:    • ENDOSCOPY N/A 6/18/2021    Procedure: ESOPHAGOGASTRODUODENOSCOPY;  Surgeon: Jeremy Darden MD;  Location: [facility] ENDOSCOPY;  Service: Gastroenterology;  Laterality: N/A;  PRE: ANEMIA  POST: HIATAL HERNIA, SMALL ESOPHAGEAL VARICES   • KNEE SURGERY     • TUBAL ABDOMINAL LIGATION        General Information     Row Name 05/05/22 1137          Physical Therapy Time and Intention    Document Type therapy note (daily note)  -EE     Mode of Treatment co-treatment;physical therapy  -EE     Row Name 05/05/22 1137          General Information    Existing Precautions/Restrictions fall  -EE     Barriers to Rehab none identified  -EE     Row Name 05/05/22 1137          Cognition    Orientation Status (Cognition) oriented x 3  -EE      Row Name 05/05/22 1137          Safety Issues, Functional Mobility    Impairments Affecting Function (Mobility) endurance/activity tolerance;balance;strength  -EE           User Key  (r) = Recorded By, (t) = Taken By, (c) = Cosigned By    Initials Name Provider Type    EE Beth Staton, PT Physical Therapist               Mobility     Row Name 05/05/22 1137          Bed Mobility    Bed Mobility supine-sit  -EE     Supine-Sit [place] (Bed Mobility) standby assist  -EE     Assistive Device (Bed Mobility) head of bed elevated;bed rails  -EE     Row Name 05/05/22 1137          Sit-Stand Transfer    Sit-Stand [place] (Transfers) standby assist;contact guard  -EE     Assistive Device (Sit-Stand Transfers) walker, front-wheeled  -EE     Row Name 05/05/22 1137          Gait/Stairs (Locomotion)    [place] Level (Gait) standby assist;contact guard;verbal cues  -EE     Assistive Device (Gait) walker, front-wheeled  -EE     Distance in Feet (Gait) 150'  -EE     Deviations/Abnormal Patterns (Gait) ismael decreased  -EE     Bilateral Gait Deviations forward flexed posture;heel strike decreased  -EE     Comment, (Gait/Stairs) cues to stay close to walker for safety  -EE           User Key  (r) = Recorded By, (t) = Taken By, (c) = Cosigned By    Initials Name Provider Type    Beth Maynard PT Physical Therapist               Obj/Interventions     Row Name 05/05/22 1137          Balance    Balance Assessment standing static balance;standing dynamic balance  -EE     Static Standing Balance standby assist  -EE     Dynamic Standing Balance standby assist;contact guard  -EE     Position/Device Used, Standing Balance supported;walker, rolling  -EE           User Key  (r) = Recorded By, (t) = Taken By, (c) = Cosigned By    Initials Name Provider Type    Beth Maynard PT Physical Therapist               Goals/Plan     Row Name 05/05/22 1140          Transfer Goal 1 (PT)    Activity/Assistive Device (Transfer Goal 1,  "PT) sit-to-stand/stand-to-sit;walker, rolling  -EE     [place] Level/Cues Needed (Transfer Goal 1, PT) modified independence  -EE     Time Frame (Transfer Goal 1, PT) 1 week  -EE     Progress/Outcome (Transfer Goal 1, PT) goal revised this date  -EE     Row Name 05/05/22 1140          Gait Training Goal 1 (PT)    Activity/Assistive Device (Gait Training Goal 1, PT) gait (walking locomotion);walker, rolling  -EE     [place] Level (Gait Training Goal 1, PT) standby assist  -EE     Time Frame (Gait Training Goal 1, PT) 1 week  -EE     Progress/Outcome (Gait Training Goal 1, PT) goal revised this date  -EE           User Key  (r) = Recorded By, (t) = Taken By, (c) = Cosigned By    Initials Name Provider Type    EE Beth Staton, PT Physical Therapist               Clinical Impression     Row Name 05/05/22 1138          Pain    Pretreatment Pain Rating 0/10 - no pain  -EE     Posttreatment Pain Rating 0/10 - no pain  -EE     Row Name 05/05/22 1138          Plan of Care Review    Plan of Care Reviewed With patient  -EE     Progress improving  -EE     Outcome Evaluation Pt demonstrates improved strength and endurance with therapy. Pt able to tolerate increased activity and ambulated in hallway with rwx and assist x1. Pt will continue to benefit from PT for ongoing strengthening in order to assist with return to PLOF. May also benefit from  PT at [place] as pt acknowledges she feels unsteady at times and \"doesn't trust herself\" to ambulate without rwx.  -EE     Row Name 05/05/22 1138          Therapy Assessment/Plan (PT)    Therapy Frequency (PT) 3 times/wk  -EE     Row Name 05/05/22 1138          Vital Signs    Pretreatment Heart Rate (beats/min) 91  -EE     Posttreatment Heart Rate (beats/min) 94  -EE     Pre Patient Position Supine  -EE     Intra Patient Position Standing  -EE     Post Patient Position Sitting  -EE     Row Name 05/05/22 1138          Positioning and Restraints    Pre-Treatment Position in bed  -EE  "    Post Treatment Position chair  -EE     In Chair sitting;call light within reach;encouraged to call for assist;exit alarm on;with family/caregiver  -EE           User Key  (r) = Recorded By, (t) = Taken By, (c) = Cosigned By    Initials Name Provider Type    EE Beth Staton PT Physical Therapist               Outcome Measures     Row Name 05/05/22 1140          How much help from another person do you currently need...    Turning from your back to your side while in flat bed without using bedrails? 4  -EE     Moving from lying on back to sitting on the side of a flat bed without bedrails? 3  -EE     Moving to and from a bed to a chair (including a wheelchair)? 3  -EE     Standing up from a chair using your arms (e.g., wheelchair, bedside chair)? 3  -EE     Climbing 3-5 steps with a railing? 3  -EE     To walk in hospital room? 3  -EE     AM-PAC 6 Clicks Score (PT) 19  -EE     Highest level of mobility 6 --> Walked 10 steps or more  -EE           User Key  (r) = Recorded By, (t) = Taken By, (c) = Cosigned By    Initials Name Provider Type    EE Beth Staton PT Physical Therapist                             Physical Therapy Education                 Title: PT OT SLP Therapies (In Progress)     Topic: Physical Therapy (In Progress)     Point: Mobility training (Done)     Learning Progress Summary           Patient Acceptance, E,TB, VU,NR by EE at 5/5/2022 1140    Acceptance, E,D, VU,NR by EE at 5/3/2022 0848                   Point: Home exercise program (Done)     Learning Progress Summary           Patient Acceptance, E,D, VU,NR by EE at 5/3/2022 0848                   Point: Body mechanics (Not Started)     Learner Progress:  Not documented in this visit.          Point: Precautions (Not Started)     Learner Progress:  Not documented in this visit.                      User Key     Initials Effective Dates Name Provider Type Discipline    EE 06/16/21 -  Beth Staton PT Physical Therapist PT              PT  "Recommendation and Plan  Planned Therapy Interventions (PT): balance training, bed mobility training, gait training, home exercise program, patient/family education, ROM (range of motion), stair training, strengthening, transfer training, postural re-education  Plan of Care Reviewed With: patient  Progress: improving  Outcome Evaluation: Pt demonstrates improved strength and endurance with therapy. Pt able to tolerate increased activity and ambulated in hallway with rwx and assist x1. Pt will continue to benefit from PT for ongoing strengthening in order to assist with return to PLOF. May also benefit from  PT at [place] as pt acknowledges she feels unsteady at times and \"doesn't trust herself\" to ambulate without rwx.     Time Calculation:    PT Charges     Row Name 05/05/22 1141             Time Calculation    Start Time 1016  -EE      Stop Time 1031  -EE      Time Calculation (min) 15 min  -EE      PT Received On 05/05/22  -EE      PT - Next Appointment 05/06/22  -EE      PT Goal Re-Cert Due Date 05/12/22  -EE              Time Calculation- PT    Total Timed Code Minutes- PT 15 minute(s)  -EE              Timed Charges    02228 - Gait Training Minutes  15  -EE              Total Minutes    Timed Charges Total Minutes 15  -EE       Total Minutes 15  -EE            User Key  (r) = Recorded By, (t) = Taken By, (c) = Cosigned By    Initials Name Provider Type    EE Beth Staton PT Physical Therapist              Therapy Charges for Today     Code Description Service Date Service Provider Modifiers Qty    [id number] HC GAIT TRAINING EA 15 MIN 5/5/2022 Beth Staton, PT GP 1          PT G-Codes  Outcome Measure Options: AM-PAC 6 Clicks Basic Mobility (PT)  AM-PAC 6 Clicks Score (PT): 19     Patient was intermittently wearing a face mask during this therapy encounter. Therapist used appropriate personal protective equipment including mask and gloves.  Mask used was standard procedure mask. Appropriate PPE was worn during " the entire therapy session. Hand hygiene was completed before and after therapy session. Patient is not in enhanced droplet precautions.       Beth Staton, PT  5/5/2022

## 2022-05-05 NOTE — CONSULTS
FIRST UROLOGY CONSULT      Patient Identification:  NAME:  Miri Yung  Age:  81 y.o.   Sex:  female   :  1940   MRN:  [id number]       Reason for consultation: Gross hematuria    History of present illness:  This is a 81 year old woman with a history of atrial fibrillation on Eliquis, CHF, KOO cirrhosis who presented to the ER with weakness and confusion and was found to be in acute renal failure. During her admission, she was noted to have blood in the Purewick. Renal US demonstrated an echogenic structure in the bladder consistent with hematoma, though bladder mass could not be ruled out. CT without contrast demonstrated no hydronephrosis and no apparent bladder mass. Her apixaban was then held, and a urology consultation was requested.     She denies smoking history. No family history of bladder cancer or kidney cancer.      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; generally asymptomatic   • Mitral regurgitation    • Mixed anxiety depressive disorder    • Nonrheumatic tricuspid (valve) insufficiency    • Osteoarthritis    • PAF (paroxysmal atrial fibrillation) (HCC)    • Sacroiliitis (HCC)    • Tricuspid regurgitation    • Type 2 diabetes mellitus (HCC)        Past surgical history:  Past Surgical History:   Procedure Laterality Date   • CATARACT EXTRACTION     • COLONOSCOPY N/A 3/10/2016    Procedure: COLONOSCOPY  TO CECUM;  Surgeon: Shaan Becerra Jr., MD;  Location:  [facility];  Service:    • COLONOSCOPY N/A 6/18/2021    Procedure: COLONOSCOPY TO CECUM AND INTO TERMINAL ILEUM;  Surgeon: Jeremy Darden MD;  Location[facility];  Service: Gastroenterology;  Laterality: N/A;  PRE: ANEMIA  POST: HEMORRHOIDS   • ENDOSCOPY N/A 3/10/2016    Procedure: ESOPHAGOGASTRODUODENOSCOPY ;  Surgeon: Shaan Becerra Jr., MD;  Location[facility];  Service:    • ENDOSCOPY N/A 6/18/2021    Procedure: ESOPHAGOGASTRODUODENOSCOPY;  Surgeon: Jeremy Darden MD;  Location: [facility] ENDOSCOPY;  Service: Gastroenterology;  Laterality: N/A;  PRE: ANEMIA  POST: HIATAL HERNIA, SMALL ESOPHAGEAL VARICES   • KNEE SURGERY     • TUBAL ABDOMINAL LIGATION         Allergies:  Decongestant  [pseudoephedrine]    Home medications:  Medications Prior to Admission   Medication Sig Dispense Refill Last Dose   • allopurinol (ZYLOPRIM) 300 MG tablet TAKE 1 TABLET BY MOUTH  DAILY 90 tablet 3    • apixaban (ELIQUIS) 5 MG tablet tablet Take 1 tablet by mouth Every 12 (Twelve) Hours. Indications: Atrial Fibrillation 28 tablet 0    • atorvastatin (LIPITOR) 40 MG tablet TAKE 1 TABLET BY MOUTH DAILY 90 tablet 0    • bumetanide (BUMEX) 2 MG tablet Take 1 tablet by mouth Daily. 90 tablet 0    • buPROPion SR (WELLBUTRIN SR) 150 MG 12 hr tablet Take 1 tablet by mouth Daily With Breakfast. 30 tablet 5    • carvedilol (COREG) 12.5 MG tablet Take 1 tablet by mouth 2 (Two) Times a Day. 180 tablet 3    • ferrous sulfate 325 (65 FE) MG EC tablet TAKE 1 TABLET BY MOUTH EVERY DAY WITH BREAKFAST 90 tablet 0    • glucose blood test strip BID. Test  In the AM and again in the  each 3    • glucose monitor monitoring kit One touch meter 1 each 0    • isosorbide mononitrate (IMDUR) 60 MG 24 hr tablet TAKE 1 TABLET BY MOUTH  DAILY 90 tablet 3    • Lancets (onetouch ultrasoft) lancets Test  each 3    • losartan (COZAAR) 50 MG tablet Take 1 tablet by mouth  Every Evening. 90 tablet 0    • metFORMIN ER (GLUCOPHAGE-XR) 500 MG 24 hr tablet Take 2 tablets by mouth Daily With Breakfast for 30 days. 60 tablet 5    • mirtazapine (REMERON) 15 MG tablet Take 1 tablet by mouth Every Night. 30 tablet 5    • montelukast (SINGULAIR) 10 MG tablet Take 1 tablet by mouth Every Night. 30 tablet 5    • multivitamin with minerals (Multivitamin Women 50+) tablet tablet Take 1 tablet by mouth Daily.      • pantoprazole (PROTONIX) 40 MG EC tablet TAKE 1 TABLET BY MOUTH  DAILY 90 tablet 3    • potassium chloride (K-DUR,KLOR-CON) 20 MEQ CR tablet Take 2 tablets by mouth Every Morning. 60 tablet 0    • SITagliptin (Januvia) 100 MG tablet Take 1 tablet by mouth Daily. 90 tablet 1    • spironolactone (ALDACTONE) 100 MG tablet Take 1 tablet by mouth 2 (Two) Times a Day. 180 tablet 0         Hospital medications:  allopurinol, 200 mg, Oral, Daily  atorvastatin, 40 mg, Oral, Daily  bumetanide, 2 mg, Oral, BID  buPROPion SR, 150 mg, Oral, Daily With Breakfast  ferrous sulfate, 325 mg, Oral, Daily With Breakfast  heparin (porcine), 5,000 Units, Intravenous, Once  insulin lispro, 0-9 Units, Subcutaneous, TID AC  lidocaine, 20 mL, Infiltration, Once  metoprolol tartrate, 50 mg, Oral, Q12H  mirtazapine, 15 mg, Oral, Nightly  montelukast, 10 mg, Oral, Nightly  multivitamin with minerals, 1 tablet, Oral, Daily  pantoprazole, 40 mg, Oral, Daily  senna-docusate sodium, 2 tablet, Oral, BID         •  acetaminophen  •  dextrose  •  dextrose  •  glucagon (human recombinant)  •  heparin (porcine)  •  melatonin  •  nitroglycerin  •  ondansetron **OR** ondansetron  •  [COMPLETED] Insert peripheral IV **AND** sodium chloride    Family history:  Family History   Problem Relation Age of Onset   • Kidney disease Mother    • Heart disease Mother    • Emphysema Father    • Heart disease Brother        Social history:  Social History     Tobacco Use   • Smoking status: Never Smoker   • Smokeless tobacco: Never Used   •  Tobacco comment: caffeine use    Vaping Use   • Vaping Use: Never used   Substance Use Topics   • Alcohol use: No   • Drug use: No       Review of systems:      Positive for:  As per HPI  Negative for:  As per HPI    Objective:  TMax 24 hours:   Temp (24hrs), Av.9 °F (36.6 °C), Min:97.6 °F (36.4 °C), Max:98.4 °F (36.9 °C)      Vitals Ranges:   Temp:  [97.6 °F (36.4 °C)-98.4 °F (36.9 °C)] 98.4 °F (36.9 °C)  Heart Rate:  [] 94  Resp:  [18] 18  BP: ()/(65-82) 93/69    Intake/Output Last 3 shifts:  I/O last 3 completed shifts:  In: 840 [P.O.:840]  Out: 1500 [Urine:1500]     Physical Exam:    General Appearance:    Alert, cooperative, NAD   HEENT:    No trauma, pupils reactive, hearing intact   Back:     No CVA tenderness   Lungs:     Respirations unlabored, no wheezing    Heart:    RRR, intact peripheral pulses   Abdomen:     Soft, NDNT, no masses, no guarding. Ventral hernia without tenderness.   :    Pelvic not performed, bladder nondistended and nontender. Purewick in place. Urine yellow and clear   Extremities:   No edema, no deformity   Lymphatic:   No neck or groin LAD   Skin:   No bleeding, bruising or rashes   Neuro/Psych:   Orientation intact, mood/affect pleasant, no focal findings       Results review:   I reviewed the patient's new clinical results.    Data review:  Lab Results (last 24 hours)     Procedure Component Value Units Date/Time    Urine Culture - Urine, Urine, Clean Catch [[id number]]  (Abnormal)  (Susceptibility) Collected: 22 1726    Specimen: Urine, Clean Catch Updated: 22 06     Urine Culture 50,000 CFU/mL Enterococcus faecalis      <25,000 CFU/mL Mixed Colin Isolated    Narrative:      Specimen contains mixed organisms of questionable pathogenicity which indicates contamination with commensal colin.  Further identification is unlikely to provide clinically useful information.  Suggest recollection.    Susceptibility      Enterococcus faecalis      SHANDA       Ampicillin Susceptible      Levofloxacin Susceptible      Nitrofurantoin Susceptible      Tetracycline Resistant     Vancomycin Susceptible                    Linear View                   Renal Function Panel [[id number]]  (Abnormal) Collected: 05/05/22 0523    Specimen: Blood Updated: 05/05/22 0554     Glucose 157 mg/dL      BUN 43 mg/dL      Creatinine 1.69 mg/dL      Sodium 135 mmol/L      Potassium 4.4 mmol/L      Chloride 97 mmol/L      CO2 26.0 mmol/L      Calcium 9.2 mg/dL      Albumin 4.00 g/dL      Phosphorus 4.0 mg/dL      Anion Gap 12.0 mmol/L      BUN/Creatinine Ratio 25.4     eGFR 30.0 mL/min/1.73      Comment: National Kidney Foundation and American Society of Nephrology (ASN) Task Force recommended calculation based on the Chronic Kidney Disease Epidemiology Collaboration (CKD-EPI) equation refit without adjustment for race.       Narrative:      GFR Normal >60  Chronic Kidney Disease <60  Kidney Failure <15      CBC (No Diff) [[id number]]  (Abnormal) Collected: 05/05/22 0523    Specimen: Blood Updated: 05/05/22 0538     WBC 8.87 10*3/mm3      RBC 3.73 10*6/mm3      Hemoglobin 10.2 g/dL      Hematocrit 32.7 %      MCV 87.7 fL      MCH 27.3 pg      MCHC 31.2 g/dL      RDW 19.2 %      RDW-SD 60.4 fl      MPV 11.8 fL      Platelets 176 10*3/mm3     POC Glucose Once [[id number]]  (Abnormal) Collected: 05/05/22 0536    Specimen: Blood Updated: 05/05/22 0537     Glucose 171 mg/dL      Comment: Meter: [id number] : [id number] Sutton Shira NA       POC Glucose Once [[id number]]  (Abnormal) Collected: 05/04/22 1940    Specimen: Blood Updated: 05/04/22 1941     Glucose 259 mg/dL      Comment: Meter: [id number] : [id number] Sutton Shira NA       Occult Blood X 1, Stool - Stool, Per Rectum [[id number]]  (Abnormal) Collected: 05/04/22 1613    Specimen: Stool from Per Rectum Updated: 05/04/22 1700     Fecal Occult Blood Positive    POC Glucose Once [[id number]]  (Abnormal) Collected: 05/04/22 1632    Specimen: Blood  Updated: 05/04/22 1635     Glucose 138 mg/dL      Comment: Meter: [id number] : [id number] Nice Kortney NA       POC Glucose Once [[id number]]  (Abnormal) Collected: 05/04/22 1133    Specimen: Blood Updated: 05/04/22 1135     Glucose 169 mg/dL      Comment: Meter: [id number] : [id number] Nice Kortney NA              Imaging:  Imaging Results (Last 24 Hours)     Procedure Component Value Units Date/Time    CT Abdomen Pelvis Stone Protocol [[id number]] Collected: 05/04/22 1319     Updated: 05/04/22 1544    Narrative:      CT OF THE ABDOMEN AND PELVIS WITHOUT CONTRAST     HISTORY: Bladder mass. CHF, shortness of air, acute kidney injury with  hyperkalemia status post urgent dialysis. Anemia with hematuria.     TECHNIQUE: Radiation dose reduction techniques were utilized, including  automated exposure control and exposure modulation based on body size.   3 mm images were obtained through the abdomen and pelvis after the  administration of IV contrast.      COMPARISON: Ultrasound 05/03/2022, CT 03/02/2022.     FINDINGS:  Lower chest: Resolution of bilateral pleural effusions with pleural and  parenchymal scarring/atelectasis particularly in the right middle lobe.  Coarsely calcified right middle lobe nodule similar to the prior  favoring a granuloma. Nodular opacity in the lingula (series 2 image 12)  similar to the prior and continued follow-up with dedicated imaging of  the chest. There are a few tiny sub-6 mm nodules, unchanged with an  index nodule in the lateral right middle lobe (series 2 image 7),  unchanged. Atherosclerosis thoracoabdominal aorta and branch vessels  with calcifications about the mitral annulus. Cardiomegaly with trace  pericardial fluid and/or thickening.     Artifact from motion and overlying leads.     Liver: Morphologic changes suggesting chronic liver disease/cirrhosis.  Evaluation for underlying liver lesions is limited. Continued  surveillance with cirrhosis protocol imaging and  alpha-fetoprotein  recommended.     Biliary tract: Within normal limits.     Spleen: Mild splenomegaly.     Pancreas: Within normal limits.     Adrenals: Within normal limits.     Kidneys:  Stable indeterminate hypodense renal lesions the largest are  compatible with cysts and the others are technically indeterminate  including a slightly hyperdense 7 mm lesion in the interpolar left  kidney which could be followed on subsequent surveillance. Intrarenal  vascular calcifications. Peripherally calcified left renal artery  aneurysm approximating 5 mm and also likely a small aneurysm of the  right renal artery approximating 3 mm. No hydronephrosis.     Bowel:  Small hiatal hernia. No obstruction. Please ensure up-to-date  with colonoscopy. Normal appendix.     Peritoneum: No free fluid or free air.     Vasculature:    Moderate calcific atherosclerosis with dense  calcification particularly involving the origin of the renal arteries  right slightly greater than left.     Lymph Nodes:  Within normal limits.                                                            Pelvic organs: Uterus in situ. Calcified uterine leiomyomas and  periuterine vascular calcifications.     The bladder is incompletely distended. No focal wall thickening. No  discrete mass however evaluation for urothelial lesions is limited on  this noncontrast exam.     Abdominal/Pelvic Wall: Fat-containing periumbilical hernia similar in  appearance to the prior. Near complete resolution of anasarca.           Bones: Diffuse bone demineralization with multilevel degenerative  changes.     LIMITED NONCONTRAST CT IMPRESSION     2.  No appreciable bladder mass to correspond to the finding on recent  ultrasound which may represent a hematoma given lack of vascularity.  Cystoscopy or CT urogram could be considered to further evaluate for  bladder mass versus short-term follow-up with ultrasound.  3.   Morphologic changes of cirrhosis with mild splenomegaly.  4.   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  5.  Please see above for additional findings/recommendations.     This report was finalized on 5/4/2022 3:41 PM by Dr. Toni Cruz M.D.                Assessment:       Acute renal failure (HCC)    Urinary tract infection    Plan:     Gross hematuria possibly related to UTI and anticoagulation, though bladder cancer must be ruled out.  - Recommend completing 5 day course of amoxicillin for UTI.  - We will plan cystoscopy as an outpatient. My office will call to arrange  - Will sign off.    Jatinder Piper Jr., MD  05/05/22  07:16 EDT

## 2022-05-05 NOTE — PROGRESS NOTES
HOSPITAL FOLLOW UP NOTE    Patient Name: Miri Yung  Patient : 1940        Date of Service:22  Provider of Service: Jignesh York MD  Place of Service: [facility]  Referral Provider: Adele Preston, *          Follow Up: Atrial fibrillation  Interval Hx: Patient's rate under reasonably good control at this time.  She is not offering any significant complaints.        OBJECTIVE  Temp:  [97.6 °F (36.4 °C)-98.4 °F (36.9 °C)] 98.3 °F (36.8 °C)  Heart Rate:  [] 93  Resp:  [18] 18  BP: ()/(53-82) 96/53     Intake/Output Summary (Last 24 hours) at 2022 0747  Last data filed at 2022 0741  Gross per 24 hour   Intake 840 ml   Output --   Net 840 ml     Body mass index is 28.62 kg/m².      22  0452 22  0542   Weight: 72.5 kg (159 lb 14.8 oz) 72.6 kg (160 lb 0.9 oz) 70.5 kg (155 lb 8 oz)         Physical Exam:   Vitals reviewed.   Constitutional:       Appearance: Well-developed.   Eyes:      Pupils: Pupils are equal, round, and reactive to light.   HENT:      Head: Normocephalic.   Neck:      Thyroid: No thyromegaly.      Vascular: No carotid bruit or JVD.   Pulmonary:      Effort: Pulmonary effort is normal.      Breath sounds: Normal breath sounds.   Cardiovascular:      Normal rate. Irregularly irregular rhythm. Normal S1. Normal S2.      Murmurs: There is a grade 1/6 high frequency blowing holosystolic murmur at the apex.      No gallop.   Pulses:     Intact distal pulses.   Edema:     Peripheral edema absent.   Abdominal:      General: Bowel sounds are normal.      Palpations: Abdomen is soft.   Musculoskeletal:      Cervical back: Normal range of motion. Skin:     General: Skin is warm and dry.      Findings: No erythema.   Neurological:      Mental Status: Alert and oriented to person, place, and time.           CURRENT MEDS    Scheduled Meds:allopurinol, 200 mg, Oral, Daily  amoxicillin, 500 mg, Oral, Q12H  atorvastatin,  40 mg, Oral, Daily  bumetanide, 2 mg, Oral, BID  buPROPion SR, 150 mg, Oral, Daily With Breakfast  ferrous sulfate, 325 mg, Oral, Daily With Breakfast  heparin (porcine), 5,000 Units, Intravenous, Once  insulin lispro, 0-9 Units, Subcutaneous, TID AC  lidocaine, 20 mL, Infiltration, Once  metoprolol tartrate, 50 mg, Oral, Q12H  mirtazapine, 15 mg, Oral, Nightly  montelukast, 10 mg, Oral, Nightly  multivitamin with minerals, 1 tablet, Oral, Daily  pantoprazole, 40 mg, Oral, Daily  senna-docusate sodium, 2 tablet, Oral, BID      Continuous Infusions:       Lab Review:   Results from last 7 days   Lab Units 05/05/22  0523 05/04/22  0501 05/03/22  0002 05/02/22  1353   SODIUM mmol/L 135* 138   < > 133*   POTASSIUM mmol/L 4.4 4.4   < > 8.4*   CHLORIDE mmol/L 97* 102   < > 103   CO2 mmol/L 26.0 25.6   < > 17.2*   BUN mg/dL 43* 46*   < > 81*   CREATININE mg/dL 1.69* 1.73*   < > 2.95*   GLUCOSE mg/dL 157* 117*   < > 119*   CALCIUM mg/dL 9.2 8.9   < > 8.9   AST (SGOT) U/L  --  21  --  19   ALT (SGPT) U/L  --  27  --  22    < > = values in this interval not displayed.         Results from last 7 days   Lab Units 05/05/22  0523 05/04/22  0501   WBC 10*3/mm3 8.87 6.64   HEMOGLOBIN g/dL 10.2* 9.3*   HEMATOCRIT % 32.7* 31.5*   PLATELETS 10*3/mm3 176 163         Results from last 7 days   Lab Units 05/03/22  0002   MAGNESIUM mg/dL 1.8         Results from last 7 days   Lab Units 05/02/22  1353   PROBNP pg/mL 892.0           I personally reviewed the patient's ECG and telemetry data    ASSESSMENT & PLAN    Acute renal failure (HCC)    1.  Acute kidney injury: Renal function improved.  Patient no longer hyperkalemic  2.  Paroxysmal atrial fibrillation: Improved rate controlled.  Continue to monitor blood pressure.  May need reduction in metoprolol  3.  Chronic diastolic heart failure: Diuretics per renal  4.  Pulmonary hypertension  5.  Mitral regurgitation: Mild to moderate  6.  Anemia: Hemoglobin stable today.  Anticoagulation on  hold.  7.  Diabetes mellitus, type II      Jignesh York MD  05/05/22

## 2022-05-05 NOTE — THERAPY EVALUATION
Patient Name: Miri Yung  : 1940    MRN: [id number]                              Today's Date: 2022       Admit Date: 2022    Visit Dx:     ICD-10-CM ICD-9-CM   1. Acute renal failure, unspecified acute renal failure type (HCC)  N17.9 584.9   2. Hyperkalemia  E87.5 276.7   3. Gastrointestinal hemorrhage, unspecified gastrointestinal hemorrhage type  K92.2 578.9     Patient Active Problem List   Diagnosis   • Dementia (CMS/HCC)   • Depressive disorder   • Edema   • Abnormal liver function tests   • Gastroesophageal reflux disease   • Gout   • Hyperlipidemia   • HTN (hypertension)   • Insomnia   • Arthropathy of knee   • Memory loss   • Low back pain   • Mitral valve insufficiency   • Tricuspid valve insufficiency   • Urinary tract infection   • Valvular endocarditis   • Long QT interval   • Microcytic anemia   • Anemia   • Pulmonary hypertension (HCC)   • Seasonal allergies   • Acute cystitis without hematuria   • Chronic fatigue   • Bacterial endocarditis   • Slow transit constipation   • Acute ischemic stroke (HCC)   • Paroxysmal tachycardia (HCC)   • Type 2 diabetes mellitus (HCC)   • Longstanding persistent atrial fibrillation (HCC)   • Morbidly obese (HCC)   • Chronic diastolic (congestive) heart failure (HCC)   • Symptomatic anemia   • History of CVA (cerebrovascular accident)   • Long term current use of anticoagulant therapy   • Acute blood loss anemia   • Cirrhosis of liver (HCC)   • Esophageal varices (HCC)   • Splenomegaly due to portal hypertension and liver disease   • Anasarca   • Atrial fibrillation with RVR (HCC)   • Occult GI bleeding   • Aortic stenosis, mild   • Acute renal failure (HCC)     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   • Endocarditis     with mitral and  tricuspid regurgitation   • Esophageal reflux    • GERD (gastroesophageal reflux disease)    • Gout    • History of blood transfusion     due to anemia   • HTN (hypertension)    • Hyperlipidemia    • Insomnia    • LOM (loss of memory)    • Lung mass    • 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; generally asymptomatic   • Mitral regurgitation    • Mixed anxiety depressive disorder    • Nonrheumatic tricuspid (valve) insufficiency    • Osteoarthritis    • PAF (paroxysmal atrial fibrillation) (HCC)    • Sacroiliitis (HCC)    • Tricuspid regurgitation    • Type 2 diabetes mellitus (HCC)      Past Surgical History:   Procedure Laterality Date   • CATARACT EXTRACTION     • COLONOSCOPY N/A 3/10/2016    Procedure: COLONOSCOPY TO CECUM;  Surgeon: Shaan Becerra Jr., MD;  Location: [facility] ENDOSCOPY;  Service:    • COLONOSCOPY N/A 6/18/2021    Procedure: COLONOSCOPY TO CECUM AND INTO TERMINAL ILEUM;  Surgeon: Jeremy Darden MD;  Location: [facility] ENDOSCOPY;  Service: Gastroenterology;  Laterality: N/A;  PRE: ANEMIA  POST: HEMORRHOIDS   • ENDOSCOPY N/A 3/10/2016    Procedure: ESOPHAGOGASTRODUODENOSCOPY ;  Surgeon: Shaan Becerra Jr., MD;  Location: [facility] ENDOSCOPY;  Service:    • ENDOSCOPY N/A 6/18/2021    Procedure: ESOPHAGOGASTRODUODENOSCOPY;  Surgeon: Jeremy Darden MD;  Location: [facility] ENDOSCOPY;  Service: Gastroenterology;  Laterality: N/A;  PRE: ANEMIA  POST: HIATAL HERNIA, SMALL ESOPHAGEAL VARICES   • KNEE SURGERY     • TUBAL ABDOMINAL LIGATION        General Information     Row Name 05/05/22 1239          OT Time and Intention    Document Type therapy note (daily note)  -SM     Mode of Treatment occupational therapy;physical therapy;co-treatment  -     Row Name 05/05/22 123          General Information    Patient Profile Reviewed yes  -SM     Prior Level of Function independent:;ADL's;dependent:;all household mobility  -SM     Existing Precautions/Restrictions fall  -SM     Barriers  to Rehab none identified  -     Row Name 05/05/22 1239          Occupational Profile    Reason for Services/Referral (Occupational Profile) increased weakness at home, pt report too weak to go to the bathroom at home. OT eval today and engagement in ADLs completed.  -     Environmental Supports and Barriers (Occupational Profile) Home DME includes raised commode, shower chair, grab bars, she uses a rwx  -[facility] Name 05/05/22 1239          Living Environment    People in Home alone  -[facility] Name 05/05/22 1239          Cognition    Orientation Status (Cognition) oriented x 4  -           User Key  (r) = Recorded By, (t) = Taken By, (c) = Cosigned By    Initials Name Provider Type     Patsy Petersen, DIANE Occupational Therapist                 Mobility/ADL's     [facility] Name 05/05/22 1242          Bed Mobility    Supine-Sit [place] (Bed Mobility) standby assist  [facility]     Assistive Device (Bed Mobility) head of bed elevated  -[facility] Name 05/05/22 1242          Transfers    Sit-Stand [place] (Transfers) standby assist  -[facility] Name 05/05/22 1242          Sit-Stand Transfer    Assistive Device (Sit-Stand Transfers) walker, front-wheeled  -[facility] Name 05/05/22 1242          Functional Mobility    Functional Mobility- Ind. Level standby assist  [facility]     Functional Mobility- Device walker, front-wheeled  [facility]     Functional Mobility- Comment to/from bathroom, progressed to hallway for further household mobility  -[facility] Name 05/05/22 1242          Activities of Daily Living    BADL Assessment/Intervention lower body dressing;grooming;toileting  -[facility] Name 05/05/22 1242          Lower Body Dressing Assessment/Training    [place] Level (Lower Body Dressing) lower body dressing skills;don;socks;standby assist  [facility]     Position (Lower Body Dressing) edge of bed sitting  -     Row Name 05/05/22 1242          Grooming Assessment/Training    [place] Level (Grooming) grooming  skills;standby assist  -     Position (Grooming) sink side;supported standing  -     Row Name 05/05/22 1242          Toileting Assessment/Training    [place] Level (Toileting) toileting skills;supervision  -           User Key  (r) = Recorded By, (t) = Taken By, (c) = Cosigned By    Initials Name Provider Type    Patsy Celestin OT Occupational Therapist               Obj/Interventions     Row Name 05/05/22 1244          Range of Motion Comprehensive    General Range of Motion bilateral upper extremity ROM WFL  -SM     Row Name 05/05/22 1244          Strength Comprehensive (MMT)    Comment, General Manual Muscle Testing (MMT) Assessment BUE 4/5  -     Row Name 05/05/22 1244          Motor Skills    Motor Skills functional endurance  -     Functional Endurance good  -     Row Name 05/05/22 1244          Balance    Static Sitting Balance supervision  -SM     Position, Sitting Balance sitting edge of bed  -SM     Static Standing Balance standby assist  -     Dynamic Standing Balance standby assist  -SM     Position/Device Used, Standing Balance supported  -     Balance Interventions standing;occupation based/functional task  -           User Key  (r) = Recorded By, (t) = Taken By, (c) = Cosigned By    Initials Name Provider Type    Patsy Celestin OT Occupational Therapist               Goals/Plan     Row Name 05/05/22 1248          Problem Specific Goal 1 (OT)    Problem Specific Goal 1 (OT) Pt to verbalize safety with bathroom set up safety for ADL completetion and fall prevention.  -SM     Time Frame (Problem Specific Goal 1, OT) short term goal (STG);by discharge  -SM     Progress/Outcome (Problem Specific Goal 1, OT) goal met  -SM           User Key  (r) = Recorded By, (t) = Taken By, (c) = Cosigned By    Initials Name Provider Type    Patsy Celestin OT Occupational Therapist               Clinical Impression     Row Name 05/05/22 1244          Pain Assessment     Pretreatment Pain Rating 0/10 - no pain  -SM     Posttreatment Pain Rating 0/10 - no pain  -SM     Row Name 05/05/22 1244          Plan of Care Review    Plan of Care Reviewed With patient;son  -     Outcome Evaluation Pt is a 81 y.o female admitted to [facility] with generalized weakness, SOB, acute renal failure. HD cathether placed 5/2 and STAT diaylsis completed. Pt reports feeling much better today. She is able to complete her ADLs, transfers, and functional mobility SBA. She denies any fatique with activites. She has all needed DME at [place]. She appears at her functional baseline. She lives alone. Anticipate her to [place] back home, no further OT needs this date.  -     Row Name 05/05/22 1244          Therapy Assessment/Plan (OT)    Therapy Frequency (OT) evaluation only  -     Row Name 05/05/22 1244          Therapy Plan Review/Discharge Plan (OT)    Anticipated Discharge Disposition (OT) home  -     Row Name 05/05/22 1244          Positioning and Restraints    Pre-Treatment Position in bed  -     Post Treatment Position chair  -     In Chair reclined;call light within reach;encouraged to call for assist;exit alarm on;with family/caregiver;notified [facility]           User Key  (r) = Recorded By, (t) = Taken By, (c) = Cosigned By    Initials Name Provider Type    Patsy Celestin, OT Occupational Therapist               Outcome Measures     Row Name 05/05/22 1248          How much help from another is currently needed...    Putting on and taking off regular lower body clothing? 4  -SM     Bathing (including washing, rinsing, and drying) 4  -SM     Toileting (which includes using toilet bed pan or urinal) 4  -SM     Putting on and taking off regular upper body clothing 4  -SM     Taking care of personal grooming (such as brushing teeth) 4  -SM     Eating meals 4  -SM     AM-PAC 6 Clicks Score (OT) 24  -SM     Row Name 05/05/22 1140          How much help from another person do you currently need...    Turning  from your back to your side while in flat bed without using bedrails? 4  -EE     Moving from lying on back to sitting on the side of a flat bed without bedrails? 3  -EE     Moving to and from a bed to a chair (including a wheelchair)? 3  -EE     Standing up from a chair using your arms (e.g., wheelchair, bedside chair)? 3  -EE     Climbing 3-5 steps with a railing? 3  -EE     To walk in hospital room? 3  -EE     AM-PAC 6 Clicks Score (PT) 19  -EE     Highest level of mobility 6 --> Walked 10 steps or more  -EE     Row Name 05/05/22 1249          Functional Assessment    Outcome Measure Options AM-PAC 6 Clicks Daily Activity (OT)  -           User Key  (r) = Recorded By, (t) = Taken By, (c) = Cosigned By    Initials Name Provider Type    EE Beth Staton, PT Physical Therapist    Patsy Celestin, OT Occupational Therapist                Occupational Therapy Education                 Title: PT OT SLP Therapies (In Progress)     Topic: Occupational Therapy (In Progress)     Point: ADL training (Done)     Description:   Instruct learner(s) on proper safety adaptation and remediation techniques during self care or transfers.   Instruct in proper use of assistive devices.              Learning Progress Summary           Patient Acceptance, E, VU by  at 5/5/2022 1249    Comment: encouraged her to continue to get up and mobilize with nsg, use bathroom for funtional mobility with nsg staff, sitting up in the chair for meals.   Family Acceptance, E, VU by  at 5/5/2022 1249    Comment: encouraged her to continue to get up and mobilize with nsg, use bathroom for funtional mobility with nsg staff, sitting up in the chair for meals.                   Point: Home exercise program (Not Started)     Description:   Instruct learner(s) on appropriate technique for monitoring, assisting and/or progressing therapeutic exercises/activities.              Learner Progress:  Not documented in this visit.          Point:  Precautions (Not Started)     Description:   Instruct learner(s) on prescribed precautions during self-care and functional transfers.              Learner Progress:  Not documented in this visit.          Point: Body mechanics (Not Started)     Description:   Instruct learner(s) on proper positioning and spine alignment during self-care, functional mobility activities and/or exercises.              Learner Progress:  Not documented in this visit.                      User Key     Initials Effective Dates Name Provider Type Discipline     04/02/20 -  Patsy Petersen OT Occupational Therapist OT              OT Recommendation and Plan  Therapy Frequency (OT): evaluation only  Plan of Care Review  Plan of Care Reviewed With: patient, son  Outcome Evaluation: Pt is a 81 y.o female admitted to [facility] with generalized weakness, SOB, acute renal failure. HD cathether placed 5/2 and STAT diaylsis completed. Pt reports feeling much better today. She is able to complete her ADLs, transfers, and functional mobility SBA. She denies any fatique with activites. She has all needed DME at [place]. She appears at her functional baseline. She lives alone. Anticipate her to [place] back home, no further OT needs this date.     Time Calculation:    Time Calculation- OT     Row Name 05/05/22 1250 05/05/22 1141          Time Calculation- OT    OT Start Time 1016  -SM --     OT Stop Time 1033  -SM --     OT Time Calculation (min) 17 min  -SM --     OT Received On 05/05/22 -SM --     OT - Next Appointment 05/06/22 -SM --     OT Goal Re-Cert Due Date 05/19/22 -SM --            Timed Charges    08842 - Gait Training Minutes  -- 15  -EE     45945 - OT Self Care/Mgmt Minutes 10  -SM --            Untimed Charges    OT Eval/Re-eval Minutes 7  -SM --            Total Minutes    Timed Charges Total Minutes 10  -SM 15  -EE     Untimed Charges Total Minutes 7  -SM --      Total Minutes 17  -SM 15  -EE           User Key  (r) = Recorded By, (t) = Taken  By, (c) = Cosigned By    Initials Name Provider Type    EE Beth Staton, PT Physical Therapist    SM Patsy Petersen, OT Occupational Therapist              Therapy Charges for Today     Code Description Service Date Service Provider Modifiers Qty    57683124993 HC OT SELF CARE/MGMT/TRAIN EA 15 MIN 5/5/2022 Patsy Petersen OT GO 1    [id number]  OT EVAL LOW COMPLEXITY 2 5/5/2022 Patsy Petersen OT GO 1               Patsy Petersen OT  5/5/2022

## 2022-05-05 NOTE — PROGRESS NOTES
[facility] Progress Note      Patient Name: Miri Yung  : 1940  MRN: [id number]  Primary Care Physician:  Loren Cruz APRN  Date of admission: 2022    Subjective     Interval History:   Follow up YOANA  No shortness of breath on room air  No dysuria despite recent gross hematuria  Appetite fine; no N/V  HD catheter removed yesterday    Review of Systems:   As noted above    Objective     Vitals:   Temp:  [97.6 °F (36.4 °C)-98.4 °F (36.9 °C)] 98.3 °F (36.8 °C)  Heart Rate:  [] 97  Resp:  [18] 18  BP: ()/(53-82) 118/60    Intake/Output Summary (Last 24 hours) at 2022 1103  Last data filed at 2022 0955  Gross per 24 hour   Intake 720 ml   Output --   Net 720 ml       Physical Exam:    General Appearance: alert, oriented x 3, no acute distress   Skin: warm and dry  HEENT: oral mucosa normal, nonicteric sclera  Neck: supple, no JVD  Lungs: Coarse but essentially clear; no wheezing; not labored on RA  Heart: Regularly irregular, tachycardic; no rub  Abdomen: soft, nontender, nondistended,  + bs, large umbilical hernia  : Sternal urinary catheter  Extremities: no significant edema  Psychiatric: She is fully oriented  Neuro: normal speech and mental status     Scheduled Meds:     allopurinol, 200 mg, Oral, Daily  amoxicillin, 500 mg, Oral, Q12H  apixaban, 5 mg, Oral, Q12H  atorvastatin, 40 mg, Oral, Daily  bumetanide, 2 mg, Oral, BID  buPROPion SR, 150 mg, Oral, Daily With Breakfast  ferrous sulfate, 325 mg, Oral, Daily With Breakfast  heparin (porcine), 5,000 Units, Intravenous, Once  insulin lispro, 0-9 Units, Subcutaneous, TID AC  lidocaine, 20 mL, Infiltration, Once  metoprolol tartrate, 50 mg, Oral, Q12H  mirtazapine, 15 mg, Oral, Nightly  montelukast, 10 mg, Oral, Nightly  multivitamin with minerals, 1 tablet, Oral, Daily  pantoprazole, 40 mg, Oral, Daily  senna-docusate sodium, 2 tablet, Oral, BID      IV Meds:        Results Reviewed:   I have  personally reviewed the results from the time of this admission to 5/5/2022 11:03 EDT     Results from last 7 days   Lab Units 05/05/22  0523 05/04/22  0501 05/03/22  0436 05/03/22  0002 05/02/22  1353   SODIUM mmol/L 135* 138 136  --  133*   POTASSIUM mmol/L 4.4 4.4 5.3*   < > 8.4*   CHLORIDE mmol/L 97* 102 102  --  103   CO2 mmol/L 26.0 25.6 24.0  --  17.2*   BUN mg/dL 43* 46* 33*  --  81*   CREATININE mg/dL 1.69* 1.73* 1.47*  --  2.95*   CALCIUM mg/dL 9.2 8.9 8.5*  --  8.9   BILIRUBIN mg/dL  --  0.5  --   --  0.3   ALK PHOS U/L  --  94  --   --  88   ALT (SGPT) U/L  --  27  --   --  22   AST (SGOT) U/L  --  21  --   --  19   GLUCOSE mg/dL 157* 117* 92  --  119*    < > = values in this interval not displayed.       Estimated Creatinine Clearance: 23.5 mL/min (A) (by C-G formula based on SCr of 1.69 mg/dL (H)).    Results from last 7 days   Lab Units 05/05/22 0523 05/04/22  0501 05/03/22  0002   MAGNESIUM mg/dL  --   --  1.8   PHOSPHORUS mg/dL 4.0 3.7  --              Results from last 7 days   Lab Units 05/05/22 0523 05/04/22 0501 05/03/22 2102 05/03/22 0436 05/02/22  1353   WBC 10*3/mm3 8.87 6.64  --  5.73 9.59   HEMOGLOBIN g/dL 10.2* 9.3* 8.2* 7.0* 7.8*   PLATELETS 10*3/mm3 176 163  --  149 218             Assessment / Plan     ASSESSMENT:  1.  YOANA, nonoliguric, and hyperkalemia, resolving and likely due to loss of autoregulation while on ARB, potassium supplement, and spironolactone.   SP dialysis x1 5/2, with HD catheter removed 5/4.  SCM normal and SCR stable today.     2.  Anemia.  Iron deficiency.  Hg stable after one unit PRBC yesterday.    3.  Afib RVR. Currently off anticoagulation. Cardiology stopped coreg and changed to metoprolol.   4.  Chronic diastolic heart failure and pulmonary hypertension  5.  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 Currently on protonix drip.  6.  Gross hematuria:  has evaluated; cystoscopy planned, the likely as an outpatient    PLAN:  1.  Continue oral  bumetanide  2.  Hopefully home soon; will arrange outpatient follow-up in my clinic    Carlo Shine MD  05/05/22  11:03 EDT    [facility]  [phone]

## 2022-05-05 NOTE — PROGRESS NOTES
Name: Miri Yung ADMIT: 2022   : 1940  PCP: Loren Cruz APRN    MRN: [id number] LOS: 3 days   AGE/SEX: 81 y.o. female  ROOM: [facility]/     Subjective   Subjective   This is a 81-year-old woman with a past medical history of stroke, atrial fibrillation, CHF, dementia, type 2 diabetes who presented to the hospital with acute onset shortness of air, confusion, and weakness.  Initial laboratory evaluation revealed acute kidney injury with hyperkalemia and she underwent urgent dialysis after having a Shiley catheter placed by vascular surgery.  Her hemoglobin also dropped to 7.0.  Per nursing report, hematuria was noted in her urine collection.    :  Renal ultrasound with echogenic structure concerning for hematoma or possible lesion.  Urology has been consulted.  Creatinine increased from 1.4-1.7 today.  Her Coreg has been discontinued and she has been started on metoprolol tartrate 50 mg every 12 hours for A. fib with RVR.  Hb improved to 9.3 (up from 7.0 after 1 unit of pRBCs)  FBOT+, however I am not convinced that this is from stool and seepage of urine into her rectum in the setting of recent gross hematuria could also result in a positive test.          Doing well.  Hematuria has stopped.  Hemoglobin is normal.    Review of Systems      GENERAL: No fevers, chills, sweats.    RESPIRATORY: No cough, shortness of breath, hemoptysis or wheezing.   CVS: No chest pain, palpitations, orthopnea, dyspnea on exertion   GI: no n/v, no diarrhea or constipation      Objective   Objective   Vital Signs  Temp:  [97.6 °F (36.4 °C)-98.4 °F (36.9 °C)] 98.3 °F (36.8 °C)  Heart Rate:  [] 97  Resp:  [18] 18  BP: ()/(53-82) 118/60  SpO2:  [92 %-97 %] 93 %  on   ;   Device (Oxygen Therapy): room air  Body mass index is 28.62 kg/m².  Physical Exam  General: Oriented x3, no acute distress  CV: Irregularly irregular, tachycardic  CV: Clear to auscultation bilaterally  Abdomen: Soft, nontender,  nondistended.  Extremities: Right femoral Shiley in place  Extremities: No edema  Neuro: Oriented x3, no gross FND appreciated   Results Review     I reviewed the patient's new clinical results.  Results from last 7 days   Lab Units 05/05/22 0523 05/04/22 0501 05/03/22 2102 05/03/22 0436 05/02/22  1353   WBC 10*3/mm3 8.87 6.64  --  5.73 9.59   HEMOGLOBIN g/dL 10.2* 9.3* 8.2* 7.0* 7.8*   PLATELETS 10*3/mm3 176 163  --  149 218     Results from last 7 days   Lab Units 05/05/22 0523 05/04/22 0501 05/03/22 0436 05/03/22 0002 05/02/22  1353   SODIUM mmol/L 135* 138 136  --  133*   POTASSIUM mmol/L 4.4 4.4 5.3* 4.5 8.4*   CHLORIDE mmol/L 97* 102 102  --  103   CO2 mmol/L 26.0 25.6 24.0  --  17.2*   BUN mg/dL 43* 46* 33*  --  81*   CREATININE mg/dL 1.69* 1.73* 1.47*  --  2.95*   GLUCOSE mg/dL 157* 117* 92  --  119*   Estimated Creatinine Clearance: 23.5 mL/min (A) (by C-G formula based on SCr of 1.69 mg/dL (H)).  Results from last 7 days   Lab Units 05/05/22 0523 05/04/22 0501 05/02/22  1353   ALBUMIN g/dL 4.00 3.90 3.80   BILIRUBIN mg/dL  --  0.5 0.3   ALK PHOS U/L  --  94 88   AST (SGOT) U/L  --  21 19   ALT (SGPT) U/L  --  27 22     Results from last 7 days   Lab Units 05/05/22 0523 05/04/22 0501 05/03/22 0436 05/03/22 0002 05/02/22  1353   CALCIUM mg/dL 9.2 8.9 8.5*  --  8.9   ALBUMIN g/dL 4.00 3.90  --   --  3.80   MAGNESIUM mg/dL  --   --   --  1.8  --    PHOSPHORUS mg/dL 4.0 3.7  --   --   --        COVID19   Date Value Ref Range Status   05/02/2022 Not Detected Not Detected - Ref. Range Final   03/04/2022 Not Detected Not Detected - Ref. Range Final     Glucose   Date/Time Value Ref Range Status   05/05/2022 1115 190 (H) 70 - 130 mg/dL Final     Comment:     Meter: [id number] : [id number] Ronel Campos    05/05/2022 0536 171 (H) 70 - 130 mg/dL Final     Comment:     Meter: [id number] : [id number] Herman Silva    05/04/2022 1940 259 (H) 70 - 130 mg/dL Final     Comment:     Meter: [id number]  : [id number] Herman Silva NA   05/04/2022 1632 138 (H) 70 - 130 mg/dL Final     Comment:     Meter: [id number] : [id number] Tex Sheets NA   05/04/2022 1133 169 (H) 70 - 130 mg/dL Final     Comment:     Meter: [id number] : [id number] Tex Sheets NA   05/03/2022 1125 165 (H) 70 - 130 mg/dL Final     Comment:     Meter: [id number] : [id number] Tex Sheets NA       CT Abdomen Pelvis Stone Protocol  CT OF THE ABDOMEN AND PELVIS WITHOUT CONTRAST     HISTORY: Bladder mass. CHF, shortness of air, acute kidney injury with  hyperkalemia status post urgent dialysis. Anemia with hematuria.     TECHNIQUE: Radiation dose reduction techniques were utilized, including  automated exposure control and exposure modulation based on body size.   3 mm images were obtained through the abdomen and pelvis after the  administration of IV contrast.      COMPARISON: Ultrasound 05/03/2022, CT 03/02/2022.     FINDINGS:  Lower chest: Resolution of bilateral pleural effusions with pleural and  parenchymal scarring/atelectasis particularly in the right middle lobe.  Coarsely calcified right middle lobe nodule similar to the prior  favoring a granuloma. Nodular opacity in the lingula (series 2 image 12)  similar to the prior and continued follow-up with dedicated imaging of  the chest. There are a few tiny sub-6 mm nodules, unchanged with an  index nodule in the lateral right middle lobe (series 2 image 7),  unchanged. Atherosclerosis thoracoabdominal aorta and branch vessels  with calcifications about the mitral annulus. Cardiomegaly with trace  pericardial fluid and/or thickening.     Artifact from motion and overlying leads.     Liver: Morphologic changes suggesting chronic liver disease/cirrhosis.  Evaluation for underlying liver lesions is limited. Continued  surveillance with cirrhosis protocol imaging and alpha-fetoprotein  recommended.     Biliary tract: Within normal limits.     Spleen: Mild splenomegaly.      Pancreas: Within normal limits.     Adrenals: Within normal limits.     Kidneys:  Stable indeterminate hypodense renal lesions the largest are  compatible with cysts and the others are technically indeterminate  including a slightly hyperdense 7 mm lesion in the interpolar left  kidney which could be followed on subsequent surveillance. Intrarenal  vascular calcifications. Peripherally calcified left renal artery  aneurysm approximating 5 mm and also likely a small aneurysm of the  right renal artery approximating 3 mm. No hydronephrosis.     Bowel:  Small hiatal hernia. No obstruction. Please ensure up-to-date  with colonoscopy. Normal appendix.     Peritoneum: No free fluid or free air.     Vasculature:    Moderate calcific atherosclerosis with dense  calcification particularly involving the origin of the renal arteries  right slightly greater than left.     Lymph Nodes:  Within normal limits.                                                            Pelvic organs: Uterus in situ. Calcified uterine leiomyomas and  periuterine vascular calcifications.     The bladder is incompletely distended. No focal wall thickening. No  discrete mass however evaluation for urothelial lesions is limited on  this noncontrast exam.     Abdominal/Pelvic Wall: Fat-containing periumbilical hernia similar in  appearance to the prior. Near complete resolution of anasarca.           Bones: Diffuse bone demineralization with multilevel degenerative  changes.     LIMITED NONCONTRAST CT IMPRESSION     2.  No appreciable bladder mass to correspond to the finding on recent  ultrasound which may represent a hematoma given lack of vascularity.  Cystoscopy or CT urogram could be considered to further evaluate for  bladder mass versus short-term follow-up with ultrasound.  3.   Morphologic changes of cirrhosis with mild splenomegaly.  4.  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  5.  Please see above for additional  findings/recommendations.     This report was finalized on 5/4/2022 3:41 PM by Dr. Toni Cruz M.D.       Scheduled Medications  allopurinol, 200 mg, Oral, Daily  amoxicillin, 500 mg, Oral, Q12H  apixaban, 5 mg, Oral, Q12H  atorvastatin, 40 mg, Oral, Daily  bumetanide, 2 mg, Oral, BID  buPROPion SR, 150 mg, Oral, Daily With Breakfast  ferrous sulfate, 325 mg, Oral, Daily With Breakfast  heparin (porcine), 5,000 Units, Intravenous, Once  insulin lispro, 0-9 Units, Subcutaneous, TID AC  lidocaine, 20 mL, Infiltration, Once  metoprolol tartrate, 50 mg, Oral, Q12H  mirtazapine, 15 mg, Oral, Nightly  montelukast, 10 mg, Oral, Nightly  multivitamin with minerals, 1 tablet, Oral, Daily  pantoprazole, 40 mg, Oral, Daily  senna-docusate sodium, 2 tablet, Oral, BID    Infusions   Diet  Diet Regular; Cardiac, Consistent Carbohydrate, Renal       Assessment/Plan     Active Hospital Problems    Diagnosis  POA   • Acute renal failure (HCC) [N17.9]  Yes      Resolved Hospital Problems   No resolved problems to display.       81 y.o. female admitted with <principal problem not specified>.    Anemia 2/2  Iron deficiency  Improved with 1 unit of PRBCs.  Hemoglobin today is 9.3  FBOT positive. No signs of active bleeding.   however I am not convinced that this is from stool and seepage of urine into her rectum in the setting of recent gross hematuria could also result in a positive test.  No urgent indication for endoscopy, especially since she is hemodynamically stable and her hemoglobin is stable.    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 Protonix    Renal failure  Hyperkalemia  Hematuria  Nephrology consulted  UA with gross hematuria. FBOT positive   Required Shiley catheter placement as well as dialysis x1  Bumex ordered, 2 mg twice daily per nephrology. Continue   Trend potassium    A. fib with RVR  Diastolic heart failure  Cardiology consulted  Coreg d/c'd due to low Bps.  Started on metoprolol tartrate for rate control  Continue Imdur, atorvastatin  Restarting Eliquis today.  If there are no further signs of bleeding and hemoglobin is stable tomorrow morning, will likely discharge    Type 2 diabetes  Currently on Tradjenta. Blood sugars well controlled.   Will discontinue and switch to sliding scale     Bladder mass  First noted on kidney ultrasound on 5/3  Was not evident on CT scan  Urology has been consulted, likely cystoscopy as an outpatient      · Eliquis  for DVT prophylaxis.  · Full code.  · Discussed with patient and nursing staff.  · Anticipate discharge home with family later this week.      Marcos Villalta MD  [facility]  05/05/22  11:41 EDT     I wore protective equipment throughout this patient encounter including a face mask, gloves and protective eyewear.  Hand hygiene was performed before donning protective equipment and after removal when leaving the room.

## 2022-05-06 PROBLEM — G93.41 METABOLIC ENCEPHALOPATHY: Status: RESOLVED | Noted: 2022-01-01 | Resolved: 2022-01-01

## 2022-05-06 PROBLEM — E87.5 HYPERKALEMIA: Status: RESOLVED | Noted: 2022-01-01 | Resolved: 2022-01-01

## 2022-05-06 PROBLEM — G93.41 METABOLIC ENCEPHALOPATHY: Status: ACTIVE | Noted: 2022-01-01

## 2022-05-06 PROBLEM — R31.9 HEMATURIA: Status: ACTIVE | Noted: 2022-01-01

## 2022-05-06 PROBLEM — E87.5 HYPERKALEMIA: Status: ACTIVE | Noted: 2022-01-01

## 2022-05-06 NOTE — OUTREACH NOTE
Prep Survey    Flowsheet Row Responses   [facility] patient discharged from? [place]   Is LACE score < 7 ? No   Emergency Room discharge w/ pulse ox? No   Eligibility [facility]   Date of Admission 05/02/22   Date of Discharge 05/06/22   Discharge Disposition Home or Self Care   Discharge diagnosis Acute renal failure   CHF   Does the patient have one of the following disease processes/diagnoses(primary or secondary)? CHF   Does the patient have Home health ordered? Yes   What is the Home health agency?   [facility]   Is there a DME ordered? Yes   Prep survey completed? Yes          CHRIST RAMIREZ - Registered Nurse

## 2022-05-06 NOTE — PROGRESS NOTES
HOSPITAL FOLLOW UP NOTE    Patient Name: Miri Yung  Patient : 1940        Date of Service:22  Provider of Service: KWASI Christensen  Place of Service: [facility]  Referral Provider: Adele Preston, *          Follow Up: Atrial fibrillation    Interval Hx: No complaints of chest pain, dyspnea or palpitations.  A. fib rate controlled.  Back on Eliquis with no issues.          OBJECTIVE  Temp:  [97.5 °F (36.4 °C)-98 °F (36.7 °C)] 98 °F (36.7 °C)  Heart Rate:  [84-97] 97  Resp:  [16-18] 16  BP: ()/(45-62) 104/57     Intake/Output Summary (Last 24 hours) at 2022 0834  Last data filed at 2022 0318  Gross per 24 hour   Intake 1410 ml   Output 500 ml   Net 910 ml     Body mass index is 28.65 kg/m².      22  0452 22  0542 22  0318   Weight: 72.6 kg (160 lb 0.9 oz) 70.5 kg (155 lb 8 oz) 70.6 kg (155 lb 11.2 oz)         Physical Exam:     General Appearance:    Alert, cooperative, in no acute distress           Neck:   supple, trachea midline, no thyromegaly, no   carotid bruit, no JVD   Lungs:     Clear to auscultation,respirations regular, even and unlabored    Heart:    Irregular rhythm/ rate, normal S1 and S2, + murmur, no gallop, no rub, no click       Abdomen:     Normal bowel sounds, no masses, no organomegaly, soft, nontender, nondistended, no guarding, no rebound  tenderness   Extremities:   Moves all extremities well, trace bilateral lower extremity edema, no cyanosis, no redness   Pulses:   Pulses palpable and equal bilaterally.          CURRENT MEDS    Scheduled Meds:allopurinol, 200 mg, Oral, Daily  amoxicillin, 500 mg, Oral, Q12H  apixaban, 5 mg, Oral, Q12H  atorvastatin, 40 mg, Oral, Daily  bumetanide, 2 mg, Oral, BID  buPROPion SR, 150 mg, Oral, Daily With Breakfast  ferrous sulfate, 325 mg, Oral, Daily With Breakfast  heparin (porcine), 5,000 Units, Intravenous, Once  insulin lispro, 0-9 Units, Subcutaneous, TID AC  lidocaine,  20 mL, Infiltration, Once  metoprolol tartrate, 50 mg, Oral, Q12H  mirtazapine, 15 mg, Oral, Nightly  montelukast, 10 mg, Oral, Nightly  multivitamin with minerals, 1 tablet, Oral, Daily  pantoprazole, 40 mg, Oral, Daily  senna-docusate sodium, 2 tablet, Oral, BID      Continuous Infusions:       Lab Review:   Results from last 7 days   Lab Units 05/06/22  0556 05/05/22  0523 05/04/22  0501 05/03/22  0002 05/02/22  1353   SODIUM mmol/L 137 135* 138   < > 133*   POTASSIUM mmol/L 4.2 4.4 4.4   < > 8.4*   CHLORIDE mmol/L 99 97* 102   < > 103   CO2 mmol/L 24.9 26.0 25.6   < > 17.2*   BUN mg/dL 53* 43* 46*   < > 81*   CREATININE mg/dL 1.48* 1.69* 1.73*   < > 2.95*   GLUCOSE mg/dL 147* 157* 117*   < > 119*   CALCIUM mg/dL 9.0 9.2 8.9   < > 8.9   AST (SGOT) U/L  --   --  21  --  19   ALT (SGPT) U/L  --   --  27  --  22    < > = values in this interval not displayed.         Results from last 7 days   Lab Units 05/06/22  0556 05/05/22  0523   WBC 10*3/mm3 9.27 8.87   HEMOGLOBIN g/dL 9.9* 10.2*   HEMATOCRIT % 31.4* 32.7*   PLATELETS 10*3/mm3 181 176         Results from last 7 days   Lab Units 05/03/22  0002   MAGNESIUM mg/dL 1.8         Results from last 7 days   Lab Units 05/02/22  1353   PROBNP pg/mL 892.0               ASSESSMENT & PLAN    Acute renal failure (HCC)    1.  Acute kidney injury: Followed by nephrology, improving  2.  Paroxysmal atrial fibrillation: Rate controlled with metoprolol, anticoagulation with Eliquis  3.  Chronic diastolic heart failure: Diuretics per renal  4.  Pulmonary hypertension  5.  Mitral regurgitation: Mild to moderate  6.  Anemia: Hemoglobin stable.  No signs or symptoms of blood loss.  Eliquis restarted yesterday  7.  Diabetes type 2    OK to discharge from a cardiac standpoint.  Follow-up with KWASI Ríos in 2 weeks.      KWASI Christensen  05/06/22

## 2022-05-06 NOTE — CASE MANAGEMENT/SOCIAL WORK
Continued Stay Note  [facility]     Patient Name: Miri Yung  MRN: [id number]  Today's Date: 5/6/2022    Admit Date: 5/2/2022     Discharge Plan     Row Name 05/06/22 1316       Plan    Plan Comments I spoke with the patient at bedside. She was hoping to go home today.  She denied any needs at discharge and will go home with family and [facility] for .  IMM updated and copy provided.  CM will follow through discharge.               Discharge Codes    No documentation.               Expected Discharge Date and Time     Expected Discharge Date Expected Discharge Time    May 6, 2022             Crystal Mckee RN

## 2022-05-06 NOTE — THERAPY DISCHARGE NOTE
Patient Name: Miri Yung  : 1940    MRN: [id number]                              Today's Date: 2022       Admit Date: 2022    Visit Dx:     ICD-10-CM ICD-9-CM   1. Acute renal failure, unspecified acute renal failure type (HCC)  N17.9 584.9   2. Hyperkalemia  E87.5 276.7   3. Gastrointestinal hemorrhage, unspecified gastrointestinal hemorrhage type  K92.2 578.9     Patient Active Problem List   Diagnosis   • Dementia (CMS/HCC)   • Depressive disorder   • Edema   • Abnormal liver function tests   • Gastroesophageal reflux disease   • Gout   • Hyperlipidemia   • HTN (hypertension)   • Insomnia   • Arthropathy of knee   • Memory loss   • Low back pain   • Mitral valve insufficiency   • Tricuspid valve insufficiency   • Urinary tract infection   • Valvular endocarditis   • Long QT interval   • Microcytic anemia   • Anemia   • Pulmonary hypertension (HCC)   • Seasonal allergies   • Acute cystitis without hematuria   • Chronic fatigue   • Bacterial endocarditis   • Slow transit constipation   • Acute ischemic stroke (HCC)   • Paroxysmal tachycardia (HCC)   • Type 2 diabetes mellitus (HCC)   • Longstanding persistent atrial fibrillation (HCC)   • Morbidly obese (HCC)   • Chronic diastolic (congestive) heart failure (HCC)   • Symptomatic anemia   • History of CVA (cerebrovascular accident)   • Long term current use of anticoagulant therapy   • Acute blood loss anemia   • Cirrhosis of liver (HCC)   • Esophageal varices (HCC)   • Splenomegaly due to portal hypertension and liver disease   • Anasarca   • Atrial fibrillation with RVR (HCC)   • Occult GI bleeding   • Aortic stenosis, mild   • Acute renal failure (HCC)   • Hematuria     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   • Endocarditis      with mitral and tricuspid regurgitation   • Esophageal reflux    • GERD (gastroesophageal reflux disease)    • Gout    • History of blood transfusion     due to anemia   • HTN (hypertension)    • Hyperlipidemia    • Insomnia    • LOM (loss of memory)    • Lung mass    • 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; generally asymptomatic   • Mitral regurgitation    • Mixed anxiety depressive disorder    • Nonrheumatic tricuspid (valve) insufficiency    • Osteoarthritis    • PAF (paroxysmal atrial fibrillation) (HCC)    • Sacroiliitis (HCC)    • Tricuspid regurgitation    • Type 2 diabetes mellitus (HCC)      Past Surgical History:   Procedure Laterality Date   • CATARACT EXTRACTION     • COLONOSCOPY N/A 3/10/2016    Procedure: COLONOSCOPY TO CECUM;  Surgeon: Shaan Becerra Jr., MD;  Location: [facility] ENDOSCOPY;  Service:    • COLONOSCOPY N/A 6/18/2021    Procedure: COLONOSCOPY TO CECUM AND INTO TERMINAL ILEUM;  Surgeon: Jeremy Darden MD;  Location: [facility] ENDOSCOPY;  Service: Gastroenterology;  Laterality: N/A;  PRE: ANEMIA  POST: HEMORRHOIDS   • ENDOSCOPY N/A 3/10/2016    Procedure: ESOPHAGOGASTRODUODENOSCOPY ;  Surgeon: Shaan Becerra Jr., MD;  Location: [facility] ENDOSCOPY;  Service:    • ENDOSCOPY N/A 6/18/2021    Procedure: ESOPHAGOGASTRODUODENOSCOPY;  Surgeon: Jeremy Darden MD;  Location: [facility] ENDOSCOPY;  Service: Gastroenterology;  Laterality: N/A;  PRE: ANEMIA  POST: HIATAL HERNIA, SMALL ESOPHAGEAL VARICES   • KNEE SURGERY     • TUBAL ABDOMINAL LIGATION        General Information     Row Name 05/06/22 1126          Physical Therapy Time and Intention    Document Type discharge treatment  -EE     Mode of Treatment physical therapy;individual therapy  -EE     Row Name 05/06/22 1126          General Information    Existing Precautions/Restrictions no known precautions/restrictions  -EE     Barriers to Rehab none identified  -EE     Row Name 05/06/22 1126          Cognition     Orientation Status (Cognition) oriented x 4  -EE           User Key  (r) = Recorded By, (t) = Taken By, (c) = Cosigned By    Initials Name Provider Type    EE Beth Staton, [place] Physical Therapist               Mobility     Row Name 05/06/22 1126          Bed Mobility    Comment, (Bed Mobility) not tested, pt sitting EOB prior to session, up in chair at end of session  -EE     Row Name 05/06/22 1126          Transfers    Comment, (Transfers) toilet transfer performed mod I with rwx and grab bar  -EE     Row Name 05/06/22 1126          Sit-Stand Transfer    Sit-Stand [place] (Transfers) modified independence  -EE     Assistive Device (Sit-Stand Transfers) walker, front-wheeled  -EE     Row Name 05/06/22 1126          Gait/Stairs (Locomotion)    [place] Level (Gait) modified independence  -EE     Assistive Device (Gait) walker, front-wheeled  -EE     Distance in Feet (Gait) 300'  -EE     Deviations/Abnormal Patterns (Gait) ismael decreased  -EE     Bilateral Gait Deviations forward flexed posture  -EE     Comment, (Gait/Stairs) no LOB or safety concerns with mobility  -EE           User Key  (r) = Recorded By, (t) = Taken By, (c) = Cosigned By    Initials Name Provider Type    EE Beth Staton PT Physical Therapist               Obj/Interventions     Row Name 05/06/22 1127          Balance    Static Sitting Balance independent  -EE     Position, Sitting Balance unsupported;sitting edge of bed  -EE     Static Standing Balance modified independence  -EE     Dynamic Standing Balance modified independence  -EE     Position/Device Used, Standing Balance supported;walker, rolling  -EE           User Key  (r) = Recorded By, (t) = Taken By, (c) = Cosigned By    Initials Name Provider Type    EE Beth Staton, [place] Physical Therapist               Goals/Plan     Row Name 05/06/22 1129          Bed Mobility Goal 1 (PT)    Activity/Assistive Device (Bed Mobility Goal 1, PT) sit to supine/supine to sit  -EE     [place]  Level/Cues Needed (Bed Mobility Goal 1, PT) modified independence  -EE     Progress/Outcomes (Bed Mobility Goal 1, PT) other (see comments)  not observed during PT but pt verbalized independence with bed mobility  -EE     Row Name 05/06/22 1129          Transfer Goal 1 (PT)    Activity/Assistive Device (Transfer Goal 1, PT) sit-to-stand/stand-to-sit;walker, rolling  -EE     [place] Level/Cues Needed (Transfer Goal 1, PT) modified independence  -EE     Progress/Outcome (Transfer Goal 1, PT) goal met  -EE     Row Name 05/06/22 1129          Gait Training Goal 1 (PT)    Activity/Assistive Device (Gait Training Goal 1, PT) gait (walking locomotion);walker, rolling  -EE     [place] Level (Gait Training Goal 1, PT) standby assist  -EE     Distance (Gait Training Goal 1, PT) 150  -EE     Progress/Outcome (Gait Training Goal 1, PT) goal met  -EE           User Key  (r) = Recorded By, (t) = Taken By, (c) = Cosigned By    Initials Name Provider Type    EE Beth Staton, PT Physical Therapist               Clinical Impression     Row Name 05/06/22 1127          Pain    Pretreatment Pain Rating 0/10 - no pain  -EE     Posttreatment Pain Rating 0/10 - no pain  -EE     Row Name 05/06/22 1127          Plan of Care Review    Plan of Care Reviewed With patient  -EE     Progress improving  -EE     Outcome Evaluation Pt demonstrates excellent progress with strength and balance. Pt more steady today and able to tolerate increased ambulation distance. Pt is ambulating independently with rwx and demonstrates no LOB or safety concerns with mobility. Pt has met all acute PT goals and is at baseline mobility. No further acute PT indicated; will sign off.  -EE     Row Name 05/06/22 1127          Positioning and Restraints    Pre-Treatment Position in bed  -EE     Post Treatment Position chair  -EE     In Chair sitting;call light within reach;encouraged to call for assist;exit alarm on  -EE           User Key  (r) = Recorded By, (t)  = Taken By, (c) = Cosigned By    Initials Name Provider Type    EE Beth Staton, [place] Physical Therapist               Outcome Measures     Row Name 05/06/22 1129          How much help from another person do you currently need...    Turning from your back to your side while in flat bed without using bedrails? 4  -EE     Moving from lying on back to sitting on the side of a flat bed without bedrails? 4  -EE     Moving to and from a bed to a chair (including a wheelchair)? 4  -EE     Standing up from a chair using your arms (e.g., wheelchair, bedside chair)? 4  -EE     Climbing 3-5 steps with a railing? 3  -EE     To walk in hospital room? 4  -EE     AM-PAC 6 Clicks Score (PT) 23  -EE     Highest level of mobility 7 --> Walked 25 feet or more  -EE           User Key  (r) = Recorded By, (t) = Taken By, (c) = Cosigned By    Initials Name Provider Type    EE Beth Staton PT Physical Therapist              Physical Therapy Education                 Title: PT OT SLP Therapies (In Progress)     Topic: Physical Therapy (Resolved)     Point: Mobility training (Resolved)     Learning Progress Summary           Patient Acceptance, E,TB, VU,DU by EE at 5/6/2022 1129    Acceptance, E,TB, VU,NR by EE at 5/5/2022 1140    Acceptance, E,D, VU,NR by EE at 5/3/2022 0848                   Point: Home exercise program (Resolved)     Learning Progress Summary           Patient Acceptance, E,D, VU,NR by  at 5/3/2022 0848                   Point: Body mechanics (Resolved)     Learner Progress:  Not documented in this visit.          Point: Precautions (Resolved)     Learning Progress Summary           Patient Acceptance, E,TB, VU by  at 5/5/2022 1510                               User Key     Initials Effective Dates Name Provider Type Discipline    EE 06/16/21 -  Beth Staton PT Physical Therapist PT    BB 06/01/21 -  Asuncion Ragsdale, RN Registered Nurse Nurse              PT Recommendation and Plan  Planned Therapy Interventions  (PT): balance training, bed mobility training, gait training, home exercise program, patient/family education, ROM (range of motion), stair training, strengthening, transfer training, postural re-education  Plan of Care Reviewed With: patient  Progress: improving  Outcome Evaluation: Pt demonstrates excellent progress with strength and balance. Pt more steady today and able to tolerate increased ambulation distance. Pt is ambulating independently with rwx and demonstrates no LOB or safety concerns with mobility. Pt has met all acute PT goals and is at baseline mobility. No further acute PT indicated; will sign off.     Time Calculation:    PT Charges     Row Name 05/06/22 1130             Time Calculation    Start Time 1111  -EE      Stop Time 1125  -EE      Time Calculation (min) 14 min  -EE      PT Received On 05/06/22  -EE              Time Calculation- PT    Total Timed Code Minutes- PT 14 minute(s)  -EE              Timed Charges    45405 - Gait Training Minutes  14  -EE              Total Minutes    Timed Charges Total Minutes 14  -EE       Total Minutes 14  -EE            User Key  (r) = Recorded By, (t) = Taken By, (c) = Cosigned By    Initials Name Provider Type    EE Beth Staton, PT Physical Therapist              Therapy Charges for Today     Code Description Service Date Service Provider Modifiers Qty    59489329080 HC GAIT TRAINING EA 15 MIN 5/5/2022 Beth Staton, PT GP 1    35334059821 HC GAIT TRAINING EA 15 MIN 5/6/2022 Beth Staton, PT GP 1          PT G-Codes  Outcome Measure Options: AM-PAC 6 Clicks Daily Activity (OT)  AM-PAC 6 Clicks Score (PT): 23  AM-PAC 6 Clicks Score (OT): 24    PT Discharge Summary  Anticipated Discharge Disposition (PT): home with assist  Reason for Discharge: All goals achieved, Independent      Patient was intermittently wearing a face mask during this therapy encounter. Therapist used appropriate personal protective equipment including mask and gloves.  Mask used was  standard procedure mask. Appropriate PPE was worn during the entire therapy session. Hand hygiene was completed before and after therapy session. Patient is not in enhanced droplet precautions.     Beth Staton, PT  5/6/2022

## 2022-05-06 NOTE — PLAN OF CARE
Problem: Restraint, Nonbehavioral (Nonviolent)  Goal: Absence of Harm or Injury  Outcome: Ongoing, Progressing  Intervention: Protect Dignity, Rights, and Personal Wellbeing  Recent Flowsheet Documentation  Taken 5/5/2022 2010 by Anaid Pate RN  Trust Relationship/Rapport:   care explained   questions answered   questions encouraged  Intervention: Protect Skin and Joint Integrity  Recent Flowsheet Documentation  Taken 5/6/2022 0400 by Anaid Pate RN  Body Position: position changed independently  Taken 5/6/2022 0200 by Anaid Pate RN  Body Position: position changed independently  Taken 5/6/2022 0000 by Anaid Pate RN  Body Position: position changed independently  Taken 5/5/2022 2200 by Anaid Pate RN  Body Position: position changed independently  Taken 5/5/2022 2010 by Anaid Pate RN  Body Position: position changed independently     Problem: Fall Injury Risk  Goal: Absence of Fall and Fall-Related Injury  Outcome: Ongoing, Progressing  Intervention: Identify and Manage Contributors  Recent Flowsheet Documentation  Taken 5/6/2022 0400 by Anaid Pate RN  Medication Review/Management: medications reviewed  Taken 5/6/2022 0200 by Anaid Pate RN  Medication Review/Management: medications reviewed  Taken 5/6/2022 0000 by Anaid Pate RN  Medication Review/Management: medications reviewed  Taken 5/5/2022 2200 by Anaid Pate RN  Medication Review/Management: medications reviewed  Taken 5/5/2022 2010 by Anaid Pate RN  Medication Review/Management: medications reviewed  Intervention: Promote Injury-Free Environment  Recent Flowsheet Documentation  Taken 5/6/2022 0400 by Anaid Pate RN  Safety Promotion/Fall Prevention:   activity supervised   fall prevention program maintained   nonskid shoes/slippers when out of bed   safety round/check completed  Taken 5/6/2022 0200 by Anaid Pate RN  Safety Promotion/Fall Prevention:   activity supervised   fall prevention program maintained    nonskid shoes/slippers when out of bed   safety round/check completed  Taken 5/6/2022 0000 by Anaid Pate RN  Safety Promotion/Fall Prevention:   activity supervised   fall prevention program maintained   nonskid shoes/slippers when out of bed   safety round/check completed  Taken 5/5/2022 2200 by Anaid Pate RN  Safety Promotion/Fall Prevention:   activity supervised   fall prevention program maintained   nonskid shoes/slippers when out of bed   safety round/check completed  Taken 5/5/2022 2010 by Anaid Pate RN  Safety Promotion/Fall Prevention:   activity supervised   fall prevention program maintained   nonskid shoes/slippers when out of bed   safety round/check completed     Problem: Adult Inpatient Plan of Care  Goal: Plan of Care Review  Outcome: Ongoing, Progressing  Goal: Patient-Specific Goal (Individualized)  Outcome: Ongoing, Progressing  Goal: Absence of Hospital-Acquired Illness or Injury  Outcome: Ongoing, Progressing  Intervention: Identify and Manage Fall Risk  Recent Flowsheet Documentation  Taken 5/6/2022 0400 by Anaid Pate RN  Safety Promotion/Fall Prevention:   activity supervised   fall prevention program maintained   nonskid shoes/slippers when out of bed   safety round/check completed  Taken 5/6/2022 0200 by Anaid Pate RN  Safety Promotion/Fall Prevention:   activity supervised   fall prevention program maintained   nonskid shoes/slippers when out of bed   safety round/check completed  Taken 5/6/2022 0000 by Anaid Pate RN  Safety Promotion/Fall Prevention:   activity supervised   fall prevention program maintained   nonskid shoes/slippers when out of bed   safety round/check completed  Taken 5/5/2022 2200 by Anaid Pate RN  Safety Promotion/Fall Prevention:   activity supervised   fall prevention program maintained   nonskid shoes/slippers when out of bed   safety round/check completed  Taken 5/5/2022 2010 by Anaid Pate RN  Safety Promotion/Fall Prevention:    activity supervised   fall prevention program maintained   nonskid shoes/slippers when out of bed   safety round/check completed  Intervention: Prevent Skin Injury  Recent Flowsheet Documentation  Taken 5/6/2022 0400 by Anaid Pate RN  Body Position: position changed independently  Taken 5/6/2022 0200 by Anaid Pate RN  Body Position: position changed independently  Taken 5/6/2022 0000 by Anaid Pate RN  Body Position: position changed independently  Taken 5/5/2022 2200 by Anaid Pate RN  Body Position: position changed independently  Taken 5/5/2022 2010 by Anaid Pate RN  Body Position: position changed independently  Intervention: Prevent and Manage VTE (Venous Thromboembolism) Risk  Recent Flowsheet Documentation  Taken 5/6/2022 0400 by Anaid Pate RN  Activity Management: activity adjusted per tolerance  Taken 5/6/2022 0200 by Anaid Pate RN  Activity Management: activity adjusted per tolerance  Taken 5/6/2022 0000 by Anaid Pate RN  Activity Management: activity adjusted per tolerance  Taken 5/5/2022 2200 by Anaid Pate RN  Activity Management: activity adjusted per tolerance  Taken 5/5/2022 2010 by Anaid Pate RN  Activity Management: activity adjusted per tolerance  VTE Prevention/Management:   bilateral   sequential compression devices on  Intervention: Prevent Infection  Recent Flowsheet Documentation  Taken 5/6/2022 0400 by Anaid Pate RN  Infection Prevention:   hand hygiene promoted   personal protective equipment utilized   rest/sleep promoted  Taken 5/6/2022 0200 by Anaid Pate RN  Infection Prevention:   hand hygiene promoted   personal protective equipment utilized   rest/sleep promoted  Taken 5/6/2022 0000 by Anaid Pate RN  Infection Prevention:   hand hygiene promoted   personal protective equipment utilized   rest/sleep promoted  Taken 5/5/2022 2200 by Anaid Pate RN  Infection Prevention:   hand hygiene promoted   personal protective equipment utilized    rest/sleep promoted  Taken 5/5/2022 2010 by Anaid Pate RN  Infection Prevention:   hand hygiene promoted   personal protective equipment utilized   rest/sleep promoted  Goal: Optimal Comfort and Wellbeing  Outcome: Ongoing, Progressing  Intervention: Provide Person-Centered Care  Recent Flowsheet Documentation  Taken 5/5/2022 2010 by Anaid Pate RN  Trust Relationship/Rapport:   care explained   questions answered   questions encouraged  Goal: Readiness for Transition of Care  Outcome: Ongoing, Progressing     Problem: Diabetes Comorbidity  Goal: Blood Glucose Level Within Targeted Range  Outcome: Ongoing, Progressing     Problem: Heart Failure Comorbidity  Goal: Maintenance of Heart Failure Symptom Control  Outcome: Ongoing, Progressing  Intervention: Maintain Heart Failure-Management  Recent Flowsheet Documentation  Taken 5/6/2022 0400 by Anaid Pate RN  Medication Review/Management: medications reviewed  Taken 5/6/2022 0200 by Anaid Pate RN  Medication Review/Management: medications reviewed  Taken 5/6/2022 0000 by Anaid Pate RN  Medication Review/Management: medications reviewed  Taken 5/5/2022 2200 by Anaid Pate RN  Medication Review/Management: medications reviewed  Taken 5/5/2022 2010 by Anaid Pate RN  Medication Review/Management: medications reviewed     Problem: Hypertension Comorbidity  Goal: Blood Pressure in Desired Range  Outcome: Ongoing, Progressing  Intervention: Maintain Blood Pressure Management  Recent Flowsheet Documentation  Taken 5/6/2022 0400 by Anaid Pate RN  Medication Review/Management: medications reviewed  Taken 5/6/2022 0200 by Anaid Pate RN  Medication Review/Management: medications reviewed  Taken 5/6/2022 0000 by Anaid Pate RN  Medication Review/Management: medications reviewed  Taken 5/5/2022 2200 by Anaid Pate RN  Medication Review/Management: medications reviewed  Taken 5/5/2022 2010 by Anaid Pate RN  Medication Review/Management:  medications reviewed     Problem: Skin Injury Risk Increased  Goal: Skin Health and Integrity  Outcome: Ongoing, Progressing  Intervention: Optimize Skin Protection  Recent Flowsheet Documentation  Taken 5/6/2022 0400 by Anaid Pate RN  Head of Bed (HOB) Positioning: HOB at 20-30 degrees  Taken 5/6/2022 0200 by Anaid Pate RN  Head of Bed (HOB) Positioning: HOB at 20-30 degrees  Taken 5/6/2022 0000 by Anaid Pate RN  Head of Bed (HOB) Positioning: HOB at 20-30 degrees  Taken 5/5/2022 2200 by Anaid Pate RN  Head of Bed (HOB) Positioning: HOB at 30-45 degrees  Taken 5/5/2022 2010 by Anaid Pate RN  Head of Bed (HOB) Positioning: HOB at 30-45 degrees   Goal Outcome Evaluation:  VS stable. Pt rested throughout the night with no issues or complaints.

## 2022-05-06 NOTE — PLAN OF CARE
Goal Outcome Evaluation:  Plan of Care Reviewed With: patient        Progress: improving  Outcome Evaluation: Pt demonstrates excellent progress with strength and balance. Pt more steady today and able to tolerate increased ambulation distance. Pt is ambulating independently with rwx and demonstrates no LOB or safety concerns with mobility. Pt has met all acute PT goals and is at baseline mobility. No further acute PT indicated; will sign off.

## 2022-05-06 NOTE — CASE MANAGEMENT/SOCIAL WORK
Case Management Discharge Note      Final Note: DC home with [facility]    Provided Post Acute Provider List?: N/A  N/A Provider List Comment: Pt current with [facility]  Provided Post Acute Provider Quality & Resource List?: N/A  N/A Quality & Resource List Comment: Pt current with [facility]    Selected Continued Care - Discharged on 5/6/2022 Admission date: 5/2/2022 - Discharge disposition: Home-Health Care Svc    Destination    No services have been selected for the patient.              Durable Medical Equipment    No services have been selected for the patient.              Dialysis/Infusion    No services have been selected for the patient.              Home Medical Care    No services have been selected for the patient.              Therapy    No services have been selected for the patient.              Community Resources    No services have been selected for the patient.              Community & DME    No services have been selected for the patient.                Selected Continued Care - Prior Encounters Includes selections from prior encounters from 2/1/2022 to 5/6/2022    Discharged on 3/11/2022 Admission date: 3/4/2022 - Discharge disposition: Home-Health Care Svc    Durable Medical Equipment     Service Provider Selected Services Address Phone Fax Patient Preferred    [facility]  Durable Medical Equipment [address], [place] [facility] [place] [facility] [address] [phone] --          Home Medical Care     Service Provider Selected Services Address Phone Fax Patient Preferred    [facility]  Home Health Services [address], [address], [place] [facility] [id number] [phone] --                         Final Discharge Disposition Code: 06 - home with home health care

## 2022-05-06 NOTE — DISCHARGE SUMMARY
[facility]    Date of Admission: 5/2/2022  Date of Discharge:  5/6/2022    PCP: Loren Cruz APRN    Discharge Diagnosis:   Active Hospital Problems    Diagnosis  POA   • **Acute renal failure (HCC) [N17.9]  Yes   • Hematuria [R31.9]  Yes   • Atrial fibrillation with RVR (HCC) [I48.91]  Yes   • Cirrhosis of liver (HCC) [K74.60]  Yes   • Acute blood loss anemia [D62]  Yes   • Long term current use of anticoagulant therapy [Z79.01]  Not Applicable   • History of CVA (cerebrovascular accident) [Z86.73]  Not Applicable   • Chronic diastolic (congestive) heart failure (HCC) [I50.32]  Yes   • Type 2 diabetes mellitus (HCC) [E11.9]  Yes   • Dementia (CMS/HCC) [F03.90]  Yes      Resolved Hospital Problems    Diagnosis Date Resolved POA   • Hyperkalemia [E87.5] 05/06/2022 Yes   • Metabolic encephalopathy [G93.41] 05/06/2022 Yes     Procedures Performed  Shiley placement 5/2 (removed 5/4)     Consults     Date and Time Order Name Status Description    5/4/2022  6:35 AM Inpatient Urology Consult Completed     5/3/2022  1:24 PM Inpatient Cardiology Consult Completed     5/2/2022  3:26 PM Inpatient Vascular Surgery Consult Completed     5/2/2022  2:44 PM Nephrology (on -call MD unless specified)      5/2/2022  2:43 PM [facility] (on-call MD unless specified) Details          Hospital Course  Please see history and physical for details. Patient is a 81 y.o. female that initially presented to the hospital with confusion, weakness and dyspnea at home. She was found to have YOANA with a serum creatinine of 3 as well as potassium of 8.4. She was admitted for further care.    The patient was seen by Nephrology and Vascular was consulted for temporary dialysis access. This was performed 5/2 and nephrology followed thereafter. Her potassium improved and her losartan, Aldactone and potassium all stopped. She did not require further dialysis and shiley was removed on 5/4. She was  transitioned to oral Bumex, but did have some hematuria as well as atrial fibrillation with RVR prompting urology and cardiology evaluations.   Cardiology saw the patient and her Coreg was changed to metoprolol given issues with hypotension. Her Eliquis was held d/t hematuria and renal US obtained showing echogenic structure in the bladder consistent with hematoma, but mass could not be ruled out. She did have an abnormal UA and antibiotics were transitioned to amoxicillin for completion of treatment. Urology plans cystoscopy as outpatient to rule out any underlying mass and they signed off. Cardiology followed and her Eliquis was resumed 5/5 with no issues with bleeding. Her dose will be adjusted at this time to 2.5 mg BID given her age and borderline creatinine with recent bleeding. She can likely go back to the 5 mg once creatinine normalizes to baseline. Cardiology signed off and she will see them in 2 weeks. Her rate is controlled.   She was monitored on SSI while inpatient and her metformin and Januvia held. She will resume her Januvia at a lower dose for renal function and hold metformin until she follows up with nephrology as well as her PCP to ensure these medications can be resumed at normal dosing based on renal function. She should monitor her sugars closely at home. She otherwise is ambulating well with walker and mentation improved as her acute issues resolved. She denies any chest pain, dyspnea, cough, fever or chills. She should keep the above follow up.    Of note, her hemoglobin improved with a blood transfusion and has been stable. There was concern that her FOBT was falsely positive d/t urine contamination. She is established with GI and should follow up as scheduled or sooner if further issues arise. Recommend repeat Bmp and CBC with PCP in 1 week for monitoring.    I discussed the patient's findings and my recommendations with patient and Dr. Villalta.    Condition on Discharge: Improved.     Temp:   [97.5 °F (36.4 °C)-98 °F (36.7 °C)] 98 °F (36.7 °C)  Heart Rate:  [84-97] 97  Resp:  [16-18] 16  BP: ()/(45-62) 104/57  Body mass index is 28.65 kg/m².    Physical Exam  Vitals and nursing note reviewed.   Constitutional:       Appearance: She is ill-appearing. She is not toxic-appearing.   Cardiovascular:      Rate and Rhythm: Normal rate. Rhythm irregular.      Pulses: Normal pulses.   Pulmonary:      Effort: Pulmonary effort is normal. No respiratory distress.      Breath sounds: Normal breath sounds.   Abdominal:      General: Bowel sounds are normal. There is no distension.      Palpations: Abdomen is soft.      Tenderness: There is no abdominal tenderness.   Musculoskeletal:         General: No swelling. Normal range of motion.      Cervical back: Normal range of motion and neck supple.   Skin:     General: Skin is warm and dry.      Findings: No bruising.   Neurological:      Mental Status: She is alert and oriented to person, place, and time.      Sensory: No sensory deficit.      Coordination: Coordination normal.   Psychiatric:         Mood and Affect: Mood normal.         Behavior: Behavior normal.           Discharge Medications      New Medications      Instructions Start Date   amoxicillin 500 MG capsule  Commonly known as: AMOXIL   500 mg, Oral, Every 12 Hours Scheduled      metoprolol tartrate 50 MG tablet  Commonly known as: LOPRESSOR   50 mg, Oral, Every 12 Hours Scheduled         Changes to Medications      Instructions Start Date   apixaban 2.5 MG tablet tablet  Commonly known as: ELIQUIS  What changed:   · medication strength  · how much to take   2.5 mg, Oral, Every 12 Hours Scheduled      bumetanide 2 MG tablet  Commonly known as: BUMEX  What changed: when to take this   2 mg, Oral, 2 Times Daily      SITagliptin 25 MG tablet  Commonly known as: Januvia  What changed:   · medication strength  · how much to take   25 mg, Oral, Daily         Continue These Medications      Instructions  Start Date   allopurinol 300 MG tablet  Commonly known as: ZYLOPRIM   TAKE 1 TABLET BY MOUTH  DAILY      atorvastatin 40 MG tablet  Commonly known as: LIPITOR   40 mg, Oral, Daily      buPROPion  MG 12 hr tablet  Commonly known as: WELLBUTRIN SR   150 mg, Oral, Daily With Breakfast      ferrous sulfate 325 (65 FE) MG EC tablet   TAKE 1 TABLET BY MOUTH EVERY DAY WITH BREAKFAST      glucose blood test strip   BID. Test  In the AM and again in the PM      glucose monitor monitoring kit   One touch meter      isosorbide mononitrate 60 MG 24 hr tablet  Commonly known as: IMDUR   TAKE 1 TABLET BY MOUTH  DAILY      mirtazapine 15 MG tablet  Commonly known as: REMERON   15 mg, Oral, Nightly      montelukast 10 MG tablet  Commonly known as: SINGULAIR   10 mg, Oral, Nightly      Multivitamin Women 50+ tablet tablet   1 tablet, Oral, Daily      onetouch ultrasoft lancets   Test BID      pantoprazole 40 MG EC tablet  Commonly known as: PROTONIX   40 mg, Oral, Daily         Stop These Medications    carvedilol 12.5 MG tablet  Commonly known as: COREG     losartan 50 MG tablet  Commonly known as: COZAAR     metFORMIN  MG 24 hr tablet  Commonly known as: GLUCOPHAGE-XR     potassium chloride 20 MEQ CR tablet  Commonly known as: K-DUR,KLOR-CON     spironolactone 100 MG tablet  Commonly known as: ALDACTONE           Diet Instructions     Diet: Regular, Consistent Carbohydrate, Cardiac, Renal      Discharge Diet:  Regular  Consistent Carbohydrate  Cardiac  Renal            Activity Instructions     Activity as Tolerated           Additional Instructions for the Follow-ups that You Need to Schedule     Ambulatory Referral to Home Health   As directed      Face to Face Visit Date: 5/6/2022    Follow-up provider for Plan of Care?: I treated the patient in an acute care facility and will not continue treatment after discharge.    Follow-up provider: STEPHENIE JUNIOR [[id number]]    Reason/Clinical Findings: weakness, fely     Describe mobility limitations that make leaving home difficult: as above    Nursing/Therapeutic Services Requested: Physical Therapy    PT orders: Therapeutic exercise Strengthening    Frequency: 1 Week 1         Discharge Follow-up with PCP   As directed       Currently Documented PCP:    Loren Cruz APRN    PCP Phone Number:    [phone]     Follow Up Details: see in 1 week, consider resumption of metformin pending renal function         Discharge Follow-up with Specialty: Cardiology; 2 Weeks   As directed      Specialty: Cardiology    Follow Up: 2 Weeks         Discharge Follow-up with Specialty: urology Dr. Piper; 2 Weeks   As directed      Specialty: urology Dr. Piper    Follow Up: 2 Weeks         Discharge Follow-up with Specified Provider: Dr. Shine with Nephrology; 1 Week   As directed      To: Dr. Shine with Nephrology    Follow Up: 1 Week    Follow Up Details: or as advised.            Follow-up Information     Evie Posadas APRN. Go in 2 week(s).    Specialties: Cardiology, Nurse Practitioner  Contact information:  [address]  [facility] [id number] [phone]             Loren Cruz APRN .    Specialty: Family Medicine  Why: see in 1 week, consider resumption of metformin pending renal function  Contact information:  [address] [facility] [id number] [phone]                       Test Results Pending at Discharge     KWASI Ann  05/06/22  11:56 EDT    Discharge time spent greater than 30 minutes.

## 2022-05-06 NOTE — PROGRESS NOTES
[facility] Progress Note      Patient Name: Miri Yung  : 1940  MRN: [id number]  Primary Care Physician:  Loren Cruz APRN  Date of admission: 2022    Subjective     Interval History:   Follow up YOAAN  No shortness of breath on room air; no chest pain  No dizziness when walking  Appetite fine; no N/V      Review of Systems:   As noted above    Objective     Vitals:   Temp:  [97.5 °F (36.4 °C)-98 °F (36.7 °C)] 98 °F (36.7 °C)  Heart Rate:  [84-97] 97  Resp:  [16-18] 16  BP: ()/(45-62) 104/57    Intake/Output Summary (Last 24 hours) at 2022 1055  Last data filed at 2022 0727  Gross per 24 hour   Intake 1050 ml   Output 500 ml   Net 550 ml       Physical Exam:    General Appearance: alert, oriented x 3, no acute distress   Skin: warm and dry  HEENT: oral mucosa normal, nonicteric sclera  Neck: supple, no JVD  Lungs: Coarse but essentially clear; no wheezing; not labored on RA  Heart: Irregularly irregular, tachycardic; no rub  Abdomen: soft, nontender, nondistended,  + bs, large umbilical hernia  : External urinary catheter  Extremities: no significant edema  Psychiatric:  fully oriented  Neuro: normal speech and mental status     Scheduled Meds:     allopurinol, 200 mg, Oral, Daily  amoxicillin, 500 mg, Oral, Q12H  apixaban, 5 mg, Oral, Q12H  atorvastatin, 40 mg, Oral, Daily  bumetanide, 2 mg, Oral, BID  buPROPion SR, 150 mg, Oral, Daily With Breakfast  ferrous sulfate, 325 mg, Oral, Daily With Breakfast  heparin (porcine), 5,000 Units, Intravenous, Once  insulin lispro, 0-9 Units, Subcutaneous, TID AC  lidocaine, 20 mL, Infiltration, Once  metoprolol tartrate, 50 mg, Oral, Q12H  mirtazapine, 15 mg, Oral, Nightly  montelukast, 10 mg, Oral, Nightly  multivitamin with minerals, 1 tablet, Oral, Daily  pantoprazole, 40 mg, Oral, Daily  senna-docusate sodium, 2 tablet, Oral, BID      IV Meds:        Results Reviewed:   I have personally reviewed the  results from the time of this admission to 5/6/2022 10:55 EDT     Results from last 7 days   Lab Units 05/06/22  0556 05/05/22  0523 05/04/22  0501 05/03/22  0002 05/02/22  1353   SODIUM mmol/L 137 135* 138   < > 133*   POTASSIUM mmol/L 4.2 4.4 4.4   < > 8.4*   CHLORIDE mmol/L 99 97* 102   < > 103   CO2 mmol/L 24.9 26.0 25.6   < > 17.2*   BUN mg/dL 53* 43* 46*   < > 81*   CREATININE mg/dL 1.48* 1.69* 1.73*   < > 2.95*   CALCIUM mg/dL 9.0 9.2 8.9   < > 8.9   BILIRUBIN mg/dL  --   --  0.5  --  0.3   ALK PHOS U/L  --   --  94  --  88   ALT (SGPT) U/L  --   --  27  --  22   AST (SGOT) U/L  --   --  21  --  19   GLUCOSE mg/dL 147* 157* 117*   < > 119*    < > = values in this interval not displayed.       Estimated Creatinine Clearance: 26.9 mL/min (A) (by C-G formula based on SCr of 1.48 mg/dL (H)).    Results from last 7 days   Lab Units 05/06/22  0556 05/05/22  0523 05/04/22  0501 05/03/22  0002   MAGNESIUM mg/dL  --   --   --  1.8   PHOSPHORUS mg/dL 4.7* 4.0 3.7  --              Results from last 7 days   Lab Units 05/06/22  0556 05/05/22  0523 05/04/22  0501 05/03/22  2102 05/03/22  0436 05/02/22  1353   WBC 10*3/mm3 9.27 8.87 6.64  --  5.73 9.59   HEMOGLOBIN g/dL 9.9* 10.2* 9.3* 8.2* 7.0* 7.8*   PLATELETS 10*3/mm3 181 176 163  --  149 218             Assessment / Plan     ASSESSMENT:  1.  YOANA, nonoliguric, resolving and likely due to loss of autoregulation while on ARB, potassium supplement, and spironolactone.   SP dialysis x1 5/2, with HD catheter removed 5/4.  Potassium remains normal  2.  Anemia.  Iron deficiency.  Hg stable; received PRBCs this admission  3.  Afib RVR. Currently off anticoagulation. Cardiology stopped coreg and changed to metoprolol.   4.  Chronic diastolic heart failure and pulmonary hypertension  5.  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  On oral PPI  6.  Gross hematuria:  has evaluated; cystoscopy planned as an outpatient    PLAN:  1.  Continue oral bumetanide  2.  Home anytime  from renal view; will arrange outpatient follow-up in my clinic    Carlo Shine MD  05/06/22  10:55 EDT    [facility]  [phone]

## 2022-05-09 NOTE — OUTREACH NOTE
Call Center TCM Note    Flowsheet Row Responses   [facility] patient discharged from? [place]   Does the patient have one of the following disease processes/diagnoses(primary or secondary)? CHF   TCM attempt successful? No   Unsuccessful attempts Attempt 2           Jessica Mayorga LPN    5/9/2022, 15:17 EDT

## 2022-05-09 NOTE — OUTREACH NOTE
Call Center TCM Note    Flowsheet Row Responses   [facility] patient discharged from? [place]   Does the patient have one of the following disease processes/diagnoses(primary or secondary)? CHF   TCM attempt successful? No   Unsuccessful attempts Attempt 1  [No recent verbal release.  Son would like to call his mother and get verbal release to talk with him.  [phone] and line was busy x3]           Jessica Mayorga LPN    5/9/2022, 14:13 EDT

## 2022-05-10 NOTE — OUTREACH NOTE
Call Center TCM Note    Flowsheet Row Responses   [facility] patient discharged from? [place]   Does the patient have one of the following disease processes/diagnoses(primary or secondary)? CHF   TCM attempt successful? Yes   Call start time 1526   Call end time 1531   Discharge diagnosis Acute renal failure   CHF   Meds reviewed with patient/caregiver? Yes   Is the patient having any side effects they believe may be caused by any medication additions or changes? No   Does the patient have all medications ordered at discharge? Yes   Is the patient taking all medications as directed (includes completed medication regime)? Yes   Does the patient have a primary care provider?  Yes   Does the patient have an appointment with their PCP within 7 days of discharge? Yes   Comments regarding PCP hospital fu appt on 5/12/22 at 8:15 AM   Has the patient kept scheduled appointments due by today? N/A   What is the Home health agency?   [facility]   Has home health visited the patient within 72 hours of discharge? Yes   Pulse Ox monitoring None   Psychosocial issues? No   Did the patient receive a copy of their discharge instructions? Yes   Nursing interventions Reviewed instructions with patient   What is the patient's perception of their health status since discharge? Improving   Nursing interventions Nurse provided patient education   Is the patient weighing daily? Yes   Does the patient have scales? Yes   Daily weight interventions Education provided on importance of daily weight   Is the patient able to teach back Heart Failure diet management? Yes   Is the patient able to teach back Heart Failure Zones? Yes   Is the patient able to teach back signs and symptoms of worsening condition? (i.e. weight gain, shortness of air, etc.) Yes   If the patient is a current smoker, are they able to teach back resources for cessation? Not a smoker   Is the patient/caregiver able to teach back the hierarchy of who to call/visit  for symptoms/problems? PCP, Specialist, Home health nurse, Urgent Care, ED, 911 Yes   TCM call completed? Yes   Wrap up additional comments Pt states she is doing better. Pt verified PCP hospital fu appt on 5/12/22, and cardiologist fu appt on 5/23/22. No questions/concerns.           Diana Banks RN    5/10/2022, 15:32 EDT

## 2022-05-11 NOTE — TELEPHONE ENCOUNTER
Ana from [facility] in [place], [place] called and wanted to give a verbal saying that they went out and restarted care for Mrs. Yung.

## 2022-05-12 NOTE — PROGRESS NOTES
Chief Complaint  Follow-up (Patient is here today for a hospital follow up. )    Subjective     {Problem List  Visit Diagnosis   Encounters  Notes  Medications  Labs  Result Review Imaging  Media :23}     Miri Yung presents to [facility]  History of Present Illness  Please see history and physical for details. Patient is a 81 y.o. female that initially presented to the hospital with confusion, weakness and dyspnea at home. She was found to have YOANA with a serum creatinine of 3 as well as potassium of 8.4. She was admitted for further care.               The patient was seen by Nephrology and Vascular was consulted for temporary dialysis access. This was performed 5/2 and nephrology followed thereafter. Her potassium improved and her losartan, Aldactone and potassium all stopped. She did not require further dialysis and shiley was removed on 5/4. She was transitioned to oral Bumex, but did have some hematuria as well as atrial fibrillation with RVR prompting urology and cardiology evaluations.              Cardiology saw the patient and her Coreg was changed to metoprolol given issues with hypotension. Her Eliquis was held d/t hematuria and renal US obtained showing echogenic structure in the bladder consistent with hematoma, but mass could not be ruled out. She did have an abnormal UA and antibiotics were transitioned to amoxicillin for completion of treatment. Urology plans cystoscopy as outpatient to rule out any underlying mass and they signed off. Cardiology followed and her Eliquis was resumed 5/5 with no issues with bleeding. Her dose will be adjusted at this time to 2.5 mg BID given her age and borderline creatinine with recent bleeding. She can likely go back to the 5 mg once creatinine normalizes to baseline. Cardiology signed off and she will see them in 2 weeks. Her rate is controlled.              She was monitored on SSI while inpatient and her metformin and  "Januvia held. She will resume her Januvia at a lower dose for renal function and hold metformin until she follows up with nephrology as well as her PCP to ensure these medications can be resumed at normal dosing based on renal function. She should monitor her sugars closely at home. She otherwise is ambulating well with walker and mentation improved as her acute issues resolved. She denies any chest pain, dyspnea, cough, fever or chills. She should keep the above follow up.     Of note, her hemoglobin improved with a blood transfusion and has been stable. There was concern that her FOBT was falsely positive d/t urine contamination. She is established with GI and should follow up as scheduled or sooner if further issues arise. Recommend repeat Bmp and CBC with PCP in 1 week for monitoring.Home health seeing  bs at 16  Not sleeping cant sleep taking melatonin,   Objective   Vital Signs:  /58 (BP Location: Left arm, Patient Position: Sitting, Cuff Size: Adult)   Pulse (!) 122   Temp 96.9 °F (36.1 °C) (Infrared)   Ht 157 cm (61.81\")   Wt 73.7 kg (162 lb 6.4 oz)   SpO2 97%   BMI 29.89 kg/m²     {BMI is >= 25 and < 30. (Overweight) The following options were offered after discussion (Optional):[id number]}      Physical Exam   Result Review :{Labs  Result Review  Imaging  Med Tab  Media  Procedures :23}   {The following data was reviewed by (Optional):77060}  {Ambulatory Labs (Optional):72099}  {Data reviewed (Optional):89144:::1}          Assessment and Plan {CC Problem List  Visit Diagnosis   ROS  Review (Popup)  Health Maintenance  Quality  BestPractice  Medications  SmartSets  SnapShot Encounters  Media :23}   There are no diagnoses linked to this encounter.       {Time Spent (Optional):45075}  Follow Up {Instructions Charge Capture  Follow-up Communications :23}  No follow-ups on file.  Patient was given instructions and counseling regarding her condition or for health maintenance " advice. Please see specific information pulled into the AVS if appropriate.

## 2022-05-15 NOTE — PROGRESS NOTES
Transitional Care Follow Up Visit  Subjective     Miri Yung is a 81 y.o. female who presents for a transitional care management visit.    Within 48 business hours after discharge our office contacted her via telephone to coordinate her care and needs.      I reviewed and discussed the details of that call along with the discharge summary, hospital problems, inpatient lab results, inpatient diagnostic studies, and consultation reports with Miri.     Current outpatient and discharge medications have been reconciled for the patient.  Reviewed by: KWASI Madrigal      Date of TCM Phone Call 5/6/2022   [facility]   Date of Admission 5/2/2022   Date of Discharge 5/6/2022   Discharge Disposition Home or Self Care     Risk for Readmission (LACE) Score: 14 (5/6/2022  6:01 AM)      History of Present Illness   Course During Hospital Stay:  This is a 80 yo female that is here today to follow of on recent hospitalization. She presented to the hospital with confusion, weakness and dyspnea at home. She was found to have YOANA with a serum creatinine of 3 as well as potassium of 8.4. After evaluation from nephrology and Vascular she received temporary dialysis. Potassium improved and her losartan, Aldactone and potassium all stopped. She did not require further dialysis and shiley was removed on 5/4. She was transitioned to oral Bumex, but did have some hematuria as well as atrial fibrillation with RVR prompting urology and cardiology evaluations. Coreg was changed to metoprolol due to hypotension. Her Eliquis was held d/t hematuria and renal US obtained showing echogenic structure in the bladder consistent with hematoma, but mass could not be ruled out. She did have an abnormal UA and antibiotics were transitioned to amoxicillin for completion of treatment. She is scheduled to see Urology next week for plans for cystoscopy as outpatient to rule out any underlying mass. Cardiology followed and  her Eliquis was resumed 5/5 with no issues with bleeding. Her dose will be adjusted at this time to 2.5 mg BID given her age and borderline creatinine with recent bleeding. Cardiology will see her in 2 weeks. Her rate is controlled today. She denies any chest pain or shortness of breath. Her metformin is on hold due to her kidney fuction. She is currently on januvia and she states her blood sugar this am was 152. She is doing well since she has been home. Home health is following with PT/OT and nursing.  Hgb was low in the hospital where she received a blood transfusion. Her hgb stablized and she is followed by GI.         The following portions of the patient's history were reviewed and updated as appropriate: allergies, current medications, past family history, past medical history, past social history, past surgical history and problem list.    Review of Systems   Constitutional: Negative for activity change, chills, fatigue and fever.   Respiratory: Negative for cough, chest tightness, shortness of breath and wheezing.    Cardiovascular: Negative for chest pain and leg swelling.   Gastrointestinal: Negative for abdominal pain, blood in stool, diarrhea, nausea and vomiting.   Genitourinary: Negative for difficulty urinating, dysuria and hematuria.   Neurological: Negative for dizziness, weakness and light-headedness.       Objective   Physical Exam  Vitals and nursing note reviewed.   HENT:      Head: Normocephalic.      Nose: Nose normal.   Eyes:      Pupils: Pupils are equal, round, and reactive to light.   Cardiovascular:      Rate and Rhythm: Normal rate and regular rhythm.      Pulses: Normal pulses.      Heart sounds: Normal heart sounds.   Pulmonary:      Effort: Pulmonary effort is normal. No respiratory distress.      Breath sounds: Normal breath sounds. No wheezing or rales.   Abdominal:      General: Bowel sounds are normal. There is no distension.      Tenderness: There is no abdominal tenderness.    Musculoskeletal:         General: No swelling.      Cervical back: Neck supple.      Right lower leg: No edema.      Left lower leg: No edema.   Skin:     General: Skin is warm and dry.      Capillary Refill: Capillary refill takes less than 2 seconds.   Neurological:      Mental Status: She is alert and oriented to person, place, and time.   Psychiatric:         Mood and Affect: Mood normal.         Assessment & Plan   Diagnoses and all orders for this visit:    1. Acute renal failure, unspecified acute renal failure type (HCC) (Primary)    2. Cirrhosis of liver without ascites, unspecified hepatic cirrhosis type (HCC)  -     Comprehensive metabolic panel    3. Type 2 diabetes mellitus without complication, without long-term current use of insulin (HCC)  -     Hemoglobin A1c    4. Anemia, unspecified type  -     CBC w AUTO Differential      I will check blood work today.  She will continue to hold metformin until her kidney function is stable.  She has appointments coming up with nephrology, GI and cardiology.  She will continue her work with home health.  She will follow-up with me in 2 months or sooner according to blood work.

## 2022-05-18 NOTE — TELEPHONE ENCOUNTER
[employer] call today on the behalf of the patient stating that there is swelling in the labs .  H/r is 120 and also went to 140 while home health was there .   Not sleeping   And dizziness     Phone number to kristi the rep for [employer] to discuss more is [phone]
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
## 2022-05-25 PROBLEM — I50.33 ACUTE ON CHRONIC DIASTOLIC HEART FAILURE (HCC): Status: ACTIVE | Noted: 2020-11-16

## 2022-05-25 PROBLEM — K75.81 NASH (NONALCOHOLIC STEATOHEPATITIS): Status: ACTIVE | Noted: 2022-01-01

## 2022-05-27 NOTE — OUTREACH NOTE
Prep Survey    Flowsheet Row Responses   [facility] facility patient discharged from? [place]   Is LACE score < 7 ? No   Emergency Room discharge w/ pulse ox? No   Eligibility [facility]   Date of Admission 05/23/22   Date of Discharge 05/27/22   Discharge Disposition Home-Health Care Sv   Discharge diagnosis A/C CHF, KOO, fall with scalp contusion, CKD   Does the patient have one of the following disease processes/diagnoses(primary or secondary)? CHF   Does the patient have Home health ordered? Yes   What is the Home health agency?  [facility]   Is there a DME ordered? No   Prep survey completed? Yes          JERSON MARR - Registered Nurse

## 2022-05-31 NOTE — OUTREACH NOTE
Call Center TCM Note    Flowsheet Row Responses   [facility] patient discharged from? [place]   Does the patient have one of the following disease processes/diagnoses(primary or secondary)? CHF   TCM attempt successful? Yes   Call start time 1602   Call end time 1605   Discharge diagnosis A/C CHF, OKO, fall with scalp contusion, CKD   Person spoke with today (if not patient) and relationship patient   Meds reviewed with patient/caregiver? Yes   Is the patient having any side effects they believe may be caused by any medication additions or changes? No   Does the patient have all medications ordered at discharge? Yes   Is the patient taking all medications as directed (includes completed medication regime)? Yes   Does the patient have a primary care provider?  Yes   Does the patient have an appointment with their PCP within 7 days of discharge? Greater than 7 days   Comments regarding PCP hospital fu appt on 6/6/22 at 1:15 PM   Nursing Interventions Verified appointment date/time/provider   Has the patient kept scheduled appointments due by today? N/A   Pulse Ox monitoring None   Psychosocial issues? No   Did the patient receive a copy of their discharge instructions? Yes   Nursing interventions Reviewed instructions with patient   What is the patient's perception of their health status since discharge? Improving   Nursing interventions Nurse provided patient education   Is the patient weighing daily? Yes   Does the patient have scales? Yes   Daily weight interventions Education provided on importance of daily weight   Is the patient able to teach back Heart Failure diet management? Yes   Is the patient able to teach back Heart Failure Zones? Yes   Is the patient able to teach back signs and symptoms of worsening condition? (i.e. weight gain, shortness of air, etc.) Yes   If the patient is a current smoker, are they able to teach back resources for cessation? Not a smoker   Is the patient/caregiver able to  teach back the hierarchy of who to call/visit for symptoms/problems? PCP, Specialist, Home health nurse, Urgent Care, ED, 911 Yes   TCM call completed? Yes   Wrap up additional comments Pt states she is doing better. Pt verified PCP hospital fu appt on 6/6/22. No questions/concerns.           Diana Banks RN    5/31/2022, 16:07 EDT

## 2022-06-01 NOTE — PROGRESS NOTES
OFFICE VISIT      Date of Office Visit: 2022    Patient Name: Miri Yung  : 1940    Encounter Provider: Jignesh York MD  Referring Provider: No ref. provider found  Primary Care Provider: Loren Cruz APRN  Place of Service: [facility] CARDIOLOGY        Chief Complaint   Patient presents with   • Chronic diastolic (congestive) heart failure    • Paroxysmal atrial fibrillation   • Hospital Follow Up Visit     History of Present Illness    The patient is an 81-year-old white female that was recently hospitalized at [facility] after mechanical fall.  She was also treated for atrial fibrillation with a rapid ventricular response and congestive heart failure which is diastolic in nature.    The patient's most recent echocardiogram was in 2022.  At the time her left ventricular systolic ejection fraction was normal at 50%.  She is also noted to have aortic stenosis that appears to be mild with a valve area of 1.18 cm².  She also has moderate mitral regurgitation and severe pulmonary hypertension.  Her PA pressure was estimated to be 73 mm on the echocardiogram.    The patient reports that she still has some swelling in her lower extremities and she is fatigued.  She does not complain of shortness of breath nor lightheadedness.  She does not have any complaint of palpitations or chest pain.  She does relate that she has been compliant with her medication.    I reviewed her laboratory work from several days ago.  Her renal function remains normal.    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   • Endocarditis     with mitral and tricuspid regurgitation   • Esophageal reflux    • GERD (gastroesophageal reflux disease)    • Gout     • History of blood transfusion     due to anemia   • HTN (hypertension)    • Hyperlipidemia    • Insomnia    • LOM (loss of memory)    • Lung mass    • 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; generally asymptomatic   • Mitral regurgitation    • Mixed anxiety depressive disorder    • Nonrheumatic tricuspid (valve) insufficiency    • Osteoarthritis    • PAF (paroxysmal atrial fibrillation) (HCC)    • Renal failure    • Sacroiliitis (HCC)    • Tricuspid regurgitation    • Type 2 diabetes mellitus (HCC)          Past Surgical History:   Procedure Laterality Date   • CATARACT EXTRACTION     • COLONOSCOPY N/A 3/10/2016    Procedure: COLONOSCOPY TO CECUM;  Surgeon: Shaan Becerra Jr., MD;  Location: [facility] ENDOSCOPY;  Service:    • COLONOSCOPY N/A 6/18/2021    Procedure: COLONOSCOPY TO CECUM AND INTO TERMINAL ILEUM;  Surgeon: Jeremy Darden MD;  Location: [facility] ENDOSCOPY;  Service: Gastroenterology;  Laterality: N/A;  PRE: ANEMIA  POST: HEMORRHOIDS   • ENDOSCOPY N/A 3/10/2016    Procedure: ESOPHAGOGASTRODUODENOSCOPY ;  Surgeon: Shaan Becerra Jr., MD;  Location: [facility] ENDOSCOPY;  Service:    • ENDOSCOPY N/A 6/18/2021    Procedure: ESOPHAGOGASTRODUODENOSCOPY;  Surgeon: Jeremy Darden MD;  Location: [facility] ENDOSCOPY;  Service: Gastroenterology;  Laterality: N/A;  PRE: ANEMIA  POST: HIATAL HERNIA, SMALL ESOPHAGEAL VARICES   • KNEE SURGERY     • TUBAL ABDOMINAL LIGATION             Current Outpatient Medications:   •  allopurinol (ZYLOPRIM) 300 MG tablet, TAKE 1 TABLET BY MOUTH  DAILY, Disp: 90 tablet, Rfl: 3  •  apixaban (ELIQUIS) 2.5 MG tablet tablet, Take 1 tablet by mouth Every 12 (Twelve) Hours. Indications: Atrial Fibrillation, Disp: , Rfl:   •  atorvastatin (LIPITOR) 40 MG tablet, TAKE 1 TABLET BY MOUTH DAILY, Disp: 90 tablet, Rfl: 0  •  bumetanide (BUMEX) 2 MG tablet, Take 1 tablet by mouth 2 (Two) Times a Day., Disp: 90 tablet, Rfl: 0  •  buPROPion SR (WELLBUTRIN SR) 150 MG 12  hr tablet, Take 1 tablet by mouth Daily With Breakfast., Disp: 30 tablet, Rfl: 5  •  ferrous sulfate 325 (65 FE) MG EC tablet, TAKE 1 TABLET BY MOUTH EVERY DAY WITH BREAKFAST, Disp: 90 tablet, Rfl: 0  •  glucose blood test strip, BID. Test  In the AM and again in the PM, Disp: 100 each, Rfl: 3  •  glucose monitor monitoring kit, One touch meter, Disp: 1 each, Rfl: 0  •  isosorbide mononitrate (IMDUR) 60 MG 24 hr tablet, TAKE 1 TABLET BY MOUTH  DAILY, Disp: 90 tablet, Rfl: 3  •  Lancets (onetouch ultrasoft) lancets, Test BID, Disp: 100 each, Rfl: 3  •  metoprolol tartrate (LOPRESSOR) 50 MG tablet, Take 1 tablet by mouth Every 12 (Twelve) Hours., Disp: 60 tablet, Rfl: 0  •  mirtazapine (REMERON) 15 MG tablet, Take 1 tablet by mouth Every Night., Disp: 30 tablet, Rfl: 5  •  montelukast (SINGULAIR) 10 MG tablet, Take 1 tablet by mouth Every Night., Disp: 30 tablet, Rfl: 5  •  multivitamin with minerals tablet tablet, Take 1 tablet by mouth Daily., Disp: , Rfl:   •  pantoprazole (PROTONIX) 40 MG EC tablet, TAKE 1 TABLET BY MOUTH  DAILY, Disp: 90 tablet, Rfl: 3  •  SITagliptin (Januvia) 25 MG tablet, Take 1 tablet by mouth Daily., Disp: 30 tablet, Rfl: 0      Social History     Socioeconomic History   • Marital status:    Tobacco Use   • Smoking status: Never Smoker   • Smokeless tobacco: Never Used   • Tobacco comment: caffeine use    Vaping Use   • Vaping Use: Never used   Substance and Sexual Activity   • Alcohol use: No   • Drug use: No   • Sexual activity: Defer         Review of Systems   Constitutional: Negative.   HENT: Negative.    Eyes: Negative.    Cardiovascular: Positive for leg swelling.   Respiratory: Negative.    Endocrine: Negative.    Skin: Negative.    Musculoskeletal: Negative.    Gastrointestinal: Negative.    Neurological: Negative.    Psychiatric/Behavioral: Negative.        Procedures      ECG 12 Lead    Date/Time: 6/1/2022 8:54 AM  Performed by: Jignesh York MD  Authorized by:  "Jignesh York MD   Comparison: compared with previous ECG from 5/23/2022  Similar to previous ECG  Rhythm: atrial fibrillation  Rate: normal  Conduction: conduction normal  QRS axis: normal  Other findings: non-specific ST-T wave changes                Objective:    /64 (BP Location: Left arm, Patient Position: Sitting)   Pulse 97   Ht 157.5 cm (62\")   Wt 82.3 kg (181 lb 6.4 oz)   SpO2 93%   BMI 33.18 kg/m²         Vitals reviewed.   Constitutional:       Appearance: Well-developed.   Eyes:      Pupils: Pupils are equal, round, and reactive to light.   HENT:      Head: Normocephalic.   Neck:      Thyroid: No thyromegaly.      Vascular: No carotid bruit or JVD.   Pulmonary:      Effort: Pulmonary effort is normal.      Breath sounds: Normal breath sounds.   Cardiovascular:      Normal rate. Irregularly irregular rhythm. Normal S1. Normal S2.      Murmurs: There is a harsh midsystolic murmur at the URSB, radiating to the neck.      No gallop. No click. No rub.   Pulses:     Intact distal pulses.   Edema:     Peripheral edema present.     Ankle: bilateral 2+ edema of the ankle.     Feet: bilateral 2+ edema of the feet.  Abdominal:      General: Bowel sounds are normal.      Palpations: Abdomen is soft.   Musculoskeletal:      Cervical back: Normal range of motion. Skin:     General: Skin is warm and dry.      Findings: No erythema.   Neurological:      Mental Status: Alert and oriented to person, place, and time.             Assessment & Plan:       Diagnosis Plan   1. Longstanding persistent atrial fibrillation (HCC)     2. Nonrheumatic mitral valve regurgitation     3. Chronic diastolic heart failure (HCC)     4. Aortic stenosis, mild         1.  Atrial fibrillation: Longstanding persistent.  Remains anticoagulated with apixaban.  Reasonable rate control  2.  Mitral regurgitation: Moderate  3.  Chronic diastolic heart failure: Remains edematous but I think this may be more related to her pulmonary " hypertension.  Going to increase her Bumex to 4 mg in the morning and 2 mg in the evening for about 3 days.  She does have a follow-up with nephrology in the future  4.  Aortic stenosis: Mild  5.  Pulmonary hypertension: Severe  6.  Chronic peripheral edema: As above  7.  Dementia  8.  Diabetes mellitus, type II: On oral therapy    The patient has a follow-up appointment with KWASI Ríos in 2 months.  She will keep that.

## 2022-06-06 NOTE — PROGRESS NOTES
"Transitional Care Follow Up Visit  Subjective     Miri Yung is a 81 y.o. female who presents for a transitional care management visit.    Within 48 business hours after discharge our office contacted her via telephone to coordinate her care and needs.      I reviewed and discussed the details of that call along with the discharge summary, hospital problems, inpatient lab results, inpatient diagnostic studies, and consultation reports with Miri.     Current outpatient and discharge medications have been reconciled for the patient.  Reviewed by: KWASI Madrigal      Date of TCM Phone Call 5/27/2022   [facility]   Date of Admission 5/23/2022   Date of Discharge 5/27/2022   Discharge Disposition Home-Health Care [facility]     Risk for Readmission (LACE) No data recorded      History of Present Illness   Course During Hospital Stay:    This is an 82 yo female patient here today after recent hospitalization. She was admitted to hospital after fall from home. She states she feel due to tripping on her walker. She has hx of afib, diastolic heart failure with aoritc stenosis, and KOO cirrhosis. She was discharged on 5/27/22 and has seen Dr York since then. Her bumex was increased for 3 days with little to no decrease in weight. Her son states her weight is not coming down at home and continues to go up. Mrs Yung does not complaint of shortness of breath or chest pain. Son saw she has seen urology for evaluation and got a good report. She has an appt soon with GI. Mrs Yung does say home health is coming in the home but is not happy with PT. She states \"I do not want that therapist back.\"     The following portions of the patient's history were reviewed and updated as appropriate: allergies, current medications, past family history, past medical history, past social history, past surgical history and problem list.    Review of Systems   Constitutional: Negative for fatigue.   Respiratory: Negative " for cough, chest tightness and shortness of breath.    Genitourinary: Negative for dysuria, flank pain and hematuria.       Objective   Physical Exam  Vitals and nursing note reviewed.   HENT:      Head: Normocephalic.      Nose: Nose normal.   Eyes:      Pupils: Pupils are equal, round, and reactive to light.   Cardiovascular:      Rate and Rhythm: Normal rate and regular rhythm.      Pulses: Normal pulses.      Heart sounds: Normal heart sounds.   Pulmonary:      Effort: Pulmonary effort is normal. No respiratory distress.      Breath sounds: Normal breath sounds. No wheezing or rales.   Abdominal:      General: Bowel sounds are normal. There is no distension.      Tenderness: There is no abdominal tenderness.   Musculoskeletal:         General: No swelling.      Cervical back: Neck supple.      Right lower leg: Edema present.      Left lower leg: Edema present.   Skin:     General: Skin is warm and dry.   Neurological:      Mental Status: She is alert and oriented to person, place, and time.   Psychiatric:         Mood and Affect: Mood normal.         Assessment & Plan   Diagnoses and all orders for this visit:    1. Acute diastolic CHF (congestive heart failure) (HCC) (Primary)  -     Basic Metabolic Panel  -     Ambulatory Referral to Home Health    2. Anemia, unspecified type  -     CBC w AUTO Differential    3. KOO (nonalcoholic steatohepatitis)  -     Basic Metabolic Panel  -     Ambulatory Referral to Home Health    4. General weakness  -     Ambulatory Referral to Home Health      I will check a CBC and BMP today.  We have discussed safety in the home with her increased weakness and she is willing to look at other home health options if need be.  I will talk with son more in-depth about that.

## 2022-06-06 NOTE — PROGRESS NOTES
"Chief Complaint  Follow-up (Patient is here for a hospital follow up. )    Subjective    {Problem List  Visit Diagnosis   Encounters  Notes  Medications  Labs  Result Review Imaging  Media :23}    Miri Yung presents to [facility] PR[facility]ARE  History of Present Illness    Objective   Vital Signs:  /58 (BP Location: Left arm, Patient Position: Sitting, Cuff Size: Adult)   Pulse 120   Temp 97.7 °F (36.5 °C) (Infrared)   Ht 157.5 cm (62.01\")   Wt 82.1 kg (181 lb)   SpO2 95%   BMI 33.10 kg/m²     {BMI is >= 30 and <= 34.9 (Class 1 obesity). The following options were offered after discussion (Optional):[id number]}      Physical Exam   Result Review :{Labs  Result Review  Imaging  Med Tab  Media  Procedures  :23}  {The following data was reviewed by (Optional):49301}  {Ambulatory Labs (Optional):29263}  {Data reviewed (Optional):97958:::1}          Assessment and Plan {CC Problem List  Visit Diagnosis   ROS  Review (Popup)  Health Maintenance  Quality  BestPractice  Medications  SmartSets  SnapShot Encounters  Media :23}  There are no diagnoses linked to this encounter.       {Time Spent (Optional):98228}  Follow Up {Instructions Charge Capture  Follow-up Communications :23}  No follow-ups on file.  Patient was given instructions and counseling regarding her condition or for health maintenance advice. Please see specific information pulled into the AVS if appropriate.       "

## 2022-06-07 PROBLEM — G47.39 SLEEP APNEA-LIKE BEHAVIOR: Status: ACTIVE | Noted: 2022-01-01

## 2022-06-07 NOTE — OUTREACH NOTE
CHF Week 2 Survey    Flowsheet Row Responses   [facility] facility patient discharged from? [place]   Does the patient have one of the following disease processes/diagnoses(primary or secondary)? CHF   Week 2 attempt successful? No   Unsuccessful attempts Attempt 1          BERNABE Rebollar Registered Nurse

## 2022-06-07 NOTE — PROGRESS NOTES
[facility]      Patient Name: Miri Yung  :1940  Age: 81 y.o.  Sex: female  Referring Provider: Jignesh York MD   Primary Cardiologist: Dr. York  Encounter Provider: Ana Ibarra, PharmD, BCACP      Chief Complaint:   Chief Complaint   Patient presents with   • Congestive Heart Failure       HPI:  Patient presents with her son, Hardeep, for her initial visit in the [facility]. 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 She was recently admitted for lower extremity edema and a fall in May 2022. She was diuresed. Her furosemide had been switched to bumetanide due to better oral bioavailability. Per patient, she doesn't know that she sees much improvement in urination with the switch. She has not trialed torsemide before. She recently saw Dr. York in clinic and he increased her bumetanide to 4 mg QAM and 2 mg QPM x 3 days. Per her son, it seemed the swelling/weights stayed steady but did not improve. She was previously on losartan and spironolactone, but these were discontinued when she presented with YOANA (Scr of 3) and hyperkalemia (K 8.4). She did need dialysis for this. She is followed by nephrology outpatient.      Current Regimen:  Heart Failure  Bumetanide 2 mg BID  Metoprolol tartrate 50 mg BID      Other CV Meds  Apixaban 2.5 mg BID  Atorvastatin 40 mg daily  Ferrous sulfate 325 mg daily  Isosorbide mononitrate 60 mg daily  KCl 20 mEq daily      Medication Use:  Adherence: good  son helps fill pillbox  Past hx of medication use/intolerance: furosemide (switched to Bumex); losartan/spironolactone (YOANA with hyperkalemia - received urgent dialysis); carvedilol (hypotension so switched to metoprolol)  Affordability: Medicare  estimated annual income $24,000      Diet:  Defer in-depth discussion to future visit. She  "does eat canned vegetables. Discussed rinsing these off with water prior to consuming      Social History:  Tobacco use: none  EtOH use: none  Illicit drug use: none  Exercise: limited due to comorbid conditions  is able to complete household tasks      Immunization Status:  Pneumococcal: 3/31/21 and 4/21/21  Influenza: obtains annually  COVID-19: PPSV23 (11/2006)      OBJECTIVE:    /54 (BP Location: Left arm, Patient Position: Sitting)   Pulse 84   Ht 157.5 cm (62.01\")   Wt 87.7 kg (193 lb 6.4 oz)   SpO2 95%   BMI 35.36 kg/m²     Body mass index is 35.36 kg/m².  Wt Readings from Last 1 Encounters:   06/07/22 87.7 kg (193 lb 6.4 oz)       Lab Review:  Renal Function: Estimated Creatinine Clearance: 39.4 mL/min (A) (by C-G formula based on SCr of 1.13 mg/dL (H)).    Lab Results   Component Value Date    PROBNP 3,381.0 (H) 05/23/2022       Results for orders placed during the hospital encounter of 03/04/22    Adult Transthoracic Echo Complete W/ Cont if Necessary Per Protocol    Interpretation Summary  · Aortic valve maximum pressure gradient is 22.8 mmHg. Aortic valve mean pressure gradient is 13 mmHg.  · Mild aortic valve stenosis is present. Aortic valve area is 1.18 cm2.  · Calculated right ventricular systolic pressure from tricuspid regurgitation is 73.1 mmHg.  · There is moderate, bileaflet mitral valve thickening present.  · Mild mitral valve stenosis is present.  · Severe pulmonary hypertension is present.  · Left atrial volume is severely increased.  · Mild to moderate mitral valve regurgitation is present with an eccentric jet noted.  · Estimated left ventricular EF = 50% Left ventricular systolic function is normal.        6 MINUTE WALK  Distance: 600 ft  SpO2: 92      ASSESSMENT/PLAN OF CARE:    1. HFpEF (EF 50%)  - Patient's weight do seem to be increasing and she does have some lower extremity edema. She is not sure she feels the bumetanide is working better than the furosemide despite " increased oral bioavailability. It seemed her weights and symptoms did stay steady (didn't necessarily improve) when she took a higher dose x 3 days per Dr. York. She may just end up needing a higher diuretic dose  - Increase bumetanide to 2 mg (2 tablets) every morning and 2 mg every afternoon  - Start Farxiga 10 mg daily. Reviewed MOA/dosing/side effects. Provided patient with free 30-day trial card and will apply for PAP. Last A1c at goal but neither Farxiga nor Januvia cause hypoglycemia. I do not think Januvia is pertinent to keep on board given A1c at goal and no CV/renal benefit. Defer discontinuation to PCP  - Continue metoprolol tartrate 50 mg twice daily. HR above goal. Will continue to monitor and see if we can increase in the future. Will be limited due to BP on lower end of normal  - She was previously on ARB/MRA but these were discontinued when she had an YOANA with hyperkalemia (K 8.4). We will continue to monitor her labs and look to re-introduce at future visit. I would suggest trialing Entresto given positive data from PARAGON-HF in females and those with an EF < 57%. BP on lower end of normal will also be a limiting factor  - She is a good candidate for [employer] RPM. She was enrolled today  - She is interested and could be a candidate for STEP-HFpEF trial. We will have research coordinator talk to her next week at her next visit  - Discuss diet in more depth at next visit. She does use canned vegetables. Discussed rinsing these off with water prior to consuming  - Pulmonary rehab discussed but patient is dependent on family members to bring her who work. Will revisit in the future if possible  - Advised patient to call clinic with 2-3 lb weight gain in 24 hrs or >5 lb weight gain in 1 week      Farxiga Free 30-Day Trial:  BIN: [id number]  PCN: [id number]  GRP: [id number]  ID: [id number]         Thank you for allowing me to participate in the care of your patient,         Ana Stacey,  PharmD  [facility]  06/07/22  13:52 EDT

## 2022-06-07 NOTE — PROGRESS NOTES
Heart Failure & Pulmonary Arterial Hypertension Clinic  [facility]  Jose Rosales M.D., Ph.D., [facility]       Jignesh York MD  [address] [address]    Thank you for asking me to see Miri Yung for PH.    History of Present Illness  This is a 81 y.o. female with:    1. PH  2. HFpEF      Miri Yung is a 81 y.o. female who presents for today for PH and HFpEF.  The patient is typically seen by Loren Cruz APRN.  Patient's primary cardiologist is Dr. York.     The patient is referred today for pulmonary hypertension and HFpEF.  She did have a recent hospitalization for ADHF.  The patient underwent diuresis.  2D echocardiogram showed pulmonary hypertension.  Her current regimen for HFpEF includes Bumex 2 mg 1 tablet p.o. twice daily and Lopressor.  Patient reports that she is medication compliant.  Denies adverse effects.  She has not had emergency department visits or inpatient admissions since her most recent admission.  She continues with exercise intolerance and exertional dyspnea.    Review of Systems - Review of Systems   Cardiovascular: Positive for dyspnea on exertion. Negative for chest pain, claudication, cyanosis, irregular heartbeat, leg swelling, near-syncope, orthopnea, palpitations, paroxysmal nocturnal dyspnea and syncope.   Respiratory: Positive for cough and shortness of breath. Negative for hemoptysis, sleep disturbances due to breathing, snoring, sputum production and wheezing.    All other systems reviewed and are negative.       All other systems were reviewed and were negative.    family history includes Emphysema in her father; Heart disease in her brother and mother; Kidney disease in her mother.     reports that she has never smoked. She has never used smokeless tobacco. She reports that she does not drink alcohol and does not use drugs.    Allergies   Allergen Reactions   • Decongestant  [Pseudoephedrine]          Current  Outpatient Medications:   •  allopurinol (ZYLOPRIM) 300 MG tablet, TAKE 1 TABLET BY MOUTH  DAILY, Disp: 90 tablet, Rfl: 3  •  apixaban (ELIQUIS) 2.5 MG tablet tablet, Take 1 tablet by mouth Every 12 (Twelve) Hours. Indications: Atrial Fibrillation, Disp: , Rfl:   •  atorvastatin (LIPITOR) 40 MG tablet, TAKE 1 TABLET BY MOUTH DAILY, Disp: 90 tablet, Rfl: 0  •  bumetanide (BUMEX) 2 MG tablet, Take 1 tablet by mouth 2 (Two) Times a Day., Disp: 90 tablet, Rfl: 0  •  buPROPion SR (WELLBUTRIN SR) 150 MG 12 hr tablet, Take 1 tablet by mouth Daily With Breakfast., Disp: 30 tablet, Rfl: 5  •  ferrous sulfate 325 (65 FE) MG EC tablet, TAKE 1 TABLET BY MOUTH EVERY DAY WITH BREAKFAST, Disp: 90 tablet, Rfl: 0  •  glucose blood test strip, BID. Test  In the AM and again in the PM, Disp: 100 each, Rfl: 3  •  glucose monitor monitoring kit, One touch meter, Disp: 1 each, Rfl: 0  •  isosorbide mononitrate (IMDUR) 60 MG 24 hr tablet, TAKE 1 TABLET BY MOUTH  DAILY, Disp: 90 tablet, Rfl: 3  •  Lancets (onetouch ultrasoft) lancets, Test BID, Disp: 100 each, Rfl: 3  •  metoprolol tartrate (LOPRESSOR) 50 MG tablet, Take 1 tablet by mouth Every 12 (Twelve) Hours., Disp: 60 tablet, Rfl: 0  •  mirtazapine (REMERON) 15 MG tablet, Take 1 tablet by mouth Every Night., Disp: 30 tablet, Rfl: 5  •  montelukast (SINGULAIR) 10 MG tablet, Take 1 tablet by mouth Every Night., Disp: 30 tablet, Rfl: 5  •  multivitamin with minerals tablet tablet, Take 1 tablet by mouth Daily., Disp: , Rfl:   •  pantoprazole (PROTONIX) 40 MG EC tablet, TAKE 1 TABLET BY MOUTH  DAILY, Disp: 90 tablet, Rfl: 3  •  POTASSIUM CHLORIDE PO, Take 20 mg by mouth., Disp: , Rfl:   •  SITagliptin (Januvia) 25 MG tablet, Take 1 tablet by mouth Daily., Disp: 30 tablet, Rfl: 0      Physical Exam:  I have reviewed the patient's current vital signs as documented in the patient's EMR.   Vitals:    06/07/22 1323   BP: 104/54   Pulse: 84   SpO2: 95%     Body mass index is 35.36 kg/m².    Pulse Pressure: normal  Pulse Ox: Normal  on room air  General: alert, appears stated age and cooperative     Body Habitus: overweight    HEENT: Head: Normocephalic, no lesions, without obvious abnormality. No arcus senilis, xanthelasma or xanthomas.  No malar flush, proptosis, xanthomas or malar flush.   Neuro: alert, oriented x3  speech normal in context and clarity  memory intact grossly   Pulses: 2+ and symmetric  JVP: Volume/Pulsation: Normal.  Normal x and y descent.  Waveforms: Normal waveforms with a, x, and v waves.   Physiology: Appropriate inspiratory decrease.  No Kussmaul's. No Finn's.     Pulmonary:Normal     Precordium: Normal impulses. P2 is not palpable.  RV Heave: absent  LV Heave: absent  [place]:  normal size and placement  Palpable S4: absent.  Heart rate: normal    Heart Rhythm: irregularly irregular     Heart Sounds: S1: normal intensity  S2: increased P2  S3: absent   S4: absent  Opening Snap: absent    A2-OS:  no  Pericardial Rub:  Absent: Yes      Ejection click: None      Murmurs:  absent   Extremity: moves all extremities equally.       DATA REVIEWED:        TTE/CURTIS:  Results for orders placed during the hospital encounter of 03/04/22    Adult Transthoracic Echo Complete W/ Cont if Necessary Per Protocol    Interpretation Summary  · Aortic valve maximum pressure gradient is 22.8 mmHg. Aortic valve mean pressure gradient is 13 mmHg.  · Mild aortic valve stenosis is present. Aortic valve area is 1.18 cm2.  · Calculated right ventricular systolic pressure from tricuspid regurgitation is 73.1 mmHg.  · There is moderate, bileaflet mitral valve thickening present.  · Mild mitral valve stenosis is present.  · Severe pulmonary hypertension is present.  · Left atrial volume is severely increased.  · Mild to moderate mitral valve regurgitation is present with an eccentric jet noted.  · Estimated left ventricular EF = 50% Left ventricular systolic function is normal.      My interpretation of  the TTE is below.   LVEF is normal.  Severe pulmonary hypertension.  -----------------------------------------------------      CT chest shows that the thoracic aorta is within normal limits for size.  There is a pulmonary nodule in the lingula which has appeared in size since 2016.  There is a 6 mm pulmonary nodule in the right upper lobe.    PFTs:    Pending    --------------------------------------------------------------------------------------------------    Laboratory evaluations:    Lab Results   Component Value Date    GLUCOSE 122 (H) 06/06/2022    BUN 32 (H) 06/06/2022    CREATININE 1.13 (H) 06/06/2022    EGFRIFNONA 67 01/31/2022    EGFRIFAFRI 77 01/31/2022    BCR 28 06/06/2022    K 3.6 06/06/2022    CO2 27 06/06/2022    CALCIUM 8.6 (L) 06/06/2022    PROTENTOTREF 5.7 (L) 05/12/2022    ALBUMIN 3.70 05/23/2022    LABIL2 3.1 (H) 05/12/2022    AST 21 05/23/2022    ALT 22 05/23/2022     Lab Results   Component Value Date    WBC 8.4 06/06/2022    HGB 7.9 (L) 06/06/2022    HCT 28.7 (L) 06/06/2022    MCV 86 06/06/2022     06/06/2022     Lab Results   Component Value Date    CHOL 111 08/06/2019    CHLPL 91 (L) 01/31/2022    TRIG 86 01/31/2022    HDL 45 01/31/2022    LDL 29 01/31/2022     Lab Results   Component Value Date    TSH 2.430 05/24/2022     Lab Results   Component Value Date    TROPONINT <0.010 05/24/2022     Lab Results   Component Value Date    HGBA1C 5.4 05/12/2022     No results found for: DDIMER  Lab Results   Component Value Date    ALT 22 05/23/2022     Lab Results   Component Value Date    HGBA1C 5.4 05/12/2022    HGBA1C 5.90 (H) 03/05/2022    HGBA1C 6.5 (H) 07/13/2021     Lab Results   Component Value Date    CREATININE 1.13 (H) 06/06/2022     Lab Results   Component Value Date    IRON 24 (L) 03/05/2022    TIBC 527 03/05/2022    FERRITIN 27.20 03/06/2022     Lab Results   Component Value Date    INR 1.27 (H) 03/05/2022    INR 1.24 (H) 03/04/2022    INR 1.40 (H) 08/17/2021    PROTIME 15.8 (H)  03/05/2022    PROTIME 15.6 (H) 03/04/2022    PROTIME 19.2 (H) 06/14/2021         1. Pulmonary hypertension (HCC). PAH/PVH. WHO Group Likely 2/3; FC: III.  RV status: Abnormal:.  PFTs: Pending.  The KCCQ-12 was updated at the visit today. The Mckeon Index was updated today. Most recent score: 6.     The patient likely has secondary pulmonary hypertension primarily from HFpEF and likely BRIDGETTE.  For now, optimize heart failure medications and obtain pulmonary and sleep work-up.    Today, I discussed the evaluation, treatment, and usual course of pulmonary hypertension.  All questions were answered.  I recommended that we continue active clinical surveillance.  Signs and symptoms of worsening pulmonary hypertension and right ventricular failure were discussed.  Handouts were provided in discharge paperwork. At this time, there is not indication for consideration of PAH-specific therapies. There is not indication for a RHC at this point.     Additional Studies Ordered Today:  -PFTs and 6MWT    Medications To Be Continued/Ordered/Adjusted Today:   -As below    Vaccinations:  Pneumoccal (PCV13) and COVID booster    I have asked the patient that if they were to move to be certain they see someone with expertise in PAH. These providers can be located at [email or web address].        2. Chronic heart failure with preserved ejection fraction (HCC).  NYHA stage C; functional class III.  Today, euvolemic and perfused.  Clinical trajectory was reviewed today and is improving.    -Bumex  - Lopressor    -Pt was advised SEs, some severe, including hypersensitivity and Marleny's.   -Common SE of UTIs and female genital mycotic infections were discussed.  -Recommend starting Farxiga (dapagliflozin) 10mg daily with quarterly assessment of GFR.  -RTC one week with BMP     3. Pulmonary nodule. Will order CT Chest.    4.  Concerns for sleep apnea. Sleep referral.    5. Research. The patient was screened today for participation in STEP HFpEF  DM trial.  A preliminary discussion was had with the patient regarding an overview of the trial, randomization, the probability of receiving drug versus placebo as 50-50, potential side effects of the study medication, ongoing study variables, and follow-up.  Preliminary screen shows that the patient is a potential participant.  We will plan on having our research staff meet the patient for additional screening and possible enrollment.     6. RPM:    Miri Yung was screened for RPM eligibility today and is a candidate. The patient was provided with instruction using the [employer] RPM package. Demonstrations was performed. The patient was instructed on how to use the tablet, BP cuff, the arm monitoring device, and the scale. They were asked to provide a weight each morning after getting up from sleep and after using the restroom. They were asked to use the wearable during Mon-Fri daytime hours. They were informed that data is not reviewed after 2PM on weekdays and anytime on the weekend. They were also told that our clinic will review data daily during the week and will contact the patient for any issues. Additionally, they were informed that if they have vital signs that are out of parameters, they may be contacted by remote monitoring. The patient expressed understanding and consent to enroll into RPM.            Return in about 1 week (around 6/14/2022).          This document has been electronically signed by Jose Rosales MD PhD on June 7, 2022 15:34 EDT        Jose Rosales M.D., Ph.D., [facility]

## 2022-06-08 NOTE — TELEPHONE ENCOUNTER
Next ov 07/12/22    Last ov 06/06/22    Last lab 05/12/22    This medication was not filled by you. Would you like me to send it to who prescribed it?

## 2022-06-09 NOTE — OUTREACH NOTE
CHF Week 2 Survey    Flowsheet Row Responses   [facility] facility patient discharged from? [place]   Does the patient have one of the following disease processes/diagnoses(primary or secondary)? CHF   Week 2 attempt successful? No   Unsuccessful attempts Attempt 2          KIRSTY TSE - Registered Nurse

## 2022-06-10 NOTE — TELEPHONE ENCOUNTER
Caller: Miri Yung     Relationship: [unfilled]     Best call back number:[phone]    What is your medical concern? PATIENT STATES SHE IS HAVING FLUID SWELLING EVERYWHERE, IT HAS BEEN SLOWLY GETTING WORSE BUT WORSE TODAY     How long has this issue been going on? A WHILE BUT TODAY IT HAS GOTTEN WORSE    Is your provider already aware of this issue? UNSURE    Have you been treated for this issue? NO

## 2022-06-13 NOTE — TELEPHONE ENCOUNTER
Spoke with son. She is being followed by cardiology and CHF clinic and adjustments ar being made. Patient has appt with CHF clinic tomorrow. Son was instructed to go to ER if increased shortness of breath etc

## 2022-06-14 PROBLEM — N17.9 ACUTE RENAL FAILURE (HCC): Status: RESOLVED | Noted: 2022-01-01 | Resolved: 2022-01-01

## 2022-06-14 PROBLEM — I48.0 PAROXYSMAL ATRIAL FIBRILLATION WITH RAPID VENTRICULAR RESPONSE (HCC): Status: ACTIVE | Noted: 2022-01-01

## 2022-06-14 NOTE — PROGRESS NOTES
[facility]      Patient Name: Miri Yung  :1940  Age: 81 y.o.  Sex: female  Referring Provider: Evie Posadas APRN   Primary Cardiologist: Dr. York  Encounter Provider: Ana Ibarra, PharmD, BCACP      Chief Complaint:   Chief Complaint   Patient presents with   • Congestive Heart Failure       HPI:  Patient presents with her son, Hardeep, for a follow-up visit in the HFC. 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 At last visit, patient's Bumex was increased and she was started on Farxiga. She had mentioned last visit that she didn't notice much increase in urination with Bumex vs Lasix. Her son had reported her weights had stayed steady but she wasn't losing any weight. She was given a [employer] RPM kit. Data reviewed. Her weights have been between 195-197 lbs. She has been tachycardic with heart rates in the 110s-low 130s. Confirmed today she is in afib. Alerts did not fire for [employer] so we were not aware of this. Will f/u with them to make sure alerts are triggered. She is short of breath and doesn't seem to be urinating as much. She expresses an urge like she needs to urinate, but then doesn't. Discussed this is likely because she is in afib. She was previously on losartan and spironolactone, but these were discontinued when she presented with YOANA (Scr of 3) and hyperkalemia (K 8.4). She did need dialysis for this. She is followed by nephrology outpatient.      Current Regimen:  Heart Failure  Bumetanide 2 mg (2 tablets) QAM   Metoprolol tartrate 50 mg BID  Farxiga 10 mg daily      Other CV Meds  Apixaban 2.5 mg BID  Atorvastatin 40 mg daily  Ferrous sulfate 325 mg daily  Isosorbide mononitrate 60 mg daily  KCl 20 mEq daily      Medication Use:  Adherence: good  son helps fill pillbox  Past hx of  "medication use/intolerance: furosemide (switched to Bumex); losartan/spironolactone (YOANA with hyperkalemia - received urgent dialysis); carvedilol (hypotension so switched to metoprolol)  Affordability: Medicare  Farxiga PAP pending      Diet:  Defer in-depth discussion to future visit. She does eat canned vegetables. Discussed rinsing these off with water prior to consuming      Social History:  Tobacco use: none  EtOH use: none  Illicit drug use: none  Exercise: limited due to comorbid conditions  is able to complete household tasks      Immunization Status:  Pneumococcal: 3/31/21 and 4/21/21  Influenza: obtains annually  COVID-19: PPSV23 (11/2006)      OBJECTIVE:    /60 (BP Location: Right arm, Patient Position: Sitting)   Pulse 120   Ht 157.5 cm (62.01\")   Wt 90.4 kg (199 lb 6.4 oz)   SpO2 93%   BMI 36.46 kg/m²     Body mass index is 36.46 kg/m².  Wt Readings from Last 1 Encounters:   06/14/22 90.4 kg (199 lb 6.4 oz)       Lab Review:  Renal Function: Estimated Creatinine Clearance: 40.1 mL/min (A) (by C-G formula based on SCr of 1.13 mg/dL (H)).    Lab Results   Component Value Date    PROBNP 3,381.0 (H) 05/23/2022       Results for orders placed during the hospital encounter of 03/04/22    Adult Transthoracic Echo Complete W/ Cont if Necessary Per Protocol    Interpretation Summary  · Aortic valve maximum pressure gradient is 22.8 mmHg. Aortic valve mean pressure gradient is 13 mmHg.  · Mild aortic valve stenosis is present. Aortic valve area is 1.18 cm2.  · Calculated right ventricular systolic pressure from tricuspid regurgitation is 73.1 mmHg.  · There is moderate, bileaflet mitral valve thickening present.  · Mild mitral valve stenosis is present.  · Severe pulmonary hypertension is present.  · Left atrial volume is severely increased.  · Mild to moderate mitral valve regurgitation is present with an eccentric jet noted.  · Estimated left ventricular EF = 50% Left ventricular systolic function " is normal.      ASSESSMENT/PLAN OF CARE:    1. HFpEF (EF 50%)  - Symptoms exacerbated due to her being in afib with rapid rate. Will try to correct this and then see if her higher Bumex dose suits her  - Start digoxin 125 mcg (2 tablets) x 1, then take 125 mcg (2 tablets) x 1 six hours later, then start 125 mcg (1 tablet) daily  - Increase metoprolol tartrate to 100 mg twice daily  - Take metolazone 2.5 mg once daily x 2 days  - Increase potassium chloride to 20 mEq twice daily x 2 days while on metolazone  - Continue bumetanide 2 mg (2 tablets) every morning and 2 mg every afternoon  - Continue Farxiga 10 mg daily   - She was previously on ARB/MRA but these were discontinued when she had an YOANA with hyperkalemia (K 8.4). We will continue to monitor her labs and look to re-introduce at future visit. I would suggest trialing Entresto given positive data from PARAGON-HF in females and those with an EF < 57%. BP on lower end of normal will also be a limiting factor  - Continue [employer] RPM  - She is interested and could be a candidate for STEP-HFpEF trial. We will have research coordinator talk to her at future visit  - Discuss diet in more depth at next visit. She does use canned vegetables. Discussed rinsing these off with water prior to consuming  - Advised patient to call clinic with 2-3 lb weight gain in 24 hrs or >5 lb weight gain in 1 week        Thank you for allowing me to participate in the care of your patient,         Ana Ibarra, PharmD  [facility]  06/14/22  13:52 EDT

## 2022-06-14 NOTE — TELEPHONE ENCOUNTER
Caller: Miri Yung    Relationship: Self    Best call back number: [phone] (H)    What medication are you requesting: SOMETHING FOR ANXIETY    What are your current symptoms: COULDN'T SIT STILL IN SEAT ON THE WAY HOME TODAY, TENSE AND WORKED UP AND HURTING ALL OVER    How long have you been experiencing symptoms: STATED NOTHING NEW    Have you had these symptoms before:    [] Yes  [x] No    Have you been treated for these symptoms before:   [] Yes  [x] No    If a prescription is needed, what is your preferred pharmacy and phone number: "[facility] STORE #[id number] - [facility] [address] [facility] [address]  - [phone]  - [phone] FX     Additional notes: PATIENT STATES SHE WENT TO THE DOCTOR TODAY AND BY THE TIME SHE GOT HOME SHE HAD TO TAKE OFF HER SEAT BELT AND MOVE AROUND AND WAS ALL OVER THE PLACE AND WAS VERY TENSE AND WORKED UP AND IS ASKING FOR SOMETHING FOR THE ANXIETY, PLEASE ADVISE PATIENT IF SOMETHING CAN BE CALLED INTO THE PHARMACY ASAP

## 2022-06-14 NOTE — PROGRESS NOTES
Heart Failure & Pulmonary Arterial Hypertension Clinic  [facility]  Jose Rosales M.D., Ph.D., [facility]       Evie Posadas APRN  [address]  [address] [address]    Thank you for asking me to see Miri Yung for PH.    History of Present Illness  This is a 81 y.o. female with:    1. PH  2. HFpEF      Miri Yung is a 81 y.o. female who presents for today for PH and HFpEF.  The patient is typically seen by Loren Cruz APRN.  Patient's primary cardiologist is Dr. York.     She RTC today for PH and HFpEF. She has had a recent admission for ADHF. She is now on Bumex, Lopressor, and Farxiga. She had stalled fluid loss/weight loss, so she was asked to increase her Bumex dose by Dr. York for a few days. We asked her to continue this dosing until she was seen today. She did get her Farxiga. She is medication compliant. Denies AEs. She has had worsening exercise intolerance. She has not had increased cardiac awareness. She remains with LE edema.       Review of Systems - Review of Systems   Cardiovascular: Positive for dyspnea on exertion. Negative for chest pain, claudication, cyanosis, irregular heartbeat, leg swelling, near-syncope, orthopnea, palpitations, paroxysmal nocturnal dyspnea and syncope.   Respiratory: Positive for cough and shortness of breath. Negative for hemoptysis, sleep disturbances due to breathing, snoring, sputum production and wheezing.    All other systems reviewed and are negative.       All other systems were reviewed and were negative.    family history includes Emphysema in her father; Heart disease in her brother and mother; Kidney disease in her mother.     reports that she has never smoked. She has never used smokeless tobacco. She reports that she does not drink alcohol and does not use drugs.    Allergies   Allergen Reactions   • Decongestant  [Pseudoephedrine]          Current Outpatient Medications:   •  allopurinol (ZYLOPRIM)  300 MG tablet, TAKE 1 TABLET BY MOUTH  DAILY, Disp: 90 tablet, Rfl: 3  •  apixaban (ELIQUIS) 2.5 MG tablet tablet, Take 1 tablet by mouth Every 12 (Twelve) Hours. Indications: Atrial Fibrillation, Disp: , Rfl:   •  atorvastatin (LIPITOR) 40 MG tablet, TAKE 1 TABLET BY MOUTH DAILY, Disp: 90 tablet, Rfl: 0  •  bumetanide (BUMEX) 2 MG tablet, TAKE 2 TABLETS BY MOUTH EVERY MORNING AND 1 TABLET EVERY AFTERNOON, Disp: 270 tablet, Rfl: 0  •  buPROPion SR (WELLBUTRIN SR) 150 MG 12 hr tablet, Take 1 tablet by mouth Daily With Breakfast., Disp: 30 tablet, Rfl: 5  •  dapagliflozin Propanediol (Farxiga) 10 MG tablet, Take 10 mg by mouth Daily., Disp: 31 tablet, Rfl: 3  •  ferrous sulfate 325 (65 FE) MG EC tablet, TAKE 1 TABLET BY MOUTH EVERY DAY WITH BREAKFAST, Disp: 90 tablet, Rfl: 0  •  glucose blood test strip, BID. Test  In the AM and again in the PM, Disp: 100 each, Rfl: 3  •  glucose monitor monitoring kit, One touch meter, Disp: 1 each, Rfl: 0  •  isosorbide mononitrate (IMDUR) 60 MG 24 hr tablet, TAKE 1 TABLET BY MOUTH  DAILY, Disp: 90 tablet, Rfl: 3  •  Lancets (onetouch ultrasoft) lancets, Test BID, Disp: 100 each, Rfl: 3  •  metoprolol tartrate (LOPRESSOR) 50 MG tablet, Take 1 tablet by mouth Every 12 (Twelve) Hours., Disp: 60 tablet, Rfl: 0  •  mirtazapine (REMERON) 15 MG tablet, Take 1 tablet by mouth Every Night., Disp: 30 tablet, Rfl: 5  •  montelukast (SINGULAIR) 10 MG tablet, Take 1 tablet by mouth Every Night., Disp: 30 tablet, Rfl: 5  •  multivitamin with minerals tablet tablet, Take 1 tablet by mouth Daily., Disp: , Rfl:   •  pantoprazole (PROTONIX) 40 MG EC tablet, TAKE 1 TABLET BY MOUTH  DAILY, Disp: 90 tablet, Rfl: 3  •  POTASSIUM CHLORIDE PO, Take 20 mg by mouth., Disp: , Rfl:   •  SITagliptin (Januvia) 25 MG tablet, Take 1 tablet by mouth Daily., Disp: 30 tablet, Rfl: 0      Physical Exam:  I have reviewed the patient's current vital signs as documented in the patient's EMR.   Vitals:    06/14/22 1129    BP: 110/60   Pulse: 120   SpO2: 93%     Body mass index is 36.46 kg/m².   Pulse Pressure: normal  Pulse Ox: Normal  on room air  General: alert, appears stated age and cooperative     Body Habitus: overweight    HEENT: Head: Normocephalic, no lesions, without obvious abnormality. No arcus senilis, xanthelasma or xanthomas.  No malar flush, proptosis, xanthomas or malar flush.   Neuro: alert, oriented x3  speech normal in context and clarity  memory intact grossly   JVP: Volume/Pulsation: Elevated to 10cm of water.  Normal x and y descent.  Waveforms: Normal waveforms with  x, and v waves.   Physiology: Appropriate inspiratory decrease.  No Kussmaul's. No Finn's.     Pulmonary:Normal     Precordium: Normal impulses. P2 is not palpable.  RV Heave: absent  LV Heave: absent  [place]:  normal size and placement  Palpable S4: absent.  Heart rate: tachycardic    Heart Rhythm: irregularly irregular     Heart Sounds: S1: normal intensity  S2: increased P2  S3: absent   S4: absent  Opening Snap: absent    A2-OS:  no  Pericardial Rub:  Absent: Yes      Ejection click: None      Murmurs:  absent   Extremity: moves all extremities equally. LE edema.       DATA REVIEWED:        TTE/CURTIS:  Results for orders placed during the hospital encounter of 03/04/22    Adult Transthoracic Echo Complete W/ Cont if Necessary Per Protocol    Interpretation Summary  · Aortic valve maximum pressure gradient is 22.8 mmHg. Aortic valve mean pressure gradient is 13 mmHg.  · Mild aortic valve stenosis is present. Aortic valve area is 1.18 cm2.  · Calculated right ventricular systolic pressure from tricuspid regurgitation is 73.1 mmHg.  · There is moderate, bileaflet mitral valve thickening present.  · Mild mitral valve stenosis is present.  · Severe pulmonary hypertension is present.  · Left atrial volume is severely increased.  · Mild to moderate mitral valve regurgitation is present with an eccentric jet noted.  · Estimated left ventricular EF  = 50% Left ventricular systolic function is normal.      My interpretation of the TTE is below.     LV EF normal. Severe PH.     PFTs:    Pending    --------------------------------------------------------------------------------------------------    Laboratory evaluations:    Lab Results   Component Value Date    GLUCOSE 122 (H) 06/06/2022    BUN 32 (H) 06/06/2022    CREATININE 1.13 (H) 06/06/2022    EGFRIFNONA 67 01/31/2022    EGFRIFAFRI 77 01/31/2022    BCR 28 06/06/2022    K 3.6 06/06/2022    CO2 27 06/06/2022    CALCIUM 8.6 (L) 06/06/2022    PROTENTOTREF 5.7 (L) 05/12/2022    ALBUMIN 3.70 05/23/2022    LABIL2 3.1 (H) 05/12/2022    AST 21 05/23/2022    ALT 22 05/23/2022     Lab Results   Component Value Date    WBC 8.4 06/06/2022    HGB 7.9 (L) 06/06/2022    HCT 28.7 (L) 06/06/2022    MCV 86 06/06/2022     06/06/2022     Lab Results   Component Value Date    CHOL 111 08/06/2019    CHLPL 91 (L) 01/31/2022    TRIG 86 01/31/2022    HDL 45 01/31/2022    LDL 29 01/31/2022     Lab Results   Component Value Date    TSH 2.430 05/24/2022     Lab Results   Component Value Date    TROPONINT <0.010 05/24/2022     Lab Results   Component Value Date    HGBA1C 5.4 05/12/2022     No results found for: DDIMER  Lab Results   Component Value Date    ALT 22 05/23/2022     Lab Results   Component Value Date    HGBA1C 5.4 05/12/2022    HGBA1C 5.90 (H) 03/05/2022    HGBA1C 6.5 (H) 07/13/2021     Lab Results   Component Value Date    CREATININE 1.13 (H) 06/06/2022     Lab Results   Component Value Date    IRON 24 (L) 03/05/2022    TIBC 527 03/05/2022    FERRITIN 27.20 03/06/2022     Lab Results   Component Value Date    INR 1.27 (H) 03/05/2022    INR 1.24 (H) 03/04/2022    INR 1.40 (H) 08/17/2021    PROTIME 15.8 (H) 03/05/2022    PROTIME 15.6 (H) 03/04/2022    PROTIME 19.2 (H) 06/14/2021         1. Pulmonary hypertension (HCC). PAH/PVH. WHO Group Likely 2/3; FC: III.  RV status: Abnormal:.  PFTs: Pending.  The Mckeon Index was  updated today. Most recent score: 6.     The patient likely has secondary pulmonary hypertension primarily from HFpEF and likely BRIDGETTE.  For now, optimize HFpEF medications and perform pulmonary/sleep eval.    Additional Studies Ordered Today:  -PFTs    Medications To Be Continued/Ordered/Adjusted Today:   -As below    Vaccinations:  Pneumoccal (PCV13) and COVID booster    I have asked the patient that if they were to move to be certain they see someone with expertise in PAH. These providers can be located at [email or web address].        2. Chronic heart failure with preserved ejection fraction (HCC).  NYHA stage C; FC-III. Today, hypervolemic, but perfused. Clinical trajectory has worsened. She has mild ADHF today. I think this is mainly from AF RVR.     I did explain to her and her son that we could attempt to manage this as an outpatient, but only if she shows a favorable response by tomorrow.  Because of her MAP today, the only attractive option would be to increase her beta-blocker and consider digitalis loading.  Her last GFR was acceptable.  Side effects were discussed, including the risk of increased side effects based on her age.  As long as she is able to come to clinic tomorrow I think we can attempt this strategy.  I did inform both of them that if she remains tachycardic tomorrow she would need to be admitted for better rate control and IV diuresis.  They were agreeable with this approach.  She also is endorsing decreased UOP.  I suspect this is from her Bumex not being absorbed and also being complicated by her atrial fibrillation.  I would like to try the following recommendations below.  If she increases her urine output, then we can continue that course.  Otherwise, would need to consider a bladder scan.    -Bumex continue  -ADD Zaroxolyn 2.5mg PO x 2 days  -Temporarily increase Potassium to BID for 2 days  -Lopressor INCREASE 100mg PO BID  -Farxiga  -BMP and BNP     3. Pulmonary nodule. CT chest  ordered, but pending.    4.  Concerns for sleep apnea. Sleep referral placed.    5. Research. Will have her meet with research staff today.    6. RPM: Data reviewed. Continue RPM. We did not receive tachy alerts. Will contact them about this.      7. AF RVR. Will attempt out-patient digoxin loading; increase Lopressor. RTC tomorrow. If RVR tomorrow will need admission. ED precautions given.          Return in about 1 day (around 6/15/2022).          This document has been electronically signed by Jose Rosales MD PhD on June 14, 2022 11:57 EDT        Jose Rosales M.D., Ph.D., [facility]

## 2022-06-14 NOTE — ADDENDUM NOTE
Encounter addended by: Evie Ren RN on: 6/14/2022 2:45 PM   Actions taken: Charge Capture section accepted

## 2022-06-14 NOTE — TELEPHONE ENCOUNTER
Spoke with the patient's son about her clinical course.  Updated on medicine recommendations.    Jose Rosales MD PhD

## 2022-06-15 NOTE — ADDENDUM NOTE
Encounter addended by: Joes Rosales MD PhD on: 6/15/2022 1:50 PM   Actions taken: Clinical Note Signed

## 2022-06-15 NOTE — ADDENDUM NOTE
Encounter addended by: Evie Ren RN on: 6/15/2022 3:01 PM   Actions taken: Charge Capture section accepted

## 2022-06-15 NOTE — PROGRESS NOTES
Heart Failure & Pulmonary Arterial Hypertension Clinic  [facility]  Jose Rosales M.D., Ph.D., [facility]       Evie Posadas APRN  [address] [address]    Thank you for asking me to see Miri Yung for PH.    History of Present Illness  This is a 81 y.o. female with:    1. PH  2. HFpEF      Miri Yung is a 81 y.o. female who presents for today for PH and HFpEF.  The patient is typically seen by Loren Cruz APRN.  Patient's primary cardiologist is Dr. York.     Patient returns the clinic today for pulmonary hypertension and HFpEF.  She has recently had an admission for ADHF.  Yesterday, she was found to be in ongoing decompensation.  This was likely secondary to atrial fibrillation with rapid ventricular response.  Yesterday, she was prescribed oral digitalis loading.  Lopressor was increased to 100 mg 1 tablet p.o. twice daily.  She was asked to increase her potassium to twice daily.  She returns today for further evaluation and management.  Otherwise, she remains on Bumex, Lopressor, and Farxiga. She has had improved UOP.         Review of Systems - Review of Systems   Cardiovascular: Positive for dyspnea on exertion. Negative for chest pain, claudication, cyanosis, irregular heartbeat, leg swelling, near-syncope, orthopnea, palpitations, paroxysmal nocturnal dyspnea and syncope.   Respiratory: Positive for cough and shortness of breath. Negative for hemoptysis, sleep disturbances due to breathing, snoring, sputum production and wheezing.    All other systems reviewed and are negative.       All other systems were reviewed and were negative.    family history includes Emphysema in her father; Heart disease in her brother and mother; Kidney disease in her mother.     reports that she has never smoked. She has never used smokeless tobacco. She reports that she does not drink alcohol and does not use drugs.    Allergies   Allergen Reactions   •  Decongestant  [Pseudoephedrine]          Current Outpatient Medications:   •  allopurinol (ZYLOPRIM) 300 MG tablet, TAKE 1 TABLET BY MOUTH  DAILY, Disp: 90 tablet, Rfl: 3  •  apixaban (ELIQUIS) 2.5 MG tablet tablet, Take 1 tablet by mouth Every 12 (Twelve) Hours. Indications: Atrial Fibrillation, Disp: , Rfl:   •  atorvastatin (LIPITOR) 40 MG tablet, TAKE 1 TABLET BY MOUTH DAILY, Disp: 90 tablet, Rfl: 0  •  bumetanide (BUMEX) 2 MG tablet, TAKE 2 TABLETS BY MOUTH EVERY MORNING AND 1 TABLET EVERY AFTERNOON, Disp: 270 tablet, Rfl: 0  •  buPROPion SR (WELLBUTRIN SR) 150 MG 12 hr tablet, Take 1 tablet by mouth Daily With Breakfast., Disp: 30 tablet, Rfl: 5  •  dapagliflozin Propanediol (Farxiga) 10 MG tablet, Take 10 mg by mouth Daily., Disp: 31 tablet, Rfl: 3  •  digoxin (LANOXIN) 125 MCG tablet, Take 2 tabs (250 mcg) once; then, take 2 tabs (250 mcg) 6 hours later; then, start one tablet a day., Disp: 31 tablet, Rfl: 1  •  ferrous sulfate 325 (65 FE) MG EC tablet, TAKE 1 TABLET BY MOUTH EVERY DAY WITH BREAKFAST, Disp: 90 tablet, Rfl: 0  •  glucose blood test strip, BID. Test  In the AM and again in the PM, Disp: 100 each, Rfl: 3  •  glucose monitor monitoring kit, One touch meter, Disp: 1 each, Rfl: 0  •  isosorbide mononitrate (IMDUR) 60 MG 24 hr tablet, TAKE 1 TABLET BY MOUTH  DAILY, Disp: 90 tablet, Rfl: 3  •  Lancets (onetouch ultrasoft) lancets, Test BID, Disp: 100 each, Rfl: 3  •  metOLazone (ZAROXOLYN) 2.5 MG tablet, Take 1 tablet by mouth Daily for 2 doses., Disp: 2 tablet, Rfl: 0  •  metoprolol tartrate (LOPRESSOR) 50 MG tablet, TAKE 2 TABLETS BY MOUTH TWICE DAILY, Disp: 360 tablet, Rfl: 0  •  mirtazapine (REMERON) 15 MG tablet, Take 1 tablet by mouth Every Night., Disp: 30 tablet, Rfl: 5  •  montelukast (SINGULAIR) 10 MG tablet, Take 1 tablet by mouth Every Night., Disp: 30 tablet, Rfl: 5  •  multivitamin with minerals tablet tablet, Take 1 tablet by mouth Daily., Disp: , Rfl:   •  pantoprazole (PROTONIX) 40  MG EC tablet, TAKE 1 TABLET BY MOUTH  DAILY, Disp: 90 tablet, Rfl: 3  •  POTASSIUM CHLORIDE PO, Take 20 mg by mouth., Disp: , Rfl:   •  SITagliptin (Januvia) 25 MG tablet, Take 1 tablet by mouth Daily., Disp: 30 tablet, Rfl: 0      Physical Exam:  I have reviewed the patient's current vital signs as documented in the patient's EMR.   Vitals:    06/15/22 1321   BP: 100/50   Pulse: 88   SpO2: 92%     Body mass index is 36.1 kg/m².   Pulse Pressure: normal  Pulse Ox: Normal  on room air  General: alert, appears stated age and cooperative     Body Habitus: overweight    HEENT: Head: Normocephalic, no lesions, without obvious abnormality. No arcus senilis, xanthelasma or xanthomas.  No malar flush, proptosis, xanthomas or malar flush.   Neuro: alert, oriented x3  speech normal in context and clarity  memory intact grossly   JVP: Volume/Pulsation: Elevated to 10cm of water.  Normal x and y descent.  Waveforms: Normal waveforms with  x, and v waves.   Physiology: Appropriate inspiratory decrease.  No Kussmaul's. No Finn's.     Pulmonary:Normal     Precordium: Normal impulses. P2 is not palpable.  RV Heave: absent  LV Heave: absent  [place]:  normal size and placement  Palpable S4: absent.  Heart rate: normal    Heart Rhythm: irregularly irregular     Heart Sounds: S1: normal intensity  S2: increased P2  S3: absent   S4: absent  Opening Snap: absent    A2-OS:  no  Pericardial Rub:  Absent: Yes      Ejection click: None      Murmurs:  absent   Extremity: moves all extremities equally.       DATA REVIEWED:        TTE/CURTIS:  Results for orders placed during the hospital encounter of 03/04/22    Adult Transthoracic Echo Complete W/ Cont if Necessary Per Protocol    Interpretation Summary  · Aortic valve maximum pressure gradient is 22.8 mmHg. Aortic valve mean pressure gradient is 13 mmHg.  · Mild aortic valve stenosis is present. Aortic valve area is 1.18 cm2.  · Calculated right ventricular systolic pressure from tricuspid  regurgitation is 73.1 mmHg.  · There is moderate, bileaflet mitral valve thickening present.  · Mild mitral valve stenosis is present.  · Severe pulmonary hypertension is present.  · Left atrial volume is severely increased.  · Mild to moderate mitral valve regurgitation is present with an eccentric jet noted.  · Estimated left ventricular EF = 50% Left ventricular systolic function is normal.      My interpretation of the TTE is below.   LVEF is normal.  Severe pulmonary hypertension.      PFTs:    Pending    --------------------------------------------------------------------------------------------------    Laboratory evaluations:    Lab Results   Component Value Date    GLUCOSE 122 (H) 06/06/2022    BUN 32 (H) 06/06/2022    CREATININE 1.13 (H) 06/06/2022    EGFRIFNONA 67 01/31/2022    EGFRIFAFRI 77 01/31/2022    BCR 28 06/06/2022    K 3.6 06/06/2022    CO2 27 06/06/2022    CALCIUM 8.6 (L) 06/06/2022    PROTENTOTREF 5.7 (L) 05/12/2022    ALBUMIN 3.70 05/23/2022    LABIL2 3.1 (H) 05/12/2022    AST 21 05/23/2022    ALT 22 05/23/2022     Lab Results   Component Value Date    WBC 8.4 06/06/2022    HGB 7.9 (L) 06/06/2022    HCT 28.7 (L) 06/06/2022    MCV 86 06/06/2022     06/06/2022     Lab Results   Component Value Date    CHOL 111 08/06/2019    CHLPL 91 (L) 01/31/2022    TRIG 86 01/31/2022    HDL 45 01/31/2022    LDL 29 01/31/2022     Lab Results   Component Value Date    TSH 2.430 05/24/2022     Lab Results   Component Value Date    TROPONINT <0.010 05/24/2022     Lab Results   Component Value Date    HGBA1C 5.4 05/12/2022     No results found for: DDIMER  Lab Results   Component Value Date    ALT 22 05/23/2022     Lab Results   Component Value Date    HGBA1C 5.4 05/12/2022    HGBA1C 5.90 (H) 03/05/2022    HGBA1C 6.5 (H) 07/13/2021     Lab Results   Component Value Date    CREATININE 1.13 (H) 06/06/2022     Lab Results   Component Value Date    IRON 24 (L) 03/05/2022    TIBC 527 03/05/2022    FERRITIN 27.20  03/06/2022     Lab Results   Component Value Date    INR 1.27 (H) 03/05/2022    INR 1.24 (H) 03/04/2022    INR 1.40 (H) 08/17/2021    PROTIME 15.8 (H) 03/05/2022    PROTIME 15.6 (H) 03/04/2022    PROTIME 19.2 (H) 06/14/2021         1. Pulmonary hypertension (HCC). PAH/PVH. WHO Group Likely 2/3; FC: III.  RV status: Abnormal:.  PFTs: Pending.  The Mckeon Index was updated today. Most recent score: 6.     The patient likely has secondary pulmonary hypertension primarily from HFpEF and likely BRIDGETTE.  For now, optimize HFpEF medications and perform pulmonary/sleep medicine.     Additional Studies Ordered Today:  -PFTs    Medications To Be Continued/Ordered/Adjusted Today:   -As below    Vaccinations:  Pneumoccal (PCV13) and COVID booster    I have asked the patient that if they were to move to be certain they see someone with expertise in PAH. These providers can be located at [email or web address].        2. Chronic heart failure with preserved ejection fraction (HCC).  NYHA stage C; FC-III. Today, she is hypervolemic, but improved. She is perfused. HR is better. MAP is better.  She is on maximum dose of Lopressor and is now on digoxin. I am hesitant to add an AAD such as Amiodarone for additional rate control. If she were to convert to NSR, I would be concerned about bradycardic and issues with heart block. I also think she may feel better with rhythm control. I am going to have her see EP for their input.     -EP referral  -Continue Bumex  -Complete Zaroxolyn  -Lopressor 100mg PO BID  -Farxiga  -BMP and BNP     3. Pulmonary nodule. CT chest ordered, referral placed.    4.  Concerns for sleep apnea. Sleep referral place.    5. Research. Will have her meet research staff next week.    6. RPM: Data reviewed. Continue RPM.          Return in about 1 week (around 6/22/2022).          This document has been electronically signed by Jose Rosales MD PhD on Blanca 15, 2022 13:42 EDT      Jose Rosales M.D., Ph.D.,  [facility]

## 2022-06-16 NOTE — OUTREACH NOTE
CHF Week 3 Survey    Flowsheet Row Responses   [facility] patient discharged from? [place]   Does the patient have one of the following disease processes/diagnoses(primary or secondary)? CHF   Week 3 attempt successful? Yes   Call start time 1656   Call end time 1704   Discharge diagnosis A/C CHF, KOO, fall with scalp contusion, CKD   Meds reviewed with patient/caregiver? Yes   Is the patient having any side effects they believe may be caused by any medication additions or changes? No   Does the patient have all medications ordered at discharge? Yes   Is the patient taking all medications as directed (includes completed medication regime)? Yes   Does the patient have a primary care provider?  Yes   Does the patient have an appointment with their PCP within 7 days of discharge? Yes   Has the patient kept scheduled appointments due by today? Yes   What is the Home health agency?  [facility]   Has home health visited the patient within 72 hours of discharge? Yes   Pulse Ox monitoring None   Psychosocial issues? No   Did the patient receive a copy of their discharge instructions? Yes   Nursing interventions Reviewed instructions with patient   What is the patient's perception of their health status since discharge? Improving   Nursing interventions Nurse provided patient education   Is the patient weighing daily? Yes   Does the patient have scales? Yes   Daily weight interventions Education provided on importance of daily weight   Is the patient able to teach back Heart Failure diet management? Yes   Is the patient able to teach back Heart Failure Zones? Yes   Is the patient able to teach back signs and symptoms of worsening condition? (i.e. weight gain, shortness of air, etc.) Yes   If the patient is a current smoker, are they able to teach back resources for cessation? Not a smoker   Is the patient/caregiver able to teach back the hierarchy of who to call/visit for symptoms/problems? PCP, Specialist,  Home health nurse, Urgent Care, ED, 911 Yes   CHF Week 3 call completed? Yes   Wrap up additional comments Patient reports she is tired today and has some SOB. She will seek medical care if worsening of symptoms.           CAPRICE FRIAS - Registered Nurse

## 2022-06-16 NOTE — TELEPHONE ENCOUNTER
6/15/22  16:38 pm    Pt lab results Potassium 2.8, Creatinine 1.36.    Per Dr. Rosales pt to take Potassium 80 meq today, then increase to 40 meq bid x 5 days.     Called spoke with pt son who verbalized understanding and agreed.

## 2022-06-16 NOTE — TELEPHONE ENCOUNTER
Pt's son called requesting refill on Pt's potassium.  Pt was in clinic yesterday and her potassium was 2.8.  Dr. Rosales advised her to increase her dose to 80 mg yesterday and then take 40 mg BID for 5 days.  Pt's son said [facility].  Pt's current prescription is  potassium 20 mEq daily, so I let him know that I will send in this dose as Pt will likely go back to normal dose once her potassium level is back up.  Pt just needs to remember to take the extra does for 1 week. All questions and concerns addressed. Understanding and agreement verbalized.      Evie Ren RN  [facility] Failure [facility]

## 2022-06-16 NOTE — TELEPHONE ENCOUNTER
PATIENT'S SON STATED STEPHENIE JUNIOR HAS PRESCRIBED JANUVIA PREVIOUSLY AND WENT TO THE HOSPITAL ABOUT 2 1/2 WEEKS AGO AND MAY HAVE HAD THE MEDICATION CHANGED THEN.    PATIENT IS COMPLETELY OUT OF MEDICATION AND NEEDS REFILLED ASAP.    CONTACT: [phone]

## 2022-06-16 NOTE — PROGRESS NOTES
"CHF Remote Monitoring Management Note  [facility]  [facility] CARDIOLOGY  [address] [address]     Patient Weight today   Wt Readings from Last 3 Encounters:   06/15/22 89.5 kg (197 lb 6.4 oz)   06/14/22 90.4 kg (199 lb 6.4 oz)   06/07/22 87.7 kg (193 lb 6.4 oz)       BP today   BP Readings from Last 3 Encounters:   06/15/22 100/50   06/14/22 110/60   06/07/22 104/54     HR today   O2 Sat today   There were no vitals filed for this visit.           KCCQ12 Score     10:13 EDT  Time at finish of encounter   Called Pt to check status.  Her SPO2 was low overnight and continues to be low.  Pt said she does wear oxygen at night.  She said she is feeling \"fine\", no breathing difficulties or shortness of air.  Will inform provider.  Patient advised to call us if weight gain of 2 to 3 pounds is noted within 24 hours, or 7 to 10 pounds within 1 week.  Patient also advised to call if shortness of breath or edema worsen.Patient advised to contact primary cardiologist for symptoms or alerts that occur after heart failure clinic hours, on weekends, and holidays.  All questions and concerns addressed. Understanding and agreement verbalized.      Evie Ren RN  [facility]     "

## 2022-06-21 NOTE — PROGRESS NOTES
Heart Failure & Pulmonary Arterial Hypertension Clinic  [facility]  Jose Rosales M.D., Ph.D., [facility]       Evie Posadas APRN  [address] [address]    Thank you for asking me to see Miri Yung for PH.    History of Present Illness  This is a 81 y.o. female with:    1. PH  2. HFpEF      Miri Yung is a 81 y.o. female who presents for today for PH and HFpEF.  The patient is typically seen by Loren Cruz APRN.  Patient's primary cardiologist is Dr. York.     Patient returns the clinic today for pulmonary hypertension and HFpEF.  She has recently had an admission for ADHF.  She has had episodes of ADHF managed as an out-patient. She was also in AF RVR. Lopressor was increased. She was loaded on digoxin. She is now on Bumex, Lopressor, Digoxin, Farxiga. She continues to report severe exercise intolerance, but no resting symptoms. No increased cardiac awareness. Adequate UOP. Denies ascites, or early abdominal satiety.         Review of Systems - Review of Systems   Cardiovascular: Positive for dyspnea on exertion. Negative for chest pain, claudication, cyanosis, irregular heartbeat, leg swelling, near-syncope, orthopnea, palpitations, paroxysmal nocturnal dyspnea and syncope.   Respiratory: Positive for cough and shortness of breath. Negative for hemoptysis, sleep disturbances due to breathing, snoring, sputum production and wheezing.    All other systems reviewed and are negative.       All other systems were reviewed and were negative.    family history includes Emphysema in her father; Heart disease in her brother and mother; Kidney disease in her mother.     reports that she has never smoked. She has never used smokeless tobacco. She reports that she does not drink alcohol and does not use drugs.    Allergies   Allergen Reactions   • Decongestant  [Pseudoephedrine]          Current Outpatient Medications:   •  allopurinol (ZYLOPRIM) 300 MG  tablet, TAKE 1 TABLET BY MOUTH  DAILY, Disp: 90 tablet, Rfl: 3  •  apixaban (ELIQUIS) 2.5 MG tablet tablet, Take 1 tablet by mouth Every 12 (Twelve) Hours. Indications: Atrial Fibrillation, Disp: , Rfl:   •  atorvastatin (LIPITOR) 40 MG tablet, TAKE 1 TABLET BY MOUTH DAILY, Disp: 90 tablet, Rfl: 0  •  bumetanide (BUMEX) 2 MG tablet, TAKE 2 TABLETS BY MOUTH EVERY MORNING AND 1 TABLET EVERY AFTERNOON, Disp: 270 tablet, Rfl: 0  •  buPROPion SR (WELLBUTRIN SR) 150 MG 12 hr tablet, Take 1 tablet by mouth Daily With Breakfast., Disp: 30 tablet, Rfl: 5  •  dapagliflozin Propanediol (Farxiga) 10 MG tablet, Take 10 mg by mouth Daily., Disp: 31 tablet, Rfl: 3  •  digoxin (LANOXIN) 125 MCG tablet, Take 2 tabs (250 mcg) once; then, take 2 tabs (250 mcg) 6 hours later; then, start one tablet a day., Disp: 31 tablet, Rfl: 1  •  ferrous sulfate 325 (65 FE) MG EC tablet, TAKE 1 TABLET BY MOUTH EVERY DAY WITH BREAKFAST, Disp: 90 tablet, Rfl: 0  •  glucose blood test strip, BID. Test  In the AM and again in the PM, Disp: 100 each, Rfl: 3  •  glucose monitor monitoring kit, One touch meter, Disp: 1 each, Rfl: 0  •  isosorbide mononitrate (IMDUR) 60 MG 24 hr tablet, TAKE 1 TABLET BY MOUTH  DAILY, Disp: 90 tablet, Rfl: 3  •  Januvia 25 MG tablet, TAKE 1 TABLET BY MOUTH DAILY, Disp: 90 tablet, Rfl: 0  •  Lancets (onetouch ultrasoft) lancets, Test BID, Disp: 100 each, Rfl: 3  •  metoprolol tartrate (LOPRESSOR) 50 MG tablet, TAKE 2 TABLETS BY MOUTH TWICE DAILY, Disp: 360 tablet, Rfl: 0  •  mirtazapine (REMERON) 15 MG tablet, Take 1 tablet by mouth Every Night., Disp: 30 tablet, Rfl: 5  •  montelukast (SINGULAIR) 10 MG tablet, Take 1 tablet by mouth Every Night., Disp: 30 tablet, Rfl: 5  •  multivitamin with minerals tablet tablet, Take 1 tablet by mouth Daily., Disp: , Rfl:   •  pantoprazole (PROTONIX) 40 MG EC tablet, TAKE 1 TABLET BY MOUTH  DAILY, Disp: 90 tablet, Rfl: 3  •  potassium chloride (K-DUR,KLOR-CON) 20 MEQ CR tablet, Take 1  tablet by mouth Daily., Disp: 90 tablet, Rfl: 3  •  POTASSIUM CHLORIDE PO, Take 20 mg by mouth., Disp: , Rfl:   •  metOLazone (ZAROXOLYN) 2.5 MG tablet, Take 1 tablet by mouth Daily for 2 doses., Disp: 2 tablet, Rfl: 0      Physical Exam:  I have reviewed the patient's current vital signs as documented in the patient's EMR.   Vitals:    06/21/22 0837   BP: 116/72   Pulse: 88   Resp: 20   SpO2: 91%     Body mass index is 36.57 kg/m².   Pulse Pressure: normal  Pulse Ox: Normal  on room air  General: alert, appears stated age and cooperative     Body Habitus: overweight    HEENT: Head: Normocephalic, no lesions, without obvious abnormality. No arcus senilis, xanthelasma or xanthomas.  No malar flush, proptosis, xanthomas or malar flush.   Neuro: alert, oriented x3  speech normal in context and clarity  memory intact grossly   JVP: Volume/Pulsation: Elevated to 8cm of water.  Normal x and y descent.  Waveforms: Normal waveforms with  x, and v waves.   Physiology: Appropriate inspiratory decrease.  No Kussmaul's. No Finn's.     Pulmonary:Normal     Precordium: Normal impulses. P2 is not palpable.  RV Heave: absent  LV Heave: absent  [place]:  normal size and placement  Palpable S4: absent.  Heart rate: normal    Heart Rhythm: irregularly irregular     Heart Sounds: S1: normal intensity  S2: increased P2  S3: absent   S4: absent  Opening Snap: absent    A2-OS:  no  Pericardial Rub:  Absent: Yes      Ejection click: None      Murmurs:  absent   Extremity: moves all extremities equally.       DATA REVIEWED:        TTE/CURTIS:  Results for orders placed during the hospital encounter of 03/04/22    Adult Transthoracic Echo Complete W/ Cont if Necessary Per Protocol    Interpretation Summary  · Aortic valve maximum pressure gradient is 22.8 mmHg. Aortic valve mean pressure gradient is 13 mmHg.  · Mild aortic valve stenosis is present. Aortic valve area is 1.18 cm2.  · Calculated right ventricular systolic pressure from  tricuspid regurgitation is 73.1 mmHg.  · There is moderate, bileaflet mitral valve thickening present.  · Mild mitral valve stenosis is present.  · Severe pulmonary hypertension is present.  · Left atrial volume is severely increased.  · Mild to moderate mitral valve regurgitation is present with an eccentric jet noted.  · Estimated left ventricular EF = 50% Left ventricular systolic function is normal.      My interpretation of the TTE is below.     LV EF is normal. Severe PH.     PFTs:    Pending.     --------------------------------------------------------------------------------------------------    Laboratory evaluations:    Lab Results   Component Value Date    GLUCOSE 115 (H) 06/15/2022    BUN 25 (H) 06/15/2022    CREATININE 1.36 (H) 06/15/2022    EGFRIFNONA 67 01/31/2022    EGFRIFAFRI 77 01/31/2022    BCR 18.4 06/15/2022    K 2.8 (L) 06/15/2022    CO2 30.0 (H) 06/15/2022    CALCIUM 8.3 (L) 06/15/2022    PROTENTOTREF 5.7 (L) 05/12/2022    ALBUMIN 3.70 05/23/2022    LABIL2 3.1 (H) 05/12/2022    AST 21 05/23/2022    ALT 22 05/23/2022     Lab Results   Component Value Date    WBC 8.4 06/06/2022    HGB 7.9 (L) 06/06/2022    HCT 28.7 (L) 06/06/2022    MCV 86 06/06/2022     06/06/2022     Lab Results   Component Value Date    CHOL 111 08/06/2019    CHLPL 91 (L) 01/31/2022    TRIG 86 01/31/2022    HDL 45 01/31/2022    LDL 29 01/31/2022     Lab Results   Component Value Date    TSH 2.430 05/24/2022     Lab Results   Component Value Date    TROPONINT <0.010 05/24/2022     Lab Results   Component Value Date    HGBA1C 5.4 05/12/2022     No results found for: DDIMER  Lab Results   Component Value Date    ALT 22 05/23/2022     Lab Results   Component Value Date    HGBA1C 5.4 05/12/2022    HGBA1C 5.90 (H) 03/05/2022    HGBA1C 6.5 (H) 07/13/2021     Lab Results   Component Value Date    CREATININE 1.36 (H) 06/15/2022     Lab Results   Component Value Date    IRON 24 (L) 03/05/2022    TIBC 527 03/05/2022    FERRITIN  27.20 03/06/2022     Lab Results   Component Value Date    INR 1.27 (H) 03/05/2022    INR 1.24 (H) 03/04/2022    INR 1.40 (H) 08/17/2021    PROTIME 15.8 (H) 03/05/2022    PROTIME 15.6 (H) 03/04/2022    PROTIME 19.2 (H) 06/14/2021         1. Pulmonary hypertension (HCC). PAH/PVH. WHO Group Likely 2/3; FC: III.  RV status: Abnormal:.  PFTs: Pending.  The Mckeon Index was updated today. Most recent score: 6.     The patient likely has secondary pulmonary hypertension primarily from HFpEF and likely BRIDGETTE. For now, optimize HFpEF medications and perform pulm/sleep eval.     Additional Studies Ordered Today:  -PFTs    Medications To Be Continued/Ordered/Adjusted Today:   -As below    Vaccinations:  Pneumoccal (PCV13) and COVID booster    I have asked the patient that if they were to move to be certain they see someone with expertise in PAH. These providers can be located at [email or web address].        2. Chronic heart failure with preserved ejection fraction (HCC).  NYHA stage C; FC-III. Today, hypervolemic and perfused. She is now on maximum dose Lopressor and digoxin for AF. She has had PAF and AFL (2019), but has been in AF recently. She had RVR, but responded to medications.  I would like to avoid a 3rd agent. If she were to convert to NSR, I would be concerned about heart block/jennifer issues. I think her AF is contributing to her worsening symptoms. Her VR is better, but she remains symptomatic. Her RAYNE >52m\L/M2, I am not certain DCCV would be effective. She many need to consider PVI/RFA. I will have EP provide recommendations. Her filling pressures remain elevated today. I will add MRA. This can also assist with her hypokalemia.     -EP for AF  -Bumex  -Zaroxolyn  -Farxiga     -Aldactone was explained to the patient.  However, not all possible side effects and adverse reactions are able to be fully discussed.  Handout was offered to the patient detailing the prescription.  -START Aldactone 25mg  -Quarterly  monitoring of potassium and renal function is currently recommended  -RTC Monday   3. Pulmonary nodule. CT chest ordered.    4.  Concerns for sleep apnea. Awaiting sleep appt.   5. Research. Will have research meet with her today.    6. RPM: Continue RPM.    7. Hypokalemia. BMP today. Continue potassium.       Return in about 5 days (around 6/26/2022).          This document has been electronically signed by Jose Rosales MD PhD on June 21, 2022 09:28 EDT      Jose Rosales M.D., Ph.D., [facility]

## 2022-06-21 NOTE — TELEPHONE ENCOUNTER
----- Message from Jose Rosales MD PhD sent at 6/21/2022 11:27 AM EDT -----  Please let her know that her kidney function has improved.

## 2022-06-21 NOTE — TELEPHONE ENCOUNTER
Called Pt with lab results. No answer.  Left message asking Pt to call back.  Will await Pt call back.    Evie Ren RN  [facility]      Addendum:  Spoke to Pt's son Hardeep.  Informed of results as per Dr. Rosales's note

## 2022-06-21 NOTE — ADDENDUM NOTE
Encounter addended by: Evie Ren RN on: 6/21/2022 10:49 AM   Actions taken: Charge Capture section accepted

## 2022-06-24 NOTE — OUTREACH NOTE
CHF Week 4 Survey    Flowsheet Row Responses   [facility] patient discharged from? [place]   Does the patient have one of the following disease processes/diagnoses(primary or secondary)? CHF   Week 4 attempt successful? Yes   Call start time 1512   Call end time 1514   Discharge diagnosis A/C CHF, KOO, fall with scalp contusion, CKD   Is patient permission given to speak with other caregiver? Yes   List who call center can speak with sons   Meds reviewed with patient/caregiver? Yes   Is the patient having any side effects they believe may be caused by any medication additions or changes? No   Is the patient taking all medications as directed (includes completed medication regime)? N/A   Has the patient kept scheduled appointments due by today? Yes   Pulse Ox monitoring None   Psychosocial issues? No   Comments Pt denies SOA but reports some edema in feet. Pt reports feeling off in her GI track.   What is the patient's perception of their health status since discharge? Improving   Is the patient weighing daily? Yes   Is the patient able to teach back signs and symptoms of worsening condition? (i.e. weight gain, shortness of air, etc.) Yes   If the patient is a current smoker, are they able to teach back resources for cessation? Not a smoker   Week 4 Call Completed? Yes   Would the patient like one additional call? No   Graduated Yes   Is the patient interested in additional calls from an ambulatory ?  NOTE:  applies to high risk patients requiring additional follow-up. No   Did the patient feel the follow up calls were helpful during their recovery period? Yes          SUSANNE NOLASCO - Registered Nurse

## 2022-06-27 NOTE — PROGRESS NOTES
Heart Failure & Pulmonary Arterial Hypertension Clinic  [facility]  Jose Rosales M.D., Ph.D., [facility]       Evie Posadas APRN  [address] [address]    Thank you for asking me to see Miri Yung for PH.    History of Present Illness  This is a 81 y.o. female with:    1. PH  2. HFpEF      Miri Yung is a 81 y.o. female who presents for today for PH and HFpEF.  The patient is typically seen by Loren Cruz APRN.  Patient's primary cardiologist is Dr. York.     Patient returns today for pulmonary hypertension.  She recently had AF RVR.  This led to an acute exacerbation of heart failure.  She was started on digoxin and had her Lopressor increased.  Her ventricular rate slowly improved.  However, she is continued to be symptomatic.  She continues to report severe exercise intolerance, but no resting symptoms.  No increased cardiac awareness.  She is now using Bumex, Lopressor, digoxin, and Farxiga.  Medication compliant.  Denies adverse effects.  Because of her ongoing symptoms and intermittent volume overload she was also started on Aldactone at her last appointment.  She reports that she was able to pick this up without any issues.  She reports that she has medication compliance with this.  She denies adverse effects.          Review of Systems - Review of Systems   Cardiovascular: Positive for dyspnea on exertion. Negative for chest pain, claudication, cyanosis, irregular heartbeat, leg swelling, near-syncope, orthopnea, palpitations, paroxysmal nocturnal dyspnea and syncope.   Respiratory: Positive for cough and shortness of breath. Negative for hemoptysis, sleep disturbances due to breathing, snoring, sputum production and wheezing.    All other systems reviewed and are negative.       All other systems were reviewed and were negative.    family history includes Emphysema in her father; Heart disease in her brother and mother; Kidney disease in  her mother.     reports that she has never smoked. She has never used smokeless tobacco. She reports that she does not drink alcohol and does not use drugs.    Allergies   Allergen Reactions   • Decongestant  [Pseudoephedrine]          Current Outpatient Medications:   •  allopurinol (ZYLOPRIM) 300 MG tablet, TAKE 1 TABLET BY MOUTH  DAILY, Disp: 90 tablet, Rfl: 3  •  apixaban (ELIQUIS) 2.5 MG tablet tablet, Take 1 tablet by mouth Every 12 (Twelve) Hours. Indications: Atrial Fibrillation, Disp: , Rfl:   •  atorvastatin (LIPITOR) 40 MG tablet, TAKE 1 TABLET BY MOUTH DAILY, Disp: 90 tablet, Rfl: 0  •  bumetanide (BUMEX) 2 MG tablet, TAKE 2 TABLETS BY MOUTH EVERY MORNING AND 1 TABLET EVERY AFTERNOON, Disp: 270 tablet, Rfl: 0  •  buPROPion SR (WELLBUTRIN SR) 150 MG 12 hr tablet, Take 1 tablet by mouth Daily With Breakfast., Disp: 30 tablet, Rfl: 5  •  dapagliflozin Propanediol (Farxiga) 10 MG tablet, Take 10 mg by mouth Daily., Disp: 31 tablet, Rfl: 3  •  digoxin (LANOXIN) 125 MCG tablet, Take 2 tabs (250 mcg) once; then, take 2 tabs (250 mcg) 6 hours later; then, start one tablet a day., Disp: 31 tablet, Rfl: 1  •  ferrous sulfate 325 (65 FE) MG EC tablet, TAKE 1 TABLET BY MOUTH EVERY DAY WITH BREAKFAST, Disp: 90 tablet, Rfl: 0  •  glucose blood test strip, BID. Test  In the AM and again in the PM, Disp: 100 each, Rfl: 3  •  glucose monitor monitoring kit, One touch meter, Disp: 1 each, Rfl: 0  •  isosorbide mononitrate (IMDUR) 60 MG 24 hr tablet, TAKE 1 TABLET BY MOUTH  DAILY, Disp: 90 tablet, Rfl: 3  •  Januvia 25 MG tablet, TAKE 1 TABLET BY MOUTH DAILY, Disp: 90 tablet, Rfl: 0  •  Lancets (onetouch ultrasoft) lancets, Test BID, Disp: 100 each, Rfl: 3  •  metoprolol tartrate (LOPRESSOR) 50 MG tablet, TAKE 2 TABLETS BY MOUTH TWICE DAILY, Disp: 360 tablet, Rfl: 0  •  mirtazapine (REMERON) 15 MG tablet, Take 1 tablet by mouth Every Night., Disp: 30 tablet, Rfl: 5  •  montelukast (SINGULAIR) 10 MG tablet, Take 1 tablet by  mouth Every Night., Disp: 30 tablet, Rfl: 5  •  multivitamin with minerals tablet tablet, Take 1 tablet by mouth Daily., Disp: , Rfl:   •  pantoprazole (PROTONIX) 40 MG EC tablet, TAKE 1 TABLET BY MOUTH  DAILY, Disp: 90 tablet, Rfl: 3  •  potassium chloride (K-DUR,KLOR-CON) 20 MEQ CR tablet, Take 1 tablet by mouth Daily., Disp: 90 tablet, Rfl: 3  •  POTASSIUM CHLORIDE PO, Take 20 mg by mouth., Disp: , Rfl:   •  spironolactone (ALDACTONE) 25 MG tablet, Take 1 tablet by mouth Daily., Disp: 31 tablet, Rfl: 3      Physical Exam:  I have reviewed the patient's current vital signs as documented in the patient's EMR.   Vitals:    06/27/22 0917   BP: 122/70   Pulse: 97   Resp: 20   SpO2: 93%     Body mass index is 36.02 kg/m².   Pulse Pressure: normal  Pulse Ox: Normal  on room air  General: alert, appears stated age and cooperative     Body Habitus: overweight    HEENT: Head: Normocephalic, no lesions, without obvious abnormality. No arcus senilis, xanthelasma or xanthomas.  No malar flush, proptosis, xanthomas or malar flush.   Neuro: alert, oriented x3  speech normal in context and clarity  memory intact grossly   JVP: Volume/Pulsation: Elevated to 8cm of water.  Normal x and y descent.  Waveforms: Normal waveforms with  x, and v waves.   Physiology: Appropriate inspiratory decrease.  No Kussmaul's. No Finn's.     Pulmonary:Normal     Precordium: Normal impulses. P2 is not palpable.  RV Heave: absent  LV Heave: absent  Ault:  normal size and placement  Palpable S4: absent.  Heart rate: normal    Heart Rhythm: irregularly irregular     Heart Sounds: S1: normal intensity  S2: increased A2  S3: absent   S4: absent  Opening Snap: absent    A2-OS:  no  Pericardial Rub:  Absent: Yes      Ejection click: None      Murmurs:  absent   Extremity: moves all extremities equally.       DATA REVIEWED:        TTE/CURTIS:  Results for orders placed during the hospital encounter of 03/04/22    Adult Transthoracic Echo Complete W/ Cont  if Necessary Per Protocol    Interpretation Summary  · Aortic valve maximum pressure gradient is 22.8 mmHg. Aortic valve mean pressure gradient is 13 mmHg.  · Mild aortic valve stenosis is present. Aortic valve area is 1.18 cm2.  · Calculated right ventricular systolic pressure from tricuspid regurgitation is 73.1 mmHg.  · There is moderate, bileaflet mitral valve thickening present.  · Mild mitral valve stenosis is present.  · Severe pulmonary hypertension is present.  · Left atrial volume is severely increased.  · Mild to moderate mitral valve regurgitation is present with an eccentric jet noted.  · Estimated left ventricular EF = 50% Left ventricular systolic function is normal.      My interpretation of the TTE is below.   LVEF is normal.  Mild to moderate MR.  Severe pulmonary hypertension.    PFTs:    Pending.    --------------------------------------------------------------------------------------------------    Laboratory evaluations:    Lab Results   Component Value Date    GLUCOSE 125 (H) 06/21/2022    BUN 29 (H) 06/21/2022    CREATININE 1.21 (H) 06/21/2022    EGFRIFNONA 67 01/31/2022    EGFRIFAFRI 77 01/31/2022    BCR 24.0 06/21/2022    K 4.0 06/21/2022    CO2 31.0 (H) 06/21/2022    CALCIUM 8.8 06/21/2022    PROTENTOTREF 5.7 (L) 05/12/2022    ALBUMIN 3.70 05/23/2022    LABIL2 3.1 (H) 05/12/2022    AST 21 05/23/2022    ALT 22 05/23/2022     Lab Results   Component Value Date    WBC 8.4 06/06/2022    HGB 7.9 (L) 06/06/2022    HCT 28.7 (L) 06/06/2022    MCV 86 06/06/2022     06/06/2022     Lab Results   Component Value Date    CHOL 111 08/06/2019    CHLPL 91 (L) 01/31/2022    TRIG 86 01/31/2022    HDL 45 01/31/2022    LDL 29 01/31/2022     Lab Results   Component Value Date    TSH 2.430 05/24/2022     Lab Results   Component Value Date    TROPONINT <0.010 05/24/2022     Lab Results   Component Value Date    HGBA1C 5.4 05/12/2022     No results found for: DDIMER  Lab Results   Component Value Date     ALT 22 05/23/2022     Lab Results   Component Value Date    HGBA1C 5.4 05/12/2022    HGBA1C 5.90 (H) 03/05/2022    HGBA1C 6.5 (H) 07/13/2021     Lab Results   Component Value Date    CREATININE 1.21 (H) 06/21/2022     Lab Results   Component Value Date    IRON 24 (L) 03/05/2022    TIBC 527 03/05/2022    FERRITIN 27.20 03/06/2022     Lab Results   Component Value Date    INR 1.27 (H) 03/05/2022    INR 1.24 (H) 03/04/2022    INR 1.40 (H) 08/17/2021    PROTIME 15.8 (H) 03/05/2022    PROTIME 15.6 (H) 03/04/2022    PROTIME 19.2 (H) 06/14/2021         1. Pulmonary hypertension (HCC). PAH/PVH. WHO Group Likely 2/3; FC: III.  RV status: Abnormal:.  PFTs: Pending.  The Mckeon Index was updated today. Most recent score: 6.     The patient likely has secondary pulmonary hypertension primarily from HFpEF and likely BRIDGETTE.  For now, I have recommended that we optimize her HFpEF medications.  She does need a thorough pulmonary and sleep evaluation.     Additional Studies Ordered Today:  -PFTs  -Chest CT, non-contrasted    Medications To Be Continued/Ordered/Adjusted Today:   -As below    Vaccinations:  Pneumoccal (PCV13) and COVID booster    I have asked the patient that if they were to move to be certain they see someone with expertise in PAH. These providers can be located at www.[email or web address].        2. Chronic heart failure with preserved ejection fraction (HCC).  NYHA stage C; functional class III.  Today, she is hypervolemic, but perfused.  Volume status, however, has improved.  She does have PAF and atrial flutter in the past.  She has recently had atrial fibrillation with intermittent uncontrolled ventricular rates.  She is now on digoxin and target dose of Lopressor.  Unfortunately, she has continued to be symptomatic, which I believe is from her atrial fibrillation.  Home VRs have been 70s-100s. Her RAYNE is greater than 52 mL/m².  I am not certain that DCCV would be effective.  She may need to consider PVI/RFA.   Recommended EP assessment.      -EP for their opinion regarding her atrial fibrillation   -Bumex  - Farxiga  - Lopressor  - Aldactone 25 mg.  Quarterly assessment of GFR.  - Chem-7 today   3. Pulmonary nodule.  CT chest has been ordered.  Awaiting scheduling.   4.  Concerns for sleep apnea.  Awaiting sleep appointment.   5. RPM: Continue RPM.       Return in about 3 months (around 9/27/2022).      Orders today:  Orders Placed This Encounter   Procedures   • Basic Metabolic Panel           This document has been electronically signed by Jose Rosales MD PhD on June 27, 2022 09:40 EDT        Jose Rosales M.D., Ph.D., [facility] Heart Failure and PAH Clinic

## 2022-06-27 NOTE — TELEPHONE ENCOUNTER
----- Message from Jose Rosales MD PhD sent at 6/27/2022 10:30 AM EDT -----  Please let her son know that her BMP was acceptable.  Continue current medications, keep scheduled outpatient consultation appointments, and follow-up as directed.

## 2022-06-27 NOTE — ADDENDUM NOTE
Encounter addended by: Evie Ren RN on: 6/27/2022 9:45 AM   Actions taken: Charge Capture section accepted

## 2022-06-27 NOTE — TELEPHONE ENCOUNTER
Called Pt's son Hardeep with lab results. No answer.  Left message asking Pt to call back.  Will await Pt call back.    Evie Ren RN  [facility]      Addendum:  Spoke to Pt's son Hardeep.  Informed of lab results and instructions as per Dr. Rosales's note. All questions and concerns addressed. Understanding and agreement verbalized.

## 2022-07-01 NOTE — PROGRESS NOTES
Chief Complaint  Cirrhosis    Subjective          History of Present Illness  Miri Yung is a  81 y.o. female patient of Dr. Aaron, new to me today, here for follow-up for Muse cirrhosis.  She has a pretty extensive past medical history as detailed below most notably for congestive heart failure, atrial fibrillation anticoagulated with Eliquis, mild dementia.  Last seen by Melody Rodríguez on 4/1/2022.  She is accompanied to today's visit by her son.    Her bowels have been moving well and regularly.  She denies any abdominal pain or increased abdominal distention.  She denies any nausea, vomiting, heartburn/reflux, melena or hematochezia.  She has had some swelling in her lower extremities but her son reports that this is actually improved and back to baseline given her recent adjustments in her medication regimen. Her current diuretic regimen is being managed by Dr. Rosales at the heart failure clinic.  Current regimen includes 2 mg of Bumex and 25 mg of Aldactone.      6/18/21 Colonoscopy without any identifiable bleeding source/NBIH.  EGD did show small varices and hiatal hernia.     Her most recent renal function was completed on 6/27/2022 with a BUN of 23 and creatinine 1.12.     8/17/2021 Alpha-fetoprotein elevated at 17.6,  CA 19-9 elevated at 94.     Unenhanced CT of the abdomen and pelvis from 5/4/2022 findings of cirrhosis.    Enhanced CT of the abdomen and pelvis from 6/15/2021: Cirrhotic morphology of the liver with portal hypertension and mild splenomegaly, moderate sized periumbilical hernia.    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; generally asymptomatic   • Mitral regurgitation    • Mixed anxiety depressive disorder    • Nonrheumatic tricuspid (valve) insufficiency    • Osteoarthritis    • PAF (paroxysmal atrial fibrillation) (HCC)    • Renal failure    • Sacroiliitis (HCC)    • Tricuspid regurgitation    • Type 2 diabetes mellitus (HCC)        Past Surgical History:   Procedure Laterality Date   • CATARACT EXTRACTION     • COLONOSCOPY N/A 3/10/2016    Procedure: COLONOSCOPY TO CECUM;  Surgeon: Shaan Becerra Jr., MD;  Location: [facility] ENDOSCOPY;  Service:    • COLONOSCOPY N/A 6/18/2021    Procedure: COLONOSCOPY TO CECUM AND INTO TERMINAL ILEUM;  Surgeon: Jeremy Darden MD;  Location: [facility] ENDOSCOPY;  Service: Gastroenterology;  Laterality: N/A;  PRE: ANEMIA  POST: HEMORRHOIDS   • ENDOSCOPY N/A 3/10/2016    Procedure: ESOPHAGOGASTRODUODENOSCOPY ;  Surgeon: Shaan Becerra Jr., MD;  Location: [facility] ENDOSCOPY;  Service:    • ENDOSCOPY N/A 6/18/2021    Procedure: ESOPHAGOGASTRODUODENOSCOPY;  Surgeon: Jeremy Darden MD;  Location: [facility] ENDOSCOPY;  Service: Gastroenterology;  Laterality: N/A;  PRE: ANEMIA  POST: HIATAL HERNIA, SMALL ESOPHAGEAL VARICES   • KNEE SURGERY     • TUBAL ABDOMINAL LIGATION         Family History   Problem Relation Age of Onset   • Kidney disease Mother    • Heart disease Mother    • Emphysema Father    • Heart disease Brother        Social History     Socioeconomic History   • Marital status:    Tobacco Use   • Smoking status: Never Smoker   • Smokeless tobacco: Never Used   • Tobacco comment: caffeine use    Vaping Use   • Vaping Use: Never used   Substance and Sexual Activity   • Alcohol use: No   • Drug use: No   • Sexual activity: Defer       Allergies   Allergen Reactions   • Decongestant   [Pseudoephedrine]        Current Outpatient Medications on File Prior to Visit   Medication Sig Dispense Refill   • allopurinol (ZYLOPRIM) 300 MG tablet TAKE 1 TABLET BY MOUTH  DAILY 90 tablet 3   • apixaban (ELIQUIS) 2.5 MG tablet tablet Take 1 tablet by mouth Every 12 (Twelve) Hours. Indications: Atrial Fibrillation     • atorvastatin (LIPITOR) 40 MG tablet TAKE 1 TABLET BY MOUTH DAILY 90 tablet 0   • bumetanide (BUMEX) 2 MG tablet TAKE 2 TABLETS BY MOUTH EVERY MORNING AND 1 TABLET EVERY AFTERNOON 270 tablet 0   • buPROPion SR (WELLBUTRIN SR) 150 MG 12 hr tablet Take 1 tablet by mouth Daily With Breakfast. 30 tablet 5   • dapagliflozin Propanediol (Farxiga) 10 MG tablet Take 10 mg by mouth Daily. 31 tablet 3   • digoxin (LANOXIN) 125 MCG tablet Take 2 tabs (250 mcg) once; then, take 2 tabs (250 mcg) 6 hours later; then, start one tablet a day. 31 tablet 1   • ferrous sulfate 325 (65 FE) MG EC tablet TAKE 1 TABLET BY MOUTH EVERY DAY WITH BREAKFAST 90 tablet 0   • glucose blood test strip BID. Test  In the AM and again in the  each 3   • glucose monitor monitoring kit One touch meter 1 each 0   • isosorbide mononitrate (IMDUR) 60 MG 24 hr tablet TAKE 1 TABLET BY MOUTH  DAILY 90 tablet 3   • Januvia 25 MG tablet TAKE 1 TABLET BY MOUTH DAILY 90 tablet 0   • Lancets (onetouch ultrasoft) lancets Test  each 3   • metoprolol tartrate (LOPRESSOR) 50 MG tablet TAKE 2 TABLETS BY MOUTH TWICE DAILY 360 tablet 0   • mirtazapine (REMERON) 15 MG tablet Take 1 tablet by mouth Every Night. 30 tablet 5   • montelukast (SINGULAIR) 10 MG tablet Take 1 tablet by mouth Every Night. 30 tablet 5   • multivitamin with minerals tablet tablet Take 1 tablet by mouth Daily.     • pantoprazole (PROTONIX) 40 MG EC tablet TAKE 1 TABLET BY MOUTH  DAILY 90 tablet 3   • potassium chloride (K-DUR,KLOR-CON) 20 MEQ CR tablet Take 1 tablet by mouth Daily. 90 tablet 3   • POTASSIUM CHLORIDE PO Take 20 mg by mouth.     • spironolactone  "(ALDACTONE) 25 MG tablet Take 1 tablet by mouth Daily. 31 tablet 3     No current facility-administered medications on file prior to visit.       Review of Systems     Objective   Vital Signs:   /62   Pulse 82   Ht 157.5 cm (62\")   Wt 90.4 kg (199 lb 3.2 oz)   BMI 36.43 kg/m²       Physical Exam  Vitals and nursing note reviewed.   Constitutional:       General: She is not in acute distress.     Appearance: She is ill-appearing (Chronically).   HENT:      Head: Normocephalic and atraumatic.      Right Ear: External ear normal.      Left Ear: External ear normal.   Eyes:      General: No scleral icterus.     Conjunctiva/sclera: Conjunctivae normal.      Pupils: Pupils are equal, round, and reactive to light.   Cardiovascular:      Rate and Rhythm: Normal rate. Rhythm irregular.      Heart sounds: Normal heart sounds.   Pulmonary:      Effort: Pulmonary effort is normal.      Breath sounds: Normal breath sounds.   Abdominal:      General: Bowel sounds are normal. There is distension (mildly).      Palpations: Abdomen is soft.      Tenderness: There is no abdominal tenderness. There is no guarding or rebound.      Comments: Large umbilical hernia   Musculoskeletal:      Cervical back: Normal range of motion and neck supple.      Right lower leg: Edema present.      Left lower leg: Edema present.   Skin:     General: Skin is warm and dry.      Comments: Chronic lower extremity skin changes   Neurological:      Mental Status: She is alert and oriented to person, place, and time.   Psychiatric:         Mood and Affect: Mood normal.         Behavior: Behavior normal.          Result Review :       Common labs    Common Labsle 6/15/22 6/21/22 6/27/22   Glucose 115 (A) 125 (A) 134 (A)   BUN 25 (A) 29 (A) 23   Creatinine 1.36 (A) 1.21 (A) 1.12 (A)   Sodium 141 140 140   Potassium 2.8 (A) 4.0 3.9   Chloride 98 99 99   Calcium 8.3 (A) 8.8 8.6   (A) Abnormal value                                Assessment and Plan  "   Diagnoses and all orders for this visit:    1. Cirrhosis of liver with ascites, unspecified hepatic cirrhosis type (HCC) (Primary)  -     CBC & Differential  -     Comprehensive Metabolic Panel  -     Protime-INR  -     AFP Tumor Marker    2. Elevated alpha fetoprotein      An 81-year-old presents for follow-up on KOO cirrhosis.  She is followed by the heart failure clinic who manages her diuretic regimen currently at 2 mg of Bumex and 25 mg of Aldactone.  Minimal abdominal distention.  She has no complaints today.    · Update CBC, CMP, AFP, PT/INR  · 2 g sodium diet and 1500 mL fluid restriction.  · Diuretic management per heart failure clinic/Dr. Rosales  · Will discuss best modality of imaging given previously elevated AFP in the setting of impaired renal function.  · Return to clinic in 3 months with Dr. Aaron.        Follow Up   Return in about 3 months (around 10/1/2022) for Dr. Aaron.    Cristina dictation used throughout this note.     TEJ Frankel

## 2022-07-06 NOTE — PROGRESS NOTES
Miri's AFP continues to remain elevated. She is unable to undergo MRI secondary to metal plates in her arms. We will plan to proceed with CT with contrast.

## 2022-07-12 NOTE — PROGRESS NOTES
"Chief Complaint  Follow-up (Patient is here for a 2 month follow up. )    Subjective        Miri Yung presents to [facility]  History of Present Illness  This is a 80 yo female patient here today for follow up. Home health was seeing patient after last hospitalization. Daughter in law states home health therapist recently discharged. Since then patient has been week in her legs. Daughter in law states that yesterday she found her trying to urinated in the kitchen and was disoriented. She reports she seems to have more confusion. She has been seen recently in heart failure clinic with last visit last week. She continues januvia and fargixa and BS today is 159. She denies any dysuria, hematuria or frequency. She is afebrile today. BP is stable.    Objective   Vital Signs:  /58 (BP Location: Left arm, Patient Position: Sitting, Cuff Size: Adult)   Pulse 76   Temp 97.7 °F (36.5 °C) (Infrared)   Ht 157.5 cm (62.01\")   Wt 90.3 kg (199 lb)   SpO2 96%   BMI 36.39 kg/m²   Estimated body mass index is 36.39 kg/m² as calculated from the following:    Height as of this encounter: 157.5 cm (62.01\").    Weight as of this encounter: 90.3 kg (199 lb).          Physical Exam  Vitals and nursing note reviewed.   HENT:      Head: Normocephalic.      Nose: Nose normal.   Eyes:      Pupils: Pupils are equal, round, and reactive to light.   Cardiovascular:      Rate and Rhythm: Normal rate and regular rhythm.      Pulses: Normal pulses.      Heart sounds: Normal heart sounds.   Pulmonary:      Effort: Pulmonary effort is normal. No respiratory distress.      Breath sounds: Normal breath sounds. No wheezing or rales.   Abdominal:      General: Bowel sounds are normal. There is no distension.      Tenderness: There is no abdominal tenderness.   Musculoskeletal:         General: No swelling.      Cervical back: Neck supple.      Right lower leg: No edema.      Left lower leg: No edema. "   Skin:     General: Skin is warm and dry.   Neurological:      Mental Status: She is alert and oriented to person, place, and time.   Psychiatric:         Mood and Affect: Mood normal.        Result Review :                Assessment and Plan   Diagnoses and all orders for this visit:    1. Anemia, unspecified type (Primary)  -     POCT urinalysis dipstick, automated  -     Ambulatory Referral to Home Health  -     CBC w AUTO Differential    2. Drowsy  -     CBC w AUTO Differential  -     Basic metabolic panel    3. Generalized weakness  -     CBC w AUTO Differential  -     Basic metabolic panel  -     Ambulatory Referral to Home Health    4. Confusion  -     Urine Culture - Urine, Urine, Clean Catch    Other orders  -     SCANNED - LABS    She is alert and oriented but drowsy today. She states she is not sleeping well at night.  I will reorder home health for therapy  I will order labs and urine today  We discussed if labs are not looking normal may need to consider evaluation in the hospital. Patient and daughter verbalized understanding.    I spent a total of 30  minutes with the patient today including face-to-face encounter, reviewing data in the system, coordination of care with the nursing staff as well as consultants, documentation and entering orders.              Follow Up   No follow-ups on file.  Patient was given instructions and counseling regarding her condition or for health maintenance advice. Please see specific information pulled into the AVS if appropriate.        [Normal Appearance] : normal appearance [Well Groomed] : well groomed [General Appearance - In No Acute Distress] : no acute distress [Neck Appearance] : the appearance of the neck was normal [Neck Cervical Mass (___cm)] : no neck mass was observed [Heart Rate And Rhythm] : heart rate was normal and rhythm regular [Heart Sounds] : normal S1 and S2 [Exaggerated Use Of Accessory Muscles For Inspiration] : no accessory muscle use [Auscultation Breath Sounds / Voice Sounds] : lungs were clear to auscultation bilaterally [Abdomen Soft] : soft [Abdomen Tenderness] : non-tender [Skin Color & Pigmentation] : normal skin color and pigmentation [Oriented To Time, Place, And Person] : oriented to person, place, and time [Impaired Insight] : insight and judgment were intact [Affect] : the affect was normal [Normal Oral Mucosa] : normal oral mucosa [No Oral Pallor] : no oral pallor [No Oral Cyanosis] : no oral cyanosis [Normal Oropharynx] : normal oropharynx [Murmurs] : no murmurs [Bowel Sounds] : normal bowel sounds [Abnormal Walk] : normal gait [] : no rash [FreeTextEntry1] : difficulty remember doctors names, dates, medications

## 2022-07-27 PROBLEM — N18.32 STAGE 3B CHRONIC KIDNEY DISEASE: Status: ACTIVE | Noted: 2022-01-01

## 2022-07-27 NOTE — PROGRESS NOTES
Do you know if iron was added? Please call son and let him know that hgb is slowly improving and would like to recheck in a couple weeks

## 2022-07-28 PROBLEM — R55 SYNCOPE, UNSPECIFIED SYNCOPE TYPE: Status: ACTIVE | Noted: 2022-01-01

## 2022-07-28 PROBLEM — R56.9 SEIZURE-LIKE ACTIVITY (HCC): Status: ACTIVE | Noted: 2022-01-01

## 2022-07-28 PROBLEM — K75.81 LIVER CIRRHOSIS SECONDARY TO NASH (HCC): Status: ACTIVE | Noted: 2022-01-01

## 2022-07-28 PROBLEM — K74.60 LIVER CIRRHOSIS SECONDARY TO NASH (HCC): Status: ACTIVE | Noted: 2022-01-01

## 2022-07-29 PROBLEM — I50.9 CONGESTIVE HEART FAILURE (CHF) (HCC): Status: ACTIVE | Noted: 2022-01-01

## 2022-08-26 ENCOUNTER — APPOINTMENT (OUTPATIENT)
Dept: CT IMAGING | Facility: HOSPITAL | Age: 82
End: 2022-08-26

## 2022-09-09 RX ORDER — ATORVASTATIN CALCIUM 40 MG/1
40 TABLET, FILM COATED ORAL DAILY
Qty: 90 TABLET | Refills: 0 | OUTPATIENT
Start: 2022-09-09

## 2022-10-28 NOTE — TELEPHONE ENCOUNTER
Caller: Miri Yung    Relationship: Self    Best call back number:[phone]       What medication are you requesting: MEDICATION FOR YEAST INFECTION        If a prescription is needed, what is your preferred pharmacy and phone number: [id number] - [facility] [address] [address] [place] [address] [address] [place] [address] - [phone] [facility] [phone] FX                [Well Nourished] : well nourished [Well Developed] : well developed [Alert] : ~L alert [Active] : active [Normal Breathing Pattern] : normal breathing pattern [No Respiratory Distress] : no respiratory distress [No Allergic Shiners] : no allergic shiners [No Drainage] : no drainage [No Conjunctivitis] : no conjunctivitis [Tympanic Membranes Clear] : tympanic membranes were clear [Nasal Mucosa Non-Edematous] : nasal mucosa non-edematous [No Nasal Drainage] : no nasal drainage [No Polyps] : no polyps [No Sinus Tenderness] : no sinus tenderness [No Oral Pallor] : no oral pallor [No Oral Cyanosis] : no oral cyanosis [Non-Erythematous] : non-erythematous [No Exudates] : no exudates [No Postnasal Drip] : no postnasal drip [No Tonsillar Enlargement] : no tonsillar enlargement [Absence Of Retractions] : absence of retractions [Symmetric] : symmetric [Good Expansion] : good expansion [No Acc Muscle Use] : no accessory muscle use [Good aeration to bases] : good aeration to bases [Equal Breath Sounds] : equal breath sounds bilaterally [No Crackles] : no crackles [No Rhonchi] : no rhonchi [No Wheezing] : no wheezing [Normal Sinus Rhythm] : normal sinus rhythm [No Heart Murmur] : no heart murmur [Soft, Non-Tender] : soft, non-tender [No Hepatosplenomegaly] : no hepatosplenomegaly [Non Distended] : was not ~L distended [Abdomen Mass (___ Cm)] : no abdominal mass palpated [Full ROM] : full range of motion [No Clubbing] : no clubbing [Capillary Refill < 2 secs] : capillary refill less than two seconds [No Cyanosis] : no cyanosis [No Petechiae] : no petechiae [No Kyphoscoliosis] : no kyphoscoliosis [No Contractures] : no contractures [Alert and  Oriented] : alert and oriented [No Abnormal Focal Findings] : no abnormal focal findings [Normal Muscle Tone And Reflexes] : normal muscle tone and reflexes [No Birth Marks] : no birth marks [No Rashes] : no rashes [No Skin Lesions] : no skin lesions

## 2023-10-27 NOTE — PROGRESS NOTES
Subjective   Miri Yung is a 78 y.o. female present today for follow up from [facility] for Acute ischemic stroke.      History of Present Illness     Last was in the hospital August 5, 2019.  Patient had a recent acute infarct of the brain possibly right around the time sent for the MRI.  Appt with Beckie Cheatham for stroke and seeing Jaylen for heart.  Currently getting monitored for possible A-fib.  Feels like she is been doing okay but she is still healing from the stroke.  Still getting PT and home health support at home.    The following portions of the patient's history were reviewed and updated as appropriate: allergies, current medications, past family history, past medical history, past social history, past surgical history and problem list.    Review of Systems   Constitutional: Positive for fatigue.   Musculoskeletal:        Hip pain (right)   All other systems reviewed and are negative.      Objective   Physical Exam   Constitutional: She appears well-developed and well-nourished. No distress.   Cardiovascular: Normal rate, regular rhythm and normal heart sounds. Exam reveals no gallop and no friction rub.   No murmur heard.  Pulmonary/Chest: Effort normal and breath sounds normal. She has no wheezes. She has no rales.   Skin: Skin is warm. Capillary refill takes less than 2 seconds. She is not diaphoretic.   Psychiatric: She has a normal mood and affect. Her behavior is normal.   Nursing note and vitals reviewed.      Assessment/Plan   Miri was seen today for follow-up.    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  We also discussed further evaluation of her memory loss due to the possible stroke.  We talked about a neuropsych test to be done in the next few months after she has seen her  cardiologist.  We will try to have that complete before she sees her neurologist at the end of the year.            .

## 2024-01-23 NOTE — ED NOTES
Pt called out saying she feels extremely anxious, Jan BURNHAM notified, states he will see pt in a minute      Nisha Tucker, RN  10/09/19 1629     Patient/Child

## 2025-01-15 NOTE — DISCHARGE PLACEMENT REQUEST
Problem: Adult Inpatient Plan of Care  Goal: Plan of Care Review  Outcome: Progressing  Goal: Patient-Specific Goal (Individualized)  Outcome: Progressing  Goal: Absence of Hospital-Acquired Illness or Injury  Outcome: Progressing  Goal: Optimal Comfort and Wellbeing  Outcome: Progressing  Goal: Readiness for Transition of Care  Outcome: Progressing     Problem: Wound  Goal: Optimal Coping  Outcome: Progressing  Goal: Optimal Functional Ability  Outcome: Progressing  Goal: Absence of Infection Signs and Symptoms  Outcome: Progressing  Goal: Improved Oral Intake  Outcome: Progressing  Goal: Optimal Pain Control and Function  Outcome: Progressing  Goal: Skin Health and Integrity  Outcome: Progressing  Goal: Optimal Wound Healing  Outcome: Progressing     Problem: Infection  Goal: Absence of Infection Signs and Symptoms  Outcome: Progressing     Problem: Fall Injury Risk  Goal: Absence of Fall and Fall-Related Injury  Outcome: Progressing    Patient is AAO x4 and is calm and cooperative when care is given. Assistance when needed was established through communication through use of the call light. Assistance when ambulated was done was Pt was able to ambulate in the room from the bed to the door. (2200). Pt tolerated interventions with no s/s of  lightheadedness/dizziness nor nausea. IV site assessed as clean, dry, intact and patent when flushed. Safety precautions provided as bed is low, side rails up x2 with call light in reach. Continue plan of care.       "Bree Yung (81 y.o. Female)             Date of Birth   1940    Social Security Number       Address   [address] [place] [id number]    Home Phone   [phone]    MRN   [id number]       [facility]    Marital Status                               Admission Date   5/2/22    Admission Type   Emergency    Admitting Provider   Adele Preston MD    Attending Provider   Marcos Villalta MD    Department, Room/Bed   [address], [address]       Discharge Date       Discharge Disposition       Discharge Destination                               Attending Provider: Marcos Villalta MD    Allergies: Decongestant  [Pseudoephedrine]    Isolation: None   Infection: None   Code Status: CPR   Advance Care Planning Activity    Ht: 157 cm (61.81\")   Wt: 70.5 kg (155 lb 8 oz)    Admission Cmt: None   Principal Problem: None                Active Insurance as of 5/2/2022     Primary Coverage     Payor Plan Insurance Group Employer/Plan Group    MEDICARE MEDICARE A & B      Payor Plan Address Payor Plan Phone Number Payor Plan Fax Number Effective Dates    [address] [phone]  11/1/2004 - None Entered    [facility] [id number]       Subscriber Name Subscriber Birth Date Member ID       BREE YUNG 1940 [id number]           Secondary Coverage     Payor Plan Insurance Group Employer/Plan Group    [employer] SUP AAR HE[employer] CARE OPTIONS      Payor Plan Address Payor Plan Phone Number Payor Plan Fax Number Effective Dates    [facility] [phone]  1/1/2016 - None Entered    [address]       [facility] [address]       Subscriber Name Subscriber Birth Date Member ID       BREE YUNG 1940 [id number]                 Emergency Contacts      (Rel.) Home Phone Work Phone Mobile Phone    Hardeep Yung (Son) [phone] -- [phone]    Brian Yung (Son) [phone] -- [phone]    Ashley Israel (Other) -- -- [phone]        "

## 2025-02-22 NOTE — PROGRESS NOTES
"Chief Complaint  Foot Swelling and Vaginitis    Subjective          Miri Yung presents to [facility]  History of Present Illness   This is a 81 yo female here today for evaluation of vaginitis and bilateral lower extremity edema. Patient was or candiasis back in January and she reports symptoms have returned. She also reports increased lower extremity edema and increased shortness of breath. She has a history of afib and is being followed by cardiology. She is also currently on lasix. Daughter in law and patient is unsure of current dose. Her weight today is up 3lbs from last visit.   Breathing harder yesterday, shallow   Cough, tonsils sore, hea      Objective   Vital Signs:   /58   Pulse 76   Temp 97.1 °F (36.2 °C)   Ht 157.5 cm (62\")   Wt 90.7 kg (200 lb)   SpO2 (!) 88%   BMI 36.58 kg/m²     Physical Exam  Vitals and nursing note reviewed. Exam conducted with a chaperone present.   Constitutional:       Appearance: Normal appearance.   HENT:      Head: Normocephalic.      Nose: Nose normal.      Mouth/Throat:      Mouth: Mucous membranes are moist.   Eyes:      Pupils: Pupils are equal, round, and reactive to light.   Cardiovascular:      Rate and Rhythm: Normal rate and regular rhythm.      Pulses: Normal pulses.      Heart sounds: Normal heart sounds.   Pulmonary:      Effort: Pulmonary effort is normal. No respiratory distress.      Breath sounds: Normal breath sounds. No wheezing or rales.      Comments: 02 sats were rechecked and was 94% on room air  Abdominal:      General: Bowel sounds are normal.      Palpations: Abdomen is soft.   Musculoskeletal:         General: Normal range of motion.      Cervical back: Neck supple.      Right lower leg: Edema present.      Left lower leg: Edema present.   Skin:     General: Skin is warm and dry.      Comments: There is redness noted in abd folds and vaginal area.    Neurological:      Mental Status: She is alert and " oriented to person, place, and time.   Psychiatric:         Mood and Affect: Mood normal.         Behavior: Behavior normal.        Result Review :                 Assessment and Plan    Diagnoses and all orders for this visit:    1. Essential hypertension (Primary)    2. Chronic diastolic (congestive) heart failure (CMS/HCC)  -     proBNP  -     Comprehensive metabolic panel  -     CBC w AUTO Differential    3. Type 2 diabetes mellitus without complication, without long-term current use of insulin (CMS/[facility])  -     Hemoglobin A1c    Patient and daughter in law is unsure of current lasix dose. I am concerned with her increased weight gain and her complaints of being short of breath at times. I have asked her to call me when she gets home to give me correct dose of lasix and asked for her to make an appt to see Dr York soon.   I will obtain lab work today as well.  She has nystatin cream at home and will restart that as well.     I spent a total of 30 minutes with the patient today including face-to-face encounter, reviewing data in the system, coordination of care with the nursing staff as well as consultants, documentation and entering orders.        Follow Up   No follow-ups on file.  Patient was given instructions and counseling regarding her condition or for health maintenance advice. Please see specific information pulled into the AVS if appropriate.        Pain or Fever (TEMP >100F) Indications: NTE: 3G/24HRS    ProviderCaryn MD   fluticasone (FLONASE) 50 MCG/ACT nasal spray 1 spray by Nasal route 2 times daily as needed for Rhinitis or Allergies  Patient taking differently: 1 spray by Nasal route every 12 hours as needed for Rhinitis or Allergies 11/12/24   Marina Son APRN - CNP   magnesium hydroxide (MILK OF MAGNESIA) 400 MG/5ML suspension Take 30 mLs by mouth daily as needed for Constipation 11/12/24   Marina Son APRN - CNP   melatonin 10 MG CAPS capsule Take 1 capsule by mouth nightly as needed (sleep) 11/12/24   Marina Son APRN - CNP   albuterol sulfate HFA (PROVENTIL;VENTOLIN;PROAIR) 108 (90 Base) MCG/ACT inhaler Inhale 2 puffs into the lungs 4 times daily as needed for Wheezing  Patient taking differently: Inhale 2 puffs into the lungs every 6 hours as needed for Shortness of Breath 6/27/24   Marina Son APRN - CNP   Menthol, Topical Analgesic, (BIOFREEZE) 4 % GEL Apply 1 application  topically every 4 hours as needed (PAIN)    Provider, MD Caryn       Current Medications:    Current Facility-Administered Medications: sennosides-docusate sodium (SENOKOT-S) 8.6-50 MG tablet 1 tablet, 1 tablet, Oral, BID  sulfur hexafluoride microspheres (LUMASON) 60.7-25 MG injection 2 mL, 2 mL, IntraVENous, ONCE PRN  insulin lispro (HUMALOG,ADMELOG) injection vial 0-16 Units, 0-16 Units, SubCUTAneous, 4x Daily WC  pantoprazole (PROTONIX) tablet 40 mg, 40 mg, Oral, QAM AC  prochlorperazine (COMPAZINE) injection 5 mg, 5 mg, IntraVENous, Q6H PRN  metoprolol (LOPRESSOR) injection 2.5 mg, 2.5 mg, IntraVENous, Q6H PRN  levoFLOXacin (LEVAQUIN) tablet 500 mg, 500 mg, Oral, Daily  hydrALAZINE (APRESOLINE) injection 10 mg, 10 mg, IntraVENous, Q6H PRN  glucose chewable tablet 16 g, 4 tablet, Oral, PRN  dextrose bolus 10% 125 mL, 125 mL, IntraVENous, PRN **OR** dextrose bolus 10% 250 mL, 250 mL, IntraVENous, PRN  glucagon injection 1 mg, 1  arformoterol tartrate (BROVANA) nebulizer solution 15 mcg, 15 mcg, Nebulization, BID RT    Allergies:  Prinivil [lisinopril] and Seasonal    Social History:    Patient lives at home with his wife. Former smoker - 2.5 PPD x 10 years, quit 1979. Denies ETOH or illicit drug use.       Family History:   Family History   Problem Relation Age of Onset    Cancer Mother     Cancer Sister        Review of Systems:   As per HPI    PHYSICAL EXAM:   /74   Pulse 89   Temp 98.8 °F (37.1 °C) (Axillary)   Resp 19   Ht 1.854 m (6' 1\")   Wt (!) 139.9 kg (308 lb 6.8 oz)   SpO2 94%   BMI 40.69 kg/m²   CONST:  Well developed, well nourished who appears of stated age. Awake, alert and cooperative. No apparent distress.   HEENT: Normocephalic, atraumatic. PERRLA. No JVD. No carotid bruit.   CHEST: Chest symmetrical and non-tender to palpation.   RESPIRATORY: Clear to auscultation bilaterally. On supplemental O2. No accessory muscle use.   CARDIOVASCULAR:     Heart Inspection- shows no noted pulsations  Heart Palpation- no heaves or thrills; PMI is non-displaced   Heart Auscultation- Tachycardic, no murmur. No s3, s4 or rub   PV: No lower extremity edema. No varicosities. Pedal pulses palpable  ABDOMEN: Soft, non-tender to light palpation. Bowel sounds present. No palpable masses   MS: Good muscle strength and tone. No atrophy or abnormal movements.   : Deferred  SKIN: Warm and dry no statis dermatitis or ulcers   NEURO / PSYCH: Oriented to person, place and time. Speech clear and appropriate. Follows all commands. Pleasant affect     DATA:    12 lead ECG:        Telemetry: Sinus rhythm with a rate in the 60's > @ 7:11 am HR increased to >110 (sinus tachycardia vs atrial tachycardia)    Imaging:  US GALLBLADDER RUQ   Final Result   1. Distended gallbladder with gallbladder neck gallstone. Sludge or   pericholecystic fluid is also present. Gallbladder wall thickening up to 4   mm. Negative sonographic Lamar's sign.   2.

## (undated) DEVICE — THE TORRENT IRRIGATION SCOPE CONNECTOR IS USED WITH THE TORRENT IRRIGATION TUBING TO PROVIDE IRRIGATION FLUIDS SUCH AS STERILE WATER DURING GASTROINTESTINAL ENDOSCOPIC PROCEDURES WHEN USED IN CONJUNCTION WITH AN IRRIGATION PUMP (OR ELECTROSURGICAL UNIT).: Brand: TORRENT

## (undated) DEVICE — ADAPT CLN BIOGUARD AIR/H2O DISP

## (undated) DEVICE — KT ORCA ORCAPOD DISP STRL

## (undated) DEVICE — MSK ENDO PORT O2 POM ELITE CURAPLEX A/

## (undated) DEVICE — SENSR O2 OXIMAX FNGR A/ 18IN NONSTR

## (undated) DEVICE — BITEBLOCK OMNI BLOC

## (undated) DEVICE — LN SMPL CO2 SHTRM SD STREAM W/M LUER

## (undated) DEVICE — TUBING, SUCTION, 1/4" X 10', STRAIGHT: Brand: MEDLINE